# Patient Record
Sex: MALE | Race: WHITE | HISPANIC OR LATINO | Employment: OTHER | URBAN - METROPOLITAN AREA
[De-identification: names, ages, dates, MRNs, and addresses within clinical notes are randomized per-mention and may not be internally consistent; named-entity substitution may affect disease eponyms.]

---

## 2017-01-04 ENCOUNTER — OFFICE VISIT (OUTPATIENT)
Dept: INTERNAL MEDICINE | Facility: CLINIC | Age: 57
End: 2017-01-04
Payer: COMMERCIAL

## 2017-01-04 VITALS
OXYGEN SATURATION: 98 % | TEMPERATURE: 98.3 F | SYSTOLIC BLOOD PRESSURE: 124 MMHG | WEIGHT: 209.6 LBS | BODY MASS INDEX: 31.77 KG/M2 | DIASTOLIC BLOOD PRESSURE: 70 MMHG | HEIGHT: 68 IN | HEART RATE: 101 BPM

## 2017-01-04 DIAGNOSIS — E11.9 TYPE 2 DIABETES MELLITUS WITHOUT COMPLICATION, WITHOUT LONG-TERM CURRENT USE OF INSULIN (H): Primary | ICD-10-CM

## 2017-01-04 DIAGNOSIS — Z11.59 NEED FOR HEPATITIS C SCREENING TEST: ICD-10-CM

## 2017-01-04 DIAGNOSIS — T30.0 BURN OF SKIN: ICD-10-CM

## 2017-01-04 DIAGNOSIS — E78.5 HYPERLIPIDEMIA LDL GOAL <100: ICD-10-CM

## 2017-01-04 PROCEDURE — 99214 OFFICE O/P EST MOD 30 MIN: CPT | Performed by: INTERNAL MEDICINE

## 2017-01-04 RX ORDER — SILVER SULFADIAZINE 10 MG/G
CREAM TOPICAL 2 TIMES DAILY
Qty: 85 G | Refills: 1 | Status: SHIPPED | OUTPATIENT
Start: 2017-01-04 | End: 2017-01-11

## 2017-01-04 RX ORDER — LISINOPRIL 20 MG/1
20 TABLET ORAL 2 TIMES DAILY
Qty: 180 TABLET | Refills: 3 | Status: ON HOLD | OUTPATIENT
Start: 2017-01-04 | End: 2017-02-27

## 2017-01-04 RX ORDER — BLOOD-GLUCOSE METER
EACH MISCELLANEOUS
Qty: 1 KIT | Refills: 0 | Status: ON HOLD | OUTPATIENT
Start: 2017-01-04 | End: 2017-02-27

## 2017-01-04 RX ORDER — CEPHALEXIN 500 MG/1
500 CAPSULE ORAL 3 TIMES DAILY
Qty: 21 CAPSULE | Refills: 0 | Status: SHIPPED | OUTPATIENT
Start: 2017-01-04 | End: 2017-02-13

## 2017-01-04 RX ORDER — SIMVASTATIN 40 MG
40 TABLET ORAL AT BEDTIME
Qty: 90 TABLET | Refills: 3 | Status: ON HOLD | OUTPATIENT
Start: 2017-01-04 | End: 2017-02-27

## 2017-01-04 NOTE — NURSING NOTE
"Chief Complaint   Patient presents with     Diabetes       Initial /70 mmHg  Pulse 101  Temp(Src) 98.3  F (36.8  C) (Oral)  Ht 5' 8\" (1.727 m)  Wt 209 lb 9.6 oz (95.074 kg)  BMI 31.88 kg/m2  SpO2 98% Estimated body mass index is 31.88 kg/(m^2) as calculated from the following:    Height as of this encounter: 5' 8\" (1.727 m).    Weight as of this encounter: 209 lb 9.6 oz (95.074 kg).  BP completed using cuff size: stanley Roberson CMA      "
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
## 2017-01-12 ENCOUNTER — OFFICE VISIT (OUTPATIENT)
Dept: URGENT CARE | Facility: URGENT CARE | Age: 57
End: 2017-01-12
Payer: COMMERCIAL

## 2017-01-12 VITALS
TEMPERATURE: 98.6 F | WEIGHT: 209 LBS | BODY MASS INDEX: 31.79 KG/M2 | HEART RATE: 88 BPM | SYSTOLIC BLOOD PRESSURE: 130 MMHG | OXYGEN SATURATION: 100 % | DIASTOLIC BLOOD PRESSURE: 70 MMHG

## 2017-01-12 DIAGNOSIS — L03.116 CELLULITIS OF LEFT LOWER EXTREMITY: ICD-10-CM

## 2017-01-12 DIAGNOSIS — M79.672 LEFT FOOT PAIN: ICD-10-CM

## 2017-01-12 DIAGNOSIS — E11.40 TYPE 2 DIABETES MELLITUS WITH DIABETIC NEUROPATHY, WITH LONG-TERM CURRENT USE OF INSULIN (H): Primary | ICD-10-CM

## 2017-01-12 DIAGNOSIS — T30.0 BURNS OF MULTIPLE SPECIFIED SITES: ICD-10-CM

## 2017-01-12 DIAGNOSIS — Z79.4 TYPE 2 DIABETES MELLITUS WITH DIABETIC NEUROPATHY, WITH LONG-TERM CURRENT USE OF INSULIN (H): Primary | ICD-10-CM

## 2017-01-12 PROCEDURE — 96372 THER/PROPH/DIAG INJ SC/IM: CPT | Performed by: PHYSICIAN ASSISTANT

## 2017-01-12 PROCEDURE — 99214 OFFICE O/P EST MOD 30 MIN: CPT | Mod: 25 | Performed by: PHYSICIAN ASSISTANT

## 2017-01-12 RX ORDER — SILVER SULFADIAZINE 10 MG/G
CREAM TOPICAL 2 TIMES DAILY
Qty: 85 G | Refills: 1 | Status: SHIPPED | OUTPATIENT
Start: 2017-01-12 | End: 2017-01-19

## 2017-01-12 RX ORDER — CEFTRIAXONE 1 G/1
1000 INJECTION, POWDER, FOR SOLUTION INTRAMUSCULAR; INTRAVENOUS ONCE
Qty: 10 ML | Refills: 0 | OUTPATIENT
Start: 2017-01-12 | End: 2017-01-12

## 2017-01-12 RX ORDER — HYDROCODONE BITARTRATE AND ACETAMINOPHEN 5; 325 MG/1; MG/1
1-2 TABLET ORAL EVERY 8 HOURS PRN
Qty: 15 TABLET | Refills: 0 | Status: SHIPPED | OUTPATIENT
Start: 2017-01-12 | End: 2017-02-13
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
## 2017-02-06 DIAGNOSIS — E11.40 TYPE 2 DIABETES MELLITUS WITH DIABETIC NEUROPATHY, WITH LONG-TERM CURRENT USE OF INSULIN (H): Primary | ICD-10-CM

## 2017-02-06 DIAGNOSIS — Z79.4 TYPE 2 DIABETES MELLITUS WITH DIABETIC NEUROPATHY, WITH LONG-TERM CURRENT USE OF INSULIN (H): Primary | ICD-10-CM

## 2017-02-06 NOTE — TELEPHONE ENCOUNTER
glimepiride (AMARYL) 2 MG tablet      Last Written Prescription Date: 02/04/2016  Last Fill Quantity: 180,  # refills: 3   Last Office Visit with [facility], [facility] or  Health prescribing provider: 01/04/2017                                             metFORMIN (GLUCOPHAGE) 1000 MG tablet         Last Written Prescription Date: 02/04/2016  Last Fill Quantity: 180, # refills: 3  Last Office Visit with [facility], [facility] or [facility] prescribing provider:  01/04/2017        BP Readings from Last 3 Encounters:   01/12/17 130/70   01/04/17 124/70   02/04/16 138/80     MICROL       65   1/27/2016  No results found for this basename: microalbumin  CREATININE   Date Value Ref Range Status   01/27/2016 0.85 0.66 - 1.25 mg/dL Final   ]  GFR ESTIMATE   Date Value Ref Range Status   01/27/2016 >90  Non  GFR Calc   >60 mL/min/1.7m2 Final   01/19/2015 >90  Non  GFR Calc   >60 mL/min/1.7m2 Final   12/12/2013 >90 >60 mL/min/1.7m2 Final     GFR ESTIMATE IF BLACK   Date Value Ref Range Status   01/27/2016 >90   GFR Calc   >60 mL/min/1.7m2 Final   01/19/2015 >90   GFR Calc   >60 mL/min/1.7m2 Final   12/12/2013 >90 >60 mL/min/1.7m2 Final     CHOL      203   10/17/2016  HDL       44   10/17/2016  LDL      125   10/17/2016  TRIG      169   10/17/2016  CHOLHDLRATIO      3.3   1/19/2015  AST       30   1/27/2016  ALT       35   1/27/2016  A1C      9.6   10/17/2016  A1C      7.8   11/2/2015  A1C      7.1   1/19/2015  A1C     10.1   12/12/2013  A1C     10.1   2/26/2013  POTASSIUM   Date Value Ref Range Status   01/27/2016 4.2 3.4 - 5.3 mmol/L Final

## 2017-02-07 RX ORDER — GLIMEPIRIDE 2 MG/1
2 TABLET ORAL 2 TIMES DAILY
Qty: 180 TABLET | Refills: 0 | Status: ON HOLD | OUTPATIENT
Start: 2017-02-07 | End: 2017-03-20

## 2017-02-07 NOTE — TELEPHONE ENCOUNTER
Routing refill request to provider for review/approval because:  Labs not current:  Last A1C elevated

## 2017-02-10 DIAGNOSIS — E11.9 TYPE 2 DIABETES MELLITUS WITHOUT COMPLICATION, WITHOUT LONG-TERM CURRENT USE OF INSULIN (H): ICD-10-CM

## 2017-02-10 DIAGNOSIS — E78.5 HYPERLIPIDEMIA LDL GOAL <100: ICD-10-CM

## 2017-02-10 DIAGNOSIS — Z11.59 NEED FOR HEPATITIS C SCREENING TEST: ICD-10-CM

## 2017-02-10 LAB
ALBUMIN SERPL-MCNC: 3.7 G/DL (ref 3.4–5)
ALP SERPL-CCNC: 75 U/L (ref 40–150)
ALT SERPL W P-5'-P-CCNC: 24 U/L (ref 0–70)
ANION GAP SERPL CALCULATED.3IONS-SCNC: 7 MMOL/L (ref 3–14)
AST SERPL W P-5'-P-CCNC: 15 U/L (ref 0–45)
BILIRUB SERPL-MCNC: 0.4 MG/DL (ref 0.2–1.3)
BUN SERPL-MCNC: 7 MG/DL (ref 7–30)
CALCIUM SERPL-MCNC: 8.6 MG/DL (ref 8.5–10.1)
CHLORIDE SERPL-SCNC: 106 MMOL/L (ref 94–109)
CHOLEST SERPL-MCNC: 166 MG/DL
CO2 SERPL-SCNC: 26 MMOL/L (ref 20–32)
CREAT SERPL-MCNC: 0.81 MG/DL (ref 0.66–1.25)
GFR SERPL CREATININE-BSD FRML MDRD: NORMAL ML/MIN/1.7M2
GLUCOSE SERPL-MCNC: 84 MG/DL (ref 70–99)
HBA1C MFR BLD: 6.8 % (ref 4.3–6)
HDLC SERPL-MCNC: 42 MG/DL
LDLC SERPL CALC-MCNC: 109 MG/DL
NONHDLC SERPL-MCNC: 124 MG/DL
POTASSIUM SERPL-SCNC: 3.8 MMOL/L (ref 3.4–5.3)
PROT SERPL-MCNC: 7.6 G/DL (ref 6.8–8.8)
SODIUM SERPL-SCNC: 139 MMOL/L (ref 133–144)
TRIGL SERPL-MCNC: 76 MG/DL
TSH SERPL DL<=0.005 MIU/L-ACNC: 0.57 MU/L (ref 0.4–4)

## 2017-02-10 PROCEDURE — 36415 COLL VENOUS BLD VENIPUNCTURE: CPT | Performed by: INTERNAL MEDICINE

## 2017-02-10 PROCEDURE — 80053 COMPREHEN METABOLIC PANEL: CPT | Performed by: INTERNAL MEDICINE

## 2017-02-10 PROCEDURE — 83036 HEMOGLOBIN GLYCOSYLATED A1C: CPT | Performed by: INTERNAL MEDICINE

## 2017-02-10 PROCEDURE — 80061 LIPID PANEL: CPT | Performed by: INTERNAL MEDICINE

## 2017-02-10 PROCEDURE — 86803 HEPATITIS C AB TEST: CPT | Performed by: INTERNAL MEDICINE

## 2017-02-10 PROCEDURE — 84443 ASSAY THYROID STIM HORMONE: CPT | Performed by: INTERNAL MEDICINE

## 2017-02-13 ENCOUNTER — OFFICE VISIT (OUTPATIENT)
Dept: INTERNAL MEDICINE | Facility: CLINIC | Age: 57
End: 2017-02-13
Payer: COMMERCIAL

## 2017-02-13 VITALS
SYSTOLIC BLOOD PRESSURE: 128 MMHG | DIASTOLIC BLOOD PRESSURE: 72 MMHG | TEMPERATURE: 98.5 F | OXYGEN SATURATION: 98 % | HEART RATE: 72 BPM | HEIGHT: 68 IN | WEIGHT: 207.9 LBS | BODY MASS INDEX: 31.51 KG/M2

## 2017-02-13 DIAGNOSIS — Z79.4 TYPE 2 DIABETES MELLITUS WITH DIABETIC NEUROPATHY, WITH LONG-TERM CURRENT USE OF INSULIN (H): Primary | ICD-10-CM

## 2017-02-13 DIAGNOSIS — E11.40 TYPE 2 DIABETES MELLITUS WITH DIABETIC NEUROPATHY, WITH LONG-TERM CURRENT USE OF INSULIN (H): Primary | ICD-10-CM

## 2017-02-13 DIAGNOSIS — T25.232D: ICD-10-CM

## 2017-02-13 LAB — HCV AB SERPL QL IA: NORMAL

## 2017-02-13 PROCEDURE — 99214 OFFICE O/P EST MOD 30 MIN: CPT | Performed by: INTERNAL MEDICINE
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
## 2017-02-13 NOTE — NURSING NOTE
"Chief Complaint   Patient presents with     Diabetes     Foot Burn     Forms     FMLA       Initial /72 (BP Location: Left arm, Patient Position: Chair, Cuff Size: Adult Regular)  Pulse 72  Temp 98.5  F (36.9  C)  Ht 5' 8\" (1.727 m)  Wt 207 lb 14.4 oz (94.3 kg)  SpO2 98%  BMI 31.61 kg/m2 Estimated body mass index is 31.61 kg/(m^2) as calculated from the following:    Height as of this encounter: 5' 8\" (1.727 m).    Weight as of this encounter: 207 lb 14.4 oz (94.3 kg).  Medication Reconciliation: complete   Zeenat Roberson, RADHA      "

## 2017-02-17 ENCOUNTER — TELEPHONE (OUTPATIENT)
Dept: OTHER | Facility: CLINIC | Age: 57
End: 2017-02-17

## 2017-02-17 ENCOUNTER — HOSPITAL ENCOUNTER (OUTPATIENT)
Dept: WOUND CARE | Facility: CLINIC | Age: 57
Discharge: HOME | End: 2017-02-17
Attending: PODIATRIST | Admitting: PODIATRIST
Payer: COMMERCIAL

## 2017-02-17 DIAGNOSIS — I73.9 PAD (PERIPHERAL ARTERY DISEASE) (H): ICD-10-CM

## 2017-02-17 DIAGNOSIS — E11.42 DIABETIC POLYNEUROPATHY ASSOCIATED WITH TYPE 2 DIABETES MELLITUS (H): Primary | ICD-10-CM

## 2017-02-17 DIAGNOSIS — F17.200 TOBACCO USE DISORDER: ICD-10-CM

## 2017-02-17 DIAGNOSIS — T25.212A SECOND DEGREE BURN OF ANKLE, LEFT, INITIAL ENCOUNTER: ICD-10-CM

## 2017-02-17 DIAGNOSIS — I73.9 PAD (PERIPHERAL ARTERY DISEASE) (H): Primary | ICD-10-CM

## 2017-02-17 DIAGNOSIS — L97.522 ISCHEMIC ULCER OF LEFT FOOT WITH FAT LAYER EXPOSED (H): ICD-10-CM

## 2017-02-17 PROCEDURE — 11042 DBRDMT SUBQ TIS 1ST 20SQCM/<: CPT

## 2017-02-17 PROCEDURE — 99202 OFFICE O/P NEW SF 15 MIN: CPT | Mod: 25 | Performed by: PODIATRIST

## 2017-02-17 PROCEDURE — 11045 DBRDMT SUBQ TISS EACH ADDL: CPT | Performed by: PODIATRIST

## 2017-02-17 PROCEDURE — 99214 OFFICE O/P EST MOD 30 MIN: CPT | Mod: 25

## 2017-02-17 PROCEDURE — 93923 UPR/LXTR ART STDY 3+ LVLS: CPT

## 2017-02-17 PROCEDURE — 11042 DBRDMT SUBQ TIS 1ST 20SQCM/<: CPT | Performed by: PODIATRIST

## 2017-02-17 NOTE — TELEPHONE ENCOUNTER
I spoke with Gomez and he is scheduled for ANTOINETTE w/exercise and appointment with Dr. Aragon on 02/21/17. Milagro Rhodes,
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
## 2017-02-17 NOTE — TELEPHONE ENCOUNTER
Pt referred to [facility] by Dr. 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 Pt also has hx of DM II, Tobacco disorder.     Pt needs to be scheduled for ANTOINETTE with exercise and consult with vascular surgery (MD requesting Dr. Aragon).  Will route to scheduling to coordinate an appointment as soon as possible.    Sylvia Nolan RN BSN

## 2017-02-20 ENCOUNTER — TELEPHONE (OUTPATIENT)
Dept: WOUND CARE | Facility: CLINIC | Age: 57
End: 2017-02-20

## 2017-02-20 NOTE — TELEPHONE ENCOUNTER
Received call from [facility] at [phone] that they are not contracted with pt's insurance to fill Santyl ointment that Dr. Bell ordered on Friday. They have been trying to contact patient since Friday to let him know that they can not fill it and wondered if he has a local pharmacy to sent rx to. They will forward rx to [facility] in [facility] that pt had in his profile.

## 2017-02-21 ENCOUNTER — HOSPITAL ENCOUNTER (OUTPATIENT)
Dept: ULTRASOUND IMAGING | Facility: CLINIC | Age: 57
Discharge: HOME | End: 2017-02-21
Attending: SURGERY | Admitting: SURGERY
Payer: COMMERCIAL

## 2017-02-21 ENCOUNTER — OFFICE VISIT (OUTPATIENT)
Dept: OTHER | Facility: CLINIC | Age: 57
End: 2017-02-21
Attending: SURGERY
Payer: COMMERCIAL

## 2017-02-21 VITALS — HEART RATE: 80 BPM | SYSTOLIC BLOOD PRESSURE: 129 MMHG | DIASTOLIC BLOOD PRESSURE: 80 MMHG

## 2017-02-21 DIAGNOSIS — I73.9 PAD (PERIPHERAL ARTERY DISEASE) (H): ICD-10-CM

## 2017-02-21 DIAGNOSIS — E08.621 DIABETIC ULCER OF LEFT FOOT ASSOCIATED WITH DIABETES MELLITUS DUE TO UNDERLYING CONDITION (H): Primary | ICD-10-CM

## 2017-02-21 DIAGNOSIS — L97.529 DIABETIC ULCER OF LEFT FOOT ASSOCIATED WITH DIABETES MELLITUS DUE TO UNDERLYING CONDITION (H): Primary | ICD-10-CM

## 2017-02-21 PROCEDURE — 93924 LWR XTR VASC STDY BILAT: CPT

## 2017-02-21 PROCEDURE — 99214 OFFICE O/P EST MOD 30 MIN: CPT | Mod: ZP | Performed by: SURGERY

## 2017-02-21 PROCEDURE — 99211 OFF/OP EST MAY X REQ PHY/QHP: CPT | Mod: 25

## 2017-02-21 RX ORDER — CEPHALEXIN 500 MG/1
500 CAPSULE ORAL 3 TIMES DAILY
Qty: 30 CAPSULE | Refills: 0 | Status: SHIPPED | OUTPATIENT
Start: 2017-02-21 | End: 2017-02-24

## 2017-02-21 NOTE — MR AVS SNAPSHOT
After Visit Summary   2/21/2017    Gomez Turk    MRN: [id number]           Patient Information     Date Of Birth          1960        Visit Information        Provider Department      2/21/2017 12:00 PM Jesus Aragon MD [facility]      Today's Diagnoses     Diabetic ulcer of left foot associated with diabetes mellitus due to underlying condition (H)    -  1    PAD (peripheral artery disease) (H)           Follow-ups after your visit        Your next 10 appointments already scheduled     Mar 17, 2017  9:00 AM CDT   Return Visit with Nivia Bell DPM, Podiatry/Foot and Ankle Surgery   [facility] [place] [facility]    [address] [facility] [address] [place] [address] [phone]              Who to contact     If you have questions or need follow up information about today's clinic visit or your schedule please contact [facility] directly at [phone].  Normal or non-critical lab and imaging results will be communicated to you by [employer], letter or phone within 4 business days after the clinic has received the results. If you do not hear from us within 7 days, please contact the clinic through [employer] or phone. If you have a critical or abnormal lab result, we will notify you by phone as soon as possible.  Submit refill requests through [employer] or call your pharmacy and they will forward the refill request to us. Please allow 3 business days for your refill to be completed.          Additional Information About Your Visit        [employer] Information     [employer] gives you secure access to your electronic health record. If you see a primary care provider, you can also send messages to your care team and make appointments. If you have questions, please call your primary care clinic.  If you do not have a primary care provider, please call  [phone] and they will assist you.        Care EveryWhere ID     This is your Care EveryWhere ID. This could be used by other organizations to access your [place] medical records  [id number]        Your Vitals Were     Pulse                   80            Blood Pressure from Last 3 Encounters:   02/21/17 129/80   02/13/17 128/72   01/12/17 130/70    Weight from Last 3 Encounters:   02/13/17 207 lb 14.4 oz (94.3 kg)   01/12/17 209 lb (94.8 kg)   01/04/17 209 lb 9.6 oz (95.1 kg)              Today, you had the following     No orders found for display         Today's Medication Changes          These changes are accurate as of: 2/21/17 12:54 PM.  If you have any questions, ask your nurse or doctor.               Start taking these medicines.        Dose/Directions    cephALEXin 500 MG capsule   Commonly known as:  KEFLEX   Used for:  Diabetic ulcer of left foot associated with diabetes mellitus due to underlying condition (H)   Started by:  Jesus Aragon MD        Dose:  500 mg   Take 1 capsule (500 mg) by mouth 3 times daily   Quantity:  30 capsule   Refills:  0            Where to get your medicines      These medications [facility] to [id number] [address] [facility]  [address] [facility] [address]     Phone:  [phone]     cephALEXin 500 MG capsule                Primary Care Provider Office Phone # Fax #    Tylor Roberts [phone]       [facility] [address] [facility] [address]        Thank you!     Thank you for choosing [facility] [place]  for your care. Our goal is always to provide you with excellent care. Hearing back from our patients is one way we can continue to improve our services. Please take a few minutes to complete the written survey that you may receive in the mail after your visit with us. Thank you!             Your Updated Medication List - Protect others around you: Learn  "how to safely use, store and throw away your medicines at www.disposemymeds.org.          This list is accurate as of: 2/21/17 12:54 PM.  Always use your most recent med list.                   Brand Name Dispense Instructions for use    aspirin 81 MG tablet     100 tablet    Take 81 mg by mouth daily Reported on 2/21/2017       * blood glucose monitoring meter device kit     1 kit    Use to test blood sugars 4 times daily or as directed with appropriate accu strips for QID use as well as control solution, + lancets, # 1 month RF times 11.       * blood glucose monitoring meter device kit     1 kit    Use to test blood sugars 3 times daily or as directed with test strips and lancets per insurance coverage, 3 month supply RF prn       blood glucose monitoring test strip    ACCU-CHEK SUNITA    1 Box    4 strips by In Vitro route 4 times daily       cephALEXin 500 MG capsule    KEFLEX    30 capsule    Take 1 capsule (500 mg) by mouth 3 times daily       collagenase ointment     90 g    Apply topically daily       exenatide ER 2 MG recon vial kit susp for weekly inj    BYDUREON    3 kit    Inject 2 mg Subcutaneous every 7 days       glimepiride 2 MG tablet    AMARYL    180 tablet    Take 1 tablet (2 mg) by mouth 2 times daily       lidocaine 2 % topical gel    XYLOCAINE    30 mL    Apply topically as needed for moderate pain       lisinopril 20 MG tablet    PRINIVIL    180 tablet    Take 1 tablet (20 mg) by mouth 2 times daily       metFORMIN 1000 MG tablet    GLUCOPHAGE    180 tablet    Take 1 tablet (1,000 mg) by mouth 2 times daily (with meals)       order for DME     1 Device    Post of shoe       order for DME     30 days    Equipment being ordered: [employer] Fax [phone]  Primary Dressing 4x4 non-sterile gauze   Qty 2 loafs Secondary Dressing Kerlix wrap 4\" Qty 30 Length of Need: 1 month Frequency of dressing change: daily       sildenafil 100 MG cap/tab    VIAGRA    12 tablet    Take 1 tablet (100 " mg) by mouth daily as needed       simvastatin 40 MG tablet    ZOCOR    90 tablet    Take 1 tablet (40 mg) by mouth At Bedtime at bedtime.       thin lancets    NO BRAND SPECIFIED    1 month    as directed qid       * Notice:  This list has 2 medication(s) that are the same as other medications prescribed for you. Read the directions carefully, and ask your doctor or other care provider to review them with you.
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
## 2017-02-24 ENCOUNTER — TELEPHONE (OUTPATIENT)
Dept: OTHER | Facility: CLINIC | Age: 57
End: 2017-02-24

## 2017-02-24 DIAGNOSIS — E08.621 DIABETIC ULCER OF LEFT FOOT ASSOCIATED WITH DIABETES MELLITUS DUE TO UNDERLYING CONDITION (H): ICD-10-CM

## 2017-02-24 DIAGNOSIS — L97.529 DIABETIC ULCER OF LEFT FOOT ASSOCIATED WITH DIABETES MELLITUS DUE TO UNDERLYING CONDITION (H): ICD-10-CM

## 2017-02-24 RX ORDER — CEPHALEXIN 500 MG/1
500 CAPSULE ORAL 3 TIMES DAILY
Qty: 17 CAPSULE | Refills: 0 | Status: SHIPPED | OUTPATIENT
Start: 2017-02-24 | End: 2017-03-16

## 2017-02-24 NOTE — TELEPHONE ENCOUNTER
Pts wife called noting dog ate some of pts pills, only has 4 left of Cephalexin.     Order placed for for refill of remaining pills needed, pt and pts wife note understanding.      Sylvia Nolan, RN, BSN.

## 2017-02-27 ENCOUNTER — HOSPITAL ENCOUNTER (INPATIENT)
Facility: CLINIC | Age: 57
LOS: 8 days | Discharge: HOME HEALTH | DRG: 253 | End: 2017-03-07
Attending: RADIOLOGY | Admitting: SURGERY
Payer: COMMERCIAL

## 2017-02-27 ENCOUNTER — APPOINTMENT (OUTPATIENT)
Dept: ULTRASOUND IMAGING | Facility: CLINIC | Age: 57
DRG: 253 | End: 2017-02-27
Attending: SURGERY
Payer: COMMERCIAL

## 2017-02-27 ENCOUNTER — APPOINTMENT (OUTPATIENT)
Dept: INTERVENTIONAL RADIOLOGY/VASCULAR | Facility: CLINIC | Age: 57
DRG: 253 | End: 2017-02-27
Attending: SURGERY
Payer: COMMERCIAL

## 2017-02-27 DIAGNOSIS — E11.9 TYPE 2 DIABETES MELLITUS WITHOUT COMPLICATION, WITHOUT LONG-TERM CURRENT USE OF INSULIN (H): ICD-10-CM

## 2017-02-27 DIAGNOSIS — Z79.4 TYPE 2 DIABETES MELLITUS WITH DIABETIC NEUROPATHY, WITH LONG-TERM CURRENT USE OF INSULIN (H): Primary | ICD-10-CM

## 2017-02-27 DIAGNOSIS — I73.9 PAD (PERIPHERAL ARTERY DISEASE) (H): ICD-10-CM

## 2017-02-27 DIAGNOSIS — E11.40 TYPE 2 DIABETES MELLITUS WITH DIABETIC NEUROPATHY, WITH LONG-TERM CURRENT USE OF INSULIN (H): Primary | ICD-10-CM

## 2017-02-27 LAB
ANION GAP SERPL CALCULATED.3IONS-SCNC: 6 MMOL/L (ref 3–14)
APTT PPP: 31 SEC (ref 22–37)
APTT PPP: 40 SEC (ref 22–37)
BASOPHILS # BLD AUTO: 0 10E9/L (ref 0–0.2)
BASOPHILS NFR BLD AUTO: 0.3 %
BUN SERPL-MCNC: 10 MG/DL (ref 7–30)
CALCIUM SERPL-MCNC: 8.6 MG/DL (ref 8.5–10.1)
CHLORIDE SERPL-SCNC: 106 MMOL/L (ref 94–109)
CO2 SERPL-SCNC: 28 MMOL/L (ref 20–32)
CREAT SERPL-MCNC: 0.7 MG/DL (ref 0.66–1.25)
CREAT SERPL-MCNC: 0.77 MG/DL (ref 0.66–1.25)
DIFFERENTIAL METHOD BLD: ABNORMAL
EOSINOPHIL # BLD AUTO: 0.2 10E9/L (ref 0–0.7)
EOSINOPHIL NFR BLD AUTO: 1.3 %
ERYTHROCYTE [DISTWIDTH] IN BLOOD BY AUTOMATED COUNT: 13 % (ref 10–15)
ERYTHROCYTE [DISTWIDTH] IN BLOOD BY AUTOMATED COUNT: 13.1 % (ref 10–15)
ERYTHROCYTE [DISTWIDTH] IN BLOOD BY AUTOMATED COUNT: 13.1 % (ref 10–15)
GFR SERPL CREATININE-BSD FRML MDRD: NORMAL ML/MIN/1.7M2
GFR SERPL CREATININE-BSD FRML MDRD: NORMAL ML/MIN/1.7M2
GLUCOSE BLDC GLUCOMTR-MCNC: 74 MG/DL (ref 70–99)
GLUCOSE BLDC GLUCOMTR-MCNC: 85 MG/DL (ref 70–99)
GLUCOSE SERPL-MCNC: 87 MG/DL (ref 70–99)
HCT VFR BLD AUTO: 36.4 % (ref 40–53)
HCT VFR BLD AUTO: 37.3 % (ref 40–53)
HCT VFR BLD AUTO: 39 % (ref 40–53)
HGB BLD-MCNC: 12.5 G/DL (ref 13.3–17.7)
HGB BLD-MCNC: 12.7 G/DL (ref 13.3–17.7)
HGB BLD-MCNC: 13.2 G/DL (ref 13.3–17.7)
IMM GRANULOCYTES # BLD: 0 10E9/L (ref 0–0.4)
IMM GRANULOCYTES NFR BLD: 0.2 %
INR PPP: 0.9 (ref 0.86–1.14)
INR PPP: 0.93 (ref 0.86–1.14)
LMWH PPP CHRO-ACNC: NORMAL IU/ML
LYMPHOCYTES # BLD AUTO: 3.2 10E9/L (ref 0.8–5.3)
LYMPHOCYTES NFR BLD AUTO: 24.5 %
MCH RBC QN AUTO: 31.3 PG (ref 26.5–33)
MCH RBC QN AUTO: 31.3 PG (ref 26.5–33)
MCH RBC QN AUTO: 31.6 PG (ref 26.5–33)
MCHC RBC AUTO-ENTMCNC: 33.8 G/DL (ref 31.5–36.5)
MCHC RBC AUTO-ENTMCNC: 34 G/DL (ref 31.5–36.5)
MCHC RBC AUTO-ENTMCNC: 34.3 G/DL (ref 31.5–36.5)
MCV RBC AUTO: 92 FL (ref 78–100)
MONOCYTES # BLD AUTO: 1.1 10E9/L (ref 0–1.3)
MONOCYTES NFR BLD AUTO: 8.1 %
NEUTROPHILS # BLD AUTO: 8.6 10E9/L (ref 1.6–8.3)
NEUTROPHILS NFR BLD AUTO: 65.6 %
NRBC # BLD AUTO: 0 10*3/UL
NRBC BLD AUTO-RTO: 0 /100
PLATELET # BLD AUTO: 381 10E9/L (ref 150–450)
PLATELET # BLD AUTO: 382 10E9/L (ref 150–450)
PLATELET # BLD AUTO: 422 10E9/L (ref 150–450)
POTASSIUM SERPL-SCNC: 3.7 MMOL/L (ref 3.4–5.3)
RBC # BLD AUTO: 3.96 10E12/L (ref 4.4–5.9)
RBC # BLD AUTO: 4.06 10E12/L (ref 4.4–5.9)
RBC # BLD AUTO: 4.22 10E12/L (ref 4.4–5.9)
SODIUM SERPL-SCNC: 140 MMOL/L (ref 133–144)
WBC # BLD AUTO: 13.1 10E9/L (ref 4–11)
WBC # BLD AUTO: 14.5 10E9/L (ref 4–11)
WBC # BLD AUTO: 14.9 10E9/L (ref 4–11)

## 2017-02-27 PROCEDURE — 36415 COLL VENOUS BLD VENIPUNCTURE: CPT | Performed by: INTERNAL MEDICINE

## 2017-02-27 PROCEDURE — 85520 HEPARIN ASSAY: CPT | Performed by: SURGERY

## 2017-02-27 PROCEDURE — 85610 PROTHROMBIN TIME: CPT | Performed by: INTERNAL MEDICINE

## 2017-02-27 PROCEDURE — 85730 THROMBOPLASTIN TIME PARTIAL: CPT | Performed by: RADIOLOGY

## 2017-02-27 PROCEDURE — C1757 CATH, THROMBECTOMY/EMBOLECT: HCPCS

## 2017-02-27 PROCEDURE — 27210906 ZZH KIT CR8

## 2017-02-27 PROCEDURE — 25500064 ZZH RX 255 OP 636: Performed by: RADIOLOGY

## 2017-02-27 PROCEDURE — 27210804 ZZH SHEATH CR3

## 2017-02-27 PROCEDURE — C1769 GUIDE WIRE: HCPCS

## 2017-02-27 PROCEDURE — 25000132 ZZH RX MED GY IP 250 OP 250 PS 637: Performed by: INTERNAL MEDICINE

## 2017-02-27 PROCEDURE — 36415 COLL VENOUS BLD VENIPUNCTURE: CPT | Performed by: SURGERY

## 2017-02-27 PROCEDURE — 85025 COMPLETE CBC W/AUTO DIFF WBC: CPT | Performed by: INTERNAL MEDICINE

## 2017-02-27 PROCEDURE — 27210845 ZZH DEVICE INFLATION CR5

## 2017-02-27 PROCEDURE — 85347 COAGULATION TIME ACTIVATED: CPT

## 2017-02-27 PROCEDURE — 75710 ARTERY X-RAYS ARM/LEG: CPT | Mod: LT

## 2017-02-27 PROCEDURE — 82565 ASSAY OF CREATININE: CPT | Performed by: RADIOLOGY

## 2017-02-27 PROCEDURE — 25000125 ZZHC RX 250: Performed by: RADIOLOGY

## 2017-02-27 PROCEDURE — 27210742 ZZH CATH CR1

## 2017-02-27 PROCEDURE — 25000128 H RX IP 250 OP 636: Performed by: INTERNAL MEDICINE

## 2017-02-27 PROCEDURE — 36415 COLL VENOUS BLD VENIPUNCTURE: CPT | Performed by: RADIOLOGY

## 2017-02-27 PROCEDURE — 25000132 ZZH RX MED GY IP 250 OP 250 PS 637: Performed by: PHYSICIAN ASSISTANT

## 2017-02-27 PROCEDURE — 00000146 ZZHCL STATISTIC GLUCOSE BY METER IP

## 2017-02-27 PROCEDURE — 80048 BASIC METABOLIC PNL TOTAL CA: CPT | Performed by: INTERNAL MEDICINE

## 2017-02-27 PROCEDURE — 85610 PROTHROMBIN TIME: CPT | Performed by: RADIOLOGY

## 2017-02-27 PROCEDURE — 40000853 ZZH STATISTIC ANGIOGRAM, STENT, VERTEBRO PLASTY

## 2017-02-27 PROCEDURE — 27210808 ZZH SHEATH CR7

## 2017-02-27 PROCEDURE — 25000128 H RX IP 250 OP 636: Performed by: RADIOLOGY

## 2017-02-27 PROCEDURE — 25000128 H RX IP 250 OP 636

## 2017-02-27 PROCEDURE — 25000132 ZZH RX MED GY IP 250 OP 250 PS 637: Performed by: RADIOLOGY

## 2017-02-27 PROCEDURE — 27210886 ZZH ACCESSORY CR5

## 2017-02-27 PROCEDURE — 047U3ZZ DILATION OF LEFT PERONEAL ARTERY, PERCUTANEOUS APPROACH: ICD-10-PCS | Performed by: RADIOLOGY

## 2017-02-27 PROCEDURE — 85730 THROMBOPLASTIN TIME PARTIAL: CPT | Performed by: INTERNAL MEDICINE

## 2017-02-27 PROCEDURE — 25000125 ZZHC RX 250

## 2017-02-27 PROCEDURE — 99223 1ST HOSP IP/OBS HIGH 75: CPT | Performed by: INTERNAL MEDICINE

## 2017-02-27 PROCEDURE — 25000128 H RX IP 250 OP 636: Performed by: SURGERY

## 2017-02-27 PROCEDURE — 85027 COMPLETE CBC AUTOMATED: CPT | Performed by: RADIOLOGY

## 2017-02-27 PROCEDURE — 12000007 ZZH R&B INTERMEDIATE

## 2017-02-27 PROCEDURE — 27210892 ZZH CATH CR4

## 2017-02-27 PROCEDURE — 93971 EXTREMITY STUDY: CPT | Mod: LT

## 2017-02-27 RX ORDER — BISACODYL 5 MG
5-15 TABLET, DELAYED RELEASE (ENTERIC COATED) ORAL DAILY PRN
Status: DISCONTINUED | OUTPATIENT
Start: 2017-02-27 | End: 2017-03-07 | Stop reason: HOSPADM

## 2017-02-27 RX ORDER — NICOTINE POLACRILEX 4 MG
15-30 LOZENGE BUCCAL
Status: DISCONTINUED | OUTPATIENT
Start: 2017-02-27 | End: 2017-03-07 | Stop reason: HOSPADM

## 2017-02-27 RX ORDER — PIPERACILLIN SODIUM, TAZOBACTAM SODIUM 3; .375 G/15ML; G/15ML
3.38 INJECTION, POWDER, LYOPHILIZED, FOR SOLUTION INTRAVENOUS EVERY 6 HOURS
Status: DISCONTINUED | OUTPATIENT
Start: 2017-02-27 | End: 2017-03-04

## 2017-02-27 RX ORDER — FLUMAZENIL 0.1 MG/ML
0.2 INJECTION, SOLUTION INTRAVENOUS
Status: DISCONTINUED | OUTPATIENT
Start: 2017-02-27 | End: 2017-02-27 | Stop reason: HOSPADM

## 2017-02-27 RX ORDER — LIDOCAINE 40 MG/G
CREAM TOPICAL
Status: DISCONTINUED | OUTPATIENT
Start: 2017-02-27 | End: 2017-02-27 | Stop reason: HOSPADM

## 2017-02-27 RX ORDER — NALOXONE HYDROCHLORIDE 0.4 MG/ML
.1-.4 INJECTION, SOLUTION INTRAMUSCULAR; INTRAVENOUS; SUBCUTANEOUS
Status: DISCONTINUED | OUTPATIENT
Start: 2017-02-27 | End: 2017-03-07 | Stop reason: HOSPADM

## 2017-02-27 RX ORDER — FENTANYL CITRATE 50 UG/ML
INJECTION, SOLUTION INTRAMUSCULAR; INTRAVENOUS
Status: COMPLETED
Start: 2017-02-27 | End: 2017-02-27

## 2017-02-27 RX ORDER — FENTANYL CITRATE 50 UG/ML
25-50 INJECTION, SOLUTION INTRAMUSCULAR; INTRAVENOUS EVERY 5 MIN PRN
Status: DISCONTINUED | OUTPATIENT
Start: 2017-02-27 | End: 2017-02-27 | Stop reason: HOSPADM

## 2017-02-27 RX ORDER — ASPIRIN 81 MG/1
81 TABLET ORAL DAILY
Status: DISCONTINUED | OUTPATIENT
Start: 2017-02-27 | End: 2017-03-07 | Stop reason: HOSPADM

## 2017-02-27 RX ORDER — POTASSIUM CHLORIDE 29.8 MG/ML
20 INJECTION INTRAVENOUS
Status: DISCONTINUED | OUTPATIENT
Start: 2017-02-27 | End: 2017-03-07 | Stop reason: HOSPADM

## 2017-02-27 RX ORDER — ATORVASTATIN CALCIUM 40 MG/1
40 TABLET, FILM COATED ORAL
Status: DISCONTINUED | OUTPATIENT
Start: 2017-02-27 | End: 2017-03-07 | Stop reason: HOSPADM

## 2017-02-27 RX ORDER — PROCHLORPERAZINE 25 MG
25 SUPPOSITORY, RECTAL RECTAL EVERY 12 HOURS PRN
Status: DISCONTINUED | OUTPATIENT
Start: 2017-02-27 | End: 2017-03-04

## 2017-02-27 RX ORDER — EZETIMIBE 10 MG/1
10 TABLET ORAL AT BEDTIME
Status: CANCELLED | OUTPATIENT
Start: 2017-02-27

## 2017-02-27 RX ORDER — TRAZODONE HYDROCHLORIDE 50 MG/1
50 TABLET, FILM COATED ORAL
Status: DISCONTINUED | OUTPATIENT
Start: 2017-02-27 | End: 2017-03-02

## 2017-02-27 RX ORDER — OXYCODONE HYDROCHLORIDE 5 MG/1
5-10 TABLET ORAL
Status: DISCONTINUED | OUTPATIENT
Start: 2017-02-27 | End: 2017-03-01

## 2017-02-27 RX ORDER — POTASSIUM CHLORIDE 1.5 G/1.58G
20-40 POWDER, FOR SOLUTION ORAL
Status: DISCONTINUED | OUTPATIENT
Start: 2017-02-27 | End: 2017-03-07 | Stop reason: HOSPADM

## 2017-02-27 RX ORDER — POLYETHYLENE GLYCOL 3350 17 G/17G
17 POWDER, FOR SOLUTION ORAL DAILY PRN
Status: DISCONTINUED | OUTPATIENT
Start: 2017-02-27 | End: 2017-03-07 | Stop reason: HOSPADM

## 2017-02-27 RX ORDER — ONDANSETRON 4 MG/1
4 TABLET, ORALLY DISINTEGRATING ORAL EVERY 6 HOURS PRN
Status: DISCONTINUED | OUTPATIENT
Start: 2017-02-27 | End: 2017-03-07 | Stop reason: HOSPADM

## 2017-02-27 RX ORDER — NITROGLYCERIN 5 MG/ML
100-500 VIAL (ML) INTRAVENOUS EVERY 5 MIN PRN
Status: DISCONTINUED | OUTPATIENT
Start: 2017-02-27 | End: 2017-02-27 | Stop reason: HOSPADM

## 2017-02-27 RX ORDER — HYDROMORPHONE HYDROCHLORIDE 1 MG/ML
.3-.5 INJECTION, SOLUTION INTRAMUSCULAR; INTRAVENOUS; SUBCUTANEOUS
Status: DISCONTINUED | OUTPATIENT
Start: 2017-02-27 | End: 2017-03-01

## 2017-02-27 RX ORDER — TRAZODONE HYDROCHLORIDE 50 MG/1
50 TABLET, FILM COATED ORAL AT BEDTIME
Status: DISCONTINUED | OUTPATIENT
Start: 2017-02-27 | End: 2017-02-27

## 2017-02-27 RX ORDER — IODIXANOL 320 MG/ML
150 INJECTION, SOLUTION INTRAVASCULAR ONCE
Status: COMPLETED | OUTPATIENT
Start: 2017-02-27 | End: 2017-02-27

## 2017-02-27 RX ORDER — HEPARIN SODIUM 1000 [USP'U]/ML
INJECTION, SOLUTION INTRAVENOUS; SUBCUTANEOUS
Status: DISCONTINUED
Start: 2017-02-27 | End: 2017-02-27 | Stop reason: HOSPADM

## 2017-02-27 RX ORDER — LISINOPRIL 20 MG/1
20 TABLET ORAL 2 TIMES DAILY
Status: DISCONTINUED | OUTPATIENT
Start: 2017-02-27 | End: 2017-03-07 | Stop reason: HOSPADM

## 2017-02-27 RX ORDER — SIMVASTATIN 40 MG
40 TABLET ORAL AT BEDTIME
Status: CANCELLED | OUTPATIENT
Start: 2017-02-27

## 2017-02-27 RX ORDER — LIDOCAINE HYDROCHLORIDE 10 MG/ML
1-30 INJECTION, SOLUTION EPIDURAL; INFILTRATION; INTRACAUDAL; PERINEURAL
Status: COMPLETED | OUTPATIENT
Start: 2017-02-27 | End: 2017-02-27

## 2017-02-27 RX ORDER — CEPHALEXIN 500 MG/1
500 CAPSULE ORAL 3 TIMES DAILY
Status: DISCONTINUED | OUTPATIENT
Start: 2017-02-27 | End: 2017-02-27

## 2017-02-27 RX ORDER — SODIUM CHLORIDE 9 MG/ML
INJECTION, SOLUTION INTRAVENOUS CONTINUOUS
Status: DISCONTINUED | OUTPATIENT
Start: 2017-02-27 | End: 2017-02-27 | Stop reason: HOSPADM

## 2017-02-27 RX ORDER — NALOXONE HYDROCHLORIDE 0.4 MG/ML
.1-.4 INJECTION, SOLUTION INTRAMUSCULAR; INTRAVENOUS; SUBCUTANEOUS
Status: DISCONTINUED | OUTPATIENT
Start: 2017-02-27 | End: 2017-02-27 | Stop reason: HOSPADM

## 2017-02-27 RX ORDER — NITROGLYCERIN 5 MG/ML
VIAL (ML) INTRAVENOUS
Status: COMPLETED
Start: 2017-02-27 | End: 2017-02-27

## 2017-02-27 RX ORDER — GLIMEPIRIDE 2 MG/1
2 TABLET ORAL 2 TIMES DAILY WITH MEALS
Status: DISCONTINUED | OUTPATIENT
Start: 2017-02-27 | End: 2017-03-02

## 2017-02-27 RX ORDER — POTASSIUM CHLORIDE 7.45 MG/ML
10 INJECTION INTRAVENOUS
Status: DISCONTINUED | OUTPATIENT
Start: 2017-02-27 | End: 2017-03-07 | Stop reason: HOSPADM

## 2017-02-27 RX ORDER — DEXTROSE MONOHYDRATE 25 G/50ML
25-50 INJECTION, SOLUTION INTRAVENOUS
Status: DISCONTINUED | OUTPATIENT
Start: 2017-02-27 | End: 2017-03-07 | Stop reason: HOSPADM

## 2017-02-27 RX ORDER — PROCHLORPERAZINE MALEATE 5 MG
5-10 TABLET ORAL EVERY 6 HOURS PRN
Status: DISCONTINUED | OUTPATIENT
Start: 2017-02-27 | End: 2017-03-04

## 2017-02-27 RX ORDER — ACETAMINOPHEN 325 MG/1
650 TABLET ORAL EVERY 4 HOURS PRN
Status: DISCONTINUED | OUTPATIENT
Start: 2017-02-27 | End: 2017-03-07 | Stop reason: HOSPADM

## 2017-02-27 RX ORDER — HEPARIN SODIUM 1000 [USP'U]/ML
500-6000 INJECTION, SOLUTION INTRAVENOUS; SUBCUTANEOUS
Status: COMPLETED | OUTPATIENT
Start: 2017-02-27 | End: 2017-02-27

## 2017-02-27 RX ORDER — FENTANYL CITRATE 50 UG/ML
INJECTION, SOLUTION INTRAMUSCULAR; INTRAVENOUS
Status: DISCONTINUED
Start: 2017-02-27 | End: 2017-02-27 | Stop reason: HOSPADM

## 2017-02-27 RX ORDER — SODIUM CHLORIDE 450 MG/100ML
INJECTION, SOLUTION INTRAVENOUS CONTINUOUS
Status: DISCONTINUED | OUTPATIENT
Start: 2017-02-28 | End: 2017-03-03 | Stop reason: CLARIF

## 2017-02-27 RX ORDER — SODIUM CHLORIDE 9 MG/ML
INJECTION, SOLUTION INTRAVENOUS CONTINUOUS
Status: DISCONTINUED | OUTPATIENT
Start: 2017-02-27 | End: 2017-02-27

## 2017-02-27 RX ORDER — ONDANSETRON 2 MG/ML
4 INJECTION INTRAMUSCULAR; INTRAVENOUS EVERY 6 HOURS PRN
Status: DISCONTINUED | OUTPATIENT
Start: 2017-02-27 | End: 2017-03-07 | Stop reason: HOSPADM

## 2017-02-27 RX ORDER — BISACODYL 10 MG
10 SUPPOSITORY, RECTAL RECTAL DAILY PRN
Status: DISCONTINUED | OUTPATIENT
Start: 2017-02-27 | End: 2017-03-07 | Stop reason: HOSPADM

## 2017-02-27 RX ORDER — ACETAMINOPHEN 500 MG
500-1000 TABLET ORAL EVERY 6 HOURS PRN
Status: DISCONTINUED | OUTPATIENT
Start: 2017-02-27 | End: 2017-03-01

## 2017-02-27 RX ORDER — POTASSIUM CHLORIDE 1500 MG/1
20-40 TABLET, EXTENDED RELEASE ORAL
Status: DISCONTINUED | OUTPATIENT
Start: 2017-02-27 | End: 2017-03-07 | Stop reason: HOSPADM

## 2017-02-27 RX ADMIN — LISINOPRIL 20 MG: 20 TABLET ORAL at 20:12

## 2017-02-27 RX ADMIN — Medication 3500 UNITS: at 22:43

## 2017-02-27 RX ADMIN — LIDOCAINE HYDROCHLORIDE 100 MG: 10 INJECTION, SOLUTION EPIDURAL; INFILTRATION; INTRACAUDAL; PERINEURAL at 08:30

## 2017-02-27 RX ADMIN — SODIUM CHLORIDE: 9 INJECTION, SOLUTION INTRAVENOUS at 07:15

## 2017-02-27 RX ADMIN — GLIMEPIRIDE 2 MG: 2 TABLET ORAL at 18:14

## 2017-02-27 RX ADMIN — MIDAZOLAM HYDROCHLORIDE 1 MG: 1 INJECTION, SOLUTION INTRAMUSCULAR; INTRAVENOUS at 08:20

## 2017-02-27 RX ADMIN — FENTANYL CITRATE 50 MCG: 50 INJECTION, SOLUTION INTRAMUSCULAR; INTRAVENOUS at 09:10

## 2017-02-27 RX ADMIN — HEPARIN SODIUM 950 UNITS/HR: 10000 INJECTION, SOLUTION INTRAVENOUS at 15:06

## 2017-02-27 RX ADMIN — MIDAZOLAM HYDROCHLORIDE 1 MG: 1 INJECTION, SOLUTION INTRAMUSCULAR; INTRAVENOUS at 08:40

## 2017-02-27 RX ADMIN — FENTANYL CITRATE 50 MCG: 50 INJECTION, SOLUTION INTRAMUSCULAR; INTRAVENOUS at 08:20

## 2017-02-27 RX ADMIN — HEPARIN SODIUM 4000 UNITS: 1000 INJECTION, SOLUTION INTRAVENOUS; SUBCUTANEOUS at 09:00

## 2017-02-27 RX ADMIN — TRAZODONE HYDROCHLORIDE 50 MG: 50 TABLET ORAL at 22:05

## 2017-02-27 RX ADMIN — MIDAZOLAM HYDROCHLORIDE 1 MG: 1 INJECTION, SOLUTION INTRAMUSCULAR; INTRAVENOUS at 10:05

## 2017-02-27 RX ADMIN — NITROGLYCERIN 200 MCG: 10 INJECTION INTRAVENOUS at 09:24

## 2017-02-27 RX ADMIN — FENTANYL CITRATE 50 MCG: 50 INJECTION, SOLUTION INTRAMUSCULAR; INTRAVENOUS at 08:40

## 2017-02-27 RX ADMIN — NITROGLYCERIN 200 MCG: 10 INJECTION INTRAVENOUS at 09:52

## 2017-02-27 RX ADMIN — ATORVASTATIN CALCIUM 40 MG: 40 TABLET, FILM COATED ORAL at 20:12

## 2017-02-27 RX ADMIN — ASPIRIN 81 MG: 81 TABLET, COATED ORAL at 14:55

## 2017-02-27 RX ADMIN — NITROGLYCERIN 200 MCG: 10 INJECTION INTRAVENOUS at 09:01

## 2017-02-27 RX ADMIN — NICOTINE 1 PATCH: 7 PATCH, EXTENDED RELEASE TRANSDERMAL at 14:55

## 2017-02-27 RX ADMIN — PIPERACILLIN SODIUM,TAZOBACTAM SODIUM 3.38 G: 3; .375 INJECTION, POWDER, FOR SOLUTION INTRAVENOUS at 14:58

## 2017-02-27 RX ADMIN — FENTANYL CITRATE 50 MCG: 50 INJECTION, SOLUTION INTRAMUSCULAR; INTRAVENOUS at 09:45

## 2017-02-27 RX ADMIN — PIPERACILLIN SODIUM,TAZOBACTAM SODIUM 3.38 G: 3; .375 INJECTION, POWDER, FOR SOLUTION INTRAVENOUS at 21:11

## 2017-02-27 RX ADMIN — FENTANYL CITRATE 50 MCG: 50 INJECTION, SOLUTION INTRAMUSCULAR; INTRAVENOUS at 08:30

## 2017-02-27 RX ADMIN — IODIXANOL 200 ML: 320 INJECTION, SOLUTION INTRAVASCULAR at 10:12

## 2017-02-27 RX ADMIN — ACETAMINOPHEN 1000 MG: 500 TABLET, FILM COATED ORAL at 11:03

## 2017-02-27 RX ADMIN — FENTANYL CITRATE 50 MCG: 50 INJECTION, SOLUTION INTRAMUSCULAR; INTRAVENOUS at 10:05
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
## 2017-02-27 NOTE — PROGRESS NOTES
1106 Pt back to room at 1045. Right groin pain at 8, pt given po tylenol. Pt given water so far. VSS. Right groin soft, no bleed or hematoma. Call light in place.   1121 ultrasound here doing US vein mapping now. Wife in room.  1133 hob up 30 degrees. Meal given. Wife helping him.

## 2017-02-27 NOTE — IP AVS SNAPSHOT
[facility]    [address] [place] [address]    Phone:  [phone]                                       After Visit Summary   2/27/2017    Gomez Turk    MRN: [id number]           After Visit Summary Signature Page     I have received my discharge instructions, and my questions have been answered. I have discussed any challenges I see with this plan with the nurse or doctor.    ..........................................................................................................................................  Patient/Patient Representative Signature      ..........................................................................................................................................  Patient Representative Print Name and Relationship to Patient    ..................................................               ................................................  Date                                            Time    ..........................................................................................................................................  Reviewed by Signature/Title    ...................................................              ..............................................  Date                                                            Time

## 2017-02-27 NOTE — PROGRESS NOTES
Vascular Surgery Progress Note    S:Underwent left leg angio and peroneal PTA.    O:   Vitals:  BP  Min: 102/61  Max: 143/87  Temp  Av.5  F (36.4  C)  Min: 97.5  F (36.4  C)  Max: 97.5  F (36.4  C)  Pulse  Av  Min: 69  Max: 69       Physical Exam: Angio reviewed.  Good arteries to left trifurcation.  Occluded PT (entire) and mid AT with reconstituted DP.  Several proximal 1/4 peroneal stenosis that did well with Angioplasty.    +3 Biphasic right DP Doppler.  +1 monophasic left DP Doppler (better with peroneal angioplasty).  Dry eschar over medial ankle ulcer with no cellulitis.      Assessment/Plan: Admit for debridement tomorrow-  Start IV antibiotics (elevated WBC and ulcers).  Start IV Heparin due to Peroneal PTA (hold Plavix for now)                                    ?? Whether enough blood supply with peroneal for healing-- especially toe ulcers.  Consider BK Popliteal to DP NRTSV tomorrow with debridement-   Map left GSV.      Wm. Margo MD       ADDENDUM:  I reviewed the angiogram with the pt and his wife.  We discussed the pros (better blood supply will aid wound healing) and cons (increased risk of wound infection due to ulcers) of the bypass.  They agree to the more aggressive revascularization.    Jesus Aragon MD

## 2017-02-27 NOTE — PLAN OF CARE
Problem: Goal Outcome Summary  Goal: Goal Outcome Summary  Outcome: No Change  Patient arrived to floor from care suites, pulses per doppler, vitals stable.
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
## 2017-02-27 NOTE — LETTER
Transition Communication Hand-off for Care Transitions to Next Level of Care Provider    Name: Gomez Turk  MRN #: [id number]  Primary Care Provider: Tylor Roberts  Primary Care MD Name: Dr. Roberts  Primary Clinic: [facility] [address] [facility] [address]  Primary Care Clinic Name: [facility]  Reason for Hospitalization:  PERIPHERAL ARTERIAL DISEASE WITH LEFT FOOT ULCER AND WOUNDS  LEFT FOOT WOUND, PAD  PAD (peripheral artery disease) (H)  PAD (peripheral artery disease) (H)  Admit Date/Time: 2/27/2017  6:25 AM  Discharge Date: 3/7/2017  Payor Source: Payor: [employer] / Plan: [employer] OPEN ACCESS FULLY INSURED / Product Type: HMO /     Reason for Communication Hand-off Referral: Other informational    Discharge Plan: Home with wife and [facility] services       Concern for non-adherence with plan of care:   Y/N N  Discharge Needs Assessment:  Needs       Most Recent Value    Equipment Currently Used at Home none    Transportation Available family or friend will provide    # of Referrals Placed by [facility], Communication hand-offs to next level of Care Providers          Already enrolled in Tele-monitoring program and name of program:  n/a  Follow-up specialty is recommended: No    Follow-up plan:  Future Appointments  Date Time Provider Department Center   3/7/2017 2:30 PM [facility]   3/17/2017 9:00 AM Nivia Bell DPM, Podiatry/Foot and Ankle Surgery [facility]       Any outstanding tests or procedures:        Referrals     Future Labs/Procedures    Home care nursing referral     Comments:    RN skilled nursing visit. RN to assess vital signs and weight, pain level and activity tolerance, incision for signs/symptoms of infection and home safety.  RN to provide wound care. Start of service March 8th, Wednesday.     Your provider has ordered home care nursing services. If you have not been contacted within 2 days of your discharge please  call the inpatient department phone number at [phone] .    Home Care PT Referral for Hospital Discharge     Comments:    PT to eval and treat    Your provider has ordered home care - physical therapy. If you have not been contacted within 2 days of your discharge please call the department phone number listed on the top of this document.            Key Recommendations:  Home today with [facility] services to assist with L foot dressing changes and Home PT. Patient's wife works split shift and times at home are 930 am to 2 pm per patient.  Start of service requested for Wednesday, March 8th.     Chela Rojo    AVS/Discharge Summary is the source of truth; this is a helpful guide for improved communication of patient story

## 2017-02-27 NOTE — IR NOTE
Right femoral artery sheath pulled (lumen intact) and manual pressure held by MARY Marie RTDAVIS.

## 2017-02-27 NOTE — IR NOTE
Hemostasis achieved.  Right groin puncture site covered with a Tegaderm and Quick Clot gauze.  Dressing appears clean, dry and intact at this time.  Puncture site feels soft to palpation.  No complications observed.

## 2017-02-27 NOTE — CONSULTS
HOSPITALIST CONSULT CHART CHECK:    At this time the Vascular Medicine team has performed a consult and addressed all medical issues.  Hospitalist service was consulted for after hours medical cross-cover only.  Formal consult will be deferred, hospitalist will chart-check tomorrow.  Please see ASSESSMENT AND PLAN below.      Assessment & Plan   Gomez Turk is a 56 year old male who was admitted on 2/27/2017.  He underwent previously scheduled angiogram which identified single vessel run-off via peroneal artery with two areas of stenosis in this vessel.  He subsequently underwent stenting of these areas and was admitted to the vascular surgery service for continued management.    Peripheral Artery Disease with critical limb ischemia of non-healing left foot ulcerations with single vessel run-off via peroneal artery s/p angioplasty of two areas of stenosis on 02/27/17.  Continued management per vascular surgery.  Continues on heparin, plans to undergo wound debridement with possible below-knee popliteal to DP artery bypass in the OR 02/27.  Continues on scheduled IV Zosyn.    Hyperlipidemia.  FLP 02/10/17 with , HDL 42, TG 76 and total cholesterol 166 while on no lipid lowering medication.  Started on atorvastatin 40mg daily.    Diabetes Mellitus, Type 2.  Last Hgb A1c 6.8 on 02/10/17.  Maintained PTA on Metformin 1000mg BID, Glimepiride 2mg BID.    - Holding PTA metformin  - Continues on PTA glimepiride  - SSI insulin    Tobacco abuse, ongoing.  Cessation strongly encouraged.  Nicoderm replacement with 7mg patch.    DVT Prophylaxis: Enoxaprain (Lovenox) SQ, IV heparin  Code Status: Full Code    This was discussed with Dr. Gonzales who is in agreement with above.      John Love PA-C  Pager: [phone]

## 2017-02-28 ENCOUNTER — SURGERY (OUTPATIENT)
Age: 57
End: 2017-02-28

## 2017-02-28 ENCOUNTER — ANESTHESIA EVENT (OUTPATIENT)
Dept: SURGERY | Facility: CLINIC | Age: 57
DRG: 253 | End: 2017-02-28
Payer: COMMERCIAL

## 2017-02-28 ENCOUNTER — ANESTHESIA (OUTPATIENT)
Dept: SURGERY | Facility: CLINIC | Age: 57
DRG: 253 | End: 2017-02-28
Payer: COMMERCIAL

## 2017-02-28 ENCOUNTER — APPOINTMENT (OUTPATIENT)
Dept: SURGERY | Facility: PHYSICIAN GROUP | Age: 57
End: 2017-02-28
Payer: COMMERCIAL

## 2017-02-28 LAB
ANION GAP SERPL CALCULATED.3IONS-SCNC: 6 MMOL/L (ref 3–14)
BUN SERPL-MCNC: 14 MG/DL (ref 7–30)
CALCIUM SERPL-MCNC: 8.1 MG/DL (ref 8.5–10.1)
CHLORIDE SERPL-SCNC: 110 MMOL/L (ref 94–109)
CO2 SERPL-SCNC: 24 MMOL/L (ref 20–32)
CREAT SERPL-MCNC: 0.8 MG/DL (ref 0.66–1.25)
GFR SERPL CREATININE-BSD FRML MDRD: ABNORMAL ML/MIN/1.7M2
GLUCOSE BLDC GLUCOMTR-MCNC: 68 MG/DL (ref 70–99)
GLUCOSE BLDC GLUCOMTR-MCNC: 68 MG/DL (ref 70–99)
GLUCOSE BLDC GLUCOMTR-MCNC: 69 MG/DL (ref 70–99)
GLUCOSE BLDC GLUCOMTR-MCNC: 75 MG/DL (ref 70–99)
GLUCOSE BLDC GLUCOMTR-MCNC: 88 MG/DL (ref 70–99)
GLUCOSE BLDC GLUCOMTR-MCNC: 88 MG/DL (ref 70–99)
GLUCOSE SERPL-MCNC: 76 MG/DL (ref 70–99)
KCT BLD-ACNC: 175 SEC (ref 105–167)
LMWH PPP CHRO-ACNC: 0.37 IU/ML
LMWH PPP CHRO-ACNC: NORMAL IU/ML
POTASSIUM SERPL-SCNC: 4 MMOL/L (ref 3.4–5.3)
SODIUM SERPL-SCNC: 140 MMOL/L (ref 133–144)

## 2017-02-28 PROCEDURE — 11042 DBRDMT SUBQ TIS 1ST 20SQCM/<: CPT | Mod: 59 | Performed by: SURGERY

## 2017-02-28 PROCEDURE — 99233 SBSQ HOSP IP/OBS HIGH 50: CPT | Performed by: INTERNAL MEDICINE

## 2017-02-28 PROCEDURE — 88300 SURGICAL PATH GROSS: CPT | Performed by: SURGERY

## 2017-02-28 PROCEDURE — 37000008 ZZH ANESTHESIA TECHNICAL FEE, 1ST 30 MIN: Performed by: SURGERY

## 2017-02-28 PROCEDURE — 25000125 ZZHC RX 250: Performed by: ANESTHESIOLOGY

## 2017-02-28 PROCEDURE — 35571 ART BYP POP-TIBL-PRL-OTHER: CPT | Performed by: SURGERY

## 2017-02-28 PROCEDURE — 27210794 ZZH OR GENERAL SUPPLY STERILE: Performed by: SURGERY

## 2017-02-28 PROCEDURE — 25000132 ZZH RX MED GY IP 250 OP 250 PS 637: Performed by: INTERNAL MEDICINE

## 2017-02-28 PROCEDURE — 25000132 ZZH RX MED GY IP 250 OP 250 PS 637: Performed by: PHYSICIAN ASSISTANT

## 2017-02-28 PROCEDURE — 25000128 H RX IP 250 OP 636: Performed by: INTERNAL MEDICINE

## 2017-02-28 PROCEDURE — 25000125 ZZHC RX 250: Performed by: SURGERY

## 2017-02-28 PROCEDURE — 85520 HEPARIN ASSAY: CPT | Performed by: SURGERY

## 2017-02-28 PROCEDURE — 25000128 H RX IP 250 OP 636: Performed by: NURSE ANESTHETIST, CERTIFIED REGISTERED

## 2017-02-28 PROCEDURE — 37000009 ZZH ANESTHESIA TECHNICAL FEE, EACH ADDTL 15 MIN: Performed by: SURGERY

## 2017-02-28 PROCEDURE — 36000069 ZZH SURGERY LEVEL 5 EA 15 ADDTL MIN: Performed by: SURGERY

## 2017-02-28 PROCEDURE — 041N09Q BYPASS LEFT POPLITEAL ARTERY TO LOWER EXTREMITY ARTERY WITH AUTOLOGOUS VENOUS TISSUE, OPEN APPROACH: ICD-10-PCS | Performed by: SURGERY

## 2017-02-28 PROCEDURE — 36000067 ZZH SURGERY LEVEL 5 1ST 30 MIN: Performed by: SURGERY

## 2017-02-28 PROCEDURE — S0020 INJECTION, BUPIVICAINE HYDRO: HCPCS | Performed by: SURGERY

## 2017-02-28 PROCEDURE — 40000169 ZZH STATISTIC PRE-PROCEDURE ASSESSMENT I: Performed by: SURGERY

## 2017-02-28 PROCEDURE — 0Y6Q0Z0 DETACHMENT AT LEFT 1ST TOE, COMPLETE, OPEN APPROACH: ICD-10-PCS | Performed by: SURGERY

## 2017-02-28 PROCEDURE — 27210995 ZZH RX 272: Performed by: SURGERY

## 2017-02-28 PROCEDURE — 25800025 ZZH RX 258: Performed by: NURSE ANESTHETIST, CERTIFIED REGISTERED

## 2017-02-28 PROCEDURE — 27210995 ZZH RX 272: Performed by: INTERNAL MEDICINE

## 2017-02-28 PROCEDURE — 28810 AMPUTATION TOE & METATARSAL: CPT | Mod: TA | Performed by: SURGERY

## 2017-02-28 PROCEDURE — 80048 BASIC METABOLIC PNL TOTAL CA: CPT | Performed by: INTERNAL MEDICINE

## 2017-02-28 PROCEDURE — 25000566 ZZH SEVOFLURANE, EA 15 MIN: Performed by: SURGERY

## 2017-02-28 PROCEDURE — 25800025 ZZH RX 258: Performed by: ANESTHESIOLOGY

## 2017-02-28 PROCEDURE — 88300 SURGICAL PATH GROSS: CPT | Mod: 26 | Performed by: SURGERY

## 2017-02-28 PROCEDURE — 25000128 H RX IP 250 OP 636: Performed by: SURGERY

## 2017-02-28 PROCEDURE — 0JBR0ZZ EXCISION OF LEFT FOOT SUBCUTANEOUS TISSUE AND FASCIA, OPEN APPROACH: ICD-10-PCS | Performed by: SURGERY

## 2017-02-28 PROCEDURE — 71000012 ZZH RECOVERY PHASE 1 LEVEL 1 FIRST HR: Performed by: SURGERY

## 2017-02-28 PROCEDURE — 00000146 ZZHCL STATISTIC GLUCOSE BY METER IP

## 2017-02-28 PROCEDURE — 25000128 H RX IP 250 OP 636: Performed by: ANESTHESIOLOGY

## 2017-02-28 PROCEDURE — 25000132 ZZH RX MED GY IP 250 OP 250 PS 637: Performed by: FAMILY MEDICINE

## 2017-02-28 PROCEDURE — 36415 COLL VENOUS BLD VENIPUNCTURE: CPT | Performed by: SURGERY

## 2017-02-28 PROCEDURE — 25000125 ZZHC RX 250: Performed by: NURSE ANESTHETIST, CERTIFIED REGISTERED

## 2017-02-28 PROCEDURE — 06BQ0ZZ EXCISION OF LEFT SAPHENOUS VEIN, OPEN APPROACH: ICD-10-PCS | Performed by: SURGERY

## 2017-02-28 PROCEDURE — 12000007 ZZH R&B INTERMEDIATE

## 2017-02-28 RX ORDER — PROPOFOL 10 MG/ML
INJECTION, EMULSION INTRAVENOUS PRN
Status: DISCONTINUED | OUTPATIENT
Start: 2017-02-28 | End: 2017-02-28

## 2017-02-28 RX ORDER — ONDANSETRON 4 MG/1
4 TABLET, ORALLY DISINTEGRATING ORAL EVERY 30 MIN PRN
Status: DISCONTINUED | OUTPATIENT
Start: 2017-02-28 | End: 2017-02-28 | Stop reason: HOSPADM

## 2017-02-28 RX ORDER — PROPOFOL 10 MG/ML
INJECTION, EMULSION INTRAVENOUS CONTINUOUS PRN
Status: DISCONTINUED | OUTPATIENT
Start: 2017-02-28 | End: 2017-02-28

## 2017-02-28 RX ORDER — SODIUM CHLORIDE, SODIUM LACTATE, POTASSIUM CHLORIDE, CALCIUM CHLORIDE 600; 310; 30; 20 MG/100ML; MG/100ML; MG/100ML; MG/100ML
INJECTION, SOLUTION INTRAVENOUS CONTINUOUS
Status: DISCONTINUED | OUTPATIENT
Start: 2017-02-28 | End: 2017-02-28 | Stop reason: HOSPADM

## 2017-02-28 RX ORDER — ONDANSETRON 4 MG/1
4 TABLET, ORALLY DISINTEGRATING ORAL EVERY 6 HOURS PRN
Status: DISCONTINUED | OUTPATIENT
Start: 2017-02-28 | End: 2017-02-28

## 2017-02-28 RX ORDER — SODIUM CHLORIDE, SODIUM LACTATE, POTASSIUM CHLORIDE, CALCIUM CHLORIDE 600; 310; 30; 20 MG/100ML; MG/100ML; MG/100ML; MG/100ML
INJECTION, SOLUTION INTRAVENOUS CONTINUOUS PRN
Status: DISCONTINUED | OUTPATIENT
Start: 2017-02-28 | End: 2017-02-28

## 2017-02-28 RX ORDER — MAGNESIUM HYDROXIDE 1200 MG/15ML
LIQUID ORAL PRN
Status: DISCONTINUED | OUTPATIENT
Start: 2017-02-28 | End: 2017-02-28 | Stop reason: HOSPADM

## 2017-02-28 RX ORDER — HEPARIN SODIUM 1000 [USP'U]/ML
INJECTION, SOLUTION INTRAVENOUS; SUBCUTANEOUS PRN
Status: DISCONTINUED | OUTPATIENT
Start: 2017-02-28 | End: 2017-02-28

## 2017-02-28 RX ORDER — LIDOCAINE 40 MG/G
CREAM TOPICAL
Status: DISCONTINUED | OUTPATIENT
Start: 2017-02-28 | End: 2017-03-07 | Stop reason: HOSPADM

## 2017-02-28 RX ORDER — HYDROMORPHONE HYDROCHLORIDE 1 MG/ML
.3-.5 INJECTION, SOLUTION INTRAMUSCULAR; INTRAVENOUS; SUBCUTANEOUS EVERY 5 MIN PRN
Status: DISCONTINUED | OUTPATIENT
Start: 2017-02-28 | End: 2017-02-28 | Stop reason: HOSPADM

## 2017-02-28 RX ORDER — BUPIVACAINE HYDROCHLORIDE 5 MG/ML
INJECTION, SOLUTION PERINEURAL PRN
Status: DISCONTINUED | OUTPATIENT
Start: 2017-02-28 | End: 2017-02-28 | Stop reason: HOSPADM

## 2017-02-28 RX ORDER — CALCIUM CARBONATE 500 MG/1
500-1000 TABLET, CHEWABLE ORAL
Status: DISCONTINUED | OUTPATIENT
Start: 2017-02-28 | End: 2017-03-07 | Stop reason: HOSPADM

## 2017-02-28 RX ORDER — GLYCOPYRROLATE 0.2 MG/ML
INJECTION, SOLUTION INTRAMUSCULAR; INTRAVENOUS PRN
Status: DISCONTINUED | OUTPATIENT
Start: 2017-02-28 | End: 2017-02-28

## 2017-02-28 RX ORDER — FENTANYL CITRATE 50 UG/ML
INJECTION, SOLUTION INTRAMUSCULAR; INTRAVENOUS PRN
Status: DISCONTINUED | OUTPATIENT
Start: 2017-02-28 | End: 2017-02-28

## 2017-02-28 RX ORDER — LABETALOL HYDROCHLORIDE 5 MG/ML
10 INJECTION, SOLUTION INTRAVENOUS
Status: DISCONTINUED | OUTPATIENT
Start: 2017-02-28 | End: 2017-02-28 | Stop reason: HOSPADM

## 2017-02-28 RX ORDER — ONDANSETRON 2 MG/ML
INJECTION INTRAMUSCULAR; INTRAVENOUS PRN
Status: DISCONTINUED | OUTPATIENT
Start: 2017-02-28 | End: 2017-02-28

## 2017-02-28 RX ORDER — ASPIRIN 600 MG/1
600 SUPPOSITORY RECTAL ONCE
Status: COMPLETED | OUTPATIENT
Start: 2017-02-28 | End: 2017-02-28

## 2017-02-28 RX ORDER — CLOPIDOGREL BISULFATE 75 MG/1
75 TABLET ORAL DAILY
Status: DISCONTINUED | OUTPATIENT
Start: 2017-02-28 | End: 2017-03-07 | Stop reason: HOSPADM

## 2017-02-28 RX ORDER — HYDROMORPHONE HYDROCHLORIDE 1 MG/ML
.3-.5 INJECTION, SOLUTION INTRAMUSCULAR; INTRAVENOUS; SUBCUTANEOUS
Status: DISCONTINUED | OUTPATIENT
Start: 2017-02-28 | End: 2017-02-28

## 2017-02-28 RX ORDER — AMOXICILLIN 250 MG
1-2 CAPSULE ORAL 2 TIMES DAILY
Status: DISCONTINUED | OUTPATIENT
Start: 2017-02-28 | End: 2017-03-07 | Stop reason: HOSPADM

## 2017-02-28 RX ORDER — HEPARIN SODIUM 1000 [USP'U]/ML
INJECTION, SOLUTION INTRAVENOUS; SUBCUTANEOUS PRN
Status: DISCONTINUED | OUTPATIENT
Start: 2017-02-28 | End: 2017-02-28 | Stop reason: HOSPADM

## 2017-02-28 RX ORDER — EPHEDRINE SULFATE 50 MG/ML
INJECTION, SOLUTION INTRAMUSCULAR; INTRAVENOUS; SUBCUTANEOUS PRN
Status: DISCONTINUED | OUTPATIENT
Start: 2017-02-28 | End: 2017-02-28

## 2017-02-28 RX ORDER — FENTANYL CITRATE 50 UG/ML
25-50 INJECTION, SOLUTION INTRAMUSCULAR; INTRAVENOUS
Status: DISCONTINUED | OUTPATIENT
Start: 2017-02-28 | End: 2017-02-28 | Stop reason: HOSPADM

## 2017-02-28 RX ORDER — ONDANSETRON 2 MG/ML
4 INJECTION INTRAMUSCULAR; INTRAVENOUS EVERY 30 MIN PRN
Status: DISCONTINUED | OUTPATIENT
Start: 2017-02-28 | End: 2017-02-28 | Stop reason: HOSPADM

## 2017-02-28 RX ORDER — ONDANSETRON 2 MG/ML
4 INJECTION INTRAMUSCULAR; INTRAVENOUS EVERY 6 HOURS PRN
Status: DISCONTINUED | OUTPATIENT
Start: 2017-02-28 | End: 2017-02-28

## 2017-02-28 RX ORDER — NALOXONE HYDROCHLORIDE 0.4 MG/ML
.1-.4 INJECTION, SOLUTION INTRAMUSCULAR; INTRAVENOUS; SUBCUTANEOUS
Status: DISCONTINUED | OUTPATIENT
Start: 2017-02-28 | End: 2017-02-28

## 2017-02-28 RX ORDER — LIDOCAINE HYDROCHLORIDE 20 MG/ML
INJECTION, SOLUTION INFILTRATION; PERINEURAL PRN
Status: DISCONTINUED | OUTPATIENT
Start: 2017-02-28 | End: 2017-02-28

## 2017-02-28 RX ADMIN — PIPERACILLIN SODIUM,TAZOBACTAM SODIUM 3.38 G: 3; .375 INJECTION, POWDER, FOR SOLUTION INTRAVENOUS at 03:45

## 2017-02-28 RX ADMIN — HYDROMORPHONE HYDROCHLORIDE 0.5 MG: 1 INJECTION, SOLUTION INTRAMUSCULAR; INTRAVENOUS; SUBCUTANEOUS at 14:23

## 2017-02-28 RX ADMIN — SODIUM CHLORIDE: 4.5 INJECTION, SOLUTION INTRAVENOUS at 00:26

## 2017-02-28 RX ADMIN — HYDROMORPHONE HYDROCHLORIDE 0.2 MG: 1 INJECTION, SOLUTION INTRAMUSCULAR; INTRAVENOUS; SUBCUTANEOUS at 11:42

## 2017-02-28 RX ADMIN — PHENYLEPHRINE HYDROCHLORIDE 100 MCG: 10 INJECTION, SOLUTION INTRAMUSCULAR; INTRAVENOUS; SUBCUTANEOUS at 10:45

## 2017-02-28 RX ADMIN — OXYCODONE HYDROCHLORIDE 10 MG: 5 TABLET ORAL at 16:10

## 2017-02-28 RX ADMIN — HYDROMORPHONE HYDROCHLORIDE 0.5 MG: 1 INJECTION, SOLUTION INTRAMUSCULAR; INTRAVENOUS; SUBCUTANEOUS at 17:48

## 2017-02-28 RX ADMIN — LISINOPRIL 20 MG: 20 TABLET ORAL at 20:03

## 2017-02-28 RX ADMIN — FENTANYL CITRATE 25 MCG: 50 INJECTION, SOLUTION INTRAMUSCULAR; INTRAVENOUS at 09:50

## 2017-02-28 RX ADMIN — FENTANYL CITRATE 25 MCG: 50 INJECTION, SOLUTION INTRAMUSCULAR; INTRAVENOUS at 10:10

## 2017-02-28 RX ADMIN — PIPERACILLIN SODIUM,TAZOBACTAM SODIUM 3.38 G: 3; .375 INJECTION, POWDER, FOR SOLUTION INTRAVENOUS at 08:57

## 2017-02-28 RX ADMIN — ATORVASTATIN CALCIUM 40 MG: 40 TABLET, FILM COATED ORAL at 20:03

## 2017-02-28 RX ADMIN — SODIUM CHLORIDE, POTASSIUM CHLORIDE, SODIUM LACTATE AND CALCIUM CHLORIDE: 600; 310; 30; 20 INJECTION, SOLUTION INTRAVENOUS at 10:40

## 2017-02-28 RX ADMIN — PROPOFOL 30 MCG/KG/MIN: 10 INJECTION, EMULSION INTRAVENOUS at 10:21

## 2017-02-28 RX ADMIN — SODIUM CHLORIDE, POTASSIUM CHLORIDE, SODIUM LACTATE AND CALCIUM CHLORIDE: 600; 310; 30; 20 INJECTION, SOLUTION INTRAVENOUS at 09:41

## 2017-02-28 RX ADMIN — SODIUM CHLORIDE, POTASSIUM CHLORIDE, SODIUM LACTATE AND CALCIUM CHLORIDE: 600; 310; 30; 20 INJECTION, SOLUTION INTRAVENOUS at 09:32

## 2017-02-28 RX ADMIN — HYDROMORPHONE HYDROCHLORIDE 0.2 MG: 1 INJECTION, SOLUTION INTRAMUSCULAR; INTRAVENOUS; SUBCUTANEOUS at 11:23

## 2017-02-28 RX ADMIN — OXYCODONE HYDROCHLORIDE 10 MG: 5 TABLET ORAL at 22:32

## 2017-02-28 RX ADMIN — SENNOSIDES AND DOCUSATE SODIUM 1 TABLET: 8.6; 5 TABLET ORAL at 20:03

## 2017-02-28 RX ADMIN — POTASSIUM CHLORIDE 20 MEQ: 1500 TABLET, EXTENDED RELEASE ORAL at 20:03

## 2017-02-28 RX ADMIN — FENTANYL CITRATE 25 MCG: 50 INJECTION, SOLUTION INTRAMUSCULAR; INTRAVENOUS at 10:06

## 2017-02-28 RX ADMIN — Medication 5 MG: at 10:54

## 2017-02-28 RX ADMIN — FENTANYL CITRATE 25 MCG: 50 INJECTION, SOLUTION INTRAMUSCULAR; INTRAVENOUS at 14:29

## 2017-02-28 RX ADMIN — PHENYLEPHRINE HYDROCHLORIDE 100 MCG: 10 INJECTION, SOLUTION INTRAMUSCULAR; INTRAVENOUS; SUBCUTANEOUS at 10:54

## 2017-02-28 RX ADMIN — ONDANSETRON 4 MG: 2 INJECTION INTRAMUSCULAR; INTRAVENOUS at 13:07

## 2017-02-28 RX ADMIN — PHENYLEPHRINE HYDROCHLORIDE 100 MCG: 10 INJECTION, SOLUTION INTRAMUSCULAR; INTRAVENOUS; SUBCUTANEOUS at 11:04

## 2017-02-28 RX ADMIN — CLOPIDOGREL BISULFATE 75 MG: 75 TABLET, FILM COATED ORAL at 17:50

## 2017-02-28 RX ADMIN — FENTANYL CITRATE 25 MCG: 50 INJECTION, SOLUTION INTRAMUSCULAR; INTRAVENOUS at 10:00

## 2017-02-28 RX ADMIN — PHENYLEPHRINE HYDROCHLORIDE 150 MCG: 10 INJECTION, SOLUTION INTRAMUSCULAR; INTRAVENOUS; SUBCUTANEOUS at 10:03

## 2017-02-28 RX ADMIN — FENTANYL CITRATE 25 MCG: 50 INJECTION, SOLUTION INTRAMUSCULAR; INTRAVENOUS at 14:53

## 2017-02-28 RX ADMIN — SODIUM CHLORIDE 1000 ML: 0.9 IRRIGANT IRRIGATION at 10:22

## 2017-02-28 RX ADMIN — SODIUM CHLORIDE: 4.5 INJECTION, SOLUTION INTRAVENOUS at 16:35

## 2017-02-28 RX ADMIN — FENTANYL CITRATE 25 MCG: 50 INJECTION, SOLUTION INTRAMUSCULAR; INTRAVENOUS at 14:26

## 2017-02-28 RX ADMIN — Medication 5 MG: at 09:57

## 2017-02-28 RX ADMIN — NICOTINE 1 PATCH: 7 PATCH, EXTENDED RELEASE TRANSDERMAL at 17:48

## 2017-02-28 RX ADMIN — HEPARIN SODIUM 5000 UNITS: 1000 INJECTION, SOLUTION INTRAVENOUS; SUBCUTANEOUS at 11:36

## 2017-02-28 RX ADMIN — GLYCOPYRROLATE 0.2 MG: 0.2 INJECTION, SOLUTION INTRAMUSCULAR; INTRAVENOUS at 09:58

## 2017-02-28 RX ADMIN — ACETAMINOPHEN 650 MG: 325 TABLET, FILM COATED ORAL at 00:34

## 2017-02-28 RX ADMIN — DEXMEDETOMIDINE 8 MCG: 100 INJECTION, SOLUTION, CONCENTRATE INTRAVENOUS at 10:38

## 2017-02-28 RX ADMIN — PHENYLEPHRINE HYDROCHLORIDE 50 MCG: 10 INJECTION, SOLUTION INTRAMUSCULAR; INTRAVENOUS; SUBCUTANEOUS at 10:27

## 2017-02-28 RX ADMIN — HYDROMORPHONE HYDROCHLORIDE 0.5 MG: 1 INJECTION, SOLUTION INTRAMUSCULAR; INTRAVENOUS; SUBCUTANEOUS at 15:00

## 2017-02-28 RX ADMIN — LIDOCAINE HYDROCHLORIDE 40 MG: 20 INJECTION, SOLUTION INFILTRATION; PERINEURAL at 09:50

## 2017-02-28 RX ADMIN — HEPARIN SODIUM 252.5 ML: 1000 INJECTION, SOLUTION INTRAVENOUS; SUBCUTANEOUS at 10:22

## 2017-02-28 RX ADMIN — FENTANYL CITRATE 50 MCG: 50 INJECTION, SOLUTION INTRAMUSCULAR; INTRAVENOUS at 10:26

## 2017-02-28 RX ADMIN — HYDROMORPHONE HYDROCHLORIDE 0.2 MG: 1 INJECTION, SOLUTION INTRAMUSCULAR; INTRAVENOUS; SUBCUTANEOUS at 11:11

## 2017-02-28 RX ADMIN — HEPARIN SODIUM 1050 UNITS/HR: 10000 INJECTION, SOLUTION INTRAVENOUS at 16:33

## 2017-02-28 RX ADMIN — PHENYLEPHRINE HYDROCHLORIDE 100 MCG: 10 INJECTION, SOLUTION INTRAMUSCULAR; INTRAVENOUS; SUBCUTANEOUS at 10:20

## 2017-02-28 RX ADMIN — PIPERACILLIN SODIUM,TAZOBACTAM SODIUM 3.38 G: 3; .375 INJECTION, POWDER, FOR SOLUTION INTRAVENOUS at 19:21

## 2017-02-28 RX ADMIN — OXYCODONE HYDROCHLORIDE 10 MG: 5 TABLET ORAL at 05:37

## 2017-02-28 RX ADMIN — PHENYLEPHRINE HYDROCHLORIDE 100 MCG: 10 INJECTION, SOLUTION INTRAMUSCULAR; INTRAVENOUS; SUBCUTANEOUS at 10:09

## 2017-02-28 RX ADMIN — OXYCODONE HYDROCHLORIDE 10 MG: 5 TABLET ORAL at 00:34

## 2017-02-28 RX ADMIN — Medication 0.4 MCG/KG/MIN: at 11:17

## 2017-02-28 RX ADMIN — PHENYLEPHRINE HYDROCHLORIDE 100 MCG: 10 INJECTION, SOLUTION INTRAMUSCULAR; INTRAVENOUS; SUBCUTANEOUS at 10:42

## 2017-02-28 RX ADMIN — Medication 5 MG: at 10:01

## 2017-02-28 RX ADMIN — PHENYLEPHRINE HYDROCHLORIDE 100 MCG: 10 INJECTION, SOLUTION INTRAMUSCULAR; INTRAVENOUS; SUBCUTANEOUS at 10:12

## 2017-02-28 RX ADMIN — OXYCODONE HYDROCHLORIDE 10 MG: 5 TABLET ORAL at 19:25

## 2017-02-28 RX ADMIN — MIDAZOLAM HYDROCHLORIDE 2 MG: 1 INJECTION, SOLUTION INTRAMUSCULAR; INTRAVENOUS at 09:45

## 2017-02-28 RX ADMIN — BUPIVACAINE HYDROCHLORIDE 30 ML: 5 INJECTION, SOLUTION PERINEURAL at 13:06

## 2017-02-28 RX ADMIN — FENTANYL CITRATE 25 MCG: 50 INJECTION, SOLUTION INTRAMUSCULAR; INTRAVENOUS at 14:51

## 2017-02-28 RX ADMIN — SODIUM CHLORIDE, POTASSIUM CHLORIDE, SODIUM LACTATE AND CALCIUM CHLORIDE: 600; 310; 30; 20 INJECTION, SOLUTION INTRAVENOUS at 13:26

## 2017-02-28 RX ADMIN — FENTANYL CITRATE 50 MCG: 50 INJECTION, SOLUTION INTRAMUSCULAR; INTRAVENOUS at 10:37

## 2017-02-28 RX ADMIN — PROPOFOL 250 MG: 10 INJECTION, EMULSION INTRAVENOUS at 09:50

## 2017-02-28 RX ADMIN — ASPIRIN 600 MG: 600 SUPPOSITORY RECTAL at 14:51

## 2017-02-28 ASSESSMENT — ENCOUNTER SYMPTOMS: SEIZURES: 0

## 2017-02-28 ASSESSMENT — LIFESTYLE VARIABLES: TOBACCO_USE: 0

## 2017-02-28 NOTE — ANESTHESIA PREPROCEDURE EVALUATION
Anesthesia Evaluation     . Pt has had prior anesthetic.     No history of anesthetic complications     ROS/MED HX    ENT/Pulmonary:      (-) tobacco use and sleep apnea   Neurologic:     (+)neuropathy    (-) seizures and CVA   Cardiovascular:     (+) Dyslipidemia, hypertension-Peripheral Vascular Disease---. : . . . :. .       METS/Exercise Tolerance:     Hematologic:         Musculoskeletal:         GI/Hepatic:     (+) GERD Asymptomatic on medication,      (-) liver disease   Renal/Genitourinary:      (-) renal disease   Endo:     (+) type II DM Not using insulin Diabetic complications: nephropathy, Obesity, .   (-) thyroid disease and chronic steroid usage   Psychiatric:         Infectious Disease:        (-) Recent Fever   Malignancy:         Other:               Physical Exam  Normal systems: cardiovascular, pulmonary and dental    Airway   Mallampati: II  TM distance: >3 FB  Neck ROM: full    Dental     Cardiovascular       Pulmonary                     Anesthesia Plan      History & Physical Review  History and physical reviewed and following examination; no interval change.    ASA Status:  3 .    NPO Status:  > 8 hours    Plan for General and LMA with Propofol induction. Maintenance will be Balanced.    PONV prophylaxis:  Ondansetron (or other 5HT-3)       Postoperative Care  Postoperative pain management:  IV analgesics.      Consents  Anesthetic plan, risks, benefits and alternatives discussed with:  Patient..                          .

## 2017-02-28 NOTE — PROGRESS NOTES
HOSPITALIST CHART CHECK:     Hospitalist service was consulted for after hours medical cross-cover only.  Vascular medicine is currently following and managing chronic medical conditions.  For detailed Assessment and Plan, please refer to my prior note dated 02/27/2017.      John Love PA-C  2/28/2017, 2:50 PM  Pager: [phone]
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
## 2017-02-28 NOTE — ANESTHESIA POSTPROCEDURE EVALUATION
Patient: Gomez Turk    Procedure(s):  IRRIGATION AND DEBRIDEMENT LEFT ANKLE WOUND, LEFT LEG FEMORAL POPLITEAL BYPASS, LEFT GREATER TOE AMPUTATION - Wound Class: II-Clean Contaminated   - Wound Class: I-Clean  Greater Toe - Wound Class: III-Contaminated    Diagnosis:LEFT FOOT WOUND, PAD  Diagnosis Additional Information: No value filed.    Anesthesia Type:  General, LMA    Note:  Anesthesia Post Evaluation    Patient location during evaluation: PACU  Patient participation: Able to fully participate in evaluation  Level of consciousness: awake and alert  Pain management: satisfactory to patient  Airway patency: patent  Cardiovascular status: hemodynamically stable  Respiratory status: acceptable and unassisted  Hydration status: balanced  PONV: none     Anesthetic complications: None          Last vitals:  Vitals:    02/28/17 1430 02/28/17 1440 02/28/17 1450   BP: 110/67 104/61 108/66   Pulse:      Resp: 26 30 16   Temp:      SpO2: 96% 98% 97%         Electronically Signed By: Chris Walker MD  February 28, 2017  3:04 PM
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
## 2017-02-28 NOTE — PROGRESS NOTES
[facility]  Vascular Medicine Progress Note          Assessment and Plan:      1. Critical limb ischemia of non-healing ulcerations of left foot with single vessel run-off via peroneal artery s/p angioplasty 2 areas of stenosis 2/27/17     To OR now for BK Popliteal to DP NRTGSV with debridement    IV UFH on hold one hour prior to OR  Zosyn     2. Ongoing tobacco use     Still smokes 5 cigarettes per day.   7 mg nicotine patch     3. Hyperlipidemia     LDL of 109, on no lipid lowering therapy.   He had been prescribed simvastatin previously, but he never took this.   Started Lipitor 40 mg daily.     4. Type 2 diabetes mellitus     His most recent A1C was 6.8%, on metformin and glimepiride.   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   SS Novolog cvg.   Restart metformin 48 hours post angio                Interval History:   doing well; no cp, sob, n/v/d, or abd pain. and ulcer pain LLE last noc relieved with narcotics              Review of Systems:   The 10 point Review of Systems is negative other than noted in the HPI             Medications:       sodium chloride (PF)  3 mL Intracatheter Q8H     [Auto Hold] aspirin EC  81 mg Oral Daily     [Auto Hold] collagenase   Topical Daily     [Auto Hold] glimepiride  2 mg Oral BID w/meals     [Auto Hold] lisinopril  20 mg Oral BID     [Auto Hold] insulin aspart  1-7 Units Subcutaneous TID AC     [Auto Hold] insulin aspart  1-5 Units Subcutaneous At Bedtime     [Auto Hold] atorvastatin  40 mg Oral Daily at 8 pm     [Auto Hold] nicotine   Transdermal Q8H     [Auto Hold] nicotine   Transdermal Daily     [Auto Hold] nicotine  1 patch Transdermal Daily     [Auto Hold] piperacillin-tazobactam  3.375 g Intravenous Q6H                  Physical Exam:     Vitals were reviewed  Patient Vitals for the past 24 hrs:   BP Temp Temp src  Pulse Heart Rate Resp SpO2 Weight   02/28/17 0725 108/57 97.8  F (36.6  C) Oral 65 68 16 99 % -   02/28/17 0600 - - - - - - - 208 lb 14.4 oz (94.8 kg)   02/28/17 0330 - - - - - 16 - -   02/28/17 0130 - - - - - 16 - -   02/28/17 0034 - - - - - 20 - -   02/28/17 0000 126/68 99  F (37.2  C) Oral - 78 20 98 % -   02/27/17 2031 (!) 134/35 98.3  F (36.8  C) Oral 59 76 16 99 % -   02/27/17 1528 130/72 97.6  F (36.4  C) Oral 62 63 16 99 % -   02/27/17 1318 143/78 - - - 60 16 98 % -   02/27/17 1230 - - - - - 18 - -   02/27/17 1200 - - - - - 18 97 % -   02/27/17 1130 131/70 - - - - 16 98 % -   02/27/17 1115 126/75 - - - - 16 96 % -   02/27/17 1100 127/69 - - - - 16 96 % -   02/27/17 1045 118/75 - - - - 16 97 % -   02/27/17 1020 109/64 - - - - - - -   02/27/17 1010 - - - - - - 99 % -   02/27/17 1000 105/60 - - - - - - -   02/27/17 0950 - - - - 62 12 - -   02/27/17 0930 102/61 - - - - 14 - -   02/27/17 0910 106/61 - - - 65 14 100 % -     Wt Readings from Last 4 Encounters:   02/28/17 208 lb 14.4 oz (94.8 kg)   02/13/17 207 lb 14.4 oz (94.3 kg)   01/12/17 209 lb (94.8 kg)   01/04/17 209 lb 9.6 oz (95.1 kg)       Intake/Output Summary (Last 24 hours) at 02/28/17 0905  Last data filed at 02/28/17 0600   Gross per 24 hour   Intake              600 ml   Output                0 ml   Net              600 ml     Constitutional: normal  Eyes: normal  ENT: normal  Neck: Supple, symmetrical, trachea midline, no adenopathy, thyroid symmetric, not enlarged and no tenderness, skin normal.  Hematologic / Lymphatic: normal  Back: normal  Lungs: No increased work of breathing, good air exchange, clear to auscultation bilaterally, no crackles or wheezing.  Cardiovascular: Regular rate and rhythm, normal S1 and S2, no S3 or S4, and no murmur noted.  Abdomen: normal  Musculoskeletal: eschar left ankle unchanged from yesterday  Neurologic: normal  Neuropsychiatric: normal  Skin: normal  Right DP Triphasic Dopplerable and Left DP Not Palpable,  Monophasic Dopplerable           Data:     Results for orders placed or performed during the hospital encounter of 02/27/17 (from the past 24 hour(s))   IR Lower Extremity Angiogram Left    Narrative    INTERVENTIONAL RADIOLOGY LOWER EXTREMITY ANGIOGRAM LEFT   2/27/2017  10:21 AM    HISTORY: 56-year-old patient with peripheral arterial disease. Patient  has history of diabetes and nonhealing wounds in the left ankle and  toes. Patient had ANTOINETTE examination on February 21, 2017 demonstrating  significant arterial insufficiency in the left lower extremity.  Request made for evaluation with angiogram.    TECHNIQUE: Patient was brought to the interventional radiology  department and informed consent obtained. Patient had previously been  seen by Dr. Kenroy Aragon of vascular surgery in clinic.    Patient was placed in a supine position and skin overlying the right  groin was prepped and draped in standard sterile fashion. 1% lidocaine  was used for local anesthesia. Micropuncture kit was used to access  the right common femoral artery and over a series of maneuvers, 6  [place] vascular sheath was placed. Over a guidewire, pigtail catheter  was advanced to the abdominal aorta where angiogram performed. Pigtail  catheter was then pulled back to the distal abdominal aorta were  pelvic angiogram performed in AP projection. A renal double curve  catheter was used to cross the aortic bifurcation and placed the left  external iliac artery where angiogram performed throughout the left  lower extremity. A IOANA-1 catheter was then placed in the popliteal  artery where angiogram performed in the runoff arteries. 4000 units of  heparin was administered as an IV bolus. 200 mcg of nitroglycerin  administered into the runoff arteries. An 014 wire was advanced into  the peroneal artery across areas of severe stenosis. Balloon  angioplasty was then performed with 2 mm, 2.5 mm, and 3 mm SLEEK  balloons. The proximal stenosis appears widely  patent at completion.  The segmental stenosis several centimeters distal demonstrates mild  residual stenosis, though in part likely related to spasm. Completion  angiogram performed with catheter in the popliteal artery and  administration of nitroglycerin. After reviewing the images, ACT level  was found to be 175 and catheter removed. Plan was to attempt  Angio-Seal, though angiogram in the right groin demonstrates access at  the femoral bifurcation, therefore unable to utilize Angio-Seal for  arteriotomy closure. Sheath was removed and hemostasis achieved with  manual compression.    Sedation: 6 mg IV Versed, 200 mcg IV fentanyl.    Medications: 600 mcg intra-arterial nitroglycerin, given at three  separate instances of 200 mcg each, 4000 units of IV heparin as a  bolus.    Sedation time: 110 minutes    Please note the above medications were administered by the  interventional radiology staff under my direct supervision. Patient's  vital signs were monitored and remained stable throughout the  procedure.    Fluoroscopic time: 9.9 minutes    Total fluoroscopic dose: 180 mGy    Contrast: 120 mL of Visipaque administered intra-arterially without  complication    Local anesthetic: 10 mL of 1% lidocaine    FINDINGS: Total of 22 spot fluoroscopic images and angiogram sequences  were obtained throughout the procedure. Abdominal aorta is widely  patent. Both renal arteries are widely patent. Both common iliac,  internal iliac, external iliac arteries are widely patent. The left  common femoral, profunda femoral, superficial femoral, popliteal  arteries are widely patent. Chronic occlusion of the posterior tibial  artery. Segments of the anterior tibial artery are occluded, at the  origin and at the distal segment, as well as in the mid segment.  Moderate to severe focal stenoses noted in the proximal peroneal  artery. These were treated with 2 mm, 2.5 mm, and 3 mm angioplasty  balloons with good result at completion.  The more proximal stenosis  appears widely patent. The more distal stenosis has slight residual  stenosis, though considerably improved from prior exam. After using 3  mm balloon, would not attempt larger diameter balloon. Runoff appears  otherwise unchanged, single vessel via the peroneal artery to the  ankle where there is collateralized filling to posterior tibial  branches in the dorsalis pedis artery. Area of ulceration in the ankle  has slight hyperemia.      Impression    IMPRESSION: Angiogram and angioplasty performed throughout the left  lower extremity. Single vessel runoff via the peroneal artery. Two  proximal stenoses treated with 2 mm, 2.5 mm, and 3 mm balloons.  Improved result at completion. Hope is that patient will have adequate  blood flow for healing. The patient will remain in hospital overnight  and have debridement the following day with Dr. Aragon.     CBC with platelets   Result Value Ref Range    WBC 14.9 (H) 4.0 - 11.0 10e9/L    RBC Count 3.96 (L) 4.4 - 5.9 10e12/L    Hemoglobin 12.5 (L) 13.3 - 17.7 g/dL    Hematocrit 36.4 (L) 40.0 - 53.0 %    MCV 92 78 - 100 fl    MCH 31.6 26.5 - 33.0 pg    MCHC 34.3 31.5 - 36.5 g/dL    RDW 13.0 10.0 - 15.0 %    Platelet Count 381 150 - 450 10e9/L   US Lower Extremity Venous Mapping Left    Narrative    LEFT LOWER EXTREMITY VENOUS MAPPING ULTRASOUND  2/27/2017 11:39 AM     HISTORY:  Preoperative planning for leg bypass.    COMPARISON: Angiogram dated 2/27/2017.    FINDINGS: The left greater saphenous vein was mapped and marked. Its  diameters from the saphenofemoral junction to the knee range between  6.2 to 3.3 mm. Its diameters from below the knee to the ankle range  between 3.8 to 2.9 mm.    Please refer to the sonographer's diagram for diameters at specific  levels.   Hospitalist IP Consult: Patient to be seen: Routine - within 24 hours; after hours ceross coverage only please; Consultant may enter orders: Yes    Narrative    John Love PA-C      2/27/2017  1:48 PM  HOSPITALIST CONSULT CHART CHECK:    At this time the Vascular Medicine team has performed a consult   and addressed all medical issues.  Hospitalist service was   consulted for after hours medical cross-cover only.  Formal   consult will be deferred, hospitalist will chart-check tomorrow.    Please see ASSESSMENT AND PLAN below.      Assessment & Plan   Gomez Turk is a 56 year old male who was admitted on   2/27/2017.  He underwent previously scheduled angiogram which   identified single vessel run-off via peroneal artery with two   areas of stenosis in this vessel.  He subsequently underwent   stenting of these areas and was admitted to the vascular surgery   service for continued management.    Peripheral Artery Disease with critical limb ischemia of   non-healing left foot ulcerations with single vessel run-off via   peroneal artery s/p angioplasty of two areas of stenosis on   02/27/17.  Continued management per vascular surgery.  Continues   on heparin, plans to undergo wound debridement with possible   below-knee popliteal to DP artery bypass in the OR 02/27.    Continues on scheduled IV Zosyn.    Hyperlipidemia.  FLP 02/10/17 with , HDL 42, TG 76 and   total cholesterol 166 while on no lipid lowering medication.    Started on atorvastatin 40mg daily.    Diabetes Mellitus, Type 2.  Last Hgb A1c 6.8 on 02/10/17.    Maintained PTA on Metformin 1000mg BID, Glimepiride 2mg BID.    - Holding PTA metformin  - Continues on PTA glimepiride  - SSI insulin    Tobacco abuse, ongoing.  Cessation strongly encouraged.  Nicoderm   replacement with 7mg patch.    DVT Prophylaxis: Enoxaprain (Lovenox) SQ, IV heparin  Code Status: Full Code    This was discussed with Dr. Gonzales who is in agreement with   above.      John Love PA-C  Pager: [phone]   INR   Result Value Ref Range    INR 0.93 0.86 - 1.14   Partial thromboplastin time   Result Value Ref Range    PTT 40 (H) 22 - 37 sec   CBC  with platelets differential   Result Value Ref Range    WBC 13.1 (H) 4.0 - 11.0 10e9/L    RBC Count 4.06 (L) 4.4 - 5.9 10e12/L    Hemoglobin 12.7 (L) 13.3 - 17.7 g/dL    Hematocrit 37.3 (L) 40.0 - 53.0 %    MCV 92 78 - 100 fl    MCH 31.3 26.5 - 33.0 pg    MCHC 34.0 31.5 - 36.5 g/dL    RDW 13.1 10.0 - 15.0 %    Platelet Count 382 150 - 450 10e9/L    Diff Method Automated Method     % Neutrophils 65.6 %    % Lymphocytes 24.5 %    % Monocytes 8.1 %    % Eosinophils 1.3 %    % Basophils 0.3 %    % Immature Granulocytes 0.2 %    Nucleated RBCs 0 0 /100    Absolute Neutrophil 8.6 (H) 1.6 - 8.3 10e9/L    Absolute Lymphocytes 3.2 0.8 - 5.3 10e9/L    Absolute Monocytes 1.1 0.0 - 1.3 10e9/L    Absolute Eosinophils 0.2 0.0 - 0.7 10e9/L    Absolute Basophils 0.0 0.0 - 0.2 10e9/L    Abs Immature Granulocytes 0.0 0 - 0.4 10e9/L    Absolute Nucleated RBC 0.0    Basic metabolic panel   Result Value Ref Range    Sodium 140 133 - 144 mmol/L    Potassium 3.7 3.4 - 5.3 mmol/L    Chloride 106 94 - 109 mmol/L    Carbon Dioxide 28 20 - 32 mmol/L    Anion Gap 6 3 - 14 mmol/L    Glucose 87 70 - 99 mg/dL    Urea Nitrogen 10 7 - 30 mg/dL    Creatinine 0.77 0.66 - 1.25 mg/dL    GFR Estimate >90  Non  GFR Calc   >60 mL/min/1.7m2    GFR Estimate If Black >90   GFR Calc   >60 mL/min/1.7m2    Calcium 8.6 8.5 - 10.1 mg/dL   Glucose by meter   Result Value Ref Range    Glucose 85 70 - 99 mg/dL   Heparin 10a Level   Result Value Ref Range    Heparin 10A Level  IU/mL     <0.10  Therapeutic Range:     UFH:   0.15-0.35 IU/mL for low              intensity dosing            0.30-0.70 IU/mL for high              intensity dosing for              DVT and PE     Enoxaparin: If administered              once daily with dose              1.5mg/k.0-2.0 IU/mL                 If administered              twice daily with dose              1mg/k.60-1.0 IU/mL     Glucose by meter   Result Value Ref Range    Glucose 74 70 -  99 mg/dL   Glucose by meter   Result Value Ref Range    Glucose 88 70 - 99 mg/dL   Heparin 10a Level   Result Value Ref Range    Heparin 10A Level 0.37 IU/mL   Basic metabolic panel   Result Value Ref Range    Sodium 140 133 - 144 mmol/L    Potassium 4.0 3.4 - 5.3 mmol/L    Chloride 110 (H) 94 - 109 mmol/L    Carbon Dioxide 24 20 - 32 mmol/L    Anion Gap 6 3 - 14 mmol/L    Glucose 76 70 - 99 mg/dL    Urea Nitrogen 14 7 - 30 mg/dL    Creatinine 0.80 0.66 - 1.25 mg/dL    GFR Estimate >90  Non  GFR Calc   >60 mL/min/1.7m2    GFR Estimate If Black >90   GFR Calc   >60 mL/min/1.7m2    Calcium 8.1 (L) 8.5 - 10.1 mg/dL

## 2017-02-28 NOTE — PLAN OF CARE
Problem: Goal Outcome Summary  Goal: Goal Outcome Summary  Outcome: No Change  VSS.  Groin soft, non-tender.  +Signal by doppler bilaterally.  Moderate pain in L foot, oxy as needed.  Up SBA t o BR, rested most of night.

## 2017-02-28 NOTE — BRIEF OP NOTE
[facility] Brief Operative Note    Pre-operative diagnosis: LEFT FOOT WOUND, PAD   Post-operative diagnosis same   Procedure: Procedure(s):  IRRIGATION AND DEBRIDEMENT LEFT ANKLE WOUND, LEFT LEG FEMORAL POPLITEAL BYPASS, LEFT GREATER TOE AMPUTATION - Wound Class: II-Clean Contaminated   - Wound Class: I-Clean  Greater Toe - Wound Class: III-Contaminated   Surgeon(s): Surgeon(s) and Role:  Panel 1:     * Jesus Aragon MD - Primary    Panel 2:     * Jesus Aragon MD - Primary   Estimated blood loss: 50 mL    Specimens:   ID Type Source Tests Collected by Time Destination   A : LEFT GREATER TOE Tissue Toe SURGICAL PATHOLOGY EXAM Jesus Aragon MD 2/28/2017  1:24 PM       Findings: Below the knee popliteal to DP bypass graft, wound debridement including left greater toe amputation

## 2017-02-28 NOTE — PROGRESS NOTES
Dr Aragon rounding. Plan to restart heparin. When heparin is available.Dr Aragon updated that order is for pharmacy to dose .  Crow in pharmacy notified of heparin orders. Pt ok to dc to floor. Crow at bedside updated writer with plan of care. He plans to talk to floor pharmacy to get heparin started.

## 2017-02-28 NOTE — PLAN OF CARE
Problem: Goal Outcome Summary  Goal: Goal Outcome Summary  Outcome: No Change  AVSS. Angiogram site soft no hematoma. Plan for surgery at 1030. NPO at [facility]. D/C heparin 1 hr prior to procedure. Up SBA. Denies pain. Heparin adjusted/maintained per orders. Wounds on foot scabbing and no drainage- FUNMILAYO

## 2017-03-01 ENCOUNTER — APPOINTMENT (OUTPATIENT)
Dept: OCCUPATIONAL THERAPY | Facility: CLINIC | Age: 57
DRG: 253 | End: 2017-03-01
Attending: FAMILY MEDICINE
Payer: COMMERCIAL

## 2017-03-01 LAB
ANION GAP SERPL CALCULATED.3IONS-SCNC: 7 MMOL/L (ref 3–14)
BASOPHILS # BLD AUTO: 0 10E9/L (ref 0–0.2)
BASOPHILS NFR BLD AUTO: 0.2 %
BUN SERPL-MCNC: 7 MG/DL (ref 7–30)
CALCIUM SERPL-MCNC: 8 MG/DL (ref 8.5–10.1)
CHLORIDE SERPL-SCNC: 107 MMOL/L (ref 94–109)
CO2 SERPL-SCNC: 24 MMOL/L (ref 20–32)
COPATH REPORT: NORMAL
CREAT SERPL-MCNC: 0.85 MG/DL (ref 0.66–1.25)
DIFFERENTIAL METHOD BLD: ABNORMAL
EOSINOPHIL # BLD AUTO: 0.1 10E9/L (ref 0–0.7)
EOSINOPHIL NFR BLD AUTO: 0.6 %
ERYTHROCYTE [DISTWIDTH] IN BLOOD BY AUTOMATED COUNT: 13.1 % (ref 10–15)
GFR SERPL CREATININE-BSD FRML MDRD: ABNORMAL ML/MIN/1.7M2
GLUCOSE BLDC GLUCOMTR-MCNC: 105 MG/DL (ref 70–99)
GLUCOSE BLDC GLUCOMTR-MCNC: 64 MG/DL (ref 70–99)
GLUCOSE BLDC GLUCOMTR-MCNC: 87 MG/DL (ref 70–99)
GLUCOSE BLDC GLUCOMTR-MCNC: 96 MG/DL (ref 70–99)
GLUCOSE SERPL-MCNC: 114 MG/DL (ref 70–99)
HCT VFR BLD AUTO: 32.3 % (ref 40–53)
HGB BLD-MCNC: 10.8 G/DL (ref 13.3–17.7)
IMM GRANULOCYTES # BLD: 0 10E9/L (ref 0–0.4)
IMM GRANULOCYTES NFR BLD: 0.2 %
LMWH PPP CHRO-ACNC: 0.15 IU/ML
LYMPHOCYTES # BLD AUTO: 1.9 10E9/L (ref 0.8–5.3)
LYMPHOCYTES NFR BLD AUTO: 14.5 %
MCH RBC QN AUTO: 30.5 PG (ref 26.5–33)
MCHC RBC AUTO-ENTMCNC: 33.4 G/DL (ref 31.5–36.5)
MCV RBC AUTO: 91 FL (ref 78–100)
MONOCYTES # BLD AUTO: 1.4 10E9/L (ref 0–1.3)
MONOCYTES NFR BLD AUTO: 10.5 %
NEUTROPHILS # BLD AUTO: 9.8 10E9/L (ref 1.6–8.3)
NEUTROPHILS NFR BLD AUTO: 74 %
NRBC # BLD AUTO: 0 10*3/UL
NRBC BLD AUTO-RTO: 0 /100
PLATELET # BLD AUTO: 341 10E9/L (ref 150–450)
POTASSIUM SERPL-SCNC: 3.8 MMOL/L (ref 3.4–5.3)
RBC # BLD AUTO: 3.54 10E12/L (ref 4.4–5.9)
SODIUM SERPL-SCNC: 138 MMOL/L (ref 133–144)
WBC # BLD AUTO: 13.3 10E9/L (ref 4–11)

## 2017-03-01 PROCEDURE — 25000132 ZZH RX MED GY IP 250 OP 250 PS 637: Performed by: INTERNAL MEDICINE

## 2017-03-01 PROCEDURE — 25000128 H RX IP 250 OP 636: Performed by: SURGERY

## 2017-03-01 PROCEDURE — 99233 SBSQ HOSP IP/OBS HIGH 50: CPT | Performed by: INTERNAL MEDICINE

## 2017-03-01 PROCEDURE — 36415 COLL VENOUS BLD VENIPUNCTURE: CPT | Performed by: SURGERY

## 2017-03-01 PROCEDURE — 99407 BEHAV CHNG SMOKING > 10 MIN: CPT | Performed by: OCCUPATIONAL THERAPIST

## 2017-03-01 PROCEDURE — 82565 ASSAY OF CREATININE: CPT | Performed by: INTERNAL MEDICINE

## 2017-03-01 PROCEDURE — 27210995 ZZH RX 272: Performed by: INTERNAL MEDICINE

## 2017-03-01 PROCEDURE — 12000007 ZZH R&B INTERMEDIATE

## 2017-03-01 PROCEDURE — 80048 BASIC METABOLIC PNL TOTAL CA: CPT | Performed by: INTERNAL MEDICINE

## 2017-03-01 PROCEDURE — 85025 COMPLETE CBC W/AUTO DIFF WBC: CPT | Performed by: INTERNAL MEDICINE

## 2017-03-01 PROCEDURE — 25000132 ZZH RX MED GY IP 250 OP 250 PS 637: Performed by: FAMILY MEDICINE

## 2017-03-01 PROCEDURE — 85520 HEPARIN ASSAY: CPT | Performed by: SURGERY

## 2017-03-01 PROCEDURE — 00000146 ZZHCL STATISTIC GLUCOSE BY METER IP

## 2017-03-01 PROCEDURE — 25000132 ZZH RX MED GY IP 250 OP 250 PS 637: Performed by: PHYSICIAN ASSISTANT

## 2017-03-01 PROCEDURE — 40000133 ZZH STATISTIC OT WARD VISIT: Performed by: OCCUPATIONAL THERAPIST

## 2017-03-01 RX ORDER — FAMOTIDINE 20 MG/1
20 TABLET, FILM COATED ORAL 2 TIMES DAILY
Status: DISCONTINUED | OUTPATIENT
Start: 2017-03-01 | End: 2017-03-07 | Stop reason: HOSPADM

## 2017-03-01 RX ORDER — HYDROMORPHONE HYDROCHLORIDE 1 MG/ML
.3-.5 INJECTION, SOLUTION INTRAMUSCULAR; INTRAVENOUS; SUBCUTANEOUS
Status: DISCONTINUED | OUTPATIENT
Start: 2017-03-01 | End: 2017-03-07 | Stop reason: HOSPADM

## 2017-03-01 RX ORDER — GABAPENTIN 300 MG/1
300 CAPSULE ORAL 3 TIMES DAILY
Status: DISCONTINUED | OUTPATIENT
Start: 2017-03-01 | End: 2017-03-07 | Stop reason: HOSPADM

## 2017-03-01 RX ORDER — ZOLPIDEM TARTRATE 6.25 MG/1
6.25 TABLET, FILM COATED, EXTENDED RELEASE ORAL
Status: DISCONTINUED | OUTPATIENT
Start: 2017-03-01 | End: 2017-03-07 | Stop reason: HOSPADM

## 2017-03-01 RX ORDER — OXYCODONE HYDROCHLORIDE 5 MG/1
15 TABLET ORAL
Status: DISCONTINUED | OUTPATIENT
Start: 2017-03-01 | End: 2017-03-02

## 2017-03-01 RX ADMIN — FAMOTIDINE 20 MG: 20 TABLET, FILM COATED ORAL at 11:41

## 2017-03-01 RX ADMIN — SENNOSIDES AND DOCUSATE SODIUM 1 TABLET: 8.6; 5 TABLET ORAL at 21:28

## 2017-03-01 RX ADMIN — OXYCODONE HYDROCHLORIDE 10 MG: 5 TABLET ORAL at 01:39

## 2017-03-01 RX ADMIN — POTASSIUM CHLORIDE 20 MEQ: 1500 TABLET, EXTENDED RELEASE ORAL at 08:15

## 2017-03-01 RX ADMIN — GLIMEPIRIDE 2 MG: 2 TABLET ORAL at 08:12

## 2017-03-01 RX ADMIN — PIPERACILLIN SODIUM,TAZOBACTAM SODIUM 3.38 G: 3; .375 INJECTION, POWDER, FOR SOLUTION INTRAVENOUS at 20:06

## 2017-03-01 RX ADMIN — Medication 3400 UNITS: at 00:48

## 2017-03-01 RX ADMIN — GLIMEPIRIDE 2 MG: 2 TABLET ORAL at 18:16

## 2017-03-01 RX ADMIN — SENNOSIDES AND DOCUSATE SODIUM 1 TABLET: 8.6; 5 TABLET ORAL at 08:13

## 2017-03-01 RX ADMIN — GABAPENTIN 300 MG: 300 CAPSULE ORAL at 21:28

## 2017-03-01 RX ADMIN — ASPIRIN 81 MG: 81 TABLET, COATED ORAL at 08:12

## 2017-03-01 RX ADMIN — OXYCODONE HYDROCHLORIDE 10 MG: 5 TABLET ORAL at 05:04

## 2017-03-01 RX ADMIN — NICOTINE 1 PATCH: 7 PATCH, EXTENDED RELEASE TRANSDERMAL at 08:13

## 2017-03-01 RX ADMIN — OXYCODONE HYDROCHLORIDE 10 MG: 5 TABLET ORAL at 08:15

## 2017-03-01 RX ADMIN — HEPARIN SODIUM 1200 UNITS/HR: 10000 INJECTION, SOLUTION INTRAVENOUS at 11:53

## 2017-03-01 RX ADMIN — LISINOPRIL 20 MG: 20 TABLET ORAL at 08:12

## 2017-03-01 RX ADMIN — OXYCODONE HYDROCHLORIDE 5 MG: 5 TABLET ORAL at 13:02

## 2017-03-01 RX ADMIN — CLOPIDOGREL BISULFATE 75 MG: 75 TABLET, FILM COATED ORAL at 08:13

## 2017-03-01 RX ADMIN — ATORVASTATIN CALCIUM 40 MG: 40 TABLET, FILM COATED ORAL at 20:06

## 2017-03-01 RX ADMIN — OXYCODONE HYDROCHLORIDE 15 MG: 5 TABLET ORAL at 15:16

## 2017-03-01 RX ADMIN — PIPERACILLIN SODIUM,TAZOBACTAM SODIUM 3.38 G: 3; .375 INJECTION, POWDER, FOR SOLUTION INTRAVENOUS at 01:45

## 2017-03-01 RX ADMIN — OXYCODONE HYDROCHLORIDE 15 MG: 5 TABLET ORAL at 21:28

## 2017-03-01 RX ADMIN — HYDROMORPHONE HYDROCHLORIDE 0.5 MG: 1 INJECTION, SOLUTION INTRAMUSCULAR; INTRAVENOUS; SUBCUTANEOUS at 11:49

## 2017-03-01 RX ADMIN — SODIUM CHLORIDE: 4.5 INJECTION, SOLUTION INTRAVENOUS at 05:05

## 2017-03-01 RX ADMIN — SODIUM CHLORIDE: 4.5 INJECTION, SOLUTION INTRAVENOUS at 20:06

## 2017-03-01 RX ADMIN — GABAPENTIN 300 MG: 300 CAPSULE ORAL at 17:21

## 2017-03-01 RX ADMIN — PIPERACILLIN SODIUM,TAZOBACTAM SODIUM 3.38 G: 3; .375 INJECTION, POWDER, FOR SOLUTION INTRAVENOUS at 08:17

## 2017-03-01 RX ADMIN — GABAPENTIN 300 MG: 300 CAPSULE ORAL at 13:33

## 2017-03-01 RX ADMIN — FAMOTIDINE 20 MG: 20 TABLET, FILM COATED ORAL at 21:28

## 2017-03-01 RX ADMIN — PIPERACILLIN SODIUM,TAZOBACTAM SODIUM 3.38 G: 3; .375 INJECTION, POWDER, FOR SOLUTION INTRAVENOUS at 13:34

## 2017-03-01 RX ADMIN — OXYCODONE HYDROCHLORIDE 10 MG: 5 TABLET ORAL at 11:41

## 2017-03-01 RX ADMIN — OXYCODONE HYDROCHLORIDE 15 MG: 5 TABLET ORAL at 18:16

## 2017-03-01 RX ADMIN — LISINOPRIL 20 MG: 20 TABLET ORAL at 21:28

## 2017-03-01 NOTE — PROGRESS NOTES
[facility]    Vascular Medicine Progress Note    Date of Service (when I saw the patient): 03/01/2017          Physician Supervisory Attestation:   I have reviewed and discussed with the physician assistant their history, physical and plan and independently interviewed and examined Gomez Turk and agree with the plan as stated in the physician assistant note.    He is c/o rt foot/ankle pain but controlled with meds, on IV heparin per vasc surgery continue 24 hours, activity as tolerated.  Excellent graft pulses.  Discussed with [facility] surg team ?? Viability of great toe amputation plantar flap closure  He quit smoking few days ago. On patches, no withdrawal  Cont asa and plavix add H2 blocker for GI protection  Statin  Tight control of DM  Cont zosyn    Time spent 35 minutes.    Leda Phillip MD 3/1/2017      Assessment & Plan     1. Critical limb ischemia of non-healing ulcerations of left foot with single vessel run-off via peroneal artery s/p angioplasty 2 areas of stenosis 2/27/17 followed by below-knee popliteal to dorsalis pedis nonreversed translocated greater saphenous vein bypass 2/28/17      Assessment: Pain controlled. Good pulse in graft and DP.  Plan:   -Continue Heparin, Plavix  -Continue Zosyn  -Needs to stop smoking.       2. Ongoing tobacco use      Assessment: Still smokes 5 cigarettes per day. Wants to quit.   Plan: 7 mg nicotine patch      3. Hyperlipidemia      Assessment: LDL of 109, on no lipid lowering therapy. He had been prescribed simvastatin previously, but he never took this.   Plan: Started Lipitor 40 mg daily. Recheck lipid panel in 3 months through primary MD.      4. Type 2 diabetes mellitus      Assessment:   -His most recent A1C was 6.8%, on metformin and glimepiride.   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   Plan:    -SS Novolog cvg. and Glimepiride in hospital   -Restart metformin 48 hours post angio    Interval History    Pain controlled. Doing well.     Physical Exam   Temp: 98.1  F (36.7  C) Temp src: Axillary BP: 125/68   Heart Rate: 91 Resp: 18 SpO2: 91 % O2 Device: None (Room air) Oxygen Delivery: 2 LPM  Vitals:    02/27/17 0655 02/28/17 0600 03/01/17 0625   Weight: 92.7 kg (204 lb 6.4 oz) 94.8 kg (208 lb 14.4 oz) 95.3 kg (210 lb 1.6 oz)     Vital Signs with Ranges  Temp:  [97.9  F (36.6  C)-99.4  F (37.4  C)] 98.1  F (36.7  C)  Heart Rate:  [68-95] 91  Resp:  [10-30] 18  BP: ()/(57-79) 125/68  SpO2:  [91 %-98 %] 91 %  I/O last 3 completed shifts:  In: 4304 [P.O.:1000; I.V.:3304]  Out: 4475 [Urine:4425; Blood:50]    Constitutional: Awake, alert, cooperative, no apparent distress, and appears stated age.  Eyes: Lids and lashes normal, sclera clear, conjunctiva normal.  ENT: Normocephalic, without obvious abnormality, atraumatic, oral pharynx with moist mucus membranes  Respiratory: No increased work of breathing, good air exchange, clear to auscultation bilaterally, no crackles or wheezing.  Cardiovascular: Regular rate and rhythm, normal S1 and S2, no S3 or S4, and no murmur noted.  GI: Normal bowel sounds, soft, non-distended, non-tender  Skin: Dressing in place over ulcers.   Musculoskeletal: There is no redness, warmth, or swelling of the joints.    Neurologic: Awake, alert, oriented to name, place and time.  Cranial nerves II-XII are grossly intact.    Neuropsychiatric: Calm, normal eye contact, alert, normal affect, oriented to self, place, time and situation, memory for past and recent events intact and thought process normal.    Medications     HEParin 1,200 Units/hr (03/01/17 0827)     NaCl 75 mL/hr at 03/01/17 0827       HYDROmorphone  1 mg Intravenous Once     clopidogrel  75 mg Oral Daily     sodium chloride (PF)  3 mL Intracatheter Q8H     senna-docusate  1-2 tablet Oral BID     aspirin EC  81 mg Oral  Daily     glimepiride  2 mg Oral BID w/meals     lisinopril  20 mg Oral BID     insulin aspart  1-7 Units Subcutaneous TID AC     insulin aspart  1-5 Units Subcutaneous At Bedtime     atorvastatin  40 mg Oral Daily at 8 pm     nicotine   Transdermal Q8H     nicotine   Transdermal Daily     nicotine  1 patch Transdermal Daily     piperacillin-tazobactam  3.375 g Intravenous Q6H       Data     Recent Labs  Lab 03/01/17  0705 02/28/17  0530 02/27/17  1626 02/27/17  1055 02/27/17  0715   WBC 13.3*  --  13.1* 14.9* 14.5*   HGB 10.8*  --  12.7* 12.5* 13.2*   MCV 91  --  92 92 92     --  382 381 422   INR  --   --  0.93  --  0.90    140 140  --   --    POTASSIUM 3.8 4.0 3.7  --   --    CHLORIDE 107 110* 106  --   --    CO2 24 24 28  --   --    BUN 7 14 10  --   --    CR 0.85 0.80 0.77  --  0.70   ANIONGAP 7 6 6  --   --    KRYSTEN 8.0* 8.1* 8.6  --   --    * 76 87  --   --      No results found for this or any previous visit (from the past 24 hour(s)).

## 2017-03-01 NOTE — PLAN OF CARE
Problem: Goal Outcome Summary  Goal: Goal Outcome Summary  Outcome: Improving  A&Ox3. VSS. Regular DM diet, declined meals, breakfast & lunch.  Medicated for pain, with oral Oxycodone and IV Dilaudid for breakthrough. Oxycodone increased to 15mg PRN.  Potassium replaced. Voided.
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
## 2017-03-01 NOTE — PROGRESS NOTES
HOSPITALIST CHART CHECK:     Hospitalist service was consulted for after hours medical cross-cover only.  Vascular medicine is currently following and managing chronic medical conditions.  For detailed Assessment and Plan, please refer note by John Love from 2/27.    Sveta Chou PA-C  Internal Medicine Hospitalist

## 2017-03-01 NOTE — PLAN OF CARE
Problem: Individualization  Goal: Patient Preferences  Outcome: Improving  A/O. VSS except low grade fever. LS clear. BS active, no flatus, denies nausea. Canada removed, due to void. Diet advanced. Numbness in left calf/foot. Bilateral lower extremities pulses audible with doppler. Pain 7-10/10, managed with oxycodone, pt sleeping in between pain medication administration. Mid leg incision and left foot dressing with drainage that did not extend. Pt tossing and turning during the evening but much more relaxed as the night progressed.

## 2017-03-02 ENCOUNTER — APPOINTMENT (OUTPATIENT)
Dept: PHYSICAL THERAPY | Facility: CLINIC | Age: 57
DRG: 253 | End: 2017-03-02
Attending: SURGERY
Payer: COMMERCIAL

## 2017-03-02 LAB
CREAT SERPL-MCNC: 0.87 MG/DL (ref 0.66–1.25)
ERYTHROCYTE [DISTWIDTH] IN BLOOD BY AUTOMATED COUNT: 13.1 % (ref 10–15)
GFR SERPL CREATININE-BSD FRML MDRD: NORMAL ML/MIN/1.7M2
GLUCOSE BLDC GLUCOMTR-MCNC: 114 MG/DL (ref 70–99)
GLUCOSE BLDC GLUCOMTR-MCNC: 54 MG/DL (ref 70–99)
GLUCOSE BLDC GLUCOMTR-MCNC: 69 MG/DL (ref 70–99)
GLUCOSE BLDC GLUCOMTR-MCNC: 81 MG/DL (ref 70–99)
GLUCOSE BLDC GLUCOMTR-MCNC: 82 MG/DL (ref 70–99)
GLUCOSE BLDC GLUCOMTR-MCNC: 91 MG/DL (ref 70–99)
GLUCOSE BLDC GLUCOMTR-MCNC: 95 MG/DL (ref 70–99)
HCT VFR BLD AUTO: 30.2 % (ref 40–53)
HGB BLD-MCNC: 10.3 G/DL (ref 13.3–17.7)
LMWH PPP CHRO-ACNC: NORMAL IU/ML
MCH RBC QN AUTO: 31.2 PG (ref 26.5–33)
MCHC RBC AUTO-ENTMCNC: 34.1 G/DL (ref 31.5–36.5)
MCV RBC AUTO: 92 FL (ref 78–100)
PLATELET # BLD AUTO: 314 10E9/L (ref 150–450)
POTASSIUM SERPL-SCNC: 3.5 MMOL/L (ref 3.4–5.3)
RBC # BLD AUTO: 3.3 10E12/L (ref 4.4–5.9)
WBC # BLD AUTO: 15.7 10E9/L (ref 4–11)

## 2017-03-02 PROCEDURE — 11042 DBRDMT SUBQ TIS 1ST 20SQCM/<: CPT | Mod: 79 | Performed by: SURGERY

## 2017-03-02 PROCEDURE — 25000132 ZZH RX MED GY IP 250 OP 250 PS 637: Performed by: INTERNAL MEDICINE

## 2017-03-02 PROCEDURE — 25000128 H RX IP 250 OP 636: Performed by: SURGERY

## 2017-03-02 PROCEDURE — 84132 ASSAY OF SERUM POTASSIUM: CPT | Performed by: FAMILY MEDICINE

## 2017-03-02 PROCEDURE — 25000132 ZZH RX MED GY IP 250 OP 250 PS 637: Performed by: PHYSICIAN ASSISTANT

## 2017-03-02 PROCEDURE — 40000193 ZZH STATISTIC PT WARD VISIT: Performed by: PHYSICAL THERAPIST

## 2017-03-02 PROCEDURE — 12000007 ZZH R&B INTERMEDIATE

## 2017-03-02 PROCEDURE — 97161 PT EVAL LOW COMPLEX 20 MIN: CPT | Mod: GP | Performed by: PHYSICAL THERAPIST

## 2017-03-02 PROCEDURE — 82565 ASSAY OF CREATININE: CPT | Performed by: FAMILY MEDICINE

## 2017-03-02 PROCEDURE — 0JBR0ZZ EXCISION OF LEFT FOOT SUBCUTANEOUS TISSUE AND FASCIA, OPEN APPROACH: ICD-10-PCS | Performed by: SURGERY

## 2017-03-02 PROCEDURE — 00000146 ZZHCL STATISTIC GLUCOSE BY METER IP

## 2017-03-02 PROCEDURE — 85520 HEPARIN ASSAY: CPT | Performed by: FAMILY MEDICINE

## 2017-03-02 PROCEDURE — 99233 SBSQ HOSP IP/OBS HIGH 50: CPT | Performed by: INTERNAL MEDICINE

## 2017-03-02 PROCEDURE — 25000132 ZZH RX MED GY IP 250 OP 250 PS 637: Performed by: FAMILY MEDICINE

## 2017-03-02 PROCEDURE — 36415 COLL VENOUS BLD VENIPUNCTURE: CPT | Performed by: FAMILY MEDICINE

## 2017-03-02 PROCEDURE — 85027 COMPLETE CBC AUTOMATED: CPT | Performed by: FAMILY MEDICINE

## 2017-03-02 PROCEDURE — 97530 THERAPEUTIC ACTIVITIES: CPT | Mod: GP | Performed by: PHYSICAL THERAPIST

## 2017-03-02 RX ORDER — OXYCODONE HYDROCHLORIDE 5 MG/1
10-15 TABLET ORAL
Status: DISCONTINUED | OUTPATIENT
Start: 2017-03-02 | End: 2017-03-07 | Stop reason: HOSPADM

## 2017-03-02 RX ADMIN — ASPIRIN 81 MG: 81 TABLET, COATED ORAL at 08:56

## 2017-03-02 RX ADMIN — HYDROMORPHONE HYDROCHLORIDE 0.5 MG: 1 INJECTION, SOLUTION INTRAMUSCULAR; INTRAVENOUS; SUBCUTANEOUS at 09:55

## 2017-03-02 RX ADMIN — OXYCODONE HYDROCHLORIDE 15 MG: 5 TABLET ORAL at 14:05

## 2017-03-02 RX ADMIN — OXYCODONE HYDROCHLORIDE 15 MG: 5 TABLET ORAL at 20:06

## 2017-03-02 RX ADMIN — HYDROMORPHONE HYDROCHLORIDE 0.3 MG: 1 INJECTION, SOLUTION INTRAMUSCULAR; INTRAVENOUS; SUBCUTANEOUS at 21:21

## 2017-03-02 RX ADMIN — PIPERACILLIN SODIUM,TAZOBACTAM SODIUM 3.38 G: 3; .375 INJECTION, POWDER, FOR SOLUTION INTRAVENOUS at 20:06

## 2017-03-02 RX ADMIN — SENNOSIDES AND DOCUSATE SODIUM 2 TABLET: 8.6; 5 TABLET ORAL at 21:20

## 2017-03-02 RX ADMIN — PIPERACILLIN SODIUM,TAZOBACTAM SODIUM 3.38 G: 3; .375 INJECTION, POWDER, FOR SOLUTION INTRAVENOUS at 08:26

## 2017-03-02 RX ADMIN — GABAPENTIN 300 MG: 300 CAPSULE ORAL at 16:08

## 2017-03-02 RX ADMIN — OXYCODONE HYDROCHLORIDE 15 MG: 5 TABLET ORAL at 10:58

## 2017-03-02 RX ADMIN — PIPERACILLIN SODIUM,TAZOBACTAM SODIUM 3.38 G: 3; .375 INJECTION, POWDER, FOR SOLUTION INTRAVENOUS at 02:00

## 2017-03-02 RX ADMIN — LISINOPRIL 20 MG: 20 TABLET ORAL at 08:56

## 2017-03-02 RX ADMIN — OXYCODONE HYDROCHLORIDE 15 MG: 5 TABLET ORAL at 03:51

## 2017-03-02 RX ADMIN — OXYCODONE HYDROCHLORIDE 15 MG: 5 TABLET ORAL at 23:03

## 2017-03-02 RX ADMIN — CLOPIDOGREL BISULFATE 75 MG: 75 TABLET, FILM COATED ORAL at 08:56

## 2017-03-02 RX ADMIN — OXYCODONE HYDROCHLORIDE 15 MG: 5 TABLET ORAL at 17:01

## 2017-03-02 RX ADMIN — OXYCODONE HYDROCHLORIDE 15 MG: 5 TABLET ORAL at 00:37

## 2017-03-02 RX ADMIN — SENNOSIDES AND DOCUSATE SODIUM 1 TABLET: 8.6; 5 TABLET ORAL at 08:56

## 2017-03-02 RX ADMIN — OXYCODONE HYDROCHLORIDE 15 MG: 5 TABLET ORAL at 07:41

## 2017-03-02 RX ADMIN — FAMOTIDINE 20 MG: 20 TABLET, FILM COATED ORAL at 08:55

## 2017-03-02 RX ADMIN — GLIMEPIRIDE 2 MG: 2 TABLET ORAL at 08:56

## 2017-03-02 RX ADMIN — ATORVASTATIN CALCIUM 40 MG: 40 TABLET, FILM COATED ORAL at 20:06

## 2017-03-02 RX ADMIN — HYDROMORPHONE HYDROCHLORIDE 0.5 MG: 1 INJECTION, SOLUTION INTRAMUSCULAR; INTRAVENOUS; SUBCUTANEOUS at 14:30

## 2017-03-02 RX ADMIN — POTASSIUM CHLORIDE 20 MEQ: 1500 TABLET, EXTENDED RELEASE ORAL at 16:08

## 2017-03-02 RX ADMIN — GABAPENTIN 300 MG: 300 CAPSULE ORAL at 21:20

## 2017-03-02 RX ADMIN — PIPERACILLIN SODIUM,TAZOBACTAM SODIUM 3.38 G: 3; .375 INJECTION, POWDER, FOR SOLUTION INTRAVENOUS at 14:33

## 2017-03-02 RX ADMIN — GABAPENTIN 300 MG: 300 CAPSULE ORAL at 08:55

## 2017-03-02 RX ADMIN — NICOTINE 1 PATCH: 7 PATCH, EXTENDED RELEASE TRANSDERMAL at 08:55

## 2017-03-02 NOTE — PLAN OF CARE
Problem: Goal Outcome Summary  Goal: Goal Outcome Summary  Outcome: Improving  Vitally stable. A/O x4, pain control achieved with Oxycodone Q3H.   Resp: IS to 3000 independently.  CV: normal  GI: tolerating diet. Bowel sounds active.   : voiding adequately without difficulty.   Surg: incisions and dressings intact. Pulses present via doppler. Pt bumped left leg while dangling in bed to void. Very small hematoma marked on left shin - no change.  Blood sugar 51 at 0200. Pt asymptomatic and corrected with snack and juice.

## 2017-03-02 NOTE — PLAN OF CARE
Problem: Individualization  Goal: Patient Preferences  Outcome: No Change  Vss, a-febrile, c/o pain left foot, 9-10/10, oxycodone and iv dilaudid helping, up with SBA, keep left extremity elevated, 2+ pulses left foot per doppler, dressing change , some moist drainage, will continue to monitor.
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
## 2017-03-02 NOTE — CONSULTS
"CLINICAL NUTRITION SERVICES  -  ASSESSMENT NOTE      RECOMMENDATIONS FOR MD/PROVIDER TO ORDER:   Recommend referral for outpatient diabetes education.   Recommendations Ordered by Registered Dietitian (RD):   Ordered: Glucerna chocolate @ 10 am   Malnutrition: Patient does not meet two of the criteria necessary for diagnosing malnutrition        REASON FOR ASSESSMENT  Gomez Turk is a 56 year old male seen by dietetic intern for Admission Nutrition Risk Screen - unintentional loss of 10 lbs      NUTRITION HISTORY  - Information obtained from spouse.  Pt usually eats 1 meal per day, dinner (sometime breakfast on Sat or Sun.)    Pt does not snack during the day and usually eat for 1 hr at dinner time 'buffet style'  Pt has not taken supplements in the past.     CURRENT NUTRITION ORDERS  Diet Order:     Mod Consistent Carbohydrate, Low Cholesterol, sat. fat.    Current Intake/Tolerance:  Pt was able to eat 50% of fish sandwich and french fries from [employer]. Pt did not eat breakfast this morning.     PHYSICAL FINDINGS  Observed  No nutrition-related physical findings observed  Obtained from Chart/Interdisciplinary Team  Edema: +2 L -leg, ankle and foot  knee popliteal to DP bypass graft, wound debridement including left greater toe amputation    ANTHROPOMETRICS  Height: 5' 9\"  Weight: 210 lbs 1.57 oz  96 kg  Body mass index is 31.03 kg/(m^2).  Weight Status:  Obesity Grade I BMI 30-34.9  IBW: 73 kg  % IBW: 132 %  Weight History: Wt stable since admit. UBW: 210 lbs.  Wt Readings from Last 5 Encounters:   03/01/17 95.3 kg (210 lb 1.6 oz)   02/13/17 94.3 kg (207 lb 14.4 oz)   01/12/17 94.8 kg (209 lb)   01/04/17 95.1 kg (209 lb 9.6 oz)   02/04/16 99.6 kg (219 lb 9.6 oz)       LABS  Labs reviewed    MEDICATIONS  Medications reviewed    Dosing Weight 79 kg (adj wt based on adm. wt)    ASSESSED NUTRITION NEEDS:  Estimated Energy Needs: 5945-5154 kcals (25-30 Kcal/Kg)  Justification: maintenance  Estimated Protein " Needs:  grams protein (1.2-1.5 g pro/Kg)  Justification: preservation of lean body mass  Estimated Fluid Needs: 5932-4390  mL (1 mL/Kcal)  Justification: per provider pending fluid status    MALNUTRITION:  % Weight Loss:  Weight loss does not meet criteria for malnutrition   % Intake:  Decreased intake does not meet criteria for malnutrition   Subcutaneous Fat Loss:  None observed  Muscle Loss:  None observed  Fluid Retention:  Mild +2 L -leg, ankle and foot    Malnutrition Diagnosis: Patient does not meet two of the above criteria necessary for diagnosing malnutrition    NUTRITION DIAGNOSIS:  Inadequate oral intake related to decreased appetite post surgery as evidenced by pt's spouse reports pt eating 50% of meals.    NUTRITION INTERVENTIONS  Recommendations / Nutrition Prescription  Continue w/ Mod Consistent Carbohydrate, Low Cholesterol and sat. fat. diet  Ordered: Glucerna chocolate @ 10 am  Recommend referral for outpatient diabetes education.    Implementation  Nutrition education: Discussed consistent CHO to maintain blood glucose levels.   Medical Food Supplement - Ordered nutrition supplement    Nutrition Goals  Pt will meet 50-75% of nutrition needs with meals and supplements.    MONITORING AND EVALUATION:  Progress towards goals will be monitored and evaluated per protocol and Practice Guidelines    Irasema Lawrence  Dietetic Intern

## 2017-03-02 NOTE — PLAN OF CARE
Problem: Goal Outcome Summary  Goal: Goal Outcome Summary  Outcome: No Change  VSS. Pain controlled with 15mg oxy. Voiding. Pulses palpable on R, doppler strong on L. Dressing CDI on LLE. Incisions WDL. Heparin at 1200u/hr.

## 2017-03-02 NOTE — PROGRESS NOTES
[facility]    Vascular Medicine Progress Note    Date of Service (when I saw the patient): 03/02/2017          Physician Supervisory Attestation:   I have reviewed and discussed with the physician assistant their history, physical and plan and independently interviewed and examined Gomez Turk and agree with the plan as stated in the physician assistant note.    This morning after vascular surg evaluation he tried to stood up then bleed into the dressing and completely saturated newly changed dressing. No dizziness but c/o pain  Heparin stopped. On IV dilaudid prn  On nicotine patch , motivated to quit completely.  Heart: RRR, lungs: CTA  Abd: soft, positive BS  Foot dressing soaked with blood.    Plan:  D/c heparin  [facility] surgery team to reassess, discussed with RN ( notifying to [facility] surg team)  Assist in quitting smoking  BS control  Antiplatelet meds with H2 blocker  Statin    Time spent today 35 minutes.    Leda Phillip MD ,[facility]    3/2/2017          Assessment & Plan     1. Critical limb ischemia of non-healing ulcerations of left foot with single vessel run-off via peroneal artery s/p angioplasty 2 areas of stenosis 2/27/17 followed by below-knee popliteal to dorsalis pedis nonreversed translocated greater saphenous vein bypass 2/28/17      Assessment: Pain controlled. Good pulse in graft and DP.  Plan:   -Continue ASA, Plavix (and Pepcid for GI protection)  -Stop IV heparin  -Continue Zosyn for ulcers  -Needs to stop smoking.       2. Ongoing tobacco use      Assessment: Still smokes 5 cigarettes per day. Wants to quit.   Plan: 7 mg nicotine patch      3. Hyperlipidemia      Assessment: LDL of 109, on no lipid lowering therapy. He had been prescribed simvastatin previously, but he never took this.   Plan: Started Lipitor 40 mg daily. Recheck lipid panel in 3 months through primary MD.      4. Type 2 diabetes mellitus      Assessment:   -His most recent A1C was 6.8%, on  metformin and glimepiride.   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   Plan:   -SS Novolog cvg. in hospital   -Hold restarting metformin given low blood sugars. Will also hold Glimepiride.     Interval History    Pain controlled. Doing well. Just stood up to use urinal and now with completely saturated/bloody dressing over toe amp site.     Physical Exam   Temp: 98.7  F (37.1  C) Temp src: Oral BP: 104/60 Pulse: 87 Heart Rate: 91 Resp: 18 SpO2: 98 % O2 Device: None (Room air)    Vitals:    02/27/17 0655 02/28/17 0600 03/01/17 0625   Weight: 92.7 kg (204 lb 6.4 oz) 94.8 kg (208 lb 14.4 oz) 95.3 kg (210 lb 1.6 oz)     Vital Signs with Ranges  Temp:  [97.8  F (36.6  C)-99.1  F (37.3  C)] 98.7  F (37.1  C)  Pulse:  [87-93] 87  Heart Rate:  [75-96] 91  Resp:  [18] 18  BP: (104-154)/(60-71) 104/60  SpO2:  [96 %-98 %] 98 %  I/O last 3 completed shifts:  In: 3645.5 [P.O.:1400; I.V.:2245.5]  Out: 3315 [Urine:3315]    Constitutional: Awake, alert, cooperative, no apparent distress, and appears stated age.  Eyes: Lids and lashes normal, sclera clear, conjunctiva normal.  ENT: Normocephalic, without obvious abnormality, atraumatic, oral pharynx with moist mucus membranes  Respiratory: No increased work of breathing, good air exchange, clear to auscultation bilaterally, no crackles or wheezing.  Cardiovascular: Regular rate and rhythm, normal S1 and S2, no S3 or S4, and no murmur noted.  GI: Normal bowel sounds, soft, non-distended, non-tender  Skin: Dressing in place over ulcers. Left foot toe amp site dressing saturated in blood.   Musculoskeletal: There is no redness, warmth, or swelling of the joints.    Neurologic: Awake, alert, oriented to name, place and time.  Cranial nerves II-XII are grossly intact.    Neuropsychiatric: Calm, normal eye contact, alert, normal affect, oriented to self,  place, time and situation, memory for past and recent events intact and thought process normal.    Medications     NaCl 10 mL/hr at 03/02/17 0854       HYDROmorphone  1 mg Intravenous Once     famotidine  20 mg Oral BID     gabapentin  300 mg Oral TID     clopidogrel  75 mg Oral Daily     sodium chloride (PF)  3 mL Intracatheter Q8H     senna-docusate  1-2 tablet Oral BID     aspirin EC  81 mg Oral Daily     glimepiride  2 mg Oral BID w/meals     lisinopril  20 mg Oral BID     insulin aspart  1-7 Units Subcutaneous TID AC     insulin aspart  1-5 Units Subcutaneous At Bedtime     atorvastatin  40 mg Oral Daily at 8 pm     nicotine   Transdermal Q8H     nicotine   Transdermal Daily     nicotine  1 patch Transdermal Daily     piperacillin-tazobactam  3.375 g Intravenous Q6H       Data     Recent Labs  Lab 03/02/17  0725 03/01/17  0705 02/28/17  0530 02/27/17  1626  02/27/17  0715   WBC 15.7* 13.3*  --  13.1*  < > 14.5*   HGB 10.3* 10.8*  --  12.7*  < > 13.2*   MCV 92 91  --  92  < > 92    341  --  382  < > 422   INR  --   --   --  0.93  --  0.90   NA  --  138 140 140  --   --    POTASSIUM 3.5 3.8 4.0 3.7  < >  --    CHLORIDE  --  107 110* 106  --   --    CO2  --  24 24 28  --   --    BUN  --  7 14 10  --   --    CR 0.87 0.85 0.80 0.77  --  0.70   ANIONGAP  --  7 6 6  --   --    KRYSTEN  --  8.0* 8.1* 8.6  --   --    GLC  --  114* 76 87  --   --    < > = values in this interval not displayed.  No results found for this or any previous visit (from the past 24 hour(s)).

## 2017-03-02 NOTE — PLAN OF CARE
Problem: Goal Outcome Summary  Goal: Goal Outcome Summary     PT: Orders received and chart reviewed. Pt in bed with L foot elevated, bleeding through dressing that nursing is going to change.  No weight bearing restrictions noted in chart, will page MD to indicate weight bearing on L foot. Will plan to r/s eval for later this day.

## 2017-03-02 NOTE — PROGRESS NOTES
HOSPITALIST CHART CHECK:     Hospitalist service was consulted for after hours medical cross-cover only.  Vascular medicine is currently following and managing chronic medical conditions.  For detailed Assessment and Plan, please refer note by John Love from 2/27.    Discussed patients hypoglycemia with Vascular Medicine PA today. Plan to hold oral Amaryl until blood sugars improve.     Sveta Chou PA-C  Internal Medicine Hospitalist
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
## 2017-03-03 ENCOUNTER — APPOINTMENT (OUTPATIENT)
Dept: PHYSICAL THERAPY | Facility: CLINIC | Age: 57
DRG: 253 | End: 2017-03-03
Attending: RADIOLOGY
Payer: COMMERCIAL

## 2017-03-03 LAB
GLUCOSE BLDC GLUCOMTR-MCNC: 102 MG/DL (ref 70–99)
GLUCOSE BLDC GLUCOMTR-MCNC: 64 MG/DL (ref 70–99)
GLUCOSE BLDC GLUCOMTR-MCNC: 75 MG/DL (ref 70–99)
GLUCOSE BLDC GLUCOMTR-MCNC: 78 MG/DL (ref 70–99)
GLUCOSE BLDC GLUCOMTR-MCNC: 79 MG/DL (ref 70–99)
GLUCOSE BLDC GLUCOMTR-MCNC: 82 MG/DL (ref 70–99)
GLUCOSE SERPL-MCNC: 62 MG/DL (ref 70–99)
POTASSIUM SERPL-SCNC: 3.3 MMOL/L (ref 3.4–5.3)
POTASSIUM SERPL-SCNC: 3.6 MMOL/L (ref 3.4–5.3)

## 2017-03-03 PROCEDURE — 36415 COLL VENOUS BLD VENIPUNCTURE: CPT | Performed by: PHYSICIAN ASSISTANT

## 2017-03-03 PROCEDURE — 25000132 ZZH RX MED GY IP 250 OP 250 PS 637: Performed by: PHYSICIAN ASSISTANT

## 2017-03-03 PROCEDURE — 36415 COLL VENOUS BLD VENIPUNCTURE: CPT | Performed by: SURGERY

## 2017-03-03 PROCEDURE — 25000132 ZZH RX MED GY IP 250 OP 250 PS 637: Performed by: INTERNAL MEDICINE

## 2017-03-03 PROCEDURE — 84132 ASSAY OF SERUM POTASSIUM: CPT | Performed by: SURGERY

## 2017-03-03 PROCEDURE — 99233 SBSQ HOSP IP/OBS HIGH 50: CPT | Performed by: INTERNAL MEDICINE

## 2017-03-03 PROCEDURE — 25000128 H RX IP 250 OP 636: Performed by: SURGERY

## 2017-03-03 PROCEDURE — 25000132 ZZH RX MED GY IP 250 OP 250 PS 637: Performed by: FAMILY MEDICINE

## 2017-03-03 PROCEDURE — 84132 ASSAY OF SERUM POTASSIUM: CPT | Performed by: PHYSICIAN ASSISTANT

## 2017-03-03 PROCEDURE — 97110 THERAPEUTIC EXERCISES: CPT | Mod: GP | Performed by: PHYSICAL THERAPIST

## 2017-03-03 PROCEDURE — 82947 ASSAY GLUCOSE BLOOD QUANT: CPT | Performed by: PHYSICIAN ASSISTANT

## 2017-03-03 PROCEDURE — 00000146 ZZHCL STATISTIC GLUCOSE BY METER IP

## 2017-03-03 PROCEDURE — 40000193 ZZH STATISTIC PT WARD VISIT: Performed by: PHYSICAL THERAPIST

## 2017-03-03 PROCEDURE — 12000007 ZZH R&B INTERMEDIATE

## 2017-03-03 RX ORDER — POTASSIUM CHLORIDE 29.8 MG/ML
20 INJECTION INTRAVENOUS
Status: DISCONTINUED | OUTPATIENT
Start: 2017-03-03 | End: 2017-03-03

## 2017-03-03 RX ORDER — POTASSIUM CHLORIDE 1.5 G/1.58G
20-40 POWDER, FOR SOLUTION ORAL
Status: DISCONTINUED | OUTPATIENT
Start: 2017-03-03 | End: 2017-03-03

## 2017-03-03 RX ORDER — POTASSIUM CHLORIDE 1500 MG/1
20-40 TABLET, EXTENDED RELEASE ORAL
Status: DISCONTINUED | OUTPATIENT
Start: 2017-03-03 | End: 2017-03-03

## 2017-03-03 RX ORDER — HEPARIN SODIUM 5000 [USP'U]/.5ML
5000 INJECTION, SOLUTION INTRAVENOUS; SUBCUTANEOUS EVERY 8 HOURS
Status: DISCONTINUED | OUTPATIENT
Start: 2017-03-03 | End: 2017-03-07 | Stop reason: HOSPADM

## 2017-03-03 RX ORDER — POTASSIUM CHLORIDE 7.45 MG/ML
10 INJECTION INTRAVENOUS
Status: DISCONTINUED | OUTPATIENT
Start: 2017-03-03 | End: 2017-03-03

## 2017-03-03 RX ADMIN — SENNOSIDES AND DOCUSATE SODIUM 2 TABLET: 8.6; 5 TABLET ORAL at 09:02

## 2017-03-03 RX ADMIN — GABAPENTIN 300 MG: 300 CAPSULE ORAL at 21:53

## 2017-03-03 RX ADMIN — GABAPENTIN 300 MG: 300 CAPSULE ORAL at 09:04

## 2017-03-03 RX ADMIN — OXYCODONE HYDROCHLORIDE 15 MG: 5 TABLET ORAL at 16:01

## 2017-03-03 RX ADMIN — SENNOSIDES AND DOCUSATE SODIUM 2 TABLET: 8.6; 5 TABLET ORAL at 20:24

## 2017-03-03 RX ADMIN — PIPERACILLIN SODIUM,TAZOBACTAM SODIUM 3.38 G: 3; .375 INJECTION, POWDER, FOR SOLUTION INTRAVENOUS at 09:10

## 2017-03-03 RX ADMIN — POTASSIUM CHLORIDE 20 MEQ: 1500 TABLET, EXTENDED RELEASE ORAL at 11:18

## 2017-03-03 RX ADMIN — HYDROMORPHONE HYDROCHLORIDE 0.5 MG: 1 INJECTION, SOLUTION INTRAMUSCULAR; INTRAVENOUS; SUBCUTANEOUS at 18:03

## 2017-03-03 RX ADMIN — OXYCODONE HYDROCHLORIDE 15 MG: 5 TABLET ORAL at 12:23

## 2017-03-03 RX ADMIN — HYDROMORPHONE HYDROCHLORIDE 0.5 MG: 1 INJECTION, SOLUTION INTRAMUSCULAR; INTRAVENOUS; SUBCUTANEOUS at 00:15

## 2017-03-03 RX ADMIN — CLOPIDOGREL BISULFATE 75 MG: 75 TABLET, FILM COATED ORAL at 09:03

## 2017-03-03 RX ADMIN — FAMOTIDINE 20 MG: 20 TABLET, FILM COATED ORAL at 09:04

## 2017-03-03 RX ADMIN — OXYCODONE HYDROCHLORIDE 15 MG: 5 TABLET ORAL at 20:24

## 2017-03-03 RX ADMIN — LISINOPRIL 20 MG: 20 TABLET ORAL at 09:03

## 2017-03-03 RX ADMIN — NICOTINE 1 PATCH: 7 PATCH, EXTENDED RELEASE TRANSDERMAL at 09:06

## 2017-03-03 RX ADMIN — ASPIRIN 81 MG: 81 TABLET, COATED ORAL at 09:04

## 2017-03-03 RX ADMIN — POTASSIUM CHLORIDE 20 MEQ: 1500 TABLET, EXTENDED RELEASE ORAL at 20:24

## 2017-03-03 RX ADMIN — FAMOTIDINE 20 MG: 20 TABLET, FILM COATED ORAL at 20:24

## 2017-03-03 RX ADMIN — HYDROMORPHONE HYDROCHLORIDE 0.5 MG: 1 INJECTION, SOLUTION INTRAMUSCULAR; INTRAVENOUS; SUBCUTANEOUS at 21:53

## 2017-03-03 RX ADMIN — OXYCODONE HYDROCHLORIDE 15 MG: 5 TABLET ORAL at 09:04

## 2017-03-03 RX ADMIN — PIPERACILLIN SODIUM,TAZOBACTAM SODIUM 3.38 G: 3; .375 INJECTION, POWDER, FOR SOLUTION INTRAVENOUS at 14:44

## 2017-03-03 RX ADMIN — OXYCODONE HYDROCHLORIDE 15 MG: 5 TABLET ORAL at 06:18

## 2017-03-03 RX ADMIN — PIPERACILLIN SODIUM,TAZOBACTAM SODIUM 3.38 G: 3; .375 INJECTION, POWDER, FOR SOLUTION INTRAVENOUS at 02:10

## 2017-03-03 RX ADMIN — POTASSIUM CHLORIDE 40 MEQ: 1500 TABLET, EXTENDED RELEASE ORAL at 09:01

## 2017-03-03 RX ADMIN — HEPARIN SODIUM 5000 UNITS: 10000 INJECTION, SOLUTION INTRAVENOUS; SUBCUTANEOUS at 16:01

## 2017-03-03 RX ADMIN — HYDROMORPHONE HYDROCHLORIDE 0.5 MG: 1 INJECTION, SOLUTION INTRAMUSCULAR; INTRAVENOUS; SUBCUTANEOUS at 14:49

## 2017-03-03 RX ADMIN — HYDROMORPHONE HYDROCHLORIDE 0.5 MG: 1 INJECTION, SOLUTION INTRAMUSCULAR; INTRAVENOUS; SUBCUTANEOUS at 10:08

## 2017-03-03 RX ADMIN — LISINOPRIL 20 MG: 20 TABLET ORAL at 20:24

## 2017-03-03 RX ADMIN — OXYCODONE HYDROCHLORIDE 15 MG: 5 TABLET ORAL at 03:36

## 2017-03-03 RX ADMIN — HEPARIN SODIUM 5000 UNITS: 10000 INJECTION, SOLUTION INTRAVENOUS; SUBCUTANEOUS at 09:08

## 2017-03-03 RX ADMIN — PIPERACILLIN SODIUM,TAZOBACTAM SODIUM 3.38 G: 3; .375 INJECTION, POWDER, FOR SOLUTION INTRAVENOUS at 20:28

## 2017-03-03 RX ADMIN — GABAPENTIN 300 MG: 300 CAPSULE ORAL at 16:01

## 2017-03-03 RX ADMIN — ATORVASTATIN CALCIUM 40 MG: 40 TABLET, FILM COATED ORAL at 20:25

## 2017-03-03 ASSESSMENT — PAIN DESCRIPTION - DESCRIPTORS
DESCRIPTORS: ACHING;CONSTANT
DESCRIPTORS: ACHING

## 2017-03-03 NOTE — PLAN OF CARE
Problem: Goal Outcome Summary  Goal: Goal Outcome Summary  Outcome: Improving  Patient is A&OX4, VSS, CMS intact with +2 edema to LLE, erythema and edema to incisions but MD FUNMILAYO aware and states WNL. Groin dressing CDI. +2 doppler pulses to graft site and PT on L, unable to assess DP on left due to dressing for L great toe amputation. Dressing with moist drainage from left toe site which was debrided today. Right foot has some baseline numbness and tingling, +2 palpable DP and PT. Up independently because refusing the bed alarm, see progress notes. Voiding in urinal. Mod carb  Diet, no coverage.  Pain consistently rated 9-10/10, taking 15mg of oxycodone q3hr and scheduled gabapentin. One dose of breakthrough IV dilaudid. On continuous pulse ox.  Will continue to monitor.

## 2017-03-03 NOTE — PROGRESS NOTES
HOSPITALIST CHART CHECK:     Hospitalist service was consulted for after hours medical cross-cover only.  Vascular medicine is currently following and managing chronic medical conditions.  For detailed Assessment and Plan, please refer note by John Love from 2/27.    Hospitalist Service will round on patient over the weekend for medical co-management    Sveta Chou PA-C  Internal Medicine Hospitalist

## 2017-03-03 NOTE — PLAN OF CARE
Problem: Goal Outcome Summary  Goal: Goal Outcome Summary  PT: Patient seated at EOB at PT arrival and declines gait training due to increased pain in his L foot and ankle.  States he is aware he is supposed to maintain his L foot elevated, and returns to supine with his L foot up.  Patient instructed in supine L/E exercises that include: AP, QS, and GS, 10 reps, and shows independence in same.  Will continue to follow for exercise, bed mobility, transfers, and gait with appropriate AD as able prior to disch.

## 2017-03-03 NOTE — PROGRESS NOTES
[facility]    Vascular Medicine Progress Note    Date of Service (when I saw the patient): 03/03/2017     Assessment & Plan     1. Critical limb ischemia of non-healing ulcerations of left foot with single vessel run-off via peroneal artery s/p angioplasty 2 areas of stenosis 2/27/17 followed by below-knee popliteal to dorsalis pedis nonreversed translocated greater saphenous vein bypass 2/28/17      Assessment: Pain controlled. Good pulse in graft and DP.  Plan:   -Continue ASA, Plavix (and Pepcid for GI protection)  -Stopped IV heparin yesterday  -Continue Zosyn for ulcers per Dr. Rapp ? Coverage ( ID , antimicrobial management team recommending changig to PO )  Per Dr. rapp note  Add cipro or septra ?  Will clarify with vasc surg team  -Needs to stop smoking.   K 3.3 this am, replace.      2. Ongoing tobacco use      Assessment: Still smokes 5 cigarettes per day. Wants to quit.   Plan: 7 mg nicotine patch      3. Hyperlipidemia      Assessment: LDL of 109, on no lipid lowering therapy. He had been prescribed simvastatin previously, but he never took this.   Plan: Started Lipitor 40 mg daily. Recheck lipid panel in 3 months through primary MD.      4. Type 2 diabetes mellitus      Assessment:   -His most recent A1C was 6.8%, on metformin and glimepiride.   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   Plan:   -SS Novolog cvg. in hospital   -Hold restarting metformin given low blood sugars. Will also hold Glimepiride.     Time spent today 35 minutes.    Interval History    Pain controlled. Doing well.   Leg elevated on wedge foam. Low potasium 3.3  Physical Exam   Temp: 98.1  F (36.7  C) Temp src: Oral BP: 105/59 Pulse: 90 Heart Rate: 90 Resp: 18 SpO2: 95 % O2 Device: None (Room air)    Vitals:    02/28/17 0600 03/01/17 0625 03/03/17 0552   Weight: 94.8 kg (208 lb 14.4  oz) 95.3 kg (210 lb 1.6 oz) 98.1 kg (216 lb 4.3 oz)     Vital Signs with Ranges  Temp:  [98.1  F (36.7  C)-99  F (37.2  C)] 98.1  F (36.7  C)  Pulse:  [83-91] 90  Heart Rate:  [84-90] 90  Resp:  [18] 18  BP: ()/(47-63) 105/59  SpO2:  [93 %-98 %] 95 %  I/O last 3 completed shifts:  In: 2161 [P.O.:950; I.V.:1211]  Out: 2650 [Urine:2650]    Constitutional: Awake, alert, cooperative, no apparent distress, and appears stated age.  Eyes: Lids and lashes normal, sclera clear, conjunctiva normal.  ENT: Normocephalic, without obvious abnormality, atraumatic, oral pharynx with moist mucus membranes  Respiratory: No increased work of breathing, good air exchange, clear to auscultation bilaterally, no crackles or wheezing.  Cardiovascular: Regular rate and rhythm, normal S1 and S2, no S3 or S4, and no murmur noted.  GI: Normal bowel sounds, soft, non-distended, non-tender  Skin: Dressing in place over ulcers. Left foot toe amp site dressing saturated in blood.   Musculoskeletal: There is no redness, warmth, or swelling of the joints.    Neurologic: Awake, alert, oriented to name, place and time.  Cranial nerves II-XII are grossly intact.    Neuropsychiatric: Calm, normal eye contact, alert, normal affect, oriented to self, place, time and situation, memory for past and recent events intact and thought process normal.    Medications        heparin  5,000 Units Subcutaneous Q8H     HYDROmorphone  1 mg Intravenous Once     famotidine  20 mg Oral BID     gabapentin  300 mg Oral TID     clopidogrel  75 mg Oral Daily     sodium chloride (PF)  3 mL Intracatheter Q8H     senna-docusate  1-2 tablet Oral BID     aspirin EC  81 mg Oral Daily     lisinopril  20 mg Oral BID     insulin aspart  1-7 Units Subcutaneous TID AC     insulin aspart  1-5 Units Subcutaneous At Bedtime     atorvastatin  40 mg Oral Daily at 8 pm     nicotine   Transdermal Q8H     nicotine   Transdermal Daily     nicotine  1 patch Transdermal Daily      piperacillin-tazobactam  3.375 g Intravenous Q6H       Data     Recent Labs  Lab 03/03/17  0735 03/02/17  0725 03/01/17  0705 02/28/17  0530 02/27/17  1626  02/27/17  0715   WBC  --  15.7* 13.3*  --  13.1*  < > 14.5*   HGB  --  10.3* 10.8*  --  12.7*  < > 13.2*   MCV  --  92 91  --  92  < > 92   PLT  --  314 341  --  382  < > 422   INR  --   --   --   --  0.93  --  0.90   NA  --   --  138 140 140  --   --    POTASSIUM 3.3* 3.5 3.8 4.0 3.7  < >  --    CHLORIDE  --   --  107 110* 106  --   --    CO2  --   --  24 24 28  --   --    BUN  --   --  7 14 10  --   --    CR  --  0.87 0.85 0.80 0.77  --  0.70   ANIONGAP  --   --  7 6 6  --   --    KRYSTEN  --   --  8.0* 8.1* 8.6  --   --    GLC 62*  --  114* 76 87  < >  --    < > = values in this interval not displayed.  No results found for this or any previous visit (from the past 24 hour(s)).

## 2017-03-03 NOTE — OP NOTE
DATE OF SERVICE:  2017.      PROCEDURE:  Full-thickness/subcutaneous excisional debridement, left great toe dorsal skin flap.      SURGEON:  Emi Rodriguez MD      ANESTHESIA:  None.      INDICATIONS:  Ayaz Chaparro is a 56-year-old patient who has undergone a left below-knee popliteal to dorsalis pedis bypass graft.  He underwent amputation of the great toe.  We used the plantar aspect of the toe as a rollover skin flap.  The patient has excellent blood supply of his bypass graft; however, we are noticing the flap is very dusky with questionable viability and felt that debridement was indicated.      DESCRIPTION OF PROCEDURE:  Informed consent was obtained from the patient at bedside.  He has significant neuropathy; therefore, no anesthetic was required.  Using a 10 blade scalpel, I excised the sutures that we had used to approximate the flap.  We then excised approximately 50% of the flap which was nonviable.  The rest of the flap, particularly on the more proximal ventral artery, appeared to be still viable.  He had a hematoma at the base of the wound and this was evacuated.  The rest of the edges of the wound appeared to be viable.  A moistened Aquacel AG was placed in the base of the wound followed by wound gel, Adaptic and Kerlix rolls.      IMPRESSION:  Questionable viability of the flap.  We may end up losing the entire portion that we were using to cover our amputation site.  If so, this may need to heal by secondary intent or a more proximal ray amputation may be necessary.         EMI RODRIGUEZ MD             D: 2017 08:35   T: 2017 14:41   MT: TS      Name:     AYAZ CHAPARRO   MRN:      [id number]        Account:        [id number]   :      1960           Procedure Date: 2017      Document: [id number]       cc: Emi Rodriguez MD

## 2017-03-03 NOTE — PLAN OF CARE
Problem: Goal Outcome Summary  Goal: Goal Outcome Summary     PT: Orders received and chart reviewed. Eval completed upon return in the afternoon, limited assessment due to continue high level of L foot pain. Prior to surgery patient reports independence with mobility w/o AD and with daily activities. Pt presently requires SBA for bed mobility and CGA for transfers w/ WW, per MD pt able to weight bear as tolerated but pt holding L foot off floor. Unable to weight bear due to bleeding and pain. Pt will need assistive device at discharge - walker vs crutches. Anticipate pt to DC to home with assist of family.

## 2017-03-03 NOTE — PROGRESS NOTES
Patient is refusing the bed alarm. Educated on fall risk due to narcotics, surgery, multiple things attached to patient. Continues to refuse bed alarm. Will update oncoming shift and continue to monitor.
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
## 2017-03-03 NOTE — PLAN OF CARE
Problem: Goal Outcome Summary  Goal: Goal Outcome Summary  Outcome: Improving  POD3 I&D: VSS, soft BP 90/50, room air. Oxy/ dilaudid PRN. Incisions CDI, L foot CDI, graft pulse per doppler, R foot pulses per doppler. Mod CHO diet, up x1 assist.

## 2017-03-03 NOTE — PROGRESS NOTES
03/02/17 1521   Quick Adds   Type of Visit Initial PT Evaluation   Living Environment   Lives With spouse   Living Arrangements house   Home Accessibility bed and bath are not on the first floor;stairs to enter home;stairs within home   Number of Stairs to Enter Home 2   Number of Stairs Within Home 12   Transportation Available family or friend will provide   Self-Care   Usual Activity Tolerance good   Current Activity Tolerance fair   Regular Exercise no   Equipment Currently Used at Home none   Functional Level Prior   Ambulation 0-->independent   Transferring 0-->independent   Toileting 0-->independent   Bathing 0-->independent   Dressing 0-->independent   Fall history within last six months no   Which of the above functional risks had a recent onset or change? ambulation   Prior Functional Level Comment independent   General Information   Onset of Illness/Injury or Date of Surgery - Date 02/27/17   Referring Physician Jesus Aragon MD   Patient/Family Goals Statement return home   Pertinent History of Current Problem (include personal factors and/or comorbidities that impact the POC) s/p L great toe amputation, Peripheral Artery Disease with critical limb ischemia of non-healing left foot ulcerations with single vessel run-off via peroneal artery s/p angioplasty of two areas of stenosis on 02/27   Precautions/Limitations fall precautions   Weight-Bearing Status - LLE weight-bearing as tolerated   Cognitive Status Examination   Orientation orientation to person, place and time   Level of Consciousness alert   Follows Commands and Answers Questions 100% of the time   Personal Safety and Judgment intact   Memory intact   Range of Motion (ROM)   ROM Comment L ankle restricted by post op dressing, remaining ROM appears WFL   Strength   Manual Muscle Testing Quick Adds No deficits were identified   Bed Mobility   Bed Mobility Comments SBA   Transfer Skills   Transfer Comments CGA w/ WW, WBAT permitted pt keeping L  "foot elevated due to pain   Gait   Gait Comments unable due to foot pain   Balance   Balance Comments impaired in standing, unable to weight bear through L foot   General Therapy Interventions   Planned Therapy Interventions bed mobility training;gait training;transfer training   Clinical Impression   PT Diagnosis Difficulty walking   Influenced by the following impairments Impaired gait/balance   Functional limitations due to impairments impaired functional mobility   Clinical Presentation Stable/Uncomplicated   Clinical Presentation Rationale Clincial observation   Clinical Decision Making (Complexity) Low complexity   Therapy Frequency` daily   Predicted Duration of Therapy Intervention (days/wks) 4 days   Anticipated Equipment Needs at Discharge crutches;front wheeled walker   Anticipated Discharge Disposition Home with Assist   Risk & Benefits of therapy have been explained Yes   Patient, Family & other staff in agreement with plan of care Yes   [facility] [employer] TM \"6 Clicks\"   2016, Trustees of [facility], under license to [employer].  All rights reserved.   6 Clicks Short Forms Basic Mobility Inpatient Short Form   [facility] AM-PAC  \"6 Clicks\" V.2 Basic Mobility Inpatient Short Form   1. Turning from your back to your side while in a flat bed without using bedrails? 4 - None   2. Moving from lying on your back to sitting on the side of a flat bed without using bedrails? 4 - None   3. Moving to and from a bed to a chair (including a wheelchair)? 2 - A Lot   4. Standing up from a chair using your arms (e.g., wheelchair, or bedside chair)? 3 - A Little   5. To walk in hospital room? 2 - A Lot   6. Climbing 3-5 steps with a railing? 1 - Total   Basic Mobility Raw Score (Score out of 24.Lower scores equate to lower levels of function) 16   Total Evaluation Time   Total Evaluation Time (Minutes) 15     "

## 2017-03-04 LAB
CREAT SERPL-MCNC: 0.79 MG/DL (ref 0.66–1.25)
ERYTHROCYTE [DISTWIDTH] IN BLOOD BY AUTOMATED COUNT: 12.8 % (ref 10–15)
GFR SERPL CREATININE-BSD FRML MDRD: NORMAL ML/MIN/1.7M2
GLUCOSE BLDC GLUCOMTR-MCNC: 126 MG/DL (ref 70–99)
GLUCOSE BLDC GLUCOMTR-MCNC: 83 MG/DL (ref 70–99)
GLUCOSE BLDC GLUCOMTR-MCNC: 87 MG/DL (ref 70–99)
GLUCOSE BLDC GLUCOMTR-MCNC: 94 MG/DL (ref 70–99)
HCT VFR BLD AUTO: 28.2 % (ref 40–53)
HGB BLD-MCNC: 9.6 G/DL (ref 13.3–17.7)
MCH RBC QN AUTO: 31.1 PG (ref 26.5–33)
MCHC RBC AUTO-ENTMCNC: 34 G/DL (ref 31.5–36.5)
MCV RBC AUTO: 91 FL (ref 78–100)
PLATELET # BLD AUTO: 285 10E9/L (ref 150–450)
POTASSIUM SERPL-SCNC: 3.8 MMOL/L (ref 3.4–5.3)
RBC # BLD AUTO: 3.09 10E12/L (ref 4.4–5.9)
WBC # BLD AUTO: 11.3 10E9/L (ref 4–11)

## 2017-03-04 PROCEDURE — 25000132 ZZH RX MED GY IP 250 OP 250 PS 637: Performed by: INTERNAL MEDICINE

## 2017-03-04 PROCEDURE — 25000128 H RX IP 250 OP 636: Performed by: SURGERY

## 2017-03-04 PROCEDURE — 82565 ASSAY OF CREATININE: CPT | Performed by: FAMILY MEDICINE

## 2017-03-04 PROCEDURE — 25000132 ZZH RX MED GY IP 250 OP 250 PS 637: Performed by: PHYSICIAN ASSISTANT

## 2017-03-04 PROCEDURE — 36415 COLL VENOUS BLD VENIPUNCTURE: CPT | Performed by: SURGERY

## 2017-03-04 PROCEDURE — 99232 SBSQ HOSP IP/OBS MODERATE 35: CPT | Performed by: INTERNAL MEDICINE

## 2017-03-04 PROCEDURE — 00000146 ZZHCL STATISTIC GLUCOSE BY METER IP

## 2017-03-04 PROCEDURE — 85027 COMPLETE CBC AUTOMATED: CPT | Performed by: SURGERY

## 2017-03-04 PROCEDURE — 12000007 ZZH R&B INTERMEDIATE

## 2017-03-04 PROCEDURE — 25000132 ZZH RX MED GY IP 250 OP 250 PS 637: Performed by: SURGERY

## 2017-03-04 PROCEDURE — 25000132 ZZH RX MED GY IP 250 OP 250 PS 637: Performed by: FAMILY MEDICINE

## 2017-03-04 PROCEDURE — 84132 ASSAY OF SERUM POTASSIUM: CPT | Performed by: SURGERY

## 2017-03-04 PROCEDURE — 36415 COLL VENOUS BLD VENIPUNCTURE: CPT | Performed by: FAMILY MEDICINE

## 2017-03-04 RX ORDER — SULFAMETHOXAZOLE/TRIMETHOPRIM 800-160 MG
1 TABLET ORAL 2 TIMES DAILY
Status: DISCONTINUED | OUTPATIENT
Start: 2017-03-04 | End: 2017-03-07 | Stop reason: HOSPADM

## 2017-03-04 RX ADMIN — OXYCODONE HYDROCHLORIDE 15 MG: 5 TABLET ORAL at 22:27

## 2017-03-04 RX ADMIN — HEPARIN SODIUM 5000 UNITS: 10000 INJECTION, SOLUTION INTRAVENOUS; SUBCUTANEOUS at 01:15

## 2017-03-04 RX ADMIN — FAMOTIDINE 20 MG: 20 TABLET, FILM COATED ORAL at 08:25

## 2017-03-04 RX ADMIN — OXYCODONE HYDROCHLORIDE 15 MG: 5 TABLET ORAL at 13:01

## 2017-03-04 RX ADMIN — SENNOSIDES AND DOCUSATE SODIUM 2 TABLET: 8.6; 5 TABLET ORAL at 21:01

## 2017-03-04 RX ADMIN — SULFAMETHOXAZOLE AND TRIMETHOPRIM 1 TABLET: 800; 160 TABLET ORAL at 21:01

## 2017-03-04 RX ADMIN — HYDROMORPHONE HYDROCHLORIDE 0.5 MG: 1 INJECTION, SOLUTION INTRAMUSCULAR; INTRAVENOUS; SUBCUTANEOUS at 14:58

## 2017-03-04 RX ADMIN — SULFAMETHOXAZOLE AND TRIMETHOPRIM 1 TABLET: 800; 160 TABLET ORAL at 08:24

## 2017-03-04 RX ADMIN — ASPIRIN 81 MG: 81 TABLET, COATED ORAL at 08:27

## 2017-03-04 RX ADMIN — GABAPENTIN 300 MG: 300 CAPSULE ORAL at 08:25

## 2017-03-04 RX ADMIN — OXYCODONE HYDROCHLORIDE 15 MG: 5 TABLET ORAL at 04:33

## 2017-03-04 RX ADMIN — SENNOSIDES AND DOCUSATE SODIUM 1 TABLET: 8.6; 5 TABLET ORAL at 08:25

## 2017-03-04 RX ADMIN — CLOPIDOGREL BISULFATE 75 MG: 75 TABLET, FILM COATED ORAL at 08:25

## 2017-03-04 RX ADMIN — GABAPENTIN 300 MG: 300 CAPSULE ORAL at 14:58

## 2017-03-04 RX ADMIN — OXYCODONE HYDROCHLORIDE 15 MG: 5 TABLET ORAL at 01:15

## 2017-03-04 RX ADMIN — AMOXICILLIN AND CLAVULANATE POTASSIUM 1 TABLET: 875; 125 TABLET, FILM COATED ORAL at 21:02

## 2017-03-04 RX ADMIN — GABAPENTIN 300 MG: 300 CAPSULE ORAL at 21:02

## 2017-03-04 RX ADMIN — POTASSIUM CHLORIDE 20 MEQ: 1500 TABLET, EXTENDED RELEASE ORAL at 08:26

## 2017-03-04 RX ADMIN — OXYCODONE HYDROCHLORIDE 15 MG: 5 TABLET ORAL at 18:59

## 2017-03-04 RX ADMIN — HEPARIN SODIUM 5000 UNITS: 10000 INJECTION, SOLUTION INTRAVENOUS; SUBCUTANEOUS at 08:25

## 2017-03-04 RX ADMIN — ATORVASTATIN CALCIUM 40 MG: 40 TABLET, FILM COATED ORAL at 21:02

## 2017-03-04 RX ADMIN — PIPERACILLIN SODIUM,TAZOBACTAM SODIUM 3.38 G: 3; .375 INJECTION, POWDER, FOR SOLUTION INTRAVENOUS at 02:46

## 2017-03-04 RX ADMIN — HYDROMORPHONE HYDROCHLORIDE 0.5 MG: 1 INJECTION, SOLUTION INTRAMUSCULAR; INTRAVENOUS; SUBCUTANEOUS at 10:48

## 2017-03-04 RX ADMIN — OXYCODONE HYDROCHLORIDE 15 MG: 5 TABLET ORAL at 08:24

## 2017-03-04 RX ADMIN — FAMOTIDINE 20 MG: 20 TABLET, FILM COATED ORAL at 21:02

## 2017-03-04 RX ADMIN — NICOTINE 1 PATCH: 7 PATCH, EXTENDED RELEASE TRANSDERMAL at 08:25

## 2017-03-04 RX ADMIN — HYDROMORPHONE HYDROCHLORIDE 0.5 MG: 1 INJECTION, SOLUTION INTRAMUSCULAR; INTRAVENOUS; SUBCUTANEOUS at 20:59

## 2017-03-04 RX ADMIN — HEPARIN SODIUM 5000 UNITS: 10000 INJECTION, SOLUTION INTRAVENOUS; SUBCUTANEOUS at 14:58

## 2017-03-04 RX ADMIN — OXYCODONE HYDROCHLORIDE 15 MG: 5 TABLET ORAL at 15:57

## 2017-03-04 RX ADMIN — LISINOPRIL 20 MG: 20 TABLET ORAL at 21:02

## 2017-03-04 RX ADMIN — AMOXICILLIN AND CLAVULANATE POTASSIUM 1 TABLET: 875; 125 TABLET, FILM COATED ORAL at 08:25

## 2017-03-04 NOTE — PROGRESS NOTES
[facility]  Hospitalist Progress Note  Pradip Byers MD 03/04/2017           Assessment and Plan:        Patient has been followed by Vascular Medicine service for medical management; Hospitalist service will assume care over the weekend.        Gomez Turk is a 56 year old male who was admitted on 2/27/2017. He underwent previously scheduled angiogram which identified single vessel run-off via peroneal artery with two areas of stenosis in this vessel. He subsequently underwent stenting of these areas and was admitted to the vascular surgery service for continued management. Hospitalist service was requested to assume medical management 3/4 in the absence of Vascular Medicine coverage.      Peripheral Artery Disease with critical limb ischemia of non-healing left foot ulcerations with single vessel run-off via peroneal artery s/p angioplasty of two areas of stenosis on 02/27/17 followed by below-knee popliteal to dorsalis pedis nonreversed translocated greater saphenous vein bypass 2/28/17 with wound debridement and left great toe amputation. Wound debrided at bedside 3/2. Now with ongoing cellulitis.   -- primary management is per Vascular Surgery  -- pt on asa and plavix  -- on subq heparin   -- was on IV Zosyn, changed to Augmentin today  per Dr. Aragon.      Hyperlipidemia. FLP 02/10/17 with , HDL 42, TG 76 and total cholesterol 166 while on no lipid lowering medication. Started on atorvastatin 40mg daily.  --continue Lipitor with recheck in 3 months per PCP      Diabetes Mellitus, Type 2. Last Hgb A1c 6.8 on 02/10/17. Maintained PTA on Metformin 1000mg BID, Glimepiride 2mg BID. He has had intermittent episodes of hypoglycemia during admission.   - Holding PTA metformin and glimepiride  - SSI insulin      Tobacco abuse, ongoing. Cessation strongly encouraged. Nicoderm replacement with 7mg patch.      DVT Prophylaxis: SQ heparin  Code Status: Full Code        Interval History  "(Subjective):      Feels well. Has pain in the left foot with stepping, has had some bleeding. No fever, cough, SOB.                   Physical Exam:      Last Vital Signs:  BP 96/56 (BP Location: Right arm)  Pulse 90  Temp 98  F (36.7  C) (Oral)  Resp 16  Ht 1.753 m (5' 9\")  Wt 98.1 kg (216 lb 4.3 oz)  SpO2 98%  BMI 31.94 kg/m2      Intake/Output Summary (Last 24 hours) at 03/04/17 0852  Last data filed at 03/03/17 2156   Gross per 24 hour   Intake              360 ml   Output             1500 ml   Net            -1140 ml       Constitutional: Awake, alert, cooperative, no apparent distress   Respiratory: Clear to auscultation bilaterally, no crackles or wheezing   Cardiovascular: Regular rate and rhythm, normal S1 and S2, and no murmur noted   Abdomen: Normal bowel sounds, soft, non-distended, non-tender   Skin: No rashes, no cyanosis, dry to touch   Neuro: Alert and oriented x3, no weakness, numbness, memory loss   Extremities: Left lower leg edema,foot dorsum erythema, palpatory tenderness , healing post surgical incisions on the medial thigh    Other(s):        All other systems: Negative          Medications:      All current medications were reviewed with changes reflected in problem list.         Data:      All new lab and imaging data was reviewed.   Labs:    Recent Labs  Lab 03/04/17  0735 03/02/17  0725 03/01/17  0705   WBC 11.3* 15.7* 13.3*   HGB 9.6* 10.3* 10.8*   HCT 28.2* 30.2* 32.3*   MCV 91 92 91    314 341       Recent Labs  Lab 03/04/17  0735 03/03/17  1510 03/03/17  0735 03/02/17  0725 03/01/17  0705 02/28/17  0530 02/27/17  1626   NA  --   --   --   --  138 140 140   POTASSIUM 3.8 3.6 3.3* 3.5 3.8 4.0 3.7   CHLORIDE  --   --   --   --  107 110* 106   CO2  --   --   --   --  24 24 28   ANIONGAP  --   --   --   --  7 6 6   GLC  --   --  62*  --  114* 76 87   BUN  --   --   --   --  7 14 10   CR  --   --   --  0.87 0.85 0.80 0.77   GFRESTIMATED  --   --   --  >90Non  GFR Calc " >90Non  GFR Calc >90Non  GFR Calc >90Non  GFR Calc   GFRESTBLACK  --   --   --  >90African American GFR Calc >90African American GFR Calc >90African American GFR Calc >90African American GFR Calc   KRYSTEN  --   --   --   --  8.0* 8.1* 8.6      Imaging:   No results found for this or any previous visit (from the past 24 hour(s)).

## 2017-03-04 NOTE — PLAN OF CARE
Problem: Goal Outcome Summary  Goal: Goal Outcome Summary  Outcome: No Change  A&O. VSS. Taking Oxycodone and then Dilaudid for breakthrough pain. Pulses easily dopplered. Incisions and dressings CDI. Redness to left groin and bruising to right groin. Using ortho shoe with getting out of bed. Refuses bed alarm. Sits at side of bed to use urinal, otherwise calls appropriately to get out of bed.

## 2017-03-04 NOTE — PROGRESS NOTES
Patient has been followed by Vascular Medicine service for medical management; Hospitalist service will assume care over the weekend.     Assessment & Plan  Gomez Turk is a 56 year old male who was admitted on 2/27/2017. He underwent previously scheduled angiogram which identified single vessel run-off via peroneal artery with two areas of stenosis in this vessel. He subsequently underwent stenting of these areas and was admitted to the vascular surgery service for continued management. Hospitalist service was requested to assume medical management 3/4 in the absence of Vascular Medicine coverage.      Peripheral Artery Disease with critical limb ischemia of non-healing left foot ulcerations with single vessel run-off via peroneal artery s/p angioplasty of two areas of stenosis on 02/27/17 followed by below-knee popliteal to dorsalis pedis nonreversed translocated greater saphenous vein bypass 2/28/17 with wound debridement and left great toe amputation. Wound debrided at bedside 3/2. Now with ongoing cellulitis.   -- primary management is per Vascular Surgery  -- pt on asa and plavix  -- on subq heparin   -- continue IV Zosyn per Dr. Aragon, per his note considering adding po cipro or Septra? Will need to clarify this tomorrow.     Hyperlipidemia. FLP 02/10/17 with , HDL 42, TG 76 and total cholesterol 166 while on no lipid lowering medication. Started on atorvastatin 40mg daily.  --continue Lipitor with recheck in 3 months per PCP     Diabetes Mellitus, Type 2. Last Hgb A1c 6.8 on 02/10/17. Maintained PTA on Metformin 1000mg BID, Glimepiride 2mg BID. He has had intermittent episodes of hypoglycemia during admission.   - Holding PTA metformin and glimepiride  - SSI insulin     Tobacco abuse, ongoing. Cessation strongly encouraged. Nicoderm replacement with 7mg patch.     DVT Prophylaxis: SQ heparin  Code Status: Full Code    Angelique James PA-C

## 2017-03-04 NOTE — PLAN OF CARE
Problem: Goal Outcome Summary  Goal: Goal Outcome Summary  Outcome: No Change  3PM - 730PM: No change, pain mgt priority for pt, wounds and incisions assessed with no change noted. Pulses palpable via doppler.  Refused PT, see note, Taking dilaudid for break through pain as well.

## 2017-03-04 NOTE — PLAN OF CARE
Problem: Goal Outcome Summary  Goal: Goal Outcome Summary  Outcome: Improving  CMS intact to LLE, pulses per doppler. Dressing changed to Left foot this am by MD. Only needed Oxycodone q3h during the noc, no IVP Dilaudid. Up with SBA and walker to BR. Had BM

## 2017-03-04 NOTE — PROGRESS NOTES
Vascular Surgery Progress Note    S:Less sore in foot.  Still very painful to stand on foot.    O:   Vitals:  BP  Min: 96/56  Max: 136/77  Temp  Av.3  F (36.8  C)  Min: 98  F (36.7  C)  Max: 98.9  F (37.2  C)  I/O last 3 completed shifts:  In: 360 [P.O.:360]  Out: 1500 [Urine:1500]    Physical Exam: Alert   Comfortable.   Some decrease in edema and erythema of calf                              and foot.                               +3 graft pulse   Good Doppler in graft and DP (slightly less hyperemic)                           Ankle ulcer= very clean   Gel/Adaptic.                            Amputation= ?? Distal flap   Base= clean      Aquacel AG to site      Assessment/Plan:  Improving cellulitis.  On Zosyn.  Will change to Augmentin and                                                Septra.                                  Elevate  For edema.  WBAT  (will get heritage slipper to use from                                              [employer] shoes)                                   ASA/Plavic    SQ Heparin.                                   Good glucose control    < 110                                    May need revision of amputation site with flap coverage loss.                                         ( wait till cellulitis is better)      Wm. Margo MD

## 2017-03-05 ENCOUNTER — APPOINTMENT (OUTPATIENT)
Dept: PHYSICAL THERAPY | Facility: CLINIC | Age: 57
DRG: 253 | End: 2017-03-05
Attending: RADIOLOGY
Payer: COMMERCIAL

## 2017-03-05 LAB
GLUCOSE BLDC GLUCOMTR-MCNC: 109 MG/DL (ref 70–99)
GLUCOSE BLDC GLUCOMTR-MCNC: 114 MG/DL (ref 70–99)
GLUCOSE BLDC GLUCOMTR-MCNC: 121 MG/DL (ref 70–99)
GLUCOSE BLDC GLUCOMTR-MCNC: 125 MG/DL (ref 70–99)
POTASSIUM SERPL-SCNC: 4 MMOL/L (ref 3.4–5.3)

## 2017-03-05 PROCEDURE — 40000193 ZZH STATISTIC PT WARD VISIT: Performed by: PHYSICAL THERAPIST

## 2017-03-05 PROCEDURE — 25000132 ZZH RX MED GY IP 250 OP 250 PS 637: Performed by: INTERNAL MEDICINE

## 2017-03-05 PROCEDURE — 25000128 H RX IP 250 OP 636: Performed by: SURGERY

## 2017-03-05 PROCEDURE — 25000132 ZZH RX MED GY IP 250 OP 250 PS 637: Performed by: SURGERY

## 2017-03-05 PROCEDURE — 25000132 ZZH RX MED GY IP 250 OP 250 PS 637: Performed by: FAMILY MEDICINE

## 2017-03-05 PROCEDURE — 97110 THERAPEUTIC EXERCISES: CPT | Mod: GP | Performed by: PHYSICAL THERAPIST

## 2017-03-05 PROCEDURE — 36415 COLL VENOUS BLD VENIPUNCTURE: CPT | Performed by: SURGERY

## 2017-03-05 PROCEDURE — 97116 GAIT TRAINING THERAPY: CPT | Mod: GP | Performed by: PHYSICAL THERAPIST

## 2017-03-05 PROCEDURE — 25000132 ZZH RX MED GY IP 250 OP 250 PS 637: Performed by: PHYSICIAN ASSISTANT

## 2017-03-05 PROCEDURE — 99232 SBSQ HOSP IP/OBS MODERATE 35: CPT | Performed by: INTERNAL MEDICINE

## 2017-03-05 PROCEDURE — 12000007 ZZH R&B INTERMEDIATE

## 2017-03-05 PROCEDURE — 84132 ASSAY OF SERUM POTASSIUM: CPT | Performed by: SURGERY

## 2017-03-05 PROCEDURE — 00000146 ZZHCL STATISTIC GLUCOSE BY METER IP

## 2017-03-05 RX ORDER — METFORMIN HCL 500 MG
1000 TABLET, EXTENDED RELEASE 24 HR ORAL 2 TIMES DAILY WITH MEALS
Status: DISCONTINUED | OUTPATIENT
Start: 2017-03-05 | End: 2017-03-07 | Stop reason: HOSPADM

## 2017-03-05 RX ADMIN — HEPARIN SODIUM 5000 UNITS: 10000 INJECTION, SOLUTION INTRAVENOUS; SUBCUTANEOUS at 15:35

## 2017-03-05 RX ADMIN — AMOXICILLIN AND CLAVULANATE POTASSIUM 1 TABLET: 875; 125 TABLET, FILM COATED ORAL at 22:11

## 2017-03-05 RX ADMIN — HEPARIN SODIUM 5000 UNITS: 10000 INJECTION, SOLUTION INTRAVENOUS; SUBCUTANEOUS at 09:10

## 2017-03-05 RX ADMIN — GABAPENTIN 300 MG: 300 CAPSULE ORAL at 15:33

## 2017-03-05 RX ADMIN — OXYCODONE HYDROCHLORIDE 15 MG: 5 TABLET ORAL at 06:36

## 2017-03-05 RX ADMIN — CLOPIDOGREL BISULFATE 75 MG: 75 TABLET, FILM COATED ORAL at 09:01

## 2017-03-05 RX ADMIN — ATORVASTATIN CALCIUM 40 MG: 40 TABLET, FILM COATED ORAL at 22:11

## 2017-03-05 RX ADMIN — METFORMIN HYDROCHLORIDE 1000 MG: 500 TABLET, EXTENDED RELEASE ORAL at 12:30

## 2017-03-05 RX ADMIN — OXYCODONE HYDROCHLORIDE 15 MG: 5 TABLET ORAL at 17:02

## 2017-03-05 RX ADMIN — HEPARIN SODIUM 5000 UNITS: 10000 INJECTION, SOLUTION INTRAVENOUS; SUBCUTANEOUS at 00:10

## 2017-03-05 RX ADMIN — POTASSIUM CHLORIDE 20 MEQ: 1500 TABLET, EXTENDED RELEASE ORAL at 13:35

## 2017-03-05 RX ADMIN — OXYCODONE HYDROCHLORIDE 15 MG: 5 TABLET ORAL at 09:54

## 2017-03-05 RX ADMIN — OXYCODONE HYDROCHLORIDE 15 MG: 5 TABLET ORAL at 22:13

## 2017-03-05 RX ADMIN — HYDROMORPHONE HYDROCHLORIDE 0.5 MG: 1 INJECTION, SOLUTION INTRAMUSCULAR; INTRAVENOUS; SUBCUTANEOUS at 19:36

## 2017-03-05 RX ADMIN — HYDROMORPHONE HYDROCHLORIDE 0.5 MG: 1 INJECTION, SOLUTION INTRAMUSCULAR; INTRAVENOUS; SUBCUTANEOUS at 08:57

## 2017-03-05 RX ADMIN — OXYCODONE HYDROCHLORIDE 15 MG: 5 TABLET ORAL at 13:35

## 2017-03-05 RX ADMIN — FAMOTIDINE 20 MG: 20 TABLET, FILM COATED ORAL at 09:02

## 2017-03-05 RX ADMIN — GABAPENTIN 300 MG: 300 CAPSULE ORAL at 09:02

## 2017-03-05 RX ADMIN — SENNOSIDES AND DOCUSATE SODIUM 2 TABLET: 8.6; 5 TABLET ORAL at 22:12

## 2017-03-05 RX ADMIN — METFORMIN HYDROCHLORIDE 1000 MG: 500 TABLET, EXTENDED RELEASE ORAL at 18:41

## 2017-03-05 RX ADMIN — HYDROMORPHONE HYDROCHLORIDE 0.5 MG: 1 INJECTION, SOLUTION INTRAMUSCULAR; INTRAVENOUS; SUBCUTANEOUS at 16:00

## 2017-03-05 RX ADMIN — SULFAMETHOXAZOLE AND TRIMETHOPRIM 1 TABLET: 800; 160 TABLET ORAL at 22:11

## 2017-03-05 RX ADMIN — SULFAMETHOXAZOLE AND TRIMETHOPRIM 1 TABLET: 800; 160 TABLET ORAL at 09:02

## 2017-03-05 RX ADMIN — OXYCODONE HYDROCHLORIDE 15 MG: 5 TABLET ORAL at 03:09

## 2017-03-05 RX ADMIN — AMOXICILLIN AND CLAVULANATE POTASSIUM 1 TABLET: 875; 125 TABLET, FILM COATED ORAL at 09:01

## 2017-03-05 RX ADMIN — FAMOTIDINE 20 MG: 20 TABLET, FILM COATED ORAL at 22:13

## 2017-03-05 RX ADMIN — NICOTINE 1 PATCH: 7 PATCH, EXTENDED RELEASE TRANSDERMAL at 09:03

## 2017-03-05 RX ADMIN — LISINOPRIL 20 MG: 20 TABLET ORAL at 09:02

## 2017-03-05 RX ADMIN — HYDROMORPHONE HYDROCHLORIDE 0.5 MG: 1 INJECTION, SOLUTION INTRAMUSCULAR; INTRAVENOUS; SUBCUTANEOUS at 01:11

## 2017-03-05 RX ADMIN — SENNOSIDES AND DOCUSATE SODIUM 2 TABLET: 8.6; 5 TABLET ORAL at 09:02

## 2017-03-05 RX ADMIN — GABAPENTIN 300 MG: 300 CAPSULE ORAL at 22:13

## 2017-03-05 RX ADMIN — ASPIRIN 81 MG: 81 TABLET, COATED ORAL at 09:02

## 2017-03-05 RX ADMIN — LISINOPRIL 20 MG: 20 TABLET ORAL at 22:11

## 2017-03-05 RX ADMIN — HYDROMORPHONE HYDROCHLORIDE 0.5 MG: 1 INJECTION, SOLUTION INTRAMUSCULAR; INTRAVENOUS; SUBCUTANEOUS at 12:18

## 2017-03-05 ASSESSMENT — PAIN DESCRIPTION - DESCRIPTORS: DESCRIPTORS: ACHING;THROBBING

## 2017-03-05 NOTE — PLAN OF CARE
Alert and oriented x4. VSS. Blood glucose was 87, 83, 93. Up with SBA/ independent. Dressing changed by MD today. Pain almost constantly rated 9-10/10 though does not look like it, also helped with oxycodone and dilaudid. Plan to continue to monitor.

## 2017-03-05 NOTE — PLAN OF CARE
Problem: Goal Outcome Summary  Goal: Goal Outcome Summary  PT: Patient continues to decline gait and mobility training at this time due to pain in his L L/E, but reports he has been gently doing the three L/E exercises he was instructed in yesterday (AP, QS, GS), often, and feels they are helping him feel better.  Nursing also reports he has been getting to the bathroom with a walker and help of Nursing.  Will continue to follow daily to intervene as able and appropriate.

## 2017-03-05 NOTE — PROGRESS NOTES
Vasc Surg Progress Note  Pt seen at 1400    S: No acute events.    O:  Vitals: B/P: 122/67, T: 98.8, P: 90, R: 16  I/O:    Gross per 24 hour   Intake                0 ml   Output             1700 ml   Net            -1700 ml     Gen: middle age male, nad, pleasant  Resp: no labored breathing  Neuro: alert, richardson  Ext: palpable graft pulse, necrotic 1st toe    LABS/IMAGING: most recent reviewed.   WBC   Date Value Ref Range Status   03/04/2017 11.3 (H) 4.0 - 11.0 10e9/L Final   , Hemoglobin   Date Value Ref Range Status   03/04/2017 9.6 (L) 13.3 - 17.7 g/dL Final   , INR   Date Value Ref Range Status   02/27/2017 0.93 0.86 - 1.14 Final   , Creatinine   Date Value Ref Range Status   03/04/2017 0.79 0.66 - 1.25 mg/dL Final       A/P: 57yo male LLE pop to DP bypass is currently stable.  -pain control prn  -diet as tolerated  -cont w/ antibx  -cont local wound cares    Kane Comer MD   Vascular Surgery Fellow  Pg #[phone]

## 2017-03-05 NOTE — PROGRESS NOTES
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 He underwent previously scheduled angiogram which identified single vessel run-off via peroneal artery with two areas of stenosis in this vessel. He subsequently underwent stenting of these areas and was admitted to the vascular surgery service for continued management. Hospitalist service was requested to assume medical management 3/4 in the absence of Vascular Medicine coverage.       Peripheral Artery Disease with critical limb ischemia of non-healing left foot ulcerations with single vessel run-off via peroneal artery s/p angioplasty of two areas of stenosis on 02/27/17 followed by below-knee popliteal to dorsalis pedis nonreversed translocated greater saphenous vein bypass 2/28/17 with wound debridement and left great toe amputation. Wound debrided at bedside 3/2. Now with ongoing cellulitis.   -- primary management is per Vascular Surgery  -- pt on asa and plavix  -- on subq heparin   -- was on IV Zosyn, changed to Augmentin on 3/4 per Dr. Aragon.       Hyperlipidemia:   -he was on no lipid lowering medication.   -Started on atorvastatin 40mg daily during this admission  --continue Lipitor with recheck in 3 months per PCP    Anemia:  Post op blood loss    Recent Labs  Lab 03/04/17  0735 03/02/17  0725 03/01/17  0705 02/27/17  1626 02/27/17  1055   HGB 9.6* 10.3* 10.8* 12.7* 12.5*           Diabetes Mellitus, Type 2;  -Maintained PTA on Metformin 1000mg BID, Glimepiride 2mg BID.   -He has had intermittent episodes of hypoglycemia during admission.   - Holding PTA metformin and glimepiride  -we can resume metformin as that does not cause hypoglycemia  - SSI insulin      Lab Results   Component Value Date    A1C 6.8 02/10/2017  "   A1C 9.6 10/17/2016    A1C 7.8 11/02/2015    A1C 7.1 01/19/2015    A1C 10.1 12/12/2013   Educated him about use of insulin  This is only while inpatient  Resume metformin    Tobacco abuse, ongoing.:  -Cessation strongly encouraged. Nicoderm replacement with 7mg patch.      DVT Prophylaxis:   -SQ heparin    Code Status:   -Full Code    Disposition:  Per vascular surgery    Discussed care with patient and his RN                     Physical Exam:      Heart Rate: 84, Blood pressure 130/69, pulse 90, temperature 99.2  F (37.3  C), temperature source Oral, resp. rate 18, height 1.753 m (5' 9\"), weight 98.1 kg (216 lb 4.3 oz), SpO2 95 %.  Vitals:    02/28/17 0600 03/01/17 0625 03/03/17 0552   Weight: 94.8 kg (208 lb 14.4 oz) 95.3 kg (210 lb 1.6 oz) 98.1 kg (216 lb 4.3 oz)     Vital Signs with Ranges  Temp:  [98  F (36.7  C)-99.2  F (37.3  C)] 99.2  F (37.3  C)  Heart Rate:  [76-84] 84  Resp:  [14-18] 18  BP: (106-130)/(53-72) 130/69  SpO2:  [95 %-97 %] 95 %  I/O's Last 24 hours  I/O last 3 completed shifts:  In: 240 [P.O.:240]  Out: 500 [Urine:500]    Constitutional: Alert awake oriented     Lungs: Good air entry on both sides of lungs     Cardiovascular: S1 and S2 no S3      Abdomen:    Skin:   Cellulitis is improving  Left foot is wrapped     Other:           Medications:          amoxicillin-clavulanate  1 tablet Oral Q12H LEYLA     sulfamethoxazole-trimethoprim  1 tablet Oral BID     heparin  5,000 Units Subcutaneous Q8H     HYDROmorphone  1 mg Intravenous Once     famotidine  20 mg Oral BID     gabapentin  300 mg Oral TID     clopidogrel  75 mg Oral Daily     sodium chloride (PF)  3 mL Intracatheter Q8H     senna-docusate  1-2 tablet Oral BID     aspirin EC  81 mg Oral Daily     lisinopril  20 mg Oral BID     insulin aspart  1-7 Units Subcutaneous TID AC     insulin aspart  1-5 Units Subcutaneous At Bedtime     atorvastatin  40 mg Oral Daily at 8 pm     nicotine   Transdermal Q8H     nicotine   Transdermal Daily     " nicotine  1 patch Transdermal Daily     PRN Meds: oxyCODONE, zolpidem, HYDROmorphone, calcium carbonate, lidocaine, lidocaine 4%, sodium chloride (PF), lidocaine, glucose **OR** dextrose **OR** glucagon, naloxone, potassium chloride, potassium chloride, potassium chloride, potassium chloride with lidocaine, potassium chloride, acetaminophen, bisacodyl, polyethylene glycol, bisacodyl, ondansetron **OR** ondansetron, HOLD MEDICATION         Data:      All new lab and imaging data was reviewed.   Recent Labs   Lab Test  03/04/17   0735  03/02/17   0725  03/01/17   0705  02/27/17   1626   02/27/17   0715   WBC  11.3*  15.7*  13.3*  13.1*   < >  14.5*   HGB  9.6*  10.3*  10.8*  12.7*   < >  13.2*   MCV  91  92  91  92   < >  92   PLT  285  314  341  382   < >  422   INR   --    --    --   0.93   --   0.90    < > = values in this interval not displayed.      Recent Labs   Lab Test  03/04/17   2320  03/04/17   0735  03/03/17   1510  03/03/17   0735  03/02/17   0725  03/01/17   0705  02/28/17   0530  02/27/17   1626   NA   --    --    --    --    --   138  140  140   POTASSIUM   --   3.8  3.6  3.3*  3.5  3.8  4.0  3.7   CHLORIDE   --    --    --    --    --   107  110*  106   CO2   --    --    --    --    --   24  24  28   BUN   --    --    --    --    --   7  14  10   CR  0.79   --    --    --   0.87  0.85  0.80  0.77   ANIONGAP   --    --    --    --    --   7  6  6   KRYSTEN   --    --    --    --    --   8.0*  8.1*  8.6   GLC   --    --    --   62*   --   114*  76  87     No lab results found.    Invalid input(s): TROP, TROPONINIES

## 2017-03-06 LAB
ERYTHROCYTE [DISTWIDTH] IN BLOOD BY AUTOMATED COUNT: 12.7 % (ref 10–15)
GLUCOSE BLDC GLUCOMTR-MCNC: 100 MG/DL (ref 70–99)
GLUCOSE BLDC GLUCOMTR-MCNC: 108 MG/DL (ref 70–99)
GLUCOSE BLDC GLUCOMTR-MCNC: 118 MG/DL (ref 70–99)
GLUCOSE BLDC GLUCOMTR-MCNC: 87 MG/DL (ref 70–99)
HCT VFR BLD AUTO: 26.6 % (ref 40–53)
HGB BLD-MCNC: 9.1 G/DL (ref 13.3–17.7)
MCH RBC QN AUTO: 30.6 PG (ref 26.5–33)
MCHC RBC AUTO-ENTMCNC: 34.2 G/DL (ref 31.5–36.5)
MCV RBC AUTO: 90 FL (ref 78–100)
PLATELET # BLD AUTO: 321 10E9/L (ref 150–450)
POTASSIUM SERPL-SCNC: 4 MMOL/L (ref 3.4–5.3)
RBC # BLD AUTO: 2.97 10E12/L (ref 4.4–5.9)
WBC # BLD AUTO: 13.6 10E9/L (ref 4–11)

## 2017-03-06 PROCEDURE — 12000007 ZZH R&B INTERMEDIATE

## 2017-03-06 PROCEDURE — 99233 SBSQ HOSP IP/OBS HIGH 50: CPT | Performed by: INTERNAL MEDICINE

## 2017-03-06 PROCEDURE — 36415 COLL VENOUS BLD VENIPUNCTURE: CPT | Performed by: SPECIALIST

## 2017-03-06 PROCEDURE — 25000132 ZZH RX MED GY IP 250 OP 250 PS 637: Performed by: FAMILY MEDICINE

## 2017-03-06 PROCEDURE — 25000128 H RX IP 250 OP 636: Performed by: SURGERY

## 2017-03-06 PROCEDURE — 25000132 ZZH RX MED GY IP 250 OP 250 PS 637: Performed by: INTERNAL MEDICINE

## 2017-03-06 PROCEDURE — 25000132 ZZH RX MED GY IP 250 OP 250 PS 637: Performed by: PHYSICIAN ASSISTANT

## 2017-03-06 PROCEDURE — 00000146 ZZHCL STATISTIC GLUCOSE BY METER IP

## 2017-03-06 PROCEDURE — 25000132 ZZH RX MED GY IP 250 OP 250 PS 637: Performed by: SURGERY

## 2017-03-06 PROCEDURE — 99232 SBSQ HOSP IP/OBS MODERATE 35: CPT | Performed by: SPECIALIST

## 2017-03-06 PROCEDURE — 85027 COMPLETE CBC AUTOMATED: CPT | Performed by: SPECIALIST

## 2017-03-06 PROCEDURE — 84132 ASSAY OF SERUM POTASSIUM: CPT | Performed by: INTERNAL MEDICINE

## 2017-03-06 PROCEDURE — 36415 COLL VENOUS BLD VENIPUNCTURE: CPT | Performed by: INTERNAL MEDICINE

## 2017-03-06 RX ORDER — GABAPENTIN 300 MG/1
300 CAPSULE ORAL 3 TIMES DAILY
Qty: 90 CAPSULE | Refills: 3 | Status: SHIPPED | OUTPATIENT
Start: 2017-03-06 | End: 2017-11-16

## 2017-03-06 RX ORDER — LISINOPRIL 20 MG/1
20 TABLET ORAL 2 TIMES DAILY
Qty: 180 TABLET | Refills: 3 | Status: SHIPPED | OUTPATIENT
Start: 2017-03-06 | End: 2017-03-13

## 2017-03-06 RX ORDER — CLOPIDOGREL BISULFATE 75 MG/1
75 TABLET ORAL DAILY
Qty: 30 TABLET | Refills: 11 | Status: SHIPPED | OUTPATIENT
Start: 2017-03-06 | End: 2018-06-08

## 2017-03-06 RX ORDER — POLYETHYLENE GLYCOL 3350 17 G/17G
17 POWDER, FOR SOLUTION ORAL DAILY PRN
Qty: 7 PACKET | Refills: 0 | Status: SHIPPED | OUTPATIENT
Start: 2017-03-06 | End: 2017-03-17

## 2017-03-06 RX ORDER — AMOXICILLIN 250 MG
1-2 CAPSULE ORAL 2 TIMES DAILY
Qty: 100 TABLET | Refills: 0 | Status: SHIPPED | OUTPATIENT
Start: 2017-03-06 | End: 2017-03-17

## 2017-03-06 RX ORDER — OXYCODONE HYDROCHLORIDE 10 MG/1
15 TABLET ORAL
Qty: 50 TABLET | Refills: 0 | Status: SHIPPED | OUTPATIENT
Start: 2017-03-06 | End: 2017-03-17

## 2017-03-06 RX ORDER — ACETAMINOPHEN 325 MG/1
650 TABLET ORAL EVERY 4 HOURS PRN
Qty: 100 TABLET | Refills: 0 | Status: SHIPPED | OUTPATIENT
Start: 2017-03-06 | End: 2018-06-06

## 2017-03-06 RX ORDER — ATORVASTATIN CALCIUM 40 MG/1
40 TABLET, FILM COATED ORAL DAILY
Qty: 30 TABLET | Refills: 11 | Status: SHIPPED | OUTPATIENT
Start: 2017-03-06 | End: 2018-06-12

## 2017-03-06 RX ADMIN — OXYCODONE HYDROCHLORIDE 15 MG: 5 TABLET ORAL at 04:34

## 2017-03-06 RX ADMIN — CLOPIDOGREL BISULFATE 75 MG: 75 TABLET, FILM COATED ORAL at 08:04

## 2017-03-06 RX ADMIN — GABAPENTIN 300 MG: 300 CAPSULE ORAL at 08:03

## 2017-03-06 RX ADMIN — AMOXICILLIN AND CLAVULANATE POTASSIUM 1 TABLET: 875; 125 TABLET, FILM COATED ORAL at 21:01

## 2017-03-06 RX ADMIN — FAMOTIDINE 20 MG: 20 TABLET, FILM COATED ORAL at 08:05

## 2017-03-06 RX ADMIN — METFORMIN HYDROCHLORIDE 1000 MG: 500 TABLET, EXTENDED RELEASE ORAL at 18:25

## 2017-03-06 RX ADMIN — SENNOSIDES AND DOCUSATE SODIUM 2 TABLET: 8.6; 5 TABLET ORAL at 08:05

## 2017-03-06 RX ADMIN — METFORMIN HYDROCHLORIDE 1000 MG: 500 TABLET, EXTENDED RELEASE ORAL at 08:17

## 2017-03-06 RX ADMIN — NICOTINE 1 PATCH: 7 PATCH, EXTENDED RELEASE TRANSDERMAL at 08:11

## 2017-03-06 RX ADMIN — LISINOPRIL 20 MG: 20 TABLET ORAL at 08:04

## 2017-03-06 RX ADMIN — ASPIRIN 81 MG: 81 TABLET, COATED ORAL at 08:04

## 2017-03-06 RX ADMIN — AMOXICILLIN AND CLAVULANATE POTASSIUM 1 TABLET: 875; 125 TABLET, FILM COATED ORAL at 08:03

## 2017-03-06 RX ADMIN — OXYCODONE HYDROCHLORIDE 15 MG: 5 TABLET ORAL at 14:32

## 2017-03-06 RX ADMIN — SULFAMETHOXAZOLE AND TRIMETHOPRIM 1 TABLET: 800; 160 TABLET ORAL at 08:03

## 2017-03-06 RX ADMIN — FAMOTIDINE 20 MG: 20 TABLET, FILM COATED ORAL at 21:02

## 2017-03-06 RX ADMIN — OXYCODONE HYDROCHLORIDE 15 MG: 5 TABLET ORAL at 08:00

## 2017-03-06 RX ADMIN — OXYCODONE HYDROCHLORIDE 15 MG: 5 TABLET ORAL at 11:04

## 2017-03-06 RX ADMIN — SULFAMETHOXAZOLE AND TRIMETHOPRIM 1 TABLET: 800; 160 TABLET ORAL at 21:01

## 2017-03-06 RX ADMIN — HEPARIN SODIUM 5000 UNITS: 10000 INJECTION, SOLUTION INTRAVENOUS; SUBCUTANEOUS at 16:41

## 2017-03-06 RX ADMIN — HEPARIN SODIUM 5000 UNITS: 10000 INJECTION, SOLUTION INTRAVENOUS; SUBCUTANEOUS at 08:07

## 2017-03-06 RX ADMIN — LISINOPRIL 20 MG: 20 TABLET ORAL at 21:02

## 2017-03-06 RX ADMIN — OXYCODONE HYDROCHLORIDE 15 MG: 5 TABLET ORAL at 18:25

## 2017-03-06 RX ADMIN — SENNOSIDES AND DOCUSATE SODIUM 1 TABLET: 8.6; 5 TABLET ORAL at 21:02

## 2017-03-06 RX ADMIN — ATORVASTATIN CALCIUM 40 MG: 40 TABLET, FILM COATED ORAL at 21:02

## 2017-03-06 RX ADMIN — OXYCODONE HYDROCHLORIDE 15 MG: 5 TABLET ORAL at 01:15

## 2017-03-06 RX ADMIN — GABAPENTIN 300 MG: 300 CAPSULE ORAL at 16:38

## 2017-03-06 RX ADMIN — HYDROMORPHONE HYDROCHLORIDE 0.5 MG: 1 INJECTION, SOLUTION INTRAMUSCULAR; INTRAVENOUS; SUBCUTANEOUS at 21:03

## 2017-03-06 RX ADMIN — HYDROMORPHONE HYDROCHLORIDE 0.5 MG: 1 INJECTION, SOLUTION INTRAMUSCULAR; INTRAVENOUS; SUBCUTANEOUS at 06:34

## 2017-03-06 RX ADMIN — HEPARIN SODIUM 5000 UNITS: 10000 INJECTION, SOLUTION INTRAVENOUS; SUBCUTANEOUS at 01:15

## 2017-03-06 RX ADMIN — GABAPENTIN 300 MG: 300 CAPSULE ORAL at 21:02

## 2017-03-06 NOTE — PROGRESS NOTES
[facility]    Vascular Medicine Progress Note    Attestation: I have examined the patient independently of Yvonne Miles PA-C and agree with the examination and plan as delineated below.    Shade Canada MD      35 minutes total care today.    Date of Service (when I saw the patient): 03/06/2017     Assessment & Plan     1. Critical limb ischemia of non-healing ulcerations of left foot with single vessel run-off via peroneal artery s/p angioplasty 2 areas of stenosis 2/27/17 followed by below-knee popliteal to dorsalis pedis nonreversed translocated greater saphenous vein bypass 2/28/17      Assessment: Pain controlled. Good pulse in graft and DP. Bleeding of amp site earlier today - controlled now.   Plan:   -Continue ASA, Plavix (and Pepcid for GI protection)  -Continue Augmentin for ulcers per Dr. Aragon   -Needs to stop smoking.   -Home with home care in a few days when cleared by Dr. Aragon.       2. Ongoing tobacco use      Assessment: Still smokes 5 cigarettes per day. Wants to quit.   Plan: 7 mg nicotine patch      3. Hyperlipidemia      Assessment: LDL of 109, on no lipid lowering therapy. He had been prescribed simvastatin previously, but he never took this.   Plan: Started Lipitor 40 mg daily. Recheck lipid panel in 3 months through primary MD.      4. Type 2 diabetes mellitus      Assessment:   -His most recent A1C was 6.8%, on metformin and glimepiride.   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   Plan:   -SS Novolog cvg. in hospital   -Hold restarting metformin given low blood sugars. Will also hold Glimepiride.       Interval History    Pain controlled. Doing well.   Leg elevated on wedge foam.   Fair amount of bleeding from amp site this am when using urinal. Now stopped.   Appetite getting better. + BM.     Physical Exam   Temp: 98.6  F (37   C) Temp src: Oral BP: 123/66 Pulse: 87 Heart Rate: 77 Resp: 16 SpO2: 97 % O2 Device: None (Room air)    Vitals:    02/28/17 0600 03/01/17 0625 03/03/17 0552   Weight: 94.8 kg (208 lb 14.4 oz) 95.3 kg (210 lb 1.6 oz) 98.1 kg (216 lb 4.3 oz)     Vital Signs with Ranges  Temp:  [98.6  F (37  C)-99.4  F (37.4  C)] 98.6  F (37  C)  Pulse:  [80-87] 87  Heart Rate:  [77-90] 77  Resp:  [14-18] 16  BP: ()/(53-79) 123/66  SpO2:  [92 %-97 %] 97 %  I/O last 3 completed shifts:  In: 240 [P.O.:240]  Out: 1650 [Urine:1650]    Constitutional: Awake, alert, cooperative, no apparent distress, and appears stated age.  Eyes: Lids and lashes normal, sclera clear, conjunctiva normal.  ENT: Normocephalic, without obvious abnormality, atraumatic, oral pharynx with moist mucus membranes  Respiratory: No increased work of breathing, good air exchange, clear to auscultation bilaterally, no crackles or wheezing.  Cardiovascular: Regular rate and rhythm, normal S1 and S2, no S3 or S4, and no murmur noted.  GI: Normal bowel sounds, soft, non-distended, non-tender  Skin: Dressing in place over ulcers. Left foot toe amp site dressing intact and dry currently. Bypass surgical sites c/d/i.   Musculoskeletal: There is no redness, warmth, or swelling of the joints.    Neurologic: Awake, alert, oriented to name, place and time.  Cranial nerves II-XII are grossly intact.    Neuropsychiatric: Calm, normal eye contact, alert, normal affect, oriented to self, place, time and situation, memory for past and recent events intact and thought process normal.    Medications        metFORMIN  1,000 mg Oral BID w/meals     amoxicillin-clavulanate  1 tablet Oral Q12H LEYLA     sulfamethoxazole-trimethoprim  1 tablet Oral BID     heparin  5,000 Units Subcutaneous Q8H     HYDROmorphone  1 mg Intravenous Once     famotidine  20 mg Oral BID     gabapentin  300 mg Oral TID     clopidogrel  75 mg Oral Daily     sodium chloride (PF)  3 mL Intracatheter Q8H      senna-docusate  1-2 tablet Oral BID     aspirin EC  81 mg Oral Daily     lisinopril  20 mg Oral BID     insulin aspart  1-7 Units Subcutaneous TID AC     insulin aspart  1-5 Units Subcutaneous At Bedtime     atorvastatin  40 mg Oral Daily at 8 pm     nicotine   Transdermal Q8H     nicotine   Transdermal Daily     nicotine  1 patch Transdermal Daily       Data     Recent Labs  Lab 03/06/17  0752 03/05/17  1009 03/04/17  2320 03/04/17  0735  03/03/17  0735 03/02/17  0725 03/01/17  0705 02/28/17  0530 02/27/17  1626   WBC  --   --   --  11.3*  --   --  15.7* 13.3*  --  13.1*   HGB  --   --   --  9.6*  --   --  10.3* 10.8*  --  12.7*   MCV  --   --   --  91  --   --  92 91  --  92   PLT  --   --   --  285  --   --  314 341  --  382   INR  --   --   --   --   --   --   --   --   --  0.93   NA  --   --   --   --   --   --   --  138 140 140   POTASSIUM 4.0 4.0  --  3.8  < > 3.3* 3.5 3.8 4.0 3.7   CHLORIDE  --   --   --   --   --   --   --  107 110* 106   CO2  --   --   --   --   --   --   --  24 24 28   BUN  --   --   --   --   --   --   --  7 14 10   CR  --   --  0.79  --   --   --  0.87 0.85 0.80 0.77   ANIONGAP  --   --   --   --   --   --   --  7 6 6   KRYSTEN  --   --   --   --   --   --   --  8.0* 8.1* 8.6   GLC  --   --   --   --   --  62*  --  114* 76 87   < > = values in this interval not displayed.  No results found for this or any previous visit (from the past 24 hour(s)).

## 2017-03-06 NOTE — PLAN OF CARE
Problem: Goal Outcome Summary  Goal: Goal Outcome Summary  Outcome: No Change  A&O. VSS. Taking Oxycodone, Dilaudid for breakthrough pain. PT pulses with doppler. Dressing with dried bloody drainage, LLE erythremia, marked and unchanged today. Redness to left groin and bruising to right groin. Using ortho shoe with getting out of bed. Refuses bed alarm. Sits at side of bed to use urinal, up with A1, GB and RW. Restarted Metformin today. Possible flap revision tomorrow.

## 2017-03-06 NOTE — PROGRESS NOTES
[facility]  Hospitalist Progress Note        Vinod Putnam MD  03/06/2017        Interval History:      Patient denies any complaints but had greater toe stump with necrotic flap, trimmed this AM with significant bleeding but better now   Rates pain 8/10 with rest and 9/10 with activity          Assessment and Plan:      56 year old male who was admitted on 2/27/2017. He underwent previously scheduled angiogram which identified single vessel run-off via peroneal artery with two areas of stenosis in this vessel. He subsequently underwent stenting of these areas and was admitted to the vascular surgery service for continued management. Hospitalist service was requested to assume medical management 3/4 in the absence of Vascular Medicine coverage.       Peripheral Artery Disease with critical limb ischemia of non-healing left foot ulcerations with single vessel run-off via peroneal artery s/p angioplasty of two areas of stenosis on 02/27/17 followed by below-knee popliteal to dorsalis pedis nonreversed translocated greater saphenous vein bypass 2/28/17 with wound debridement and left great toe amputation. Wound debrided at bedside 3/2. Now with ongoing cellulitis.   -- primary management is per Vascular Surgery  -- pt on asa and plavix  -- on subq heparin   -- was on IV Zosyn, changed to Augmentin on 3/4 per Dr. Aragon.       Hyperlipidemia:   -he was on no lipid lowering medication.   -Started on atorvastatin 40mg daily during this admission  --continue Lipitor with recheck in 3 months per PCP    Anemia:  Post op blood loss  No need to transfuse and continue to monitor      Recent Labs  Lab 03/06/17  1051 03/04/17  0735 03/02/17  0725 03/01/17  0705 02/27/17  1626   HGB 9.1* 9.6* 10.3* 10.8* 12.7*       Diabetes Mellitus, Type 2;  -Maintained PTA on Metformin 1000mg BID, Glimepiride 2mg BID.   -He has had intermittent episodes of hypoglycemia during admission.   - Holding PTA metformin and  "glimepiride  -we can resume metformin as that does not cause hypoglycemia  - SSI insulin  Lab Results   Component Value Date    A1C 6.8 02/10/2017    A1C 9.6 10/17/2016    A1C 7.8 11/02/2015    A1C 7.1 01/19/2015    A1C 10.1 12/12/2013   Educated him about use of insulin  This is only while inpatient  Resumed metformin and well controlled A1C    Tobacco abuse, ongoing.:  -Cessation strongly encouraged. Nicoderm replacement with 7mg patch.      DVT Prophylaxis:   -SQ heparin    Code Status:   -Full Code    Disposition:  Per vascular surgery    Discussed care with patient and his RN                     Physical Exam:      Heart Rate: 81, Blood pressure 120/64, pulse 87, temperature 98.7  F (37.1  C), temperature source Oral, resp. rate 16, height 1.753 m (5' 9\"), weight 98.1 kg (216 lb 4.3 oz), SpO2 99 %.  Vitals:    02/28/17 0600 03/01/17 0625 03/03/17 0552   Weight: 94.8 kg (208 lb 14.4 oz) 95.3 kg (210 lb 1.6 oz) 98.1 kg (216 lb 4.3 oz)     Vital Signs with Ranges  Temp:  [98.6  F (37  C)-99.4  F (37.4  C)] 98.7  F (37.1  C)  Pulse:  [80-87] 87  Heart Rate:  [77-90] 81  Resp:  [14-18] 16  BP: ()/(53-79) 120/64  SpO2:  [92 %-99 %] 99 %  I/O's Last 24 hours  I/O last 3 completed shifts:  In: 240 [P.O.:240]  Out: 1650 [Urine:1650]    Constitutional: Alert awake oriented     Lungs: Good air entry on both sides of lungs     Cardiovascular: S1 and S2 no S3      Abdomen: No tenderness and good bowel sounds noted    Skin:   Cellulitis is improving  Left foot is wrapped     Cns  Cn intact and no focal deficit           Medications:          metFORMIN  1,000 mg Oral BID w/meals     amoxicillin-clavulanate  1 tablet Oral Q12H LEYLA     sulfamethoxazole-trimethoprim  1 tablet Oral BID     heparin  5,000 Units Subcutaneous Q8H     HYDROmorphone  1 mg Intravenous Once     famotidine  20 mg Oral BID     gabapentin  300 mg Oral TID     clopidogrel  75 mg Oral Daily     sodium chloride (PF)  3 mL Intracatheter Q8H     " senna-docusate  1-2 tablet Oral BID     aspirin EC  81 mg Oral Daily     lisinopril  20 mg Oral BID     insulin aspart  1-7 Units Subcutaneous TID AC     insulin aspart  1-5 Units Subcutaneous At Bedtime     atorvastatin  40 mg Oral Daily at 8 pm     nicotine   Transdermal Q8H     nicotine   Transdermal Daily     nicotine  1 patch Transdermal Daily     PRN Meds: oxyCODONE, zolpidem, HYDROmorphone, calcium carbonate, lidocaine, lidocaine 4%, sodium chloride (PF), lidocaine, glucose **OR** dextrose **OR** glucagon, naloxone, potassium chloride, potassium chloride, potassium chloride, potassium chloride with lidocaine, potassium chloride, acetaminophen, bisacodyl, polyethylene glycol, bisacodyl, ondansetron **OR** ondansetron, HOLD MEDICATION         Data:      All new lab and imaging data was reviewed.   Recent Labs   Lab Test  03/06/17   1051  03/04/17   0735  03/02/17   0725   02/27/17   1626   02/27/17   0715   WBC  13.6*  11.3*  15.7*   < >  13.1*   < >  14.5*   HGB  9.1*  9.6*  10.3*   < >  12.7*   < >  13.2*   MCV  90  91  92   < >  92   < >  92   PLT  321  285  314   < >  382   < >  422   INR   --    --    --    --   0.93   --   0.90    < > = values in this interval not displayed.      Recent Labs   Lab Test  03/06/17   0752  03/05/17   1009  03/04/17   2320  03/04/17   0735   03/03/17   0735  03/02/17   0725  03/01/17   0705  02/28/17   0530  02/27/17   1626   NA   --    --    --    --    --    --    --   138  140  140   POTASSIUM  4.0  4.0   --   3.8   < >  3.3*  3.5  3.8  4.0  3.7   CHLORIDE   --    --    --    --    --    --    --   107  110*  106   CO2   --    --    --    --    --    --    --   24  24  28   BUN   --    --    --    --    --    --    --   7  14  10   CR   --    --   0.79   --    --    --   0.87  0.85  0.80  0.77   ANIONGAP   --    --    --    --    --    --    --   7  6  6   KRYSTEN   --    --    --    --    --    --    --   8.0*  8.1*  8.6   GLC   --    --    --    --    --   62*   --   114*   76  87    < > = values in this interval not displayed.     No lab results found.    Invalid input(s): TROP, TROPONINIES       Vinod Putnam
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
## 2017-03-06 NOTE — PLAN OF CARE
Problem: Goal Outcome Summary  Goal: Goal Outcome Summary  PT: Needs SBA and vc for exercise, bed mobility, transfers, and gait with FWW and L postop shoe, 90'.  Disch per clinical course.  Patient planning for disch to home with help of his wife.

## 2017-03-06 NOTE — PLAN OF CARE
Problem: Goal Outcome Summary  Goal: Goal Outcome Summary  PT: Attempted patient for PT treatment - currently working with RN staff.  Will check back as schedule allows.

## 2017-03-07 ENCOUNTER — CARE COORDINATION (OUTPATIENT)
Dept: CARE COORDINATION | Facility: CLINIC | Age: 57
End: 2017-03-07

## 2017-03-07 VITALS
RESPIRATION RATE: 16 BRPM | DIASTOLIC BLOOD PRESSURE: 62 MMHG | TEMPERATURE: 98.9 F | HEIGHT: 69 IN | BODY MASS INDEX: 32.03 KG/M2 | SYSTOLIC BLOOD PRESSURE: 121 MMHG | WEIGHT: 216.27 LBS | OXYGEN SATURATION: 95 % | HEART RATE: 80 BPM

## 2017-03-07 LAB
GLUCOSE BLDC GLUCOMTR-MCNC: 94 MG/DL (ref 70–99)
PLATELET # BLD AUTO: 337 10E9/L (ref 150–450)

## 2017-03-07 PROCEDURE — 99207 ZZC CDG-CODE CATEGORY CHANGED: CPT | Performed by: SPECIALIST

## 2017-03-07 PROCEDURE — 25000132 ZZH RX MED GY IP 250 OP 250 PS 637: Performed by: INTERNAL MEDICINE

## 2017-03-07 PROCEDURE — 99233 SBSQ HOSP IP/OBS HIGH 50: CPT | Performed by: INTERNAL MEDICINE

## 2017-03-07 PROCEDURE — 25000132 ZZH RX MED GY IP 250 OP 250 PS 637: Performed by: FAMILY MEDICINE

## 2017-03-07 PROCEDURE — 25000128 H RX IP 250 OP 636: Performed by: SURGERY

## 2017-03-07 PROCEDURE — 36415 COLL VENOUS BLD VENIPUNCTURE: CPT | Performed by: SURGERY

## 2017-03-07 PROCEDURE — 85049 AUTOMATED PLATELET COUNT: CPT | Performed by: SURGERY

## 2017-03-07 PROCEDURE — 00000146 ZZHCL STATISTIC GLUCOSE BY METER IP

## 2017-03-07 PROCEDURE — 25000132 ZZH RX MED GY IP 250 OP 250 PS 637: Performed by: SURGERY

## 2017-03-07 PROCEDURE — 99232 SBSQ HOSP IP/OBS MODERATE 35: CPT | Performed by: SPECIALIST

## 2017-03-07 PROCEDURE — 25000132 ZZH RX MED GY IP 250 OP 250 PS 637: Performed by: PHYSICIAN ASSISTANT

## 2017-03-07 RX ADMIN — GABAPENTIN 300 MG: 300 CAPSULE ORAL at 10:50

## 2017-03-07 RX ADMIN — OXYCODONE HYDROCHLORIDE 15 MG: 5 TABLET ORAL at 00:33

## 2017-03-07 RX ADMIN — LISINOPRIL 20 MG: 20 TABLET ORAL at 10:51

## 2017-03-07 RX ADMIN — NICOTINE 1 PATCH: 7 PATCH, EXTENDED RELEASE TRANSDERMAL at 10:52

## 2017-03-07 RX ADMIN — OXYCODONE HYDROCHLORIDE 15 MG: 5 TABLET ORAL at 03:50

## 2017-03-07 RX ADMIN — OXYCODONE HYDROCHLORIDE 15 MG: 5 TABLET ORAL at 11:54

## 2017-03-07 RX ADMIN — METFORMIN HYDROCHLORIDE 1000 MG: 500 TABLET, EXTENDED RELEASE ORAL at 10:51

## 2017-03-07 RX ADMIN — HYDROMORPHONE HYDROCHLORIDE 0.5 MG: 1 INJECTION, SOLUTION INTRAMUSCULAR; INTRAVENOUS; SUBCUTANEOUS at 04:55

## 2017-03-07 RX ADMIN — AMOXICILLIN AND CLAVULANATE POTASSIUM 1 TABLET: 875; 125 TABLET, FILM COATED ORAL at 10:50

## 2017-03-07 RX ADMIN — SENNOSIDES AND DOCUSATE SODIUM 2 TABLET: 8.6; 5 TABLET ORAL at 10:58

## 2017-03-07 RX ADMIN — HEPARIN SODIUM 5000 UNITS: 10000 INJECTION, SOLUTION INTRAVENOUS; SUBCUTANEOUS at 10:48

## 2017-03-07 RX ADMIN — FAMOTIDINE 20 MG: 20 TABLET, FILM COATED ORAL at 10:51

## 2017-03-07 RX ADMIN — SULFAMETHOXAZOLE AND TRIMETHOPRIM 1 TABLET: 800; 160 TABLET ORAL at 10:49

## 2017-03-07 RX ADMIN — HEPARIN SODIUM 5000 UNITS: 10000 INJECTION, SOLUTION INTRAVENOUS; SUBCUTANEOUS at 00:37

## 2017-03-07 RX ADMIN — ASPIRIN 81 MG: 81 TABLET, COATED ORAL at 10:51

## 2017-03-07 RX ADMIN — CLOPIDOGREL BISULFATE 75 MG: 75 TABLET, FILM COATED ORAL at 10:50

## 2017-03-07 RX ADMIN — OXYCODONE HYDROCHLORIDE 15 MG: 5 TABLET ORAL at 07:41

## 2017-03-07 NOTE — PROGRESS NOTES
Vascular Surgery Progress Note    S:Less pain with walking.  Some bleeding from debrided great toe site yesterday-- OK since.    O:   Vitals:  BP  Min: 109/61  Max: 137/65  Temp  Av.6  F (37  C)  Min: 97.7  F (36.5  C)  Max: 99.6  F (37.6  C)  Pulse  Av  Min: 80  Max: 80  I/O last 3 completed shifts:  In: 440 [P.O.:440]  Out: 1400 [Urine:1400]    Physical Exam: Decreased edema  +3 graft pulse.  +3 Doppler graft and DP                             Erythema almost gone.  Surgical wds=A                              Ankle ulcer= clean  (Gel and Xeroform)                               Minor debridement great toe distal edge with #10 blade-- good bleeding                                   (Aquacel Ag to this)                                 2nd toe now with exposed joint.      Assessment/Plan: Will eventually need 2nd toe amputation and further bone debridement in OR-- would let infection be treated as outpitent with dressing changes and finish po antibiotics.                                       Home today.  [facility] for dressing changes.                                     RTO 2 weeks to decide on further treatment.      Wm. Margo MD

## 2017-03-07 NOTE — PROGRESS NOTES
[facility]  Hospitalist Progress Note        Vinod Putnam MD  03/07/2017        Interval History:      Patient denies any complaints but had  Supersaturated dressing and removed the dressing which showed multiple oozing spots which were treated with silver nitrate by the surgery resident. Pt ambulated after that and no more bleeding was noted  Planned to d/c later today          Assessment and Plan:      56 year old male who was admitted on 2/27/2017. He underwent previously scheduled angiogram which identified single vessel run-off via peroneal artery with two areas of stenosis in this vessel. He subsequently underwent stenting of these areas and was admitted to the vascular surgery service for continued management. Hospitalist service was requested to assume medical management 3/4 in the absence of Vascular Medicine coverage.       Peripheral Artery Disease with critical limb ischemia of non-healing left foot ulcerations with single vessel run-off via peroneal artery s/p angioplasty of two areas of stenosis on 02/27/17 followed by below-knee popliteal to dorsalis pedis nonreversed translocated greater saphenous vein bypass 2/28/17 with wound debridement and left great toe amputation. Wound debrided at bedside 3/2. Now with ongoing cellulitis.   -- primary management is per Vascular Surgery  -- pt on asa and plavix  -- on subq heparin   -- was on IV Zosyn, changed to Augmentin on 3/4 per Dr. Aragon.       Hyperlipidemia:   -he was on no lipid lowering medication.   -Started on atorvastatin 40mg daily during this admission  --continue Lipitor with recheck in 3 months per PCP    Anemia:  Post op blood loss  No need to transfuse and continue to monitor  Oozing spots were treated with silver nitrate and monitored and no more bleeding noted       Recent Labs  Lab 03/06/17  1051 03/04/17  0735 03/02/17  0725 03/01/17  0705   HGB 9.1* 9.6* 10.3* 10.8*       Diabetes Mellitus, Type 2;  -Maintained PTA  "on Metformin 1000mg BID, Glimepiride 2mg BID.   -He has had intermittent episodes of hypoglycemia during admission.   - Holding PTA metformin and glimepiride  -we can resume metformin as that does not cause hypoglycemia  - SSI insulin  Lab Results   Component Value Date    A1C 6.8 02/10/2017    A1C 9.6 10/17/2016    A1C 7.8 11/02/2015    A1C 7.1 01/19/2015    A1C 10.1 12/12/2013   Educated him about use of insulin  This is only while inpatient  Resumed metformin and well controlled A1C    Tobacco abuse, ongoing.:  -Cessation strongly encouraged. Nicoderm replacement with 7mg patch.      DVT Prophylaxis:   -SQ heparin    Code Status:   -Full Code    Disposition:  Per vascular surgery                       Physical Exam:      Heart Rate: 69, Blood pressure 121/62, pulse 80, temperature 98.9  F (37.2  C), temperature source Oral, resp. rate 16, height 1.753 m (5' 9\"), weight 98.1 kg (216 lb 4.3 oz), SpO2 95 %.  Vitals:    02/28/17 0600 03/01/17 0625 03/03/17 0552   Weight: 94.8 kg (208 lb 14.4 oz) 95.3 kg (210 lb 1.6 oz) 98.1 kg (216 lb 4.3 oz)     Vital Signs with Ranges  Temp:  [97.7  F (36.5  C)-99.6  F (37.6  C)] 98.9  F (37.2  C)  Pulse:  [80] 80  Heart Rate:  [69-79] 69  Resp:  [16] 16  BP: (109-137)/(61-65) 121/62  SpO2:  [91 %-97 %] 95 %  I/O's Last 24 hours  I/O last 3 completed shifts:  In: 440 [P.O.:440]  Out: 1400 [Urine:1400]    Constitutional: Alert awake oriented     Lungs: Good air entry on both sides of lungs     Cardiovascular: S1 and S2 no S3      Abdomen: No tenderness and good bowel sounds noted    Skin:   Cellulitis is improving  Left foot is unwrapped with missing great toe and great toe bed constantly oozing which improved with silver nitrate      Cns  Cn intact and no focal deficit           Medications:          metFORMIN  1,000 mg Oral BID w/meals     amoxicillin-clavulanate  1 tablet Oral Q12H LEYLA     sulfamethoxazole-trimethoprim  1 tablet Oral BID     heparin  5,000 Units Subcutaneous Q8H "     HYDROmorphone  1 mg Intravenous Once     famotidine  20 mg Oral BID     gabapentin  300 mg Oral TID     clopidogrel  75 mg Oral Daily     sodium chloride (PF)  3 mL Intracatheter Q8H     senna-docusate  1-2 tablet Oral BID     aspirin EC  81 mg Oral Daily     lisinopril  20 mg Oral BID     insulin aspart  1-7 Units Subcutaneous TID AC     insulin aspart  1-5 Units Subcutaneous At Bedtime     atorvastatin  40 mg Oral Daily at 8 pm     nicotine   Transdermal Q8H     nicotine   Transdermal Daily     nicotine  1 patch Transdermal Daily     PRN Meds: oxyCODONE, zolpidem, HYDROmorphone, calcium carbonate, lidocaine, lidocaine 4%, sodium chloride (PF), lidocaine, glucose **OR** dextrose **OR** glucagon, naloxone, potassium chloride, potassium chloride, potassium chloride, potassium chloride with lidocaine, potassium chloride, acetaminophen, bisacodyl, polyethylene glycol, bisacodyl, ondansetron **OR** ondansetron, HOLD MEDICATION         Data:      All new lab and imaging data was reviewed.   Recent Labs   Lab Test  03/07/17   0700  03/06/17   1051  03/04/17   0735  03/02/17   0725   02/27/17   1626   02/27/17   0715   WBC   --   13.6*  11.3*  15.7*   < >  13.1*   < >  14.5*   HGB   --   9.1*  9.6*  10.3*   < >  12.7*   < >  13.2*   MCV   --   90  91  92   < >  92   < >  92   PLT  337  321  285  314   < >  382   < >  422   INR   --    --    --    --    --   0.93   --   0.90    < > = values in this interval not displayed.      Recent Labs   Lab Test  03/06/17   0752  03/05/17   1009  03/04/17   2320  03/04/17   0735   03/03/17   0735  03/02/17   0725  03/01/17   0705  02/28/17   0530  02/27/17   1626   NA   --    --    --    --    --    --    --   138  140  140   POTASSIUM  4.0  4.0   --   3.8   < >  3.3*  3.5  3.8  4.0  3.7   CHLORIDE   --    --    --    --    --    --    --   107  110*  106   CO2   --    --    --    --    --    --    --   24  24  28   BUN   --    --    --    --    --    --    --   7  14  10   CR   --     --   0.79   --    --    --   0.87  0.85  0.80  0.77   ANIONGAP   --    --    --    --    --    --    --   7  6  6   KRYSTEN   --    --    --    --    --    --    --   8.0*  8.1*  8.6   GLC   --    --    --    --    --   62*   --   114*  76  87    < > = values in this interval not displayed.     No lab results found.    Invalid input(s): TROP, TROPONINIES       Vinod Putnam

## 2017-03-07 NOTE — PROGRESS NOTES
ANISA: Consult acknowledged. RN care coordinator managing home care referral, will continue to follow for any additional needs. Consult completed.    RONNA Rivas, [facility]  Daytime (8:00am-4:30pm): [phone]  After-Hours SW Pager (4:30pm-11:30pm): [phone]

## 2017-03-07 NOTE — PLAN OF CARE
Problem: Goal Outcome Summary  Goal: Goal Outcome Summary  Outcome: No Change  Pt VSS, patient received Oxycodone and dilaudid for pain this evening. Pt has good appetite, -no coverage. Left foot dressing with dried drainage- no new bleeding. Plan: continue to monitor.
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
## 2017-03-07 NOTE — DISCHARGE INSTRUCTIONS
You are being discharged with home care services through [facility]. [facility] will contact you to schedule initial visit. If you have any questions prior to this call, please contact the 24 hour patient line at [phone].    Discharge Instructions following Arterial Bypass Surgery  [facility]    The surgeon who performed your surgery is: _Dr. Jesus Aragon  [phone]___  Incision Care      After removal of dressings apply a piece of Aquacel AG to the great toe site and moisten with Saline solution. Apply a dab of Skintegrity Hydrogel to the second toe and a larger amount to the ankle wound. Cover all three areas with pieces of Xeroform Occlusive Guaze and wrap with a Kerlex Roll. The great toe area may be padded with some Kerlex guaze if needed before the wrap.      The length and number of incisions you have will vary depending on the exact type of arterial bypass you require.  You may have incisions on your  good  leg as well, if that happens to be the leg with the  best vein  for the bypass graft.      You may have staples holding your incisions closed, and possibly sutures in other incisions, especially if near the ankle or foot.  These will be removed at your 2 week post-operative appointment.  Once they are removed, you may have white strips of tape, called steri-strips, applied over the incision.  These will curl up on the ends after 5-7 days, at which time they can be removed.      Leg swelling is common after surgery and must be controlled by elevating your legs above your heart.  Propping your legs up on a stool or sitting in a recliner chair will not relieve the swelling, but it will prevent it from getting worse.  Avoid hanging your leg in a dependant position.  For the first few weeks after surgery, you will need to really work at controlling this swelling, and will likely need to spend a fair amount of  time with your leg elevated.  Eventually, you should be able to manage the swelling by elevating 3-4 times a day for 20-30 minutes.  If the swelling does not decrease after elevating your legs above your heart for at least 2 hours, you should call our office.      If you have a groin incision, you should keep gauze tucked in the skin fold to prevent the skin on skin contact.  This will prevent a moist environment which hinders healing.  Call our office to discuss this further if this area looks red or has an odor.      If you need to use a dressing over your incision, avoid using tape on the surrounding skin.  It is best to wrap with roll gauze, such as Kerlix.  Be careful not to wrap tightly, just enough to hold the gauze in place.       You may shower 2-3 days after your surgery - pat the incision dry to prevent irritation from moisture.  You do not need to cover with a bandage unless you have drainage.      You may develop a bruising and firmness near your incision.  This will soften and resolve over the next 1-3 weeks.  Call our office if it enlarges, becomes red, or painful.      On occasion a soft, fluid-filled bulge can develop near a surgical incision - this is called a seroma.  It will likely resolve on its own, but occasionally requires your doctor to drain it in clinic.  You should call our office if you have questions about this.      You may notice numbness around your incision.  This is due to nerve irritation from the surgery and will gradually improve over the next several weeks.      Activity    Walking is important after surgery.  You will begin walking before you leave the hospital, and then you should gradually increase your distance as tolerated.  You should be taking at least 3-4 short walks a day if leg swelling is not a problem.      You can climb stairs when you feel strong enough.      You should not drive for 1-2 weeks.  In addition, you can not be taking prescription strength pain  medication and you must feel strong enough to drive safely.      You may return to work once you feel ready - this can be decided at your 2 week post-operative visit.      You should avoid strenuous activity, including running, biking, or weight-lifting until discussed at your post-operative appointment.  Diet    You may resume a regular diet before you leave the hospital.  You may find that it is best to try smaller, more frequent meals until your appetite returns.    Medications    You may be started on a blood thinner after surgery.  If you are taking Coumadin, you need to have your blood monitored regularly.      You may be given a prescription for pain medication after surgery.  If you need to have this refilled, please call your pharmacy where you had it filled.  They will then call us for approval.  Please do not call after hours for a refill on pain medication.    Post-Operative Appointments    You will need to see your doctor in clinic 10-14 days after surgery.        You will then be monitored regularly with an Ultrasound test to assure that the bypass graft is functioning properly.  Typically for the first year, you will have this test and appointment with your doctor every 3-6 months.  Thereafter, the frequency varies, but is generally every 6-12 months.  We will notify you by mail when you are due for these appointments.    When to Call our Office    Temperature greater than 101.      When you are unable to feel a pulse in your foot or leg, when you have been monitoring regularly.      If you notice new onset of numbness, tingling, coolness or pain in your leg or foot.      Severe pain, especially if it is new and preventing sleep.      If you will be having an invasive procedure, such as a colonoscopy or dental work, within one year of your surgery, you may need to take an antibiotic prior to the procedure if you had an artificial graft placed with your surgery; please call us so we can assist you with  this.      Call our main number, [phone], for assistance with any concerns or questions.     Revised 5/2014

## 2017-03-07 NOTE — PROGRESS NOTES
Care Transition Initial Assessment - RN        Met with: Patient and Family.    DATA   Active Problems:    PAD (peripheral artery disease) (H)       Cognitive Status: awake, alert and oriented.        Contact information and PCP information verified: Yes    Lives With: spouse  Living Arrangements: house     Insurance concerns: No Insurance issues identified    ASSESSMENT  Patient currently receives the following services:  none        Identified issues/concerns regarding health management: interested in learning more from a dietitian  Patient wants [facility] services to assist with and help teach dressing changes to his wife to care for his left foot.     PLAN  Financial costs for the patient include ?  Patient given options and choices for discharge Yes, I called [facility] and they are only accepting patients with their supporting primary MD's. I called [facility] and they do not have availability until Friday for patient to be seen.  Patient then agreed to have [facility] with requested start of care for Wednesday. I informed the liaison that the best time is between 10 and 1:30 as this is the time patient's wife is home.   Patient anticipates discharging to home with [facility]        Patient anticipates needs for home equipment: No    Plan/Disposition: Home with [facility] RN for dressing changes.   Appointments:       Care  (CTS) will continue to follow as needed.

## 2017-03-07 NOTE — PLAN OF CARE
Problem: Perioperative Period (Adult)  Goal: Signs and Symptoms of Listed Potential Problems Will be Absent or Manageable (Perioperative Period)  Signs and symptoms of listed potential problems will be absent or manageable by discharge/transition of care (reference Perioperative Period (Adult) CPG).   Outcome: Adequate for Discharge Date Met:  03/07/17  Left foot wound redressed with cauterization x 2. Afterwards patient had no bleeding after trial walk to bathroom. Patient and wife taught how to perform dressing change with good feedback. Discharged to home with home health care.

## 2017-03-08 ENCOUNTER — TELEPHONE (OUTPATIENT)
Dept: OTHER | Facility: CLINIC | Age: 57
End: 2017-03-08

## 2017-03-08 NOTE — TELEPHONE ENCOUNTER
Pt was recently hospitalized for:   Critical limb ischemia of non-healing ulcerations of left foot with single vessel run-off via peroneal artery s/p angioplasty 2 areas of stenosis 2/27/17 followed by below-knee popliteal to dorsalis pedis nonreversed translocated greater saphenous vein bypass, debridement of ulcers and amputation of the left great toe 2/28/17    Pt was discharged from hospital yesterday, 3/7/17.  Pt calls today to report that he is having a vision problem in his right eye.  Pt reports that yesterday he had noted that his right eye was cloudy yesterday morning and later in the day he noted that when he closed his left eye he could not see at all out of the bottom half of his right eye.  Pt notes that today when he has both eyes open he is having a hard time focusing with his right eye.  Pt denies pain, HA, dizziness, or any other symptoms.  Pt had his wife look at his eyes and she does not note any difference in appearance of the pt's two eyes.    Discussed above with Yvonne Miles PA-C and she recommends that the pt be evaluated by eye doctor or PCP today.  I advised the pt of Yvonne's recommendation and he verbalized understanding.    Yin Valdes, VIDALN, RN

## 2017-03-08 NOTE — PROGRESS NOTES
If patient has visual complaints then needs to be seen per his eye physician for formal assessment of vision changes.  I do not see any reason alter BP medications unless told to do so or advised per Vascular to do so based on symptoms

## 2017-03-08 NOTE — PLAN OF CARE
Problem: Goal Outcome Summary  Goal: Goal Outcome Summary  Physical Therapy Discharge Summary     Reason for therapy discharge:    Discharged to home with home therapy.     Progress towards therapy goal(s). See goals on Care Plan in [facility] electronic health record for goal details.  Goals not met.  Barriers to achieving goals:   limited tolerance for therapy.     Therapy recommendation(s):    Continued therapy is recommended.  Rationale/Recommendations:  Pt will benfit from continued skilled PT services to increase indepndence and safety with functional mobility.

## 2017-03-08 NOTE — PROGRESS NOTES
Clinic Care Coordination Contact  OUTREACH    Referral Information:  Referral Source: CTS  Reason for Contact: Hospitalization--2/27-3/7/2017--  Discharge Diagnoses      1. Critical limb ischemia of non-healing ulcerations of left foot with single vessel run-off via peroneal artery s/p angioplasty 2 areas of stenosis 2/27/17 followed by below-knee popliteal to dorsalis pedis nonreversed translocated greater saphenous vein bypass, debridement of ulcers and amputation of the left great toe 2/28/17      2. Ongoing tobacco use      3. Hyperlipidemia      4. Type 2 diabetes mellitus        Universal Utilization:   ED Visits in last year: 0  Hospital visits in last year: 1  Last PCP appointment: 02/13/17  Missed Appointments: 6  Concerns: NO  Multiple Providers or Specialists:  (Yes)    Clinical Concerns:  Current Medical Concerns: Patient reports wounds are healing well redness decreasing.  Wife has been trained in by home care RN to change the dressings.  Patient has a future appointment with the wound clinic and Dr Aragon 3/20/2017  Patient reports partial blackened  Vision in right eye  since yesterday and was advised by Dr Aragon's office to see an Ophthalmologist.  CC encouraged a visit today /  Patient agrees with the plan.   Current Behavioral Concerns: Not discussed       Clinical Pathway Name: None  Clinical Pathway: None    Medication Management:  Reviewed by home care RN      Functional Status:  Mobility Status: Independent w/Device  Equipment Currently Used at Home: none, walker, standard        Psychosocial:  Current living arrangement:: I live in a private home with spouse       Resources and Interventions:  Current Resources:  ; Home Care  PAS Number:  (NA)  Senior Linkage Line Referral Placed:  (NA)  Advanced Care Plans/Directives on file:: No  Referrals Placed:  (NA)     Barriers: right eye partial black vision   Strengths:Patient will make an appointment with the Ophthalmologist today  ASAP  Patient/Caregiver understanding: Expresses good understanding of discharge instruction   Frequency of Care Coordination:  (CC will follow up when discharged from home care)        Plan:   CC spoke to April admission [facility] RN and she is questioning the Lisinopril dose of 20 mg twice a day  Blood pressure this morning 110/60  April will leave CC contact with Leena ARORA RN CM going forward     Rosalva Palafox, RN  Care Coordinator-[facility]  [email or web address]  [phone]

## 2017-03-08 NOTE — TELEPHONE ENCOUNTER
Home care nurse, WINSTON Mckeon asking if pt needs to be taking both augmentin and keflex.      Clarified above with Yvonne Miles PA-C and she states that the pt can discontinue the keflex and just continue with the augmentin.      Called April back to give above instruction.  April read back order.  April also reports that the pt only has a little bit of xeroform for his dressing changes and she states that they either need an order to get more xeroform or she wonders if they could use adaptic instead.  We will check with  tomorrow while he is in clinic and call April back ([phone]).    Yin Valdes, VIDALN, RN

## 2017-03-08 NOTE — PROGRESS NOTES
April [facility] RN  ([phone]).informed of Dr Roberts's advice.  April will continue to monitor the patients blood pressure     Rosalva Palafox RN  Care Coordinator-[facility]  [email or web address]  [phone]

## 2017-03-09 ENCOUNTER — HOSPITAL ENCOUNTER (EMERGENCY)
Facility: CLINIC | Age: 57
Discharge: HOME | End: 2017-03-09
Attending: EMERGENCY MEDICINE | Admitting: EMERGENCY MEDICINE
Payer: COMMERCIAL

## 2017-03-09 ENCOUNTER — APPOINTMENT (OUTPATIENT)
Dept: CT IMAGING | Facility: CLINIC | Age: 57
End: 2017-03-09
Attending: EMERGENCY MEDICINE
Payer: COMMERCIAL

## 2017-03-09 ENCOUNTER — APPOINTMENT (OUTPATIENT)
Dept: MRI IMAGING | Facility: CLINIC | Age: 57
End: 2017-03-09
Attending: EMERGENCY MEDICINE
Payer: COMMERCIAL

## 2017-03-09 VITALS
OXYGEN SATURATION: 99 % | WEIGHT: 204 LBS | SYSTOLIC BLOOD PRESSURE: 133 MMHG | RESPIRATION RATE: 20 BRPM | BODY MASS INDEX: 30.21 KG/M2 | HEART RATE: 70 BPM | TEMPERATURE: 98.5 F | HEIGHT: 69 IN | DIASTOLIC BLOOD PRESSURE: 79 MMHG

## 2017-03-09 DIAGNOSIS — H54.7 VISION LOSS: ICD-10-CM

## 2017-03-09 DIAGNOSIS — H53.10 SUBJECTIVE VISUAL DISTURBANCE: ICD-10-CM

## 2017-03-09 DIAGNOSIS — H46.9 OPTIC NEUROPATHY: ICD-10-CM

## 2017-03-09 LAB
ABO + RH BLD: NORMAL
ABO + RH BLD: NORMAL
ALBUMIN SERPL-MCNC: 2.9 G/DL (ref 3.4–5)
ALP SERPL-CCNC: 92 U/L (ref 40–150)
ALT SERPL W P-5'-P-CCNC: 30 U/L (ref 0–70)
ANION GAP SERPL CALCULATED.3IONS-SCNC: 7 MMOL/L (ref 3–14)
AST SERPL W P-5'-P-CCNC: 38 U/L (ref 0–45)
BASOPHILS # BLD AUTO: 0.1 10E9/L (ref 0–0.2)
BASOPHILS NFR BLD AUTO: 0.4 %
BILIRUB SERPL-MCNC: 0.6 MG/DL (ref 0.2–1.3)
BLD GP AB SCN SERPL QL: NORMAL
BLOOD BANK CMNT PATIENT-IMP: NORMAL
BUN SERPL-MCNC: 12 MG/DL (ref 7–30)
CALCIUM SERPL-MCNC: 8.8 MG/DL (ref 8.5–10.1)
CHLORIDE SERPL-SCNC: 105 MMOL/L (ref 94–109)
CO2 SERPL-SCNC: 25 MMOL/L (ref 20–32)
CREAT SERPL-MCNC: 1.06 MG/DL (ref 0.66–1.25)
CRP SERPL-MCNC: 80.7 MG/L (ref 0–8)
DIFFERENTIAL METHOD BLD: ABNORMAL
EOSINOPHIL # BLD AUTO: 0.3 10E9/L (ref 0–0.7)
EOSINOPHIL NFR BLD AUTO: 2.6 %
ERYTHROCYTE [DISTWIDTH] IN BLOOD BY AUTOMATED COUNT: 13 % (ref 10–15)
ERYTHROCYTE [SEDIMENTATION RATE] IN BLOOD BY WESTERGREN METHOD: 101 MM/H (ref 0–20)
GFR SERPL CREATININE-BSD FRML MDRD: 72 ML/MIN/1.7M2
GLUCOSE SERPL-MCNC: 60 MG/DL (ref 70–99)
HCT VFR BLD AUTO: 27.9 % (ref 40–53)
HGB BLD-MCNC: 9.5 G/DL (ref 13.3–17.7)
IMM GRANULOCYTES # BLD: 0.1 10E9/L (ref 0–0.4)
IMM GRANULOCYTES NFR BLD: 0.5 %
INR PPP: 1.01 (ref 0.86–1.14)
INTERPRETATION ECG - MUSE: NORMAL
LYMPHOCYTES # BLD AUTO: 2.4 10E9/L (ref 0.8–5.3)
LYMPHOCYTES NFR BLD AUTO: 21.6 %
MCH RBC QN AUTO: 30.7 PG (ref 26.5–33)
MCHC RBC AUTO-ENTMCNC: 34.1 G/DL (ref 31.5–36.5)
MCV RBC AUTO: 90 FL (ref 78–100)
MONOCYTES # BLD AUTO: 1.1 10E9/L (ref 0–1.3)
MONOCYTES NFR BLD AUTO: 10.1 %
NEUTROPHILS # BLD AUTO: 7.2 10E9/L (ref 1.6–8.3)
NEUTROPHILS NFR BLD AUTO: 64.8 %
NRBC # BLD AUTO: 0 10*3/UL
NRBC BLD AUTO-RTO: 0 /100
PLATELET # BLD AUTO: 450 10E9/L (ref 150–450)
POTASSIUM SERPL-SCNC: 4.8 MMOL/L (ref 3.4–5.3)
PROT SERPL-MCNC: 8.1 G/DL (ref 6.8–8.8)
RBC # BLD AUTO: 3.09 10E12/L (ref 4.4–5.9)
SODIUM SERPL-SCNC: 137 MMOL/L (ref 133–144)
SPECIMEN EXP DATE BLD: NORMAL
WBC # BLD AUTO: 11.2 10E9/L (ref 4–11)

## 2017-03-09 PROCEDURE — 85652 RBC SED RATE AUTOMATED: CPT | Performed by: EMERGENCY MEDICINE

## 2017-03-09 PROCEDURE — 86901 BLOOD TYPING SEROLOGIC RH(D): CPT | Performed by: EMERGENCY MEDICINE

## 2017-03-09 PROCEDURE — 25000128 H RX IP 250 OP 636: Performed by: EMERGENCY MEDICINE

## 2017-03-09 PROCEDURE — A9585 GADOBUTROL INJECTION: HCPCS | Performed by: EMERGENCY MEDICINE

## 2017-03-09 PROCEDURE — 70450 CT HEAD/BRAIN W/O DYE: CPT | Mod: XS

## 2017-03-09 PROCEDURE — 70498 CT ANGIOGRAPHY NECK: CPT

## 2017-03-09 PROCEDURE — 80053 COMPREHEN METABOLIC PANEL: CPT | Performed by: EMERGENCY MEDICINE

## 2017-03-09 PROCEDURE — 25500064 ZZH RX 255 OP 636: Performed by: EMERGENCY MEDICINE

## 2017-03-09 PROCEDURE — 86140 C-REACTIVE PROTEIN: CPT | Performed by: EMERGENCY MEDICINE

## 2017-03-09 PROCEDURE — 85025 COMPLETE CBC W/AUTO DIFF WBC: CPT | Performed by: EMERGENCY MEDICINE

## 2017-03-09 PROCEDURE — 70553 MRI BRAIN STEM W/O & W/DYE: CPT

## 2017-03-09 PROCEDURE — 93005 ELECTROCARDIOGRAM TRACING: CPT

## 2017-03-09 PROCEDURE — 96360 HYDRATION IV INFUSION INIT: CPT | Mod: 59

## 2017-03-09 PROCEDURE — 99285 EMERGENCY DEPT VISIT HI MDM: CPT | Mod: 25

## 2017-03-09 PROCEDURE — 25000125 ZZHC RX 250: Performed by: EMERGENCY MEDICINE

## 2017-03-09 PROCEDURE — 96361 HYDRATE IV INFUSION ADD-ON: CPT

## 2017-03-09 PROCEDURE — 85610 PROTHROMBIN TIME: CPT | Performed by: EMERGENCY MEDICINE

## 2017-03-09 PROCEDURE — 86900 BLOOD TYPING SEROLOGIC ABO: CPT | Performed by: EMERGENCY MEDICINE

## 2017-03-09 PROCEDURE — 86850 RBC ANTIBODY SCREEN: CPT | Performed by: EMERGENCY MEDICINE

## 2017-03-09 RX ORDER — IOPAMIDOL 755 MG/ML
70 INJECTION, SOLUTION INTRAVASCULAR ONCE
Status: COMPLETED | OUTPATIENT
Start: 2017-03-09 | End: 2017-03-09

## 2017-03-09 RX ORDER — SODIUM CHLORIDE 9 MG/ML
INJECTION, SOLUTION INTRAVENOUS CONTINUOUS
Status: DISCONTINUED | OUTPATIENT
Start: 2017-03-09 | End: 2017-03-09 | Stop reason: HOSPADM

## 2017-03-09 RX ORDER — GADOBUTROL 604.72 MG/ML
9 INJECTION INTRAVENOUS ONCE
Status: COMPLETED | OUTPATIENT
Start: 2017-03-09 | End: 2017-03-09

## 2017-03-09 RX ADMIN — GADOBUTROL 9 ML: 604.72 INJECTION INTRAVENOUS at 14:12

## 2017-03-09 RX ADMIN — SODIUM CHLORIDE 500 ML: 9 INJECTION, SOLUTION INTRAVENOUS at 12:49

## 2017-03-09 RX ADMIN — SODIUM CHLORIDE 100 ML: 9 INJECTION, SOLUTION INTRAVENOUS at 13:14

## 2017-03-09 RX ADMIN — IOPAMIDOL 70 ML: 755 INJECTION, SOLUTION INTRAVENOUS at 13:14

## 2017-03-09 ASSESSMENT — ENCOUNTER SYMPTOMS
NUMBNESS: 0
HEADACHES: 0

## 2017-03-09 NOTE — ED AVS SNAPSHOT
Emergency Department    [address] [place] [address]    Phone:  [phone]    Fax:  [phone]                                       Gomez Turk   MRN: [id number]    Department:   Emergency Department   Date of Visit:  3/9/2017           After Visit Summary Signature Page     I have received my discharge instructions, and my questions have been answered. I have discussed any challenges I see with this plan with the nurse or doctor.    ..........................................................................................................................................  Patient/Patient Representative Signature      ..........................................................................................................................................  Patient Representative Print Name and Relationship to Patient    ..................................................               ................................................  Date                                            Time    ..........................................................................................................................................  Reviewed by Signature/Title    ...................................................              ..............................................  Date                                                            Time

## 2017-03-09 NOTE — ED NOTES
Bed: ED06  Expected date:   Expected time:   Means of arrival:   Comments:  triage     Vishnu Kent MD  03/09/17 1145

## 2017-03-09 NOTE — TELEPHONE ENCOUNTER
I discussed with Dr. Aragon.     I then called April back and noted pt to have adaptic to wound. April notes understanding.     Sylvia Nolan RN, BSN.

## 2017-03-09 NOTE — PROGRESS NOTES
[facility]  Patient is currently open to home care services with [facility]. The patient was admitted to [facility] on 3/8/17 and is currently receiving RN services.  [facility]  and team have been notified that patient is in the ED.  [facility] liaison will continue to follow patient during stay.  If patient is admitted to inpatient status and is appropriate for skilled home care services at time of discharge, please provide orders to resume home care services.

## 2017-03-09 NOTE — DISCHARGE INSTRUCTIONS
Understanding Vision Problems    If your eyes are normal  Your eyes see objects as light. Your cornea focuses light rays onto a layer that lines the back of your eye (the retina). If your eyes are normal, this process produces a focused image on your retina. This makes objects look clear.    If you re nearsighted  Your cornea and your retina are too far apart if you re nearsighted. Sometimes your cornea or your lens has an abnormal shape. This makes light rays from distant objects focus in front of your retina. These objects then look blurry.    If you re farsighted  Your cornea and your retina are too close together if you re farsighted. Sometimes your cornea or your lens has an abnormal shape. This makes light rays from close objects focus behind your retina. These objects then look blurry.    If you have an astigmatism  Sometimes the curve of your cornea is uneven or the lens inside your eye is curved. Then light rays can t focus evenly onto your retina. This is called an astigmatism. It makes both close and distant objects look blurry.    [id number] [employer]. [address] [place] [address], [place] [address]. Todos los derechos reservados. Esta información no pretende sustituir la atención médica profesional. Sólo nye médico puede diagnosticar y tratar un problema de yumiko.

## 2017-03-09 NOTE — ED NOTES
MD at [facility] examining eyes that were previously dilated by eye doctor's office pt just came from.

## 2017-03-09 NOTE — PROGRESS NOTES
"Vascular Surgery Progress Note    Date of service: 3/9/2017    Subjective: C/o progressively worsening visual field defects in both eyes, denies any pain or discomfort otherwise.    Objective:  /79  Pulse 70  Temp 98.5  F (36.9  C) (Oral)  Resp 20  Ht 1.753 m (5' 9\")  Wt 92.5 kg (204 lb)  SpO2 99%  BMI 30.13 kg/m2  GEN: NAD  PULM: breathing comfortably  ABD: soft, NT, ND  EXTR: WWP, wound bases with minimal fibrinous debris, moist      Assessment and Plan:  Gomez Turk is a 56 yom with critical limb ischemia of non-healing ulcerations of left foot with single vessel run-off via peroneal artery s/p angioplasty 2 areas of stenosis 2/27/17 followed by below-knee popliteal to dorsalis pedis nonreversed translocated greater saphenous vein bypass, debridement of ulcers and amputation of the left great toe 2/28/17. He was recently discharged and was in good health until he re-presented with vision changes. He states that he felt a curtain coming down in both eyes. He was evaluated in the ED with brain MRI and CTA obtained and found no acute findings. Patient was referred to ophtalmology for further evaluation and treatment.  Dressing to left medial malleolus, greater tore and send toe wounds were changed while in the ED. Clean wound base. Continue daily dressing changes with aquacel to toe stump and hydrogel/xeroform to other open wounds. F/u with Dr. rapp as scheduled      Valentine Cardenas MD, PGY4  [phone]        "

## 2017-03-09 NOTE — ED AVS SNAPSHOT
Emergency Department    [address] [facility] [address]    Phone:  [phone]    Fax:  [phone]                                       Gomez Turk   MRN: [id number]    Department:   Emergency Department   Date of Visit:  3/9/2017           Patient Information     Date Of Birth          1960        Your diagnoses for this visit were:     Vision loss        You were seen by Vishnu Kent MD.      Follow-up Information     Follow up with Arya Mcgowan MD Today.    Specialty:  Ophthalmology    Contact information:    [facility]  [address]  [facility] [address] [phone]          Follow up with  Emergency Department.    Specialty:  EMERGENCY MEDICINE    Why:  If symptoms worsen    Contact information:    [address] [facility] [address] [phone]        Discharge Instructions         Understanding Vision Problems    If your eyes are normal  Your eyes see objects as light. Your cornea focuses light rays onto a layer that lines the back of your eye (the retina). If your eyes are normal, this process produces a focused image on your retina. This makes objects look clear.    If you re nearsighted  Your cornea and your retina are too far apart if you re nearsighted. Sometimes your cornea or your lens has an abnormal shape. This makes light rays from distant objects focus in front of your retina. These objects then look blurry.    If you re farsighted  Your cornea and your retina are too close together if you re farsighted. Sometimes your cornea or your lens has an abnormal shape. This makes light rays from close objects focus behind your retina. These objects then look blurry.    If you have an astigmatism  Sometimes the curve of your cornea is uneven or the lens inside your eye is curved. Then light rays can t focus evenly onto your retina. This is called an astigmatism. It makes both close and distant objects look  yaa.    [id number] [employer]. [address] [place] [address], [place] [address]. Todos los derechos reservados. Esta información no pretende sustituir la atención médica profesional. Sólo nye médico puede diagnosticar y tratar un problema de yumiko.          Future Appointments        Provider Department Dept Phone Center    3/17/2017 9:00 AM Nivia Bell DPM, Podiatry/Foot and Ankle Surgery [facility] [place] [phone] [facility]    3/20/2017 12:00 PM Jesus Aragon MD [facility] [phone] [facility]      24 Hour Appointment Hotline       To make an appointment at any [facility] clinic, call [phone] ([phone]). If you don't have a family doctor or clinic, we will help you find one. [facility] clinics are conveniently located to serve the needs of you and your family.             Review of your medicines      Our records show that you are taking the medicines listed below. If these are incorrect, please call your family doctor or clinic.        Dose / Directions Last dose taken    acetaminophen 325 MG tablet   Commonly known as:  TYLENOL   Dose:  650 mg   Quantity:  100 tablet        Take 2 tablets (650 mg) by mouth every 4 hours as needed for mild pain   Refills:  0        amoxicillin-clavulanate 875-125 MG per tablet   Commonly known as:  AUGMENTIN   Dose:  1 tablet   Indication:  Skin and Soft Tissue Infection   Quantity:  10 tablet        Take 1 tablet by mouth every 12 hours for 5 days   Refills:  0        aspirin 81 MG EC tablet   Dose:  81 mg   Quantity:  30 tablet        Take 1 tablet (81 mg) by mouth daily   Refills:  11        atorvastatin 40 MG tablet   Commonly known as:  LIPITOR   Dose:  40 mg   Quantity:  30 tablet        Take 1 tablet (40 mg) by mouth daily   Refills:  11        cephALEXin 500 MG capsule   Commonly known as:  KEFLEX   Dose:  500 mg   Quantity:  17 capsule        Take 1 capsule (500 mg) by mouth 3 times daily  "  Refills:  0        clopidogrel 75 MG tablet   Commonly known as:  PLAVIX   Dose:  75 mg   Quantity:  30 tablet        Take 1 tablet (75 mg) by mouth daily   Refills:  11        collagenase ointment   Quantity:  90 g        Apply topically daily   Refills:  1        gabapentin 300 MG capsule   Commonly known as:  NEURONTIN   Dose:  300 mg   Quantity:  90 capsule        Take 1 capsule (300 mg) by mouth 3 times daily   Refills:  3        glimepiride 2 MG tablet   Commonly known as:  AMARYL   Dose:  2 mg   Quantity:  180 tablet        Take 1 tablet (2 mg) by mouth 2 times daily   Refills:  0        lidocaine 2 % topical gel   Commonly known as:  XYLOCAINE   Quantity:  30 mL        Apply topically as needed for moderate pain   Refills:  1        lisinopril 20 MG tablet   Commonly known as:  PRINIVIL   Dose:  20 mg   Quantity:  180 tablet        Take 1 tablet (20 mg) by mouth 2 times daily   Refills:  3        metFORMIN 1000 MG tablet   Commonly known as:  GLUCOPHAGE   Dose:  1000 mg   Quantity:  180 tablet        Take 1 tablet (1,000 mg) by mouth 2 times daily (with meals)   Refills:  3        nicotine 7 MG/24HR 24 hr patch   Commonly known as:  NICODERM CQ   Dose:  1 patch   Quantity:  30 patch        Place 1 patch onto the skin daily   Refills:  3        order for DME   Quantity:  1 Device        Post of shoe   Refills:  0        order for DME   Quantity:  30 days        Equipment being ordered: [facility] [employer] [facility] Fax [phone]  Primary Dressing 4x4 non-sterile gauze   Qty 2 loafs Secondary Dressing Kerlix wrap 4\" Qty 30 Length of Need: 1 month Frequency of dressing change: daily   Refills:  0        oxyCODONE 10 MG IR tablet   Commonly known as:  ROXICODONE   Dose:  15 mg   Quantity:  50 tablet        Take 1.5 tablets (15 mg) by mouth every 3 hours as needed for moderate to severe pain   Refills:  0        polyethylene glycol Packet   Commonly known as:  MIRALAX/GLYCOLAX   Dose:  17 g   Quantity:  7 packet     "    Take 17 g by mouth daily as needed for constipation   Refills:  0        senna-docusate 8.6-50 MG per tablet   Commonly known as:  SENOKOT-S;PERICOLACE   Dose:  1-2 tablet   Quantity:  100 tablet        Take 1-2 tablets by mouth 2 times daily   Refills:  0        sildenafil 100 MG cap/tab   Commonly known as:  VIAGRA   Dose:  100 mg   Quantity:  12 tablet        Take 1 tablet (100 mg) by mouth daily as needed   Refills:  5                Procedures and tests performed during your visit     ABO/Rh type and screen    CBC with platelets differential    CT Head Neck Angio w/o & w Contrast    CT Head w/o Contrast    Comprehensive metabolic panel    EKG 12-lead, tracing only    INR    MR Brain w/o & w Contrast    Peripheral IV catheter      Orders Needing Specimen Collection     None      Pending Results     No orders found from 3/7/2017 to 3/10/2017.            Pending Culture Results     No orders found from 3/7/2017 to 3/10/2017.             Test Results from your hospital stay     3/9/2017 12:36 PM - Interface, Flexilab Results      Component Results     Component Value Ref Range & Units Status    WBC 11.2 (H) 4.0 - 11.0 10e9/L Final    RBC Count 3.09 (L) 4.4 - 5.9 10e12/L Final    Hemoglobin 9.5 (L) 13.3 - 17.7 g/dL Final    Hematocrit 27.9 (L) 40.0 - 53.0 % Final    MCV 90 78 - 100 fl Final    MCH 30.7 26.5 - 33.0 pg Final    MCHC 34.1 31.5 - 36.5 g/dL Final    RDW 13.0 10.0 - 15.0 % Final    Platelet Count 450 150 - 450 10e9/L Final    Diff Method Automated Method  Final    % Neutrophils 64.8 % Final    % Lymphocytes 21.6 % Final    % Monocytes 10.1 % Final    % Eosinophils 2.6 % Final    % Basophils 0.4 % Final    % Immature Granulocytes 0.5 % Final    Nucleated RBCs 0 0 /100 Final    Absolute Neutrophil 7.2 1.6 - 8.3 10e9/L Final    Absolute Lymphocytes 2.4 0.8 - 5.3 10e9/L Final    Absolute Monocytes 1.1 0.0 - 1.3 10e9/L Final    Absolute Eosinophils 0.3 0.0 - 0.7 10e9/L Final    Absolute Basophils 0.1 0.0 -  0.2 10e9/L Final    Abs Immature Granulocytes 0.1 0 - 0.4 10e9/L Final    Absolute Nucleated RBC 0.0  Final         3/9/2017 12:52 PM - Interface, Flexilab Results      Component Results     Component Value Ref Range & Units Status    Sodium 137 133 - 144 mmol/L Final    Potassium 4.8 3.4 - 5.3 mmol/L Final    Chloride 105 94 - 109 mmol/L Final    Carbon Dioxide 25 20 - 32 mmol/L Final    Anion Gap 7 3 - 14 mmol/L Final    Glucose 60 (L) 70 - 99 mg/dL Final    Urea Nitrogen 12 7 - 30 mg/dL Final    Creatinine 1.06 0.66 - 1.25 mg/dL Final    GFR Estimate 72 >60 mL/min/1.7m2 Final    Non  GFR Calc    GFR Estimate If Black 87 >60 mL/min/1.7m2 Final    African American GFR Calc    Calcium 8.8 8.5 - 10.1 mg/dL Final    Bilirubin Total 0.6 0.2 - 1.3 mg/dL Final    Albumin 2.9 (L) 3.4 - 5.0 g/dL Final    Protein Total 8.1 6.8 - 8.8 g/dL Final    Alkaline Phosphatase 92 40 - 150 U/L Final    ALT 30 0 - 70 U/L Final    AST 38 0 - 45 U/L Final         3/9/2017  1:11 PM - Interface, Flexilab Results      Component Results     Component    ABO    A    RH(D)     Pos    Antibody Screen    Neg    Test Valid Only At    [facility]    Specimen Expires    03/12/2017         3/9/2017 12:47 PM - Interface, Flexilab Results      Component Results     Component Value Ref Range & Units Status    INR 1.01 0.86 - 1.14 Final         3/9/2017  2:47 PM - Interface, Radiant Ib      Narrative     MRI OF THE BRAIN WITHOUT AND WITH CONTRAST March 9, 2017 2:02 PM     HISTORY: Loss of vision.     TECHNIQUE: Multisequence, multiplanar MRI images of the brain were  acquired before and after the administration of IV gadolinium (9 mL  Gadavist).    COMPARISON: Head CT same day.    FINDINGS: The ventricles and basal cisterns are normal in  configuration. There is no midline shift. There are no extra-axial  fluid collections. Gray-white differentiation is well maintained.  There is no evidence for stroke or acute  intracranial hemorrhage.  There is no abnormal contrast enhancement in the brain or its  coverings.    There is no sinusitis or mastoiditis.        Impression     IMPRESSION: Normal brain MRI.    MARTIN NUÑEZ MD         3/9/2017  2:47 PM - Interface, Radiant Ib      Narrative     CT ANGIOGRAM OF THE HEAD AND NECK WITHOUT AND WITH CONTRAST March 9, 2017 1:27 PM     HISTORY: Evaluate for dissection/thromboembolism. Vision loss.    TECHNIQUE:  Precontrast localizing scans were followed by CT  angiography with an injection of 70 mL Isovue-370 nonionic intravenous  contrast material with scans through the head and neck.  Images were  transferred to a separate 3-D workstation where multiplanar  reformations and 3-D images were created.  Estimates of carotid  stenoses are made relative to the distal internal carotid artery  diameters except as noted. Radiation dose for this scan was reduced  using automated exposure control, adjustment of the mA and/or kV  according to patient size, or iterative reconstruction technique.    COMPARISON: None.    FINDINGS:   Neck CTA: The common carotid arteries bilaterally are patent without  stenosis. There is calcified and noncalcified atherosclerotic plaque  at the origins of the internal carotid arteries on both sides that  does not result in stenosis. The cervical internal carotid arteries  bilaterally are patent without stenosis. The right vertebral artery is  patent without stenosis. The right vertebral artery below the C4 level  is not opacified with contrast and is likely occluded. Filling of the  distal right vertebral artery is likely via retrograde flow from the  nasal or artery. Proximal occlusion of the right vertebral artery may  be chronic or acute. Acute dissection is not excluded.    Head CTA: There is calcified atherosclerotic plaque of the  intracranial distal internal carotid arteries on both sides that  results in minimal narrowing of the cavernous segment on the  right but  no significant narrowing on the left. The basilar, bilateral anterior  cerebral, bilateral middle cerebral and bilateral posterior cerebral  arteries are patent and unremarkable. The anterior communicating and  bilateral posterior communicating arteries are patent and  unremarkable.        Impression     IMPRESSION:  1. Age-indeterminate complete occlusion of the proximal right  vertebral artery from the origin to approximately the C4 level with  retrograde filling of the distal segment.  2. Calcified atherosclerotic plaque of the proximal and distal  internal carotid arteries on both sides that does not result in any  stenosis.  3. Otherwise, normal neck and head CTA.      MARTIN NUÑEZ MD         3/9/2017  1:49 PM - Interface, Radiant Ib      Narrative     CT OF THE HEAD WITHOUT CONTRAST  3/9/2017 1:20 PM     COMPARISON: None.    HISTORY:  Vision loss.    TECHNIQUE: Axial CT images of the head from the skull base to the  vertex were acquired without IV contrast.    FINDINGS:  The ventricles and basal cisterns are within normal limits  in configuration. There is no midline shift. There are no extra-axial  fluid collections.  Gray-white differentiation is well maintained.     No intracranial hemorrhage, mass or recent infarct.    The visualized paranasal sinuses are well aerated. There is no  mastoiditis. There are no fractures of the visualized bones.         Impression     IMPRESSION:  Normal head CT.     Radiation dose for this scan was reduced using automated exposure  control, adjustment of the mA and/or kV according to patient size, or  iterative reconstruction technique.    MARTIN NUÑEZ MD                Clinical Quality Measure: Blood Pressure Screening     Your blood pressure was checked while you were in the emergency department today. The last reading we obtained was  BP: 133/79 . Please read the guidelines below about what these numbers mean and what you should do about them.  If your  systolic blood pressure (the top number) is less than 120 and your diastolic blood pressure (the bottom number) is less than 80, then your blood pressure is normal. There is nothing more that you need to do about it.  If your systolic blood pressure (the top number) is 120-139 or your diastolic blood pressure (the bottom number) is 80-89, your blood pressure may be higher than it should be. You should have your blood pressure rechecked within a year by a primary care provider.  If your systolic blood pressure (the top number) is 140 or greater or your diastolic blood pressure (the bottom number) is 90 or greater, you may have high blood pressure. High blood pressure is treatable, but if left untreated over time it can put you at risk for heart attack, stroke, or kidney failure. You should have your blood pressure rechecked by a primary care provider within the next 4 weeks.  If your provider in the emergency department today gave you specific instructions to follow-up with your doctor or provider even sooner than that, you should follow that instruction and not wait for up to 4 weeks for your follow-up visit.        Thank you for choosing [facility]       Thank you for choosing [facility] for your care. Our goal is always to provide you with excellent care. Hearing back from our patients is one way we can continue to improve our services. Please take a few minutes to complete the written survey that you may receive in the mail after you visit with us. Thank you!        [facility] Information     [facility] gives you secure access to your electronic health record. If you see a primary care provider, you can also send messages to your care team and make appointments. If you have questions, please call your primary care clinic.  If you do not have a primary care provider, please call [phone] and they will assist you.        Care EveryWhere ID     This is your Care EveryWhere ID. This could be used by other organizations to  access your [place] medical records  [id number]        After Visit Summary       This is your record. Keep this with you and show to your community pharmacist(s) and doctor(s) at your next visit.

## 2017-03-09 NOTE — ED PROVIDER NOTES
History     Chief Complaint:  Eye problem    HPI   Gomez Turk is a 56 year old male, with a history of PAD, type 2 Diabetes, and diverticulosis, who presents with an eye problem. The patient reports recent discharge from the hospital on Tuesday after having bypass surgery on his left lower leg and foot. The patient reports that since discharge he has had progressively worsening visual disturbances. Specifically, 2 days ago he began experiencing a loss of vision in his right eye that now encompasses 1/4 of his visual field from the top-down. Additionally, yesterday he began experiencing loss of vision in his left eye that now encompasses 2/3 of his visual field in that eye. Today he has also noted a change in his voice; it has become more high pitched. With his symptoms continuing to progressively worsen, he decided to go to an optometrist, who was concerned for ischemic optic neuropathy and referred him here for evaluation. He notes being currently on antibiotics secondary to his recent surgery. He denies a headache, numbness, tingling or weakness to any of his extremities. He further denies any other symptoms.    Allergies:  No known drug allergies      Medications:    Oxycodone (roxicodone) 10 mg ir tablet  Acetaminophen (tylenol) 325 mg tablet  Aspirin ec 81 mg ec tablet  Gabapentin (neurontin) 300 mg capsule  Lisinopril (prinivil) 20 mg tablet  Atorvastatin (lipitor) 40 mg tablet  Polyethylene glycol (miralax/glycolax) packet  Senna-docusate (senokot-s;pericolace) 8.6-50 mg per tablet  Clopidogrel (plavix) 75 mg tablet  Nicotine (nicoderm cq) 7 mg/24hr 24 hr patch  Amoxicillin-clavulanate (augmentin) 875-125 mg per tablet  Cephalexin (keflex) 500 mg capsule  Collagenase ointment  Lidocaine (xylocaine) 2 % topical gel  Order for dme  Metformin (glucophage) 1000 mg tablet  Glimepiride (amaryl) 2 mg tablet  Order for dme  Sildenafil (viagra) 100 mg tablet    Past Medical History:    Diverticulosis w/o  "bleed  Type 2 Diabetes  PAD    Past Surgical History:    ENT surgery - deviated septum repair  Irrigation and debridement foot, combined  Bypass graft femoropopliteal  Amputate toes    Family History:    Diabetes - mother  Lipids - mother  Cancer - paternal grandmother  Neurologic disorder - maternal uncle    Social History:  Smoking status: current every day - 0.25 packs/day  Alcohol use: no   Marital Status:       Review of Systems   Eyes: Positive for visual disturbance.   Neurological: Negative for numbness and headaches.   All other systems reviewed and are negative.      Physical Exam     Patient Vitals for the past 24 hrs:   BP Temp Temp src Pulse Resp SpO2 Height Weight   03/09/17 1437 133/79 - - 70 20 99 % - -   03/09/17 1146 107/75 98.5  F (36.9  C) Oral 75 16 100 % 1.753 m (5' 9\") 92.5 kg (204 lb)           Physical Exam  Nursing note and vitals reviewed.  Constitutional:  Oriented to person, place, and time. Cooperative.   HENT:   Nose:    Nose normal.   Mouth/Throat:   Mucous membranes are normal.   Eyes:    Conjunctivae normal and EOM are normal.      Pupils are equal, round, and reactive to light.      Funduscopic exam shows increased paleness to the central area of the fundi bilaterally.  Neck:    Trachea normal.   Cardiovascular:  Normal rate, regular rhythm, normal heart sounds and normal pulses. No murmur heard.  Pulmonary/Chest:  Effort normal and breath sounds normal.   Abdominal:   Soft. Normal appearance and bowel sounds are normal.      There is no tenderness.      There is no rebound and no CVA tenderness.   Musculoskeletal:  Extremities atraumatic x 4. Left lower leg edematous and slightly warm with a bandage over the left foot.  Lymphadenopathy:  No cervical adenopathy.   Neurological:   Alert and oriented to person, place, and time. Normal strength.      Diminished visual fields throughout.     No cranial nerve deficit or sensory deficit. GCS eye subscore is 4. GCS verbal subscore " is 5. GCS motor subscore is 6.   Skin:    Skin is intact. No rash noted.   Psychiatric:   Normal mood and affect.    Emergency Department Course   ECG (12:000:41):  Rate 67 bpm. WI interval 146. QRS duration 66. QT/QTc 408/431. P-R-T axes 49 28 39.   Normal sinus rhythm.  Normal ECG.   Interpreted at 1205 by Vishnu Kent MD*.    Imaging:  Radiographic findings were communicated with the patient who voiced understanding of the findings.    CT head neck angio w/o & w contrast  IMPRESSION:  1. Age-indeterminate complete occlusion of the proximal right  vertebral artery from the origin to approximately the C4 level with  retrograde filling of the distal segment.  2. Calcified atherosclerotic plaque of the proximal and distal  internal carotid arteries on both sides that does not result in any  stenosis.  3. Otherwise, normal neck and head CTA.  As read by Radiology.    CT head w/o contrast  IMPRESSION:  Normal head CT.     Radiation dose for this scan was reduced using automated exposure  control, adjustment of the mA and/or kV according to patient size, or  iterative reconstruction technique.  As read by Radiology.    MR Brain w/o & w contrast  IMPRESSION: Normal brain MRI  As read by Radiology.     Laboratory:  CBC: WBC 11.2 (H), HGB 9.5 (L), o/w WNL ()   CMP: Glucose 60 (L), Albumin 2.9 (L), o/w WNL (Creatinine 1.06)  INR: 1.01  ABO/Rh type and screen: ABO A, RH(D) pos, antibody neg  CRP inflammation: 80.7 (H)  Erythrocyte sedimentation rate auto: 101 (H)    Interventions:  1249 - NS 1L IV Bolus    Emergency Department Course:  Past medical records, nursing notes, and vitals reviewed.  1143: I performed an exam of the patient and obtained history, as documented above.   IV inserted and blood drawn.  The patient was sent for a MRI and CT scan while in the emergency department, findings above.  1211: I discussed the case with Dr. Canada regarding the patient.   1445: I discussed the case with   Juan M regarding the patient.   1456: I rechecked the patient. Findings and plan explained to the Patient. Patient discharged home with instructions regarding supportive care, medications, and reasons to return. The importance of close follow-up was reviewed.       Impression & Plan      Medical Decision Making:  Gomez Turk is a 56 year old male who came in with visual complains in both eyes. This is a patient who has significant vascular disease and in fact just had a fem-pop bypass a few days ago on the left. He is already on Plavix, but he has diabetes and just recently quit smoking. I was concerned that he could have a stroke or dissection as well as retinal artery or vein occlusion. My suspicion was lower that it would be a retinal vein or artery occlusion though, given the fact that this is both eyes. I first spoke with Dr. Canada, who knows this patient. He asked that I not only obtain an MRI of his brain, but also a CT angio of his head and neck. Those show the above findings. I do not believe those findings are the cause of his symptoms. I think at this point it is best that he follow up with an ophthalmologist, and I spoke with the on call ophthalmologist, Dr. Mcgowan. He indicated that he could see the patient in his clinic today. He also asked that I add on sed rate and CRP, which I did. He is to follow up with his own doctors as previously scheduled. He was subsequently to go straight to the eye clinic.     Diagnosis:    ICD-10-CM    1. Vision loss H54.7 CRP inflammation     CRP inflammation     Erythrocyte sedimentation rate auto     Erythrocyte sedimentation rate auto     CANCELED: CRP inflammation     CANCELED: Erythrocyte sedimentation rate auto       Disposition:  discharged to home    Discharge Medications:  New Prescriptions    No medications on file         Houston Winn  3/9/2017    EMERGENCY DEPARTMENT  PETER, Houston Winn, am serving as a scribe at 11:42 AM on 3/9/2017 to document  services personally performed by Vishnu Kent MD, based on my observations and the provider's statements to me.       Vishnu Kent MD  03/09/17 2055

## 2017-03-10 ENCOUNTER — CARE COORDINATION (OUTPATIENT)
Dept: CARE COORDINATION | Facility: CLINIC | Age: 57
End: 2017-03-10

## 2017-03-10 ENCOUNTER — TELEPHONE (OUTPATIENT)
Dept: OTHER | Facility: CLINIC | Age: 57
End: 2017-03-10

## 2017-03-10 ENCOUNTER — OFFICE VISIT (OUTPATIENT)
Dept: OPHTHALMOLOGY | Facility: CLINIC | Age: 57
End: 2017-03-10
Attending: OPHTHALMOLOGY
Payer: COMMERCIAL

## 2017-03-10 DIAGNOSIS — H53.10 SUBJECTIVE VISUAL DISTURBANCE: ICD-10-CM

## 2017-03-10 DIAGNOSIS — H46.9 OPTIC NEUROPATHY: ICD-10-CM

## 2017-03-10 PROCEDURE — 99215 OFFICE O/P EST HI 40 MIN: CPT | Mod: ZF

## 2017-03-10 PROCEDURE — 99214 OFFICE O/P EST MOD 30 MIN: CPT

## 2017-03-10 ASSESSMENT — SLIT LAMP EXAM - LIDS
COMMENTS: NORMAL
COMMENTS: NORMAL

## 2017-03-10 ASSESSMENT — CONF VISUAL FIELD
OS_INFERIOR_NASAL_RESTRICTION: 1
OD_SUPERIOR_TEMPORAL_RESTRICTION: 1

## 2017-03-10 ASSESSMENT — CUP TO DISC RATIO
OS_RATIO: 0.0
OD_RATIO: 0.0

## 2017-03-10 ASSESSMENT — TONOMETRY
OD_IOP_MMHG: 9
IOP_METHOD: TONOPEN
OS_IOP_MMHG: 8

## 2017-03-10 ASSESSMENT — VISUAL ACUITY
METHOD: SNELLEN - LINEAR
OS_SC: 20/150

## 2017-03-10 ASSESSMENT — EXTERNAL EXAM - RIGHT EYE: OD_EXAM: NORMAL

## 2017-03-10 ASSESSMENT — EXTERNAL EXAM - LEFT EYE: OS_EXAM: NORMAL

## 2017-03-10 NOTE — PROGRESS NOTES
Clinic Care Coordination Contact  Care Team Conversations    Patient reports he has had visits with 3 eye providers .    Patient reports his vision loss is permanent and he will not be able to go back to work or drive... Patient has a follow up appointment 3/29/2017  CC set up a consultation in the patients home through [facility] [phone]  Patient continues home care services     Rosalva Palafox RN  Care Coordinator-[facility]  [email or web address]  [phone]
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
## 2017-03-10 NOTE — PROGRESS NOTES
ATTENDING ASSESSMENT AND PLAN:       1. Bilateral perioperative ischemic optic neuropathy:    Gomez Turk is a pleasant 56 year old male who presents to my neuro-ophthalmology clinic today referred by Dr. Arya Mcgowan with subacute vision painless decreased vision in both eyes with associated optic nerve head swelling. The patient has a relevant past medical history of type 2 diabetes mellitus, peripheral vascular disease, and peripheral neuropathy. On 2/28 the patient underwent an uncomplicated femoral popliteal bypass surgery on his left lower leg and foot in addition to big toe amputation with a relatively uneventful postoperative course. During his stay at the hospital, he recalls that his vision was normal. No eye pain. Upon discharge from the hospital on 3/8 while driving back home he started to notice darkening in the lower part of his vision in the right that progressed over to cover most of his vision. Within hours it started to involve the left eye. The next day he noticed that most of the vision in the right eye is lost and the upper field in the left eye too.  The vision loss subjectively stopped progressing yesterday morning. The patient denies any symptoms of giant cell arteritis including headaches, jaw claudication, myalgias, temple tenderness, scalp tenderness.  His hgbA1c was checked in Feb and was 6.8. His last CBC yesterday showed a hgb / hematocrit of 9.5 / 27.9. He underwent a brain MRI that was read as unremarkable and a CT angiogram of the head / neck that was notable for age-indeterminate complete occlusion of the proximal right vertebral artery.  There were bilateral calcified plaques of the proximal and distal internal carotid arteries without significant stenosis. ESR and CRP were both elevated at 101 and 80.7 respectively.  The patient is on aspirin and plavix currently.  He was on heparin during his hospital course.  He suffered no major hemorrhagic events during the surgery or  after per his account.  The operative report suggested an unremarkable surgery without complication.  Discussions with the surgeon after our visit confirmed that the surgery was uncomplicated.  The patient did not require any blood transfusions at the time of surgery or in the post-operative course per his recollection.  The patient did recall that he was mildly hypotensive at some point during his hospital stay but he does not recall how low his blood pressure ran.  He did not think that his hypotension was severe.  The patient did use Viagra in the past but denies any use of erectile dysfunction medications since 2006.    Today the visual acuity is counting fingers in the right eye at 1 ft distance and 20/150 in the left eye. Both pupils showed sluggish reaction to light with a trace of afferent pupillary defect in the right eye. Extraocular motility was full. Slit lamp examination was remarkable for bilateral diffuse corneal punctate erosions indicative of dry eye and moderate cataracts in both eyes. Fundus exam showed bilateral moderate swelling of the optic nerves along with diffuse cotton wool spots in both eyes.       Fluorescein angiography was remarkable for delayed leakage from the optic nerves bilaterally.  Of note the choroidal vasculature filling in the right eye was unremarkable showing only mild delay in the choroidal filling time. OCT of the optic nerve head revealed severe superior segmental retinal nerve fiber layer thickening in the right eye and moderate retinal nerve fiber layer thickening inferiorly in the left eye indicative of segmental optic nerve head swelling in both eyes and correlating to visual field defects.    Automated visual field testing showed severe generalized depression in the right eye and a superior altitudinal defect in the left eye.     I have reviewed his brain MRI images from yesterday.  The optic nerves appeared normal. There was no optic nerve enhancement nor any  abnormal T2 signal.  I did not detect any definitive restricted diffusion of the optic nerves. There was no restricted diffusion elsewhere in the brain to indicate central nervous system ischemia.    In conclusion, Mr. Turk presents today with bilateral subacute painless vision loss from bilateral optic neuropathy with swollen optic nerve heads in the context of a recent vascular surgery and toe amputation 8 days prior.  Based upon the clinical picture, lack of MRI pathology of the optic nerves, segmental nature of the optic nerve head swelling, and his vasculopathic history, this represents perioperative ischemic optic neuropathy.  While his ESR/CRP are massively elevated, he has several reasons for acute phase reactant elevation including recent infection and recent surgery.  His age, lack of giant cell arteritis symptoms, and normal choroidal filling on fluorescein angiography exclude the possibility of giant cell arteritis.  If this were an inflammatory or malignant process unrelated to his recent surgery we would expect to see pathologic findings on the MRI which were not present.  The MRI is usually unremarkable in cases of ischemic optic neuropathy.    At this point there is no evidence based medicine treatment for perioperative ischemic optic neuropathy except to control any underlying risk factors and hemodynamics to try and avoid additional vision loss.  I advised the patient to check his blood pressure at least daily to ensure he does not become hypotensive.  He has relatively mild anemia- not severe enough to warrant transfusion.  In theory, swollen optic nerves status post recent ischemia are likely to be more susceptible to additional infarction with hemodynamic changes thus I have spoke with his vascular surgeon and we mutually agreed to delay any additional surgery (one was planned for next week to replace a skin flap on the foot, I believe) until the optic nerve head swelling had resolved and  he had healed from his ischemic optic neuropathy episode.  The optic nerve head swelling typically takes 6-12 weeks to resolve.  Finally I explained to the patient that this is a known, well reported, rare complication of surgery including vascular surgery, cardiac surgery, orthopedic surgery, plastic surgery, etc.  In many cases there is no clear complication of the surgery leading to the vision loss and pre-existing vascular risk factors probably play a role in a patient's susceptibility.     I will refer this patient on to low vision occupational therapy.  I will also see him back in 2 months for repeat evaluation.  He may always return sooner should he have a significant worsening of his vision.       Complete documentation of historical and exam elements from today's encounter can be found in the full encounter summary report (not reduplicated in this progress note).  I personally obtained the chief complaint(s) and history of present illness.  I confirmed and edited as necessary the review of systems, past medical/surgical history, family history, social history, and examination findings as documented by others; and I examined the patient myself.  I personally reviewed the relevant tests, images, and reports as documented above.  I formulated and edited as necessary the assessment and plan and discussed the findings and management plan with the patient and family   Siddhartha Mclaughlin MD  8:24 AM  3/10/2017

## 2017-03-10 NOTE — LETTER
March 10, 2017    RE: Gomez Turk  : 1960  MRN: [id number]    Dear Dr. Mcgowan,     Thank you for referring your patient, Gomez Turk, to my neuro-ophthalmology clinic recently.  After a thorough neuro-ophthalmic history and examination, I came to the following conclusions:     1. Bilateral perioperative ischemic optic neuropathy:    Gomez Turk is a pleasant 56 year old male who presents to my neuro-ophthalmology clinic today referred by Dr. Arya Mcgowan with subacute vision painless decreased vision in both eyes with associated optic nerve head swelling. The patient has a relevant past medical history of type 2 diabetes mellitus, peripheral vascular disease, and peripheral neuropathy. On  the patient underwent an uncomplicated femoral popliteal bypass surgery on his left lower leg and foot in addition to big toe amputation with a relatively uneventful postoperative course. During his stay at the hospital, he recalls that his vision was normal. No eye pain. Upon discharge from the hospital on 3/8 while driving back home he started to notice darkening in the lower part of his vision in the right that progressed over to cover most of his vision. Within hours it started to involve the left eye. The next day he noticed that most of the vision in the right eye is lost and the upper field in the left eye too.  The vision loss subjectively stopped progressing yesterday morning. The patient denies any symptoms of giant cell arteritis including headaches, jaw claudication, myalgias, temple tenderness, scalp tenderness.  His hgbA1c was checked in Feb and was 6.8. His last CBC yesterday showed a hgb / hematocrit of 9.5 / 27.9. He underwent a brain MRI that was read as unremarkable and a CT angiogram of the head / neck that was notable for age-indeterminate complete occlusion of the proximal right vertebral artery.  There were bilateral calcified plaques of the proximal and distal internal  carotid arteries without significant stenosis. ESR and CRP were both elevated at 101 and 80.7 respectively.  The patient is on aspirin and plavix currently.  He was on heparin during his hospital course.  He suffered no major hemorrhagic events during the surgery or after per his account.  The operative report suggested an unremarkable surgery without complication.  Discussions with the surgeon after our visit confirmed that the surgery was uncomplicated.  The patient did not require any blood transfusions at the time of surgery or in the post-operative course per his recollection.  The patient did recall that he was mildly hypotensive at some point during his hospital stay but he does not recall how low his blood pressure ran.  He did not think that his hypotension was severe.  The patient did use Viagra in the past but denies any use of erectile dysfunction medications since 2006.    Today the visual acuity is counting fingers in the right eye at 1 ft distance and 20/150 in the left eye. Both pupils showed sluggish reaction to light with a trace of afferent pupillary defect in the right eye. Extraocular motility was full. Slit lamp examination was remarkable for bilateral diffuse corneal punctate erosions indicative of dry eye and moderate cataracts in both eyes. Fundus exam showed bilateral moderate swelling of the optic nerves along with diffuse cotton wool spots in both eyes.       Fluorescein angiography was remarkable for delayed leakage from the optic nerves bilaterally.  Of note the choroidal vasculature filling in the right eye was unremarkable showing only mild delay in the choroidal filling time. OCT of the optic nerve head revealed severe superior segmental retinal nerve fiber layer thickening in the right eye and moderate retinal nerve fiber layer thickening inferiorly in the left eye indicative of segmental optic nerve head swelling in both eyes and correlating to visual field defects.    Automated  visual field testing showed severe generalized depression in the right eye and a superior altitudinal defect in the left eye.     I have reviewed his brain MRI images from yesterday.  The optic nerves appeared normal. There was no optic nerve enhancement nor any abnormal T2 signal.  I did not detect any definitive restricted diffusion of the optic nerves. There was no restricted diffusion elsewhere in the brain to indicate central nervous system ischemia.    In conclusion, Mr. Turk presents today with bilateral subacute painless vision loss from bilateral optic neuropathy with swollen optic nerve heads in the context of a recent vascular surgery and toe amputation 8 days prior.  Based upon the clinical picture, lack of MRI pathology of the optic nerves, segmental nature of the optic nerve head swelling, and his vasculopathic history, this represents perioperative ischemic optic neuropathy.  While his ESR/CRP are massively elevated, he has several reasons for acute phase reactant elevation including recent infection and recent surgery.  His age, lack of giant cell arteritis symptoms, and normal choroidal filling on fluorescein angiography exclude the possibility of giant cell arteritis.  If this were an inflammatory or malignant process unrelated to his recent surgery we would expect to see pathologic findings on the MRI which were not present.  The MRI is usually unremarkable in cases of ischemic optic neuropathy.    At this point there is no evidence based medicine treatment for perioperative ischemic optic neuropathy except to control any underlying risk factors and hemodynamics to try and avoid additional vision loss.  I advised the patient to check his blood pressure at least daily to ensure he does not become hypotensive.  He has relatively mild anemia- not severe enough to warrant transfusion.  In theory, swollen optic nerves status post recent ischemia are likely to be more susceptible to additional  infarction with hemodynamic changes thus I have spoke with his vascular surgeon and we mutually agreed to delay any additional surgery (one was planned for next week to replace a skin flap on the foot, I believe) until the optic nerve head swelling had resolved and he had healed from his ischemic optic neuropathy episode.  The optic nerve head swelling typically takes 6-12 weeks to resolve.  Finally I explained to the patient that this is a known, well reported, rare complication of surgery including vascular surgery, cardiac surgery, orthopedic surgery, plastic surgery, etc.  In many cases there is no clear complication of the surgery leading to the vision loss and pre-existing vascular risk factors probably play a role in a patient's susceptibility.     I will refer this patient on to low vision occupational therapy.  I will also see him back in 2 months for repeat evaluation.  He may always return sooner should he have a significant worsening of his vision.    Again, thank you for trusting me with the care of your patient.  For further exam details, please feel free to contact our office for additional records.  If you wish to contact me regarding this patient please email me at [email or web address] or give my clinic a call to arrange a phone conversation.    Sincerely,    Siddhartha Mclaughlin MD  , Neuro-Ophthalmology and Adult Strabismus  Department of Ophthalmology and Visual Neurosciences  [facility]    DX: perioperative ischemic optic neuropathy

## 2017-03-10 NOTE — MR AVS SNAPSHOT
After Visit Summary   3/10/2017    Gomez Turk    MRN: [id number]           Patient Information     Date Of Birth          1960        Visit Information        Provider Department      3/10/2017 7:30 AM Siddhartha Mclaughlin MD [facility]        Today's Diagnoses     Optic neuropathy        Subjective visual disturbance           Follow-ups after your visit        Your next 10 appointments already scheduled     Mar 17, 2017  9:00 AM CDT   Return Visit with Nivia Bell DPM, Podiatry/Foot and Ankle Surgery   [facility] [place] ([facility])    [address]  [address]  [facility] [address]   [phone]            Mar 20, 2017 12:00 PM CDT   Post Op with Jesus Aragon MD   [facility] ([facility])    [address]  [facility] [address]   [phone]            Mar 29, 2017  7:30 AM CDT   RETURN NEURO with Siddhartha Mclaughlin MD   Eye Clinic ([facility])    [address]  [address] [facility]  [address]  [facility] [address]   [phone]              Future tests that were ordered for you today     Open Future Orders        Priority Expected Expires Ordered    IOP Measurement Routine  5/8/2017 3/9/2017    Fluorescein Angiography OU (both eyes) Routine  9/9/2018 3/9/2017            Who to contact     Please call your clinic at [phone] to:    Ask questions about your health    Make or cancel appointments    Discuss your medicines    Learn about your test results    Speak to your doctor   If you have compliments or concerns about an experience at your clinic, or if you wish to file a complaint, please contact [facility] Patient Relations at [phone] or email us at [email or web address]         Additional Information About Your Visit        MyChart Information     MyChart gives  you secure access to your electronic health record. If you see a primary care provider, you can also send messages to your care team and make appointments. If you have questions, please call your primary care clinic.  If you do not have a primary care provider, please call [phone] and they will assist you.      Contour is an electronic gateway that provides easy, online access to your medical records. With Contour, you can request a clinic appointment, read your test results, renew a prescription or communicate with your care team.     To access your existing account, please contact your [facility] or call [phone] for assistance.        Care EveryWhere ID     This is your Care EveryWhere ID. This could be used by other organizations to access your [facility] medical records  [id number]         Blood Pressure from Last 3 Encounters:   03/09/17 133/79   03/07/17 121/62   02/21/17 129/80    Weight from Last 3 Encounters:   03/09/17 92.5 kg (204 lb)   03/03/17 98.1 kg (216 lb 4.3 oz)   02/13/17 94.3 kg (207 lb 14.4 oz)              We Performed the Following     Color Vision - Screening OU (both eyes)     Low Vision Central OU        Primary Care Provider Office Phone # Fax #    Tylor Roberts [phone]       [facility] [address] [place] [id number]        Thank you!     Thank you for choosing EYE CLINIC  for your care. Our goal is always to provide you with excellent care. Hearing back from our patients is one way we can continue to improve our services. Please take a few minutes to complete the written survey that you may receive in the mail after your visit with us. Thank you!             Your Updated Medication List - Protect others around you: Learn how to safely use, store and throw away your medicines at www.disposemymeds.org.          This list is accurate as of: 3/10/17 10:29 AM.  Always use your most recent med list.           "         Brand Name Dispense Instructions for use    acetaminophen 325 MG tablet    TYLENOL    100 tablet    Take 2 tablets (650 mg) by mouth every 4 hours as needed for mild pain       amoxicillin-clavulanate 875-125 MG per tablet    AUGMENTIN    10 tablet    Take 1 tablet by mouth every 12 hours for 5 days       aspirin 81 MG EC tablet     30 tablet    Take 1 tablet (81 mg) by mouth daily       atorvastatin 40 MG tablet    LIPITOR    30 tablet    Take 1 tablet (40 mg) by mouth daily       cephALEXin 500 MG capsule    KEFLEX    17 capsule    Take 1 capsule (500 mg) by mouth 3 times daily       clopidogrel 75 MG tablet    PLAVIX    30 tablet    Take 1 tablet (75 mg) by mouth daily       collagenase ointment     90 g    Apply topically daily       gabapentin 300 MG capsule    NEURONTIN    90 capsule    Take 1 capsule (300 mg) by mouth 3 times daily       glimepiride 2 MG tablet    AMARYL    180 tablet    Take 1 tablet (2 mg) by mouth 2 times daily       lidocaine 2 % topical gel    XYLOCAINE    30 mL    Apply topically as needed for moderate pain       lisinopril 20 MG tablet    PRINIVIL    180 tablet    Take 1 tablet (20 mg) by mouth 2 times daily       metFORMIN 1000 MG tablet    GLUCOPHAGE    180 tablet    Take 1 tablet (1,000 mg) by mouth 2 times daily (with meals)       nicotine 7 MG/24HR 24 hr patch    NICODERM CQ    30 patch    Place 1 patch onto the skin daily       order for DME     1 Device    Post of shoe       order for DME     30 days    Equipment being ordered: [facility] Medical Order Fax [phone]  Primary Dressing 4x4 non-sterile gauze   Qty 2 loafs Secondary Dressing Kerlix wrap 4\" Qty 30 Length of Need: 1 month Frequency of dressing change: daily       oxyCODONE 10 MG IR tablet    ROXICODONE    50 tablet    Take 1.5 tablets (15 mg) by mouth every 3 hours as needed for moderate to severe pain       polyethylene glycol Packet    MIRALAX/GLYCOLAX    7 packet    Take 17 g by mouth daily as needed for " constipation       senna-docusate 8.6-50 MG per tablet    SENOKOT-S;PERICOLACE    100 tablet    Take 1-2 tablets by mouth 2 times daily       sildenafil 100 MG cap/tab    VIAGRA    12 tablet    Take 1 tablet (100 mg) by mouth daily as needed

## 2017-03-10 NOTE — TELEPHONE ENCOUNTER
I called Gomez Turk about his vision changes. As he left the hospital he noted a progressive loss of vision from the bottom to the top.  By the time he got home he could only see the upper 1/5 visual field.   Shortly after he noted almost the exact same on the left eye.    CTA/MRI ruled out carotid disease and CVI. Elevated ESR and CRP  (could be related to foot / toe ulcers).      I spoke with Dr Siddhartha Mclaughlin who saw the patient.  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  Prognosis is unclear.  No specific treatment.  Avoid any general anesthesia.    This was discussed with the patient and his wife.       Jesus Aragon MD

## 2017-03-10 NOTE — PROGRESS NOTES
Clinic Care Coordination Contact  OUTREACH    Referral Information:  Referral Source: [facility]  Reason for Contact: ED visit 3/9/2017- vision loss  Care Conference:  (NA)     Universal Utilization:   ED Visits in last year: 0  Hospital visits in last year: 1  Last PCP appointment: 02/13/17  Missed Appointments: 6  Concerns: NO  Multiple Providers or Specialists:  (Yes)      Functional Status:  Mobility Status: Independent w/Device  Equipment Currently Used at Home: none, walker, standard       Psychosocial:  Current living arrangement:: I live in a private home with spouse     Resources and Interventions:  Current Resources:  (NA); Home Care  PAS Number:  (NA)  Senior Linkage Line Referral Placed:  (NA)  Advanced Care Plans/Directives on file:: No  Referrals Placed:  (NA)     Clinic Care Coordination Contact  UTC/Voicemail    Referral Source: CTS  Clinical Data: Care Coordinator Outreach  Outreach attempted x 1.  Left message on voicemail with call back information and requested return call.  Plan:  Care Coordinator will try to reach patient again in 1-2 business days.    Rosalva Palafox RN  Care Coordinator-[facility]  [email or web address]  [phone]

## 2017-03-13 ENCOUNTER — TELEPHONE (OUTPATIENT)
Dept: OTHER | Facility: CLINIC | Age: 57
End: 2017-03-13

## 2017-03-13 ENCOUNTER — OFFICE VISIT (OUTPATIENT)
Dept: OPHTHALMOLOGY | Facility: CLINIC | Age: 57
End: 2017-03-13
Attending: OPHTHALMOLOGY
Payer: COMMERCIAL

## 2017-03-13 ENCOUNTER — TELEPHONE (OUTPATIENT)
Dept: OPHTHALMOLOGY | Facility: CLINIC | Age: 57
End: 2017-03-13

## 2017-03-13 DIAGNOSIS — H47.013 ISCHEMIC OPTIC NEUROPATHY, BILATERAL: Primary | ICD-10-CM

## 2017-03-13 PROCEDURE — 99213 OFFICE O/P EST LOW 20 MIN: CPT | Mod: 25,ZF

## 2017-03-13 ASSESSMENT — CUP TO DISC RATIO
OD_RATIO: 0.0
OS_RATIO: 0.0

## 2017-03-13 ASSESSMENT — CONF VISUAL FIELD
OD_SUPERIOR_NASAL_RESTRICTION: 1
OS_SUPERIOR_NASAL_RESTRICTION: 1
OD_INFERIOR_NASAL_RESTRICTION: 1
OS_SUPERIOR_TEMPORAL_RESTRICTION: 1
OD_SUPERIOR_TEMPORAL_RESTRICTION: 1
OS_INFERIOR_NASAL_RESTRICTION: 1
OS_INFERIOR_TEMPORAL_RESTRICTION: 1
OD_INFERIOR_TEMPORAL_RESTRICTION: 1

## 2017-03-13 ASSESSMENT — SLIT LAMP EXAM - LIDS
COMMENTS: NORMAL
COMMENTS: NORMAL

## 2017-03-13 ASSESSMENT — TONOMETRY
OD_IOP_MMHG: 10
IOP_METHOD: TONOPEN
OS_IOP_MMHG: 12

## 2017-03-13 ASSESSMENT — EXTERNAL EXAM - LEFT EYE: OS_EXAM: NORMAL

## 2017-03-13 ASSESSMENT — VISUAL ACUITY
OD_SC: LP
METHOD: SNELLEN - LINEAR
OS_SC: HM

## 2017-03-13 ASSESSMENT — EXTERNAL EXAM - RIGHT EYE: OD_EXAM: NORMAL

## 2017-03-13 NOTE — PROGRESS NOTES
CC: Worsening Vision Loss    HPI: Gomez Turk is a 56 year old male who presents for acutely for worsening vision loss. He has a past medical history significant for hyperlipidemia, type 2 diabetes mellitus, peripheral artery disease, and past tobacco use. He underwent an uncomplicated femoral popliteal bypass surgery on 2/28 with normal hospital course. On 3/8, while driving back from the hospital, he started to notice loss of the lower vision field in his right eye which progressed to cover the entire eye. He was seen by Dr. Mclaughlin 3/10 and it is thought he has bilateral perioperative ischemic optic neuropathy.    Today, he says he woke up Sunday with significantly worse vision in his left eye (was previously 20/150 now HM). He has occasional episodes of sweats and nausea. They have measured his blood pressure at home and it has been normal (Sat SBP 140s, Sun SBP 120s)    Review of Testing  OCT 3-  Right Eye: worsening optic nerve edema avg 193 from 163  Left Eye: worsening optic nerve edema avg 180 from 141    Blood pressure in office today:  98/61  93/58    Assessment & Plan   Gomez Turk is a 56 year old male with the following diagnoses:     1. Perioperative Ischemic Optic Neuropathy, Both Eyes (Right > Left)    - other ddx could possibly include GCA, NAION, PION   - MRI (3/9) was unremarkable, no optic nerve enhancement    - CTA (3/9) age-indeterminate complete occlusion of proximal right vertebral artery; carotid plaques without stenosis    - FA (3/10) mild delay in choroidal filling, delayed leakage of optic nerves   - CBC (3/9) 11.2*/9.5*/27.9*/450*; ESR/CRP (3/9) 101*/80.7*; A1C (2/2017) 6.8%   - OCT (3/13) today worsening of optic nerve edema OU today   - repeat MRI   - repeat CBC   - stop lisinopril    Service will be in contact with patient, follow-up March 29th with Dr. Mclaughlin    Seen and discussed with Dr. Smith, discussed with Dr. Mclaughlin,    Wife Camelia's cell [phone]  [phone]    Romaine Fraga MD PGY-3  Ophthalmology    I have reviewed Dr. Fraga's note and agree with the documentation.  This patient appears to have suffered progression of his vision loss from perioperative ischemic optic neuropathy. Given the progression we will recheck an MRI orbits to ensure we have not missed any additional pathology.  Will also check complete blood count (CBC) to ensure no serious anemia.      Siddhartha Mclaughlin MD  , Neuro-Ophthalmology and Adult Strabismus  Department of Ophthalmology and Visual Neurosciences  [facility]

## 2017-03-13 NOTE — MR AVS SNAPSHOT
After Visit Summary   3/13/2017    Gomez Turk    MRN: [id number]           Patient Information     Date Of Birth          1960        Visit Information        Provider Department      3/13/2017 10:00 AM Romaine Fraga MD [facility]        Today's Diagnoses     Ischemic optic neuropathy, bilateral    -  1       Follow-ups after your visit        Your next 10 appointments already scheduled     Mar 13, 2017  2:30 PM CDT   (Arrive by 2:15 PM)   MR BRAIN W/O & W CONTRAST with [address] MRI ([facility])    [address] [place] [address] [phone]           Take your medicines as usual, unless your doctor tells you not to. Bring a list of your current medicines to your exam (including vitamins, minerals and over-the-counter drugs).  You will be given intravenous contrast for this exam. To prepare:   The day before your exam, drink extra fluids at least six 8-ounce glasses (unless your doctor tells you to restrict your fluids).   Have a blood test (creatinine test) within 30 days of your exam. Go to your clinic or Diagnostic Imaging Department for this test.  The MRI machine uses a strong magnet. Please wear clothes without metal (snaps, zippers). A sweatsuit works well, or we may give you a hospital gown.  Please remove any body piercings and hair extensions before you arrive. You will also remove watches, jewelry, hairpins, wallets, dentures, partial dental plates and hearing aids. You may wear contact lenses, and you may be able to wear your rings. We have a safe place to keep your personal items, but it is safer to leave them at home.   **IMPORTANT** THE INSTRUCTIONS BELOW ARE ONLY FOR THOSE PATIENTS WHO HAVE BEEN TOLD THEY WILL RECEIVE SEDATION OR GENERAL ANESTHESIA DURING THEIR MRI PROCEDURE:  IF YOU WILL RECEIVE SEDATION (take medicine to help you relax during your exam):   You must get the medicine from  your doctor before you arrive. Bring the medicine to the exam. Do not take it at home.   Arrive one hour early. Bring someone who can take you home after the test. Your medicine will make you sleepy. After the exam, you may not drive, take a bus or take a taxi by yourself.   No eating 8 hours before your exam. You may have clear liquids up until 4 hours before your exam. (Clear liquids include water, clear tea, black coffee and fruit juice without pulp.)  IF YOU WILL RECEIVE ANESTHESIA (be asleep for your exam):   Arrive 1 1/2 hours early. Bring someone who can take you home after the test. You may not drive, take a bus or take a taxi by yourself.   No eating 8 hours before your exam. You may have clear liquids up until 4 hours before your exam. (Clear liquids include water, clear tea, black coffee and fruit juice without pulp.)  Please call the Imaging Department at your exam site with any questions.            Mar 17, 2017  9:00 AM CDT   Return Visit with Nivia Bell DPM, Podiatry/Foot and Ankle Surgery   [facility] ([facility])    [address]  [address]  [facility] [address]   [phone]            Mar 20, 2017 12:00 PM CDT   Post Op with Jesus Aragon MD   [facility] ([facility])    [address]  [facility] [address]   [phone]            Mar 29, 2017  7:30 AM CDT   RETURN NEURO with Siddhartha Mclaughlin MD   [facility] ([facility])    [address]  [address] [facility]  [address]  [facility] [address]   [phone]              Future tests that were ordered for you today     Open Future Orders        Priority Expected Expires Ordered    MRI Brain w & w/o contrast Routine  3/13/2018 3/13/2017    CBC with platelets differential Routine  6/11/2017 3/13/2017            Who to contact     Please call your clinic at [phone]  to:    Ask questions about your health    Make or cancel appointments    Discuss your medicines    Learn about your test results    Speak to your doctor   If you have compliments or concerns about an experience at your clinic, or if you wish to file a complaint, please contact [facility] Patient Relations at [phone] or email us at [email or web address]         Additional Information About Your Visit        MyChart Information     [employer] gives you secure access to your electronic health record. If you see a primary care provider, you can also send messages to your care team and make appointments. If you have questions, please call your primary care clinic.  If you do not have a primary care provider, please call [phone] and they will assist you.      [employer] is an electronic gateway that provides easy, online access to your medical records. With [employer], you can request a clinic appointment, read your test results, renew a prescription or communicate with your care team.     To access your existing account, please contact your [facility] or call [phone] for assistance.        Care EveryWhere ID     This is your Care EveryWhere ID. This could be used by other organizations to access your [facility] medical records  [id number]         Blood Pressure from Last 3 Encounters:   03/09/17 133/79   03/07/17 121/62   02/21/17 129/80    Weight from Last 3 Encounters:   03/09/17 92.5 kg (204 lb)   03/03/17 98.1 kg (216 lb 4.3 oz)   02/13/17 94.3 kg (207 lb 14.4 oz)              We Performed the Following     OCT Optic Nerve RNFL Spectralis OU (both eyes)          Today's Medication Changes          These changes are accurate as of: 3/13/17 12:00 PM.  If you have any questions, ask your nurse or doctor.               Stop taking these medicines if you haven't already. Please contact your care team if you have questions.     lisinopril 20 MG  tablet   Commonly known as:  PRINIVIL   Stopped by:  Romaine Fraga MD                    Primary Care Provider Office Phone # Fax #    Tylor Roberts [phone]       [facility] [address] [facility] [address]        Thank you!     Thank you for choosing E[facility] CLINIC  for your care. Our goal is always to provide you with excellent care. Hearing back from our patients is one way we can continue to improve our services. Please take a few minutes to complete the written survey that you may receive in the mail after your visit with us. Thank you!             Your Updated Medication List - Protect others around you: Learn how to safely use, store and throw away your medicines at www.disposemymeds.org.          This list is accurate as of: 3/13/17 12:00 PM.  Always use your most recent med list.                   Brand Name Dispense Instructions for use    acetaminophen 325 MG tablet    TYLENOL    100 tablet    Take 2 tablets (650 mg) by mouth every 4 hours as needed for mild pain       aspirin 81 MG EC tablet     30 tablet    Take 1 tablet (81 mg) by mouth daily       atorvastatin 40 MG tablet    LIPITOR    30 tablet    Take 1 tablet (40 mg) by mouth daily       cephALEXin 500 MG capsule    KEFLEX    17 capsule    Take 1 capsule (500 mg) by mouth 3 times daily       clopidogrel 75 MG tablet    PLAVIX    30 tablet    Take 1 tablet (75 mg) by mouth daily       collagenase ointment     90 g    Apply topically daily       gabapentin 300 MG capsule    NEURONTIN    90 capsule    Take 1 capsule (300 mg) by mouth 3 times daily       glimepiride 2 MG tablet    AMARYL    180 tablet    Take 1 tablet (2 mg) by mouth 2 times daily       lidocaine 2 % topical gel    XYLOCAINE    30 mL    Apply topically as needed for moderate pain       metFORMIN 1000 MG tablet    GLUCOPHAGE    180 tablet    Take 1 tablet (1,000 mg) by mouth 2 times daily (with meals)       nicotine 7 MG/24HR 24 hr patch  "   NICODERM CQ    30 patch    Place 1 patch onto the skin daily       order for DME     1 Device    Post of shoe       order for DME     30 days    Equipment being ordered: [employer] Fax [phone]  Primary Dressing 4x4 non-sterile gauze   Qty 2 loafs Secondary Dressing Kerlix wrap 4\" Qty 30 Length of Need: 1 month Frequency of dressing change: daily       oxyCODONE 10 MG IR tablet    ROXICODONE    50 tablet    Take 1.5 tablets (15 mg) by mouth every 3 hours as needed for moderate to severe pain       polyethylene glycol Packet    MIRALAX/GLYCOLAX    7 packet    Take 17 g by mouth daily as needed for constipation       senna-docusate 8.6-50 MG per tablet    SENOKOT-S;PERICOLACE    100 tablet    Take 1-2 tablets by mouth 2 times daily       sildenafil 100 MG cap/tab    VIAGRA    12 tablet    Take 1 tablet (100 mg) by mouth daily as needed         "

## 2017-03-13 NOTE — NURSING NOTE
Chief Complaints and History of Present Illnesses   Patient presents with     Vision Changes Ou     HPI    Affected eye(s):  Both   Symptoms:     No floaters   No flashes   No redness   No Dryness         Do you have eye pain now?:  No      Comments:  Pt states vision in LE is much worse since last visit on Friday. Pt woke up on Sunday with decreased vision in LE. No eye pain.  DMII BS: 96 last night.  Lab Results       Component                Value               Date                       A1C                      6.8                 02/10/2017                 A1C                      9.6                 10/17/2016                 A1C                      7.8                 11/02/2015                 A1C                      7.1                 01/19/2015                 A1C                      10.1                12/12/2013                 [facility] March 13, 2017 10:04 AM

## 2017-03-13 NOTE — TELEPHONE ENCOUNTER
New vision loss left eye.  By Sunday morning lost all vision in left eye now.  No pain  Last visit with dr. Sheth 3-10-17  Annette-operative ischemic optic neuropathy  Scheduled eval with dr kirkpatrick at 10  Text/page Message to Dr. Smith  Note forwarded to dr. Sheth/Dr. Sarah Jacques RN 8:00 AM 03/13/17

## 2017-03-14 ENCOUNTER — TELEPHONE (OUTPATIENT)
Dept: INTERNAL MEDICINE | Facility: CLINIC | Age: 57
End: 2017-03-14

## 2017-03-14 ENCOUNTER — TELEPHONE (OUTPATIENT)
Dept: OPHTHALMOLOGY | Facility: CLINIC | Age: 57
End: 2017-03-14

## 2017-03-14 ENCOUNTER — CARE COORDINATION (OUTPATIENT)
Dept: CARE COORDINATION | Facility: CLINIC | Age: 57
End: 2017-03-14

## 2017-03-14 NOTE — LETTER
[facility]  [address]  [address]  Phone  [phone]  Fax   [phone]            Dear Tiffanie,    It was a pleasure to speak with you.  I would like to provide you with the enclosed emergency contact   information for your records.  As part of care coordination, we are developing care plans to assist in accomplishing your health care goals.  When we speak next, please feel free to let me know if you want to add or change any information on your care plans.    As always, feel free to contact me if you have any questions or concerns.  I look forward to working with you in the effort to achieve your health care and wellness goals .        Sincerely,        Rosalva Palafox RN, Care Coordinator  [facility]   [email or web address]   [phone]

## 2017-03-14 NOTE — TELEPHONE ENCOUNTER
I spoke with Mr. Turk wife and discussed with her the result of the MRI and that it did not show changes compared to prior study. No evidence of enhancement of the nerve and no signs of high pressure.   With that, his vision loss is most likely is secondary to bilateral posterior ischemic optic neuropathy. Unfortunately, there is no evidence for a good treatment for that.   He will see Dr. Mclaughlin after 2 weeks.     Aure Smith MD  Neuro-Ophthalmology Fellow

## 2017-03-14 NOTE — TELEPHONE ENCOUNTER
Spoke with patient wife she will be coming with him . He is having depression symptoms and inability to sleep with loss of vision . Patient did sign consent at  for wife to be able to discuss his health . Scheduled appt 3/16 /17 .Kasie Escobar RN

## 2017-03-14 NOTE — PROGRESS NOTES
Clinic Care Coordination Contact  Care Team Conversations    Incoming call from Camelia/wife and she is requesting the contact information for Vision Loss Resources. Camelia admits she is overwhelmed with her husbands vision loss and has a lot of questions .  CC referred Camelia to the Dr Palmer/ Ophthalmologists office.  Camelia agrees with the plan. CC contact information given for future questions or concerns .    Rosalva Palafox RN  Care Coordinator-[facility]  [email or web address]  [phone]

## 2017-03-14 NOTE — LETTER
[facility] Home  Complex Care Plan  About Me  Patient Name:  Gomez Turk    YOB: 1960  Age:   56 year old   [place] MRN: [id number] Telephone Information:     Home Phone [phone]   Mobile [phone]       Address:    [address] [place] [address] Email address:  [email or web address]      Emergency Contact(s)  Name Relationship Lgl Grd Work Phone Home Phone Mobile Phone   RADHA RINALDI Spouse   [phone]           Primary language:  English     needed? No   [place] [facility]:  [phone] op. 1  Other communication barriers: No  Preferred Method of Communication:  Letter, MyChart, Phone  Current living arrangement: I live in a private home with spouse  Mobility Status/ Medical Equipment: Independent w/Device  Other information to know about me:    Health Maintenance  Health Maintenance Reviewed:      My Access Plan  Medical Emergency 911   Primary Clinic Line [facility] [phone]   24 Hour Appointment Line [phone] or  [phone]) (toll-free)   24 Hour Nurse Line [phone] (toll-free)   Preferred Urgent Care [facility], [phone]   Preferred [facility]  [phone]   Preferred Pharmacy [id number] - [place], [place] - [address] [facility] Crisis Line [facility], [phone] or 911     My Care Team Members  Patient Care Team       Relationship Specialty Notifications Start End    Tylor Roberts MD PCP - General   2/15/02     Phone: [phone] Fax: [phone]         [facility] [address] [facility] [address]    Jesus Aragon MD Surgeon Surgery  3/10/17     Comment:  phone: [phone]     Emani CRUZ         My Care Plans  Self Management and Treatment Plan  Goals and (Comments)  Goal #1: I will call  [place] [facility]       0% of  goal reached      Action Plans on File:  (NA)  Advance Care Plans/Directives Type:   Type Advanced Care Plans/Directives:  (NA)    My Medical and Care Information  Problem List   Patient Active Problem List   Diagnosis     Diverticulosis of large intestine     Tobacco use disorder     HYPERLIPIDEMIA LDL GOAL <100     Type 2 diabetes mellitus with diabetic neuropathy, with long-term current use of insulin (H)     PAD (peripheral artery disease) (H)      Current Medications and Allergies:  See printed Medication Report.    Care Coordination Start Date: 03/08/17   Frequency of Care Coordination:  (CC will follow up when discharged from home care)   Form Last Updated: 03/14/2017

## 2017-03-14 NOTE — TELEPHONE ENCOUNTER
Reason for Call:  Other call back    Detailed comments: Can he get some depression pills and/or a sleep aid?  Does he need to come to see Dr. Roberts.    Phone Number Patient can be reached at: [phone]    Best Time: Any time    Can we leave a detailed message on this number? YES    Call taken on 3/14/2017 at 8:43 AM by Dona Antunez

## 2017-03-15 ENCOUNTER — CARE COORDINATION (OUTPATIENT)
Dept: CARE COORDINATION | Facility: CLINIC | Age: 57
End: 2017-03-15

## 2017-03-15 DIAGNOSIS — G89.18 ACUTE POST-OPERATIVE PAIN: Primary | ICD-10-CM

## 2017-03-15 DIAGNOSIS — I73.9 PAD (PERIPHERAL ARTERY DISEASE) (H): ICD-10-CM

## 2017-03-15 RX ORDER — OXYCODONE HYDROCHLORIDE 10 MG/1
5-10 TABLET ORAL 3 TIMES DAILY PRN
Qty: 20 TABLET | Refills: 0 | Status: CANCELLED | OUTPATIENT
Start: 2017-03-15

## 2017-03-15 RX ORDER — OXYCODONE HYDROCHLORIDE 5 MG/1
5-10 TABLET ORAL 3 TIMES DAILY
Qty: 30 TABLET | Refills: 0 | Status: ON HOLD | OUTPATIENT
Start: 2017-03-15 | End: 2017-03-20

## 2017-03-15 NOTE — TELEPHONE ENCOUNTER
Pt's wife called to request refill for prn oxycodone. She stated that pt was taking oxycodone 15 mg three times a day alternating with prn acetaminophen and then was off of oxycodone for couple of days. Pt is having intermittent throbbing pain on his left foot now which is keeping him awake at night.      Will route to provider to approve or disapprove.     Nadia Page RN, BSN.

## 2017-03-15 NOTE — PROGRESS NOTES
Clinic Care Coordination RN:    Incoming call from Camelia/ wife .  Requesting contact information for the [facility] to ask questions about [facility] assistance in the future   CC gave [facility] contact information [phone].  Patient and wife will need marriage counseling to adjust to his vision loss in the future and Camelia will call CC when needed       Rosalva Palafox RN  Care Coordinator-[facility]  [email or web address]  [phone]

## 2017-03-16 ENCOUNTER — TELEPHONE (OUTPATIENT)
Dept: OPHTHALMOLOGY | Facility: CLINIC | Age: 57
End: 2017-03-16

## 2017-03-16 ENCOUNTER — TELEPHONE (OUTPATIENT)
Dept: INTERNAL MEDICINE | Facility: CLINIC | Age: 57
End: 2017-03-16

## 2017-03-16 ENCOUNTER — OFFICE VISIT (OUTPATIENT)
Dept: INTERNAL MEDICINE | Facility: CLINIC | Age: 57
End: 2017-03-16
Payer: COMMERCIAL

## 2017-03-16 VITALS
HEIGHT: 69 IN | HEART RATE: 89 BPM | WEIGHT: 191.6 LBS | TEMPERATURE: 99.1 F | DIASTOLIC BLOOD PRESSURE: 56 MMHG | OXYGEN SATURATION: 96 % | SYSTOLIC BLOOD PRESSURE: 110 MMHG | BODY MASS INDEX: 28.38 KG/M2

## 2017-03-16 DIAGNOSIS — H47.013 ISCHEMIC OPTIC NEUROPATHY, BILATERAL: ICD-10-CM

## 2017-03-16 DIAGNOSIS — I73.9 PAD (PERIPHERAL ARTERY DISEASE) (H): ICD-10-CM

## 2017-03-16 DIAGNOSIS — E11.39 TYPE 2 DIABETES MELLITUS WITH OTHER OPHTHALMIC COMPLICATION, WITHOUT LONG-TERM CURRENT USE OF INSULIN (H): ICD-10-CM

## 2017-03-16 DIAGNOSIS — H53.9 VISION CHANGES: Primary | ICD-10-CM

## 2017-03-16 DIAGNOSIS — F17.200 TOBACCO USE DISORDER: ICD-10-CM

## 2017-03-16 DIAGNOSIS — R07.89 CHEST WALL PAIN: ICD-10-CM

## 2017-03-16 LAB
BASOPHILS # BLD AUTO: 0 10E9/L (ref 0–0.2)
BASOPHILS NFR BLD AUTO: 0.1 %
DIFFERENTIAL METHOD BLD: ABNORMAL
EOSINOPHIL # BLD AUTO: 0 10E9/L (ref 0–0.7)
EOSINOPHIL NFR BLD AUTO: 0.1 %
ERYTHROCYTE [DISTWIDTH] IN BLOOD BY AUTOMATED COUNT: 12.9 % (ref 10–15)
HCT VFR BLD AUTO: 31.4 % (ref 40–53)
HGB BLD-MCNC: 10.4 G/DL (ref 13.3–17.7)
LYMPHOCYTES # BLD AUTO: 1.6 10E9/L (ref 0.8–5.3)
LYMPHOCYTES NFR BLD AUTO: 4.2 %
MCH RBC QN AUTO: 30.3 PG (ref 26.5–33)
MCHC RBC AUTO-ENTMCNC: 33.1 G/DL (ref 31.5–36.5)
MCV RBC AUTO: 92 FL (ref 78–100)
MONOCYTES # BLD AUTO: 2.5 10E9/L (ref 0–1.3)
MONOCYTES NFR BLD AUTO: 6.6 %
NEUTROPHILS # BLD AUTO: 34.3 10E9/L (ref 1.6–8.3)
NEUTROPHILS NFR BLD AUTO: 89 %
PLATELET # BLD AUTO: 768 10E9/L (ref 150–450)
RBC # BLD AUTO: 3.43 10E12/L (ref 4.4–5.9)
WBC # BLD AUTO: 38.5 10E9/L (ref 4–11)

## 2017-03-16 PROCEDURE — 99214 OFFICE O/P EST MOD 30 MIN: CPT | Performed by: INTERNAL MEDICINE

## 2017-03-16 PROCEDURE — 36415 COLL VENOUS BLD VENIPUNCTURE: CPT | Performed by: OPHTHALMOLOGY

## 2017-03-16 PROCEDURE — 85025 COMPLETE CBC W/AUTO DIFF WBC: CPT | Performed by: OPHTHALMOLOGY

## 2017-03-16 RX ORDER — HYDROCODONE BITARTRATE AND ACETAMINOPHEN 5; 325 MG/1; MG/1
1-2 TABLET ORAL EVERY 4 HOURS PRN
Qty: 20 TABLET | Refills: 0 | Status: SHIPPED | OUTPATIENT
Start: 2017-03-16 | End: 2017-03-17

## 2017-03-16 NOTE — MR AVS SNAPSHOT
After Visit Summary   3/16/2017    Gomez Turk    MRN: [id number]           Patient Information     Date Of Birth          1960        Visit Information        Provider Department      3/16/2017 7:40 AM Tylor Roberts MD [facility]        Today's Diagnoses     Vision changes    -  1    Type 2 diabetes mellitus with other ophthalmic complication, without long-term current use of insulin (H)        PAD (peripheral artery disease) (H)        Tobacco use disorder        Chest wall pain           Follow-ups after your visit        Follow-up notes from your care team     Return if symptoms worsen or fail to improve.      Your next 10 appointments already scheduled     Mar 16, 2017  9:45 AM CDT   [facility]   [facility]    [address] [address] [phone]           Patient must bring picture ID.  Patient should be prepared to give a urine specimen  Please do not eat 10-12 hours before your appointment if you are coming in fasting for labs on lipids, cholesterol, or glucose (sugar).  Pregnant women should follow their Care Team instructions. Water with medications is okay. Do not drink coffee or other fluids.   If you have concerns about taking  your medications, please ask at office or if scheduling via My [employer], send a message by clicking on Secure Messaging, Message Your Care Team.            Mar 20, 2017 12:00 PM CDT   Post Op with Jesus Aragon MD   [facility] ([facility])    [address]  [facility] [address] [phone]            Mar 29, 2017  7:30 AM CDT   RETURN NEURO with Siddhartha Mclaughlin MD   [facility] ([facility])    [address]  [address]  [address] [address]  [facility] [id number] [phone]              Who to contact      "If you have questions or need follow up information about today's clinic visit or your schedule please contact [facility] directly at [phone].  Normal or non-critical lab and imaging results will be communicated to you by [employer], letter or phone within 4 business days after the clinic has received the results. If you do not hear from us within 7 days, please contact the clinic through [employer] or phone. If you have a critical or abnormal lab result, we will notify you by phone as soon as possible.  Submit refill requests through [employer] or call your pharmacy and they will forward the refill request to us. Please allow 3 business days for your refill to be completed.          Additional Information About Your Visit        [employer] Information     [employer] gives you secure access to your electronic health record. If you see a primary care provider, you can also send messages to your care team and make appointments. If you have questions, please call your primary care clinic.  If you do not have a primary care provider, please call [phone] and they will assist you.        Care EveryWhere ID     This is your Care EveryWhere ID. This could be used by other organizations to access your [place] medical records  [id number]        Your Vitals Were     Pulse Temperature Height Pulse Oximetry BMI (Body Mass Index)       89 99.1  F (37.3  C) (Oral) 5' 9\" (1.753 m) 96% 28.29 kg/m2        Blood Pressure from Last 3 Encounters:   03/16/17 110/56   03/09/17 133/79   03/07/17 121/62    Weight from Last 3 Encounters:   03/16/17 191 lb 9.6 oz (86.9 kg)   03/09/17 204 lb (92.5 kg)   03/03/17 216 lb 4.3 oz (98.1 kg)              Today, you had the following     No orders found for display         Today's Medication Changes          These changes are accurate as of: 3/16/17  8:40 AM.  If you have any questions, ask your nurse or doctor.               Start taking these medicines.        Dose/Directions "    HYDROcodone-acetaminophen 5-325 MG per tablet   Commonly known as:  NORCO   Used for:  Chest wall pain   Started by:  Tylor Roberts MD        Dose:  1-2 tablet   Take 1-2 tablets by mouth every 4 hours as needed for moderate to severe pain maximum 4 tablet(s) per day   Quantity:  20 tablet   Refills:  0            Where to get your medicines      Some of these will need a paper prescription and others can be bought over the counter.  Ask your nurse if you have questions.     Bring a paper prescription for each of these medications     HYDROcodone-acetaminophen 5-325 MG per tablet                Primary Care Provider Office Phone # Fax #    Tylor Roberts [phone]       [facility] [address] [facility] [address]        Thank you!     Thank you for choosing [facility]  for your care. Our goal is always to provide you with excellent care. Hearing back from our patients is one way we can continue to improve our services. Please take a few minutes to complete the written survey that you may receive in the mail after your visit with us. Thank you!             Your Updated Medication List - Protect others around you: Learn how to safely use, store and throw away your medicines at www.disposemymeds.org.          This list is accurate as of: 3/16/17  8:40 AM.  Always use your most recent med list.                   Brand Name Dispense Instructions for use    acetaminophen 325 MG tablet    TYLENOL    100 tablet    Take 2 tablets (650 mg) by mouth every 4 hours as needed for mild pain       aspirin 81 MG EC tablet     30 tablet    Take 1 tablet (81 mg) by mouth daily       atorvastatin 40 MG tablet    LIPITOR    30 tablet    Take 1 tablet (40 mg) by mouth daily       clopidogrel 75 MG tablet    PLAVIX    30 tablet    Take 1 tablet (75 mg) by mouth daily       collagenase ointment     90 g    Apply topically daily       gabapentin 300 MG capsule     "NEURONTIN    90 capsule    Take 1 capsule (300 mg) by mouth 3 times daily       glimepiride 2 MG tablet    AMARYL    180 tablet    Take 1 tablet (2 mg) by mouth 2 times daily       HYDROcodone-acetaminophen 5-325 MG per tablet    NORCO    20 tablet    Take 1-2 tablets by mouth every 4 hours as needed for moderate to severe pain maximum 4 tablet(s) per day       lidocaine 2 % topical gel    XYLOCAINE    30 mL    Apply topically as needed for moderate pain       metFORMIN 1000 MG tablet    GLUCOPHAGE    180 tablet    Take 1 tablet (1,000 mg) by mouth 2 times daily (with meals)       nicotine 7 MG/24HR 24 hr patch    NICODERM CQ    30 patch    Place 1 patch onto the skin daily       order for DME     1 Device    Post of shoe       order for DME     30 days    Equipment being ordered: [facility] Fax [phone]  Primary Dressing 4x4 non-sterile gauze   Qty 2 loafs Secondary Dressing Kerlix wrap 4\" Qty 30 Length of Need: 1 month Frequency of dressing change: daily       * oxyCODONE 10 MG IR tablet    ROXICODONE    50 tablet    Take 1.5 tablets (15 mg) by mouth every 3 hours as needed for moderate to severe pain       * oxyCODONE 5 MG IR tablet    ROXICODONE    30 tablet    Take 1-2 tablets (5-10 mg) by mouth 3 times daily       polyethylene glycol Packet    MIRALAX/GLYCOLAX    7 packet    Take 17 g by mouth daily as needed for constipation       senna-docusate 8.6-50 MG per tablet    SENOKOT-S;PERICOLACE    100 tablet    Take 1-2 tablets by mouth 2 times daily       sildenafil 100 MG cap/tab    VIAGRA    12 tablet    Take 1 tablet (100 mg) by mouth daily as needed       * Notice:  This list has 2 medication(s) that are the same as other medications prescribed for you. Read the directions carefully, and ask your doctor or other care provider to review them with you.      "

## 2017-03-16 NOTE — TELEPHONE ENCOUNTER
Reason for Call:  Other call back    Detailed comments: Gomez saw Dr. Prabhakar today and blood work showed abnormalities.  He would like Dr. Roberts to order medication. Please call Camelia at home ASAP and let her know what to do.     Phone Number Patient can be reached at: Home number on file [phone] (home)    Best Time: ASAP    Can we leave a detailed message on this number? YES    Call taken on 3/16/2017 at 1:00 PM by Dona Antunez

## 2017-03-16 NOTE — PROGRESS NOTES
SUBJECTIVE:                                                    Gomez Turk is a 56 year old male who presents to clinic today for the following health issues:      ED/UC Followup:     Facility: [facility]  Date of visit: 3/9/17  Reason for visit: Vision loss  Current Status: Complete loss of vision. Patient has been experiencing nausea, vomiting, lack of appetite, and trouble sleeping. Right rib pain with breathing.     Did not finished Keflex because wife states it was causing patient to become ill        Medical Decision Making:  Gomez Turk is a 56 year old male who came in with visual complains in both eyes. This is a patient who has significant vascular disease and in fact just had a fem-pop bypass a few days ago on the left. He is already on Plavix, but he has diabetes and just recently quit smoking. I was concerned that he could have a stroke or dissection as well as retinal artery or vein occlusion. My suspicion was lower that it would be a retinal vein or artery occlusion though, given the fact that this is both eyes. I first spoke with Dr. Canada, who knows this patient. He asked that I not only obtain an MRI of his brain, but also a CT angio of his head and neck. Those show the above findings. I do not believe those findings are the cause of his symptoms. I think at this point it is best that he follow up with an ophthalmologist, and I spoke with the on call ophthalmologist, Dr. Mcgowan. He indicated that he could see the patient in his clinic today. He also asked that I add on sed rate and CRP, which I did. He is to follow up with his own doctors as previously scheduled. He was subsequently to go straight to the eye clinic.      Diagnosis:      ICD-10-CM     1. Vision loss H54.7 CRP inflammation       CRP inflammation       Erythrocyte          Problem list and histories reviewed & adjusted, as indicated.  Additional history: as documented    Patient Active Problem List   Diagnosis      Diverticulosis of large intestine     Tobacco use disorder     HYPERLIPIDEMIA LDL GOAL <100     Type 2 diabetes mellitus with diabetic neuropathy, with long-term current use of insulin (H)     PAD (peripheral artery disease) (H)     Past Surgical History   Procedure Laterality Date     Ent surgery  1990     deviated septum repair     Irrigation and debridement foot, combined Left 2/28/2017     Procedure: COMBINED IRRIGATION AND DEBRIDEMENT FOOT;  Surgeon: Jesus Aragon MD;  Location: [facility] OR     Bypass graft femoropopliteal Left 2/28/2017     Procedure: BYPASS GRAFT FEMOROPOPLITEAL;  Surgeon: Jesus Aragon MD;  Location: [facility] OR     Amputate toe(s) Left 2/28/2017     Procedure: AMPUTATE TOE(S);  Surgeon: Jesus Aragon MD;  Location: [facility] OR       Social History   Substance Use Topics     Smoking status: Former Smoker     Packs/day: 0.00     Types: Cigarettes     Smokeless tobacco: Never Used      Comment: quit 2 weeks ago     Alcohol use No     Family History   Problem Relation Age of Onset     DIABETES Mother      Lipids Mother      CANCER Paternal Grandmother      Neurologic Disorder Maternal Uncle      Glaucoma No family hx of      Macular Degeneration No family hx of      Retinal detachment No family hx of      Thyroid Disease No family hx of          Current Outpatient Prescriptions   Medication Sig Dispense Refill     oxyCODONE (ROXICODONE) 5 MG IR tablet Take 1-2 tablets (5-10 mg) by mouth 3 times daily 30 tablet 0     oxyCODONE (ROXICODONE) 10 MG IR tablet Take 1.5 tablets (15 mg) by mouth every 3 hours as needed for moderate to severe pain 50 tablet 0     acetaminophen (TYLENOL) 325 MG tablet Take 2 tablets (650 mg) by mouth every 4 hours as needed for mild pain 100 tablet 0     aspirin EC 81 MG EC tablet Take 1 tablet (81 mg) by mouth daily 30 tablet 11     gabapentin (NEURONTIN) 300 MG capsule Take 1 capsule (300 mg) by mouth 3 times daily 90 capsule 3     atorvastatin (LIPITOR) 40  "MG tablet Take 1 tablet (40 mg) by mouth daily 30 tablet 11     polyethylene glycol (MIRALAX/GLYCOLAX) Packet Take 17 g by mouth daily as needed for constipation 7 packet 0     senna-docusate (SENOKOT-S;PERICOLACE) 8.6-50 MG per tablet Take 1-2 tablets by mouth 2 times daily 100 tablet 0     clopidogrel (PLAVIX) 75 MG tablet Take 1 tablet (75 mg) by mouth daily 30 tablet 11     nicotine (NICODERM CQ) 7 MG/24HR 24 hr patch Place 1 patch onto the skin daily 30 patch 3     collagenase ointment Apply topically daily 90 g 1     lidocaine (XYLOCAINE) 2 % topical gel Apply topically as needed for moderate pain 30 mL 1     order for DME Equipment being ordered: [facility] Fax [phone]    Primary Dressing 4x4 non-sterile gauze   Qty 2 loafs  Secondary Dressing Kerlix wrap 4\" Qty 30  Length of Need: 1 month  Frequency of dressing change: daily 30 days 0     metFORMIN (GLUCOPHAGE) 1000 MG tablet Take 1 tablet (1,000 mg) by mouth 2 times daily (with meals) 180 tablet 3     glimepiride (AMARYL) 2 MG tablet Take 1 tablet (2 mg) by mouth 2 times daily 180 tablet 0     order for DME Post of shoe 1 Device 0     sildenafil (VIAGRA) 100 MG tablet Take 1 tablet (100 mg) by mouth daily as needed 12 tablet 5     Allergies   Allergen Reactions     No Known Drug Allergies      BP Readings from Last 3 Encounters:   03/16/17 92/56   03/09/17 133/79   03/07/17 121/62    Wt Readings from Last 3 Encounters:   03/16/17 191 lb 9.6 oz (86.9 kg)   03/09/17 204 lb (92.5 kg)   03/03/17 216 lb 4.3 oz (98.1 kg)              Labs reviewed in EPIC    Reviewed and updated as needed this visit by clinical staff  Tobacco  Allergies  Med Hx  Surg Hx  Fam Hx  Soc Hx      Reviewed and updated as needed this visit by Provider       ROS:  C: NEGATIVE for fever, chills, change in weight  E/M: NEGATIVE for ear, mouth and throat problems  R: NEGATIVE for significant cough or SOB  CV: NEGATIVE for chest pain, palpitations or peripheral edema  : " "NEGATIVE for frequency, dysuria, or hematuria  M: NEGATIVE for significant arthralgias or myalgia  H: NEGATIVE for bleeding problems  P: NEGATIVE for changes in mood or affect    OBJECTIVE:                                                    /56  Pulse 89  Temp 99.1  F (37.3  C) (Oral)  Ht 5' 9\" (1.753 m)  Wt 191 lb 9.6 oz (86.9 kg)  SpO2 96%  BMI 28.29 kg/m2  Body mass index is 28.29 kg/(m^2).  GENERAL: alert and no distress  EYES: decreased VA OU, HM only  HENT: ear canals- normal; TMs- normal; Nose- normal; Mouth- no ulcers, no lesions  NECK: no tenderness, no adenopathy, no asymmetry, no masses, no stiffness; thyroid- normal to palpation  RESP: lungs clear to auscultation - no rales, no rhonchi, no wheezes, right lateral low right chest wall pain  CV: regular rates and rhythm, normal S1 S2, no S3 or S4 and no click or rub   MS: extremities- no gross deformities noted, no edema  PSYCH: Alert and oriented times 3; speech- coherent , normal rate and volume; able to articulate logical thoughts, able to abstract reason, no tangential thoughts, no hallucinations or delusions, affect- normal     ASSESSMENT/PLAN:                                                      (H53.9) Vision changes  (primary encounter diagnosis)  Comment: appears stat blind at present, need 3 month window  Plan: advised may need VF testing with [facility] 30-2 if cc VA > 20/200 OU    (I73.9) PAD (peripheral artery disease) (H)  Comment: as noted per surgery  Plan:     (E11.40,  Z79.4) Type 2 diabetes mellitus with diabetic neuropathy, without long-term current use of insulin (H)  Comment:   Lab Results   Component Value Date    A1C 6.8 02/10/2017    A1C 9.6 10/17/2016    A1C 7.8 11/02/2015    A1C 7.1 01/19/2015    A1C 10.1 12/12/2013     Plan:     (F17.200) Tobacco use disorder  Comment:   Plan: Smoking cessation was advised and the risks of continued smoking in regards to this patients health history was reiterated. Options of smoking " cessation were also discussed. This time extended beyond the routine exam.    (R07.89) Chest wall pain  Comment: appears as muscular  Plan: HYDROcodone-acetaminophen (NORCO) 5-325 MG per         tablet          See Patient Instructions    Tylor Roberts MD  [facility]  25 minutes spent with this patient, face to face, discussing treatment options for listed problems above as well as side effects of appropriate medications.  Counseling time extended beyond 50% of the clinic visit.  Medication dosing, treatment plan and follow-up were discussed. Also reviewed need for primary care testing for patient.

## 2017-03-16 NOTE — TELEPHONE ENCOUNTER
I called and spoke with patient on the phone regarding the results of his recent complete blood count (CBC) from today after the lab called me with a critical result.  His wbc was 38,000 with 89% PMNs which is up from 11,000 wbc 1 week ago.  patient denies any wound infection and his wound dressings are changed regularly.  No UTI symptoms.  Platelets are at 768,000.  Patient reports that he has been vomitting for the past 3 days.  Advised patient and his wife that they must call his primary care physician today to have him review these labs as he will likely require a work-up to look for infection.

## 2017-03-17 ENCOUNTER — TELEPHONE (OUTPATIENT)
Dept: INTERNAL MEDICINE | Facility: CLINIC | Age: 57
End: 2017-03-17

## 2017-03-17 ENCOUNTER — HOSPITAL ENCOUNTER (INPATIENT)
Facility: CLINIC | Age: 57
LOS: 3 days | Discharge: HOME | DRG: 418 | End: 2017-03-20
Attending: EMERGENCY MEDICINE | Admitting: INTERNAL MEDICINE
Payer: COMMERCIAL

## 2017-03-17 ENCOUNTER — APPOINTMENT (OUTPATIENT)
Dept: ULTRASOUND IMAGING | Facility: CLINIC | Age: 57
DRG: 418 | End: 2017-03-17
Attending: EMERGENCY MEDICINE
Payer: COMMERCIAL

## 2017-03-17 ENCOUNTER — APPOINTMENT (OUTPATIENT)
Dept: CT IMAGING | Facility: CLINIC | Age: 57
DRG: 418 | End: 2017-03-17
Attending: EMERGENCY MEDICINE
Payer: COMMERCIAL

## 2017-03-17 DIAGNOSIS — I73.9 PAD (PERIPHERAL ARTERY DISEASE) (H): ICD-10-CM

## 2017-03-17 DIAGNOSIS — Z79.4 TYPE 2 DIABETES MELLITUS WITH DIABETIC NEUROPATHY, WITH LONG-TERM CURRENT USE OF INSULIN (H): ICD-10-CM

## 2017-03-17 DIAGNOSIS — G89.18 ACUTE POST-OPERATIVE PAIN: ICD-10-CM

## 2017-03-17 DIAGNOSIS — E11.40 TYPE 2 DIABETES MELLITUS WITH DIABETIC NEUROPATHY, WITH LONG-TERM CURRENT USE OF INSULIN (H): ICD-10-CM

## 2017-03-17 DIAGNOSIS — K81.0 ACUTE CHOLECYSTITIS: ICD-10-CM

## 2017-03-17 DIAGNOSIS — D62 ANEMIA DUE TO BLOOD LOSS, ACUTE: Primary | ICD-10-CM

## 2017-03-17 LAB
ALBUMIN SERPL-MCNC: 2.4 G/DL (ref 3.4–5)
ALP SERPL-CCNC: 303 U/L (ref 40–150)
ALT SERPL W P-5'-P-CCNC: 111 U/L (ref 0–70)
ANION GAP SERPL CALCULATED.3IONS-SCNC: 12 MMOL/L (ref 3–14)
AST SERPL W P-5'-P-CCNC: 51 U/L (ref 0–45)
BASOPHILS # BLD AUTO: 0 10E9/L (ref 0–0.2)
BASOPHILS NFR BLD AUTO: 0.1 %
BILIRUB SERPL-MCNC: 0.7 MG/DL (ref 0.2–1.3)
BUN SERPL-MCNC: 16 MG/DL (ref 7–30)
CALCIUM SERPL-MCNC: 8.4 MG/DL (ref 8.5–10.1)
CHLORIDE SERPL-SCNC: 96 MMOL/L (ref 94–109)
CO2 BLDCOV-SCNC: 22 MMOL/L (ref 21–28)
CO2 SERPL-SCNC: 23 MMOL/L (ref 20–32)
CREAT SERPL-MCNC: 0.99 MG/DL (ref 0.66–1.25)
DIFFERENTIAL METHOD BLD: ABNORMAL
EOSINOPHIL # BLD AUTO: 0.1 10E9/L (ref 0–0.7)
EOSINOPHIL NFR BLD AUTO: 0.2 %
ERYTHROCYTE [DISTWIDTH] IN BLOOD BY AUTOMATED COUNT: 13.1 % (ref 10–15)
GFR SERPL CREATININE-BSD FRML MDRD: 78 ML/MIN/1.7M2
GLUCOSE BLDC GLUCOMTR-MCNC: 54 MG/DL (ref 70–99)
GLUCOSE BLDC GLUCOMTR-MCNC: 95 MG/DL (ref 70–99)
GLUCOSE SERPL-MCNC: 68 MG/DL (ref 70–99)
HCT VFR BLD AUTO: 27.8 % (ref 40–53)
HGB BLD-MCNC: 9.6 G/DL (ref 13.3–17.7)
IMM GRANULOCYTES # BLD: 0.2 10E9/L (ref 0–0.4)
IMM GRANULOCYTES NFR BLD: 0.7 %
LACTATE BLD-SCNC: 1 MMOL/L (ref 0.7–2.1)
LIPASE SERPL-CCNC: 73 U/L (ref 73–393)
LYMPHOCYTES # BLD AUTO: 2.1 10E9/L (ref 0.8–5.3)
LYMPHOCYTES NFR BLD AUTO: 5.9 %
MCH RBC QN AUTO: 30.3 PG (ref 26.5–33)
MCHC RBC AUTO-ENTMCNC: 34.5 G/DL (ref 31.5–36.5)
MCV RBC AUTO: 88 FL (ref 78–100)
MONOCYTES # BLD AUTO: 2.1 10E9/L (ref 0–1.3)
MONOCYTES NFR BLD AUTO: 5.9 %
NEUTROPHILS # BLD AUTO: 30.5 10E9/L (ref 1.6–8.3)
NEUTROPHILS NFR BLD AUTO: 87.2 %
NRBC # BLD AUTO: 0 10*3/UL
NRBC BLD AUTO-RTO: 0 /100
PCO2 BLDV: 38 MM HG (ref 40–50)
PH BLDV: 7.38 PH (ref 7.32–7.43)
PLATELET # BLD AUTO: 666 10E9/L (ref 150–450)
PO2 BLDV: 26 MM HG (ref 25–47)
POTASSIUM SERPL-SCNC: 3.7 MMOL/L (ref 3.4–5.3)
PROT SERPL-MCNC: 8.1 G/DL (ref 6.8–8.8)
RBC # BLD AUTO: 3.17 10E12/L (ref 4.4–5.9)
SAO2 % BLDV FROM PO2: 48 %
SODIUM SERPL-SCNC: 131 MMOL/L (ref 133–144)
WBC # BLD AUTO: 35 10E9/L (ref 4–11)

## 2017-03-17 PROCEDURE — 25000125 ZZHC RX 250: Performed by: EMERGENCY MEDICINE

## 2017-03-17 PROCEDURE — 25500064 ZZH RX 255 OP 636: Performed by: EMERGENCY MEDICINE

## 2017-03-17 PROCEDURE — 00000146 ZZHCL STATISTIC GLUCOSE BY METER IP

## 2017-03-17 PROCEDURE — 74177 CT ABD & PELVIS W/CONTRAST: CPT

## 2017-03-17 PROCEDURE — 80053 COMPREHEN METABOLIC PANEL: CPT | Performed by: EMERGENCY MEDICINE

## 2017-03-17 PROCEDURE — 25000132 ZZH RX MED GY IP 250 OP 250 PS 637: Performed by: INTERNAL MEDICINE

## 2017-03-17 PROCEDURE — 93971 EXTREMITY STUDY: CPT | Mod: LT

## 2017-03-17 PROCEDURE — 25000128 H RX IP 250 OP 636: Performed by: EMERGENCY MEDICINE

## 2017-03-17 PROCEDURE — 87040 BLOOD CULTURE FOR BACTERIA: CPT | Performed by: EMERGENCY MEDICINE

## 2017-03-17 PROCEDURE — 99207 ZZC CDG-EXAM COMPONENT: MEETS DETAILED - DOWN CODED: CPT | Performed by: INTERNAL MEDICINE

## 2017-03-17 PROCEDURE — 99285 EMERGENCY DEPT VISIT HI MDM: CPT | Mod: 25

## 2017-03-17 PROCEDURE — 99221 1ST HOSP IP/OBS SF/LOW 40: CPT | Mod: AI | Performed by: INTERNAL MEDICINE

## 2017-03-17 PROCEDURE — 96361 HYDRATE IV INFUSION ADD-ON: CPT

## 2017-03-17 PROCEDURE — 83605 ASSAY OF LACTIC ACID: CPT

## 2017-03-17 PROCEDURE — 25000125 ZZHC RX 250: Performed by: HOSPITALIST

## 2017-03-17 PROCEDURE — 83690 ASSAY OF LIPASE: CPT | Performed by: EMERGENCY MEDICINE

## 2017-03-17 PROCEDURE — 96365 THER/PROPH/DIAG IV INF INIT: CPT

## 2017-03-17 PROCEDURE — 25800025 ZZH RX 258: Performed by: INTERNAL MEDICINE

## 2017-03-17 PROCEDURE — 36415 COLL VENOUS BLD VENIPUNCTURE: CPT

## 2017-03-17 PROCEDURE — 85025 COMPLETE CBC W/AUTO DIFF WBC: CPT | Performed by: EMERGENCY MEDICINE

## 2017-03-17 PROCEDURE — 12000007 ZZH R&B INTERMEDIATE

## 2017-03-17 PROCEDURE — 82803 BLOOD GASES ANY COMBINATION: CPT

## 2017-03-17 PROCEDURE — 25000128 H RX IP 250 OP 636: Performed by: INTERNAL MEDICINE

## 2017-03-17 PROCEDURE — 99222 1ST HOSP IP/OBS MODERATE 55: CPT | Mod: 57 | Performed by: SURGERY

## 2017-03-17 PROCEDURE — S0074 INJECTION, CEFOTETAN DISODIU: HCPCS | Performed by: EMERGENCY MEDICINE

## 2017-03-17 RX ORDER — DEXTROSE MONOHYDRATE 25 G/50ML
25-50 INJECTION, SOLUTION INTRAVENOUS
Status: DISCONTINUED | OUTPATIENT
Start: 2017-03-17 | End: 2017-03-20 | Stop reason: HOSPADM

## 2017-03-17 RX ORDER — ONDANSETRON 2 MG/ML
4 INJECTION INTRAMUSCULAR; INTRAVENOUS EVERY 6 HOURS PRN
Status: DISCONTINUED | OUTPATIENT
Start: 2017-03-17 | End: 2017-03-20 | Stop reason: HOSPADM

## 2017-03-17 RX ORDER — PIPERACILLIN SODIUM, TAZOBACTAM SODIUM 3; .375 G/15ML; G/15ML
3.38 INJECTION, POWDER, LYOPHILIZED, FOR SOLUTION INTRAVENOUS EVERY 6 HOURS
Status: DISCONTINUED | OUTPATIENT
Start: 2017-03-17 | End: 2017-03-20

## 2017-03-17 RX ORDER — METOCLOPRAMIDE 5 MG/1
10 TABLET ORAL EVERY 6 HOURS PRN
Status: DISCONTINUED | OUTPATIENT
Start: 2017-03-17 | End: 2017-03-20 | Stop reason: HOSPADM

## 2017-03-17 RX ORDER — HYDROMORPHONE HYDROCHLORIDE 1 MG/ML
.3-.5 INJECTION, SOLUTION INTRAMUSCULAR; INTRAVENOUS; SUBCUTANEOUS
Status: DISCONTINUED | OUTPATIENT
Start: 2017-03-17 | End: 2017-03-18

## 2017-03-17 RX ORDER — PROCHLORPERAZINE 25 MG
25 SUPPOSITORY, RECTAL RECTAL EVERY 12 HOURS PRN
Status: DISCONTINUED | OUTPATIENT
Start: 2017-03-17 | End: 2017-03-20 | Stop reason: HOSPADM

## 2017-03-17 RX ORDER — SODIUM CHLORIDE 9 MG/ML
INJECTION, SOLUTION INTRAVENOUS CONTINUOUS
Status: DISCONTINUED | OUTPATIENT
Start: 2017-03-17 | End: 2017-03-17

## 2017-03-17 RX ORDER — POLYETHYLENE GLYCOL 3350 17 G
2-4 POWDER IN PACKET (EA) ORAL
Status: DISCONTINUED | OUTPATIENT
Start: 2017-03-17 | End: 2017-03-20 | Stop reason: HOSPADM

## 2017-03-17 RX ORDER — ACETAMINOPHEN 325 MG/1
650 TABLET ORAL EVERY 4 HOURS PRN
Status: DISCONTINUED | OUTPATIENT
Start: 2017-03-17 | End: 2017-03-20 | Stop reason: HOSPADM

## 2017-03-17 RX ORDER — NALOXONE HYDROCHLORIDE 0.4 MG/ML
.1-.4 INJECTION, SOLUTION INTRAMUSCULAR; INTRAVENOUS; SUBCUTANEOUS
Status: DISCONTINUED | OUTPATIENT
Start: 2017-03-17 | End: 2017-03-18

## 2017-03-17 RX ORDER — ONDANSETRON 4 MG/1
4 TABLET, ORALLY DISINTEGRATING ORAL EVERY 6 HOURS PRN
Status: DISCONTINUED | OUTPATIENT
Start: 2017-03-17 | End: 2017-03-20 | Stop reason: HOSPADM

## 2017-03-17 RX ORDER — AMOXICILLIN 250 MG
1-2 CAPSULE ORAL 2 TIMES DAILY PRN
Status: DISCONTINUED | OUTPATIENT
Start: 2017-03-17 | End: 2017-03-20 | Stop reason: HOSPADM

## 2017-03-17 RX ORDER — IOPAMIDOL 755 MG/ML
96 INJECTION, SOLUTION INTRAVASCULAR ONCE
Status: COMPLETED | OUTPATIENT
Start: 2017-03-17 | End: 2017-03-17

## 2017-03-17 RX ORDER — PROCHLORPERAZINE MALEATE 5 MG
5-10 TABLET ORAL EVERY 6 HOURS PRN
Status: DISCONTINUED | OUTPATIENT
Start: 2017-03-17 | End: 2017-03-20 | Stop reason: HOSPADM

## 2017-03-17 RX ORDER — NICOTINE POLACRILEX 4 MG
15-30 LOZENGE BUCCAL
Status: DISCONTINUED | OUTPATIENT
Start: 2017-03-17 | End: 2017-03-20 | Stop reason: HOSPADM

## 2017-03-17 RX ORDER — METOCLOPRAMIDE HYDROCHLORIDE 5 MG/ML
10 INJECTION INTRAMUSCULAR; INTRAVENOUS EVERY 6 HOURS PRN
Status: DISCONTINUED | OUTPATIENT
Start: 2017-03-17 | End: 2017-03-20 | Stop reason: HOSPADM

## 2017-03-17 RX ORDER — GABAPENTIN 300 MG/1
300 CAPSULE ORAL 3 TIMES DAILY
Status: DISCONTINUED | OUTPATIENT
Start: 2017-03-17 | End: 2017-03-20 | Stop reason: HOSPADM

## 2017-03-17 RX ORDER — POLYETHYLENE GLYCOL 3350 17 G/17G
17 POWDER, FOR SOLUTION ORAL DAILY PRN
Status: DISCONTINUED | OUTPATIENT
Start: 2017-03-17 | End: 2017-03-20 | Stop reason: HOSPADM

## 2017-03-17 RX ORDER — CEFOTETAN DISODIUM 1 G/10ML
1 INJECTION, POWDER, FOR SOLUTION INTRAMUSCULAR; INTRAVENOUS EVERY 12 HOURS
Status: DISCONTINUED | OUTPATIENT
Start: 2017-03-17 | End: 2017-03-17

## 2017-03-17 RX ORDER — BISACODYL 10 MG
10 SUPPOSITORY, RECTAL RECTAL DAILY PRN
Status: DISCONTINUED | OUTPATIENT
Start: 2017-03-17 | End: 2017-03-20 | Stop reason: HOSPADM

## 2017-03-17 RX ADMIN — PIPERACILLIN SODIUM,TAZOBACTAM SODIUM 3.38 G: 3; .375 INJECTION, POWDER, FOR SOLUTION INTRAVENOUS at 20:48

## 2017-03-17 RX ADMIN — HYDROMORPHONE HYDROCHLORIDE 0.5 MG: 1 INJECTION, SOLUTION INTRAMUSCULAR; INTRAVENOUS; SUBCUTANEOUS at 20:52

## 2017-03-17 RX ADMIN — CEFOTETAN DISODIUM 1 G: 1 INJECTION, POWDER, FOR SOLUTION INTRAMUSCULAR; INTRAVENOUS at 19:32

## 2017-03-17 RX ADMIN — NICOTINE 1 PATCH: 7 PATCH, EXTENDED RELEASE TRANSDERMAL at 22:13

## 2017-03-17 RX ADMIN — IOPAMIDOL 96 ML: 755 INJECTION, SOLUTION INTRAVENOUS at 18:00

## 2017-03-17 RX ADMIN — SODIUM CHLORIDE 69 ML: 9 INJECTION, SOLUTION INTRAVENOUS at 18:01

## 2017-03-17 RX ADMIN — SODIUM CHLORIDE: 9 INJECTION, SOLUTION INTRAVENOUS at 20:48

## 2017-03-17 RX ADMIN — SODIUM CHLORIDE 1000 ML: 9 INJECTION, SOLUTION INTRAVENOUS at 17:04

## 2017-03-17 RX ADMIN — DEXTROSE MONOHYDRATE 25 ML: 25 INJECTION, SOLUTION INTRAVENOUS at 22:54

## 2017-03-17 RX ADMIN — DEXTROSE AND SODIUM CHLORIDE: 5; .9 INJECTION, SOLUTION INTRAVENOUS at 23:53

## 2017-03-17 ASSESSMENT — ENCOUNTER SYMPTOMS
APPETITE CHANGE: 1
ABDOMINAL PAIN: 1
COUGH: 0
WOUND: 1
NAUSEA: 1
DIARRHEA: 1
SHORTNESS OF BREATH: 0
BLOOD IN STOOL: 0
VOMITING: 1

## 2017-03-17 ASSESSMENT — ACTIVITIES OF DAILY LIVING (ADL): WHICH_OF_THE_ABOVE_FUNCTIONAL_RISKS_HAD_A_RECENT_ONSET_OR_CHANGE?: AMBULATION;TRANSFERRING
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
## 2017-03-17 NOTE — TELEPHONE ENCOUNTER
Homecare nurse is calling on behalf of pt.   Pt was seen by Ophth/Eye  yesterday. He Lost his sight over this past week.  Abnormal labs @ WBC 38,000--wanted PCP to address this.  Pt was on 5 day course of aug, and keflex, but had to stop antibiotics (stopped Sun PM) due to side effects:  Nausea, vomiting, urinary output has decreased, he started culturelle while on keflex. Since stopped ABX's, still not eating much, but has been drinking small sips of water.   1. Requesting RX for compasine (to help with symptoms).  2. Pt also cannot sleep a night. And is requesting RX for trazadone at bedtime.      Please advise. Pharmacy is pending. Also advise regarding pt's high WBC count.   Please call homecare nurse Susanne @ [phone], and pt.

## 2017-03-17 NOTE — IP AVS SNAPSHOT
[address] [facility]    [address] [place] [address]    Phone:  [phone]                                       After Visit Summary   3/17/2017    Gomez Turk    MRN: [id number]           After Visit Summary Signature Page     I have received my discharge instructions, and my questions have been answered. I have discussed any challenges I see with this plan with the nurse or doctor.    ..........................................................................................................................................  Patient/Patient Representative Signature      ..........................................................................................................................................  Patient Representative Print Name and Relationship to Patient    ..................................................               ................................................  Date                                            Time    ..........................................................................................................................................  Reviewed by Signature/Title    ...................................................              ..............................................  Date                                                            Time

## 2017-03-17 NOTE — TELEPHONE ENCOUNTER
Difficult to assess this over phone as am not in clinic, if that ill with nausea and vomiting concern for dehydration and abnormal renal function, ?also source for increased WBC, may benefit from ER assessment for IV fluids

## 2017-03-17 NOTE — ED PROVIDER NOTES
History     Chief Complaint:  Elevated White Blood Cell Count     HPI   Gomez Turk is a 56 year old male with a complex recent medical history and type II diabetes mellitus who presents accompanied by his wife for evaluation of an elevated white blood cell count. The patient is status post left femoropopliteal bypass graft, left foot irrigation and debridement, and left great toe amputation on 2/28/2017. The patient was prescribed Augmentin and Keflex following these procedures. On 3/9, the patient was seen in the ED by Dr. Kent for bilateral partial vision loss. He had an MRI of the brain and CTA of the head and neck that showed age indeterminate right artrial body occlusion. Vascular medicine, who knows the patient, was consulted at that time and did not think that this was the cause of his acute symptoms. He followed up with Dr. Mclaughlin of ophthalmology the next day and was diagnosed with bilateral perioperative ischemic optic neuropathy.     On 3/12, the patient started to develop nausea and vomiting. The following day, he additionally started to have diarrhea, described as loose stools that are not bloody or particularly watery. He has been vomiting approximately three times daily, and he has had burning abdominal pain and lower chest pain that he attributes to his frequent vomiting. He was evaluated in his primary clinic by Dr. Roberts yesterday regarding this and had a white count that returned elevated at 38,500, so he was referred to seek further evaluation and management in the ED regarding this abnormal lab. His lower chest pain was attributed to rib pain due to vomiting pain by Dr. Roberts. Currently, he reports that his vomiting is actually somewhat improved and he was able to eat yesterday. With regard to the patient's surgical wounds, both he and his wife feel that these have been healing very well. He has completed his courses of Amoxicillin and Keflex. With regard to his visual  disturbance, this is ongoing and has not changed significantly since his previous evaluation. He has not had any cough or shortness of breath recently.     Allergies:  NKDA     Medications:    HYDROcodone-acetaminophen (NORCO) 5-325 MG per tablet  oxyCODONE (ROXICODONE) 5 MG IR tablet  oxyCODONE (ROXICODONE) 10 MG IR tablet  acetaminophen (TYLENOL) 325 MG tablet  aspirin EC 81 MG EC tablet  gabapentin (NEURONTIN) 300 MG capsule  atorvastatin (LIPITOR) 40 MG tablet  polyethylene glycol (MIRALAX/GLYCOLAX) Packet  senna-docusate (SENOKOT-S;PERICOLACE) 8.6-50 MG per tablet  clopidogrel (PLAVIX) 75 MG tablet  nicotine (NICODERM CQ) 7 MG/24HR 24 hr patch  collagenase ointment  lidocaine (XYLOCAINE) 2 % topical gel  metFORMIN (GLUCOPHAGE) 1000 MG tablet  glimepiride (AMARYL) 2 MG tablet  sildenafil (VIAGRA) 100 MG tablet    Past Medical History:    Diabetes mellitus, type II  Diverticulosis   Peripheral artery disease  Hyperlipidemia     Past Surgical History:    Deviated septum repair  Irrigation and debridement foot, left  Bypass graft femoropopliteal, left   Amputate toe, left     Family History:    Diabetes - Mother  Lipids - Mother     Social History:  Tobacco use:    Former smoker, quit 2 weeks ago  Alcohol use:    Negative  Marital status:       Accompanied to ED by:  Wife      Review of Systems   Constitutional: Positive for appetite change (reduced).   Eyes: Positive for visual disturbance.   Respiratory: Negative for cough and shortness of breath.    Cardiovascular: Positive for chest pain.   Gastrointestinal: Positive for abdominal pain, diarrhea, nausea and vomiting. Negative for blood in stool.   Skin: Positive for wound (surgical wounds, left foot, healing ).   All other systems reviewed and are negative.    Physical Exam   First Vitals:  BP: 92/50  Pulse: 92  Temp: 99.9  F (37.7  C)  Resp: 16  SpO2: 100 %   Patient Vitals for the past 24 hrs:   BP Temp Temp src Pulse Heart Rate Resp SpO2   03/17/17  1904 102/68 - - - - 16 -   03/17/17 1745 111/60 - - - - - -   03/17/17 1730 110/62 - - - - - -   03/17/17 1728 120/60 - - - 86 14 -   03/17/17 1617 92/50 - - - - - -   03/17/17 1615 - 99.9  F (37.7  C) Oral 92 - 16 100 %          Physical Exam    Physical Exam   Constitutional:  Patient is oriented to person, place, and time. They appear well-developed and well-nourished. Mild distress secondary to abdominal pain.    HENT:   Mouth/Throat:   Oropharynx is clear and moist.   Eyes:    Conjunctivae normal and EOM are normal. Pupils are equal, round, and reactive to light.   Neck:    Normal range of motion.   Cardiovascular: Normal rate, regular rhythm and normal heart sounds.  Exam reveals no gallop and no friction rub.  No murmur heard.  Pulmonary/Chest:  Effort normal and breath sounds normal. Patient has no wheezes. Patient has no rales.   Abdominal:   The patient indicates that he had pain over the right lower rib. RUQ pain to palpation. Soft. Bowel sounds are normal. Patient exhibits no mass. There is no rebound and no guarding.   Musculoskeletal:  Normal range of motion. Patient exhibits no edema.   Neurological:   Patient is alert and oriented to person, place, and time. Patient has normal strength. No cranial nerve deficit or sensory deficit. GCS 15  Skin:   The patient has a left great toe amputation with slight surrounding erythema. No purulent drainage. Incision sites in the left foot as well as the left medial leg look to be well-healed, dry, and intact, with no dehiscence, no purulent drainage, and no bleeding. No rash noted.   Psychiatric:   Patient has a normal mood and affect. Patient's behavior is normal. Judgment and thought content normal.     Emergency Department Course     Imaging:  Radiographic findings were communicated with the patient and family who voiced understanding of the findings.    US Lower Extremity Venous Duplex Left:  IMPRESSION: Negative, no DVT identified in the left lower  extremity.  Preliminary report per radiology.    CT Abdomen Pelvis w Contrast:  IMPRESSION: Distended gallbladder with areas of gallbladder wall thickening and surrounding inflammation concerning for acute cholecystitis. No calcified stones are seen. Noncalcified stones may  be present.  Preliminary report per radiology.     Laboratory:  CBC: WBC 35.0 high, HGB 9.6 low,  high   CMP:  low, Glucose 68 low, Calcium 8.4 low, Albumin 2.4 low, Alkphos 303 high,  high, AST 51 high, o/w WNL (Creatinine 0.99)  ISTAT gases lactate esha POCT: pH 7.38, pCO2 38 low, pO2 26, Bicarbonate 22, O2 sat 48, Lactic acid 1.0   Lipase: 73  Blood culture: Pending x2     Interventions:  1704 NS 1,000 mL IV  1932 Cefotan 1 g IV     Emergency Department Course:  Nursing notes and vitals reviewed.  1647: I performed an exam of the patient as documented above.     1827: I updated and reassessed the patient.     1844: I spoke with Dr. Hollingsworth of the general surgery service regarding patient's presentation, findings, and plan of care.    1855: I updated and reassessed the patient.     1901: I spoke with Dr. Anand of the hospitalist service regarding patient's presentation, findings, and plan of care.    Findings and plan explained to the Patient and spouse who consents to admission. Discussed the patient with Dr. Anand, who will admit the patient to a surgical bed for further monitoring, evaluation, and treatment.     Impression & Plan      Medical Decision Making:  Gomez Turk is a 56 year old male sent over here from his primary care clinic after his white blood cell count came back elevated yesterday. He had complaint of vomiting over the last few days and right upper abdominal pain, although this has decreased as he has had decreased input. He did not have a surgical abdomen on exam, but focally reproducible. Here, he does in fact have a leukocytosis. His lactic acid is normal. His liver function is elevated slightly; it  was not this way eight days ago. I did CT scan his abdomen him thinking inflammatory bowel disease such as colitis would be common being that he was on antibiotics. CT scan actually shows cholecystitis, otherwise normal.  His incision sites and amputation site actually look clean, dry, and intact. This does not appear to be the source of his infection. I then spoke to Dr. Hollingsworth of general surgery. He is recommending admission for medical clearance, IV antibiotics, and gallbladder out tomorrow. At this time, I will admit to Dr. Anand.     Diagnosis:    ICD-10-CM   1. Acute cholecystitis K81.0       Disposition:  Admitted to Dr. Anand.    I, Sergey Lara, am serving as a scribe at 4:47 PM on 3/17/2017 to document services personally performed by Dr. Carey, based on my observations and the provider's statements to me.      EMERGENCY DEPARTMENT       Lilli Carey MD  03/17/17 2007

## 2017-03-18 ENCOUNTER — SURGERY (OUTPATIENT)
Age: 57
End: 2017-03-18

## 2017-03-18 ENCOUNTER — ANESTHESIA EVENT (OUTPATIENT)
Dept: SURGERY | Facility: CLINIC | Age: 57
DRG: 418 | End: 2017-03-18
Payer: COMMERCIAL

## 2017-03-18 ENCOUNTER — ANESTHESIA (OUTPATIENT)
Dept: SURGERY | Facility: CLINIC | Age: 57
DRG: 418 | End: 2017-03-18
Payer: COMMERCIAL

## 2017-03-18 LAB
ABO + RH BLD: NORMAL
ABO + RH BLD: NORMAL
ANION GAP SERPL CALCULATED.3IONS-SCNC: 11 MMOL/L (ref 3–14)
BLD GP AB SCN SERPL QL: NORMAL
BLD PROD TYP BPU: NORMAL
BLD UNIT ID BPU: 0
BLOOD BANK CMNT PATIENT-IMP: NORMAL
BLOOD PRODUCT CODE: NORMAL
BPU ID: NORMAL
BUN SERPL-MCNC: 13 MG/DL (ref 7–30)
CALCIUM SERPL-MCNC: 7.6 MG/DL (ref 8.5–10.1)
CHLORIDE SERPL-SCNC: 101 MMOL/L (ref 94–109)
CO2 SERPL-SCNC: 21 MMOL/L (ref 20–32)
CREAT SERPL-MCNC: 0.75 MG/DL (ref 0.66–1.25)
ERYTHROCYTE [DISTWIDTH] IN BLOOD BY AUTOMATED COUNT: 13.1 % (ref 10–15)
GFR SERPL CREATININE-BSD FRML MDRD: ABNORMAL ML/MIN/1.7M2
GLUCOSE BLDC GLUCOMTR-MCNC: 112 MG/DL (ref 70–99)
GLUCOSE BLDC GLUCOMTR-MCNC: 121 MG/DL (ref 70–99)
GLUCOSE BLDC GLUCOMTR-MCNC: 138 MG/DL (ref 70–99)
GLUCOSE BLDC GLUCOMTR-MCNC: 151 MG/DL (ref 70–99)
GLUCOSE BLDC GLUCOMTR-MCNC: 163 MG/DL (ref 70–99)
GLUCOSE SERPL-MCNC: 109 MG/DL (ref 70–99)
HCT VFR BLD AUTO: 25.1 % (ref 40–53)
HGB BLD-MCNC: 8.6 G/DL (ref 13.3–17.7)
INR PPP: 1.38 (ref 0.86–1.14)
MAGNESIUM SERPL-MCNC: 2.3 MG/DL (ref 1.6–2.3)
MCH RBC QN AUTO: 30 PG (ref 26.5–33)
MCHC RBC AUTO-ENTMCNC: 34.3 G/DL (ref 31.5–36.5)
MCV RBC AUTO: 88 FL (ref 78–100)
NUM BPU REQUESTED: 3
PLATELET # BLD AUTO: 568 10E9/L (ref 150–450)
POTASSIUM SERPL-SCNC: 3.1 MMOL/L (ref 3.4–5.3)
POTASSIUM SERPL-SCNC: 3.7 MMOL/L (ref 3.4–5.3)
RBC # BLD AUTO: 2.87 10E12/L (ref 4.4–5.9)
SODIUM SERPL-SCNC: 133 MMOL/L (ref 133–144)
SPECIMEN EXP DATE BLD: NORMAL
TRANSFUSION STATUS PATIENT QL: NORMAL
WBC # BLD AUTO: 28.6 10E9/L (ref 4–11)

## 2017-03-18 PROCEDURE — 27210794 ZZH OR GENERAL SUPPLY STERILE: Performed by: SURGERY

## 2017-03-18 PROCEDURE — 88304 TISSUE EXAM BY PATHOLOGIST: CPT | Performed by: SURGERY

## 2017-03-18 PROCEDURE — 25800025 ZZH RX 258: Performed by: NURSE ANESTHETIST, CERTIFIED REGISTERED

## 2017-03-18 PROCEDURE — 25000125 ZZHC RX 250: Performed by: NURSE ANESTHETIST, CERTIFIED REGISTERED

## 2017-03-18 PROCEDURE — 25800025 ZZH RX 258: Performed by: SURGERY

## 2017-03-18 PROCEDURE — 25000566 ZZH SEVOFLURANE, EA 15 MIN: Performed by: SURGERY

## 2017-03-18 PROCEDURE — 27210995 ZZH RX 272: Performed by: SURGERY

## 2017-03-18 PROCEDURE — 86900 BLOOD TYPING SEROLOGIC ABO: CPT | Performed by: ANESTHESIOLOGY

## 2017-03-18 PROCEDURE — 25000128 H RX IP 250 OP 636: Performed by: INTERNAL MEDICINE

## 2017-03-18 PROCEDURE — 25000125 ZZHC RX 250: Performed by: SURGERY

## 2017-03-18 PROCEDURE — 36415 COLL VENOUS BLD VENIPUNCTURE: CPT | Performed by: ANESTHESIOLOGY

## 2017-03-18 PROCEDURE — 85014 HEMATOCRIT: CPT

## 2017-03-18 PROCEDURE — 37000009 ZZH ANESTHESIA TECHNICAL FEE, EACH ADDTL 15 MIN: Performed by: SURGERY

## 2017-03-18 PROCEDURE — 86923 COMPATIBILITY TEST ELECTRIC: CPT | Performed by: ANESTHESIOLOGY

## 2017-03-18 PROCEDURE — 84132 ASSAY OF SERUM POTASSIUM: CPT

## 2017-03-18 PROCEDURE — 36415 COLL VENOUS BLD VENIPUNCTURE: CPT | Performed by: INTERNAL MEDICINE

## 2017-03-18 PROCEDURE — 83735 ASSAY OF MAGNESIUM: CPT | Performed by: INTERNAL MEDICINE

## 2017-03-18 PROCEDURE — 85027 COMPLETE CBC AUTOMATED: CPT | Performed by: INTERNAL MEDICINE

## 2017-03-18 PROCEDURE — 84132 ASSAY OF SERUM POTASSIUM: CPT | Performed by: INTERNAL MEDICINE

## 2017-03-18 PROCEDURE — 0FT44ZZ RESECTION OF GALLBLADDER, PERCUTANEOUS ENDOSCOPIC APPROACH: ICD-10-PCS | Performed by: SURGERY

## 2017-03-18 PROCEDURE — 25000128 H RX IP 250 OP 636: Performed by: NURSE ANESTHETIST, CERTIFIED REGISTERED

## 2017-03-18 PROCEDURE — 12000007 ZZH R&B INTERMEDIATE

## 2017-03-18 PROCEDURE — 86901 BLOOD TYPING SEROLOGIC RH(D): CPT | Performed by: ANESTHESIOLOGY

## 2017-03-18 PROCEDURE — 47562 LAPAROSCOPIC CHOLECYSTECTOMY: CPT | Performed by: SURGERY

## 2017-03-18 PROCEDURE — 85610 PROTHROMBIN TIME: CPT | Performed by: INTERNAL MEDICINE

## 2017-03-18 PROCEDURE — 25000128 H RX IP 250 OP 636: Performed by: SURGERY

## 2017-03-18 PROCEDURE — 36000060 ZZH SURGERY LEVEL 3 W FLUORO 1ST 30 MIN: Performed by: SURGERY

## 2017-03-18 PROCEDURE — 25000132 ZZH RX MED GY IP 250 OP 250 PS 637: Performed by: INTERNAL MEDICINE

## 2017-03-18 PROCEDURE — 00000146 ZZHCL STATISTIC GLUCOSE BY METER IP

## 2017-03-18 PROCEDURE — 99233 SBSQ HOSP IP/OBS HIGH 50: CPT | Performed by: INTERNAL MEDICINE

## 2017-03-18 PROCEDURE — 25000125 ZZHC RX 250: Performed by: ANESTHESIOLOGY

## 2017-03-18 PROCEDURE — P9016 RBC LEUKOCYTES REDUCED: HCPCS | Performed by: ANESTHESIOLOGY

## 2017-03-18 PROCEDURE — 88304 TISSUE EXAM BY PATHOLOGIST: CPT | Mod: 26 | Performed by: SURGERY

## 2017-03-18 PROCEDURE — 47562 LAPAROSCOPIC CHOLECYSTECTOMY: CPT | Mod: AS | Performed by: PHYSICIAN ASSISTANT

## 2017-03-18 PROCEDURE — 71000015 ZZH RECOVERY PHASE 1 LEVEL 2 EA ADDTL HR: Performed by: SURGERY

## 2017-03-18 PROCEDURE — 82803 BLOOD GASES ANY COMBINATION: CPT

## 2017-03-18 PROCEDURE — 37000008 ZZH ANESTHESIA TECHNICAL FEE, 1ST 30 MIN: Performed by: SURGERY

## 2017-03-18 PROCEDURE — 25000125 ZZHC RX 250: Performed by: PHYSICIAN ASSISTANT

## 2017-03-18 PROCEDURE — 25800025 ZZH RX 258: Performed by: ANESTHESIOLOGY

## 2017-03-18 PROCEDURE — 71000014 ZZH RECOVERY PHASE 1 LEVEL 2 FIRST HR: Performed by: SURGERY

## 2017-03-18 PROCEDURE — 25000128 H RX IP 250 OP 636: Performed by: ANESTHESIOLOGY

## 2017-03-18 PROCEDURE — 80048 BASIC METABOLIC PNL TOTAL CA: CPT | Performed by: INTERNAL MEDICINE

## 2017-03-18 PROCEDURE — 84295 ASSAY OF SERUM SODIUM: CPT

## 2017-03-18 PROCEDURE — 86850 RBC ANTIBODY SCREEN: CPT | Performed by: ANESTHESIOLOGY

## 2017-03-18 PROCEDURE — 36000058 ZZH SURGERY LEVEL 3 EA 15 ADDTL MIN: Performed by: SURGERY

## 2017-03-18 PROCEDURE — 40000169 ZZH STATISTIC PRE-PROCEDURE ASSESSMENT I: Performed by: SURGERY

## 2017-03-18 RX ORDER — CEFAZOLIN SODIUM 2 G/100ML
2 INJECTION, SOLUTION INTRAVENOUS
Status: DISCONTINUED | OUTPATIENT
Start: 2017-03-18 | End: 2017-03-18 | Stop reason: HOSPADM

## 2017-03-18 RX ORDER — OXYCODONE HYDROCHLORIDE 5 MG/1
5-10 TABLET ORAL
Status: DISCONTINUED | OUTPATIENT
Start: 2017-03-18 | End: 2017-03-20 | Stop reason: HOSPADM

## 2017-03-18 RX ORDER — SODIUM CHLORIDE, SODIUM LACTATE, POTASSIUM CHLORIDE, CALCIUM CHLORIDE 600; 310; 30; 20 MG/100ML; MG/100ML; MG/100ML; MG/100ML
INJECTION, SOLUTION INTRAVENOUS CONTINUOUS
Status: DISCONTINUED | OUTPATIENT
Start: 2017-03-18 | End: 2017-03-18 | Stop reason: HOSPADM

## 2017-03-18 RX ORDER — ONDANSETRON 4 MG/1
4 TABLET, ORALLY DISINTEGRATING ORAL EVERY 30 MIN PRN
Status: DISCONTINUED | OUTPATIENT
Start: 2017-03-18 | End: 2017-03-18 | Stop reason: HOSPADM

## 2017-03-18 RX ORDER — POTASSIUM CHLORIDE 29.8 MG/ML
20 INJECTION INTRAVENOUS
Status: DISCONTINUED | OUTPATIENT
Start: 2017-03-18 | End: 2017-03-20 | Stop reason: HOSPADM

## 2017-03-18 RX ORDER — SODIUM CHLORIDE AND POTASSIUM CHLORIDE 150; 900 MG/100ML; MG/100ML
INJECTION, SOLUTION INTRAVENOUS CONTINUOUS
Status: DISCONTINUED | OUTPATIENT
Start: 2017-03-18 | End: 2017-03-18

## 2017-03-18 RX ORDER — NEOSTIGMINE METHYLSULFATE 1 MG/ML
VIAL (ML) INJECTION PRN
Status: DISCONTINUED | OUTPATIENT
Start: 2017-03-18 | End: 2017-03-18

## 2017-03-18 RX ORDER — SODIUM CHLORIDE, SODIUM LACTATE, POTASSIUM CHLORIDE, CALCIUM CHLORIDE 600; 310; 30; 20 MG/100ML; MG/100ML; MG/100ML; MG/100ML
INJECTION, SOLUTION INTRAVENOUS CONTINUOUS PRN
Status: DISCONTINUED | OUTPATIENT
Start: 2017-03-18 | End: 2017-03-18

## 2017-03-18 RX ORDER — FENTANYL CITRATE 50 UG/ML
25-50 INJECTION, SOLUTION INTRAMUSCULAR; INTRAVENOUS
Status: DISCONTINUED | OUTPATIENT
Start: 2017-03-18 | End: 2017-03-18 | Stop reason: HOSPADM

## 2017-03-18 RX ORDER — SODIUM CHLORIDE 9 MG/ML
INJECTION, SOLUTION INTRAVENOUS CONTINUOUS PRN
Status: DISCONTINUED | OUTPATIENT
Start: 2017-03-18 | End: 2017-03-18

## 2017-03-18 RX ORDER — GLYCOPYRROLATE 0.2 MG/ML
INJECTION, SOLUTION INTRAMUSCULAR; INTRAVENOUS PRN
Status: DISCONTINUED | OUTPATIENT
Start: 2017-03-18 | End: 2017-03-18

## 2017-03-18 RX ORDER — ACETAMINOPHEN 10 MG/ML
1000 INJECTION, SOLUTION INTRAVENOUS ONCE
Status: COMPLETED | OUTPATIENT
Start: 2017-03-18 | End: 2017-03-18

## 2017-03-18 RX ORDER — FENTANYL CITRATE 50 UG/ML
INJECTION, SOLUTION INTRAMUSCULAR; INTRAVENOUS PRN
Status: DISCONTINUED | OUTPATIENT
Start: 2017-03-18 | End: 2017-03-18

## 2017-03-18 RX ORDER — POTASSIUM CHLORIDE 1.5 G/1.58G
20-40 POWDER, FOR SOLUTION ORAL
Status: DISCONTINUED | OUTPATIENT
Start: 2017-03-18 | End: 2017-03-20 | Stop reason: HOSPADM

## 2017-03-18 RX ORDER — POTASSIUM CHLORIDE 7.45 MG/ML
10 INJECTION INTRAVENOUS
Status: DISCONTINUED | OUTPATIENT
Start: 2017-03-18 | End: 2017-03-20 | Stop reason: HOSPADM

## 2017-03-18 RX ORDER — EPHEDRINE SULFATE 50 MG/ML
INJECTION, SOLUTION INTRAMUSCULAR; INTRAVENOUS; SUBCUTANEOUS PRN
Status: DISCONTINUED | OUTPATIENT
Start: 2017-03-18 | End: 2017-03-18

## 2017-03-18 RX ORDER — BUPIVACAINE HYDROCHLORIDE AND EPINEPHRINE 2.5; 5 MG/ML; UG/ML
INJECTION, SOLUTION INFILTRATION; PERINEURAL PRN
Status: DISCONTINUED | OUTPATIENT
Start: 2017-03-18 | End: 2017-03-18 | Stop reason: HOSPADM

## 2017-03-18 RX ORDER — ONDANSETRON 2 MG/ML
INJECTION INTRAMUSCULAR; INTRAVENOUS PRN
Status: DISCONTINUED | OUTPATIENT
Start: 2017-03-18 | End: 2017-03-18

## 2017-03-18 RX ORDER — ONDANSETRON 2 MG/ML
4 INJECTION INTRAMUSCULAR; INTRAVENOUS EVERY 30 MIN PRN
Status: DISCONTINUED | OUTPATIENT
Start: 2017-03-18 | End: 2017-03-18 | Stop reason: HOSPADM

## 2017-03-18 RX ORDER — FENTANYL CITRATE 50 UG/ML
100 INJECTION, SOLUTION INTRAMUSCULAR; INTRAVENOUS
Status: COMPLETED | OUTPATIENT
Start: 2017-03-18 | End: 2017-03-18

## 2017-03-18 RX ORDER — PROPOFOL 10 MG/ML
INJECTION, EMULSION INTRAVENOUS PRN
Status: DISCONTINUED | OUTPATIENT
Start: 2017-03-18 | End: 2017-03-18

## 2017-03-18 RX ORDER — CEFAZOLIN SODIUM 1 G/3ML
1 INJECTION, POWDER, FOR SOLUTION INTRAMUSCULAR; INTRAVENOUS SEE ADMIN INSTRUCTIONS
Status: DISCONTINUED | OUTPATIENT
Start: 2017-03-18 | End: 2017-03-18 | Stop reason: HOSPADM

## 2017-03-18 RX ORDER — LIDOCAINE HYDROCHLORIDE 20 MG/ML
INJECTION, SOLUTION INFILTRATION; PERINEURAL PRN
Status: DISCONTINUED | OUTPATIENT
Start: 2017-03-18 | End: 2017-03-18

## 2017-03-18 RX ORDER — HYDROMORPHONE HYDROCHLORIDE 1 MG/ML
.3-.5 INJECTION, SOLUTION INTRAMUSCULAR; INTRAVENOUS; SUBCUTANEOUS EVERY 5 MIN PRN
Status: DISCONTINUED | OUTPATIENT
Start: 2017-03-18 | End: 2017-03-18 | Stop reason: HOSPADM

## 2017-03-18 RX ORDER — MAGNESIUM SULFATE HEPTAHYDRATE 40 MG/ML
4 INJECTION, SOLUTION INTRAVENOUS EVERY 4 HOURS PRN
Status: DISCONTINUED | OUTPATIENT
Start: 2017-03-18 | End: 2017-03-20 | Stop reason: HOSPADM

## 2017-03-18 RX ORDER — MAGNESIUM HYDROXIDE 1200 MG/15ML
LIQUID ORAL PRN
Status: DISCONTINUED | OUTPATIENT
Start: 2017-03-18 | End: 2017-03-18 | Stop reason: HOSPADM

## 2017-03-18 RX ORDER — POTASSIUM CHLORIDE 1500 MG/1
20-40 TABLET, EXTENDED RELEASE ORAL
Status: DISCONTINUED | OUTPATIENT
Start: 2017-03-18 | End: 2017-03-20 | Stop reason: HOSPADM

## 2017-03-18 RX ORDER — VECURONIUM BROMIDE 1 MG/ML
INJECTION, POWDER, LYOPHILIZED, FOR SOLUTION INTRAVENOUS PRN
Status: DISCONTINUED | OUTPATIENT
Start: 2017-03-18 | End: 2017-03-18

## 2017-03-18 RX ORDER — SODIUM CHLORIDE AND POTASSIUM CHLORIDE 150; 900 MG/100ML; MG/100ML
INJECTION, SOLUTION INTRAVENOUS CONTINUOUS
Status: DISCONTINUED | OUTPATIENT
Start: 2017-03-18 | End: 2017-03-20 | Stop reason: HOSPADM

## 2017-03-18 RX ORDER — NALOXONE HYDROCHLORIDE 0.4 MG/ML
.1-.4 INJECTION, SOLUTION INTRAMUSCULAR; INTRAVENOUS; SUBCUTANEOUS
Status: DISCONTINUED | OUTPATIENT
Start: 2017-03-18 | End: 2017-03-20 | Stop reason: HOSPADM

## 2017-03-18 RX ADMIN — PIPERACILLIN SODIUM,TAZOBACTAM SODIUM 3.38 G: 3; .375 INJECTION, POWDER, FOR SOLUTION INTRAVENOUS at 09:00

## 2017-03-18 RX ADMIN — FIBRINOGEN HUMAN AND THROMBIN HUMAN 5 ML: KIT at 13:11

## 2017-03-18 RX ADMIN — FENTANYL CITRATE 50 MCG: 50 INJECTION, SOLUTION INTRAMUSCULAR; INTRAVENOUS at 14:03

## 2017-03-18 RX ADMIN — POTASSIUM CHLORIDE 10 MEQ: 7.46 INJECTION, SOLUTION INTRAVENOUS at 10:33

## 2017-03-18 RX ADMIN — GABAPENTIN 300 MG: 300 CAPSULE ORAL at 09:27

## 2017-03-18 RX ADMIN — FENTANYL CITRATE 50 MCG: 50 INJECTION, SOLUTION INTRAMUSCULAR; INTRAVENOUS at 14:24

## 2017-03-18 RX ADMIN — HYDROMORPHONE HYDROCHLORIDE 0.5 MG: 1 INJECTION, SOLUTION INTRAMUSCULAR; INTRAVENOUS; SUBCUTANEOUS at 14:08

## 2017-03-18 RX ADMIN — SODIUM CHLORIDE: 9 INJECTION, SOLUTION INTRAVENOUS at 12:35

## 2017-03-18 RX ADMIN — FENTANYL CITRATE 50 MCG: 50 INJECTION, SOLUTION INTRAMUSCULAR; INTRAVENOUS at 13:30

## 2017-03-18 RX ADMIN — PHENYLEPHRINE HYDROCHLORIDE 200 MCG: 10 INJECTION, SOLUTION INTRAMUSCULAR; INTRAVENOUS; SUBCUTANEOUS at 11:45

## 2017-03-18 RX ADMIN — HYDROMORPHONE HYDROCHLORIDE 0.5 MG: 1 INJECTION, SOLUTION INTRAMUSCULAR; INTRAVENOUS; SUBCUTANEOUS at 16:48

## 2017-03-18 RX ADMIN — PHENYLEPHRINE HYDROCHLORIDE 0.4 MCG/KG/MIN: 10 INJECTION, SOLUTION INTRAMUSCULAR; INTRAVENOUS; SUBCUTANEOUS at 11:51

## 2017-03-18 RX ADMIN — FENTANYL CITRATE 75 MCG: 50 INJECTION, SOLUTION INTRAMUSCULAR; INTRAVENOUS at 11:30

## 2017-03-18 RX ADMIN — VECURONIUM BROMIDE 4 MG: 1 INJECTION, POWDER, LYOPHILIZED, FOR SOLUTION INTRAVENOUS at 12:00

## 2017-03-18 RX ADMIN — SODIUM CHLORIDE 1000 ML: 0.9 IRRIGANT IRRIGATION at 12:16

## 2017-03-18 RX ADMIN — PIPERACILLIN SODIUM,TAZOBACTAM SODIUM 3.38 G: 3; .375 INJECTION, POWDER, FOR SOLUTION INTRAVENOUS at 02:22

## 2017-03-18 RX ADMIN — SODIUM CHLORIDE, POTASSIUM CHLORIDE, SODIUM LACTATE AND CALCIUM CHLORIDE: 600; 310; 30; 20 INJECTION, SOLUTION INTRAVENOUS at 11:32

## 2017-03-18 RX ADMIN — BUPIVACAINE HYDROCHLORIDE AND EPINEPHRINE BITARTRATE 30 ML: 2.5; .005 INJECTION, SOLUTION INFILTRATION; PERINEURAL at 13:30

## 2017-03-18 RX ADMIN — PROPOFOL 200 MG: 10 INJECTION, EMULSION INTRAVENOUS at 11:45

## 2017-03-18 RX ADMIN — HYDROMORPHONE HYDROCHLORIDE 0.5 MG: 1 INJECTION, SOLUTION INTRAMUSCULAR; INTRAVENOUS; SUBCUTANEOUS at 05:27

## 2017-03-18 RX ADMIN — POTASSIUM CHLORIDE AND SODIUM CHLORIDE 125 ML/HR: 900; 150 INJECTION, SOLUTION INTRAVENOUS at 18:35

## 2017-03-18 RX ADMIN — HYDROMORPHONE HYDROCHLORIDE: 10 INJECTION, SOLUTION INTRAMUSCULAR; INTRAVENOUS; SUBCUTANEOUS at 20:04

## 2017-03-18 RX ADMIN — SODIUM CHLORIDE, POTASSIUM CHLORIDE, SODIUM LACTATE AND CALCIUM CHLORIDE: 600; 310; 30; 20 INJECTION, SOLUTION INTRAVENOUS at 14:16

## 2017-03-18 RX ADMIN — SODIUM CHLORIDE 1000 ML: 900 IRRIGANT IRRIGATION at 12:16

## 2017-03-18 RX ADMIN — FENTANYL CITRATE 50 MCG: 50 INJECTION, SOLUTION INTRAMUSCULAR; INTRAVENOUS at 13:55

## 2017-03-18 RX ADMIN — ACETAMINOPHEN 1000 MG: 10 INJECTION, SOLUTION INTRAVENOUS at 15:45

## 2017-03-18 RX ADMIN — GLYCOPYRROLATE 0.8 MG: 0.2 INJECTION, SOLUTION INTRAMUSCULAR; INTRAVENOUS at 13:30

## 2017-03-18 RX ADMIN — SODIUM CHLORIDE, POTASSIUM CHLORIDE, SODIUM LACTATE AND CALCIUM CHLORIDE: 600; 310; 30; 20 INJECTION, SOLUTION INTRAVENOUS at 12:35

## 2017-03-18 RX ADMIN — HYDROMORPHONE HYDROCHLORIDE 0.5 MG: 1 INJECTION, SOLUTION INTRAMUSCULAR; INTRAVENOUS; SUBCUTANEOUS at 14:25

## 2017-03-18 RX ADMIN — NEOSTIGMINE METHYLSULFATE 4 MG: 1 INJECTION INTRAMUSCULAR; INTRAVENOUS; SUBCUTANEOUS at 13:30

## 2017-03-18 RX ADMIN — FENTANYL CITRATE 50 MCG: 50 INJECTION, SOLUTION INTRAMUSCULAR; INTRAVENOUS at 10:49

## 2017-03-18 RX ADMIN — HYDROMORPHONE HYDROCHLORIDE 0.5 MG: 1 INJECTION, SOLUTION INTRAMUSCULAR; INTRAVENOUS; SUBCUTANEOUS at 02:24

## 2017-03-18 RX ADMIN — HYDROMORPHONE HYDROCHLORIDE 0.5 MG: 1 INJECTION, SOLUTION INTRAMUSCULAR; INTRAVENOUS; SUBCUTANEOUS at 15:08

## 2017-03-18 RX ADMIN — Medication 5 MG: at 11:45

## 2017-03-18 RX ADMIN — GABAPENTIN 300 MG: 300 CAPSULE ORAL at 16:48

## 2017-03-18 RX ADMIN — HYDROMORPHONE HYDROCHLORIDE 0.5 MG: 1 INJECTION, SOLUTION INTRAMUSCULAR; INTRAVENOUS; SUBCUTANEOUS at 14:15

## 2017-03-18 RX ADMIN — LIDOCAINE HYDROCHLORIDE 80 MG: 20 INJECTION, SOLUTION INFILTRATION; PERINEURAL at 11:30

## 2017-03-18 RX ADMIN — FENTANYL CITRATE 50 MCG: 50 INJECTION, SOLUTION INTRAMUSCULAR; INTRAVENOUS at 14:39

## 2017-03-18 RX ADMIN — HYDROMORPHONE HYDROCHLORIDE 0.5 MG: 1 INJECTION, SOLUTION INTRAMUSCULAR; INTRAVENOUS; SUBCUTANEOUS at 08:58

## 2017-03-18 RX ADMIN — SODIUM CHLORIDE, POTASSIUM CHLORIDE, SODIUM LACTATE AND CALCIUM CHLORIDE: 600; 310; 30; 20 INJECTION, SOLUTION INTRAVENOUS at 10:55

## 2017-03-18 RX ADMIN — MIDAZOLAM HYDROCHLORIDE 1 MG: 1 INJECTION, SOLUTION INTRAMUSCULAR; INTRAVENOUS at 11:30

## 2017-03-18 RX ADMIN — FENTANYL CITRATE 75 MCG: 50 INJECTION, SOLUTION INTRAMUSCULAR; INTRAVENOUS at 12:15

## 2017-03-18 RX ADMIN — ONDANSETRON 4 MG: 2 INJECTION INTRAMUSCULAR; INTRAVENOUS at 13:30

## 2017-03-18 RX ADMIN — MIDAZOLAM HYDROCHLORIDE 2 MG: 1 INJECTION, SOLUTION INTRAMUSCULAR; INTRAVENOUS at 10:50

## 2017-03-18 RX ADMIN — GABAPENTIN 300 MG: 300 CAPSULE ORAL at 21:45

## 2017-03-18 RX ADMIN — PIPERACILLIN SODIUM,TAZOBACTAM SODIUM 3.38 G: 3; .375 INJECTION, POWDER, FOR SOLUTION INTRAVENOUS at 21:45

## 2017-03-18 RX ADMIN — FENTANYL CITRATE 50 MCG: 50 INJECTION, SOLUTION INTRAMUSCULAR; INTRAVENOUS at 13:45

## 2017-03-18 RX ADMIN — HYDROMORPHONE HYDROCHLORIDE 0.5 MG: 1 INJECTION, SOLUTION INTRAMUSCULAR; INTRAVENOUS; SUBCUTANEOUS at 14:44

## 2017-03-18 NOTE — ANESTHESIA PREPROCEDURE EVALUATION
Anesthesia Evaluation     . Pt has had prior anesthetic.     No history of anesthetic complications     ROS/MED HX    ENT/Pulmonary:      (-) sleep apnea   Neurologic:       Cardiovascular: Comment: Peripheral vascular disezase        METS/Exercise Tolerance:     Hematologic:         Musculoskeletal:         GI/Hepatic:        (-) GERD   Renal/Genitourinary:         Endo:     (+) type II DM .      Psychiatric:         Infectious Disease:         Malignancy:         Other: Comment: Recent ischeemic optic neuropathy and vision loss 10 days after fem pop by omdimplee              Physical Exam  Normal systems: dental    Airway   Mallampati: I  TM distance: >3 FB  Neck ROM: full    Dental     Cardiovascular   Rhythm and rate: regular      Pulmonary    breath sounds clear to auscultation                    Anesthesia Plan      History & Physical Review  History and physical reviewed and following examination; no interval change.    ASA Status:  3 emergent.        Plan for General and ETT with Intravenous induction. Maintenance will be Balanced.    PONV prophylaxis:  Ondansetron (or other 5HT-3)  Additional equipment: Videolaryngoscope and Arterial Line Discussed patient with dr boyce.  Per dr boyce, hospitalist, opthalmologist and family the patient would like to have this porocedure done even though not ideal in the setting of recent eye issues and perioperative ischemic optic neuropathy.  The patient has minimal vision (sees faint shadows in one eye; and has lost vision in the other.  Per the opthalmologist this is permanent    Plan is to maintain tight blood pressure control with an warren throughout and neosynephrine infusion as needed.  The patient and family understand that even with these precautions patients can have blood pressure issues during surgery.  Risks were discussed about possible worsening vision.  Patient agrees and consents to surgery.      Postoperative Care      Consents  Anesthetic plan, risks,  benefits and alternatives discussed with:  Patient..                          .

## 2017-03-18 NOTE — PROGRESS NOTES
[facility]    Hospitalist Progress Note    Date of Service (when I saw the patient): 03/18/2017    Assessment & Plan   Gomez Turk is a 56 year old male with complicated history of type 2 diabetes, hyperlipidemia, non-healing LLE ulcers, PAD s/p fem-pop bypass and L great toe amputation on 2/28/17, tobacco dependency, who developed progressive bilateral vision loss the past 2 weeks due to perioperative ischemic optic neuropathy who now presents with nausea and vomiting, diagnosed with acute cholecystitis.     Acute cholecystitis  WBC 35.0, CT abd/pelvis showing distended gallbladder with areas of gallbladder wall thickening and surrounding inflammation concerning for acute cholecystitis.  - Dr. Hollingsworth of general surgery requested perioperative evaluation.   - From a cardiac perspective, the patient is not considered high risk for surgery. Unfortunately not much can be done to preserve his vision given the current complications and neither option is ideal nor preferred over the other from an ophthalmologic perspective.  - Discussed with ophthalmologist on call, Dr. Singh, who attempted to reach Dr. Arnoldo barber but was unable to. Dr. Singh does recommend maintaining higher blood pressures would be ideal, but the patient's vision prognosis and chance for any significant recovery in vision is very poor. Primary goal would be to retain the minimal vision he has left  - Option 1 would be to proceed with cholecystectomy understanding the patient will be prone to some fluctuations in blood pressure with general anesthesia, with hopefully quicker post-operative improvement in blood pressure with definitive management.    - Option 2 would be to pursue medical management with antibiotics and cholecystotomy tube with risk for ongoing hypotension from sepsis and of active inflammation and infection  - Discussed with Dr. Hollingsworth, tentative plan is for surgery today.   - On Zosyn  - No further  optimization is needed prior to possible surgery. Will keep NPO.  - Holding PTA ASA & Plavix (see below)      Perioperative ischemic optic neuropathy - Felt this occurred secondary to low perfusions state madyson-opoperatively.   - Diagnosed and progressively worsening over the past 2 weeks, now with no vision in the right eye and only able to see shadows on the left. Patient has been following closely with ophthalmologist Dr. Mclaughlin.  - Per opthalmologist on call, Dr. Singh, the small benefit of steroids would not be worthwhile with the risk of active infection        PAD, admitted with critical leg ischemia, s/p angioplasty followed by bypass to LLE and L great toe amputation on 2/28/17  - Routine wound cares  - Consider vascular surgery consult if questions arise  - NOTE: Currently holding Plavix and ASA. Given recent  LE ischemia, he's at higher risk for recurrent events while this is off.   - Discussed with Dr. Hollingsworth, resume plavix & aspirin tonigh 3/18 post either cholecystostomy cholecystostomy tube unless significant bleeding complication with surgery.     Hypokalemia  - placed on potassium replacement  - check magnesium and replace per protocol PRN.      Type 2 diabetes  - Hold PTA metformin and Amaryl  - Sliding scale insulin  - On PTA neurontin for diabetic peripheral neuropathy.     Lab Results   Component Value Date    A1C 6.8 02/10/2017    A1C 9.6 10/17/2016       Recent Labs  Lab 03/18/17  0615 03/18/17  0229 03/17/17  2320 03/17/17  2245 03/17/17  1700   *  --   --   --  68*   BGM  --  112* 95 54*  --      - Holding PTA metformin and glimeperide.      Prophylaxis: Pneumatic Compression Devices    Code Status: Full    Disposition: Pending surgical plan and clinical improvement.    Communication: Discussed with opthalmologist and general surgeon  on 03/18/17       Sierra Sandhu  [phone] (p)  Text Page (7AM - 6PM)     Interval History   Still having RUQ pain, controlled with dilaudid.  Trouble sleeping. Otherwise, denies SOB,CP, vomiting, diarrhea. Doing overall well,  Waiting to hear from surgery about ultimate plan. Went though everything and he demonstrates a good understanding of events.     -Data reviewed today: I reviewed all new labs and imaging results over the last 24 hours. I personally reviewed no images or EKG's today.    Physical Exam   Temp: 99.5  F (37.5  C) Temp src: Oral BP: 124/67 Pulse: 72 Heart Rate: 76 Resp: 16 SpO2: 98 % O2 Device: None (Room air)    There were no vitals filed for this visit.  Vital Signs with Ranges  Temp:  [98.9  F (37.2  C)-99.9  F (37.7  C)] 99.5  F (37.5  C)  Pulse:  [72-92] 72  Heart Rate:  [72-86] 76  Resp:  [14-16] 16  BP: ()/(46-68) 124/67  SpO2:  [96 %-100 %] 98 %  I/O last 3 completed shifts:  In: 802.08 [I.V.:802.08]  Out: -     Constitutional:  NAD,   Neuropsyche:  Alert and oriented, answers questions appropriately.   Respiratory:  Breathing comfortably, good air exchange, no wheezes, no crackles.   Cardiovascular:  Regular rate and rhythm, no edema.  GI:  soft, NT/ND, BS normal  Skin/Integumen:  No acute rash or sign of bleeding.     Medications   All medications reviewed on 3/18    IV infusion builder WITH additives 125 mL/hr at 03/17/17 0573       ceFAZolin  2 g Intravenous Pre-Op/Pre-procedure x 1 dose     ceFAZolin  1 g Intravenous See Admin Instructions     gabapentin  300 mg Oral TID     nicotine  1 patch Transdermal Daily     piperacillin-tazobactam  3.375 g Intravenous Q6H     insulin aspart  1-7 Units Subcutaneous TID AC     insulin aspart  1-5 Units Subcutaneous At Bedtime     nicotine   Transdermal Daily     nicotine   Transdermal Q8H       Data     Recent Labs  Lab 03/18/17  0615 03/17/17  1700 03/16/17  0833   WBC 28.6* 35.0* 38.5*   HGB 8.6* 9.6* 10.4*   MCV 88 88 92   * 666* 768*   INR 1.38*  --   --     131*  --    POTASSIUM 3.1* 3.7  --    CHLORIDE 101 96  --    CO2 21 23  --    BUN 13 16  --    CR 0.75 0.99   --    ANIONGAP 11 12  --    KRYSTEN 7.6* 8.4*  --    * 68*  --    ALBUMIN  --  2.4*  --    PROTTOTAL  --  8.1  --    BILITOTAL  --  0.7  --    ALKPHOS  --  303*  --    ALT  --  111*  --    AST  --  51*  --    LIPASE  --  73  --        Recent Results (from the past 24 hour(s))   CT Abdomen Pelvis w Contrast    Narrative    CT ABDOMEN AND PELVIS WITH CONTRAST 3/17/2017 6:10 PM     HISTORY: Diarrhea, recent antibiotics.    COMPARISON: None.    TECHNIQUE: Volumetric helical acquisition of CT images from the lung  bases through the symphysis pubis after the administration of 96mL  Isovue-370  intravenous contrast. Radiation dose for this scan was  reduced using automated exposure control, adjustment of the mA and/or  kV according to patient size, or iterative reconstruction technique.    FINDINGS: The gallbladder is distended, the wall is thickened, and  there is surrounding inflammatory change concerning for acute  cholecystitis. No calcified stones are seen. Noncalcified stones may  be present. The liver, spleen, adrenal glands, kidneys, and pancreas  demonstrate no worrisome focal lesion. There are no dilated loops of  small intestine or large bowel to suggest ileus or obstruction. Small  amount of free fluid. Appendix unremarkable. No hydronephrosis. There  are moderate atherosclerotic changes of the visualized aorta and its  branches. There is no evidence of aortic dissection or aneurysm. No  free air. The visualized lung bases are unremarkable. Survey of the  visualized bony structures demonstrates no destructive bony lesions.      Impression    IMPRESSION: Distended gallbladder with areas of gallbladder wall  thickening and surrounding inflammation concerning for acute  cholecystitis. No calcified stones are seen. Noncalcified stones may  be present.    AYAZ BURTON MD   US Lower Extremity Venous Duplex Left    Narrative    US LOWER EXTREMITY VENOUS DUPLEX LEFT3/17/2017 6:41 PM    HISTORY:  s/p fem pop  bilpass, eval dvt    TECHNIQUE:Venous Doppler US. Color flow and spectral Doppler with  waveform analysis performed.    COMPARISON: None.    FINDINGS: The lower extremity venous ultrasound is negative for DVT.   The veins do augment and compress normally.  No thrombus is seen.      Impression    IMPRESSION: Negative, no DVT identified in the left lower extremity.    TROY THRASHER MD

## 2017-03-18 NOTE — H&P
[facility]  History and Physical   Hospitalist Service  Marcelino Anand MD    Gomez Turk MRN# [id number]   YOB: 1960 Age: 56 year old      Date of Admission:  3/17/2017    Assessment & Plan   Gomez Turk is a 56 year old male with complicated history of type 2 diabetes, hyperlipidemia, non-healing LLE ulcers, PAD s/p fem-pop bypass and L great toe amputation on 2/28/17, tobacco dependency, who developed progressive bilateral vision loss the past 2 weeks due to perioperative ischemic optic neuropathy who now presents with nausea and vomiting, diagnosed with acute cholecystitis.    Acute cholecystitis  WBC 35.0, CT abd/pelvis showing distended gallbladder with areas of gallbladder wall thickening and surrounding inflammation concerning for acute cholecystitis.  - Dr. Hollingsworth of general surgery consulted and requesting pre-operative evaluation  - Discussed with ophthalmologist on call, Dr. Singh, who attempted to reach Dr. Arnoldo barber but was unable to.  Dr. Singh does recommend maintaining higher blood pressures would be ideal, but the patient's vision prognosis and chance for any significant recovery in vision is very poor.  Primary goal would be to retain the minimal vision he has left  - From a cardiac perspective, the patient is not considered high risk for surgery.  Unfortunately not much can be done to preserve his vision given the current complications and neither option is ideal nor preferred over the other from an ophthalmologic perspective.   - Option 1 would be to proceed with cholecystectomy understanding the patient will be prone to some fluctuations in blood pressure with general anesthesia, with hopefully quicker post-operative improvement in blood pressure with definitive management   - Option 2 would be to pursue medical management with antibiotics and cholecystotomy tube with risk for ongoing hypotension from sepsis and of active inflammation and  infection  - Zosyn  - Hold Plavix and ASA  - No further optimization is needed prior to possible surgery.  Will keep NPO.    Perioperative ischemic optic neuropathy  Diagnosed and progressively worsening over the past 2 weeks, now with no vision in the right eye and only able to see shadows on the left.  Patient has been following closely with ophthalmologist Dr. Mclaughlin.  - Per opthalmologist on call, Dr. Singh, the small benefit of steroids would not be worthwhile with the risk of active infection    PAD s/p fem-pop bypass and L great toe amputation on 2/28/17  - Routine wound cares  - Consider vascular surgery consult if questions arise    Type 2 diabetes  - Hold PTA metformin and Amaryl  - Sliding scale insulin    Prophylaxis: Pneumatic Compression Devices  Code Status: Full  Disposition: Anticipate > 2 days.  Inpatient.    Primary Care Physician   Tylor Roberts    Chief Complaint   Nausea and vomiting  History is obtained from the patient.    History of Present Illness   Gomez Turk is a 56 year old male with history of type 2 diabetes, hyperlipidemia, PAD, tobacco dependency, who presents with nausea and vomiting.  The patient was recently admitted from 2/27 - 3/7/17 for critical limb ischemia of non-healing ulcerations of the left foot s/p angioplasty on 2/27 and below-knee popliteal to dorsalis pedis nonreversed translocated greater saphenous vein bypass, debridement of ulcers and amputation of the left great toe on 2/28.  The patient did well during his hospitalization and was discharged to home.  Soon after discharge he had gradual bilateral vision loss.  He was seen urgently in eye clinic on 3/10 by Dr. Mclaughlin and diagnosed with perioperative ischemic optic neuropathy.  Workup between ED, PCP, and optho clinic have included MRI brain on 3/9 which was unremarkable without optic nerve enhancement, CTA head/neck showing age indeterminate complete occlusion of the proximal right vertebral  artery, and MRI orbits (results not in EPIC).  He followed up in optho clinic on 3/13/17, and again with his PCP yesterday.  His vision has worsened to a point were he has no vision on the right and sees only shadows on the left.    He has also had fever and chills over the past week with poor appetite.  For 3 days he has had RUQ abdominal pain and nausea and vomiting.  When he was seen in clinic yesterday, his WBC was elevated to 38.5.  He was advised to come into the emergency department where CT abd/pelvis shows acute cholecystitis.  Dr. Hollingsworth of general surgery has been consulted and recommends cholecystectomy pending pre-operative clearance.  Blood pressure has been 101/53, temp 98.9F, lactic negative.    Past Medical History    I have reviewed this patient's medical history and updated it with pertinent information if needed.   Past Medical History   Diagnosis Date     DIVERTICULOSIS OF COLON W/O BLEED 6/25/2003     Hyperlipidemia LDL goal <100 10/31/2010     Tobacco use disorder 6/25/2003     Type 2 diabetes, HbA1c goal < 7% (H) 10/31/2010     Past Surgical History   I have reviewed this patient's surgical history and updated it with pertinent information if needed.  Past Surgical History   Procedure Laterality Date     Ent surgery  1990     deviated septum repair     Irrigation and debridement foot, combined Left 2/28/2017     Procedure: COMBINED IRRIGATION AND DEBRIDEMENT FOOT;  Surgeon: Jesus Aragon MD;  Location:  OR     Bypass graft femoropopliteal Left 2/28/2017     Procedure: BYPASS GRAFT FEMOROPOPLITEAL;  Surgeon: Jesus Aragon MD;  Location:  OR     Amputate toe(s) Left 2/28/2017     Procedure: AMPUTATE TOE(S);  Surgeon: Jesus Aragon MD;  Location:  OR     Prior to Admission Medications   Prior to Admission Medications   Prescriptions Last Dose Informant Patient Reported? Taking?   Lactobacillus Rhamnosus, GG, (CULTURELLE PO)  Spouse/Significant Other Yes Yes  "  Sig: Take 1 capsule by mouth 2 times daily   acetaminophen (TYLENOL) 325 MG tablet  at PRN Spouse/Significant Other No Yes   Sig: Take 2 tablets (650 mg) by mouth every 4 hours as needed for mild pain   aspirin EC 81 MG EC tablet 3/16/2017 at AM Spouse/Significant Other No Yes   Sig: Take 1 tablet (81 mg) by mouth daily   atorvastatin (LIPITOR) 40 MG tablet 3/16/2017 at PM Spouse/Significant Other No Yes   Sig: Take 1 tablet (40 mg) by mouth daily   clopidogrel (PLAVIX) 75 MG tablet 3/16/2017 at AM Spouse/Significant Other No Yes   Sig: Take 1 tablet (75 mg) by mouth daily   gabapentin (NEURONTIN) 300 MG capsule Past Week at Unknown time Spouse/Significant Other No Yes   Sig: Take 1 capsule (300 mg) by mouth 3 times daily   glimepiride (AMARYL) 2 MG tablet 3/16/2017 at Unknown time Spouse/Significant Other No Yes   Sig: Take 1 tablet (2 mg) by mouth 2 times daily   metFORMIN (GLUCOPHAGE) 1000 MG tablet Past Week at Unknown time Spouse/Significant Other No Yes   Sig: Take 1 tablet (1,000 mg) by mouth 2 times daily (with meals)   nicotine (NICODERM CQ) 7 MG/24HR 24 hr patch 3/17/2017 at Unknown time Spouse/Significant Other No Yes   Sig: Place 1 patch onto the skin daily   order for DME  Spouse/Significant Other No No   Sig: Post of shoe   order for DME  Spouse/Significant Other No No   Sig: Equipment being ordered: [facility] Fax [phone]    Primary Dressing 4x4 non-sterile gauze   Qty 2 loafs  Secondary Dressing Kerlix wrap 4\" Qty 30  Length of Need: 1 month  Frequency of dressing change: daily   oxyCODONE (ROXICODONE) 5 MG IR tablet 3/17/2017 at Unknown time Spouse/Significant Other No Yes   Sig: Take 1-2 tablets (5-10 mg) by mouth 3 times daily   sildenafil (VIAGRA) 100 MG tablet  at PRN Spouse/Significant Other No Yes   Sig: Take 1 tablet (100 mg) by mouth daily as needed      Facility-Administered Medications: None     Allergies   Allergies   Allergen Reactions     No Known Drug Allergies  "     Social History   I have reviewed this patient's social history and updated it with pertinent information if needed.   Gomez  reports that he has quit smoking. His smoking use included Cigarettes. He smoked 0.00 packs per day. He has never used smokeless tobacco. He reports that he does not drink alcohol or use illicit drugs.    Family History   I have reviewed this patient's family history and updated it with pertinent information if needed.    Problem (# of Occurrences) Relation (Name,Age of Onset)    CANCER (1) Paternal Grandmother    DIABETES (1) Mother    Lipids (1) Mother    Neurologic Disorder (1) Maternal Uncle       Negative family history of: Glaucoma, Macular Degeneration, Retinal detachment, Thyroid Disease        Review of Systems   The 10 point Review of Systems is negative other than noted in the HPI or here.    Physical Exam   /60  Pulse 92  Temp 99.9  F (37.7  C) (Oral)  Resp 14  SpO2 100%  Constitutional: Awake, NAD  HEENT: NC, AT, bilateral vision loss  Cardiovascular: S1, S2, regular rhythm  Respiratory: CTAB, no crackles  Gastrointestinal: Soft, RUQ tenderness, ND, no rebound  Neurologic: No focal deficits, oriented  Psychiatric: Appropriate affect  Lymphatic: No edema  Skin: LLE in dressing    Data   All pertinent laboratory and imaging results from this encounter were personally reviewed.    Recent Labs  Lab 03/17/17  1715 03/17/17  1700 03/16/17  0833   WBC  --  35.0* 38.5*   HGB  --  9.6* 10.4*   MCV  --  88 92   PLT  --  666* 768*   NA  --  131*  --    POTASSIUM  --  3.7  --    CHLORIDE  --  96  --    CO2  --  23  --    BUN  --  16  --    CR  --  0.99  --    ANIONGAP  --  12  --    KRYSTEN  --  8.4*  --    GLC  --  68*  --    LACT 1.0  --   --    ALBUMIN  --  2.4*  --    PROTTOTAL  --  8.1  --    BILITOTAL  --  0.7  --    ALKPHOS  --  303*  --    ALT  --  111*  --    AST  --  51*  --    LIPASE  --  73  --        Recent Results (from the past 24 hour(s))   CT Abdomen Pelvis w Contrast     Narrative    CT ABDOMEN AND PELVIS WITH CONTRAST 3/17/2017 6:10 PM     HISTORY: Diarrhea, recent antibiotics.    COMPARISON: None.    TECHNIQUE: Volumetric helical acquisition of CT images from the lung  bases through the symphysis pubis after the administration of 96mL  Isovue-370  intravenous contrast. Radiation dose for this scan was  reduced using automated exposure control, adjustment of the mA and/or  kV according to patient size, or iterative reconstruction technique.    FINDINGS: The gallbladder is distended, the wall is thickened, and  there is surrounding inflammatory change concerning for acute  cholecystitis. No calcified stones are seen. Noncalcified stones may  be present. The liver, spleen, adrenal glands, kidneys, and pancreas  demonstrate no worrisome focal lesion. There are no dilated loops of  small intestine or large bowel to suggest ileus or obstruction. Small  amount of free fluid. Appendix unremarkable. No hydronephrosis. There  are moderate atherosclerotic changes of the visualized aorta and its  branches. There is no evidence of aortic dissection or aneurysm. No  free air. The visualized lung bases are unremarkable. Survey of the  visualized bony structures demonstrates no destructive bony lesions.      Impression    IMPRESSION: Distended gallbladder with areas of gallbladder wall  thickening and surrounding inflammation concerning for acute  cholecystitis. No calcified stones are seen. Noncalcified stones may  be present.   US Lower Extremity Venous Duplex Left    Narrative    US LOWER EXTREMITY VENOUS DUPLEX LEFT3/17/2017 6:41 PM    HISTORY:  s/p fem pop bilpass, eval dvt    TECHNIQUE:Venous Doppler US. Color flow and spectral Doppler with  waveform analysis performed.    COMPARISON: None.    FINDINGS: The lower extremity venous ultrasound is negative for DVT.   The veins do augment and compress normally.  No thrombus is seen.      Impression    IMPRESSION: Negative, no DVT identified in  the left lower extremity.

## 2017-03-18 NOTE — ANESTHESIA PROCEDURE NOTES
ARTERIAL LINE PROCEDURE NOTE:   Pre-Procedure  Performed by BEATRIZ BALLARD  Location: pre-op      Pre-Anesthestic Checklist: patient identified, IV checked, risks and benefits discussed and informed consent    Timeout  Correct Patient: Yes   Correct Procedure: Yes   Correct Site: Yes   Correct Laterality: N/A   Correct Position: Yes   Site Marked: N/A   .   Procedure Documentation  Procedure: arterial line    Supine  Insertion Site:right, radial.Skin infiltrated with mL of 1% lidocaine. Injection technique: Seldinger Technique  .  .  Patient Prep;all elements of maximal sterile barrier technique followed, mask, hat, sterile gown, sterile gloves, draped, hand hygiene, chlorhexidine gluconate and isopropyl alcohol, patient draped    Assessment/Narrative    Catheter: 20 gauge, 12 cm     Secured by suture  Tegaderm dressing used.    Arterial waveform: Yes     Comments:  Arterial Line  No complications

## 2017-03-18 NOTE — ANESTHESIA CARE TRANSFER NOTE
Patient: Gomez Turk    Procedure(s):  LAPAROSCOPIC CHOLECYSTECTOMY - Wound Class: II-Clean Contaminated    Diagnosis: UNKNOWN  Diagnosis Additional Information: No value filed.    Anesthesia Type:   No value filed.     Note:  Airway :Nasal Cannula  Patient transferred to:PACU  Comments: Transferred to PACU  Awake  Breathing adequactely by self  Report to [facility]  RN      Vitals: (Last set prior to Anesthesia Care Transfer)    CRNA VITALS  3/18/2017 1315 - 3/18/2017 1357      3/18/2017             Pulse: 90    ART BP: 137/54    ART Mean: 82    SpO2: 98 %    Resp Rate (set): 10                Electronically Signed By: COOKIE Sutherland CRNA  March 18, 2017  1:57 PM

## 2017-03-18 NOTE — PROVIDER NOTIFICATION
Call on call MD regarding pt Low blood sugar,54 and PRN meds given.Blood sugar went up 94.MD order new IVF fluids.Pls see orders.

## 2017-03-18 NOTE — ED NOTES
[facility]  ED Nurse Handoff Report    ED Chief complaint: Abnormal Labs (high WBC)      ED Diagnosis:   Final diagnoses:   Acute cholecystitis       Code Status: Full Code    Allergies:   Allergies   Allergen Reactions     No Known Drug Allergies        Activity level:  Independent     Needed?: No    Isolation: No  Infection: Not Applicable    Bariatric?: No      Vital Signs:   Vitals:    03/17/17 1617 03/17/17 1728 03/17/17 1730 03/17/17 1745   BP: 92/50 120/60 110/62 111/60   Pulse:       Resp:  14     Temp:       TempSrc:       SpO2:           Cardiac Rhythm: ,        Pain level:      Is this patient confused?: No    Patient Report: Initial Complaint: For detailed report please read MD note. Patient sent from clinic for work-up of WBC- 38. Patient c/o abdominal pain since surgery for his foot, vascular bypass  Focused Assessment: No complaints except mild abdominal pain  Tests Performed: labs, CT, US  Abnormal Results: CT- angelika  Treatments provided: 1L NS, IV abx    Family Comments: wife at bedside    OBS brochure/video discussed/provided to patient: N/A    ED Medications:   Medications   cefoTEtan (CEFOTAN) 1 g vial to attach to  ml bag (not administered)   0.9% sodium chloride BOLUS (0 mLs Intravenous Stopped 3/17/17 1818)   iopamidol (ISOVUE-370) solution 96 mL (96 mLs Intravenous Given 3/17/17 1800)   sodium chloride 0.9 % for CT scan flush dose 69 mL (69 mLs Intravenous Given 3/17/17 1801)       Drips infusing?:  No      ED NURSE PHONE NUMBER: [phone]

## 2017-03-18 NOTE — OP NOTE
General Surgery Operative Note    PREOPERATIVE DIAGNOSIS:  Acute calculus cholecystitis    POSTOPERATIVE DIAGNOSIS:  same    PROCEDURE:  LAPAROSCOPIC CHOLECYSTECTOMY    Difficulty: Extremely difficult with a 22 modifier owing to acute cholecystitis and dense intra-abdominal adhesions. Patient is also on Plavix.    ANESTHESIA:  General.    PREOPERATIVE MEDICATIONS:  Ancef IV.    SURGEON:  Edin Hollingsworth MD    ASSISTANT:  Natalia Guan PA-C  A first assistant was necessary owing to challenging laparoscopic visualization and exposure.  Retraction was also necessary.    INDICATIONS:  CT scan and white blood cell count suggestive of acute cholecystitis.    PROCEDURE:  The patient was taken to the operating suite and uneventfully endotracheally intubated.  The abdomen was prepped and draped in a sterile fashion.  Surgeon initiated timeout was acknowledged.  We entered the abdomen in the left upper quadrant using Visiport technique.  Three other trocars were placed under laparoscopic visualization.  We elevated the liver and were able to identify a densely inflamed gallbladder.  Significant omental adhesions were taken down from the outside of the gallbladder using accommodation of sharp and blunt dissection.  The gallbladder was grasped and used to elevate the liver further.  We began dissecting out some fatty adhesions down near the neck of the gallbladder until a cystic duct was encountered.  We continued our dissection using combination of sharp and blunt dissection until the cystic duct was largely dissected out.  We continued our dissection up along the sides of the gallbladder, both medially and laterally, until we had created a space between the gallbladder and the liver.  At this point, we encountered the cystic artery, just posterior and lateral to the cystic duct.  This again was dissected out.  Once we had created a window where only the cystic artery and duct were noted to be entering the gallbladder, we felt  that this represented our critical view.  The cystic artery and duct were then doubly clipped and divided.  We continued our dissection up along the body of the gallbladder, freeing all attachments and adhesions of the gallbladder to the liver.  Gallbladder was removed from the liver in an atraumatic fashion.  A remnant of the posterior wall of the gallbladder remained adherent to the liver bed and this was taken out separately and removed.  The gallbladder was then brought up through the umbilical port site and removed from the abdomen.  The gallbladder fossa was reinspected, and all areas of bleeding were managed with electrocautery.  We irrigated the area with normal saline and aspirated it out.  A 15 round VIOLETTA drain was placed in the right upper quadrant of the gallbladder fossa.  We then removed the umbilical port trocar and closed the fascia with a figure-of-eight 0 Vicryl suture.  This was done using the Best-More device.  We then reinspected the abdomen, and everything appeared to be in pristine condition.  We removed the trocars under laparoscopic visualization and desufflated the abdomen with the [place] suction .  The skin edges were reapproximated with 4-0 Vicryl and Steri-Strips.  The patient was uneventfully extubated, awakened and taken to the PACU in stable condition.  At the conclusion of the case, all lap and needle counts were correct.      ESTIMATED BLOOD LOSS:  250 mL    INTRAOPERATIVE FINDINGS:  Acute gangrenous cholecystitis    Edin Hollingsworth MD, MD

## 2017-03-18 NOTE — ADDENDUM NOTE
Addendum  created 03/18/17 1456 by Trudy Ron    Anesthesia Intra Blocks edited, Child order released for a procedure order, Sign clinical note

## 2017-03-18 NOTE — ANESTHESIA POSTPROCEDURE EVALUATION
Patient: Gomez Turk    Procedure(s):  LAPAROSCOPIC CHOLECYSTECTOMY - Wound Class: II-Clean Contaminated    Diagnosis:UNKNOWN  Diagnosis Additional Information: No value filed.    Anesthesia Type:  No value filed.    Note:  Anesthesia Post Evaluation    Patient location during evaluation: PACU  Patient participation: Able to fully participate in evaluation  Level of consciousness: awake  Pain management: adequate  Airway patency: patent  Cardiovascular status: acceptable  Respiratory status: acceptable  Hydration status: acceptable  PONV: none     Anesthetic complications: None    Comments: Doing well, no post operative visison changes per patient        Last vitals:  Vitals:    03/18/17 1350 03/18/17 1410 03/18/17 1420   BP: 131/68 123/59 115/69   Pulse:      Resp: 18 24 16   Temp: 36.8  C (98.3  F)     SpO2: 97% 97% 95%         Electronically Signed By: Trudy Ron  March 18, 2017  2:52 PM

## 2017-03-18 NOTE — BRIEF OP NOTE
General Surgery Brief Operative Note    Pre-operative diagnosis: Acute cholecystitis   Post-operative diagnosis Acute gangrenous cholecystitis   Procedure: Procedure(s):  LAPAROSCOPIC CHOLECYSTECTOMY - Wound Class: II-Clean Contaminated    Surgeon(s), Assistant(s): Surgeon(s) and Role:     * Edin Hollingsworth MD - Primary     * Natalia Guan PA-C - Assisting   Estimated blood loss: 250 mL   Drains: VIOLETTA   Specimens:   ID Type Source Tests Collected by Time Destination   A : GALLBLADDER AND CONTENTS Tissue Gallbladder and Contents SURGICAL PATHOLOGY EXAM Edin Hollingsworth MD 3/18/2017  1:01 PM       Findings: See postop diagnosis   Condition: Stable   Comments:      Natalia Guan PA-C See dictated operative report for full details

## 2017-03-18 NOTE — PHARMACY-ADMISSION MEDICATION HISTORY
Admission Medication History    Admission medication history interview status for the 3/17/2017 admission is complete. See EPIC admission navigator for prior to admission medications     Medication history source reliability:Good    Actions taken by pharmacist (provider contacted, etc):None     Additional medication history information not noted on PTA med list :None    Medication reconciliation/reorder completed by provider prior to medication history? No    Time spent in this activity: 15 minutes    Prior to Admission medications    Medication Sig Last Dose Taking? Auth Provider   Lactobacillus Rhamnosus, GG, (CULTURELLE PO) Take 1 capsule by mouth 2 times daily  Yes Unknown, Entered By History   oxyCODONE (ROXICODONE) 5 MG IR tablet Take 1-2 tablets (5-10 mg) by mouth 3 times daily 3/17/2017 at Unknown time Yes Cristóbal Hill PA-C   acetaminophen (TYLENOL) 325 MG tablet Take 2 tablets (650 mg) by mouth every 4 hours as needed for mild pain  at PRN Yes Valentine Cardenas MD   aspirin EC 81 MG EC tablet Take 1 tablet (81 mg) by mouth daily 3/16/2017 at AM Yes Valentine Cardenas MD   gabapentin (NEURONTIN) 300 MG capsule Take 1 capsule (300 mg) by mouth 3 times daily Past Week at Unknown time Yes Valentine Cardenas MD   atorvastatin (LIPITOR) 40 MG tablet Take 1 tablet (40 mg) by mouth daily 3/16/2017 at PM Yes Valentine Cardenas MD   clopidogrel (PLAVIX) 75 MG tablet Take 1 tablet (75 mg) by mouth daily 3/16/2017 at AM Yes Valentine Cardenas MD   nicotine (NICODERM CQ) 7 MG/24HR 24 hr patch Place 1 patch onto the skin daily 3/17/2017 at Unknown time Yes Valentine Cardenas MD   metFORMIN (GLUCOPHAGE) 1000 MG tablet Take 1 tablet (1,000 mg) by mouth 2 times daily (with meals) Past Week at Unknown time Yes Tylor Roberts MD   glimepiride (AMARYL) 2 MG tablet Take 1 tablet (2 mg) by mouth 2 times daily 3/16/2017 at Unknown time Yes Tylor Roberts MD   sildenafil (VIAGRA)  "100 MG tablet Take 1 tablet (100 mg) by mouth daily as needed  at PRN Yes Tylor Roberts MD   order for DME Equipment being ordered: [facility] Fax [phone]    Primary Dressing 4x4 non-sterile gauze   Qty 2 loafs  Secondary Dressing Kerlix wrap 4\" Qty 30  Length of Need: 1 month  Frequency of dressing change: daily   Nivia Bell DPM, Podiatry/Foot and Ankle Surgery   order for DME Post of [facility] Bowman, GEOVANI Rush, PharmD      "

## 2017-03-18 NOTE — PLAN OF CARE
Problem: Goal Outcome Summary  Goal: Goal Outcome Summary  Outcome: No Change  A/OX4,cms intact.Has a vision impairment after his last surgery.Pain control with IV diluadid.IVF infusing.NPO from midnight.Up with SBA,use walker at home.Voiding per urinal.Incision from pervious femoral popliteal bypass surgery CDI.Diabetic,plan to have surgery today if everything okay.Will continue to monitor.

## 2017-03-18 NOTE — CONSULTS
Surgery Consultation, [facility]         Edin Hollingsworth MD    Gomez Turk MRN# [id number]   YOB: 1960 Age: 56 year old     PCP:  Tylor Roberts [phone]    Chief Complaint:  Right upper quadrant pain, nausea    History of Present Illness:  Gomez Turk is a 56 year old male who presented with several episodes of upper abdominal pain and intermittent nausea, usually after eating.  Commonly associated with eating greasy foods.  Patient's recent medical history is very complicated, this included a femoral to popliteal bypass as well as toe debridements. Patient has extensive vascular disease was also seen for acute vision loss following surgery. This was a nonsurgical problem but resulted in complete loss of vision in the right eye and significant loss in the left. Began having significant upper abdominal pain earlier in the week and now presents to the emergency department. Was seen by his primary care doctor yesterday and lab work revealed a white blood cell count of 38. Today his blood cell count is 35 and CT scan shows distended gallbladder suggestive of cholecystitis.    PMH:  Gomez Turk  has a past medical history of DIVERTICULOSIS OF COLON W/O BLEED (6/25/2003); Hyperlipidemia LDL goal <100 (10/31/2010); Tobacco use disorder (6/25/2003); and Type 2 diabetes, HbA1c goal < 7% (H) (10/31/2010).  PSH:  Gomez Turk  has a past surgical history that includes ENT surgery (1990); Irrigation and debridement foot, combined (Left, 2/28/2017); Bypass graft femoropopliteal (Left, 2/28/2017); and Amputate toe(s) (Left, 2/28/2017).    Home medications and allergies reviewed.    Social History:  Gomez Turk  reports that he has quit smoking. His smoking use included Cigarettes. He smoked 0.00 packs per day. He has never used smokeless tobacco. He reports that he does not drink alcohol or use illicit drugs.  Family History:  Gomez Turk family history  includes CANCER in his paternal grandmother; DIABETES in his mother; Lipids in his mother; Neurologic Disorder in his maternal uncle. There is no history of Glaucoma, Macular Degeneration, Retinal detachment, or Thyroid Disease.    ROS:  The 10 point Review of Systems is negative other than noted in the HPI.  Admits to nausea and abdominal pain. Some lightheadedness and dizziness..    Physical Exam:  Blood pressure 101/53, pulse 92, temperature 98.9  F (37.2  C), temperature source Oral, resp. rate 16, SpO2 97 %.  0 lbs 0 oz  Large gentleman in no distress.  Patient has a pleasant affect, speaks without difficulty.   Vision very poor. No site of the right eye, minimal site from left.  No cervical lymphadenopathy or thyromegaly.   Lung fields clear, breathing comfortably.   Heart normal sinus rhythm.  No murmurs rubs or gallops.  Abdomen soft, significantly tender in the right upper quadrant. Mild distention. No peritoneal signs or rebound. Suggestion of palpable gallbladder below the right ribs.  Skin warm, dry, without rashes or lesions.    All new lab and imaging data was reviewed.  Abdominal CT shows gallbladder with distention, wall thickening, and stones.     Assessment/plan:  Gomez Turk is a 56 year old male with signs and symptoms suggesting acute cholecystitis. This is consistent with his symptoms but the timing is unfortunate, given his recent surgery. In any event, I think he would do well with admission to the hospital, IV fluids and antibiotics, we will get him on the schedule for laparoscopic cholecystectomy in the morning.  However, given the concerns about his vision in the fear of potential worsening or complete loss of what vision he has left, I would want to make sure his vision was stabilized and general anesthesia did not present a significant risk for worsening.  If we were concerned about this, I would advocate IV antibiotics and perhaps a cholecystostomy tube for now.    Chris Hollingsworth,  M.D.  [facility]  [phone]    Please route or send letter to:  Primary Care Provider (PCP) and Referring Provider

## 2017-03-19 LAB
ALBUMIN SERPL-MCNC: 2.2 G/DL (ref 3.4–5)
ALP SERPL-CCNC: 285 U/L (ref 40–150)
ALT SERPL W P-5'-P-CCNC: 99 U/L (ref 0–70)
ANION GAP SERPL CALCULATED.3IONS-SCNC: 9 MMOL/L (ref 3–14)
AST SERPL W P-5'-P-CCNC: 109 U/L (ref 0–45)
BILIRUB DIRECT SERPL-MCNC: 0.4 MG/DL (ref 0–0.2)
BILIRUB SERPL-MCNC: 0.7 MG/DL (ref 0.2–1.3)
BUN SERPL-MCNC: 9 MG/DL (ref 7–30)
CALCIUM SERPL-MCNC: 7.9 MG/DL (ref 8.5–10.1)
CHLORIDE SERPL-SCNC: 103 MMOL/L (ref 94–109)
CO2 SERPL-SCNC: 21 MMOL/L (ref 20–32)
CREAT SERPL-MCNC: 0.74 MG/DL (ref 0.66–1.25)
ERYTHROCYTE [DISTWIDTH] IN BLOOD BY AUTOMATED COUNT: 13.7 % (ref 10–15)
GFR SERPL CREATININE-BSD FRML MDRD: ABNORMAL ML/MIN/1.7M2
GLUCOSE BLDC GLUCOMTR-MCNC: 105 MG/DL (ref 70–99)
GLUCOSE BLDC GLUCOMTR-MCNC: 79 MG/DL (ref 70–99)
GLUCOSE BLDC GLUCOMTR-MCNC: 85 MG/DL (ref 70–99)
GLUCOSE BLDC GLUCOMTR-MCNC: 98 MG/DL (ref 70–99)
GLUCOSE SERPL-MCNC: 91 MG/DL (ref 70–99)
HCT VFR BLD AUTO: 26.3 % (ref 40–53)
HGB BLD-MCNC: 9 G/DL (ref 13.3–17.7)
MCH RBC QN AUTO: 29.8 PG (ref 26.5–33)
MCHC RBC AUTO-ENTMCNC: 34.2 G/DL (ref 31.5–36.5)
MCV RBC AUTO: 87 FL (ref 78–100)
PLATELET # BLD AUTO: 589 10E9/L (ref 150–450)
POTASSIUM SERPL-SCNC: 3.8 MMOL/L (ref 3.4–5.3)
PROT SERPL-MCNC: 7.2 G/DL (ref 6.8–8.8)
RBC # BLD AUTO: 3.02 10E12/L (ref 4.4–5.9)
SODIUM SERPL-SCNC: 133 MMOL/L (ref 133–144)
WBC # BLD AUTO: 17.5 10E9/L (ref 4–11)

## 2017-03-19 PROCEDURE — 25000132 ZZH RX MED GY IP 250 OP 250 PS 637: Performed by: INTERNAL MEDICINE

## 2017-03-19 PROCEDURE — 85027 COMPLETE CBC AUTOMATED: CPT | Performed by: PHYSICIAN ASSISTANT

## 2017-03-19 PROCEDURE — 99233 SBSQ HOSP IP/OBS HIGH 50: CPT | Performed by: INTERNAL MEDICINE

## 2017-03-19 PROCEDURE — 12000007 ZZH R&B INTERMEDIATE

## 2017-03-19 PROCEDURE — 80076 HEPATIC FUNCTION PANEL: CPT | Performed by: PHYSICIAN ASSISTANT

## 2017-03-19 PROCEDURE — 25000128 H RX IP 250 OP 636: Performed by: INTERNAL MEDICINE

## 2017-03-19 PROCEDURE — 25000125 ZZHC RX 250: Performed by: PHYSICIAN ASSISTANT

## 2017-03-19 PROCEDURE — 25000132 ZZH RX MED GY IP 250 OP 250 PS 637: Performed by: SURGERY

## 2017-03-19 PROCEDURE — 00000146 ZZHCL STATISTIC GLUCOSE BY METER IP

## 2017-03-19 PROCEDURE — 80048 BASIC METABOLIC PNL TOTAL CA: CPT | Performed by: PHYSICIAN ASSISTANT

## 2017-03-19 PROCEDURE — 36415 COLL VENOUS BLD VENIPUNCTURE: CPT | Performed by: PHYSICIAN ASSISTANT

## 2017-03-19 RX ORDER — ATORVASTATIN CALCIUM 40 MG/1
40 TABLET, FILM COATED ORAL EVERY EVENING
Status: DISCONTINUED | OUTPATIENT
Start: 2017-03-19 | End: 2017-03-20 | Stop reason: HOSPADM

## 2017-03-19 RX ORDER — ASPIRIN 81 MG/1
81 TABLET ORAL DAILY
Status: DISCONTINUED | OUTPATIENT
Start: 2017-03-19 | End: 2017-03-20 | Stop reason: HOSPADM

## 2017-03-19 RX ORDER — CELECOXIB 100 MG/1
100 CAPSULE ORAL 2 TIMES DAILY PRN
Status: DISCONTINUED | OUTPATIENT
Start: 2017-03-19 | End: 2017-03-19

## 2017-03-19 RX ORDER — CELECOXIB 200 MG/1
200 CAPSULE ORAL 2 TIMES DAILY PRN
Status: DISCONTINUED | OUTPATIENT
Start: 2017-03-19 | End: 2017-03-19

## 2017-03-19 RX ORDER — CLOPIDOGREL BISULFATE 75 MG/1
75 TABLET ORAL DAILY
Status: DISCONTINUED | OUTPATIENT
Start: 2017-03-19 | End: 2017-03-20 | Stop reason: HOSPADM

## 2017-03-19 RX ORDER — DOCUSATE SODIUM 100 MG/1
100 CAPSULE, LIQUID FILLED ORAL 2 TIMES DAILY
Status: DISCONTINUED | OUTPATIENT
Start: 2017-03-19 | End: 2017-03-20 | Stop reason: HOSPADM

## 2017-03-19 RX ORDER — CELECOXIB 200 MG/1
200 CAPSULE ORAL 2 TIMES DAILY PRN
Status: DISCONTINUED | OUTPATIENT
Start: 2017-03-19 | End: 2017-03-20 | Stop reason: HOSPADM

## 2017-03-19 RX ADMIN — NICOTINE 1 PATCH: 7 PATCH, EXTENDED RELEASE TRANSDERMAL at 00:52

## 2017-03-19 RX ADMIN — HYDROMORPHONE HYDROCHLORIDE: 10 INJECTION, SOLUTION INTRAMUSCULAR; INTRAVENOUS; SUBCUTANEOUS at 10:53

## 2017-03-19 RX ADMIN — ASPIRIN 81 MG: 81 TABLET, COATED ORAL at 10:30

## 2017-03-19 RX ADMIN — HYDROMORPHONE HYDROCHLORIDE: 10 INJECTION, SOLUTION INTRAMUSCULAR; INTRAVENOUS; SUBCUTANEOUS at 14:12

## 2017-03-19 RX ADMIN — HYDROMORPHONE HYDROCHLORIDE: 10 INJECTION, SOLUTION INTRAMUSCULAR; INTRAVENOUS; SUBCUTANEOUS at 19:05

## 2017-03-19 RX ADMIN — ATORVASTATIN CALCIUM 40 MG: 40 TABLET, FILM COATED ORAL at 22:41

## 2017-03-19 RX ADMIN — CLOPIDOGREL BISULFATE 75 MG: 75 TABLET, FILM COATED ORAL at 10:31

## 2017-03-19 RX ADMIN — GABAPENTIN 300 MG: 300 CAPSULE ORAL at 10:30

## 2017-03-19 RX ADMIN — HYDROMORPHONE HYDROCHLORIDE: 10 INJECTION, SOLUTION INTRAMUSCULAR; INTRAVENOUS; SUBCUTANEOUS at 06:53

## 2017-03-19 RX ADMIN — HYDROMORPHONE HYDROCHLORIDE: 10 INJECTION, SOLUTION INTRAMUSCULAR; INTRAVENOUS; SUBCUTANEOUS at 00:57

## 2017-03-19 RX ADMIN — POTASSIUM CHLORIDE AND SODIUM CHLORIDE: 900; 150 INJECTION, SOLUTION INTRAVENOUS at 12:34

## 2017-03-19 RX ADMIN — PIPERACILLIN SODIUM,TAZOBACTAM SODIUM 3.38 G: 3; .375 INJECTION, POWDER, FOR SOLUTION INTRAVENOUS at 10:27

## 2017-03-19 RX ADMIN — POTASSIUM CHLORIDE AND SODIUM CHLORIDE: 900; 150 INJECTION, SOLUTION INTRAVENOUS at 20:03

## 2017-03-19 RX ADMIN — DOCUSATE SODIUM 100 MG: 100 CAPSULE, LIQUID FILLED ORAL at 12:35

## 2017-03-19 RX ADMIN — Medication 1 MG: at 22:41

## 2017-03-19 RX ADMIN — POTASSIUM CHLORIDE AND SODIUM CHLORIDE: 900; 150 INJECTION, SOLUTION INTRAVENOUS at 03:37

## 2017-03-19 RX ADMIN — GABAPENTIN 300 MG: 300 CAPSULE ORAL at 19:02

## 2017-03-19 RX ADMIN — GABAPENTIN 300 MG: 300 CAPSULE ORAL at 22:41

## 2017-03-19 RX ADMIN — ACETAMINOPHEN 650 MG: 325 TABLET, FILM COATED ORAL at 00:52

## 2017-03-19 RX ADMIN — DOCUSATE SODIUM 100 MG: 100 CAPSULE, LIQUID FILLED ORAL at 22:41

## 2017-03-19 RX ADMIN — PIPERACILLIN SODIUM,TAZOBACTAM SODIUM 3.38 G: 3; .375 INJECTION, POWDER, FOR SOLUTION INTRAVENOUS at 03:37

## 2017-03-19 NOTE — PROGRESS NOTES
[facility]    Hospitalist Progress Note    Date of Service (when I saw the patient): 03/19/2017    Assessment & Plan   Gomez Turk is a 56 year old male with complicated history of type 2 diabetes, hyperlipidemia, non-healing LLE ulcers, PAD s/p fem-pop bypass and L great toe amputation on 2/28/17, tobacco dependency, who developed progressive bilateral vision loss the past 2 weeks due to perioperative ischemic optic neuropathy who now presents with nausea and vomiting, diagnosed with acute cholecystitis.     Acute gangrenous cholecystitis  WBC 35.0, CT abd/pelvis showing distended gallbladder with areas of gallbladder wall thickening and surrounding inflammation concerning for acute cholecystitis.  - S/p laparoscopy cholecystectomy by Dr. Hollingsworth on 3/18. Difficult case, drain left in place.   - Continues on zosyn given gangrenous cholecystitis.     Pain control   - Continues with a lot of pain, transitioned to PCA evening of 3/18  - Added celebrex @ 200 mg BID PRN. Chose this over other NSAIDS, because he is on ASA and plavix for PAD. Use tylenol as well.     FEN  - Continue on NS + 20 KCl @ 125 cc/h. bp soft and minimal appetite on 3/19   - Fluid bolus ordered PRN for SBP < 100.   - ADAT     Perioperative ischemic optic neuropathy - Felt this occurred secondary to low perfusions state madyson-opoperatively.   - Diagnosed and progressively worsening over the past 2 weeks, now with no vision in the right eye and only able to see shadows on the left. Patient has been following closely with ophthalmologist Dr. Mclaughlin.  - Per opthalmologist on call, Dr. Singh, the small benefit of steroids would not be worthwhile with the risk of active infection     PAD, admitted with critical leg ischemia, s/p angioplasty followed by bypass to LLE and L great toe amputation on 2/28/17  - Routine wound cares  - Consider vascular surgery consult if questions arise  - Resumed Plavix and ASA on 3/18.   - Dressing  changes daily ordered.     Hypokalemia, resolved  - placed on potassium replacement  - check magnesium and replace per protocol PRN.      Type 2 diabetes  - Hold PTA metformin and Amaryl  - Sliding scale insulin  - On PTA neurontin for diabetic peripheral neuropathy.     Lab Results   Component Value Date    A1C 6.8 02/10/2017    A1C 9.6 10/17/2016       Recent Labs  Lab 03/19/17  1222 03/19/17  0615 03/19/17  0102 03/18/17  2108 03/18/17  1739 03/18/17  1408 03/18/17  1004 03/18/17  0615  03/17/17  1700   GLC  --  91  --   --   --   --   --  109*  --  68*   BGM 79  --  105* 121* 163* 151* 138*  --   < >  --    < > = values in this interval not displayed.  - Holding PTA metformin and glimeperide.   - Advancing diet slowly     Prophylaxis: Pneumatic Compression Devices, ASA, plavix    Insominia: Ordered melatonin    Code Status: Full    Disposition: Hopefully can transition to PO medications tomorrow and discharge home.        Sierra Sandhu  [phone] (p)  Text Page (7AM - 6PM)     Interval History   As above. Continued pain is his biggest complaint. Passing gas and had small BM yesterday. Advancing diet slowly. Continues to have trouble sleeping. Forgot to order melatonin, so ordered as scheduled.     -Data reviewed today: I reviewed all new labs and imaging results over the last 24 hours. I personally reviewed no images or EKG's today.    Physical Exam   Temp: 98.4  F (36.9  C) Temp src: Oral BP: 122/62 Pulse: 78 Heart Rate: 78 Resp: 18 SpO2: 95 % O2 Device: None (Room air) Oxygen Delivery: 3 LPM  There were no vitals filed for this visit.  Vital Signs with Ranges  Temp:  [97.7  F (36.5  C)-98.4  F (36.9  C)] 98.4  F (36.9  C)  Pulse:  [60-78] 78  Heart Rate:  [62-78] 78  Resp:  [16-18] 18  BP: ()/(51-70) 122/62  SpO2:  [95 %-100 %] 95 %  I/O last 3 completed shifts:  In: 2920 [P.O.:320; I.V.:2600]  Out: 1170 [Urine:1100; Drains:70]    Constitutional:  NAD,   Neuropsyche:  Alert and oriented, answers  questions appropriately. Blind.   Respiratory:  Breathing comfortably, good air exchange, no wheezes, no crackles.   Cardiovascular:  Regular rate and rhythm, no edema.  GI:  Soft, incisions clean and dry, BS normal, TTP localized to RUQ without guarding or rebound.   Skin/Integumen:  No acute rash or sign of bleeding.     Medications   All medications reviewed on 3/18    0.9% sodium chloride + KCl 20 mEq/L 125 mL/hr at 03/19/17 1234       aspirin  81 mg Oral Daily     clopidogrel  75 mg Oral Daily     atorvastatin  40 mg Oral QPM     docusate sodium  100 mg Oral BID     melatonin  1 mg Oral At Bedtime     HYDROmorphone   Intravenous PCA     gabapentin  300 mg Oral TID     nicotine  1 patch Transdermal Daily     piperacillin-tazobactam  3.375 g Intravenous Q6H     insulin aspart  1-7 Units Subcutaneous TID AC     insulin aspart  1-5 Units Subcutaneous At Bedtime     nicotine   Transdermal Daily     nicotine   Transdermal Q8H       Data     Recent Labs  Lab 03/19/17  0615 03/18/17  1808 03/18/17  0615 03/17/17  1700   WBC 17.5*  --  28.6* 35.0*   HGB 9.0*  --  8.6* 9.6*   MCV 87  --  88 88   *  --  568* 666*   INR  --   --  1.38*  --      --  133 131*   POTASSIUM 3.8 3.7 3.1* 3.7   CHLORIDE 103  --  101 96   CO2 21  --  21 23   BUN 9  --  13 16   CR 0.74  --  0.75 0.99   ANIONGAP 9  --  11 12   KRYSTEN 7.9*  --  7.6* 8.4*   GLC 91  --  109* 68*   ALBUMIN 2.2*  --   --  2.4*   PROTTOTAL 7.2  --   --  8.1   BILITOTAL 0.7  --   --  0.7   ALKPHOS 285*  --   --  303*   ALT 99*  --   --  111*   *  --   --  51*   LIPASE  --   --   --  73       No results found for this or any previous visit (from the past 24 hour(s)).

## 2017-03-19 NOTE — PLAN OF CARE
Problem: Goal Outcome Summary  Goal: Goal Outcome Summary  Outcome: Improving  Pt A/O x4. Impaired vision.Up Ax1 GB and walker. Voiding per urinal.  VSS. BP soft. CMS Baseline loss of sensation to LLE. Drsg reinforced. PCA controlling pain. Lap site x3 C/D/I. VIOLETTA in place and patent. abd distended. Nicotene patch in place RUE. BGM. Bypass incision approximated. C/D/I FUNMILAYO. Tolerating clear liq diet. Denies N/V. Will cont to monitor.

## 2017-03-19 NOTE — PROGRESS NOTES
Surgery Postop Note    Pt is s/p lap angelika.  Difficult case, drain left in place.  Pretty lethargic but pain improved with PCA.  Probably needs one more day; advance diet, try to transition to PO pain meds.    Chris Hollingsworth M.D.  [facility]  [phone]

## 2017-03-19 NOTE — CONSULTS
"CLINICAL NUTRITION SERVICES  -  ASSESSMENT NOTE      RECOMMENDATIONS FOR MD/PROVIDER TO ORDER:   Rec change to High Consistent CHO (6559-7889 kcals per day) as appropriate.   Recommendations Ordered by Registered Dietitian (RD):   Vanilla Glucerna BID btw meals   Malnutrition:  Severe malnutrition  In Context of:  Acute illness or injury        REASON FOR ASSESSMENT  Gomez Turk is a 56 year old male seen by Registered Dietitian for Admission Nutrition Risk Screen - Unintentional weight loss of 10# or more in past 2 months      NUTRITION HISTORY  - Information obtained from patient and EPIC records.  - Patient is on a regular diet at home.  - Typical food/fluid intake is good up until recently.  - Diet history:  Wife had reported to intern in early March that he typically has 1 meal per day (dinner).  He does eat breakfast on the w/e.  Pt tells me he is a \"foodie\" and appreciates the  on TV and cooking techniques.  Pt very talkative and emotional during my visit, upset about his recent vision loss.    No food allergies/intolerances.  Pt started having N/V on 3/12 and has had limited nutritional intake since then (x 8 days).  He states the weight loss is from \"all of my fat stores\".  Pt remembers getting Vanilla Glucerna during recent admission and tolerated it well.      CURRENT NUTRITION ORDERS  Diet Order:     Regular     Current Intake/Tolerance:  For lunch today he was only able to eat a few bites of a burger.  Pt is resistant to having me change his diet to Consistent CHO level.      PHYSICAL FINDINGS  Observed  Subcutaneous fat loss - orbital   Obtained from Chart/Interdisciplinary Team  None noted    ANTHROPOMETRICS  Height:  5 ft 9 in  Weight:  No record this admission  Wt on 3/16:  86.9 kg  BMI:  28.2  Weight Status:  Overweight BMI 25-29.9  IBW:  72.7 kg  % IBW:  120%  Weight History:  Pt has lost 16 lbs over the past month (8%).  He states he weighed 220 lbs last summer.    Wt Readings from Last " 20 Encounters:   03/16/17 86.9 kg (191 lb 9.6 oz)   03/09/17 92.5 kg (204 lb)   03/03/17 98.1 kg (216 lb 4.3 oz)   02/13/17 94.3 kg (207 lb 14.4 oz)   01/12/17 94.8 kg (209 lb)   01/04/17 95.1 kg (209 lb 9.6 oz)   02/04/16 99.6 kg (219 lb 9.6 oz)   11/09/15 99.5 kg (219 lb 6.4 oz)   02/04/15 100 kg (220 lb 8 oz)   01/19/15 98.9 kg (218 lb)   02/06/14 98.9 kg (218 lb)   06/25/13 97.5 kg (215 lb)   02/26/13 98.7 kg (217 lb 8 oz)   04/25/12 96.6 kg (213 lb)   03/12/12 93.4 kg (206 lb)   03/05/12 94.3 kg (208 lb)   03/02/12 97.5 kg (215 lb)   01/18/12 96.6 kg (213 lb)   04/06/11 97.1 kg (214 lb)   10/26/10 96.2 kg (212 lb)       LABS  Labs reviewed  Accuchecks:  121, 105, 91, 79    MEDICATIONS  Medications reviewed  IVF NaCl + 20 KCl at 125 mL/hr - for fluid delivery  Colace - for bowel program    Dosing Weight:  76.2 kg (adjusted for overwt)    ASSESSED NUTRITION NEEDS:  Estimated Energy Needs:  9672-9017 kcals (25-30 Kcal/Kg)  Justification: overweight  Estimated Protein Needs:   grams protein (1.2-1.5 g pro/Kg)  Justification: post-op  Estimated Fluid Needs:  8435-5782 mL (1 mL/Kcal)  Justification: maintenance    MALNUTRITION:  % Weight Loss:  > 5% in 1 month (severe malnutrition)  % Intake:  </= 50% for >/= 5 days (severe malnutrition)  Subcutaneous Fat Loss:  Orbital region mild depletion  Muscle Loss:  None observed  Fluid Retention:  None noted    Malnutrition Diagnosis: Severe malnutrition  In Context of:  Acute illness or injury    NUTRITION DIAGNOSIS:  Inadequate oral intake related to altered GI function, decreased appetite and early satiety as evidenced by limited nutritional intake x 8 days.      NUTRITION INTERVENTIONS  Recommendations / Nutrition Prescription  Vanilla Glucerna BID btw meals    - Rec change to High Consistent CHO (5146-4813 kcals per day) as appropriate.    Implementation  Nutrition education: Per Provider order if indicated    Provided education on the use of supplements to  optimize intake.  Medical Food Supplement - Ordered Glucerna in EPIC.  Ordered one to be sent now.    Nutrition Goals  Pt will consume > 75% meals and > 50% supplements in 3-5 days.    MONITORING AND EVALUATION:  Progress towards goals will be monitored and evaluated per protocol and Practice Guidelines    Alejandrina Buenrostro, RD, LD, [facility]

## 2017-03-20 VITALS
DIASTOLIC BLOOD PRESSURE: 63 MMHG | HEART RATE: 73 BPM | RESPIRATION RATE: 16 BRPM | OXYGEN SATURATION: 98 % | SYSTOLIC BLOOD PRESSURE: 116 MMHG | TEMPERATURE: 97.8 F

## 2017-03-20 DIAGNOSIS — K81.0 ACUTE CHOLECYSTITIS: ICD-10-CM

## 2017-03-20 LAB
ALBUMIN SERPL-MCNC: 1.9 G/DL (ref 3.4–5)
ALP SERPL-CCNC: 261 U/L (ref 40–150)
ALT SERPL W P-5'-P-CCNC: 71 U/L (ref 0–70)
ANION GAP SERPL CALCULATED.3IONS-SCNC: 9 MMOL/L (ref 3–14)
AST SERPL W P-5'-P-CCNC: 58 U/L (ref 0–45)
BILIRUB SERPL-MCNC: 0.5 MG/DL (ref 0.2–1.3)
BUN SERPL-MCNC: 5 MG/DL (ref 7–30)
CALCIUM SERPL-MCNC: 7.7 MG/DL (ref 8.5–10.1)
CHLORIDE SERPL-SCNC: 106 MMOL/L (ref 94–109)
CO2 BLD-SCNC: 21 MMOL/L (ref 21–28)
CO2 SERPL-SCNC: 21 MMOL/L (ref 20–32)
CREAT SERPL-MCNC: 0.7 MG/DL (ref 0.66–1.25)
ERYTHROCYTE [DISTWIDTH] IN BLOOD BY AUTOMATED COUNT: 13.3 % (ref 10–15)
GFR SERPL CREATININE-BSD FRML MDRD: ABNORMAL ML/MIN/1.7M2
GLUCOSE BLDC GLUCOMTR-MCNC: 74 MG/DL (ref 70–99)
GLUCOSE BLDC GLUCOMTR-MCNC: 85 MG/DL (ref 70–99)
GLUCOSE BLDC GLUCOMTR-MCNC: 95 MG/DL (ref 70–99)
GLUCOSE SERPL-MCNC: 71 MG/DL (ref 70–99)
HCT VFR BLD AUTO: 21.2 % (ref 40–53)
HCT VFR BLD CALC: 26 %PCV (ref 40–53)
HGB BLD CALC-MCNC: 8.8 G/DL (ref 13.3–17.7)
HGB BLD-MCNC: 7.2 G/DL (ref 13.3–17.7)
MCH RBC QN AUTO: 29.9 PG (ref 26.5–33)
MCHC RBC AUTO-ENTMCNC: 34 G/DL (ref 31.5–36.5)
MCV RBC AUTO: 88 FL (ref 78–100)
PCO2 BLD: 40 MM HG (ref 35–45)
PH BLD: 7.32 PH (ref 7.35–7.45)
PLATELET # BLD AUTO: 466 10E9/L (ref 150–450)
PO2 BLD: 205 MM HG (ref 80–105)
POTASSIUM BLD-SCNC: 3.7 MMOL/L (ref 3.4–5.3)
POTASSIUM SERPL-SCNC: 3.8 MMOL/L (ref 3.4–5.3)
PROT SERPL-MCNC: 6.1 G/DL (ref 6.8–8.8)
RBC # BLD AUTO: 2.41 10E12/L (ref 4.4–5.9)
SAO2 % BLDA FROM PO2: 100 % (ref 92–100)
SODIUM BLD-SCNC: 132 MMOL/L (ref 133–144)
SODIUM SERPL-SCNC: 136 MMOL/L (ref 133–144)
WBC # BLD AUTO: 12.1 10E9/L (ref 4–11)

## 2017-03-20 PROCEDURE — 25000132 ZZH RX MED GY IP 250 OP 250 PS 637: Performed by: INTERNAL MEDICINE

## 2017-03-20 PROCEDURE — 97597 DBRDMT OPN WND 1ST 20 CM/<: CPT | Mod: 79 | Performed by: SURGERY

## 2017-03-20 PROCEDURE — 85027 COMPLETE CBC AUTOMATED: CPT | Performed by: INTERNAL MEDICINE

## 2017-03-20 PROCEDURE — 25000132 ZZH RX MED GY IP 250 OP 250 PS 637: Performed by: PHYSICIAN ASSISTANT

## 2017-03-20 PROCEDURE — 25000132 ZZH RX MED GY IP 250 OP 250 PS 637: Performed by: SURGERY

## 2017-03-20 PROCEDURE — 99239 HOSP IP/OBS DSCHRG MGMT >30: CPT | Performed by: INTERNAL MEDICINE

## 2017-03-20 PROCEDURE — 80053 COMPREHEN METABOLIC PANEL: CPT | Performed by: INTERNAL MEDICINE

## 2017-03-20 PROCEDURE — 25000128 H RX IP 250 OP 636: Performed by: INTERNAL MEDICINE

## 2017-03-20 PROCEDURE — 36415 COLL VENOUS BLD VENIPUNCTURE: CPT | Performed by: INTERNAL MEDICINE

## 2017-03-20 PROCEDURE — 00000146 ZZHCL STATISTIC GLUCOSE BY METER IP

## 2017-03-20 PROCEDURE — 99221 1ST HOSP IP/OBS SF/LOW 40: CPT | Mod: 24 | Performed by: SURGERY

## 2017-03-20 RX ORDER — GLIMEPIRIDE 2 MG/1
TABLET ORAL
Refills: 0 | COMMUNITY
Start: 2017-03-20 | End: 2017-05-30

## 2017-03-20 RX ORDER — FERROUS SULFATE 325(65) MG
325 TABLET ORAL EVERY OTHER DAY
Qty: 60 TABLET | Refills: 0 | Status: SHIPPED | OUTPATIENT
Start: 2017-03-20 | End: 2018-06-06

## 2017-03-20 RX ORDER — CELECOXIB 200 MG/1
200 CAPSULE ORAL 2 TIMES DAILY PRN
Qty: 30 CAPSULE | Refills: 0 | Status: SHIPPED | OUTPATIENT
Start: 2017-03-20 | End: 2017-05-30

## 2017-03-20 RX ORDER — PIPERACILLIN SODIUM, TAZOBACTAM SODIUM 3; .375 G/15ML; G/15ML
3.38 INJECTION, POWDER, LYOPHILIZED, FOR SOLUTION INTRAVENOUS EVERY 6 HOURS
Status: DISCONTINUED | OUTPATIENT
Start: 2017-03-20 | End: 2017-03-20 | Stop reason: HOSPADM

## 2017-03-20 RX ORDER — OXYCODONE HYDROCHLORIDE 5 MG/1
5-10 TABLET ORAL EVERY 4 HOURS PRN
Qty: 30 TABLET | Refills: 0 | Status: SHIPPED | OUTPATIENT
Start: 2017-03-20 | End: 2017-03-23

## 2017-03-20 RX ORDER — AMOXICILLIN 250 MG
1-2 CAPSULE ORAL 2 TIMES DAILY PRN
COMMUNITY
Start: 2017-03-20 | End: 2018-06-06

## 2017-03-20 RX ADMIN — ACETAMINOPHEN 650 MG: 325 TABLET, FILM COATED ORAL at 13:08

## 2017-03-20 RX ADMIN — NICOTINE 1 PATCH: 7 PATCH, EXTENDED RELEASE TRANSDERMAL at 01:09

## 2017-03-20 RX ADMIN — PIPERACILLIN SODIUM,TAZOBACTAM SODIUM 3.38 G: 3; .375 INJECTION, POWDER, FOR SOLUTION INTRAVENOUS at 11:12

## 2017-03-20 RX ADMIN — DOCUSATE SODIUM 100 MG: 100 CAPSULE, LIQUID FILLED ORAL at 08:36

## 2017-03-20 RX ADMIN — PIPERACILLIN SODIUM,TAZOBACTAM SODIUM 3.38 G: 3; .375 INJECTION, POWDER, FOR SOLUTION INTRAVENOUS at 06:05

## 2017-03-20 RX ADMIN — PIPERACILLIN SODIUM,TAZOBACTAM SODIUM 3.38 G: 3; .375 INJECTION, POWDER, FOR SOLUTION INTRAVENOUS at 01:08

## 2017-03-20 RX ADMIN — OXYCODONE HYDROCHLORIDE 10 MG: 5 TABLET ORAL at 13:08

## 2017-03-20 RX ADMIN — ACETAMINOPHEN 650 MG: 325 TABLET, FILM COATED ORAL at 08:35

## 2017-03-20 RX ADMIN — GABAPENTIN 300 MG: 300 CAPSULE ORAL at 08:36

## 2017-03-20 RX ADMIN — CELECOXIB 200 MG: 200 CAPSULE ORAL at 03:31

## 2017-03-20 RX ADMIN — ASPIRIN 81 MG: 81 TABLET, COATED ORAL at 08:35

## 2017-03-20 RX ADMIN — CLOPIDOGREL BISULFATE 75 MG: 75 TABLET, FILM COATED ORAL at 08:36

## 2017-03-20 RX ADMIN — OXYCODONE HYDROCHLORIDE 10 MG: 5 TABLET ORAL at 10:07

## 2017-03-20 RX ADMIN — HYDROMORPHONE HYDROCHLORIDE: 10 INJECTION, SOLUTION INTRAMUSCULAR; INTRAVENOUS; SUBCUTANEOUS at 07:09

## 2017-03-20 RX ADMIN — ACETAMINOPHEN 650 MG: 325 TABLET, FILM COATED ORAL at 03:31

## 2017-03-20 RX ADMIN — HYDROMORPHONE HYDROCHLORIDE: 10 INJECTION, SOLUTION INTRAMUSCULAR; INTRAVENOUS; SUBCUTANEOUS at 03:29

## 2017-03-20 NOTE — PROGRESS NOTES
[facility]  Patient is currently open to home care services with [facility].  The patient is currently receiving RN/PT/OT services.  [facility]  and team have been notified of patient admission.  [facility] liaison will continue to follow patient during stay.  If appropriate provide orders to resume home care at time of discharge.    Abigail Chappell RN, BSN  [facility] Liaison  [phone]

## 2017-03-20 NOTE — PLAN OF CARE
Problem: Goal Outcome Summary  Goal: Goal Outcome Summary  Outcome: Adequate for Discharge Date Met:  03/19/17  Pain managed with PCA Dilaudid, tolerating regular diet, voiding in urinal, VIOLETTA drain and sutures  removed, drsg change to left ft with aquacel ag extra, adaptic, 4x4's and kerlix, no change in vision.

## 2017-03-20 NOTE — CONSULTS
VASCULAR SURGERY CONSULTATION      HISTORY OF PRESENT ILLNESS:  Gomez Turk was scheduled to see me in the office this afternoon for postoperative followup.  He had undergone a left below-knee popliteal to dorsalis pedis nonreversed translocated greater saphenous vein bypass on 02/28/2017.  We did a partial amputation of his great toe and debrided a left medial ankle ulcer and a left dorsal second toe ulcer.  He had excellent blood supply following the procedure and was placed on aspirin and Plavix.  We did several debridements of the great toe site, which will need to heal by secondary intent.  We felt that a second toe with exposed bone eventually may require an amputation.        The patient was discharged to home on 03/04/2017 doing well.  He went home on aspirin and Plavix along with Augmentin and Septra for his toe cellulitis.  Aquacel AG was used for dressing change by home health care.      He noticed as he was leaving problems with vision, initially in the right eye, followed by the left eye.  He underwent evaluation and was found to have no cerebrovascular disease.  However, it was suggested he likely had ischemic optic neuritis and was seen by Neuro-Ophthalmology at [facility] (see their notes).  He can see very little out his right side, but does have stable vision of light and forms in the left eye, though he is unable to read.  Prognosis of this is unclear.      The patient developed acute right upper quadrant pain.  He required admission and starting intravenous antibiotics.  This did not improve.  Though we wanted to avoid any general anesthetic due to the ischemic optic neuritis as recommended by her Neuro-Ophthalmology, his pain was such that this could not be dealt with.  He therefore underwent a laparoscopic cholecystectomy for necrotic gallbladder by my associate, Dr. Hollingsworth, on 03/18/2017.  A drain was left in place, and he was treated with antibiotics.  Drain was removed last  evening.  He is feeling much better and ready for discharge.      Since he was still in the hospital, I felt I should evaluate him for his left foot.      PHYSICAL EXAMINATION:  He is alert and appropriate.  Vital signs are stable.  T-max is now 99.9 degrees.  Less edema is noted in his leg.  The thigh incisions are healing well.  He has an excellent palpable pulse within the bypass graft on the dorsalis pedis artery.  Several sutures in the dorsum of the foot were removed.  The medial ankle ulcer measures 3x2 cm with a layer of slough and fibrin, but no surrounding cellulitis.  The partially amputated great toe is granulating in with a mild amount of fibrin.  The bone is no longer exposed.  There is a slight amount of bone exposed on the second toe, but no obvious infection.      PROCEDURE:  Timeout was called, and the sites were identified.  Using a sharp debriding agent, I debrided the slough and fibrin off the ankle and great toe ulcers (the latter measures approximately 3x3 cm) in a selective fashion.  I could not distinguish the proximal amputated bone of the great toe, which is granulating in well over this.Minimal distal toe necrosis at skin edge was also partially debrided. Moistened Aquacel AG was applied over both sites, followed by Desiree and Fernanda.      IMPRESSION:     1.  Patent left leg bypass graft.  The ulcerations are improving.  He will continue with the moistened Aquacel AG and change this daily to every other day with home health care.  I will plan to see him in the office next week.   2.  Ischemic optic neuritis.  Prognosis is guarded.  He has a followup appointment with his neuro-ophthalmologist on 2017.     3.  Recovering from emergent Lap Mansi for gangrenous cholecystitis.        EMI RODRIGUEZ MD             D: 2017 13:33   T: 2017 14:08   MT: [id number]      Name:     AYAZ CHAPARRO   MRN:      [id number]        Account:       [id number]   :      1960            Consult Date:  03/20/2017      Document: [id number]       cc: Jesus Aragon MD

## 2017-03-20 NOTE — DISCHARGE INSTRUCTIONS
A hospital follow-up appointment has been scheduled for you.    It is important to go to this appointment--bring this paperwork with you.  At this visit, you should discuss your hospital stay--tell your primary care doctor about how your symptoms are doing, and if there were any medication changes made.  Your doctor will make sure you are still doing OK and update the records at their clinic.        Discharge Instructions following  Cholecystectomy  [facility]   Diet:    Regular diet as tolerated.  Drink plenty of fluids, especially water.  Activities:    No heavy lifting (greater than 10-15 lbs) or strenuous activity until approved by surgeon.  Bathing/Incision Care    Ok to shower.  Do not submerge incision (no tub baths or swimming) until it s fully healed.  Pat incisions dry.  If present, tape dressing (Steri-Strips) will fall off on their own in 7-10 days.  No lotion, powders, or perfumes near or on the incision. Change dressing daily to previous drain site, when no longer having drainage okay to leave open to air.   What to expect:    For laparoscopic surgery, you may have shoulder discomfort due to the gas used in surgery.  This should resolve in 24-48 hours.  Short, frequent walks may help with this.  Call your surgeon if you have these signs or symptoms:    Fever greater than 101 oF or chills.    Severe nausea, vomiting, or constipation.    Signs of infection at incision site: redness, swelling, warmth, foul-smelling drainage.    Increased pain that does not improve with pain medicine.    With any questions or concerns.  Follow up with doctor as ordered.
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
## 2017-03-20 NOTE — TELEPHONE ENCOUNTER
[facility] pharmacy computer system down, sending Abx. For Augmentin to [facility] instead of [facility] pharm. In [place].    Brittni Jensen RN BSN

## 2017-03-20 NOTE — PLAN OF CARE
Problem: Goal Outcome Summary  Goal: Goal Outcome Summary  Outcome: No Change  A/O x4. Blind. VSS. BP soft. Up Ax1 walker and ortho shoe. C/o uncontrolled pain with PCA. Celebrex and tylenol given. Appeared to rest comfortably for 1 hr; however, restless over night. LLE Drsg C/D/I. 4x lap sites C/D/I. Voiding adequately per urinal. Nicotine patch in place. Progressing per POC. Will cont to monitor.

## 2017-03-20 NOTE — PLAN OF CARE
Problem: Goal Outcome Summary  Goal: Goal Outcome Summary  Outcome: Adequate for Discharge Date Met:  03/20/17  VSS. A&Ox4. R abd pain managed with tylenol and oxycodone, once PCA dilaudid stopped. Pain better controlled on orals. Poor appetite BG 74. LLE scabbing incision, L foot dressing changed. Lap sites WDL. R sided old drain site dressing changed. CMS neuropathy. BS still hypoactive, little flatus. Tele: NSR. Up Ax1. Seen by surgery, Dr. rapp and hospitalist. D/c today home with family.

## 2017-03-20 NOTE — PROGRESS NOTES
"Care Coordination:    Routinely following. Noted d/c order.  Scheduled f/u apt with PCP Dr. Roberts for 3/23 at 4:00pm, and requested (per homecare request) hospitalist write \"resume RN/PT/OT services\" order on d/c papers.     Will meet with pt re: f/u apts for specialty providers, vascular and surgery.    Pt's spouse in agreement with f/u apt, and will be able to manage scheduling the specialty appointments.    Gianna Sesay RN, BSN  [facility] Care Coordinator   Mobile Phone: [phone]    "

## 2017-03-20 NOTE — PLAN OF CARE
Problem: Goal Outcome Summary  Goal: Goal Outcome Summary  Outcome: Improving  A&O, vs stable, drsg's to abdomen CDI.  Elevating LLE, has scabs on leg from previous surgery, drsg to left foot CDI.  Pain controlled with dilaudid PCA, IVF infusing, voiding using the urinal.  Has soft touch call light for vision impairment.  Bowel sounds hypoactive, Bm today per pt, on regular diet, will continue to monitor.

## 2017-03-21 ENCOUNTER — TELEPHONE (OUTPATIENT)
Dept: INTERNAL MEDICINE | Facility: CLINIC | Age: 57
End: 2017-03-21

## 2017-03-21 LAB — COPATH REPORT: NORMAL

## 2017-03-22 NOTE — TELEPHONE ENCOUNTER
"Hospital/TCU/ED for chronic condition Discharge Protocol    \"Hi, my name is Michelle Heck, a registered nurse, and I am calling from [facility].  I am calling to follow up and see how things are going for you after your recent emergency visit/hospital/TCU stay.\"    Tell me how you are doing now that you are home?\" okay      Discharge Instructions    \"Let's review your discharge instructions.  What is/are the follow-up recommendations?  Pt. Response: see surgeon and PCP     \"Has an appointment with your primary care provider been scheduled?\"   Yes. (confirm)    \"When you see the provider, I would recommend that you bring your medications with you.\"    Medications    \"Tell me what changed about your medicines when you discharged?\"    Changes to chronic meds?    0-1    \"What questions do you have about your medications?\"    None     New diagnoses of heart failure, COPD, diabetes, or MI?    No              Medication reconciliation completed? Yes  Was MTM referral placed (*Make sure to put transitions as reason for referral)?   No    Call Summary    \"What questions or concerns do you have about your recent visit and your follow-up care?\"     none    \"If you have questions or things don't continue to improve, we encourage you contact us through the main clinic number (give number).  Even if the clinic is not open, triage nurses are available 24/7 to help you.     We would like you to know that our clinic has extended hours (provide information).  We also have urgent care (provide details on closest location and hours/contact info)\"      \"Thank you for your time and take care!\"             "

## 2017-03-23 ENCOUNTER — OFFICE VISIT (OUTPATIENT)
Dept: INTERNAL MEDICINE | Facility: CLINIC | Age: 57
End: 2017-03-23
Payer: COMMERCIAL

## 2017-03-23 ENCOUNTER — CARE COORDINATION (OUTPATIENT)
Dept: CARE COORDINATION | Facility: CLINIC | Age: 57
End: 2017-03-23

## 2017-03-23 VITALS
WEIGHT: 196 LBS | BODY MASS INDEX: 29.03 KG/M2 | DIASTOLIC BLOOD PRESSURE: 72 MMHG | HEART RATE: 75 BPM | SYSTOLIC BLOOD PRESSURE: 130 MMHG | HEIGHT: 69 IN | TEMPERATURE: 98.2 F | OXYGEN SATURATION: 100 %

## 2017-03-23 DIAGNOSIS — K81.0 ACUTE CHOLECYSTITIS: ICD-10-CM

## 2017-03-23 DIAGNOSIS — H54.3 VISION LOSS, BILATERAL: ICD-10-CM

## 2017-03-23 DIAGNOSIS — G89.18 ACUTE POST-OPERATIVE PAIN: ICD-10-CM

## 2017-03-23 DIAGNOSIS — Z09 HOSPITAL DISCHARGE FOLLOW-UP: Primary | ICD-10-CM

## 2017-03-23 DIAGNOSIS — I73.9 PAD (PERIPHERAL ARTERY DISEASE) (H): ICD-10-CM

## 2017-03-23 LAB
ALBUMIN SERPL-MCNC: 2.6 G/DL (ref 3.4–5)
ALP SERPL-CCNC: 247 U/L (ref 40–150)
ALT SERPL W P-5'-P-CCNC: 46 U/L (ref 0–70)
AST SERPL W P-5'-P-CCNC: 23 U/L (ref 0–45)
BACTERIA SPEC CULT: NO GROWTH
BACTERIA SPEC CULT: NO GROWTH
BILIRUB DIRECT SERPL-MCNC: 0.2 MG/DL (ref 0–0.2)
BILIRUB SERPL-MCNC: 0.3 MG/DL (ref 0.2–1.3)
ERYTHROCYTE [DISTWIDTH] IN BLOOD BY AUTOMATED COUNT: 13.3 % (ref 10–15)
HCT VFR BLD AUTO: 27.2 % (ref 40–53)
HGB BLD-MCNC: 8.9 G/DL (ref 13.3–17.7)
Lab: NORMAL
Lab: NORMAL
MCH RBC QN AUTO: 30.2 PG (ref 26.5–33)
MCHC RBC AUTO-ENTMCNC: 32.7 G/DL (ref 31.5–36.5)
MCV RBC AUTO: 92 FL (ref 78–100)
MICRO REPORT STATUS: NORMAL
MICRO REPORT STATUS: NORMAL
PLATELET # BLD AUTO: 508 10E9/L (ref 150–450)
PROT SERPL-MCNC: 8.1 G/DL (ref 6.8–8.8)
RBC # BLD AUTO: 2.95 10E12/L (ref 4.4–5.9)
SPECIMEN SOURCE: NORMAL
SPECIMEN SOURCE: NORMAL
WBC # BLD AUTO: 15.1 10E9/L (ref 4–11)

## 2017-03-23 PROCEDURE — 85027 COMPLETE CBC AUTOMATED: CPT | Performed by: INTERNAL MEDICINE

## 2017-03-23 PROCEDURE — 99495 TRANSJ CARE MGMT MOD F2F 14D: CPT | Performed by: INTERNAL MEDICINE

## 2017-03-23 PROCEDURE — 80076 HEPATIC FUNCTION PANEL: CPT | Performed by: INTERNAL MEDICINE

## 2017-03-23 PROCEDURE — 36415 COLL VENOUS BLD VENIPUNCTURE: CPT | Performed by: INTERNAL MEDICINE

## 2017-03-23 RX ORDER — OXYCODONE HYDROCHLORIDE 5 MG/1
5-10 TABLET ORAL EVERY 4 HOURS PRN
Qty: 30 TABLET | Refills: 0 | Status: SHIPPED | OUTPATIENT
Start: 2017-03-23 | End: 2017-05-01

## 2017-03-23 NOTE — LETTER
[address]  [address]      March 24, 2017      Gomez Turk  [id number] [facility] [id number]          Dear Gomez,      I have enclosed a copy of your most recent labs done here at the clinic and if available some of your prior labs for comparison.     Your liver, hemoglobin and white blood cell function tests are still slightly abnormal but overall stable and should be rechecked here in the clinic in 2 weeks with a follow-up visit with me.  I will look forward to seeing you at that time and please call to make an appointment.      Please call me if you have further questions.        Tylor Roberts MD

## 2017-03-23 NOTE — PROGRESS NOTES
SUBJECTIVE:                                                    Gomez Turk is a 56 year old male who presents to clinic today for the following health issues:    Hospital Follow-up Visit:    Hospital/Nursing Home/IP Rehab Facility: [facility]  Date of Admission: 3/17/2017  Date of Discharge: 3/20/2017  Reason(s) for Admission: Cholecystitis             Problems taking medications regularly:  None       Medication changes since discharge: None       Problems adhering to non-medication therapy:  None    Summary of hospitalization:  [facility] discharge summary reviewed  Diagnostic Tests/Treatments reviewed.  Follow up needed: surgery  Other Healthcare Providers Involved in Patient s Care:         None  Update since discharge: stable.     Post Discharge Medication Reconciliation: discharge medications reconciled, continue medications without change.  Plan of care communicated with patient     Coding guidelines for this visit:  Type of Medical   Decision Making Face-to-Face Visit       within 7 Days of discharge Face-to-Face Visit        within 14 days of discharge   Moderate Complexity [id number]   High Complexity [id number]            Acute gangrenous cholecystitis  WBC 35.0, CT abd/pelvis showing distended gallbladder with areas of gallbladder wall thickening and surrounding inflammation concerning for acute cholecystitis.  - S/p laparoscopy cholecystectomy by Dr. Hollingsworth on 3/18. Difficult case, drain left in place.   - Received zosyn perioperatively. Given severity of infection, leukocytosis, will continue x 5 more days at discharge.      Pain control   - Continues with a lot of pain, transitioned to PCA evening of 3/18 > off PCA and using 10 mg oxycodone PRN with good relief + celebrex at time of discharge.   - Discussed the importance of a short course.       H/o perioperative ischemic optic neuropathy - Felt this occurred secondary to low perfusions state madyson-opoperatively.   -  Diagnosed and progressively worsening over the past 2 weeks, now with no vision in the right eye and only able to see shadows on the left. Patient has been following closely with ophthalmologist Dr. Mclaughlin. Ophthalmologists were consulted perioperatively, and recommended close monitoring of blood pressure to avoid lows peroperatively.      Acute Blood Loss Anemia   - Hgb down to 7.2, post-op. No sign of bleeding  - Ordered iron at discharge, with follow up hgb.       PAD, admitted with critical leg ischemia, s/p angioplasty followed by bypass to LLE and L great toe amputation on 2/28/17  - Resumed Plavix and ASA on 3/18.   - Dressing changes daily ordered.   - Follow up appointment scheduled. Continue activity restrictions and wound care as PTA.      Hypokalemia, resolved  - placed on potassium replacement  - check magnesium and replace per protocol PRN.       Type 2 diabetes  - Hold PTA metformin and Amaryl  - Sliding scale insulin  - On PTA neurontin for diabetic peripheral neuropathy.     Problem list and histories reviewed & adjusted, as indicated.  Additional history: as documented    Patient Active Problem List   Diagnosis     Diverticulosis of large intestine     Tobacco use disorder     HYPERLIPIDEMIA LDL GOAL <100     PAD (peripheral artery disease) (H)     Type 2 diabetes mellitus with other ophthalmic complication, without long-term current use of insulin (H)     Acute cholecystitis     Past Surgical History:   Procedure Laterality Date     AMPUTATE TOE(S) Left 2/28/2017    Procedure: AMPUTATE TOE(S);  Surgeon: Jesus Aragon MD;  Location:  OR     BYPASS GRAFT FEMOROPOPLITEAL Left 2/28/2017    Procedure: BYPASS GRAFT FEMOROPOPLITEAL;  Surgeon: Jesus Aragon MD;  Location:  OR     ENT SURGERY  1990    deviated septum repair     IRRIGATION AND DEBRIDEMENT FOOT, COMBINED Left 2/28/2017    Procedure: COMBINED IRRIGATION AND DEBRIDEMENT FOOT;  Surgeon: Jesus Aragon MD;   Location:  OR     LAPAROSCOPIC CHOLECYSTECTOMY N/A 3/18/2017    Procedure: LAPAROSCOPIC CHOLECYSTECTOMY;  Surgeon: Edin Hollingsworth MD;  Location:  OR       Social History   Substance Use Topics     Smoking status: Former Smoker     Packs/day: 0.00     Types: Cigarettes     Smokeless tobacco: Never Used      Comment: quit 2 weeks ago     Alcohol use No     Family History   Problem Relation Age of Onset     DIABETES Mother      Lipids Mother      CANCER Paternal Grandmother      Neurologic Disorder Maternal Uncle      Glaucoma No family hx of      Macular Degeneration No family hx of      Retinal detachment No family hx of      Thyroid Disease No family hx of          Current Outpatient Prescriptions   Medication Sig Dispense Refill     oxyCODONE (ROXICODONE) 5 MG IR tablet Take 1-2 tablets (5-10 mg) by mouth every 4 hours as needed for moderate to severe pain or pain 30 tablet 0     celecoxib (CELEBREX) 200 MG capsule Take 1 capsule (200 mg) by mouth 2 times daily as needed for moderate pain 30 capsule 0     glimepiride (AMARYL) 2 MG tablet HOLD UNTIL YOU HAVE FULLY ADVANCED DIET, AND BLOOD SUGARS START TO RUN OVER 150.  0     senna-docusate (SENOKOT-S;PERICOLACE) 8.6-50 MG per tablet Take 1-2 tablets by mouth 2 times daily as needed (constipation) While on oxycodone to prevent constipation       ferrous sulfate (IRON) 325 (65 FE) MG tablet Take 1 tablet (325 mg) by mouth every other day 60 tablet 0     amoxicillin-clavulanate (AUGMENTIN) 875-125 MG per tablet Take 1 tablet by mouth 2 times daily 10 tablet 0     Lactobacillus Rhamnosus, GG, (CULTURELLE PO) Take 1 capsule by mouth 2 times daily       acetaminophen (TYLENOL) 325 MG tablet Take 2 tablets (650 mg) by mouth every 4 hours as needed for mild pain 100 tablet 0     aspirin EC 81 MG EC tablet Take 1 tablet (81 mg) by mouth daily 30 tablet 11     gabapentin (NEURONTIN) 300 MG capsule Take 1 capsule (300 mg) by mouth 3 times daily 90 capsule 3      "atorvastatin (LIPITOR) 40 MG tablet Take 1 tablet (40 mg) by mouth daily 30 tablet 11     clopidogrel (PLAVIX) 75 MG tablet Take 1 tablet (75 mg) by mouth daily 30 tablet 11     nicotine (NICODERM CQ) 7 MG/24HR 24 hr patch Place 1 patch onto the skin daily 30 patch 3     order for DME Equipment being ordered: [employer] Fax [phone]    Primary Dressing 4x4 non-sterile gauze   Qty 2 loafs  Secondary Dressing Kerlix wrap 4\" Qty 30  Length of Need: 1 month  Frequency of dressing change: daily 30 days 0     metFORMIN (GLUCOPHAGE) 1000 MG tablet Take 1 tablet (1,000 mg) by mouth 2 times daily (with meals) 180 tablet 3     order for DME Post of shoe 1 Device 0     sildenafil (VIAGRA) 100 MG tablet Take 1 tablet (100 mg) by mouth daily as needed 12 tablet 5     Allergies   Allergen Reactions     No Known Drug Allergies      Recent Labs   Lab Test  03/20/17   0730  03/19/17   0615   03/17/17   1700   02/10/17   1520  10/17/16   1542  01/27/16   1523  11/02/15   1529   A1C   --    --    --    --    --   6.8*  9.6*   --   7.8*   LDL   --    --    --    --    --   109*  125*  132*   --    HDL   --    --    --    --    --   42  44  40   --    TRIG   --    --    --    --    --   76  169*  170*   --    ALT  71*  99*   --   111*   < >  24   --   35   --    CR  0.70  0.74   < >  0.99   < >  0.81   --   0.85   --    GFRESTIMATED  >90  Non  GFR Calc    >90  Non  GFR Calc     < >  78   < >  >90  Non  GFR Calc     --   >90  Non  GFR Calc     --    GFRESTBLACK  >90   GFR Calc    >90   GFR Calc     < >  >90   GFR Calc     < >  >90   GFR Calc     --   >90   GFR Calc     --    POTASSIUM  3.8  3.8   < >  3.7   < >  3.8   --   4.2   --    TSH   --    --    --    --    --   0.57   --   0.91   --     < > = values in this interval not displayed.      BP Readings from Last 3 Encounters: " "  03/20/17 116/63   03/16/17 110/56   03/09/17 133/79    Wt Readings from Last 3 Encounters:   03/16/17 191 lb 9.6 oz (86.9 kg)   03/09/17 204 lb (92.5 kg)   03/03/17 216 lb 4.3 oz (98.1 kg)           Labs reviewed in EPIC    Reviewed and updated as needed this visit by clinical staff       Reviewed and updated as needed this visit by Provider       ROS:  C: NEGATIVE for fever, chills, change in weight  E/M: NEGATIVE for ear, mouth and throat problems  R: NEGATIVE for significant cough or SOB  CV: NEGATIVE for chest pain, palpitations or peripheral edema  : NEGATIVE for frequency, dysuria, or hematuria  M: NEGATIVE for significant arthralgias or myalgia  H: NEGATIVE for bleeding problems  P: NEGATIVE for changes in mood or affect    OBJECTIVE:                                                    /72  Pulse 75  Temp 98.2  F (36.8  C) (Oral)  Ht 5' 9\" (1.753 m)  Wt 196 lb (88.9 kg)  SpO2 100%  BMI 28.94 kg/m2  Body mass index is 28.94 kg/(m^2).  GENERAL: alert and no distress  EYES: decreased VA OU to HM  HENT: ear canals- normal; TMs- normal; Nose- normal; Mouth- no ulcers, no lesions  RESP: lungs clear to auscultation - no rales, no rhonchi, no wheezes  CV: regular rates and rhythm, normal S1 S2, no S3 or S4 and no click or rub   ABDOMEN: soft, no tenderness, no  hepatosplenomegaly, no masses, normal bowel sounds  MS: extremities- no gross deformities noted  PSYCH: Alert and oriented times 3; speech- coherent , normal rate and volume; able to articulate logical thoughts, able to abstract reason, no tangential thoughts, no hallucinations or delusions, affect- normal     ASSESSMENT/PLAN:                                                      (Z09) Hospital discharge follow-up  (primary encounter diagnosis)  Comment: STABLE   Plan: Hepatic panel, CBC with platelets            (K81.0) Acute cholecystitis  Comment: RESOLVED AS NOTED  Plan:     (H54.3) Vision loss, bilateral  Comment: slightly better although " decreased  Plan:     (I73.9) PAD (peripheral artery disease) (H)  Comment: stable as noted  Plan:        (G89.18) Acute post-operative pain  Comment: refilled until sees surgery  Plan: oxyCODONE (ROXICODONE) 5 MG IR tablet          See Patient Instructions    Tylor Roberts MD  [facility]    THE MEDICATION LIST HAS BEEN FULLY RECONCILED BY THE M.D. AND THE NURSING STAFF.

## 2017-03-23 NOTE — PROGRESS NOTES
Clinic Care Coordination Contact  OUTREACH    Referral Information:  Referral Source: CTS  Reason for Contact: Follow up on vision loss   Care Conference: No     Universal Utilization:   ED Visits in last year: 0  Hospital visits in last year: 1  Last PCP appointment: 02/13/17  Missed Appointments: 6  Concerns: NO  Multiple Providers or Specialists:  (Yes)    Clinical Concerns:  Current Medical Concerns: Patient continues home care services.  Patient reports he had a company come into the home and they made some changes to help him navigate at home to accommodate is vision loss.  Patient states he can see shadows only.  Patient just recently had his gallbladder out and has some soreness in the surgical area.  Patient presents with a boot on left foot and states he has home care change the dressing twice a week  Current Behavioral Concerns: not discussed      Clinical Pathway Name: None  Clinical Pathway: None    Medication Management:  Reviewed at PCP visit      Functional Status:  Mobility Status: Independent w/Device  Equipment Currently Used at Home: none, walker, standard  Transportation: family provides transportation      Psychosocial:  Current living arrangement:: I live in a private home with spouse     Resources and Interventions:  Current Resources:  (NA); Home Care  PAS Number:  (NA)  Senior Linkage Line Referral Placed:  (NA)  Advanced Care Plans/Directives on file:: No  Referrals Placed:  (NA)     Goals:   Goal 1 Statement: I will get vision loss resources   Goal 1 Progression Percent: 100%  Goal 1 Progression Date: 03/23/17        Barriers: Vision loss   Strengths:Patient has a positive attitude about vision loss.  Patient plans to get a second opinion at the [facility]     Frequency of Care Coordination:  (CC will follow up when discharged from home care)       Plan: CC will follow up when discharged from home care     Rosalva Palafox RN  Care Coordinator-[facility]  [facility]  [email or web address]  [phone]

## 2017-03-23 NOTE — MR AVS SNAPSHOT
After Visit Summary   3/23/2017    Gomez Turk    MRN: [id number]           Patient Information     Date Of Birth          1960        Visit Information        Provider Department      3/23/2017 4:00 PM Tylor Roberts MD [facility]        Today's Diagnoses     Hospital discharge follow-up    -  1    Acute cholecystitis        Vision loss, bilateral        PAD (peripheral artery disease) (H)        Acute post-operative pain           Follow-ups after your visit        Your next 10 appointments already scheduled     Mar 29, 2017  7:30 AM CDT   RETURN NEURO with Siddhartha Mclaughlin MD   [facility] ([facility]    [address]  [address]  [address]  [place] [facility] [address] [phone]            Apr 03, 2017  1:00 PM CDT   Post Op with Celestine Guzman PA-C   [facility]    [address], [address]  [place] [facility] [address] [phone]              Who to contact     If you have questions or need follow up information about today's clinic visit or your schedule please contact [facility] directly at [phone].  Normal or non-critical lab and imaging results will be communicated to you by MyChart, letter or phone within 4 business days after the clinic has received the results. If you do not hear from us within 7 days, please contact the clinic through [employer] or phone. If you have a critical or abnormal lab result, we will notify you by phone as soon as possible.  Submit refill requests through [employer] or call your pharmacy and they will forward the refill request to us. Please allow 3 business days for your refill to be completed.          Additional Information About Your Visit        MyChart Information     [employer] gives you secure access to your electronic health record. If you see a primary care provider, you can also send messages to your care  "team and make appointments. If you have questions, please call your primary care clinic.  If you do not have a primary care provider, please call [phone] and they will assist you.        Care EveryWhere ID     This is your Care EveryWhere ID. This could be used by other organizations to access your [facility] medical records  [id number]        Your Vitals Were     Pulse Temperature Height Pulse Oximetry BMI (Body Mass Index)       75 98.2  F (36.8  C) (Oral) 5' 9\" (1.753 m) 100% 28.94 kg/m2        Blood Pressure from Last 3 Encounters:   03/23/17 130/72   03/20/17 116/63   03/16/17 110/56    Weight from Last 3 Encounters:   03/23/17 196 lb (88.9 kg)   03/16/17 191 lb 9.6 oz (86.9 kg)   03/09/17 204 lb (92.5 kg)              We Performed the Following     CBC with platelets     Hepatic panel          Where to get your medicines      Some of these will need a paper prescription and others can be bought over the counter.  Ask your nurse if you have questions.     Bring a paper prescription for each of these medications     oxyCODONE 5 MG IR tablet          Primary Care Provider Office Phone # Fax #    Tylor Roberts [phone]       [facility] [address] [facility] [address]        Thank you!     Thank you for choosing [facility]  for your care. Our goal is always to provide you with excellent care. Hearing back from our patients is one way we can continue to improve our services. Please take a few minutes to complete the written survey that you may receive in the mail after your visit with us. Thank you!             Your Updated Medication List - Protect others around you: Learn how to safely use, store and throw away your medicines at www.disposemymeds.org.          This list is accurate as of: 3/23/17  4:24 PM.  Always use your most recent med list.                   Brand Name Dispense Instructions for use    acetaminophen 325 MG tablet    " "TYLENOL    100 tablet    Take 2 tablets (650 mg) by mouth every 4 hours as needed for mild pain       AMARYL 2 MG tablet   Generic drug:  glimepiride      HOLD UNTIL YOU HAVE FULLY ADVANCED DIET, AND BLOOD SUGARS START TO RUN OVER 150.       amoxicillin-clavulanate 875-125 MG per tablet    AUGMENTIN    10 tablet    Take 1 tablet by mouth 2 times daily       aspirin 81 MG EC tablet     30 tablet    Take 1 tablet (81 mg) by mouth daily       atorvastatin 40 MG tablet    LIPITOR    30 tablet    Take 1 tablet (40 mg) by mouth daily       celecoxib 200 MG capsule    celeBREX    30 capsule    Take 1 capsule (200 mg) by mouth 2 times daily as needed for moderate pain       clopidogrel 75 MG tablet    PLAVIX    30 tablet    Take 1 tablet (75 mg) by mouth daily       CULTURELLE PO      Take 1 capsule by mouth 2 times daily       ferrous sulfate 325 (65 FE) MG tablet    IRON    60 tablet    Take 1 tablet (325 mg) by mouth every other day       gabapentin 300 MG capsule    NEURONTIN    90 capsule    Take 1 capsule (300 mg) by mouth 3 times daily       metFORMIN 1000 MG tablet    GLUCOPHAGE    180 tablet    Take 1 tablet (1,000 mg) by mouth 2 times daily (with meals)       nicotine 7 MG/24HR 24 hr patch    NICODERM CQ    30 patch    Place 1 patch onto the skin daily       order for DME     1 Device    Post of shoe       order for DME     30 days    Equipment being ordered: [employer] Fax [phone]  Primary Dressing 4x4 non-sterile gauze   Qty 2 loafs Secondary Dressing Kerlix wrap 4\" Qty 30 Length of Need: 1 month Frequency of dressing change: daily       oxyCODONE 5 MG IR tablet    ROXICODONE    30 tablet    Take 1-2 tablets (5-10 mg) by mouth every 4 hours as needed for moderate to severe pain or pain       senna-docusate 8.6-50 MG per tablet    SENOKOT-S;PERICOLACE     Take 1-2 tablets by mouth 2 times daily as needed (constipation) While on oxycodone to prevent constipation       sildenafil 100 MG cap/tab    " VIAGRA    12 tablet    Take 1 tablet (100 mg) by mouth daily as needed

## 2017-03-23 NOTE — NURSING NOTE
"Chief Complaint   Patient presents with     Hospital F/U       Initial /72  Pulse 75  Temp 98.2  F (36.8  C) (Oral)  Ht 5' 9\" (1.753 m)  Wt 196 lb (88.9 kg)  SpO2 100%  BMI 28.94 kg/m2 Estimated body mass index is 28.94 kg/(m^2) as calculated from the following:    Height as of this encounter: 5' 9\" (1.753 m).    Weight as of this encounter: 196 lb (88.9 kg).  Medication Reconciliation: complete   Zeenat Roberson, RADHA      "

## 2017-03-28 DIAGNOSIS — H46.9 OPTIC NEUROPATHY: Primary | ICD-10-CM

## 2017-03-29 ENCOUNTER — OFFICE VISIT (OUTPATIENT)
Dept: OPHTHALMOLOGY | Facility: CLINIC | Age: 57
End: 2017-03-29
Attending: OPHTHALMOLOGY
Payer: COMMERCIAL

## 2017-03-29 DIAGNOSIS — H47.013 ISCHEMIC OPTIC NEUROPATHY, BILATERAL: Primary | ICD-10-CM

## 2017-03-29 DIAGNOSIS — H46.9 OPTIC NEUROPATHY: ICD-10-CM

## 2017-03-29 PROCEDURE — 92083 EXTENDED VISUAL FIELD XM: CPT | Mod: ZF | Performed by: OPHTHALMOLOGY

## 2017-03-29 PROCEDURE — 99213 OFFICE O/P EST LOW 20 MIN: CPT | Mod: ZF

## 2017-03-29 PROCEDURE — 92133 CPTRZD OPH DX IMG PST SGM ON: CPT | Mod: ZF | Performed by: OPHTHALMOLOGY

## 2017-03-29 ASSESSMENT — VISUAL ACUITY
OD_SC: CF@ 1 FT
OS_SC: CF @ 1 FT
METHOD: SNELLEN - LINEAR
OD_SC+: TEMPORAL

## 2017-03-29 ASSESSMENT — CUP TO DISC RATIO
OD_RATIO: 0.0
OS_RATIO: 0.0

## 2017-03-29 ASSESSMENT — EXTERNAL EXAM - RIGHT EYE: OD_EXAM: NORMAL

## 2017-03-29 ASSESSMENT — SLIT LAMP EXAM - LIDS
COMMENTS: NORMAL
COMMENTS: NORMAL

## 2017-03-29 ASSESSMENT — TONOMETRY
IOP_METHOD: TONOPEN
OS_IOP_MMHG: 14
OD_IOP_MMHG: 12

## 2017-03-29 ASSESSMENT — CONF VISUAL FIELD
OS_NORMAL: 1
OD_INFERIOR_NASAL_RESTRICTION: 1
OD_SUPERIOR_NASAL_RESTRICTION: 1

## 2017-03-29 ASSESSMENT — EXTERNAL EXAM - LEFT EYE: OS_EXAM: NORMAL

## 2017-03-29 NOTE — MR AVS SNAPSHOT
After Visit Summary   3/29/2017    Gomez Turk    MRN: [id number]           Patient Information     Date Of Birth          1960        Visit Information        Provider Department      3/29/2017 7:30 AM Siddhartha Mclaughlin MD [facility]        Today's Diagnoses     Optic neuropathy           Follow-ups after your visit        Additional Services     OCCUPATIONAL THERAPY REFERRAL       *This therapy referral will be filtered to a centralized scheduling office at [facility] and the patient will receive a call to schedule an appointment at a [place] location most convenient for them. *     [facility] provides Occupational Therapy evaluation and treatment and many specialty services across the [place] system.  If requesting a specialty program, please choose from the list below.    If you have not heard from the scheduling office within 2 business days, please call [phone] for all locations, with the exception of [place], please call [phone].     Treatment: Evaluation & Treatment  Special Instructions/Modalities: Low vision evaluation and therapy (Alejandrina Salinas)  Special Programs: Low vision evaluation and therapy (Alejandrina Salinas)    Please be aware that coverage of these services is subject to the terms and limitations of your health insurance plan.  Call member services at your health plan with any benefit or coverage questions.      **Note to Provider:  If you are referring outside of [place] for the therapy appointment, please list the name of the location in the  special instructions  above, print the referral and give to the patient to schedule the appointment.                  Your next 10 appointments already scheduled     Mar 30, 2017 12:00 PM CDT   Post Op with Jesus Aragon MD   [facility] ([facility]    [address]  [facility] [address]    [phone]            Apr 03, 2017  1:00 PM CDT   Post Op with Celestine Guzman PA-C   [facility] ([facility]    [address], [address]  [facility] [id number] [phone]            Apr 06, 2017 10:00 AM CDT   Office Visit with Tylor Roberts MD   [facility] ([facility])    [address] [address] [phone]           Bring a current list of meds and any records pertaining to this visit.  For Physicals, please bring immunization records and any forms needing to be filled out.  Please arrive 10 minutes early to complete paperwork.            Apr 27, 2017  7:30 AM CDT   RETURN NEURO with Siddhartha Mclaughlin MD   Eye Clinic ([facility])    [address]  [address]  [facility] [id number] [phone]              Who to contact     Please call your clinic at [phone] to:    Ask questions about your health    Make or cancel appointments    Discuss your medicines    Learn about your test results    Speak to your doctor   If you have compliments or concerns about an experience at your clinic, or if you wish to file a complaint, please contact [facility] Patient Relations at [phone] or email us at [email or web address]         Additional Information About Your Visit        [email or web address] Information     [email or web address] gives you secure access to your electronic health record. If you see a primary care provider, you can also send messages to your care team and make appointments. If you have questions, please call your primary care clinic.  If you do not have a primary care provider, please call [phone] and they will assist you.      [email or web address] is an electronic gateway that provides easy, online access to your medical records. With [email or web address], you can request a clinic appointment, read your test results, renew a  prescription or communicate with your care team.     To access your existing account, please contact your [facility] or call [phone] for assistance.        Care EveryWhere ID     This is your Care EveryWhere ID. This could be used by other organizations to access your [place] medical records  [id number]         Blood Pressure from Last 3 Encounters:   03/23/17 130/72   03/20/17 116/63   03/16/17 110/56    Weight from Last 3 Encounters:   03/23/17 88.9 kg (196 lb)   03/16/17 86.9 kg (191 lb 9.6 oz)   03/09/17 92.5 kg (204 lb)              We Performed the Following     DILATED FUNDUS EXAM     IOP Measurement     Low Vision Central OU     OCCUPATIONAL THERAPY REFERRAL     OCT Optic Nerve RNFL Spectralis OU (both eyes)        Primary Care Provider Office Phone # Fax #    Tylor Roberts [phone]       [facility] [address] [place] [address]        Thank you!     Thank you for choosing EYE CLINIC  for your care. Our goal is always to provide you with excellent care. Hearing back from our patients is one way we can continue to improve our services. Please take a few minutes to complete the written survey that you may receive in the mail after your visit with us. Thank you!             Your Updated Medication List - Protect others around you: Learn how to safely use, store and throw away your medicines at www.disposemymeds.org.          This list is accurate as of: 3/29/17  9:42 AM.  Always use your most recent med list.                   Brand Name Dispense Instructions for use    acetaminophen 325 MG tablet    TYLENOL    100 tablet    Take 2 tablets (650 mg) by mouth every 4 hours as needed for mild pain       AMARYL 2 MG tablet   Generic drug:  glimepiride      HOLD UNTIL YOU HAVE FULLY ADVANCED DIET, AND BLOOD SUGARS START TO RUN OVER 150.       amoxicillin-clavulanate 875-125 MG per tablet    AUGMENTIN    10 tablet    Take 1 tablet by  "mouth 2 times daily       aspirin 81 MG EC tablet     30 tablet    Take 1 tablet (81 mg) by mouth daily       atorvastatin 40 MG tablet    LIPITOR    30 tablet    Take 1 tablet (40 mg) by mouth daily       celecoxib 200 MG capsule    celeBREX    30 capsule    Take 1 capsule (200 mg) by mouth 2 times daily as needed for moderate pain       clopidogrel 75 MG tablet    PLAVIX    30 tablet    Take 1 tablet (75 mg) by mouth daily       CULTURELLE PO      Take 1 capsule by mouth 2 times daily       ferrous sulfate 325 (65 FE) MG tablet    IRON    60 tablet    Take 1 tablet (325 mg) by mouth every other day       gabapentin 300 MG capsule    NEURONTIN    90 capsule    Take 1 capsule (300 mg) by mouth 3 times daily       metFORMIN 1000 MG tablet    GLUCOPHAGE    180 tablet    Take 1 tablet (1,000 mg) by mouth 2 times daily (with meals)       nicotine 7 MG/24HR 24 hr patch    NICODERM CQ    30 patch    Place 1 patch onto the skin daily       order for DME     1 Device    Post of shoe       order for DME     30 days    Equipment being ordered: [employer] Fax [phone]  Primary Dressing 4x4 non-sterile gauze   Qty 2 loafs Secondary Dressing Kerlix wrap 4\" Qty 30 Length of Need: 1 month Frequency of dressing change: daily       oxyCODONE 5 MG IR tablet    ROXICODONE    30 tablet    Take 1-2 tablets (5-10 mg) by mouth every 4 hours as needed for moderate to severe pain or pain       senna-docusate 8.6-50 MG per tablet    SENOKOT-S;PERICOLACE     Take 1-2 tablets by mouth 2 times daily as needed (constipation) While on oxycodone to prevent constipation       sildenafil 100 MG cap/tab    VIAGRA    12 tablet    Take 1 tablet (100 mg) by mouth daily as needed         "

## 2017-03-29 NOTE — LETTER
2017    RE: Gomez Turk  : 1960  MRN: [id number]    Dear Providers,    I saw our mutual patient, Gomez Turk, in follow-up in my clinic recently.  After a thorough neuro-ophthalmic history and examination, I came to the following conclusions:       1. Bilateral perioperative ischemic optic neuropathy    Gomez Turk is a pleasant 56 year old  or  male who presents to my neuro-ophthalmology clinic today for follow of his bilateral perioperative ischemic optic neuropathy.     Please refer to my initial note dated 3/10/17 for details.     - he underwent uncomplicated femoral popliteal bypass surgery, big toe amputation on 17   - noticed vision loss initially on 3/8/17 with the right eye.  Had bilateral severe optic neuropathy last visit (worse in the right eye than in the left eye)  - noticed progressive vision loss in the left eye acutely on 3/13/17 leading to urgent visit with Dr. Romaine Fraga.  - Repeat MRI orbits obtained - no significant visual pathway pathology - no changes from first MRI  - repeat CBC looking for anemia that may have precipitated worsening vision showed elevated white count (35,000 with left shift) and thrombocytosis  - patient was called immediately upon receipt of CBC results and told to call his primary care physician for check to look for infection   - patient ultimately found to have acute gangrenous cholecystitis and is now status post laparoscopic cholecystectomy 3/18/17.   - blood pressure was running little low- unclear whether he may have had early sepsis  - No further decrease in vision since last visit (did not have subjective progression in vision loss since before cholecystectomy surgery)    - In summary, he initially presented with bilateral perioperative ischemic optic neuropathy (right greater than left), and again noticed worsening of his vision of the left eye 2 days later.    - Progression in the left eye may have been  secondary to early sepsis due to his gangrenous cholecystitis.   - he denies being on any blood pressure medications since his initial hospitalization for the popliteal bypass.    - His testing today revealed mild improvement in his visual acuity in both eyes.  - OCT RNFL shows decreased retinal nerve fiber layer swelling in both eyes likely indicative of early atrophy.  - Visual field testing shows global depression in both eyes (compared to 3/10/17 visit in the right eye is slightly improved and in the left eye is worse)  - His nerves now look pale on examination indicative of permanent vision loss and optic atrophy setting in.  - We again discussed the lack of treatment for ischemic optic neuropathy- aside from treating potential vasculopathic risk factors (better diabetes mellitus control, avoiding hypotension or hypertension, avoiding anemia, etc) and that likely his vision will remain at this level.   - we will send him to low vision occupational therapy     - return to clinic in 1 month.   - I would still recommend avoiding any elective surgeries that require general anesthesia given that his optic nerve heads are still swollen and in theory he may be more susceptible to additional perioperative ischemic optic neuropathy during this window.  Once optic nerve head swelling resolved then will notify vascular surgeon.    For further exam details, please feel free to contact our office for additional records.  If you wish to contact me regarding this patient please email me at [email or web address] or give my clinic a call to arrange a phone conversation.    Sincerely,    Siddhartha Mclaughlin MD  , Neuro-Ophthalmology and Adult Strabismus  Department of Ophthalmology and Visual Neurosciences  [facility]

## 2017-03-30 ENCOUNTER — TELEPHONE (OUTPATIENT)
Dept: OPHTHALMOLOGY | Facility: CLINIC | Age: 57
End: 2017-03-30

## 2017-03-30 ENCOUNTER — OFFICE VISIT (OUTPATIENT)
Dept: OTHER | Facility: CLINIC | Age: 57
End: 2017-03-30
Attending: SURGERY
Payer: COMMERCIAL

## 2017-03-30 VITALS — SYSTOLIC BLOOD PRESSURE: 116 MMHG | DIASTOLIC BLOOD PRESSURE: 68 MMHG | HEART RATE: 85 BPM

## 2017-03-30 DIAGNOSIS — I73.9 PAD (PERIPHERAL ARTERY DISEASE) (H): Primary | ICD-10-CM

## 2017-03-30 DIAGNOSIS — I73.9 PAD (PERIPHERAL ARTERY DISEASE) (H): ICD-10-CM

## 2017-03-30 DIAGNOSIS — E08.621 DIABETIC ULCER OF RIGHT FOOT ASSOCIATED WITH DIABETES MELLITUS DUE TO UNDERLYING CONDITION (H): Primary | ICD-10-CM

## 2017-03-30 DIAGNOSIS — L97.519 DIABETIC ULCER OF RIGHT FOOT ASSOCIATED WITH DIABETES MELLITUS DUE TO UNDERLYING CONDITION (H): Primary | ICD-10-CM

## 2017-03-30 PROCEDURE — 99211 OFF/OP EST MAY X REQ PHY/QHP: CPT

## 2017-03-30 PROCEDURE — 11042 DBRDMT SUBQ TIS 1ST 20SQCM/<: CPT | Performed by: SURGERY

## 2017-03-30 RX ORDER — OXYCODONE HYDROCHLORIDE 5 MG/1
5 TABLET ORAL EVERY 4 HOURS PRN
Qty: 40 TABLET | Refills: 0 | Status: SHIPPED | OUTPATIENT
Start: 2017-03-30 | End: 2017-04-13

## 2017-03-30 NOTE — MR AVS SNAPSHOT
After Visit Summary   3/30/2017    Gomez Turk    MRN: [id number]           Patient Information     Date Of Birth          1960        Visit Information        Provider Department      3/30/2017 12:00 PM Jesus Aragon MD [facility]      Today's Diagnoses     Diabetic ulcer of right foot associated with diabetes mellitus due to underlying condition (H)    -  1    PAD (peripheral artery disease) (H)           Follow-ups after your visit        Your next 10 appointments already scheduled     Apr 03, 2017  1:00 PM CDT   Post Op with Celestine Guzman PA-C   [facility] ([facility])    [address]  [facility] [address] [phone]            Apr 06, 2017 10:00 AM CDT   Office Visit with Tylor Roberts MD   [facility] ([facility])    [address] [id number] [phone]           Bring a current list of meds and any records pertaining to this visit.  For Physicals, please bring immunization records and any forms needing to be filled out.  Please arrive 10 minutes early to complete paperwork.            Apr 20, 2017  9:30 AM CDT   Evaluation 90 with Alejandrina Salinas OT   [facility], Occupational Therapy, Vision - Outpatie ([facility])    [address] [facility]  9th Floor, Clinic 9a  [facility] [address]   [phone]            Apr 27, 2017  7:30 AM CDT   RETURN NEURO with Siddhartha Mclaughlin MD   Eye Clinic ([facility])    [address]  [address] [facility]  [address] Clin [address]  [facility] [address]   [phone]              Who to contact     If you have questions or need follow up information about today's clinic visit or your schedule please contact [facility] directly at  [phone].  Normal or non-critical lab and imaging results will be communicated to you by MyChart, letter or phone within 4 business days after the clinic has received the results. If you do not hear from us within 7 days, please contact the clinic through Mixwith[employer] or phone. If you have a critical or abnormal lab result, we will notify you by phone as soon as possible.  Submit refill requests through [employer] or call your pharmacy and they will forward the refill request to us. Please allow 3 business days for your refill to be completed.          Additional Information About Your Visit        [employer] Information     [employer] gives you secure access to your electronic health record. If you see a primary care provider, you can also send messages to your care team and make appointments. If you have questions, please call your primary care clinic.  If you do not have a primary care provider, please call [phone] and they will assist you.        Care EveryWhere ID     This is your Care EveryWhere ID. This could be used by other organizations to access your [place] medical records  [id number]        Your Vitals Were     Pulse                   85            Blood Pressure from Last 3 Encounters:   03/30/17 116/68   03/23/17 130/72   03/20/17 116/63    Weight from Last 3 Encounters:   03/23/17 196 lb (88.9 kg)   03/16/17 191 lb 9.6 oz (86.9 kg)   03/09/17 204 lb (92.5 kg)              We Performed the Following     DEBRIDE SKIN/SUBQ TISSUE        Primary Care Provider Office Phone # Fax #    Tylor Roberts [phone]       [facility] [address] [facility] [address]        Thank you!     Thank you for choosing [facility]  for your care. Our goal is always to provide you with excellent care. Hearing back from our patients is one way we can continue to improve our services. Please take a few minutes to complete the written survey that you may receive in  "the mail after your visit with us. Thank you!             Your Updated Medication List - Protect others around you: Learn how to safely use, store and throw away your medicines at www.disposemymeds.org.          This list is accurate as of: 3/30/17 12:58 PM.  Always use your most recent med list.                   Brand Name Dispense Instructions for use    acetaminophen 325 MG tablet    TYLENOL    100 tablet    Take 2 tablets (650 mg) by mouth every 4 hours as needed for mild pain       AMARYL 2 MG tablet   Generic drug:  glimepiride      HOLD UNTIL YOU HAVE FULLY ADVANCED DIET, AND BLOOD SUGARS START TO RUN OVER 150.       amoxicillin-clavulanate 875-125 MG per tablet    AUGMENTIN    10 tablet    Take 1 tablet by mouth 2 times daily       aspirin 81 MG EC tablet     30 tablet    Take 1 tablet (81 mg) by mouth daily       atorvastatin 40 MG tablet    LIPITOR    30 tablet    Take 1 tablet (40 mg) by mouth daily       celecoxib 200 MG capsule    celeBREX    30 capsule    Take 1 capsule (200 mg) by mouth 2 times daily as needed for moderate pain       clopidogrel 75 MG tablet    PLAVIX    30 tablet    Take 1 tablet (75 mg) by mouth daily       CULTURELLE PO      Take 1 capsule by mouth 2 times daily       ferrous sulfate 325 (65 FE) MG tablet    IRON    60 tablet    Take 1 tablet (325 mg) by mouth every other day       gabapentin 300 MG capsule    NEURONTIN    90 capsule    Take 1 capsule (300 mg) by mouth 3 times daily       metFORMIN 1000 MG tablet    GLUCOPHAGE    180 tablet    Take 1 tablet (1,000 mg) by mouth 2 times daily (with meals)       nicotine 7 MG/24HR 24 hr patch    NICODERM CQ    30 patch    Place 1 patch onto the skin daily       order for DME     1 Device    Post of shoe       order for DME     30 days    Equipment being ordered: [employer] Fax [phone]  Primary Dressing 4x4 non-sterile gauze   Qty 2 loafs Secondary Dressing Kerlix wrap 4\" Qty 30 Length of Need: 1 month Frequency of " dressing change: daily       oxyCODONE 5 MG IR tablet    ROXICODONE    30 tablet    Take 1-2 tablets (5-10 mg) by mouth every 4 hours as needed for moderate to severe pain or pain       senna-docusate 8.6-50 MG per tablet    SENOKOT-S;PERICOLACE     Take 1-2 tablets by mouth 2 times daily as needed (constipation) While on oxycodone to prevent constipation       sildenafil 100 MG cap/tab    VIAGRA    12 tablet    Take 1 tablet (100 mg) by mouth daily as needed

## 2017-03-30 NOTE — NURSING NOTE
"Chief Complaint   Patient presents with     RECHECK     Hx of left below-knee popliteal to dorsalis pedis nonreversed translocated greater saphenous vein bypass on 02/28/2017. F/U per Dr. Aragon       Initial /68 (BP Location: Left arm, Patient Position: Chair, Cuff Size: Adult Regular)  Pulse 85 Estimated body mass index is 28.94 kg/(m^2) as calculated from the following:    Height as of 3/23/17: 5' 9\" (1.753 m).    Weight as of 3/23/17: 196 lb (88.9 kg).  Medication Reconciliation: complete     Face to face nursing time: 8 minutes    Elena Simon MA     "

## 2017-03-30 NOTE — PROGRESS NOTES
Gomez Turk His wife return to see in the office today.  He is on a left below-knee popliteal to dorsalis pedis NRTSV bypass along with debridement of the medial ankle ulcer, partial amputation of the great toe ulcer, and debridement of the  Second toe dorsal ulcer.  The second toe does have exposed bone and may eventually require amputation to the joint space involvement.  There using AquacelAg on the great toe partial amputation and a wound gel on the medial ankle.    He developed bilateral significant visual changes seven days postoperatively.  This is evaluated at [facility].  Prognosis is guarded.  He does still have some vision in the left eye which has remained stable but obviously this will be quite disabling for him.      Exam: Alert and appropriate.  Blood pressure 116/68.  Pulse 85.             +3 palpable pulse within left leg bypass graft with +3 biphasic Doppler the graft and dorsalis pedis artery                .  Medial ankle ulcer measures 2 x 1.3 cm and very superficial with good granulation and no undermining.  Great toe ulcer measures 3.4 x 3.5 somewhat depth of 0.3 cm.  Thick fibrin covers most of the wound.  Distally there is some nonviable fatty tissue.  No obvious infection.  Granulation tissue is noted over the second toe with no obvious visible joint space measuring 1 x 1 x 0.1 cm        Procedure: Timeout was called.  A full-thickness/subcutaneous excisional debridement was performed of the three ulcers using a 15 blade scalpel.  All fibrin and slough was removed and the skin edges were debrided.  On the great toe we excised the bleeding tissue.  Good bleeding was noted that stopped with simple pressure and time.  AquacelAG applied to the toe ulcers and wound gel to the ankle.      Impression:  #1  Patent left bypass graft with good distal flow.  Duplex in six weeks.                         #2  Toe ulcerations and ankle ulcer.  Continue with daily dressing changes.  Return for debridement  next week.  Eventually may require second toe amputation    .  Jesus Aragon MD     Please route or send letter to:  *None*

## 2017-04-03 ENCOUNTER — TELEPHONE (OUTPATIENT)
Dept: NURSING | Facility: CLINIC | Age: 57
End: 2017-04-03

## 2017-04-03 NOTE — TELEPHONE ENCOUNTER
Alejandrina Grossman, [facility] calling with request to renew physical therapy order to evaluate and treat patient.  Verbal consent given per nursing standing orders.

## 2017-04-04 ENCOUNTER — TELEPHONE (OUTPATIENT)
Dept: OTHER | Facility: CLINIC | Age: 57
End: 2017-04-04

## 2017-04-04 NOTE — TELEPHONE ENCOUNTER
S/p left below-knee popliteal to dorsalis pedis NRTSV bypass along with debridement of the medial ankle ulcer, partial amputation of the great toe ulcer, and debridement of the Second toe dorsal ulcer 2/28/17    Home health nurse, Leena, called to report that the area on the top of the pt's foot where  removed sutures last week has dehisced and is 100% covered with yellow slough.  Nurse reports that the wound edges are slightly reddened.  Nurse is inquiring about wound care to the new open wound and also asking if they should be applying santyl or hydrogel to the pt's ankle wound.    Will discuss with  and then call the home health nurse back.    Yin Valdes, VIDALN, RN

## 2017-04-04 NOTE — TELEPHONE ENCOUNTER
Discussed with .   recommends that home health nurse apply bacitracin to the new open wound and hydrogel to the ankle wound.  Additionally,  recommends that the pt come in for a clinical evaluation this week.    Called home health nurse back and informed her of above.  Pt is scheduled to see  on 4/6/17.    VIDAL WallaceN, RN

## 2017-04-05 ENCOUNTER — TELEPHONE (OUTPATIENT)
Dept: INTERNAL MEDICINE | Facility: CLINIC | Age: 57
End: 2017-04-05

## 2017-04-05 NOTE — TELEPHONE ENCOUNTER
Home Physical Therapy 1 x a week for 4 weeks for home exercise and Gait . Approved.Kasie Escobar RN

## 2017-04-06 ENCOUNTER — OFFICE VISIT (OUTPATIENT)
Dept: OTHER | Facility: CLINIC | Age: 57
End: 2017-04-06
Attending: SURGERY
Payer: COMMERCIAL

## 2017-04-06 ENCOUNTER — OFFICE VISIT (OUTPATIENT)
Dept: INTERNAL MEDICINE | Facility: CLINIC | Age: 57
End: 2017-04-06
Payer: COMMERCIAL

## 2017-04-06 VITALS — DIASTOLIC BLOOD PRESSURE: 73 MMHG | SYSTOLIC BLOOD PRESSURE: 114 MMHG | HEART RATE: 68 BPM

## 2017-04-06 VITALS
SYSTOLIC BLOOD PRESSURE: 130 MMHG | OXYGEN SATURATION: 100 % | TEMPERATURE: 97.8 F | BODY MASS INDEX: 27.64 KG/M2 | DIASTOLIC BLOOD PRESSURE: 80 MMHG | HEART RATE: 77 BPM | WEIGHT: 187.2 LBS

## 2017-04-06 DIAGNOSIS — K81.0 ACUTE CHOLECYSTITIS: ICD-10-CM

## 2017-04-06 DIAGNOSIS — H54.3 VISION LOSS, BILATERAL: Primary | ICD-10-CM

## 2017-04-06 DIAGNOSIS — I73.9 PAD (PERIPHERAL ARTERY DISEASE) (H): ICD-10-CM

## 2017-04-06 DIAGNOSIS — E08.621 DIABETIC ULCER OF LEFT FOOT ASSOCIATED WITH DIABETES MELLITUS DUE TO UNDERLYING CONDITION (H): Primary | ICD-10-CM

## 2017-04-06 DIAGNOSIS — L97.529 DIABETIC ULCER OF LEFT FOOT ASSOCIATED WITH DIABETES MELLITUS DUE TO UNDERLYING CONDITION (H): Primary | ICD-10-CM

## 2017-04-06 DIAGNOSIS — G47.9 SLEEP DISORDER: ICD-10-CM

## 2017-04-06 LAB
ERYTHROCYTE [DISTWIDTH] IN BLOOD BY AUTOMATED COUNT: 14 % (ref 10–15)
HCT VFR BLD AUTO: 30.7 % (ref 40–53)
HGB BLD-MCNC: 9.8 G/DL (ref 13.3–17.7)
MCH RBC QN AUTO: 29.2 PG (ref 26.5–33)
MCHC RBC AUTO-ENTMCNC: 31.9 G/DL (ref 31.5–36.5)
MCV RBC AUTO: 91 FL (ref 78–100)
PLATELET # BLD AUTO: 526 10E9/L (ref 150–450)
RBC # BLD AUTO: 3.36 10E12/L (ref 4.4–5.9)
WBC # BLD AUTO: 10.6 10E9/L (ref 4–11)

## 2017-04-06 PROCEDURE — 85027 COMPLETE CBC AUTOMATED: CPT | Performed by: INTERNAL MEDICINE

## 2017-04-06 PROCEDURE — 36415 COLL VENOUS BLD VENIPUNCTURE: CPT | Performed by: INTERNAL MEDICINE

## 2017-04-06 PROCEDURE — 99214 OFFICE O/P EST MOD 30 MIN: CPT | Performed by: INTERNAL MEDICINE

## 2017-04-06 PROCEDURE — 11042 DBRDMT SUBQ TIS 1ST 20SQCM/<: CPT | Performed by: SURGERY

## 2017-04-06 RX ORDER — TRAZODONE HYDROCHLORIDE 100 MG/1
100 TABLET ORAL
Qty: 30 TABLET | Refills: 1 | Status: SHIPPED | OUTPATIENT
Start: 2017-04-06 | End: 2017-05-30 | Stop reason: ALTCHOICE

## 2017-04-06 NOTE — MR AVS SNAPSHOT
After Visit Summary   4/6/2017    Gomez Turk    MRN: [id number]           Patient Information     Date Of Birth          1960        Visit Information        Provider Department      4/6/2017 10:00 AM Tylor Roberts MD [facility]        Today's Diagnoses     Vision loss, bilateral    -  1    Acute cholecystitis        Sleep disorder           Follow-ups after your visit        Your next 10 appointments already scheduled     Apr 06, 2017  1:00 PM CDT   Return Visit with Jesus Aragon MD   [facility] ([facility])    [address]  [place] [address]   [phone]            Apr 20, 2017  9:30 AM CDT   Evaluation 90 with Alejandrina Salinas OT   [facility] [place], Occupational Therapy, Vision - Outpatie ([facility], [facility])    [address] [facility] [address] [facility]  9th Floor, Clinic [address]a  [place] [address]   [phone]            Apr 27, 2017  7:30 AM CDT   RETURN NEURO with Siddhartha Mclaughlin MD   [facility]    [address]  [address] Clin [address]  [place] [address]   [phone]            May 11, 2017 10:30 AM CDT   US LOWER EXTREMITY ARTERIAL DUPLEX LEFT with SHVUS4   [facility] ([facility])    [address]  [facility] [address]   [phone]           Please bring a list of your medicines (including vitamins, minerals and over-the-counter drugs). Also, tell your doctor about any allergies you may have. Wear comfortable clothes and leave your valuables at home.  You do not need to do anything special to prepare for your exam.  Please call the Imaging Department at your exam site with any questions.            May 11, 2017 11:15 AM CDT   Return Visit with Jesus Aragon MD   [facility] ([facility])    [address]  [place] [id number] [phone]              Future tests that were ordered for you today     Open Future Orders        Priority Expected Expires Ordered    CBC with platelets Routine 5/1/2017 5/31/2017 4/6/2017            Who to contact     If you have questions or need follow up information about today's clinic visit or your schedule please contact [facility] directly at [phone].  Normal or non-critical lab and imaging results will be communicated to you by [employer], letter or phone within 4 business days after the clinic has received the results. If you do not hear from us within 7 days, please contact the clinic through Color[employer] or phone. If you have a critical or abnormal lab result, we will notify you by phone as soon as possible.  Submit refill requests through [employer] or call your pharmacy and they will forward the refill request to us. Please allow 3 business days for your refill to be completed.          Additional Information About Your Visit        [employer] Information     [employer] gives you secure access to your electronic health record. If you see a primary care provider, you can also send messages to your care team and make appointments. If you have questions, please call your primary care clinic.  If you do not have a primary care provider, please call [phone] and they will assist you.        Care EveryWhere ID     This is your Care EveryWhere ID. This could be used by other organizations to access your [place] medical records  [id number]        Your Vitals Were     Pulse Temperature Pulse Oximetry BMI (Body Mass Index)          77 97.8  F (36.6  C) (Oral) 100% 27.64 kg/m2         Blood Pressure from Last 3 Encounters:   04/06/17 130/80   03/30/17 116/68   03/23/17 130/72    Weight from Last 3 Encounters:   04/06/17 187 lb 3.2 oz (84.9 kg)   03/23/17 196 lb (88.9 kg)    03/16/17 191 lb 9.6 oz (86.9 kg)              We Performed the Following     CBC with platelets          Today's Medication Changes          These changes are accurate as of: 4/6/17 10:29 AM.  If you have any questions, ask your nurse or doctor.               Start taking these medicines.        Dose/Directions    traZODone 100 MG tablet   Commonly known as:  DESYREL   Used for:  Sleep disorder   Started by:  Tylor Roberts MD        Dose:  100 mg   Take 1 tablet (100 mg) by mouth nightly as needed for sleep   Quantity:  30 tablet   Refills:  1            Where to get your medicines      These medications were sent to [id number] [place] [facility] [address] [facility]  [address] [facility] [address]     Phone:  [phone]     traZODone 100 MG tablet                Primary Care Provider Office Phone # Fax #    Tylor Roberts [phone]       [facility] [address]  [facility] [address]        Thank you!     Thank you for choosing [facility]  for your care. Our goal is always to provide you with excellent care. Hearing back from our patients is one way we can continue to improve our services. Please take a few minutes to complete the written survey that you may receive in the mail after your visit with us. Thank you!             Your Updated Medication List - Protect others around you: Learn how to safely use, store and throw away your medicines at www.disposemymeds.org.          This list is accurate as of: 4/6/17 10:29 AM.  Always use your most recent med list.                   Brand Name Dispense Instructions for use    acetaminophen 325 MG tablet    TYLENOL    100 tablet    Take 2 tablets (650 mg) by mouth every 4 hours as needed for mild pain       AMARYL 2 MG tablet   Generic drug:  glimepiride      HOLD UNTIL YOU HAVE FULLY ADVANCED DIET, AND BLOOD SUGARS START TO RUN OVER 150.       aspirin 81 MG EC tablet     30  "tablet    Take 1 tablet (81 mg) by mouth daily       atorvastatin 40 MG tablet    LIPITOR    30 tablet    Take 1 tablet (40 mg) by mouth daily       celecoxib 200 MG capsule    celeBREX    30 capsule    Take 1 capsule (200 mg) by mouth 2 times daily as needed for moderate pain       clopidogrel 75 MG tablet    PLAVIX    30 tablet    Take 1 tablet (75 mg) by mouth daily       CULTURELLE PO      Take 1 capsule by mouth 2 times daily       ferrous sulfate 325 (65 FE) MG tablet    IRON    60 tablet    Take 1 tablet (325 mg) by mouth every other day       gabapentin 300 MG capsule    NEURONTIN    90 capsule    Take 1 capsule (300 mg) by mouth 3 times daily       metFORMIN 1000 MG tablet    GLUCOPHAGE    180 tablet    Take 1 tablet (1,000 mg) by mouth 2 times daily (with meals)       nicotine 7 MG/24HR 24 hr patch    NICODERM CQ    30 patch    Place 1 patch onto the skin daily       order for DME     1 Device    Post of shoe       order for DME     30 days    Equipment being ordered: [employer] [facility] Fax [phone]  Primary Dressing 4x4 non-sterile gauze   Qty 2 loafs Secondary Dressing Kerlix wrap 4\" Qty 30 Length of Need: 1 month Frequency of dressing change: daily       * oxyCODONE 5 MG IR tablet    ROXICODONE    30 tablet    Take 1-2 tablets (5-10 mg) by mouth every 4 hours as needed for moderate to severe pain or pain       * oxyCODONE 5 MG IR tablet    ROXICODONE    40 tablet    Take 1 tablet (5 mg) by mouth every 4 hours as needed for pain maximum 6 tablet(s) per day       senna-docusate 8.6-50 MG per tablet    SENOKOT-S;PERICOLACE     Take 1-2 tablets by mouth 2 times daily as needed (constipation) While on oxycodone to prevent constipation       sildenafil 100 MG cap/tab    VIAGRA    12 tablet    Take 1 tablet (100 mg) by mouth daily as needed       traZODone 100 MG tablet    DESYREL    30 tablet    Take 1 tablet (100 mg) by mouth nightly as needed for sleep       * Notice:  This list has 2 medication(s) " that are the same as other medications prescribed for you. Read the directions carefully, and ask your doctor or other care provider to review them with you.

## 2017-04-06 NOTE — NURSING NOTE
"Chief Complaint   Patient presents with     RECHECK     wound check       Initial /73 (BP Location: Right arm, Patient Position: Chair, Cuff Size: Adult Large)  Pulse 68 Estimated body mass index is 27.64 kg/(m^2) as calculated from the following:    Height as of 3/23/17: 5' 9\" (1.753 m).    Weight as of an earlier encounter on 4/6/17: 187 lb 3.2 oz (84.9 kg).  Medication Reconciliation: complete     Face to face nursing time: 8 minutes    Elena Simon MA     "

## 2017-04-06 NOTE — PROGRESS NOTES
SUBJECTIVE:                                                    Gomez Turk is a 56 year old male who presents to clinic today for the following health issues:    Recheck liver, hgb and wbc from 3/23/17 labs     Other concerns:  1. Requesting refills on Oxycodone 5 mg tab as issues of sleep disorder also due to visual changes.    Problem list and histories reviewed & adjusted, as indicated.  Additional history: as documented    Patient Active Problem List   Diagnosis     Diverticulosis of large intestine     Tobacco use disorder     HYPERLIPIDEMIA LDL GOAL <100     PAD (peripheral artery disease) (H)     Type 2 diabetes mellitus with other ophthalmic complication, without long-term current use of insulin (H)     Acute cholecystitis     Vision loss, bilateral     Past Surgical History:   Procedure Laterality Date     AMPUTATE TOE(S) Left 2/28/2017    Procedure: AMPUTATE TOE(S);  Surgeon: Jesus Aragon MD;  Location:  OR     BYPASS GRAFT FEMOROPOPLITEAL Left 2/28/2017    Procedure: BYPASS GRAFT FEMOROPOPLITEAL;  Surgeon: Jesus Aragon MD;  Location:  OR     ENT SURGERY  1990    deviated septum repair     IRRIGATION AND DEBRIDEMENT FOOT, COMBINED Left 2/28/2017    Procedure: COMBINED IRRIGATION AND DEBRIDEMENT FOOT;  Surgeon: Jesus Aragon MD;  Location:  OR     LAPAROSCOPIC CHOLECYSTECTOMY N/A 3/18/2017    Procedure: LAPAROSCOPIC CHOLECYSTECTOMY;  Surgeon: Edin Hollingsworth MD;  Location:  OR       Social History   Substance Use Topics     Smoking status: Former Smoker     Packs/day: 0.00     Types: Cigarettes     Smokeless tobacco: Never Used      Comment: quit 2 weeks ago     Alcohol use No     Family History   Problem Relation Age of Onset     DIABETES Mother      Lipids Mother      CANCER Paternal Grandmother      Neurologic Disorder Maternal Uncle      Glaucoma No family hx of      Macular Degeneration No family hx of      Retinal detachment No family hx of       Thyroid Disease No family hx of          Current Outpatient Prescriptions   Medication Sig Dispense Refill     oxyCODONE (ROXICODONE) 5 MG IR tablet Take 1 tablet (5 mg) by mouth every 4 hours as needed for pain maximum 6 tablet(s) per day 40 tablet 0     oxyCODONE (ROXICODONE) 5 MG IR tablet Take 1-2 tablets (5-10 mg) by mouth every 4 hours as needed for moderate to severe pain or pain 30 tablet 0     celecoxib (CELEBREX) 200 MG capsule Take 1 capsule (200 mg) by mouth 2 times daily as needed for moderate pain 30 capsule 0     glimepiride (AMARYL) 2 MG tablet HOLD UNTIL YOU HAVE FULLY ADVANCED DIET, AND BLOOD SUGARS START TO RUN OVER 150.  0     senna-docusate (SENOKOT-S;PERICOLACE) 8.6-50 MG per tablet Take 1-2 tablets by mouth 2 times daily as needed (constipation) While on oxycodone to prevent constipation       ferrous sulfate (IRON) 325 (65 FE) MG tablet Take 1 tablet (325 mg) by mouth every other day 60 tablet 0     aspirin EC 81 MG EC tablet Take 1 tablet (81 mg) by mouth daily 30 tablet 11     gabapentin (NEURONTIN) 300 MG capsule Take 1 capsule (300 mg) by mouth 3 times daily 90 capsule 3     atorvastatin (LIPITOR) 40 MG tablet Take 1 tablet (40 mg) by mouth daily 30 tablet 11     clopidogrel (PLAVIX) 75 MG tablet Take 1 tablet (75 mg) by mouth daily 30 tablet 11     nicotine (NICODERM CQ) 7 MG/24HR 24 hr patch Place 1 patch onto the skin daily 30 patch 3     metFORMIN (GLUCOPHAGE) 1000 MG tablet Take 1 tablet (1,000 mg) by mouth 2 times daily (with meals) 180 tablet 3     sildenafil (VIAGRA) 100 MG tablet Take 1 tablet (100 mg) by mouth daily as needed 12 tablet 5     Lactobacillus Rhamnosus, GG, (CULTURELLE PO) Take 1 capsule by mouth 2 times daily       acetaminophen (TYLENOL) 325 MG tablet Take 2 tablets (650 mg) by mouth every 4 hours as needed for mild pain 100 tablet 0     order for DME Equipment being ordered: [employer] [facility] Fax [phone]    Primary Dressing 4x4 non-sterile gauze   Qty 2  "loafs  Secondary Dressing Kerlix wrap 4\" Qty 30  Length of Need: 1 month  Frequency of dressing change: daily 30 days 0     order for DME Post of shoe 1 Device 0     Allergies   Allergen Reactions     No Known Drug Allergies      BP Readings from Last 3 Encounters:   04/06/17 130/80   03/30/17 116/68   03/23/17 130/72    Wt Readings from Last 3 Encounters:   04/06/17 187 lb 3.2 oz (84.9 kg)   03/23/17 196 lb (88.9 kg)   03/16/17 191 lb 9.6 oz (86.9 kg)                  Labs reviewed in EPIC    Reviewed and updated as needed this visit by clinical staff  Tobacco  Allergies  Med Hx  Surg Hx  Fam Hx  Soc Hx      Reviewed and updated as needed this visit by Provider         ROS:  C: NEGATIVE for fever, chills, change in weight  E/M: NEGATIVE for ear, mouth and throat problems  R: NEGATIVE for significant cough or SOB  CV: NEGATIVE for chest pain, palpitations or peripheral edema  GI: NEGATIVE for nausea, abdominal pain, heartburn, or change in bowel habits  : NEGATIVE for frequency, dysuria, or hematuria  M: NEGATIVE for significant arthralgias or myalgia  H: NEGATIVE for bleeding problems  P: NEGATIVE for changes in mood or affect    OBJECTIVE:                                                    /80  Pulse 77  Temp 97.8  F (36.6  C) (Oral)  Wt 187 lb 3.2 oz (84.9 kg)  SpO2 100%  BMI 27.64 kg/m2  Body mass index is 27.64 kg/(m^2).  GENERAL:  alert and no distress  EYES: decreased VA OU HM, using blind cane.  Walks with personal aide.  HENT: ear canals- normal; TMs- normal; Nose- normal; Mouth- no ulcers, no lesions  NECK: no tenderness, no adenopathy, no asymmetry, no masses, no stiffness; thyroid- normal to palpation  RESP: lungs clear to auscultation - no rales, no rhonchi, no wheezes  CV: regular rates and rhythm, normal S1 S2, no S3 or S4 and no click or rub   MS: extremities- no gross deformities noted  PSYCH: Alert and oriented times 3; speech- coherent , normal rate and volume; able to articulate " logical thoughts, able to abstract reason, no tangential thoughts, no hallucinations or delusions, affect- normal     ASSESSMENT/PLAN:                                                      (H54.3) Vision loss, bilateral  (primary encounter diagnosis)  Comment: suspect anoxia due to surgery  Plan: CBC with platelets, CBC with platelets        May suggest SS disability for 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    (K81.0) Acute cholecystitis  Comment: resolved  Plan: CBC with platelets, CBC with platelets        Follow CBC    (G47.9) Sleep disorder  Comment: suggest stopping pain meds, trial Trazodone.  Plan: traZODone (DESYREL) 100 MG tablet          See Patient Instructions    Tylor Roberts MD  [facility]    25 minutes spent with this patient, face to face, discussing treatment options for listed problems above as well as side effects of appropriate medications.  Counseling time extended beyond 50% of the clinic visit.  Medication dosing, treatment plan and follow-up were discussed. Also reviewed need for primary care testing for patient.

## 2017-04-06 NOTE — MR AVS SNAPSHOT
After Visit Summary   4/6/2017    Gomez Turk    MRN: [id number]           Patient Information     Date Of Birth          1960        Visit Information        Provider Department      4/6/2017 1:00 PM Jesus Aragon MD [facility]  [facility] [place]       Follow-ups after your visit        Your next 10 appointments already scheduled     Apr 13, 2017 11:15 AM CDT   Return Visit with Jesus Aragon MD   [facility] ([facility])    [address]  [place] [address]   [phone]            Apr 18, 2017 10:00 AM CDT   Return Visit with Jesus Aragon MD   [facility] [place] ([facility])    [address]  [place] [address]   [phone]            Apr 20, 2017  9:30 AM CDT   Evaluation 90 with Alejandrina Salinas OT   [facility] [place], Occupational Therapy, Vision - Outpatie ([facility], [facility])    [address] [facility] [address]  [place] [address]   [phone]            Apr 27, 2017  7:30 AM CDT   RETURN NEURO with Siddhartha Mclaughlin MD   Eye Clinic ([facility])    [address]  [address] [facility]  [address] [address]  [place] [address]   [phone]            Apr 27, 2017 10:30 AM CDT   Return Visit with Jesus Aragon MD   [facility] Center ([facility])    [address]  [place] [address]   [phone]            May 11, 2017 10:30 AM CDT   US LOWER EXTREMITY ARTERIAL DUPLEX LEFT with VUS4   [facility] MVI Ultrasound ([facility])    [address]  [place] [address]   [phone]           Please bring a list of your  medicines (including vitamins, minerals and over-the-counter drugs). Also, tell your doctor about any allergies you may have. Wear comfortable clothes and leave your valuables at home.  You do not need to do anything special to prepare for your exam.  Please call the Imaging Department at your exam site with any questions.            May 11, 2017 11:15 AM CDT   Return Visit with Jesus Aragon MD   [facility] [place] ([facility])    [address]  [facility] [id number] [phone]              Future tests that were ordered for you today     Open Future Orders        Priority Expected Expires Ordered    CBC with platelets Routine 5/1/2017 5/31/2017 4/6/2017            Who to contact     If you have questions or need follow up information about today's clinic visit or your schedule please contact [facility] directly at [phone].  Normal or non-critical lab and imaging results will be communicated to you by [employer], letter or phone within 4 business days after the clinic has received the results. If you do not hear from us within 7 days, please contact the clinic through [employer] or phone. If you have a critical or abnormal lab result, we will notify you by phone as soon as possible.  Submit refill requests through [employer] or call your pharmacy and they will forward the refill request to us. Please allow 3 business days for your refill to be completed.          Additional Information About Your Visit        [employer] Information     [employer] gives you secure access to your electronic health record. If you see a primary care provider, you can also send messages to your care team and make appointments. If you have questions, please call your primary care clinic.  If you do not have a primary care provider, please call [phone] and they will assist you.        Care EveryWhere ID     This is your Care  EveryWhere ID. This could be used by other organizations to access your [place] medical records  [id number]        Your Vitals Were     Pulse                   68            Blood Pressure from Last 3 Encounters:   04/06/17 114/73   04/06/17 130/80   03/30/17 116/68    Weight from Last 3 Encounters:   04/06/17 187 lb 3.2 oz (84.9 kg)   03/23/17 196 lb (88.9 kg)   03/16/17 191 lb 9.6 oz (86.9 kg)              Today, you had the following     No orders found for display         Today's Medication Changes          These changes are accurate as of: 4/6/17  1:32 PM.  If you have any questions, ask your nurse or doctor.               Start taking these medicines.        Dose/Directions    traZODone 100 MG tablet   Commonly known as:  DESYREL   Used for:  Sleep disorder   Started by:  Tylor Roberts MD        Dose:  100 mg   Take 1 tablet (100 mg) by mouth nightly as needed for sleep   Quantity:  30 tablet   Refills:  1            Where to get your medicines      These medications were sent to [id number] - [facility] [address]  [address] [facility] [address]     Phone:  [phone]     traZODone 100 MG tablet                Primary Care Provider Office Phone # Fax #    Tylor Roberts [phone]       [facility] [address] [facility] [address]        Thank you!     Thank you for choosing [facility] [place]  for your care. Our goal is always to provide you with excellent care. Hearing back from our patients is one way we can continue to improve our services. Please take a few minutes to complete the written survey that you may receive in the mail after your visit with us. Thank you!             Your Updated Medication List - Protect others around you: Learn how to safely use, store and throw away your medicines at www.disposemymeds.org.          This list is accurate as of: 4/6/17  1:32 PM.  Always use your most recent  "med list.                   Brand Name Dispense Instructions for use    acetaminophen 325 MG tablet    TYLENOL    100 tablet    Take 2 tablets (650 mg) by mouth every 4 hours as needed for mild pain       AMARYL 2 MG tablet   Generic drug:  glimepiride      HOLD UNTIL YOU HAVE FULLY ADVANCED DIET, AND BLOOD SUGARS START TO RUN OVER 150.       aspirin 81 MG EC tablet     30 tablet    Take 1 tablet (81 mg) by mouth daily       atorvastatin 40 MG tablet    LIPITOR    30 tablet    Take 1 tablet (40 mg) by mouth daily       celecoxib 200 MG capsule    celeBREX    30 capsule    Take 1 capsule (200 mg) by mouth 2 times daily as needed for moderate pain       clopidogrel 75 MG tablet    PLAVIX    30 tablet    Take 1 tablet (75 mg) by mouth daily       CULTURELLE PO      Take 1 capsule by mouth 2 times daily       ferrous sulfate 325 (65 FE) MG tablet    IRON    60 tablet    Take 1 tablet (325 mg) by mouth every other day       gabapentin 300 MG capsule    NEURONTIN    90 capsule    Take 1 capsule (300 mg) by mouth 3 times daily       metFORMIN 1000 MG tablet    GLUCOPHAGE    180 tablet    Take 1 tablet (1,000 mg) by mouth 2 times daily (with meals)       nicotine 7 MG/24HR 24 hr patch    NICODERM CQ    30 patch    Place 1 patch onto the skin daily       order for DME     1 Device    Post of shoe       order for DME     30 days    Equipment being ordered: [facility] [facility] Fax [phone]  Primary Dressing 4x4 non-sterile gauze   Qty 2 loafs Secondary Dressing Kerlix wrap 4\" Qty 30 Length of Need: 1 month Frequency of dressing change: daily       * oxyCODONE 5 MG IR tablet    ROXICODONE    30 tablet    Take 1-2 tablets (5-10 mg) by mouth every 4 hours as needed for moderate to severe pain or pain       * oxyCODONE 5 MG IR tablet    ROXICODONE    40 tablet    Take 1 tablet (5 mg) by mouth every 4 hours as needed for pain maximum 6 tablet(s) per day       senna-docusate 8.6-50 MG per tablet    SENOKOT-S;PERICOLACE     Take " 1-2 tablets by mouth 2 times daily as needed (constipation) While on oxycodone to prevent constipation       sildenafil 100 MG cap/tab    VIAGRA    12 tablet    Take 1 tablet (100 mg) by mouth daily as needed       traZODone 100 MG tablet    DESYREL    30 tablet    Take 1 tablet (100 mg) by mouth nightly as needed for sleep       * Notice:  This list has 2 medication(s) that are the same as other medications prescribed for you. Read the directions carefully, and ask your doctor or other care provider to review them with you.

## 2017-04-06 NOTE — PROGRESS NOTES
Gomez Turk and his wife returns in the office today.  He had undergone a left popliteal to dorsalis pedis NRTSV bypass.  He had a partial amputation of his great toe with debridement of the medial ankle and dorsal second also.   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    Last week and debrided his foot ulcers.  They've been using Aquacel Ag on the great toe amputation site and a wound gel on the other sites.  We removed his sutures since it up in three weeks in surgery on the dorsum of the foot and these were partially pulling through.  Two evenings ago his wife noticed that  The wound dehisced over the dorsalis pedis site with no bleeding.  We felt that evaluation was indicated today.      Exam: Alert and appropriate.  Blood pressure 114/73.  Pulse 68.             +3 palpable pulse bypass graft and distal dorsalis pedis artery.             Dehiscence of the dorsal foot wound with no obvious infection.  This measures 3.5 cm in length and 1.2 cm with a depth of approximately 0.3 cm.  There is fibrin within the wound.  Graft is not exposed that there may be a silk suture from a side branch visible.             Dorsal partial toe amputation site measures 3 x 2 cm the depth is 0.3 cm and filled with fibrin.  Very minimal distal necrotic skin is noted.  Ulcer of the ankle decreased to 1.1 x 1.2 cm with a depth of 0.1 cm healthy granulation tissue.  Dorsal second toe measures approximately 1 x 1 cm.  Previous exposed joint is no longer visible.  No swelling or signs of infection.    Procedure: Timeout was called.  4% topical applied.  He is a 50 with scalpel full-thickness/subcutaneous excisional debridement was performed on the three ulcer sites ( not the second toe).  Good granulation tissue is noted in the ankle and toe.  I cautiously debrided over the graft prevent graft from being exposed but got down to viable bleeding tissue.  A moistened Aquacel Ag was  placed over the dorsal foot ulcer and great toe with wound gel to the other sites.  Good bleeding was noted.  This stopped with simple pressure and time.    Impression:  #1  Dorsal wound dehiscence.  Mild swelling is noted but not significant.  No obvious infection.  This will need to heal by secondary intent.  Daily application of Aquacel Ag with moistening.                          #2  Other ulcers are improving.  Eventually the second toe may need to be removed since the joint space was exposed in one time.  Continue with same dressing changes.    Due to concerns over the dorsal foot dehiscence.  I will see him again in one week.       Jesus Aragon MD       Please route or send letter to:  *None*

## 2017-04-06 NOTE — NURSING NOTE
"Chief Complaint   Patient presents with     RECHECK       Initial /80  Pulse 77  Temp 97.8  F (36.6  C) (Oral)  Wt 187 lb 3.2 oz (84.9 kg)  SpO2 100%  BMI 27.64 kg/m2 Estimated body mass index is 27.64 kg/(m^2) as calculated from the following:    Height as of 3/23/17: 5' 9\" (1.753 m).    Weight as of this encounter: 187 lb 3.2 oz (84.9 kg).     Medication Reconciliation: complete   Zeenat Roberson CMA      "

## 2017-04-13 ENCOUNTER — OFFICE VISIT (OUTPATIENT)
Dept: OTHER | Facility: CLINIC | Age: 57
End: 2017-04-13
Attending: SURGERY
Payer: COMMERCIAL

## 2017-04-13 ENCOUNTER — CARE COORDINATION (OUTPATIENT)
Dept: CARE COORDINATION | Facility: CLINIC | Age: 57
End: 2017-04-13

## 2017-04-13 VITALS — DIASTOLIC BLOOD PRESSURE: 83 MMHG | SYSTOLIC BLOOD PRESSURE: 121 MMHG | HEART RATE: 80 BPM

## 2017-04-13 DIAGNOSIS — L97.529 DIABETIC ULCER OF LEFT FOOT ASSOCIATED WITH DIABETES MELLITUS DUE TO UNDERLYING CONDITION (H): Primary | ICD-10-CM

## 2017-04-13 DIAGNOSIS — E08.621 DIABETIC ULCER OF LEFT FOOT ASSOCIATED WITH DIABETES MELLITUS DUE TO UNDERLYING CONDITION (H): Primary | ICD-10-CM

## 2017-04-13 DIAGNOSIS — I73.9 PAD (PERIPHERAL ARTERY DISEASE) (H): ICD-10-CM

## 2017-04-13 PROCEDURE — 11042 DBRDMT SUBQ TIS 1ST 20SQCM/<: CPT | Performed by: SURGERY

## 2017-04-13 PROCEDURE — 99211 OFF/OP EST MAY X REQ PHY/QHP: CPT

## 2017-04-13 RX ORDER — OXYCODONE HYDROCHLORIDE 5 MG/1
5 TABLET ORAL EVERY 4 HOURS PRN
Qty: 30 TABLET | Refills: 0 | Status: SHIPPED | OUTPATIENT
Start: 2017-04-13 | End: 2017-05-17

## 2017-04-13 NOTE — LETTER
[facility] Home  Complex Care Plan  About Me  Patient Name:  Gomez Chaparro    YOB: 1960  Age:   56 year old   Andre MRN: [id number] Telephone Information:     Home Phone [phone]   Mobile Not on file.       Address:    [address] [place] [address] Email address:  [email or web address]      Emergency Contact(s)  Name Relationship Lgl Grd Work Phone Home Phone Mobile Phone   1. RADHA CHAPARRO Spouse   [phone]   2. SHANKAR, CHR* Son    [phone]           Primary language:  English     needed? No   [employer]:  [phone] op. 1  Other communication barriers: No  Preferred Method of Communication:  Letter, MyChart, Phone  Current living arrangement: I live in a private home with spouse  Mobility Status/ Medical Equipment: Independent w/Device  Other information to know about me:    Health Maintenance  Health Maintenance Reviewed: Not assessed    My Access Plan  Medical Emergency 911   Primary Clinic Line [facility]- [phone]   24 Hour Appointment Line [phone] or  [phone] ([phone]) (toll-free)   24 Hour Nurse Line [phone] (toll-free)   Preferred Urgent Care [facility], [phone]   Preferred Hospital [facility]  [phone]   Preferred Pharmacy [id number] - [place], [place] - [address] [facility] Crisis Line [facility], [phone] or 911     My Care Team Members  Patient Care Team       Relationship Specialty Notifications Start End    Tylor Roberts MD PCP - General   2/15/02     Phone: [phone] Fax: [phone]         [facility] [address] [facility] [address]    Jesus Aragon MD Surgeon Surgery  3/10/17     Comment:  phone: [phone]     Rosalva Alvarez MD, RN Clinic Care Coordinator  Admissions 3/23/17     Phone:  [phone] Fax: [phone]             My Care Plans  Self Management and Treatment Plan  Goals and (Comments)  Goal #1: I will get vision loss resources       100% of goal reached    Goal #2:  (I will increase my strength )   I will keep my home care PT appointments    50% of goal reached      Action Plans on File:  (NA)  Advance Care Plans/Directives Type:   Type Advanced Care Plans/Directives:  (NA)    My Medical and Care Information  Problem List   Patient Active Problem List   Diagnosis     Diverticulosis of large intestine     Tobacco use disorder     HYPERLIPIDEMIA LDL GOAL <100     PAD (peripheral artery disease) (H)     Type 2 diabetes mellitus with other ophthalmic complication, without long-term current use of insulin (H)     Acute cholecystitis     Vision loss, bilateral      Current Medications and Allergies:  See printed Medication Report.    Care Coordination Start Date: 03/08/17   Frequency of Care Coordination:  (CC will follow up when discharged from home care)   Form Last Updated: 04/13/2017

## 2017-04-13 NOTE — PROGRESS NOTES
Gomez Turk And his wife return to see me in the office today.He underwent a left popliteal to dorsalis pedis bypass graft with a partial open great toe amputation. He also has a ulcer over the left second dorsal toe with the joint exposed but appears to be resolving ( no amputation planned at this time no this is oftentimes necessary since the joint was exposed at one time).  He also was a left medial ankle ulcer is decreasing in size.      His distal anastomosis site broke down and were allowing this to heal by secondary intent.    Revision problem postoperatively that has not improved with some vision in his left eye but none in the right.    He is decreasing his oxycodone to approximately one tablet every night and needs a refill today.    Exam: Alert and appropriate.  Blood pressure 121/83.  Pulse 80              +3 palpable pulse within bypass graft and distal dorsalis pedis artery.                 Dehisced area measures 4.5 x 1.57 with a depth of 0.3.  Graft is not visible at the base and the wound is filled with fibrin.  Edges are well defined with no infection.  Great toe ulcer measured 3.2 x 2.57 depth of 0.3 cm.  Granulation tissue is noted the base covering at least 60% of the wound.  There is no nonviable tissue.  Fibrin is noted over the wounds.                  Second toe ulcer measures 1 x 1 cm with a depth of less than 0.1 cm.  Joint is no longer visible.                     Medial ankle ulcer measures 1.2 x 1 cm with a depth of 0.1 cm excellent granulation.      Procedure:  Timeout was called.  4% topical lidocaine was applied to the four ulcers with good results.  A full-thickness/subcutaneous excisional debridement was performed removing all slough and fibrin debriding the edges.  Good bleeding was noted.  Graft is not exposed at the base of the dorsal ankle incision breakdown.  Aquacel Ag was applied.      Impression:  Improving ulcerations.  Less concerned about the graft site now that  were seen better granulation tissue.  This will heal by secondary intent.  Plan daily Aquacel Ag by his wife with home health care seeing him weekly.  Return to clinic in one week.    I refilled his oxycodone #30 today.      Jesus Aragon MD Please route or send letter to:  *None*

## 2017-04-13 NOTE — MR AVS SNAPSHOT
After Visit Summary   4/13/2017    Gomez Turk    MRN: [id number]           Patient Information     Date Of Birth          1960        Visit Information        Provider Department      4/13/2017 11:15 AM Jesus Aragon MD [facility] [place]      Today's Diagnoses     PAD (peripheral artery disease) (H)           Follow-ups after your visit        Follow-up notes from your care team     Return in about 1 week (around 4/20/2017).      Your next 10 appointments already scheduled     Apr 18, 2017 10:00 AM CDT   Return Visit with Jesus Aragon MD   [facility] ([facility])    [address]  [place] [address]   [phone]            Apr 20, 2017  9:30 AM CDT   Evaluation 90 with Alejandrina Salinas OT   [facility], [place], Occupational Therapy, Vision - Outpatie ([facility], [facility])    [address] [place] [address]  [facility], Clinic 9a  [facility] [address]   [phone]            Apr 27, 2017  7:30 AM CDT   RETURN NEURO with Siddhartha Mclaughlin MD   Eye Clinic ([facility])    [address]  [address]  [facility] [address]   [phone]            Apr 27, 2017 10:30 AM CDT   Return Visit with Jesus Aragon MD   [facility] ([facility])    [address]  [place] [address]   [phone]            May 11, 2017 10:30 AM CDT   US LOWER EXTREMITY ARTERIAL DUPLEX LEFT with SHVUS4   [facility] ([facility])    [address]  [place] [address]   [phone]           Please bring a list of your medicines (including vitamins, minerals and over-the-counter drugs). Also, tell your doctor  about any allergies you may have. Wear comfortable clothes and leave your valuables at home.  You do not need to do anything special to prepare for your exam.  Please call the Imaging Department at your exam site with any questions.            May 11, 2017 11:15 AM CDT   Return Visit with Jesus Aragon MD   [facility] ([facility])    [address]  [place] [address] [phone]              Who to contact     If you have questions or need follow up information about today's clinic visit or your schedule please contact [facility] directly at [phone].  Normal or non-critical lab and imaging results will be communicated to you by Aptanahart, letter or phone within 4 business days after the clinic has received the results. If you do not hear from us within 7 days, please contact the clinic through Aptanahart or phone. If you have a critical or abnormal lab result, we will notify you by phone as soon as possible.  Submit refill requests through LemonStand. or call your pharmacy and they will forward the refill request to us. Please allow 3 business days for your refill to be completed.          Additional Information About Your Visit        AptanaharCoScale Information     LemonStand. gives you secure access to your electronic health record. If you see a primary care provider, you can also send messages to your care team and make appointments. If you have questions, please call your primary care clinic.  If you do not have a primary care provider, please call [phone] and they will assist you.        Care EveryWhere ID     This is your Care EveryWhere ID. This could be used by other organizations to access your [place] medical records  [id number]        Your Vitals Were     Pulse                   80            Blood Pressure from Last 3 Encounters:   04/13/17 121/83   04/06/17 114/73   04/06/17 130/80     Weight from Last 3 Encounters:   04/06/17 187 lb 3.2 oz (84.9 kg)   03/23/17 196 lb (88.9 kg)   03/16/17 191 lb 9.6 oz (86.9 kg)              Today, you had the following     No orders found for display         Where to get your medicines      Some of these will need a paper prescription and others can be bought over the counter.  Ask your nurse if you have questions.     Bring a paper prescription for each of these medications     oxyCODONE 5 MG IR tablet          Primary Care Provider Office Phone # Fax #    Tylor Roberts [phone]       [facility] [address] [facility] [address]        Thank you!     Thank you for choosing [facility] [place]  for your care. Our goal is always to provide you with excellent care. Hearing back from our patients is one way we can continue to improve our services. Please take a few minutes to complete the written survey that you may receive in the mail after your visit with us. Thank you!             Your Updated Medication List - Protect others around you: Learn how to safely use, store and throw away your medicines at www.disposemymeds.org.          This list is accurate as of: 4/13/17 12:25 PM.  Always use your most recent med list.                   Brand Name Dispense Instructions for use    acetaminophen 325 MG tablet    TYLENOL    100 tablet    Take 2 tablets (650 mg) by mouth every 4 hours as needed for mild pain       AMARYL 2 MG tablet   Generic drug:  glimepiride      HOLD UNTIL YOU HAVE FULLY ADVANCED DIET, AND BLOOD SUGARS START TO RUN OVER 150.       aspirin 81 MG EC tablet     30 tablet    Take 1 tablet (81 mg) by mouth daily       atorvastatin 40 MG tablet    LIPITOR    30 tablet    Take 1 tablet (40 mg) by mouth daily       celecoxib 200 MG capsule    celeBREX    30 capsule    Take 1 capsule (200 mg) by mouth 2 times daily as needed for moderate pain       clopidogrel 75 MG tablet    PLAVIX    30 tablet    Take  "1 tablet (75 mg) by mouth daily       CULTURELLE PO      Take 1 capsule by mouth 2 times daily       ferrous sulfate 325 (65 FE) MG tablet    IRON    60 tablet    Take 1 tablet (325 mg) by mouth every other day       gabapentin 300 MG capsule    NEURONTIN    90 capsule    Take 1 capsule (300 mg) by mouth 3 times daily       metFORMIN 1000 MG tablet    GLUCOPHAGE    180 tablet    Take 1 tablet (1,000 mg) by mouth 2 times daily (with meals)       nicotine 7 MG/24HR 24 hr patch    NICODERM CQ    30 patch    Place 1 patch onto the skin daily       order for DME     1 Device    Post of shoe       order for DME     30 days    Equipment being ordered: [employer] Fax [phone]  Primary Dressing 4x4 non-sterile gauze   Qty 2 loafs Secondary Dressing Kerlix wrap 4\" Qty 30 Length of Need: 1 month Frequency of dressing change: daily       * oxyCODONE 5 MG IR tablet    ROXICODONE    30 tablet    Take 1-2 tablets (5-10 mg) by mouth every 4 hours as needed for moderate to severe pain or pain       * oxyCODONE 5 MG IR tablet    ROXICODONE    30 tablet    Take 1 tablet (5 mg) by mouth every 4 hours as needed for pain maximum 6 tablet(s) per day       senna-docusate 8.6-50 MG per tablet    SENOKOT-S;PERICOLACE     Take 1-2 tablets by mouth 2 times daily as needed (constipation) While on oxycodone to prevent constipation       sildenafil 100 MG cap/tab    VIAGRA    12 tablet    Take 1 tablet (100 mg) by mouth daily as needed       traZODone 100 MG tablet    DESYREL    30 tablet    Take 1 tablet (100 mg) by mouth nightly as needed for sleep       * Notice:  This list has 2 medication(s) that are the same as other medications prescribed for you. Read the directions carefully, and ask your doctor or other care provider to review them with you.      "

## 2017-04-13 NOTE — PROGRESS NOTES
Clinic Care Coordination Contact  OUTREACH    Referral Information:  Referral Source: CTS  Reason for Contact: Follow up call   Care Conference: No     Universal Utilization:   ED Visits in last year: 0  Hospital visits in last year: 1  Last PCP appointment: 02/13/17  Missed Appointments: 6  Concerns: NO  Multiple Providers or Specialists:  (Yes)    Clinical Concerns:  Current Medical Concerns: Patient reports he saw Dr Aragon/Vascular surgeon and he is pleased with the healing process of his foot .   Patient states he is moving around much more and will have home care PT for approximately 1 week .  Patient states his vision problems are in the early morning and at night.  Can't see up close and shadows far away .  Patient was told his vision could improve as time goes on.  Blood sugars are averaging around 98.  Patient states his taste buds are coming back and appetite is a little better   Current Behavioral Concerns: Not discussed       Clinical Pathway Name: None  Clinical Pathway: None    Medication Management:  No     Functional Status:  Mobility Status: Independent w/Device  Equipment Currently Used at Home: none, walker, standard  Transportation: Family provides            Psychosocial:  Current living arrangement:: I live in a private home with spouse    Resources and Interventions:  Current Resources:  (NA); Home Care  PAS Number:  (NA)  Senior Linkage Line Referral Placed:  (NA)  Advanced Care Plans/Directives on file:: No  Referrals Placed:  (NA)     Goals:   Goal 1 Statement: I will get vision loss resources   Goal 1 Progression Percent: 100%  Goal 1 Progression Date: 04/13/17  Goal 2 Statement:  I will increase my strength--I will keep home care PT appointments   Goal 2 Progression Percent : 50 %  Goal 2 Progression date : 4/13/2017        Barriers: Poor vision  Strengths: Vision loss devices in place     Frequency of Care Coordination:  (CC will follow up when discharged from home care)  Upcoming  appointment: 03/23/17     Plan: CC will follow up in 2 weeks     Rosalva Palafox, RN  Care Coordinator-[facility]  [email or web address]  [phone]

## 2017-04-13 NOTE — NURSING NOTE
"Chief Complaint   Patient presents with     RECHECK     wound check       Initial /83 (BP Location: Left arm, Patient Position: Chair, Cuff Size: Adult Regular)  Pulse 80 Estimated body mass index is 27.64 kg/(m^2) as calculated from the following:    Height as of 3/23/17: 5' 9\" (1.753 m).    Weight as of 4/6/17: 187 lb 3.2 oz (84.9 kg).  Medication Reconciliation: complete     Face to face nursing time: 8 minutes    Elena Simon MA     "

## 2017-04-18 ENCOUNTER — OFFICE VISIT (OUTPATIENT)
Dept: OTHER | Facility: CLINIC | Age: 57
End: 2017-04-18
Attending: SURGERY
Payer: COMMERCIAL

## 2017-04-18 VITALS — DIASTOLIC BLOOD PRESSURE: 78 MMHG | SYSTOLIC BLOOD PRESSURE: 153 MMHG | HEART RATE: 92 BPM

## 2017-04-18 DIAGNOSIS — L97.529 DIABETIC ULCER OF LEFT FOOT ASSOCIATED WITH DIABETES MELLITUS DUE TO UNDERLYING CONDITION (H): Primary | ICD-10-CM

## 2017-04-18 DIAGNOSIS — E08.621 DIABETIC ULCER OF LEFT FOOT ASSOCIATED WITH DIABETES MELLITUS DUE TO UNDERLYING CONDITION (H): Primary | ICD-10-CM

## 2017-04-18 PROCEDURE — 11042 DBRDMT SUBQ TIS 1ST 20SQCM/<: CPT | Performed by: SURGERY

## 2017-04-18 NOTE — PROGRESS NOTES
Gomez Turk And his wife return to see me today for debridement of his left foot and ankle ulcers.  He had partial dehiscence of the dorsalis pedis bypass wound.  Aquacel Ag is used on a daily basis on the wounds except for the medial ankle wound were wound gel was used.  He's been doing well with stable vision in his left eye.  He was able to walk a quarter mile yesterday with no difficulty.    Exam: Alert and appropriate. VSS              Chest= clear               +3 palpable pulse in left leg bypass graft and native distal dorsalis pedis                   Artery.              All wounds are improving with better granulation tissue.               Dorsalis pedis wound measures 4.5 x 1.6 on with a depth of 0.3 cm                 Mild amount of slough is noted.  No undermining.  Graft is not exposed.                 50-60% healthy granulation.                 Medial ankle ulcer measures 1 x 1 cm the depth of 0.1 cm and minimal slough.                 Great toe dorsal ulcer measures 3.2 x 2.6 cm the depth of 0.2 cm mild amount of slough is noted..                 Dorsal second toe measures 1 x 1 cm with no exposed joint.                 No obvious cellulitis on any site.    Procedure: Timeout was called and sites were identified on his foot.  4% topical lidocaine was applied with good results.  A full-thickness/subcutaneous excisional debridement was performed and the four ulcers to remove all slough and fibrin and debride the base down to good bleeding granulation tissue and the skin edges slightly.  He tolerated this very well.  Aquacel Ag was reapplied along with wound gel to the ankle and second toe sites.      Impression: Continued improvement wounds.  The need to heal by secondary intent.  Daily dressing changes as described.  Return to office in one week  .    Jesus Aragon MD

## 2017-04-18 NOTE — MR AVS SNAPSHOT
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      Your next 10 appointments already scheduled     Apr 20, 2017  9:30 AM CDT   Evaluation 90 with Alejandrina Salinas OT   [facility], [place], Occupational Therapy, Vision - Outpatie ([facility], [facility])    [address] [facility]  [facility], Clinic 9a  [facility] [address]   [phone]            Apr 27, 2017  7:30 AM CDT   RETURN NEURO with Siddhartha Mclaughlin MD   Eye Clinic ([facility])    [address]  [address] Clin [address]  [facility] [address]   [phone]            Apr 27, 2017 10:30 AM CDT   Return Visit with Jesus Aragon MD   [facility] ([facility])    [address]  [facility] [address]   [phone]            May 11, 2017 10:30 AM CDT   US LOWER EXTREMITY ARTERIAL DUPLEX LEFT with VUS4   [facility]ound ([facility])    [address]  [facility] [address]   [phone]           Please bring a list of your medicines (including vitamins, minerals and over-the-counter drugs). Also, tell your doctor about any allergies you may have. Wear comfortable clothes and leave your valuables at home.  You do not need to do anything special to prepare for your exam.  Please call the Imaging  Department at your exam site with any questions.            May 11, 2017 11:15 AM CDT   Return Visit with Jesus Aragon MD   [facility] ([facility])    [address]  [place] [id number] [phone]              Who to contact     If you have questions or need follow up information about today's clinic visit or your schedule please contact [facility] [place] directly at [phone].  Normal or non-critical lab and imaging results will be communicated to you by OneUp Sportshart, letter or phone within 4 business days after the clinic has received the results. If you do not hear from us within 7 days, please contact the clinic through Plasmont or phone. If you have a critical or abnormal lab result, we will notify you by phone as soon as possible.  Submit refill requests through [employer] or call your pharmacy and they will forward the refill request to us. Please allow 3 business days for your refill to be completed.          Additional Information About Your Visit        OneUp SportsharYaData Information     [employer] gives you secure access to your electronic health record. If you see a primary care provider, you can also send messages to your care team and make appointments. If you have questions, please call your primary care clinic.  If you do not have a primary care provider, please call [phone] and they will assist you.        Care EveryWhere ID     This is your Care EveryWhere ID. This could be used by other organizations to access your [place] medical records  [id number]        Your Vitals Were     Pulse                   92            Blood Pressure from Last 3 Encounters:   04/18/17 153/78   04/13/17 121/83   04/06/17 114/73    Weight from Last 3 Encounters:   04/06/17 187 lb 3.2 oz (84.9 kg)   03/23/17 196 lb (88.9 kg)   03/16/17 191 lb 9.6 oz (86.9 kg)              We Performed the Following     DEBRIDE  SKIN/SUBQ TISSUE        Primary Care Provider Office Phone # Fax #    Tylor Roberts [phone]       [facility] [address] [facility] [address]        Thank you!     Thank you for choosing [facility] [place]  for your care. Our goal is always to provide you with excellent care. Hearing back from our patients is one way we can continue to improve our services. Please take a few minutes to complete the written survey that you may receive in the mail after your visit with us. Thank you!             Your Updated Medication List - Protect others around you: Learn how to safely use, store and throw away your medicines at www.disposemymeds.org.          This list is accurate as of: 4/18/17 10:21 AM.  Always use your most recent med list.                   Brand Name Dispense Instructions for use    acetaminophen 325 MG tablet    TYLENOL    100 tablet    Take 2 tablets (650 mg) by mouth every 4 hours as needed for mild pain       AMARYL 2 MG tablet   Generic drug:  glimepiride      HOLD UNTIL YOU HAVE FULLY ADVANCED DIET, AND BLOOD SUGARS START TO RUN OVER 150.       aspirin 81 MG EC tablet     30 tablet    Take 1 tablet (81 mg) by mouth daily       atorvastatin 40 MG tablet    LIPITOR    30 tablet    Take 1 tablet (40 mg) by mouth daily       celecoxib 200 MG capsule    celeBREX    30 capsule    Take 1 capsule (200 mg) by mouth 2 times daily as needed for moderate pain       clopidogrel 75 MG tablet    PLAVIX    30 tablet    Take 1 tablet (75 mg) by mouth daily       CULTURELLE PO      Take 1 capsule by mouth 2 times daily       ferrous sulfate 325 (65 FE) MG tablet    IRON    60 tablet    Take 1 tablet (325 mg) by mouth every other day       gabapentin 300 MG capsule    NEURONTIN    90 capsule    Take 1 capsule (300 mg) by mouth 3 times daily       metFORMIN 1000 MG tablet    GLUCOPHAGE    180 tablet    Take 1 tablet (1,000 mg) by mouth 2 times daily (with meals)     "   nicotine 7 MG/24HR 24 hr patch    NICODERM CQ    30 patch    Place 1 patch onto the skin daily       order for DME     1 Device    Post of shoe       order for DME     30 days    Equipment being ordered: [employer] Fax [phone]  Primary Dressing 4x4 non-sterile gauze   Qty 2 loafs Secondary Dressing Kerlix wrap 4\" Qty 30 Length of Need: 1 month Frequency of dressing change: daily       * oxyCODONE 5 MG IR tablet    ROXICODONE    30 tablet    Take 1-2 tablets (5-10 mg) by mouth every 4 hours as needed for moderate to severe pain or pain       * oxyCODONE 5 MG IR tablet    ROXICODONE    30 tablet    Take 1 tablet (5 mg) by mouth every 4 hours as needed for pain maximum 6 tablet(s) per day       senna-docusate 8.6-50 MG per tablet    SENOKOT-S;PERICOLACE     Take 1-2 tablets by mouth 2 times daily as needed (constipation) While on oxycodone to prevent constipation       sildenafil 100 MG cap/tab    VIAGRA    12 tablet    Take 1 tablet (100 mg) by mouth daily as needed       traZODone 100 MG tablet    DESYREL    30 tablet    Take 1 tablet (100 mg) by mouth nightly as needed for sleep       * Notice:  This list has 2 medication(s) that are the same as other medications prescribed for you. Read the directions carefully, and ask your doctor or other care provider to review them with you.      "

## 2017-04-18 NOTE — NURSING NOTE
"Chief Complaint   Patient presents with     RECHECK     wound care       Initial /78 (BP Location: Right arm, Patient Position: Chair, Cuff Size: Adult Large)  Pulse 92 Estimated body mass index is 27.64 kg/(m^2) as calculated from the following:    Height as of 3/23/17: 5' 9\" (1.753 m).    Weight as of 4/6/17: 187 lb 3.2 oz (84.9 kg).  Medication Reconciliation: complete     Face to face nursing time: 8 minutes    Elena Simon MA     "

## 2017-04-20 ENCOUNTER — HOSPITAL ENCOUNTER (OUTPATIENT)
Dept: OCCUPATIONAL THERAPY | Facility: CLINIC | Age: 57
Discharge: HOME | End: 2017-04-20
Attending: OCCUPATIONAL THERAPIST | Admitting: OCCUPATIONAL THERAPIST
Payer: COMMERCIAL

## 2017-04-20 PROCEDURE — 97535 SELF CARE MNGMENT TRAINING: CPT | Mod: GO | Performed by: OCCUPATIONAL THERAPIST

## 2017-04-20 PROCEDURE — 40000249 ZZH STATISTIC VISIT LOW VISION CLINIC: Performed by: OCCUPATIONAL THERAPIST

## 2017-04-20 PROCEDURE — 97166 OT EVAL MOD COMPLEX 45 MIN: CPT | Mod: GO | Performed by: OCCUPATIONAL THERAPIST

## 2017-04-20 ASSESSMENT — VISUAL ACUITY
OD: CF
OS: CF

## 2017-04-20 ASSESSMENT — ACTIVITIES OF DAILY LIVING (ADL): PRIOR_ADL/IADL_STATUS: INDEPENDENT PRIOR TO ONSET

## 2017-04-20 NOTE — PROGRESS NOTES
04/20/17 0900   Visit Type   Type of Visit Initial       Present No   General Information   Start Of Care Date 04/20/17   Referring Physician MD Arnoldo   Orders Evaluate And Treat As Indicated   Date of Order 03/29/17   Medical Diagnosis ischemic optic neuropathy   Onset Of Illness/injury Or Date Of Surgery 03/08/2017   Surgical/Medical history reviewed Yes  (toe amputation 2/28, ION 03/38, Gangrenous cholecystitis)   Precautions/Limitations diabetic neuropathy, feet,   Additional Occupational Profile Info/Pertinent History of Current Problem pt with difficult course of medical sequela since toe amputation of 2/28. Has completed home therapies. 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 No   Have you fallen and had an injury in the past year? No   Is the patient a fall risk? No   Cognitive/Behavioral   Communication Intact   Cognitive Status Intact for evaluation process   Behavior Appropriate   Patient/family aware of diagnosis Yes   How well do you understand your eye condition? Well   Vision related restrictions on visiting with family/friends None   Reported emotional impact of visual impairment Severe   Adjustment to disability Good  (for recent onset. Has also met with SSB)   Physical Status/Equipment   Physical Status (big toe amputation - wearing protective boot)   Mobility equipment used White cane   Visual Report    Functional Complaints Reading;Writing;Homemaking;Safety in mobility  (cooking, shopping, computer and phone communications)   Visual Complaints (near total visual impairment both eyes)   Darell Bonnet Symptoms? No   Magnifier (strength and type) 7X magnifier for seeing large print cards   Technology Computer  (cell phone (android - trying Dragon))   Lighting and Glare   Is your lighting adequate? Yes/ at home   Is glare a problem? Yes/ indoors;Yes/ outdoors   Are you satisfied with your sunglasses? No   Sunglass filter color Gray   Visual Acuity   Acuity right eye CF   Acuity left eye CF   Contrast Sensitivity   Contrast sensitivity (score/25) unable   [place] Read   Smallest print size read unable   Functional Reading Screen   Reading screen comments Self Report Assessment of Functional Visual Performance Profle: 54% visual impairment.   Dynavision: Evaluation of visual skills/search of extra personal space via 5X4 foot computerized light board with 64 stimuli.  The user reacts as quickly as possible by striking the lights as they turn on in random succession.   Dynavision Cascade Dynavision Mode A (single stimulus attention, 1 minute   Dynavision Mode A (single stimulus attention, 1 minute)   Patient Score (Mode A, 1 min) 26   Education   Learner Patient;Significant Other   Readiness Eager;Acceptance   Method Explanation;Demonstration   Response Verbalizes understanding;Demonstrates understanding;Needs reinforcement   Clinical Impression, OT Eval   Criteria for Skilled Therapeutic Interventions Met yes;treatment indicated   Therapy  Diagnosis: Impaired ADL/IADL with deficits in Reading based ADL;Written communication;Home management  (cooking, computer and phone communications, light managment)   Assessment of Occupational Performance 5 or more Performance Deficits   Identified Performance Deficits as above   Clinical Decision Making (Complexity) Moderate complexity   OT Visual Rehabilitation Evaluation Plan    Therapy Plan Occupational therapy intervention   Planned Interventions Visual field loss awareness;Low vision compensatory training for reading;Low vision compensatory training for written communication;Low vision compensatory training for object identification;Instruction in environmental adaptations for glare;Instruction in environmental adaptations for contrast;Instruction in environmental adaptations for lighting;Optical device/ADL device instruction and training;Instruction in community resources   Frequency / Duration 4 tx visits over 12 weeks -1X every 3 weeks for 12 weeks   Risks and Benefits of Treatment have been explained. Yes   Patient, Family in agreement with plan of care Yes   GOALS   Goals Near Vision;Environmental Modification;Resource Education   Goals Addressed this Session Near vision   Goal 1 - Near Vision   Goal Description: Near Vision Patient will verbalize awareness of visual field Loss and demonstrate improved use of visual skills/adaptive equipment for increased independence in reading-based activities of daily living, written communication and detail ADL tasks.   Target Date 07/13/17   Goal 3 - Environmental modification   Goal Description: Environment modification Patient will demonstrate understanding of the impact of lighting, contrast and glare on ADL activities, in conjunction with environmentally-based ADL modifications   Target Date 07/13/17   Goal 4 - Resource education   Goal Description: Resource education Patient will verbalize awareness of community resources for the following:;Audio access to print materials;Access to low vision devices;Transportation alternatives   Target Date 07/13/17   Total Evaluation Time   Total Evaluation Time 45   Therapy Certification   Certification date from 04/20/17   Certification date to 07/13/17   Medical Diagnosis ischemic optic neuropathy both eyes, near total visual impairment

## 2017-04-26 DIAGNOSIS — H46.9 OPTIC NEUROPATHY: Primary | ICD-10-CM

## 2017-04-27 ENCOUNTER — OFFICE VISIT (OUTPATIENT)
Dept: OPHTHALMOLOGY | Facility: CLINIC | Age: 57
End: 2017-04-27
Attending: OPHTHALMOLOGY
Payer: COMMERCIAL

## 2017-04-27 ENCOUNTER — TELEPHONE (OUTPATIENT)
Dept: INTERNAL MEDICINE | Facility: CLINIC | Age: 57
End: 2017-04-27

## 2017-04-27 DIAGNOSIS — H46.9 OPTIC NEUROPATHY: ICD-10-CM

## 2017-04-27 PROCEDURE — 99213 OFFICE O/P EST LOW 20 MIN: CPT | Mod: ZF

## 2017-04-27 PROCEDURE — 92083 EXTENDED VISUAL FIELD XM: CPT | Mod: ZF | Performed by: OPHTHALMOLOGY

## 2017-04-27 ASSESSMENT — EXTERNAL EXAM - RIGHT EYE: OD_EXAM: NORMAL

## 2017-04-27 ASSESSMENT — CONF VISUAL FIELD
OS_INFERIOR_NASAL_RESTRICTION: 1
OD_SUPERIOR_NASAL_RESTRICTION: 1
OD_INFERIOR_NASAL_RESTRICTION: 1
OD_INFERIOR_TEMPORAL_RESTRICTION: 1
OS_SUPERIOR_NASAL_RESTRICTION: 1

## 2017-04-27 ASSESSMENT — SLIT LAMP EXAM - LIDS
COMMENTS: NORMAL
COMMENTS: NORMAL

## 2017-04-27 ASSESSMENT — VISUAL ACUITY: METHOD: SNELLEN - LINEAR

## 2017-04-27 ASSESSMENT — CUP TO DISC RATIO
OD_RATIO: 0.0
OS_RATIO: 0.0

## 2017-04-27 ASSESSMENT — TONOMETRY
IOP_METHOD: TONOPEN
OS_IOP_MMHG: 19
OD_IOP_MMHG: 17

## 2017-04-27 ASSESSMENT — EXTERNAL EXAM - LEFT EYE: OS_EXAM: NORMAL

## 2017-04-27 NOTE — TELEPHONE ENCOUNTER
Completed forms faxed and copied for abstraction. Left detailed VM for patient informing that copies were at IM reception for pick-up and blank forms for eye doctor were included.  Zeenat Roberson CMA

## 2017-04-27 NOTE — PROGRESS NOTES
ATTENDING ASSESSMENT AND PLAN:       1. Bilateral perioperative ischemic optic neuropathy-    Gomez uTrk is a pleasant 56 year old male who presents to my neuro-ophthalmology clinic today for follow of his bilateral perioperative ischemic optic neuropathy. Please refer to my initial note dated 3/10/17 for details of presentation.    Optic disc edema today has completely resolved in both eyes with transition to moderate optic nerve head pallor diffusely in both eyes.    Visual acuity slightly improved today- he has lost central vision and must use eccentric fixation for optimal vision.  Advised patient that he is unlikely to regain much additional vision at this point.  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    Advised patient that he may now proceed with any additional semi-elective vascular surgery he might require for his ischemic leg (will send note to his vascular surgeon).  While he could always suffer additional vision loss with any surgery affecting hemodynamic status, his chances are theoretically less now that this acute event of ischemia and optic nerve head swelling has resolved.  Patient should notify the anesthesia team and surgeon in the future for any surgery that may affect hemodynamic status as it could in theory result in additional vision loss.  Blood pressure, anemia, and blood loss should be followed carefully in that situation.    Follow-up in 1 year or sooner as needed  Continue to follow-up with low vision occupational therapy          Complete documentation of historical and exam elements from today's encounter can be found in the full encounter summary report (not reduplicated in this progress note).  I personally obtained the chief complaint(s) and history of present illness.  I confirmed and edited as necessary the review of systems, past medical/surgical history, family history, social history, and examination findings as  documented by others; and I examined the patient myself.  I personally reviewed the relevant tests, images, and reports as documented above.  I formulated and edited as necessary the assessment and plan and discussed the findings and management plan with the patient and family   Siddhartha Mclaughlin MD  9:29 AM  4/27/2017

## 2017-04-27 NOTE — MR AVS SNAPSHOT
After Visit Summary   4/27/2017    Gomez Turk    MRN: [id number]           Patient Information     Date Of Birth          1960        Visit Information        Provider Department      4/27/2017 7:30 AM Siddhartha Mclaughlin MD [facility]        Today's Diagnoses     Optic neuropathy - Both Eyes           Follow-ups after your visit        Your next 10 appointments already scheduled     May 03, 2017  9:30 AM CDT   Treatment 60 with Alejandrina Salinas OT   [facility] [place], Occupational Therapy, Vision - Outpatie ([facility])    [address] [place] [address]  9th Floor, Clinic 9a  [facility] [address]   [phone]            May 04, 2017 11:45 AM CDT   Return Visit with Jesus Aragon MD   [facility] ([facility])    [address]  [facility] [address] [phone]            May 11, 2017 10:30 AM CDT   US LOWER EXTREMITY ARTERIAL DUPLEX LEFT with SHVUS4   [facility] ([facility])    [address]  [facility] [address]   [phone]           Please bring a list of your medicines (including vitamins, minerals and over-the-counter drugs). Also, tell your doctor about any allergies you may have. Wear comfortable clothes and leave your valuables at home.  You do not need to do anything special to prepare for your exam.  Please call the Imaging Department at your exam site with any questions.            May 11, 2017 11:15 AM CDT   Return Visit with Jesus Aragon MD   [facility] ([facility])    [address]  [facility] [address] [phone]            May 18, 2017 10:00 AM CDT   Return Visit with Jesus Aragon MD   [facility] ([facility])    [address]  [place] [id number] [phone]              Who to contact     Please call your clinic at [phone] to:    Ask questions about your health    Make or cancel appointments    Discuss your medicines    Learn about your test results    Speak to your doctor   If you have compliments or concerns about an experience at your clinic, or if you wish to file a complaint, please contact [facility] Patient Relations at [phone] or email us at [email or web address]         Additional Information About Your Visit        [employer] Information     "[employer] gives you secure access to your electronic health record. If you see a primary care provider, you can also send messages to your care team and make appointments. If you have questions, please call your primary care clinic.  If you do not have a primary care provider, please call [phone] and they will assist you.      "[employer] is an electronic gateway that provides easy, online access to your medical records. With "[employer] you can request a clinic appointment, read your test results, renew a prescription or communicate with your care team.     To access your existing account, please contact your [facility] or call [phone] for assistance.        Care EveryWhere ID     This is your Care EveryWhere ID. This could be used by other organizations to access your [facility] medical records  [id number]         Blood Pressure from Last 3 Encounters:   04/18/17 153/78   04/13/17 121/83   04/06/17 114/73    Weight from Last 3 Encounters:   04/06/17 84.9 kg (187 lb 3.2 oz)   03/23/17 88.9 kg (196 lb)   03/16/17 86.9 kg (191 lb 9.6 oz)              We Performed the Following     IOP Measurement     Low Vision Central OU        Primary Care Provider Office Phone # Fax #    Tylor Roberts [phone]       [facility] [address] [facility] [address]        Thank you!     Thank you for choosing [facility]  for your care. Our goal is always to provide you with excellent care. Hearing back from our patients is one way we can continue to improve our services. Please take a few minutes to complete the written survey that you may receive in the mail after your visit with us. Thank you!             Your Updated Medication List - Protect others around you: Learn how to safely use, store and throw away your medicines at www.disposemymeds.org.          This list is accurate as of: 4/27/17 11:10 AM.  Always use your most recent med list.                   Brand Name Dispense Instructions for use    acetaminophen 325 MG tablet    TYLENOL    100 tablet    Take 2 tablets (650 mg) by mouth every 4 hours as needed for mild pain       AMARYL 2 MG tablet   Generic drug:  glimepiride      HOLD UNTIL YOU HAVE FULLY ADVANCED DIET, AND BLOOD SUGARS START TO RUN OVER 150.       aspirin 81 MG EC tablet     30 tablet    Take 1 tablet (81 mg) by mouth daily       atorvastatin 40 MG tablet    LIPITOR    30 tablet    Take 1 tablet (40 mg) by mouth daily       celecoxib 200 MG capsule    celeBREX    30 capsule    Take 1 capsule (200 mg) by mouth 2 times daily as needed for moderate pain       clopidogrel 75 MG tablet    PLAVIX    30 tablet    Take 1 tablet (75 mg) by mouth daily       CULTURELLE PO      Take 1 capsule by mouth 2 times daily       ferrous sulfate 325 (65 FE) MG tablet    IRON    60 tablet    Take 1 tablet (325 mg) by mouth every other day       gabapentin 300 MG capsule    NEURONTIN    90 capsule    Take 1 capsule (300 mg) by mouth 3 times daily       metFORMIN 1000 MG tablet    GLUCOPHAGE    180 tablet    Take 1 tablet (1,000 mg) by mouth 2 times daily (with meals)       nicotine 7 MG/24HR 24 hr patch    NICODERM CQ    30 patch    Place 1 patch onto the skin daily       order for DME     1 Device    Post of shoe       order for DME      "30 days    Equipment being ordered: [employer] Fax [phone]  Primary Dressing 4x4 non-sterile gauze   Qty 2 loafs Secondary Dressing Kerlix wrap 4\" Qty 30 Length of Need: 1 month Frequency of dressing change: daily       * oxyCODONE 5 MG IR tablet    ROXICODONE    30 tablet    Take 1-2 tablets (5-10 mg) by mouth every 4 hours as needed for moderate to severe pain or pain       * oxyCODONE 5 MG IR tablet    ROXICODONE    30 tablet    Take 1 tablet (5 mg) by mouth every 4 hours as needed for pain maximum 6 tablet(s) per day       senna-docusate 8.6-50 MG per tablet    SENOKOT-S;PERICOLACE     Take 1-2 tablets by mouth 2 times daily as needed (constipation) While on oxycodone to prevent constipation       sildenafil 100 MG cap/tab    VIAGRA    12 tablet    Take 1 tablet (100 mg) by mouth daily as needed       traZODone 100 MG tablet    DESYREL    30 tablet    Take 1 tablet (100 mg) by mouth nightly as needed for sleep       * Notice:  This list has 2 medication(s) that are the same as other medications prescribed for you. Read the directions carefully, and ask your doctor or other care provider to review them with you.      "

## 2017-04-27 NOTE — NURSING NOTE
Chief Complaints and History of Present Illnesses   Patient presents with     Follow Up For     Bilateral perioperative ischemic optic neuropathy     HPI    Affected eye(s):  Both   Symptoms:     Blurred vision   No decreased vision         Do you have eye pain now?:  No      Comments:  Arianna MCCLELLAN 7:47 AM April 27, 2017

## 2017-04-27 NOTE — LETTER
2017    RE: Gomez Turk  : 1960  MRN: [id number]    Dear Providers,    I saw our mutual patient, Gomez Turk, in follow-up in my clinic recently.  After a thorough neuro-ophthalmic history and examination, I came to the following conclusions:     1. Bilateral perioperative ischemic optic neuropathy-    Gomez Turk is a pleasant 56 year old male who presents to my neuro-ophthalmology clinic today for follow of his bilateral perioperative ischemic optic neuropathy. Please refer to my initial note dated 3/10/17 for details of presentation.    Optic disc edema today has completely resolved in both eyes with transition to moderate optic nerve head pallor diffusely in both eyes.    Visual acuity slightly improved today- he has lost central vision and must use eccentric fixation for optimal vision.  Advised patient that he is unlikely to regain much additional vision at this point.  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    Advised patient that he may now proceed with any additional semi-elective vascular surgery he might require for his ischemic leg (will send note to his vascular surgeon).  While he could always suffer additional vision loss with any surgery affecting hemodynamic status, his chances are theoretically less now that this acute event of ischemia and optic nerve head swelling has resolved.  Patient should notify the anesthesia team and surgeon in the future for any surgery that may affect hemodynamic status as it could in theory result in additional vision loss.  Blood pressure, anemia, and blood loss should be followed carefully in that situation.    Follow-up in 1 year or sooner as needed  Continue to follow-up with low vision occupational therapy         For further exam details, please feel free to contact our office for additional records.  If you wish to contact me regarding this patient please email me at  [email or web address] or give my clinic a call to arrange a phone conversation.    Sincerely,    Siddhartha Mclaughlin MD  , Neuro-Ophthalmology and Adult Strabismus  Department of Ophthalmology and Visual Neurosciences  [facility]

## 2017-05-01 DIAGNOSIS — M79.672 LEFT FOOT PAIN: Primary | ICD-10-CM

## 2017-05-01 DIAGNOSIS — I73.9 PAD (PERIPHERAL ARTERY DISEASE) (H): ICD-10-CM

## 2017-05-01 DIAGNOSIS — K81.0 ACUTE CHOLECYSTITIS: ICD-10-CM

## 2017-05-01 DIAGNOSIS — Z87.2 HISTORY OF FOOT ULCER: ICD-10-CM

## 2017-05-01 DIAGNOSIS — G89.18 ACUTE POST-OPERATIVE PAIN: ICD-10-CM

## 2017-05-01 RX ORDER — OXYCODONE HYDROCHLORIDE 5 MG/1
5-10 TABLET ORAL EVERY 4 HOURS PRN
Qty: 30 TABLET | Refills: 0 | Status: SHIPPED | OUTPATIENT
Start: 2017-05-01 | End: 2017-05-17

## 2017-05-03 ENCOUNTER — CARE COORDINATION (OUTPATIENT)
Dept: CARE COORDINATION | Facility: CLINIC | Age: 57
End: 2017-05-03

## 2017-05-03 ENCOUNTER — TELEPHONE (OUTPATIENT)
Dept: OTHER | Facility: CLINIC | Age: 57
End: 2017-05-03

## 2017-05-03 NOTE — LETTER
[facility]  Complex Care Plan  About Me  Patient Name:  Gomez Chaparro    YOB: 1960  Age:   56 year old   Andre MRN: [id number] Telephone Information:     Home Phone [phone]   Mobile Not on file.       Address:    [address] [place] [address] Email address:  [email or web address]      Emergency Contact(s)  Name Relationship Lgl Grd Work Phone Home Phone Mobile Phone   1. RADHA CHAPARRO Spouse   [phone]   2. SHANKAR, CHR* Son    [phone]           Primary language:  English     needed? No   [employer]:  [phone] op. 1  Other communication barriers: No  Preferred Method of Communication:  Letter, MyChart, Phone  Current living arrangement: I live in a private home with spouse  Mobility Status/ Medical Equipment: Independent w/Device  Other information to know about me:    Health Maintenance  Health Maintenance Reviewed: Due/Overdue Cholesterol Foot exam Microalbumin urine Health care directive  My Access Plan  Medical Emergency 911   Primary Clinic Line [facility]- [phone]   24 Hour Appointment Line [phone] or  [phone] ([phone]) (toll-free)   24 Hour Nurse Line [phone] (toll-free)   Preferred Urgent Care [facility], [phone]   Preferred Hospital [facility]  [phone]   Preferred Pharmacy [id number] - [place], [place] - [address] [facility] Crisis Line [facility], [phone] or [phone]     My Care Team Members  Patient Care Team       Relationship Specialty Notifications Start End    Tylor Roberts MD PCP - General   2/15/02     Phone: [phone] Fax: [phone]         [facility] [address] [facility] [place] [place] [address]    Jesus Aragon MD Surgeon Surgery  3/10/17     Comment:  phone: [phone]     Rosalva Alvarez MD, SAYRA  Clinic Care Coordinator  Admissions 3/23/17     Phone: [phone] Fax: [phone]             My Care Plans  Self Management and Treatment Plan  Goals and (Comments)  Goal #1: I will get vison loss resources       100% of goal reached    Goal #2:  (I will increase my strength )   80% of goal reached  I will do home PT exercises daily   80% of goal reached    Action Plans on File:  (NA)  Advance Care Plans/Directives Type:   Type Advanced Care Plans/Directives:  (NA)    My Medical and Care Information  Problem List   Patient Active Problem List   Diagnosis     Diverticulosis of large intestine     Tobacco use disorder     HYPERLIPIDEMIA LDL GOAL <100     PAD (peripheral artery disease) (H)     Type 2 diabetes mellitus with other ophthalmic complication, without long-term current use of insulin (H)     Acute cholecystitis     Vision loss, bilateral      Current Medications and Allergies:  See printed Medication Report.    Care Coordination Start Date: 03/08/17   Frequency of Care Coordination:  2-4 weeks   Form Last Updated: 05/03/2017

## 2017-05-03 NOTE — TELEPHONE ENCOUNTER
Pt's wife Camelia called and stated that pt's left leg is swollen and the swelling did not go down even after the leg was elevated. She stated that the left leg is slightly red but there are no other changes to the left leg from when pt saw Dr. Aragon. Denied any discoloration or pain. Pt's wife was told that pt can have Dr. Aragon check his leg on 5/4/17 since he has an appt with him at 11:45am. She was told to take pt to urgent care or ER if the symptoms get worse. She stated understanding.     Nadia Page, RN, BSN.

## 2017-05-03 NOTE — PROGRESS NOTES
Clinic Care Coordination Contact  OUTREACH    Referral Information:  Referral Source: CTS  Reason for Contact: ED visit 3/9/2017- vision loss  Care Conference: No     Universal Utilization:   ED Visits in last year: 0  Hospital visits in last year: 1  Last PCP appointment: 02/13/17  Missed Appointments: 6  Concerns: NO  Multiple Providers or Specialists:  (Yes)    Clinical Concerns:  Current Medical Concerns:   Patient reports he just saw the eye provider and the edema has decreased a bit in the eyes.  Patient states he is having a hard time adjusting to the recent sunlight .  Patient states overall his vision is better.   Patient continues to have his foot debrided every 2 weeks and the foot is healing well.    Appetite is much better.    Strength is improving and he continues daily home PT exercises  .  Sleeping poorly at night   Current Behavioral Concerns: not discussed      Clinical Pathway Name: None  Clinical Pathway: None    Medication Management:  Not discussed today      Functional Status:  Mobility Status: Independent w/Device  Equipment Currently Used at Home: none, walker, standard       Psychosocial:  Current living arrangement:: I live in a private home with spouse     Resources and Interventions:  Current Resources:  (NA); Home Care  PAS Number:  (NA)  Senior Linkage Line Referral Placed:  (NA)  Advanced Care Plans/Directives on file:: No  Referrals Placed:  (NA)     Goals:   Goal 1 Statement: I will get vision loss resources   Goal 1 Progression Percent: 100%  Goal 1 Progression Date: 05/03/17  Goal 2 Statement:  (I will increase my strength )  Goal 2 Progression Percent: 80%  Goal 2 Progression Date: 05/03/17  Goal 3 Statement:  (I will improve my strength)  Goal 3 Progression Percent: 50%  Goal 3 Progression Date: 05/03/17      Barriers: No barriers identified   Strengths: Improved eye sight,foot healing well,( gallbladder better) good appetite     Frequency of Care Coordination:  (CC will follow  up when discharged from home care)  Upcoming appointment: 03/23/17     Plan: CC will follow up in 2-4 weeks     Rosalva Palafox RN  Care Coordinator-[facility]  [email or web address]  [phone]

## 2017-05-04 ENCOUNTER — OFFICE VISIT (OUTPATIENT)
Dept: OTHER | Facility: CLINIC | Age: 57
End: 2017-05-04
Attending: SURGERY
Payer: COMMERCIAL

## 2017-05-04 VITALS — SYSTOLIC BLOOD PRESSURE: 157 MMHG | DIASTOLIC BLOOD PRESSURE: 85 MMHG | HEART RATE: 79 BPM

## 2017-05-04 DIAGNOSIS — L97.222 ULCER OF CALF, LEFT, WITH FAT LAYER EXPOSED (H): Primary | ICD-10-CM

## 2017-05-04 PROCEDURE — 11042 DBRDMT SUBQ TIS 1ST 20SQCM/<: CPT | Performed by: SURGERY

## 2017-05-04 PROCEDURE — 99211 OFF/OP EST MAY X REQ PHY/QHP: CPT

## 2017-05-04 NOTE — PROGRESS NOTES
Gomez Turk and his wife return to see me in the office today.  He undergone a left  Below-knee popliteal to dorsalis pedis NRTSV bypass for great toe and medial ankle ulcer along with a dorsal second toe ulcer.  Partial toe amputation was performed along with steal by secondary intent.    He had the vision problems as documented previously.  He has some vision in his left eye that fortunately has stabilized.    They're applying Aquacel Ag to the breakdown on the dorsal incision and great toe with a wound gel to the other sites.    Exam: Alert and appropriate. Blood pressure 157/85.  Pulse 79             +3 palpable pulse in graft and dorsalis pedis artery.              Dorsal wound as a small amount of exposed tendon but no exposed graft  .              This now measures 3 x 1.5 cm with granulation tissue filling most of the wound and fibrin.                 Great toe ulcer has decreased in size now measuring 2 x 2 0.3 cm with a moderate amount of fibrin.                  Medial ankle ulcer measures 1 x 1 cm with minimal depth.  Second toe measures approximately 0.4 x 0.4 with no exposed joint noted today.                   Mild calf and foot swelling.  Some dry irritated skin ( will apply topical hydrocortisone daily for the next week).      Procedure: Timeout was called and sites were identified on the left foot.  On the three ulcer sites a full-thickness/subcutaneous excisional debridement was performed removing all slough and fibrin.   Partial removal of the small amount of exposed tendon.  Good bleeding was noted.  Aquacel Ag was sent with saline was applied to the two larger ulcers with wound gel to the rest.        Impression:  Continued improvement of ulcerations.  These will need to heal by secondary intent.  Daily dressing changes by his wife.  Size E Tubigrip p.r.n. Swelling.  Visit next week for graft duplex and re-debridement.        Jesus Aragon MD     Please route or send letter  to:  *None*

## 2017-05-04 NOTE — MR AVS SNAPSHOT
After Visit Summary   5/4/2017    Gomez Turk    MRN: [id number]           Patient Information     Date Of Birth          1960        Visit Information        Provider Department      5/4/2017 11:45 AM Jesus Aragon MD [facility] [place]      Today's Diagnoses     Ulcer of calf, left, with fat layer exposed (H)    -  1       Follow-ups after your visit        Follow-up notes from your care team     Return in about 1 week (around 5/11/2017).      Your next 10 appointments already scheduled     May 11, 2017 10:30 AM CDT   US LOWER EXTREMITY ARTERIAL DUPLEX LEFT with VUS4   [facility] ([facility])    [address]  [place] [address]   [phone]           Please bring a list of your medicines (including vitamins, minerals and over-the-counter drugs). Also, tell your doctor about any allergies you may have. Wear comfortable clothes and leave your valuables at home.  You do not need to do anything special to prepare for your exam.  Please call the Imaging Department at your exam site with any questions.            May 11, 2017 11:15 AM CDT   Return Visit with Jesus Aragon MD   [facility] [place] ([facility])    [address]  [place] [address] [phone]            May 18, 2017 10:00 AM CDT   Return Visit with Jesus Aragon MD   [facility] [place] ([facility])    [address]  [place] [id number] [phone]              Who to contact     If you have questions or need follow up information about today's clinic visit or your schedule please contact [facility] directly at [phone].  Normal or non-critical lab and imaging results will be  communicated to you by LucidErahart, letter or phone within 4 business days after the clinic has received the results. If you do not hear from us within 7 days, please contact the clinic through LoopMe or phone. If you have a critical or abnormal lab result, we will notify you by phone as soon as possible.  Submit refill requests through LoopMe or call your pharmacy and they will forward the refill request to us. Please allow 3 business days for your refill to be completed.          Additional Information About Your Visit        LucidEraharKiva Information     LoopMe gives you secure access to your electronic health record. If you see a primary care provider, you can also send messages to your care team and make appointments. If you have questions, please call your primary care clinic.  If you do not have a primary care provider, please call [phone] and they will assist you.        Care EveryWhere ID     This is your Care EveryWhere ID. This could be used by other organizations to access your [place] medical records  [id number]        Your Vitals Were     Pulse                   79            Blood Pressure from Last 3 Encounters:   05/04/17 157/85   04/18/17 153/78   04/13/17 121/83    Weight from Last 3 Encounters:   04/06/17 187 lb 3.2 oz (84.9 kg)   03/23/17 196 lb (88.9 kg)   03/16/17 191 lb 9.6 oz (86.9 kg)              We Performed the Following     DEBRIDE SKIN/SUBQ TISSUE        Primary Care Provider Office Phone # Fax #    Tylor Roberts [phone]       [facility] [address] [place] [id number]        Thank you!     Thank you for choosing [facility]  for your care. Our goal is always to provide you with excellent care. Hearing back from our patients is one way we can continue to improve our services. Please take a few minutes to complete the written survey that you may receive in the mail after your visit with us. Thank you!             Your  "Updated Medication List - Protect others around you: Learn how to safely use, store and throw away your medicines at www.disposemymeds.org.          This list is accurate as of: 5/4/17 12:52 PM.  Always use your most recent med list.                   Brand Name Dispense Instructions for use    acetaminophen 325 MG tablet    TYLENOL    100 tablet    Take 2 tablets (650 mg) by mouth every 4 hours as needed for mild pain       AMARYL 2 MG tablet   Generic drug:  glimepiride      HOLD UNTIL YOU HAVE FULLY ADVANCED DIET, AND BLOOD SUGARS START TO RUN OVER 150.       aspirin 81 MG EC tablet     30 tablet    Take 1 tablet (81 mg) by mouth daily       atorvastatin 40 MG tablet    LIPITOR    30 tablet    Take 1 tablet (40 mg) by mouth daily       celecoxib 200 MG capsule    celeBREX    30 capsule    Take 1 capsule (200 mg) by mouth 2 times daily as needed for moderate pain       clopidogrel 75 MG tablet    PLAVIX    30 tablet    Take 1 tablet (75 mg) by mouth daily       CULTURELLE PO      Take 1 capsule by mouth 2 times daily       ferrous sulfate 325 (65 FE) MG tablet    IRON    60 tablet    Take 1 tablet (325 mg) by mouth every other day       gabapentin 300 MG capsule    NEURONTIN    90 capsule    Take 1 capsule (300 mg) by mouth 3 times daily       metFORMIN 1000 MG tablet    GLUCOPHAGE    180 tablet    Take 1 tablet (1,000 mg) by mouth 2 times daily (with meals)       nicotine 7 MG/24HR 24 hr patch    NICODERM CQ    30 patch    Place 1 patch onto the skin daily       order for DME     1 Device    Post of shoe       order for DME     30 days    Equipment being ordered: [employer] Fax [phone]  Primary Dressing 4x4 non-sterile gauze   Qty 2 loafs Secondary Dressing Kerlix wrap 4\" Qty 30 Length of Need: 1 month Frequency of dressing change: daily       * oxyCODONE 5 MG IR tablet    ROXICODONE    30 tablet    Take 1 tablet (5 mg) by mouth every 4 hours as needed for pain maximum 6 tablet(s) per day       * " oxyCODONE 5 MG IR tablet    ROXICODONE    30 tablet    Take 1-2 tablets (5-10 mg) by mouth every 4 hours as needed for moderate to severe pain or pain       senna-docusate 8.6-50 MG per tablet    SENOKOT-S;PERICOLACE     Take 1-2 tablets by mouth 2 times daily as needed (constipation) While on oxycodone to prevent constipation       sildenafil 100 MG cap/tab    VIAGRA    12 tablet    Take 1 tablet (100 mg) by mouth daily as needed       traZODone 100 MG tablet    DESYREL    30 tablet    Take 1 tablet (100 mg) by mouth nightly as needed for sleep       * Notice:  This list has 2 medication(s) that are the same as other medications prescribed for you. Read the directions carefully, and ask your doctor or other care provider to review them with you.

## 2017-05-04 NOTE — NURSING NOTE
"Chief Complaint   Patient presents with     RECHECK     wound check per Dr. Aragon.        Initial /85 (BP Location: Left arm, Patient Position: Chair, Cuff Size: Adult Regular)  Pulse 79 Estimated body mass index is 27.64 kg/(m^2) as calculated from the following:    Height as of 3/23/17: 5' 9\" (1.753 m).    Weight as of 4/6/17: 187 lb 3.2 oz (84.9 kg).  Medication Reconciliation: complete    Face to face time: 5 minutes    Gianna Morales CMA    "

## 2017-05-11 ENCOUNTER — HOSPITAL ENCOUNTER (OUTPATIENT)
Dept: ULTRASOUND IMAGING | Facility: CLINIC | Age: 57
Discharge: HOME | End: 2017-05-11
Attending: SURGERY | Admitting: SURGERY
Payer: COMMERCIAL

## 2017-05-11 ENCOUNTER — OFFICE VISIT (OUTPATIENT)
Dept: OTHER | Facility: CLINIC | Age: 57
End: 2017-05-11
Attending: SURGERY
Payer: COMMERCIAL

## 2017-05-11 VITALS — HEART RATE: 80 BPM | SYSTOLIC BLOOD PRESSURE: 126 MMHG | DIASTOLIC BLOOD PRESSURE: 70 MMHG

## 2017-05-11 DIAGNOSIS — I73.9 PAD (PERIPHERAL ARTERY DISEASE) (H): ICD-10-CM

## 2017-05-11 DIAGNOSIS — L97.222 ULCER OF CALF, LEFT, WITH FAT LAYER EXPOSED (H): Primary | ICD-10-CM

## 2017-05-11 PROCEDURE — 93926 LOWER EXTREMITY STUDY: CPT | Mod: LT

## 2017-05-11 PROCEDURE — 99213 OFFICE O/P EST LOW 20 MIN: CPT | Mod: ZP | Performed by: SURGERY

## 2017-05-11 PROCEDURE — 11042 DBRDMT SUBQ TIS 1ST 20SQCM/<: CPT | Performed by: SURGERY

## 2017-05-11 NOTE — PROGRESS NOTES
Gomez Turk His wife return to see me in the office today.  A gangrenous changes to his toes and a nonhealing medial ankle ulcer.  He underwent a left below-knee popliteal to dorsalis pedis NRTSV bypass on 2/28/2017.  An open amputation of the great toe was performed.  He also had exposure of the left second toe joint space dorsally.  He did have some breakdown over the dorsalis pedis incision for allowing this to heal by secondary intent.  He is using a combination of Aquacel Ag and  Wound gel with daily dressing changes.    He had the vision changes postoperatively.  [facility].      Exam: Alert and appropriate.  Blood pressure 126/70.  Pulse 80.              Vision changes as described essentially blind in the right eye with some vision in the left  .            Chest= clear   Cardiovascular=RR               +3 palpable graft and dorsalis pedis pulse.           Ulcer over the dorsal foot measures 3 x 1 cm with a depth of the gel 0.3 and a small amount of tendon at the base.  Good granulation tissue otherwise noted with no undermining.  Great toe ulcer measures 2 x 1.8 cm with a depth of 0.2 cm and good granulation tissue with mild fibrin.  Medial ankle measures 0.8 x 0.8 x 0.1 cm with decreasing size.  Second dorsal toe ulcer measures 0.8 x 0.6 cm with minimal depth and no exposed joint though obvious loss of the tendon.      Procedure: Timeout was called and all sites were identified with 4% topical lidocaine being applied.  Using a 15 blade scalpel full-thickness/subcutaneous excisional debridement was performed..  Good bleeding granulation tissue was noted following removal of the fibrinous debris in the edges.  A small cut occurred to the distal great toe amputation site from the scalpel that stopped with time and pressure.  Aquacel Ag and wound gel applied.      Duplex today reveals the bypass graft is widely patent with good flow with no  stenoses.        Impression:  #1  Patent left leg bypass graft.  Continue with  Aspirin and Plavix.                              Repeat duplex three months.                            #2   Slowly improving ulcerations to the foot-ankle-toes.  Continue with same dressing changes.  This will need to heal by secondary intent.  May need eventual second toe amputation since the joint was involved and now there is deformity from tendon loss. Repeat debridement in two weeks.       Jesus Aragon MD       Please route or send letter to:  *None*

## 2017-05-11 NOTE — MR AVS SNAPSHOT
After Visit Summary   5/11/2017    Gomez Turk    MRN: [id number]           Patient Information     Date Of Birth          1960        Visit Information        Provider Department      5/11/2017 11:15 AM Jesus Aragon MD [facility]       Follow-ups after your visit        Your next 10 appointments already scheduled     May 25, 2017 11:15 AM CDT   Return Visit with Jesus Aragon MD   [facility] [place] ([facility])    [address]  [place] [address]   [phone]            Jun 08, 2017 11:15 AM CDT   Return Visit with Jesus Aragon MD   [facility] ([facility])    [address]  [place] [address]   [phone]            Aug 10, 2017 10:00 AM CDT   US LOWER EXTREMITY ARTERIAL DUPLEX [facility]   [facility] ([facility])    [address]  [place] [address]   [phone]           Please bring a list of your medicines (including vitamins, minerals and over-the-counter drugs). Also, tell your doctor about any allergies you may have. Wear comfortable clothes and leave your valuables at home.  You do not need to do anything special to prepare for your exam.  Please call the Imaging Department at your exam site with any questions.            Aug 10, 2017 10:45 AM CDT   Return Visit with Jesus Aragon MD   [facility] ([facility])    [address]  [facility] [address]   [phone]              Who to contact     If you have questions or need follow up information about today's clinic visit or your schedule please contact [facility] directly at  [phone].  Normal or non-critical lab and imaging results will be communicated to you by MyChart, letter or phone within 4 business days after the clinic has received the results. If you do not hear from us within 7 days, please contact the clinic through Pinkdingohart or phone. If you have a critical or abnormal lab result, we will notify you by phone as soon as possible.  Submit refill requests through Opta Sportsdata or call your pharmacy and they will forward the refill request to us. Please allow 3 business days for your refill to be completed.          Additional Information About Your Visit        Pinkdingohart Information     [employer] gives you secure access to your electronic health record. If you see a primary care provider, you can also send messages to your care team and make appointments. If you have questions, please call your primary care clinic.  If you do not have a primary care provider, please call [phone] and they will assist you.        Care EveryWhere ID     This is your Care EveryWhere ID. This could be used by other organizations to access your [place] medical records  [id number]        Your Vitals Were     Pulse                   80            Blood Pressure from Last 3 Encounters:   05/11/17 126/70   05/04/17 157/85   04/18/17 153/78    Weight from Last 3 Encounters:   04/06/17 187 lb 3.2 oz (84.9 kg)   03/23/17 196 lb (88.9 kg)   03/16/17 191 lb 9.6 oz (86.9 kg)              Today, you had the following     No orders found for display       Primary Care Provider Office Phone # Fax #    Tylor Roberts [phone]       [facility] [address] [facility] [address]        Thank you!     Thank you for choosing [facility]  for your care. Our goal is always to provide you with excellent care. Hearing back from our patients is one way we can continue to improve our services. Please take a few minutes to complete the written survey that you may  "receive in the mail after your visit with us. Thank you!             Your Updated Medication List - Protect others around you: Learn how to safely use, store and throw away your medicines at www.disposemymeds.org.          This list is accurate as of: 5/11/17 12:06 PM.  Always use your most recent med list.                   Brand Name Dispense Instructions for use    acetaminophen 325 MG tablet    TYLENOL    100 tablet    Take 2 tablets (650 mg) by mouth every 4 hours as needed for mild pain       AMARYL 2 MG tablet   Generic drug:  glimepiride      HOLD UNTIL YOU HAVE FULLY ADVANCED DIET, AND BLOOD SUGARS START TO RUN OVER 150.       aspirin 81 MG EC tablet     30 tablet    Take 1 tablet (81 mg) by mouth daily       atorvastatin 40 MG tablet    LIPITOR    30 tablet    Take 1 tablet (40 mg) by mouth daily       celecoxib 200 MG capsule    celeBREX    30 capsule    Take 1 capsule (200 mg) by mouth 2 times daily as needed for moderate pain       clopidogrel 75 MG tablet    PLAVIX    30 tablet    Take 1 tablet (75 mg) by mouth daily       CULTURELLE PO      Take 1 capsule by mouth 2 times daily       ferrous sulfate 325 (65 FE) MG tablet    IRON    60 tablet    Take 1 tablet (325 mg) by mouth every other day       gabapentin 300 MG capsule    NEURONTIN    90 capsule    Take 1 capsule (300 mg) by mouth 3 times daily       metFORMIN 1000 MG tablet    GLUCOPHAGE    180 tablet    Take 1 tablet (1,000 mg) by mouth 2 times daily (with meals)       nicotine 7 MG/24HR 24 hr patch    NICODERM CQ    30 patch    Place 1 patch onto the skin daily       order for DME     1 Device    Post of shoe       order for DME     30 days    Equipment being ordered: [employer] Fax [phone]  Primary Dressing 4x4 non-sterile gauze   Qty 2 loafs Secondary Dressing Kerlix wrap 4\" Qty 30 Length of Need: 1 month Frequency of dressing change: daily       * oxyCODONE 5 MG IR tablet    ROXICODONE    30 tablet    Take 1 tablet (5 mg) by " mouth every 4 hours as needed for pain maximum 6 tablet(s) per day       * oxyCODONE 5 MG IR tablet    ROXICODONE    30 tablet    Take 1-2 tablets (5-10 mg) by mouth every 4 hours as needed for moderate to severe pain or pain       senna-docusate 8.6-50 MG per tablet    SENOKOT-S;PERICOLACE     Take 1-2 tablets by mouth 2 times daily as needed (constipation) While on oxycodone to prevent constipation       sildenafil 100 MG cap/tab    VIAGRA    12 tablet    Take 1 tablet (100 mg) by mouth daily as needed       traZODone 100 MG tablet    DESYREL    30 tablet    Take 1 tablet (100 mg) by mouth nightly as needed for sleep       * Notice:  This list has 2 medication(s) that are the same as other medications prescribed for you. Read the directions carefully, and ask your doctor or other care provider to review them with you.

## 2017-05-11 NOTE — NURSING NOTE
"Chief Complaint   Patient presents with     RECHECK     L pop-DP (10:30 [facility]; 11:15 [facility]) History of left below-knee popliteal to dorsalis pedis Nonreversed translocated saphenous vein bypass; 6 week follow up to 3-30-17 appointment with Dr. Aragon        Initial /70 (BP Location: Left arm, Patient Position: Chair, Cuff Size: Adult Large)  Pulse 80 Estimated body mass index is 27.64 kg/(m^2) as calculated from the following:    Height as of 3/23/17: 5' 9\" (1.753 m).    Weight as of 4/6/17: 187 lb 3.2 oz (84.9 kg).  Medication Reconciliation: complete     Face to face nursing time: 8 minutes    Elena Simon MA     "

## 2017-05-12 DIAGNOSIS — I73.9 PAD (PERIPHERAL ARTERY DISEASE) (H): Primary | ICD-10-CM

## 2017-05-17 ENCOUNTER — RADIANT APPOINTMENT (OUTPATIENT)
Dept: GENERAL RADIOLOGY | Facility: CLINIC | Age: 57
End: 2017-05-17
Attending: FAMILY MEDICINE
Payer: COMMERCIAL

## 2017-05-17 ENCOUNTER — OFFICE VISIT (OUTPATIENT)
Dept: URGENT CARE | Facility: URGENT CARE | Age: 57
End: 2017-05-17
Payer: COMMERCIAL

## 2017-05-17 VITALS
BODY MASS INDEX: 29.53 KG/M2 | OXYGEN SATURATION: 100 % | HEART RATE: 80 BPM | WEIGHT: 200 LBS | DIASTOLIC BLOOD PRESSURE: 72 MMHG | TEMPERATURE: 98 F | SYSTOLIC BLOOD PRESSURE: 134 MMHG

## 2017-05-17 DIAGNOSIS — F17.200 TOBACCO USE DISORDER: ICD-10-CM

## 2017-05-17 DIAGNOSIS — R06.02 SOB (SHORTNESS OF BREATH): ICD-10-CM

## 2017-05-17 DIAGNOSIS — E11.39 TYPE 2 DIABETES MELLITUS WITH OTHER OPHTHALMIC COMPLICATION, WITHOUT LONG-TERM CURRENT USE OF INSULIN (H): ICD-10-CM

## 2017-05-17 DIAGNOSIS — Z87.2 HISTORY OF FOOT ULCER: ICD-10-CM

## 2017-05-17 DIAGNOSIS — Z09 FOLLOW-UP EXAMINATION FOLLOWING SURGERY: Primary | ICD-10-CM

## 2017-05-17 DIAGNOSIS — R06.02 SOB (SHORTNESS OF BREATH): Primary | ICD-10-CM

## 2017-05-17 DIAGNOSIS — I73.9 PAD (PERIPHERAL ARTERY DISEASE) (H): ICD-10-CM

## 2017-05-17 DIAGNOSIS — M79.672 LEFT FOOT PAIN: ICD-10-CM

## 2017-05-17 LAB
CREAT SERPL-MCNC: 0.92 MG/DL (ref 0.66–1.25)
D DIMER PPP FEU-MCNC: 0.3 UG/ML FEU (ref 0–0.5)
GFR SERPL CREATININE-BSD FRML MDRD: 85 ML/MIN/1.7M2
TROPONIN I SERPL-MCNC: NORMAL UG/L (ref 0–0.04)
WBC # BLD AUTO: 12.2 10E9/L (ref 4–11)

## 2017-05-17 PROCEDURE — 85379 FIBRIN DEGRADATION QUANT: CPT | Performed by: FAMILY MEDICINE

## 2017-05-17 PROCEDURE — 82565 ASSAY OF CREATININE: CPT | Performed by: FAMILY MEDICINE

## 2017-05-17 PROCEDURE — 93000 ELECTROCARDIOGRAM COMPLETE: CPT | Performed by: FAMILY MEDICINE

## 2017-05-17 PROCEDURE — 99215 OFFICE O/P EST HI 40 MIN: CPT | Performed by: FAMILY MEDICINE

## 2017-05-17 PROCEDURE — 71020 XR CHEST 2 VW: CPT

## 2017-05-17 PROCEDURE — 84484 ASSAY OF TROPONIN QUANT: CPT | Performed by: FAMILY MEDICINE

## 2017-05-17 PROCEDURE — 36415 COLL VENOUS BLD VENIPUNCTURE: CPT | Performed by: FAMILY MEDICINE

## 2017-05-17 PROCEDURE — 85048 AUTOMATED LEUKOCYTE COUNT: CPT | Performed by: FAMILY MEDICINE

## 2017-05-17 RX ORDER — OXYCODONE HYDROCHLORIDE 5 MG/1
5 TABLET ORAL EVERY 4 HOURS PRN
Qty: 30 TABLET | Refills: 0 | Status: SHIPPED | OUTPATIENT
Start: 2017-05-17 | End: 2017-05-30

## 2017-05-17 RX ORDER — OXYCODONE HYDROCHLORIDE 5 MG/1
5-10 TABLET ORAL EVERY 4 HOURS PRN
Qty: 30 TABLET | Refills: 0 | Status: SHIPPED | OUTPATIENT
Start: 2017-05-17 | End: 2017-05-17

## 2017-05-17 NOTE — PROGRESS NOTES
[facility]    Chart reviewed  Patient has multiple open wounds.  Request for Oxycodone #30 approved.     Cristóbal Hill PA-C

## 2017-05-17 NOTE — NURSING NOTE
"Chief Complaint   Patient presents with     Shortness of Breath     difficulty time breathing for 4 days.       Initial /72 (BP Location: Left arm, Patient Position: Chair, Cuff Size: Adult Regular)  Pulse 80  Temp 98  F (36.7  C) (Oral)  Wt 200 lb (90.7 kg)  SpO2 100%  BMI 29.53 kg/m2 Estimated body mass index is 29.53 kg/(m^2) as calculated from the following:    Height as of 3/23/17: 5' 9\" (1.753 m).    Weight as of this encounter: 200 lb (90.7 kg).  Medication Reconciliation: complete    "

## 2017-05-17 NOTE — MR AVS SNAPSHOT
After Visit Summary   5/17/2017    Gomez Turk    MRN: [id number]           Patient Information     Date Of Birth          1960        Visit Information        Provider Department      5/17/2017 3:40 PM Tylor Hill DO [facility]        Today's Diagnoses     SOB (shortness of breath)    -  1    Type 2 diabetes mellitus with other ophthalmic complication, without long-term current use of insulin (H)        Tobacco use disorder           Follow-ups after your visit        Your next 10 appointments already scheduled     May 25, 2017 11:15 AM CDT   Return Visit with Jesus Aragon MD   [facility] ([facility])    [address]  [place] [address] [phone]            Jun 08, 2017 11:15 AM CDT   Return Visit with Jesus Aragon MD   [facility] ([facility])    [address]  [place] [address] [phone]            Aug 10, 2017 10:00 AM CDT   US LOWER EXTREMITY ARTERIAL DUPLEX LEFT with SHVUS1   [facility] ([facility])    [address]  [place] [address]   [phone]           Please bring a list of your medicines (including vitamins, minerals and over-the-counter drugs). Also, tell your doctor about any allergies you may have. Wear comfortable clothes and leave your valuables at home.  You do not need to do anything special to prepare for your exam.  Please call the Imaging Department at your exam site with any questions.            Aug 10, 2017 10:45 AM CDT   Return Visit with Jesus Aragon MD   [facility] ([facility])    [address]  [place] [address] [phone]              Who to contact      If you have questions or need follow up information about today's clinic visit or your schedule please contact [facility] directly at [phone].  Normal or non-critical lab and imaging results will be communicated to you by [employer], letter or phone within 4 business days after the clinic has received the results. If you do not hear from us within 7 days, please contact the clinic through [employer] or phone. If you have a critical or abnormal lab result, we will notify you by phone as soon as possible.  Submit refill requests through [employer] or call your pharmacy and they will forward the refill request to us. Please allow 3 business days for your refill to be completed.          Additional Information About Your Visit        [employer] Information     [employer] gives you secure access to your electronic health record. If you see a primary care provider, you can also send messages to your care team and make appointments. If you have questions, please call your primary care clinic.  If you do not have a primary care provider, please call [phone] and they will assist you.        Care EveryWhere ID     This is your Care EveryWhere ID. This could be used by other organizations to access your [facility] medical records  [id number]        Your Vitals Were     Pulse Temperature Pulse Oximetry BMI (Body Mass Index)          80 98  F (36.7  C) (Oral) 100% 29.53 kg/m2         Blood Pressure from Last 3 Encounters:   05/17/17 134/72   05/11/17 126/70   05/04/17 157/85    Weight from Last 3 Encounters:   05/17/17 200 lb (90.7 kg)   04/06/17 187 lb 3.2 oz (84.9 kg)   03/23/17 196 lb (88.9 kg)              We Performed the Following     Creatinine     D dimer quantitative     EKG 12-lead complete w/read - Clinics     Troponin I     WBC count          Today's Medication Changes          These changes are accurate as of: 5/17/17 11:59 PM.  If you have any questions, ask your nurse or doctor.                These medicines have changed or have updated prescriptions.        Dose/Directions    oxyCODONE 5 MG IR tablet   Commonly known as:  ROXICODONE   This may have changed:  Another medication with the same name was removed. Continue taking this medication, and follow the directions you see here.   Used for:  PAD (peripheral artery disease) (H)   Changed by:  Jesus Aragon MD        Dose:  5 mg   Take 1 tablet (5 mg) by mouth every 4 hours as needed for pain maximum 6 tablet(s) per day   Quantity:  30 tablet   Refills:  0            Where to get your medicines      Some of these will need a paper prescription and others can be bought over the counter.  Ask your nurse if you have questions.     Bring a paper prescription for each of these medications     oxyCODONE 5 MG IR tablet                Primary Care Provider Office Phone # Fax #    Tylor Roberts [phone]       [facility] [address] [facility] [address]        Thank you!     Thank you for choosing [facility]  for your care. Our goal is always to provide you with excellent care. Hearing back from our patients is one way we can continue to improve our services. Please take a few minutes to complete the written survey that you may receive in the mail after your visit with us. Thank you!             Your Updated Medication List - Protect others around you: Learn how to safely use, store and throw away your medicines at www.disposemymeds.org.          This list is accurate as of: 5/17/17 11:59 PM.  Always use your most recent med list.                   Brand Name Dispense Instructions for use    acetaminophen 325 MG tablet    TYLENOL    100 tablet    Take 2 tablets (650 mg) by mouth every 4 hours as needed for mild pain       AMARYL 2 MG tablet   Generic drug:  glimepiride      HOLD UNTIL YOU HAVE FULLY ADVANCED DIET, AND BLOOD SUGARS START TO RUN OVER 150.       aspirin 81 MG EC  "tablet     30 tablet    Take 1 tablet (81 mg) by mouth daily       atorvastatin 40 MG tablet    LIPITOR    30 tablet    Take 1 tablet (40 mg) by mouth daily       celecoxib 200 MG capsule    celeBREX    30 capsule    Take 1 capsule (200 mg) by mouth 2 times daily as needed for moderate pain       clopidogrel 75 MG tablet    PLAVIX    30 tablet    Take 1 tablet (75 mg) by mouth daily       CULTURELLE PO      Take 1 capsule by mouth 2 times daily Reported on 5/17/2017       ferrous sulfate 325 (65 FE) MG tablet    IRON    60 tablet    Take 1 tablet (325 mg) by mouth every other day       gabapentin 300 MG capsule    NEURONTIN    90 capsule    Take 1 capsule (300 mg) by mouth 3 times daily       metFORMIN 1000 MG tablet    GLUCOPHAGE    180 tablet    Take 1 tablet (1,000 mg) by mouth 2 times daily (with meals)       nicotine 7 MG/24HR 24 hr patch    NICODERM CQ    30 patch    Place 1 patch onto the skin daily       order for DME     1 Device    Post of shoe       order for DME     30 days    Equipment being ordered: [employer] Fax [phone]  Primary Dressing 4x4 non-sterile gauze   Qty 2 loafs Secondary Dressing Kerlix wrap 4\" Qty 30 Length of Need: 1 month Frequency of dressing change: daily       oxyCODONE 5 MG IR tablet    ROXICODONE    30 tablet    Take 1 tablet (5 mg) by mouth every 4 hours as needed for pain maximum 6 tablet(s) per day       senna-docusate 8.6-50 MG per tablet    SENOKOT-S;PERICOLACE     Take 1-2 tablets by mouth 2 times daily as needed (constipation) While on oxycodone to prevent constipation       sildenafil 100 MG cap/tab    VIAGRA    12 tablet    Take 1 tablet (100 mg) by mouth daily as needed       traZODone 100 MG tablet    DESYREL    30 tablet    Take 1 tablet (100 mg) by mouth nightly as needed for sleep         "

## 2017-05-17 NOTE — TELEPHONE ENCOUNTER
Pt requesting for refill for Oxycodone.   Med & pharm loaded.   Routing to Cristóbal Hill MS, PAMALLORIE.     Nadia Page, RN, BSN.

## 2017-05-21 NOTE — PROGRESS NOTES
SUBJECTIVE: Gomez Turk is a 56 year old male presenting with a chief complaint of SOB which is not worse with activity, actually feels better.  Feels on occasion he needs to take a deep breath but technically does not feel SOB.  Onset of symptoms was week(s) ago.  Course of illness is same.    Severity moderate  Current and Associated symptoms: none  Treatment measures tried include OTC meds.  Predisposing factors include stress, diabetes, and smoker.    Past Medical History:   Diagnosis Date     DIVERTICULOSIS OF COLON W/O BLEED 6/25/2003     Hyperlipidemia LDL goal <100 10/31/2010     Tobacco use disorder 6/25/2003     Type 2 diabetes, HbA1c goal < 7% (H) 10/31/2010     Allergies   Allergen Reactions     No Known Drug Allergies      Social History   Substance Use Topics     Smoking status: Current Every Day Smoker     Packs/day: 0.00     Types: Cigarettes     Smokeless tobacco: Never Used      Comment: quit 2 weeks ago     Alcohol use No       ROS:  Review Of Systems  Skin: negative  Eyes: negative  Ears/Nose/Throat: negative  Respiratory: SOB  Cardiovascular: negative for, palpitations, tachycardia, irregular heart beat and chest pain  Gastrointestinal: negative  Genitourinary: negative  Musculoskeletal: negative  Neurologic: negative  Psychiatric: negative  Hematologic/Lymphatic/Immunologic: negative  Endocrine: negative      OBJECTIVE:  /72 (BP Location: Left arm, Patient Position: Chair, Cuff Size: Adult Regular)  Pulse 80  Temp 98  F (36.7  C) (Oral)  Wt 200 lb (90.7 kg)  SpO2 100%  BMI 29.53 kg/m2   GENERAL APPEARANCE: healthy, alert and no distress  EYES: EOMI,  PERRL, conjunctiva clear  HENT: ear canals and TM's normal.  Nose and mouth without ulcers, erythema or lesions  NECK: supple, nontender, no lymphadenopathy  RESP: lungs clear to auscultation - no rales, rhonchi or wheezes  CV: regular rates and rhythm, normal S1 S2, no murmur noted  ABDOMEN:  soft, nontender, no HSM or masses and  bowel sounds normal  NEURO: Normal strength and tone, sensory exam grossly normal,  normal speech and mentation  SKIN: no suspicious lesions or rashes    Xray without acute findings, read by Tylor Hlil D.O.         EKG Interpretation:      Interpreted by Tylor Hill    Symptoms at time of EKG: None   Rhythm: Normal sinus   Rate: Normal  Axis: Normal  Ectopy: None  Conduction: Normal  ST Segments/ T Waves: No ST-T wave changes and No acute ischemic changes  Q Waves: None  Comparison to prior: No old EKG available    Clinical Impression: normal EKG        ICD-10-CM    1. SOB (shortness of breath) R06.02 Troponin I     D dimer quantitative     WBC count     EKG 12-lead complete w/read - Clinics     Creatinine     XR Chest 2 Views   2. Type 2 diabetes mellitus with other ophthalmic complication, without long-term current use of insulin (H) E11.39 Troponin I     D dimer quantitative     WBC count     EKG 12-lead complete w/read - Clinics     XR Chest 2 Views   3. Tobacco use disorder F17.200 Troponin I     D dimer quantitative     WBC count     EKG 12-lead complete w/read - Clinics     XR Chest 2 Views     Pt believes it is more from stress as it is not worse with activity and desires to F/U  Fluids/Rest, f/u ED if worse/not any better

## 2017-05-22 DIAGNOSIS — Z79.4 TYPE 2 DIABETES MELLITUS WITH DIABETIC NEUROPATHY, WITH LONG-TERM CURRENT USE OF INSULIN (H): ICD-10-CM

## 2017-05-22 DIAGNOSIS — E11.40 TYPE 2 DIABETES MELLITUS WITH DIABETIC NEUROPATHY, WITH LONG-TERM CURRENT USE OF INSULIN (H): ICD-10-CM

## 2017-05-22 RX ORDER — GLIMEPIRIDE 2 MG/1
TABLET ORAL
Qty: 180 TABLET | Refills: 0 | Status: SHIPPED | OUTPATIENT
Start: 2017-05-22 | End: 2018-01-17

## 2017-05-22 NOTE — TELEPHONE ENCOUNTER
"Currently listed as historical with sig :\"HOLD UNTIL YOU HAVE FULLY ADVANCED DIET, AND BLOOD SUGARS START TO RUN OVER 150.\"  Was filled by Dr. Armando brooke.     Routing refill request to provider for review/approval because:  Medication is reported/historical  Labs out of range:  LDL, microalbumin  Labs not current:  microalbumin      BP Readings from Last 3 Encounters:   05/17/17 134/72   05/11/17 126/70   05/04/17 157/85     Lab Results   Component Value Date    MICROL 65 01/27/2016     Lab Results   Component Value Date    UMALCR 31.98 01/27/2016     Creatinine   Date Value Ref Range Status   05/17/2017 0.92 0.66 - 1.25 mg/dL Final   ]  GFR Estimate   Date Value Ref Range Status   05/17/2017 85 >60 mL/min/1.7m2 Final     Comment:     Non  GFR Calc   03/20/2017 >90  Non  GFR Calc   >60 mL/min/1.7m2 Final   03/19/2017 >90  Non  GFR Calc   >60 mL/min/1.7m2 Final     GFR Estimate If Black   Date Value Ref Range Status   05/17/2017 >90   GFR Calc   >60 mL/min/1.7m2 Final   03/20/2017 >90   GFR Calc   >60 mL/min/1.7m2 Final   03/19/2017 >90   GFR Calc   >60 mL/min/1.7m2 Final     Lab Results   Component Value Date    CHOL 166 02/10/2017     Lab Results   Component Value Date    HDL 42 02/10/2017     Lab Results   Component Value Date     02/10/2017     Lab Results   Component Value Date    TRIG 76 02/10/2017     Lab Results   Component Value Date    CHOLHDLRATIO 3.3 01/19/2015     Lab Results   Component Value Date    AST 23 03/23/2017     Lab Results   Component Value Date    ALT 46 03/23/2017     Lab Results   Component Value Date    A1C 6.8 02/10/2017    A1C 9.6 10/17/2016    A1C 7.8 11/02/2015    A1C 7.1 01/19/2015    A1C 10.1 12/12/2013     Potassium   Date Value Ref Range Status   03/20/2017 3.8 3.4 - 5.3 mmol/L Final       "

## 2017-05-22 NOTE — TELEPHONE ENCOUNTER
glimepiride      Last Written Prescription Date:    Last Fill Quantity: ,   # refills: 0  Last Office Visit with [facility] or [facility] prescribing provider: 03/20/17  Future Office visit:    Next 5 appointments (look out 90 days)     May 25, 2017 11:15 AM CDT   Return Visit with Jesus Aragon MD   [facility] ([facility])    [address]  [place] [address]   [phone]            Jun 08, 2017 11:15 AM CDT   Return Visit with Jesus Aragon MD   [facility] ([facility])    [address]  [place] [address]   [phone]            Aug 10, 2017 10:45 AM CDT   Return Visit with Jesus Aragon MD   [facility] [place] ([facility])    [address]  [place] [address]   [phone]                   Routing refill request to provider for review/approval because:  Medication is reported/historical

## 2017-05-24 ENCOUNTER — CARE COORDINATION (OUTPATIENT)
Dept: CARE COORDINATION | Facility: CLINIC | Age: 57
End: 2017-05-24

## 2017-05-25 ENCOUNTER — OFFICE VISIT (OUTPATIENT)
Dept: OTHER | Facility: CLINIC | Age: 57
End: 2017-05-25
Attending: SURGERY
Payer: COMMERCIAL

## 2017-05-25 VITALS — DIASTOLIC BLOOD PRESSURE: 77 MMHG | SYSTOLIC BLOOD PRESSURE: 159 MMHG | HEART RATE: 70 BPM

## 2017-05-25 DIAGNOSIS — I73.9 PAD (PERIPHERAL ARTERY DISEASE) (H): ICD-10-CM

## 2017-05-25 DIAGNOSIS — L97.222 ULCER OF CALF, LEFT, WITH FAT LAYER EXPOSED (H): Primary | ICD-10-CM

## 2017-05-25 PROCEDURE — 11042 DBRDMT SUBQ TIS 1ST 20SQCM/<: CPT | Performed by: SURGERY

## 2017-05-25 NOTE — MR AVS SNAPSHOT
After Visit Summary   5/25/2017    Gomez Turk    MRN: [id number]           Patient Information     Date Of Birth          1960        Visit Information        Provider Department      5/25/2017 11:15 AM Jesus Aragon MD [facility]      Today's Diagnoses     Ulcer of calf, left, with fat layer exposed (H)    -  1    PAD (peripheral artery disease) (H)           Follow-ups after your visit        Your next 10 appointments already scheduled     May 30, 2017  9:40 AM CDT   Office Visit with Tylor Roberts MD   [facility]    [address] [id number] [phone]           Bring a current list of meds and any records pertaining to this visit.  For Physicals, please bring immunization records and any forms needing to be filled out.  Please arrive 10 minutes early to complete paperwork.            Jun 08, 2017 11:15 AM CDT   Return Visit with Jesus Aragon MD   [facility] ([facility])    [address]  [facility] [address]   [phone]            Aug 10, 2017 10:00 AM CDT   US LOWER EXTREMITY ARTERIAL D[facility]   [facility] ([facility])    [address]  [facility] [address]   [phone]           Please bring a list of your medicines (including vitamins, minerals and over-the-counter drugs). Also, tell your doctor about any allergies you may have. Wear comfortable clothes and leave your valuables at home.  You do not need to do anything special to prepare for your exam.  Please call the Imaging Department at your exam site with any questions.            Aug 10, 2017 10:45 AM CDT   Return Visit with Jesus Aragon MD   [facility] ([facility])    [address]  [place] [address] [phone]              Who to contact     If you have questions or need follow up information about today's clinic visit or your schedule please contact [facility] [place] directly at [phone].  Normal or non-critical lab and imaging results will be communicated to you by MyChart, letter or phone within 4 business days after the clinic has received the results. If you do not hear from us within 7 days, please contact the clinic through [employer] or phone. If you have a critical or abnormal lab result, we will notify you by phone as soon as possible.  Submit refill requests through [employer] or call your pharmacy and they will forward the refill request to us. Please allow 3 business days for your refill to be completed.          Additional Information About Your Visit        [employer] Information     [employer] gives you secure access to your electronic health record. If you see a primary care provider, you can also send messages to your care team and make appointments. If you have questions, please call your primary care clinic.  If you do not have a primary care provider, please call [phone] and they will assist you.        Care EveryWhere ID     This is your Care EveryWhere ID. This could be used by other organizations to access your [place] medical records  [id number]        Your Vitals Were     Pulse                   70            Blood Pressure from Last 3 Encounters:   05/25/17 159/77   05/17/17 134/72   05/11/17 126/70    Weight from Last 3 Encounters:   05/17/17 200 lb (90.7 kg)   04/06/17 187 lb 3.2 oz (84.9 kg)   03/23/17 196 lb (88.9 kg)              We Performed the Following     DEBRIDE SKIN/SUBQ TISSUE        Primary Care Provider Office Phone # Fax #    Tylor Roberts [phone]       [facility] [address] [facility]  [address]        Thank you!     Thank you for choosing [facility]  for your care. Our goal is always to provide you with excellent care. Hearing back from our patients is one way we can continue to improve our services. Please take a few minutes to complete the written survey that you may receive in the mail after your visit with us. Thank you!             Your Updated Medication List - Protect others around you: Learn how to safely use, store and throw away your medicines at www.disposemymeds.org.          This list is accurate as of: 5/25/17 12:32 PM.  Always use your most recent med list.                   Brand Name Dispense Instructions for use    acetaminophen 325 MG tablet    TYLENOL    100 tablet    Take 2 tablets (650 mg) by mouth every 4 hours as needed for mild pain       * AMARYL 2 MG tablet   Generic drug:  glimepiride      HOLD UNTIL YOU HAVE FULLY ADVANCED DIET, AND BLOOD SUGARS START TO RUN OVER 150.       * glimepiride 2 MG tablet    AMARYL    180 tablet    TAKE ONE TABLET BY MOUTH TWICE DAILY       aspirin 81 MG EC tablet     30 tablet    Take 1 tablet (81 mg) by mouth daily       atorvastatin 40 MG tablet    LIPITOR    30 tablet    Take 1 tablet (40 mg) by mouth daily       celecoxib 200 MG capsule    celeBREX    30 capsule    Take 1 capsule (200 mg) by mouth 2 times daily as needed for moderate pain       clopidogrel 75 MG tablet    PLAVIX    30 tablet    Take 1 tablet (75 mg) by mouth daily       CULTURELLE PO      Take 1 capsule by mouth 2 times daily Reported on 5/17/2017       ferrous sulfate 325 (65 FE) MG tablet    IRON    60 tablet    Take 1 tablet (325 mg) by mouth every other day       gabapentin 300 MG capsule    NEURONTIN    90 capsule    Take 1 capsule (300 mg) by mouth 3 times daily       metFORMIN 1000 MG tablet    GLUCOPHAGE    180 tablet    Take 1 tablet (1,000 mg) by mouth 2 times daily (with meals)       nicotine 7 MG/24HR 24 hr patch    NICODERM CQ     "30 patch    Place 1 patch onto the skin daily       order for DME     1 Device    Post of shoe       order for DME     30 days    Equipment being ordered: [employer] Fax [phone]  Primary Dressing 4x4 non-sterile gauze   Qty 2 loafs Secondary Dressing Kerlix wrap 4\" Qty 30 Length of Need: 1 month Frequency of dressing change: daily       oxyCODONE 5 MG IR tablet    ROXICODONE    30 tablet    Take 1 tablet (5 mg) by mouth every 4 hours as needed for pain maximum 6 tablet(s) per day       senna-docusate 8.6-50 MG per tablet    SENOKOT-S;PERICOLACE     Take 1-2 tablets by mouth 2 times daily as needed (constipation) While on oxycodone to prevent constipation       sildenafil 100 MG cap/tab    VIAGRA    12 tablet    Take 1 tablet (100 mg) by mouth daily as needed       traZODone 100 MG tablet    DESYREL    30 tablet    Take 1 tablet (100 mg) by mouth nightly as needed for sleep       * Notice:  This list has 2 medication(s) that are the same as other medications prescribed for you. Read the directions carefully, and ask your doctor or other care provider to review them with you.      "

## 2017-05-25 NOTE — PROGRESS NOTES
Gomez Turk And his wife return to see me in the office today.  He does undergone a left popliteal to dorsalis pedis bypass graft for toe and ankle ulcerations.  These been gradually improving with the use of Aquacel Ag and wound gel.  Bypass graft is widely patent.  He does have known involvement of the left second toe joint but this has healed over.  He does have hammertoe deformity now with Hemorrhage under the nailbed but no open ulcer.      His vision is unchanged but fortunately remains stable.    Exam: Alert and appropriate.  Blood pressure 159/77 pulse 70.             Excellent palpable pulse within left leg bypass graft and dorsalis pedis artery.              Improvement of ulcers.  Medial ankle ulcer now measures 0.6 x 0.6 cm.  Dorsal ulcer over the graft measures 1.2 x 0.7 cm with excellent granulation tissue and a very minimal exposed tendon.  Great toe ulcer measures 1 x 0.7 cm with good granulation.  All wounds are less than 0.1 cm in depth.  No open ulcer over the second toe that has the hammertoe deformity and some mild swelling and erythema.      Procedure: Timeout was called and the sites are identified in the left foot and ankle.  4% topical lidocaine was applied.  Using a 15 blade scalpel the overlying scabs removed and we performed a full-thickness/subcutaneous excisional debridement of all ulcers including 0.1 cm the skin edges.  Minimal debridement of the small portion of exposed tendon was done over the dorsal wound.  Graft is well covered by soft tissue and granulation tissue.  Good bleeding was noted but not excessive even with his Plavix.  Wound gel was applied.  Adaptic was applied over this along with a Fernanda.      Impression:  Slowly improving ulcerations.  Change to wound gel due to minimal depth.  Return to office in 2-3 weeks for re-debridement.  He eventually may require the second toe to be amputated and this was discussed.      Jesus Aragon MD     Please route or  send letter to:  *None*

## 2017-05-25 NOTE — NURSING NOTE
"Chief Complaint   Patient presents with     RECHECK     wound check       Initial /77 (BP Location: Right arm, Patient Position: Chair, Cuff Size: Adult Large)  Pulse 70 Estimated body mass index is 29.53 kg/(m^2) as calculated from the following:    Height as of 3/23/17: 5' 9\" (1.753 m).    Weight as of 5/17/17: 200 lb (90.7 kg).  Medication Reconciliation: complete     Face to face nursing time: 8 minutes    Elena Simon MA     "

## 2017-05-26 ENCOUNTER — TELEPHONE (OUTPATIENT)
Dept: OPHTHALMOLOGY | Facility: CLINIC | Age: 57
End: 2017-05-26

## 2017-05-26 NOTE — TELEPHONE ENCOUNTER
"----- Message from Tylor Jacques RN sent at 5/26/2017 10:08 AM CDT -----  Regarding: FW: Call Back  Contact: [phone]   Left message direct number at [phone]  Tylor Jacques RN 10:08 AM 05/26/17    ----- Message -----     From: Flores Arzate     Sent: 5/26/2017   9:26 AM       To: Eye [facility]  Subject: Call Back                                        Camelia, pt's spouse is requesting a call back. She stated that the pt has had \"a lot of vision changes\" lately and wonder if that's something that he should come in for. She stated that he is legally blind and wonders if this new change should be a concern.    Thank you!    KI    Please DO NOT send this message and/or reply back to sender.  Call Center Representatives DO NOT respond to messages.        "

## 2017-05-26 NOTE — TELEPHONE ENCOUNTER
Pt with h/o bilateral ischemic optic neuropathy  Less than 5ft/200 vision each eye  Pt/wife reporting worsening vision in past 1-2 weeks and more so in past 3 days  Cloudier vision and dots in vision  No objects/people-- does not sound s/s geoffrey drake per phone call.  Recommended evaluation and pt unable today  [place] dr. Broadbent [place] [facility]  Pt aware of date/time/location  Tylor Jacques RN 11:48 AM 05/26/17

## 2017-05-27 ENCOUNTER — OFFICE VISIT (OUTPATIENT)
Dept: OPHTHALMOLOGY | Facility: CLINIC | Age: 57
End: 2017-05-27

## 2017-05-27 DIAGNOSIS — H53.10 SUBJECTIVE VISUAL DISTURBANCE: ICD-10-CM

## 2017-05-27 DIAGNOSIS — H47.013 ISCHEMIC OPTIC NEUROPATHY, BILATERAL: Primary | ICD-10-CM

## 2017-05-27 ASSESSMENT — CUP TO DISC RATIO
OD_RATIO: 0.0
OS_RATIO: 0.0

## 2017-05-27 ASSESSMENT — TONOMETRY
IOP_METHOD: ICARE
OD_IOP_MMHG: 13
OS_IOP_MMHG: 14

## 2017-05-27 ASSESSMENT — SLIT LAMP EXAM - LIDS
COMMENTS: NORMAL
COMMENTS: NORMAL

## 2017-05-27 ASSESSMENT — EXTERNAL EXAM - RIGHT EYE: OD_EXAM: NORMAL

## 2017-05-27 ASSESSMENT — VISUAL ACUITY
METHOD: SNELLEN - LINEAR
OS_SC: 20/250 ECC
OD_SC: 20/200 ECC
OD_SC+: +

## 2017-05-27 ASSESSMENT — EXTERNAL EXAM - LEFT EYE: OS_EXAM: NORMAL

## 2017-05-27 NOTE — NURSING NOTE
Chief Complaints and History of Present Illnesses   Patient presents with     Follow Up For     Vision cloudy 4 days ago both eyes     HPI    Affected eye(s):  Both   Symptoms:     No flashes      Duration:  4 days   Frequency:  Constant          Comments:  Patient states that about 4 days ago both eyes became cloudy. It started with big white and black spots both eyes, lasted a few days and then became smaller and replaced with smoky and hazy cloudiness both eyes. Central vision seems to have improved. Denies eye pain or flashes of light. No eye drops. He notes that he was sitting in the sun and wonders if that has caused the symptoms.    Jina Parmar COT 9:16 AM May 27, 2017

## 2017-05-27 NOTE — MR AVS SNAPSHOT
After Visit Summary   5/27/2017    Gomez Turk    MRN: [id number]           Patient Information     Date Of Birth          1960        Visit Information        Provider Department      5/27/2017 9:00 AM Broadbent, Talmage Jay, MD [facility] Ophthalmology        Today's Diagnoses     Ischemic optic neuropathy, bilateral - Both Eyes    -  1    Subjective visual disturbance           Follow-ups after your visit        Your next 10 appointments already scheduled     May 30, 2017  9:40 AM CDT   Office Visit with Tylor Roberts MD   [facility] ([facility])    [address] [id number] [phone]           Bring a current list of meds and any records pertaining to this visit.  For Physicals, please bring immunization records and any forms needing to be filled out.  Please arrive 10 minutes early to complete paperwork.            Jun 08, 2017 11:15 AM CDT   Return Visit with Jesus Aragon MD   [facility] ([facility])    [address]  [facility] [address]   [phone]            Aug 10, 2017 10:00 AM CDT   US LOWER EXTREMITY ARTERIAL DUPLEX LEFT with SHVUS1   [facility] ([facility])    [address]  [facility] [address]   [phone]           Please bring a list of your medicines (including vitamins, minerals and over-the-counter drugs). Also, tell your doctor about any allergies you may have. Wear comfortable clothes and leave your valuables at home.  You do not need to do anything special to prepare for your exam.  Please call the Imaging Department at your exam site with any questions.            Aug 10, 2017 10:45 AM CDT   Return Visit with Jesus Aragon MD   [facility] ([facility]    [address]  [place] [address] [phone]              Who to contact     Please call your clinic at [phone] to:    Ask questions about your health    Make or cancel appointments    Discuss your medicines    Learn about your test results    Speak to your doctor   If you have compliments or concerns about an experience at your clinic, or if you wish to file a complaint, please contact [facility] Patient Relations at [phone] or email us at [email or web address]         Additional Information About Your Visit        MySQUARharTransporeon Information     Triggit gives you secure access to your electronic health record. If you see a primary care provider, you can also send messages to your care team and make appointments. If you have questions, please call your primary care clinic.  If you do not have a primary care provider, please call [phone] and they will assist you.      Triggit is an electronic gateway that provides easy, online access to your medical records. With Triggit, you can request a clinic appointment, read your test results, renew a prescription or communicate with your care team.     To access your existing account, please contact your [facility] or call [phone] for assistance.        Care EveryWhere ID     This is your Care EveryWhere ID. This could be used by other organizations to access your [facility] medical records  [id number]         Blood Pressure from Last 3 Encounters:   05/25/17 159/77   05/17/17 134/72   05/11/17 126/70    Weight from Last 3 Encounters:   05/17/17 90.7 kg (200 lb)   04/06/17 84.9 kg (187 lb 3.2 oz)   03/23/17 88.9 kg (196 lb)              Today, you had the following     No orders found for display       Primary Care Provider Office Phone # Fax #    Tylor Roberts [phone]       [facility] [address] [facility] [address]         Thank you!     Thank you for choosing [facility] OPHTH[facility]MOLOG[facility]  for your care. Our goal is always to provide you with excellent care. Hearing back from our patients is one way we can continue to improve our services. Please take a few minutes to complete the written survey that you may receive in the mail after your visit with us. Thank you!             Your Updated Medication List - Protect others around you: Learn how to safely use, store and throw away your medicines at www.disposemymeds.org.          This list is accurate as of: 5/27/17 10:12 AM.  Always use your most recent med list.                   Brand Name Dispense Instructions for use    acetaminophen 325 MG tablet    TYLENOL    100 tablet    Take 2 tablets (650 mg) by mouth every 4 hours as needed for mild pain       * AMARYL 2 MG tablet   Generic drug:  glimepiride      HOLD UNTIL YOU HAVE FULLY ADVANCED DIET, AND BLOOD SUGARS START TO RUN OVER 150.       * glimepiride 2 MG tablet    AMARYL    180 tablet    TAKE ONE TABLET BY MOUTH TWICE DAILY       aspirin 81 MG EC tablet     30 tablet    Take 1 tablet (81 mg) by mouth daily       atorvastatin 40 MG tablet    LIPITOR    30 tablet    Take 1 tablet (40 mg) by mouth daily       celecoxib 200 MG capsule    celeBREX    30 capsule    Take 1 capsule (200 mg) by mouth 2 times daily as needed for moderate pain       clopidogrel 75 MG tablet    PLAVIX    30 tablet    Take 1 tablet (75 mg) by mouth daily       CULTURELLE PO      Take 1 capsule by mouth 2 times daily Reported on 5/17/2017       ferrous sulfate 325 (65 FE) MG tablet    IRON    60 tablet    Take 1 tablet (325 mg) by mouth every other day       gabapentin 300 MG capsule    NEURONTIN    90 capsule    Take 1 capsule (300 mg) by mouth 3 times daily       metFORMIN 1000 MG tablet    GLUCOPHAGE    180 tablet    Take 1 tablet (1,000 mg) by mouth 2 times daily (with meals)       nicotine 7 MG/24HR 24 hr patch    NICODERM CQ    30 patch    Place 1 patch  "onto the skin daily       order for DME     1 Device    Post of shoe       order for DME     30 days    Equipment being ordered: [employer] [facility] Fax [phone]  Primary Dressing 4x4 non-sterile gauze   Qty 2 loafs Secondary Dressing Kerlix wrap 4\" Qty 30 Length of Need: 1 month Frequency of dressing change: daily       oxyCODONE 5 MG IR tablet    ROXICODONE    30 tablet    Take 1 tablet (5 mg) by mouth every 4 hours as needed for pain maximum 6 tablet(s) per day       senna-docusate 8.6-50 MG per tablet    SENOKOT-S;PERICOLACE     Take 1-2 tablets by mouth 2 times daily as needed (constipation) While on oxycodone to prevent constipation       sildenafil 100 MG cap/tab    VIAGRA    12 tablet    Take 1 tablet (100 mg) by mouth daily as needed       traZODone 100 MG tablet    DESYREL    30 tablet    Take 1 tablet (100 mg) by mouth nightly as needed for sleep       * Notice:  This list has 2 medication(s) that are the same as other medications prescribed for you. Read the directions carefully, and ask your doctor or other care provider to review them with you.      "

## 2017-05-27 NOTE — PROGRESS NOTES
Subjective visual disturbance          Mr. Turk presents today because of changes that he has noticed in his vision over the past several days.  He has a history of profound vision loss due to ischemic optic neuropathy.  Following a vascular surgery he developed vision loss and optic disc edema.  His course was further complicated by gangrenous cholecystitis for which he had urgent surgery.  Since that time his blood pressure has been more stable.  He has been followed by the neuro ophthalmology service and at his last visit his optic disc edema had resolved.  He has noticed some changes in his vision over the last several weeks.  He has noticed some large black and white dots in his vision, these have since resolved.  He feels like the vision centrally was maybe a little bit better but that has gotten worse.  He currently feels like his vision is foggy centrally and peripherally.  He feels like he is unable to see the TV as well anymore, though this morning he noted he was able to see the weather man on the TV.      Assessment & Plan     Gomez Turk is a 56 year old male with the following diagnoses:   1. Ischemic optic neuropathy, bilateral - Both Eyes    2. Subjective visual disturbance       His exam today is essentially unchanged from his previous visit.  His vision actually measures slightly better today than before though I think this is likely variation rather than true significant improvement.  We spent some time discussing his diagnosis and prognosis.  He was concerned that he could hurt his vision by being out in the sun.  I reassured him that sunlight would not damage his vision and that he could use dark glasses for light sensitivity in the sun.      They will contact us with further questions or concerns, otherwise plan to follow up as scheduled in about 12 months with Dr. Prabhakar.         Attending Physician Attestation:  I have seen and examined this patient.  I have confirmed and edited  as necessary the chief complaint(s), history of present illness, review of systems, relevant history, and examination findings as documented by others.  I have personally reviewed the relevant tests, images, and reports as documented above.  I have confirmed and edited as necessary the assessment and plan and agree with this note.    - Talmage Broadbent MD, PhD 9:04 AM 5/27/2017

## 2017-05-30 ENCOUNTER — OFFICE VISIT (OUTPATIENT)
Dept: INTERNAL MEDICINE | Facility: CLINIC | Age: 57
End: 2017-05-30
Payer: COMMERCIAL

## 2017-05-30 VITALS
TEMPERATURE: 97.8 F | SYSTOLIC BLOOD PRESSURE: 142 MMHG | OXYGEN SATURATION: 99 % | BODY MASS INDEX: 29.46 KG/M2 | DIASTOLIC BLOOD PRESSURE: 74 MMHG | HEART RATE: 77 BPM | WEIGHT: 199.5 LBS

## 2017-05-30 DIAGNOSIS — H54.3 VISION LOSS, BILATERAL: Primary | ICD-10-CM

## 2017-05-30 DIAGNOSIS — G47.9 SLEEP DISORDER: ICD-10-CM

## 2017-05-30 DIAGNOSIS — F41.9 ANXIETY: ICD-10-CM

## 2017-05-30 DIAGNOSIS — Z71.89 COUNSELING REGARDING ADVANCED DIRECTIVES: ICD-10-CM

## 2017-05-30 PROCEDURE — 99214 OFFICE O/P EST MOD 30 MIN: CPT | Performed by: INTERNAL MEDICINE

## 2017-05-30 RX ORDER — TRAZODONE HYDROCHLORIDE 100 MG/1
100 TABLET ORAL
Qty: 30 TABLET | Refills: 1 | Status: CANCELLED | OUTPATIENT
Start: 2017-05-30

## 2017-05-30 RX ORDER — CITALOPRAM HYDROBROMIDE 20 MG/1
20 TABLET ORAL DAILY
Qty: 90 TABLET | Refills: 1 | Status: SHIPPED | OUTPATIENT
Start: 2017-05-30 | End: 2017-07-19 | Stop reason: DRUGHIGH

## 2017-05-30 RX ORDER — ZOLPIDEM TARTRATE 5 MG/1
5 TABLET ORAL
Qty: 14 TABLET | Refills: 0 | Status: SHIPPED | OUTPATIENT
Start: 2017-05-30 | End: 2017-06-13

## 2017-05-30 ASSESSMENT — ANXIETY QUESTIONNAIRES
3. WORRYING TOO MUCH ABOUT DIFFERENT THINGS: SEVERAL DAYS
1. FEELING NERVOUS, ANXIOUS, OR ON EDGE: NEARLY EVERY DAY
5. BEING SO RESTLESS THAT IT IS HARD TO SIT STILL: NEARLY EVERY DAY
7. FEELING AFRAID AS IF SOMETHING AWFUL MIGHT HAPPEN: NEARLY EVERY DAY
GAD7 TOTAL SCORE: 17
6. BECOMING EASILY ANNOYED OR IRRITABLE: NEARLY EVERY DAY
2. NOT BEING ABLE TO STOP OR CONTROL WORRYING: SEVERAL DAYS
IF YOU CHECKED OFF ANY PROBLEMS ON THIS QUESTIONNAIRE, HOW DIFFICULT HAVE THESE PROBLEMS MADE IT FOR YOU TO DO YOUR WORK, TAKE CARE OF THINGS AT HOME, OR GET ALONG WITH OTHER PEOPLE: EXTREMELY DIFFICULT

## 2017-05-30 ASSESSMENT — PATIENT HEALTH QUESTIONNAIRE - PHQ9: 5. POOR APPETITE OR OVEREATING: NEARLY EVERY DAY

## 2017-05-30 NOTE — PROGRESS NOTES
SUBJECTIVE:                                                    Gomez Turk is a 56 year old male who presents to clinic today for the following health issues:      Abnormal Mood Symptoms      Duration: 3+ weeks.     Description:  Depression: no  Anxiety: YES  Panic attacks: YES    Accompanying signs and symptoms: see PHQ-9 and SABINE scores- worse at night time, pt having a hard time relaxing- feels claustrophobic and has a hard time breathing     History (similar episodes/previous evaluation): Shortness of breath, seen at  5/17/17    Precipitating or alleviating factors: vision loss     Therapies tried and outcome: none       Problem list and histories reviewed & adjusted, as indicated.  Additional history: as documented    Patient Active Problem List   Diagnosis     Diverticulosis of large intestine     Tobacco use disorder     HYPERLIPIDEMIA LDL GOAL <100     PAD (peripheral artery disease) (H)     Type 2 diabetes mellitus with other ophthalmic complication, without long-term current use of insulin (H)     Acute cholecystitis     Vision loss, bilateral     Counseling regarding advanced directives     Past Surgical History:   Procedure Laterality Date     AMPUTATE TOE(S) Left 2/28/2017    Procedure: AMPUTATE TOE(S);  Surgeon: Jesus Aragon MD;  Location:  OR     BYPASS GRAFT FEMOROPOPLITEAL Left 2/28/2017    Procedure: BYPASS GRAFT FEMOROPOPLITEAL;  Surgeon: Jesus Aragon MD;  Location:  OR     ENT SURGERY  1990    deviated septum repair     IRRIGATION AND DEBRIDEMENT FOOT, COMBINED Left 2/28/2017    Procedure: COMBINED IRRIGATION AND DEBRIDEMENT FOOT;  Surgeon: Jesus Aragon MD;  Location:  OR     LAPAROSCOPIC CHOLECYSTECTOMY N/A 3/18/2017    Procedure: LAPAROSCOPIC CHOLECYSTECTOMY;  Surgeon: Edin Hollingsworth MD;  Location:  OR       Social History   Substance Use Topics     Smoking status: Current Every Day Smoker     Packs/day: 0.00     Types: Cigarettes      Smokeless tobacco: Never Used      Comment: quit 2 weeks ago     Alcohol use No     Family History   Problem Relation Age of Onset     DIABETES Mother      Lipids Mother      CANCER Paternal Grandmother      Neurologic Disorder Maternal Uncle      Glaucoma No family hx of      Macular Degeneration No family hx of      Retinal detachment No family hx of      Thyroid Disease No family hx of          Current Outpatient Prescriptions   Medication Sig Dispense Refill     glimepiride (AMARYL) 2 MG tablet TAKE ONE TABLET BY MOUTH TWICE DAILY  180 tablet 0     oxyCODONE (ROXICODONE) 5 MG IR tablet Take 1 tablet (5 mg) by mouth every 4 hours as needed for pain maximum 6 tablet(s) per day 30 tablet 0     traZODone (DESYREL) 100 MG tablet Take 1 tablet (100 mg) by mouth nightly as needed for sleep 30 tablet 1     celecoxib (CELEBREX) 200 MG capsule Take 1 capsule (200 mg) by mouth 2 times daily as needed for moderate pain 30 capsule 0     glimepiride (AMARYL) 2 MG tablet HOLD UNTIL YOU HAVE FULLY ADVANCED DIET, AND BLOOD SUGARS START TO RUN OVER 150.  0     senna-docusate (SENOKOT-S;PERICOLACE) 8.6-50 MG per tablet Take 1-2 tablets by mouth 2 times daily as needed (constipation) While on oxycodone to prevent constipation       ferrous sulfate (IRON) 325 (65 FE) MG tablet Take 1 tablet (325 mg) by mouth every other day 60 tablet 0     Lactobacillus Rhamnosus, GG, (CULTURELLE PO) Take 1 capsule by mouth 2 times daily Reported on 5/17/2017       acetaminophen (TYLENOL) 325 MG tablet Take 2 tablets (650 mg) by mouth every 4 hours as needed for mild pain 100 tablet 0     aspirin EC 81 MG EC tablet Take 1 tablet (81 mg) by mouth daily 30 tablet 11     gabapentin (NEURONTIN) 300 MG capsule Take 1 capsule (300 mg) by mouth 3 times daily 90 capsule 3     atorvastatin (LIPITOR) 40 MG tablet Take 1 tablet (40 mg) by mouth daily 30 tablet 11     clopidogrel (PLAVIX) 75 MG tablet Take 1 tablet (75 mg) by mouth daily 30 tablet 11      "nicotine (NICODERM CQ) 7 MG/24HR 24 hr patch Place 1 patch onto the skin daily 30 patch 3     order for DME Equipment being ordered: [employer] [facility] Fax [phone]    Primary Dressing 4x4 non-sterile gauze   Qty 2 loafs  Secondary Dressing Kerlix wrap 4\" Qty 30  Length of Need: 1 month  Frequency of dressing change: daily 30 days 0     metFORMIN (GLUCOPHAGE) 1000 MG tablet Take 1 tablet (1,000 mg) by mouth 2 times daily (with meals) 180 tablet 3     order for DME Post of shoe 1 Device 0     sildenafil (VIAGRA) 100 MG tablet Take 1 tablet (100 mg) by mouth daily as needed 12 tablet 5     Allergies   Allergen Reactions     No Known Drug Allergies      BP Readings from Last 3 Encounters:   05/25/17 159/77   05/17/17 134/72   05/11/17 126/70    Wt Readings from Last 3 Encounters:   05/17/17 200 lb (90.7 kg)   04/06/17 187 lb 3.2 oz (84.9 kg)   03/23/17 196 lb (88.9 kg)                    Reviewed and updated as needed this visit by clinical staff  Tobacco  Allergies  Med Hx  Surg Hx  Fam Hx  Soc Hx      Reviewed and updated as needed this visit by Provider         ROS:  C: NEGATIVE for fever, chills, change in weight  E/M: NEGATIVE for ear, mouth and throat problems  R: NEGATIVE for significant cough or SOB  CV: NEGATIVE for chest pain, palpitations or peripheral edema  GI: NEGATIVE for nausea, abdominal pain, heartburn, or change in bowel habits  : NEGATIVE for frequency, dysuria, or hematuria  H: NEGATIVE for bleeding problems    OBJECTIVE:                                                    /74  Pulse 77  Temp 97.8  F (36.6  C) (Oral)  Wt 199 lb 8 oz (90.5 kg)  SpO2 99%  BMI 29.46 kg/m2  Body mass index is 29.46 kg/(m^2).  GENERAL: alert and using blind cane  EYES: decreased VA noted OU, 20/HM  HENT: ear canals- normal; TMs- normal; Nose- normal; Mouth- no ulcers, no lesions  NECK: no tenderness, no adenopathy, no asymmetry, no masses, no stiffness; thyroid- normal to palpation  RESP: lungs " clear to auscultation - no rales, no rhonchi, no wheezes  CV: regular rates and rhythm, normal S1 S2, no S3 or S4 and no click or rub   ABDOMEN: soft, no tenderness, no  hepatosplenomegaly, no masses, normal bowel sounds  PSYCH: anxious         ASSESSMENT/PLAN:                                                      (H54.3) Vision loss, bilateral  (primary encounter diagnosis)  Comment: progressive and slightly worse  Plan: citalopram (CELEXA) 20 MG tablet            (F41.9) Anxiety  Comment: suspect related to above  Plan: citalopram (CELEXA) 20 MG tablet, zolpidem         (AMBIEN) 5 MG tablet, f/u 4 weeks for reassessment        I've explained to him that drugs of the SSRI class can have side effects such as weight gain, sexual dysfunction, insomnia, headache, nausea. These medications are generally effective at alleviating symptoms of anxiety and/or depression. Let me know if significant side effects do occur.      (Z71.89) Counseling regarding advanced directives  Comment: discussed options of care  Plan:     (G47.9) Sleep disorder  Comment: stop Trazodone and start Ambien trial.  Plan:     See Patient Instructions    Tylor Roberts MD  [facility]    25 minutes spent with this patient, face to face, discussing treatment options for listed problems above as well as side effects of appropriate medications.  Counseling time extended beyond 50% of the clinic visit.  Medication dosing, treatment plan and follow-up were discussed. Also reviewed need for primary care testing for patient.

## 2017-05-30 NOTE — NURSING NOTE
"Chief Complaint   Patient presents with     Anxiety       Initial /74  Pulse 77  Temp 97.8  F (36.6  C) (Oral)  Wt 199 lb 8 oz (90.5 kg)  SpO2 99%  BMI 29.46 kg/m2 Estimated body mass index is 29.46 kg/(m^2) as calculated from the following:    Height as of 3/23/17: 5' 9\" (1.753 m).    Weight as of this encounter: 199 lb 8 oz (90.5 kg).  Medication Reconciliation: complete   Zeenat Roberson, RADHA      "

## 2017-05-30 NOTE — MR AVS SNAPSHOT
After Visit Summary   5/30/2017    Gomez Turk    MRN: [id number]           Patient Information     Date Of Birth          1960        Visit Information        Provider Department      5/30/2017 9:40 AM Tylor Roberts MD [facility]        Today's Diagnoses     Vision loss, bilateral    -  1    Anxiety        Counseling regarding advanced directives        Sleep disorder           Follow-ups after your visit        Follow-up notes from your care team     Return if symptoms worsen or fail to improve.      Your next 10 appointments already scheduled     Jun 08, 2017 11:15 AM CDT   Return Visit with Jesus Aragon MD   [facility] ([facility])    [address]  [place] [address] [phone]            Aug 10, 2017 10:00 AM CDT   US LOWER EXTREMITY ARTERIAL DUPLEX LEFT with SHVUS1   [facility] ([facility])    [address]  [place] [address]   [phone]           Please bring a list of your medicines (including vitamins, minerals and over-the-counter drugs). Also, tell your doctor about any allergies you may have. Wear comfortable clothes and leave your valuables at home.  You do not need to do anything special to prepare for your exam.  Please call the Imaging Department at your exam site with any questions.            Aug 10, 2017 10:45 AM CDT   Return Visit with Jesus Aragon MD   [facility] ([facility])    [address]  [place] [address] [phone]              Who to contact     If you have questions or need follow up information about today's clinic visit or your schedule please contact [facility] directly at [phone].  Normal or non-critical lab and imaging  results will be communicated to you by Conscious Boxhart, letter or phone within 4 business days after the clinic has received the results. If you do not hear from us within 7 days, please contact the clinic through Wear My Tags or phone. If you have a critical or abnormal lab result, we will notify you by phone as soon as possible.  Submit refill requests through Wear My Tags or call your pharmacy and they will forward the refill request to us. Please allow 3 business days for your refill to be completed.          Additional Information About Your Visit        Wear My Tags Information     Wear My Tags gives you secure access to your electronic health record. If you see a primary care provider, you can also send messages to your care team and make appointments. If you have questions, please call your primary care clinic.  If you do not have a primary care provider, please call [phone] and they will assist you.        Care EveryWhere ID     This is your Care EveryWhere ID. This could be used by other organizations to access your [facility] medical records  [id number]        Your Vitals Were     Pulse Temperature Pulse Oximetry BMI (Body Mass Index)          77 97.8  F (36.6  C) (Oral) 99% 29.46 kg/m2         Blood Pressure from Last 3 Encounters:   05/30/17 142/74   05/25/17 159/77   05/17/17 134/72    Weight from Last 3 Encounters:   05/30/17 199 lb 8 oz (90.5 kg)   05/17/17 200 lb (90.7 kg)   04/06/17 187 lb 3.2 oz (84.9 kg)              Today, you had the following     No orders found for display         Today's Medication Changes          These changes are accurate as of: 5/30/17 10:29 AM.  If you have any questions, ask your nurse or doctor.               Start taking these medicines.        Dose/Directions    citalopram 20 MG tablet   Commonly known as:  celeXA   Used for:  Vision loss, bilateral, Anxiety   Started by:  Tylor Roberts MD        Dose:  20 mg   Take 1 tablet (20 mg) by mouth daily   Quantity:  90 tablet   Refills:  1        zolpidem 5 MG tablet   Commonly known as:  AMBIEN   Used for:  Anxiety   Started by:  Tylor Roberts MD        Dose:  5 mg   Take 1 tablet (5 mg) by mouth nightly as needed for sleep   Quantity:  14 tablet   Refills:  0         Stop taking these medicines if you haven't already. Please contact your care team if you have questions.     oxyCODONE 5 MG IR tablet   Commonly known as:  ROXICODONE   Stopped by:  Tylor Roberts MD           traZODone 100 MG tablet   Commonly known as:  DESYREL   Stopped by:  Tylor Roberts MD                Where to get your medicines      These medications were sent to [id number] - [place], [place] - [address] [facility]  [address] [facility] [address]     Phone:  [phone]     citalopram 20 MG tablet         Some of these will need a paper prescription and others can be bought over the counter.  Ask your nurse if you have questions.     Bring a paper prescription for each of these medications     zolpidem 5 MG tablet                Primary Care Provider Office Phone # Fax #    Tylor Roberts -[phone]       [facility] [address]  [facility] [address]        Thank you!     Thank you for choosing [facility]  for your care. Our goal is always to provide you with excellent care. Hearing back from our patients is one way we can continue to improve our services. Please take a few minutes to complete the written survey that you may receive in the mail after your visit with us. Thank you!             Your Updated Medication List - Protect others around you: Learn how to safely use, store and throw away your medicines at www.disposemymeds.org.          This list is accurate as of: 5/30/17 10:29 AM.  Always use your most recent med list.                   Brand Name Dispense Instructions for use    acetaminophen 325 MG tablet    TYLENOL    100 tablet    Take 2 tablets (650 mg) by mouth every 4  "hours as needed for mild pain       aspirin 81 MG EC tablet     30 tablet    Take 1 tablet (81 mg) by mouth daily       atorvastatin 40 MG tablet    LIPITOR    30 tablet    Take 1 tablet (40 mg) by mouth daily       citalopram 20 MG tablet    celeXA    90 tablet    Take 1 tablet (20 mg) by mouth daily       clopidogrel 75 MG tablet    PLAVIX    30 tablet    Take 1 tablet (75 mg) by mouth daily       ferrous sulfate 325 (65 FE) MG tablet    IRON    60 tablet    Take 1 tablet (325 mg) by mouth every other day       gabapentin 300 MG capsule    NEURONTIN    90 capsule    Take 1 capsule (300 mg) by mouth 3 times daily       glimepiride 2 MG tablet    AMARYL    180 tablet    TAKE ONE TABLET BY MOUTH TWICE DAILY       metFORMIN 1000 MG tablet    GLUCOPHAGE    180 tablet    Take 1 tablet (1,000 mg) by mouth 2 times daily (with meals)       nicotine 7 MG/24HR 24 hr patch    NICODERM CQ    30 patch    Place 1 patch onto the skin daily       order for DME     1 Device    Post of shoe       order for DME     30 days    Equipment being ordered: [facility] Fax [phone]  Primary Dressing 4x4 non-sterile gauze   Qty 2 loafs Secondary Dressing Kerlix wrap 4\" Qty 30 Length of Need: 1 month Frequency of dressing change: daily       senna-docusate 8.6-50 MG per tablet    SENOKOT-S;PERICOLACE     Take 1-2 tablets by mouth 2 times daily as needed (constipation) While on oxycodone to prevent constipation       sildenafil 100 MG cap/tab    VIAGRA    12 tablet    Take 1 tablet (100 mg) by mouth daily as needed       zolpidem 5 MG tablet    AMBIEN    14 tablet    Take 1 tablet (5 mg) by mouth nightly as needed for sleep         "

## 2017-05-31 ENCOUNTER — TELEPHONE (OUTPATIENT)
Dept: INTERNAL MEDICINE | Facility: CLINIC | Age: 57
End: 2017-05-31

## 2017-05-31 ASSESSMENT — ANXIETY QUESTIONNAIRES: GAD7 TOTAL SCORE: 17

## 2017-05-31 NOTE — TELEPHONE ENCOUNTER
A  by the name of Amber called requesting a verbal confirmation indicating wether or not the patient is still totally and permanently disabled.  She would like to be called at [phone]. This is a secured line so a detailed message can be left on th VM.

## 2017-06-06 ENCOUNTER — CARE COORDINATION (OUTPATIENT)
Dept: CARE COORDINATION | Facility: CLINIC | Age: 57
End: 2017-06-06

## 2017-06-06 NOTE — PROGRESS NOTES
Clinic Care Coordination Contact  [facility]/Voicemail    Referral Source: [facility]   Clinical Data: Care Coordinator Outreach---  Follow up call after [facility] appointment 5/31/2017  Outreach attempted x 1.  Left message on voicemail with call back information and requested return call.  Plan:  Care Coordinator will await a return call  .    Rosalva Palafox RN / Clinical Care Coordinator     [address] [address]  [email or web address]  Office :  [phone] / Fax :  [phone]

## 2017-06-13 ENCOUNTER — TELEPHONE (OUTPATIENT)
Dept: INTERNAL MEDICINE | Facility: CLINIC | Age: 57
End: 2017-06-13

## 2017-06-13 ENCOUNTER — MYC REFILL (OUTPATIENT)
Dept: INTERNAL MEDICINE | Facility: CLINIC | Age: 57
End: 2017-06-13

## 2017-06-13 DIAGNOSIS — F41.9 ANXIETY: ICD-10-CM

## 2017-06-13 DIAGNOSIS — I73.9 PAD (PERIPHERAL ARTERY DISEASE) (H): Primary | ICD-10-CM

## 2017-06-13 RX ORDER — ZOLPIDEM TARTRATE 5 MG/1
5 TABLET ORAL
Qty: 14 TABLET | Refills: 0 | Status: SHIPPED | OUTPATIENT
Start: 2017-06-13 | End: 2017-06-13 | Stop reason: DRUGHIGH

## 2017-06-13 RX ORDER — OXYCODONE HYDROCHLORIDE 5 MG/1
5 TABLET ORAL EVERY 8 HOURS PRN
Qty: 30 TABLET | Refills: 0 | Status: SHIPPED | OUTPATIENT
Start: 2017-06-13 | End: 2018-01-17

## 2017-06-13 RX ORDER — ZOLPIDEM TARTRATE 10 MG/1
10 TABLET ORAL
Qty: 30 TABLET | Refills: 0 | Status: SHIPPED | OUTPATIENT
Start: 2017-06-13 | End: 2017-07-12

## 2017-06-13 NOTE — TELEPHONE ENCOUNTER
Per pt's mychart message below  Comment:  Would like this refilled he is taking 2 tablets a night that is working for him. Thank you his sleeping is all night now.  Thank you from Gomez Turk    You just filled exactly what he was doing before and it would only be a 7 day supply.  Is this what you want?  Please respond to pt on mychart if you do not want him taking 2 per day...

## 2017-06-13 NOTE — TELEPHONE ENCOUNTER
Message from MyChart:  Original authorizing provider: MD Gomez Vidal would like a refill of the following medications:  zolpidem (AMBIEN) 5 MG tablet [Tylor Roberts MD]    Preferred pharmacy: [id number] [address] [facility]    Comment:  Would like this refilled he is taking 2 tablets a night that is working for him. Thank you his sleeping is all night now. Thank you from Gomez Turk

## 2017-06-13 NOTE — PROGRESS NOTES
Clinic Care Coordination Contact      CC spoke to the patient wife and she reports the patient had an assessment through [facility] 5/31/2017.   Patient will make a  future appointment for classes at the [facility] on Thursdays when he is done with his appointments with Dr Aragon   .Patient wears son glasses when he is outdoors to protect his eyes .   Amputated toe is healing well and continues to be followed by Dr Aragon   Blood sugars have been in the low 100's and this morning his reading was 77    Plan:    CC will follow up in 2-4 weeks     Rosalva Palafox RN / Clinical Care Coordinator     [address] [address]  [email or web address]  Office :  [phone] / Fax :  [phone]

## 2017-06-13 NOTE — TELEPHONE ENCOUNTER
There is no refill placed, I take it he is now taking 10 mg at [facility] and wants a 7 day supply.  If so, please send as refill request

## 2017-06-13 NOTE — TELEPHONE ENCOUNTER
Pt's wife calling to request refill of oxycodone for pt.  Pt is taking oxycodone prior to wound care.  Pt's wife states that they will need the Rx by the end of the week.  I told her that I could discuss with  when I see him next and I will call her back once Rx is signed.  Pt's wife will plan to pick rx up after she is called.    Unable to fill per RN protocol.    Med and pharm loaded, will route to provider for approval/denial.    Yin Valdes, VIDALN, RN

## 2017-06-13 NOTE — TELEPHONE ENCOUNTER
I entered the refill and you just filled what was given last time and didn't change anything.   New script for 10mg loaded, is that what you want, or take 2 5mg?

## 2017-06-15 ENCOUNTER — MEDICAL CORRESPONDENCE (OUTPATIENT)
Dept: HEALTH INFORMATION MANAGEMENT | Facility: CLINIC | Age: 57
End: 2017-06-15

## 2017-06-22 ENCOUNTER — OFFICE VISIT (OUTPATIENT)
Dept: OTHER | Facility: CLINIC | Age: 57
End: 2017-06-22
Attending: SURGERY
Payer: COMMERCIAL

## 2017-06-22 VITALS — HEART RATE: 70 BPM | DIASTOLIC BLOOD PRESSURE: 76 MMHG | SYSTOLIC BLOOD PRESSURE: 123 MMHG

## 2017-06-22 DIAGNOSIS — L97.222 ULCER OF CALF, LEFT, WITH FAT LAYER EXPOSED (H): ICD-10-CM

## 2017-06-22 DIAGNOSIS — I73.9 PAD (PERIPHERAL ARTERY DISEASE) (H): Primary | ICD-10-CM

## 2017-06-22 PROCEDURE — 97597 DBRDMT OPN WND 1ST 20 CM/<: CPT | Performed by: SURGERY
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
## 2017-06-22 NOTE — NURSING NOTE
"Chief Complaint   Patient presents with     RECHECK     wound check       Initial /76 (BP Location: Left arm, Patient Position: Chair, Cuff Size: Adult Regular)  Pulse 70 Estimated body mass index is 29.46 kg/(m^2) as calculated from the following:    Height as of 3/23/17: 5' 9\" (1.753 m).    Weight as of 5/30/17: 199 lb 8 oz (90.5 kg).  Medication Reconciliation: complete     Face to face nursing time: 8 minutes    Elena Simon MA     "

## 2017-06-22 NOTE — PROGRESS NOTES
Gomez Turk And his wife return to see me in the office today.   He had undergone a left popliteal to dorsalis pedis bypass graft along with amputation of his left great toe and debridement of multiple ulcers.  He developed a breakdown over the dorsalis pedis artery and we've been debriding this wound allowing to heal by secondary intent.  He has known likely osteomyelitis of the dorsal second toe.  Due to his patient problems following surgery  We decided to let this heal over and not perform an amputation unless chronic problems should develop.    Overall he continues to do well.  His vision is very poor but has stabilized.  He is disabled from work because of this.  He uses a cane now to help with walking.  He still wearing his cast boot on the left and with the decreased swelling this is getting harder to keep on.    Medications: Aspirin, Plavix, Lipitor, Glucophage, Neurontin.  Still occasionally will have a cigarette but trying to quit ( encouraged again)    Exam: Alert and appropriate.  Blood pressure 123/74.  Pulse 70 regular              +3 pulse within the left graft and dorsalis pedis artery              Decreased ankle and foot swelling.              Scab is noted over the dorsalis pedis incision and great toe ulcerated site              Healed second toe ulcer is noted with hammertoe deformity but no               Clinical infection.  Skin is somewhat dry on the foot and distal calf.               Ankle ulcer measures 1.2 x 0.8 x 0.1 cm with healthy granulation.      Procedure: Timeout was called and the sites were identified.  4% topical lidocaine was applied.  Using a 15 blade scalpel a selective debridement was performed to remove all scabs.  Granulation tissue following debridement on the medial ankle looks excellent and a wound gel was applied.  On the great toe with removal of the scab a small ulcer still noted measuring 0.3 x 0.3 x 0.1 cm with good bleeding.   Dorsalis pedis incision is  completely epithelialized over with removal of the scab.      Impression:  Continued improvement.  Centrally only open ulcers over the medial ankle.  Continue with the wound gel daily.  Sween 24 lotion over the dry skin.  He may start wearing a regular shoe as long as it has a good insert.  We will see him again in August for his follow-up duplex ultrasound and reevaluation of the ulcerated areas.  His wife is very L experience taking care of his foot and will call me if there is any concerns prior to this.      Jesus Aragon MD     Please route or send letter to:  *None*

## 2017-07-12 DIAGNOSIS — F41.9 ANXIETY: ICD-10-CM

## 2017-07-12 RX ORDER — ZOLPIDEM TARTRATE 10 MG/1
10 TABLET ORAL
Qty: 30 TABLET | Refills: 0 | Status: SHIPPED | OUTPATIENT
Start: 2017-07-12 | End: 2017-08-09

## 2017-07-12 NOTE — TELEPHONE ENCOUNTER
zolpidem (AMBIEN) 10 MG tablet      Last Written Prescription Date:  6/13/2017  Last Fill Quantity: 30,   # refills: 0  Last Office Visit with [facility] prescribing provider: 5/30/2017  Future Office visit:    Next 5 appointments (look out 90 days)     Aug 10, 2017 10:45 AM CDT   Return Visit with Jesus Aragon MD   [facility] ([facility])    [address]  [facility] [address] [phone]                   Routing refill request to provider for review/approval because:  Drug not on the [facility] refill protocol or controlled substance

## 2017-07-13 ENCOUNTER — CARE COORDINATION (OUTPATIENT)
Dept: CARE COORDINATION | Facility: CLINIC | Age: 57
End: 2017-07-13

## 2017-07-13 NOTE — PROGRESS NOTES
Clinic Care Coordination Contact  OUTREACH    Referral Information:  Referral Source: CTS  Reason for Contact: ED visit 3/9/2017- vision loss  Care Conference: No )     Universal Utilization:   ED Visits in last year: 0  Hospital visits in last year: 1  Last PCP appointment: 02/13/17  Missed Appointments: 6  Concerns: NO  Multiple Providers or Specialists:  (Yes)    Clinical Concerns:  Current Medical Concerns: CC spoke to Camelia/wife and she reports the patients blood sugar is ranging .  Patient is not ready to start Vision Loss Resource classes at this time.  Patient stays close to home due to the diminished  vision.  Wife states the patient is going for a new eye exam to get new glasses and then he might do more outside the home.  Continues daily walks in the home to increase his strength  Clinical Pathway Name: None  Clinical Pathway: None    Medication Management:  Not discussed today      Functional Status:  Mobility Status: Independent w/Device  Equipment Currently Used at Home: none, walker, standard  Psychosocial:  Current living arrangement:: I live in a private home with spouse     Resources and Interventions:  Current Resources:  (NA); Home Care  PAS Number:  (NA)  Senior Linkage Line Referral Placed:  (NA)  Advanced Care Plans/Directives on file:: No  Referrals Placed:  (NA)     Goals:   Goal 1 Statement: I will increase my strength -I will walk daily in my home         Barriers: Diminished vision   Strengths: getting new glasses        Plan:   No unmet needs, no further care coordination needed at this time   Wife has CC contact information to call with future questions or concerns     Rosalva Palafox RN / Clinical Care Coordinator     [address] [address]  [email or web address]  Office :  [phone] / Fax :  [phone]

## 2017-07-13 NOTE — LETTER
[facility]  Complex Care Plan  About Me  Patient Name:  Gomez Chaparro    YOB: 1960  Age:   56 year old   Andre MRN: [id number] Telephone Information:     Home Phone [phone]   Mobile Not on file.       Address:    [address] [place] [address] Email address:  [email or web address]      Emergency Contact(s)  Name Relationship Lgl Grd Work Phone Home Phone Mobile Phone   1. RADHA CHAPARRO Spouse   [phone]   2. HARRISON NELSON Relative   [phone]            Primary language:  English     needed? No   [facility]:  [phone] op. 1  Other communication barriers: No  Preferred Method of Communication:  Letter, MyChart, Phone  Current living arrangement: I live in a private home with spouse  Mobility Status/ Medical Equipment: Independent w/Device  Other information to know about me:    Health Maintenance  Health Maintenance Reviewed: Due/Overdue Foot exam Micro albumin urine and LDL ( Bad cholesterol)    My Access Plan  Medical Emergency 911   Primary Clinic Line [facility]- [phone]   24 Hour Appointment Line [phone] or  [phone]) (toll-free)   24 Hour Nurse Line [phone] (toll-free)   Preferred Urgent Care [facility], [phone]   Preferred Hospital [facility]  [phone]   Preferred Pharmacy [id number] - [place], [place] - [address] [facility] Crisis Line The National Suicide Prevention Lifeline at 1-957.804.2031 or 911     My Care Team Members  Patient Care Team       Relationship Specialty Notifications Start End    Tylor Roberts MD PCP - General   2/15/02     Phone: [phone] Fax: [phone]         [facility] [address] [facility] [address]    Jesus Aragon MD Surgeon Surgery  3/10/17     Comment:  phone: [phone]     Emani  MD         My Care Plans  Self Management and Treatment Plan  Goals and (Comments)  Goal #1: I will increase my strength  I will do my daily walks in the home      100% of goal reached      Action Plans on File:  (NA)  Advance Care Plans/Directives Type:   Type Advanced Care Plans/Directives:  (NA)    My Medical and Care Information  Problem List   Patient Active Problem List   Diagnosis     Diverticulosis of large intestine     Tobacco use disorder     HYPERLIPIDEMIA LDL GOAL <100     PAD (peripheral artery disease) (H)     Type 2 diabetes mellitus with other ophthalmic complication, without long-term current use of insulin (H)     Acute cholecystitis     Vision loss, bilateral     Counseling regarding advanced directives      Current Medications and Allergies:  See printed Medication Report.    Care Coordination Start Date: 05/03/17   Frequency of Care Coordination:  (CC closed 7/13/201   Form Last Updated: 07/13/2017

## 2017-07-18 ENCOUNTER — MYC MEDICAL ADVICE (OUTPATIENT)
Dept: INTERNAL MEDICINE | Facility: CLINIC | Age: 57
End: 2017-07-18

## 2017-07-18 DIAGNOSIS — H54.3 VISION LOSS, BILATERAL: Primary | ICD-10-CM

## 2017-07-19 RX ORDER — CITALOPRAM HYDROBROMIDE 40 MG/1
40 TABLET ORAL DAILY
Qty: 90 TABLET | Refills: 1 | Status: SHIPPED | OUTPATIENT
Start: 2017-07-19 | End: 2018-01-17

## 2017-08-09 ENCOUNTER — MYC REFILL (OUTPATIENT)
Dept: INTERNAL MEDICINE | Facility: CLINIC | Age: 57
End: 2017-08-09

## 2017-08-09 DIAGNOSIS — F41.9 ANXIETY: ICD-10-CM

## 2017-08-09 RX ORDER — ZOLPIDEM TARTRATE 10 MG/1
10 TABLET ORAL
Qty: 30 TABLET | Refills: 0 | Status: SHIPPED | OUTPATIENT
Start: 2017-08-09 | End: 2017-09-08

## 2017-08-09 NOTE — TELEPHONE ENCOUNTER
Message from [employer]:  Original authorizing provider: Tylor Roberts MD    Gomez MOSELEYPasquale Turk would like a refill of the following medications:  zolpidem (AMBIEN) 10 MG tablet [Tylor Roberts MD]    Preferred pharmacy: [id number] - [facility] [address] [facility]    Comment:

## 2017-08-09 NOTE — TELEPHONE ENCOUNTER
ambien      Last Written Prescription Date:  7/12/17  Last Fill Quantity: 30,   # refills: 0  Last Office Visit with [facility], [facility] or [facility] prescribing provider: 5/30/17  Future Office visit:    Next 5 appointments (look out 90 days)     Aug 10, 2017 10:45 AM CDT   Return Visit with Jesus Aragon MD   [facility] ([facility])    [address] [place]  [facility] [address] [phone]                   Routing refill request to provider for review/approval because:  Drug not on the [facility], [facility] or [facility] refill protocol or controlled substance

## 2017-08-10 ENCOUNTER — OFFICE VISIT (OUTPATIENT)
Dept: OTHER | Facility: CLINIC | Age: 57
End: 2017-08-10
Attending: SURGERY
Payer: COMMERCIAL

## 2017-08-10 ENCOUNTER — HOSPITAL ENCOUNTER (OUTPATIENT)
Dept: ULTRASOUND IMAGING | Facility: CLINIC | Age: 57
Discharge: HOME | End: 2017-08-10
Attending: SURGERY | Admitting: SURGERY
Payer: COMMERCIAL

## 2017-08-10 VITALS — SYSTOLIC BLOOD PRESSURE: 154 MMHG | DIASTOLIC BLOOD PRESSURE: 76 MMHG | HEART RATE: 56 BPM

## 2017-08-10 DIAGNOSIS — I73.9 PAD (PERIPHERAL ARTERY DISEASE) (H): Primary | ICD-10-CM

## 2017-08-10 DIAGNOSIS — I73.9 PAD (PERIPHERAL ARTERY DISEASE) (H): ICD-10-CM

## 2017-08-10 PROCEDURE — 99211 OFF/OP EST MAY X REQ PHY/QHP: CPT | Mod: 25

## 2017-08-10 PROCEDURE — 99213 OFFICE O/P EST LOW 20 MIN: CPT | Mod: ZP | Performed by: SURGERY

## 2017-08-10 PROCEDURE — 93926 LOWER EXTREMITY STUDY: CPT | Mod: LT

## 2017-08-10 RX ORDER — OXYCODONE HYDROCHLORIDE 5 MG/1
5 TABLET ORAL EVERY 8 HOURS PRN
Qty: 40 TABLET | Refills: 0 | Status: SHIPPED | OUTPATIENT
Start: 2017-08-10 | End: 2018-01-17
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
## 2017-08-10 NOTE — PROGRESS NOTES
[facility]  Gomez Turk Return to see me in the office today.   He underwent a left below-knee popliteal to dorsalis pedis bypass graft on 2/28/2017.  He had multiple ulcerations including the medial ankle.  He also had a great toe amputation.  All these ulcers have healed over.    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  He does have some light perception one of his eyes.  He still able to live independently with his wife but is now disabled as far as a working.    At the time of his last visit On 6/22/2017 He was noticing a slight rash on the dorsal foot lateral to the incision and also near the ankle.  He is tried multiple topical agents and this is only increased in size.  This is very pruritic and annoying.    He still has rest pain within his foot.  He is running out of his oxycodone.  He does request that he have this refilled taking one nightly or every other night to help him sleep and I do feel this is appropriate.  He is on Neurontin and this is not helping.  He has wife are very reliable and OMR concerns about narcotic addiction       Medications include Lipitor, aspirin, Plavix, Neurontin, Amaryl, Glucophage,                                                  Celexa               He has quit smoking in the past but now smoking  Again-up to one pack per day.  This started again after his blindness episode and stress associated with this including loss of his job due to this disability.    Exam: Alert and appropriate.Blood pressure 154/76 right arm.  Pulse 56.             Chest= clear   Cardiovascular=RR              +3 pulse within the graft and native dorsalis pedis artery with triphasic                   Doppler signals.             Healed great toe amputation site and medial ankle ulcer.  Previously                        exposed bone over the second hammer toe deformity now healed over              Rash noted in the ankle region medially and also lateral dorsal foot.        Duplex today reveals a widely patent graft with good flow.  Native artery has a slightly elevated velocity compared to last visit but more due to calcification with a diameter of 1.8 mm and good flow.        Impression:  #1  Patent left leg bypass graft with adequate distal flow.  Continue with aspirin and Plavix.  Repeat duplex in three months.                          #2  Non  improving rash to left ankle and foot.  He is tried conservative treatment.  Recommended to see a dermatologist.                          #3  I again stressed the absolute necessity of complete smoking cessation with his vascular problems in particular the limited runoff in his left foot.                          #4   I will give him 40 tablets of 5 mg oxycodone to use very sparingly to help him sleep at night and last him until next visit.      Jesus Aragon MD     Please route or send letter to:  Primary Care Provider (PCP)

## 2017-08-10 NOTE — NURSING NOTE
"Chief Complaint   Patient presents with     RECHECK     3 Month F/U, L BK pop, Hx of left below-knee popliteal to dorsalis pedis       Initial /76 (BP Location: Right arm, Patient Position: Chair, Cuff Size: Adult Regular)  Pulse 56 Estimated body mass index is 29.46 kg/(m^2) as calculated from the following:    Height as of 3/23/17: 5' 9\" (1.753 m).    Weight as of 5/30/17: 199 lb 8 oz (90.5 kg).  Medication Reconciliation: complete    Face to Face time: 5 minutes    Zeenat Reyna MA    "

## 2017-08-11 DIAGNOSIS — I73.9 PAD (PERIPHERAL ARTERY DISEASE) (H): Primary | ICD-10-CM

## 2017-08-17 ENCOUNTER — OFFICE VISIT (OUTPATIENT)
Dept: DERMATOLOGY | Facility: CLINIC | Age: 57
End: 2017-08-17
Payer: COMMERCIAL

## 2017-08-17 VITALS — HEART RATE: 57 BPM | OXYGEN SATURATION: 98 % | SYSTOLIC BLOOD PRESSURE: 139 MMHG | DIASTOLIC BLOOD PRESSURE: 78 MMHG

## 2017-08-17 DIAGNOSIS — L28.0 LICHEN SIMPLEX CHRONICUS: Primary | ICD-10-CM

## 2017-08-17 PROCEDURE — 99203 OFFICE O/P NEW LOW 30 MIN: CPT | Performed by: DERMATOLOGY

## 2017-08-17 RX ORDER — BETAMETHASONE DIPROPIONATE 0.5 MG/G
CREAM TOPICAL
Qty: 100 G | Refills: 3 | Status: SHIPPED | OUTPATIENT
Start: 2017-08-17 | End: 2020-04-26

## 2017-08-17 NOTE — PATIENT INSTRUCTIONS
Proper skin care from Dr. Perry- [facility]     Eliminate harsh soaps, i.e. Dial, Zest, Dorita Spring;   Use mild soaps such as Cetaphil or Dove Sensitive Skin   Avoid hot or cold showers   After showering, lightly dry off.    Aggressive use of a moisturizer (including Vanicream, Cetaphil, Aquafor or Cerave)   We recommend using a tub that needs to be scooped out, not a pump. This has more of an oil base. It will hold moisture in your skin much better than a water base moisturizer. The ones recommended are non- pore clogging.       If you have any questions call [phone] and follow the prompts to Dr. Perry's office.
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
## 2017-08-17 NOTE — NURSING NOTE
"Initial /78  Pulse 57  SpO2 98% Estimated body mass index is 29.46 kg/(m^2) as calculated from the following:    Height as of 3/23/17: 1.753 m (5' 9\").    Weight as of 5/30/17: 90.5 kg (199 lb 8 oz). .      "

## 2017-08-17 NOTE — PROGRESS NOTES
Gomez Turk is a 56 year old year old male patient here today for itching spot on left ankle.   .  Patient states this has been present for years.  Patient reports the following itching:  none .  Patient reports the following previous treatments dial soap.  Patient reports the following modifying factors none.  Associated symptoms: none.  Patient has no other skin complaints today.  Remainder of the HPI, Meds, PMH, Allergies, FH, and SH was reviewed in chart.      Past Medical History:   Diagnosis Date     DIVERTICULOSIS OF COLON W/O BLEED 6/25/2003     Hyperlipidemia LDL goal <100 10/31/2010     Tobacco use disorder 6/25/2003     Type 2 diabetes, HbA1c goal < 7% (H) 10/31/2010       Past Surgical History:   Procedure Laterality Date     AMPUTATE TOE(S) Left 2/28/2017    Procedure: AMPUTATE TOE(S);  Surgeon: Jesus Aragon MD;  Location:  OR     BYPASS GRAFT FEMOROPOPLITEAL Left 2/28/2017    Procedure: BYPASS GRAFT FEMOROPOPLITEAL;  Surgeon: Jesus Aragon MD;  Location:  OR     ENT SURGERY  1990    deviated septum repair     IRRIGATION AND DEBRIDEMENT FOOT, COMBINED Left 2/28/2017    Procedure: COMBINED IRRIGATION AND DEBRIDEMENT FOOT;  Surgeon: Jesus Aragon MD;  Location:  OR     LAPAROSCOPIC CHOLECYSTECTOMY N/A 3/18/2017    Procedure: LAPAROSCOPIC CHOLECYSTECTOMY;  Surgeon: Edin Hollingsworth MD;  Location:  OR        Family History   Problem Relation Age of Onset     DIABETES Mother      Lipids Mother      Melanoma Mother      CANCER Paternal Grandmother      Neurologic Disorder Maternal Uncle      Glaucoma No family hx of      Macular Degeneration No family hx of      Retinal detachment No family hx of      Thyroid Disease No family hx of        Social History     Social History     Marital status:      Spouse name: N/A     Number of children: N/A     Years of education: N/A     Occupational History     Not on file.     Social History Main Topics     Smoking  status: Current Every Day Smoker     Packs/day: 1.00     Types: Cigarettes     Smokeless tobacco: Never Used     Alcohol use No     Drug use: No     Sexual activity: Yes     Partners: Female     Other Topics Concern     Not on file     Social History Narrative       Outpatient Encounter Prescriptions as of 8/17/2017   Medication Sig Dispense Refill     augmented betamethasone dipropionate (DIPROLENE-AF) 0.05 % cream Apply sparingly to affected area twice daily as needed.  Do not apply to face. 100 g 3     oxyCODONE (ROXICODONE) 5 MG IR tablet Take 1 tablet (5 mg) by mouth every 8 hours as needed for pain maximum 2 tablet(s) per day 40 tablet 0     zolpidem (AMBIEN) 10 MG tablet Take 1 tablet (10 mg) by mouth nightly as needed for sleep 30 tablet 0     citalopram (CELEXA) 40 MG tablet Take 1 tablet (40 mg) by mouth daily 90 tablet 1     oxyCODONE (ROXICODONE) 5 MG IR tablet Take 1 tablet (5 mg) by mouth every 8 hours as needed for pain maximum 6 tablet(s) per day 30 tablet 0     glimepiride (AMARYL) 2 MG tablet TAKE ONE TABLET BY MOUTH TWICE DAILY  180 tablet 0     senna-docusate (SENOKOT-S;PERICOLACE) 8.6-50 MG per tablet Take 1-2 tablets by mouth 2 times daily as needed (constipation) While on oxycodone to prevent constipation       ferrous sulfate (IRON) 325 (65 FE) MG tablet Take 1 tablet (325 mg) by mouth every other day 60 tablet 0     acetaminophen (TYLENOL) 325 MG tablet Take 2 tablets (650 mg) by mouth every 4 hours as needed for mild pain 100 tablet 0     aspirin EC 81 MG EC tablet Take 1 tablet (81 mg) by mouth daily 30 tablet 11     gabapentin (NEURONTIN) 300 MG capsule Take 1 capsule (300 mg) by mouth 3 times daily 90 capsule 3     atorvastatin (LIPITOR) 40 MG tablet Take 1 tablet (40 mg) by mouth daily 30 tablet 11     clopidogrel (PLAVIX) 75 MG tablet Take 1 tablet (75 mg) by mouth daily 30 tablet 11     nicotine (NICODERM CQ) 7 MG/24HR 24 hr patch Place 1 patch onto the skin daily 30 patch 3     order  "for DME Equipment being ordered: [facility] Fax [phone]    Primary Dressing 4x4 non-sterile gauze   Qty 2 loafs  Secondary Dressing Kerlix wrap 4\" Qty 30  Length of Need: 1 month  Frequency of dressing change: daily 30 days 0     metFORMIN (GLUCOPHAGE) 1000 MG tablet Take 1 tablet (1,000 mg) by mouth 2 times daily (with meals) 180 tablet 3     order for DME Post of shoe 1 Device 0     sildenafil (VIAGRA) 100 MG tablet Take 1 tablet (100 mg) by mouth daily as needed 12 tablet 5     No facility-administered encounter medications on file as of 8/17/2017.              Review Of Systems  Skin: As above  Eyes: negative  Ears/Nose/Throat: negative  Respiratory: No shortness of breath, dyspnea on exertion, cough, or hemoptysis  Cardiovascular: negative  Gastrointestinal: negative  Genitourinary: negative  Musculoskeletal: negative  Neurologic: negative  Psychiatric: negative  Hematologic/Lymphatic/Immunologic: negative  Endocrine: negative      O:   NAD, WDWN, Alert & Oriented, Mood & Affect wnl, Vitals stable   Here today alone   /78  Pulse 57  SpO2 98%   General appearance normal   Vitals stable   Alert, oriented and in no acute distress     Lichenified excoriated palque on left ankle       The remainder of expanded problem focused exam was unremarkable; the following areas were examined:  scalp/hair, conjunctiva/lids, face, neck, lips, chest, digits/nails, RUE, LUE.      Eyes: Conjunctivae/lids:Normal     ENT: Lips, buccal mucosa, tongue: normal    MSK:Normal    Cardiovascular: peripheral edema none    Pulm: Breathing Normal    Neuro/Psych: Orientation:Normal; Mood/Affect:Normal      A/P:  1. [facility]  Zyrtec daily   Betamethasone daily  Skin care discussed with patient   Stop dial   Skin care regimen reviewed with patient: Eliminate harsh soaps, i.e. Dial, zest, irsih spring; Mild soaps such as Cetaphil or Dove sensitive skin, avoid hot or cold showers, aggressive use of emollients including vanicream, " cetaphil or cerave discussed with patient.    Return to clinic 2 weeks

## 2017-09-08 ENCOUNTER — MYC REFILL (OUTPATIENT)
Dept: INTERNAL MEDICINE | Facility: CLINIC | Age: 57
End: 2017-09-08

## 2017-09-08 DIAGNOSIS — F41.9 ANXIETY: ICD-10-CM

## 2017-09-08 RX ORDER — ZOLPIDEM TARTRATE 10 MG/1
10 TABLET ORAL
Qty: 30 TABLET | Refills: 0 | Status: SHIPPED | OUTPATIENT
Start: 2017-09-08 | End: 2017-10-09

## 2017-09-08 NOTE — TELEPHONE ENCOUNTER
zolpidem (AMBIEN) 10 MG tablet      Last Written Prescription Date:  8/9/17  Last Fill Quantity: 30,   # refills: 0  Last Office Visit with [facility] or [facility] prescribing provider: 5/30/17  Future Office visit:    Next 5 appointments (look out 90 days)     Nov 09, 2017 10:30 AM CST   Return Visit with Jesus Aragon MD   [facility] ([facility])    [address]  [facility] [address] [phone]                   Routing refill request to provider for review/approval because:  Drug not on the [facility] or [facility] refill protocol or controlled substance

## 2017-09-08 NOTE — TELEPHONE ENCOUNTER
Message from MyChart:  Original authorizing provider: Tylor Roberts MD    Gomez Artisquin would like a refill of the following medications:  zolpidem (AMBIEN) 10 MG tablet [Tylor Roberts MD]    Preferred pharmacy: [id number] - [facility] [address]    Comment:  This is working well for him to sleep.. Thank yo

## 2017-10-09 ENCOUNTER — MYC REFILL (OUTPATIENT)
Dept: INTERNAL MEDICINE | Facility: CLINIC | Age: 57
End: 2017-10-09

## 2017-10-09 DIAGNOSIS — F41.9 ANXIETY: ICD-10-CM

## 2017-10-10 RX ORDER — ZOLPIDEM TARTRATE 10 MG/1
10 TABLET ORAL
Qty: 30 TABLET | Refills: 0 | Status: SHIPPED | OUTPATIENT
Start: 2017-10-10 | End: 2017-11-09

## 2017-10-10 NOTE — TELEPHONE ENCOUNTER
Message from MyChart:  Original authorizing provider: Tylor Roberts MD    Gomez MOSELEYPasquale Turk would like a refill of the following medications:  zolpidem (AMBIEN) 10 MG tablet [Tylor Roberts MD]    Preferred pharmacy: [id number] - [facility] [address]    Comment:  Thank You

## 2017-10-10 NOTE — TELEPHONE ENCOUNTER
ambien      Last Written Prescription Date:  9/8/17  Last Fill Quantity: 30,   # refills: 0  Last Office Visit with [facility], [facility] or [facility] prescribing provider: 5/30/17  Future Office visit:    Next 5 appointments (look out 90 days)     Nov 09, 2017 10:30 AM CST   Return Visit with Jesus Aragon MD   [facility] ([facility])    [address]  [facility] [address] [phone]                   Routing refill request to provider for review/approval because:  Drug not on the [facility], [facility] or [facility] refill protocol or controlled substance

## 2017-11-06 ENCOUNTER — DOCUMENTATION ONLY (OUTPATIENT)
Dept: LAB | Facility: CLINIC | Age: 57
End: 2017-11-06

## 2017-11-06 DIAGNOSIS — E11.39 TYPE 2 DIABETES MELLITUS WITH OTHER OPHTHALMIC COMPLICATION, WITHOUT LONG-TERM CURRENT USE OF INSULIN (H): Primary | ICD-10-CM

## 2017-11-07 DIAGNOSIS — E11.39 TYPE 2 DIABETES MELLITUS WITH OTHER OPHTHALMIC COMPLICATION, WITHOUT LONG-TERM CURRENT USE OF INSULIN (H): ICD-10-CM

## 2017-11-07 LAB
ALBUMIN SERPL-MCNC: 4 G/DL (ref 3.4–5)
ALP SERPL-CCNC: 96 U/L (ref 40–150)
ALT SERPL W P-5'-P-CCNC: 49 U/L (ref 0–70)
AST SERPL W P-5'-P-CCNC: 31 U/L (ref 0–45)
BILIRUB DIRECT SERPL-MCNC: 0.1 MG/DL (ref 0–0.2)
BILIRUB SERPL-MCNC: 0.2 MG/DL (ref 0.2–1.3)
ERYTHROCYTE [DISTWIDTH] IN BLOOD BY AUTOMATED COUNT: 12.7 % (ref 10–15)
HCT VFR BLD AUTO: 37.6 % (ref 40–53)
HGB BLD-MCNC: 12.5 G/DL (ref 13.3–17.7)
MCH RBC QN AUTO: 32 PG (ref 26.5–33)
MCHC RBC AUTO-ENTMCNC: 33.2 G/DL (ref 31.5–36.5)
MCV RBC AUTO: 96 FL (ref 78–100)
PLATELET # BLD AUTO: 296 10E9/L (ref 150–450)
PROT SERPL-MCNC: 7.9 G/DL (ref 6.8–8.8)
RBC # BLD AUTO: 3.91 10E12/L (ref 4.4–5.9)
WBC # BLD AUTO: 12.5 10E9/L (ref 4–11)

## 2017-11-07 PROCEDURE — 36415 COLL VENOUS BLD VENIPUNCTURE: CPT | Performed by: INTERNAL MEDICINE

## 2017-11-07 PROCEDURE — 85027 COMPLETE CBC AUTOMATED: CPT | Performed by: INTERNAL MEDICINE

## 2017-11-07 PROCEDURE — 80076 HEPATIC FUNCTION PANEL: CPT | Performed by: INTERNAL MEDICINE

## 2017-11-09 ENCOUNTER — OFFICE VISIT (OUTPATIENT)
Dept: OTHER | Facility: CLINIC | Age: 57
End: 2017-11-09
Attending: SURGERY
Payer: COMMERCIAL

## 2017-11-09 ENCOUNTER — MYC REFILL (OUTPATIENT)
Dept: INTERNAL MEDICINE | Facility: CLINIC | Age: 57
End: 2017-11-09

## 2017-11-09 ENCOUNTER — HOSPITAL ENCOUNTER (OUTPATIENT)
Dept: ULTRASOUND IMAGING | Facility: CLINIC | Age: 57
Discharge: HOME | End: 2017-11-09
Attending: SURGERY | Admitting: SURGERY
Payer: COMMERCIAL

## 2017-11-09 VITALS — SYSTOLIC BLOOD PRESSURE: 150 MMHG | HEART RATE: 61 BPM | DIASTOLIC BLOOD PRESSURE: 75 MMHG

## 2017-11-09 DIAGNOSIS — I73.9 PAD (PERIPHERAL ARTERY DISEASE) (H): Primary | ICD-10-CM

## 2017-11-09 DIAGNOSIS — I73.9 PAD (PERIPHERAL ARTERY DISEASE) (H): ICD-10-CM

## 2017-11-09 DIAGNOSIS — F41.9 ANXIETY: ICD-10-CM

## 2017-11-09 PROCEDURE — 99211 OFF/OP EST MAY X REQ PHY/QHP: CPT

## 2017-11-09 PROCEDURE — 99214 OFFICE O/P EST MOD 30 MIN: CPT | Mod: ZP | Performed by: SURGERY

## 2017-11-09 PROCEDURE — 93926 LOWER EXTREMITY STUDY: CPT | Mod: LT

## 2017-11-09 RX ORDER — OXYCODONE HYDROCHLORIDE 5 MG/1
5 TABLET ORAL EVERY 4 HOURS PRN
Qty: 40 TABLET | Refills: 0 | Status: SHIPPED | OUTPATIENT
Start: 2017-11-09 | End: 2018-01-29

## 2017-11-09 RX ORDER — ZOLPIDEM TARTRATE 10 MG/1
10 TABLET ORAL
Qty: 30 TABLET | Refills: 0 | Status: SHIPPED | OUTPATIENT
Start: 2017-11-09 | End: 2017-12-11

## 2017-11-09 NOTE — NURSING NOTE
"Chief Complaint   Patient presents with     RECHECK     L pop-DP (9:45 V[facility]; 10:30 WRO) History of left below-knee popliteal to dorsalis pedis bypass graft on 2/28/2017; 3 months follow up to 8/10/17 appointment with Dr. Aragon        Initial /75 (BP Location: Left arm, Patient Position: Chair, Cuff Size: Adult Regular)  Pulse 61 Estimated body mass index is 29.46 kg/(m^2) as calculated from the following:    Height as of 3/23/17: 5' 9\" (1.753 m).    Weight as of 5/30/17: 199 lb 8 oz (90.5 kg).  Medication Reconciliation: complete    Face to Face time 5 minutes  Irasema Chan CMA      "

## 2017-11-09 NOTE — PROGRESS NOTES
[facility]    Gomez Turk Returns to see me for a three month follow-up.  He underwent a left below-knee popliteal to dorsalis pedis NRTSV bypass along with a great toe amputation and ulcer debridement on 2/28/2017.  The amputation site is healed over.  He still has a very small  Proximally 0.5 x 0.3 cm scab on the  left medial malleolus that were allowing to heal with no specific treatment. He is ambulatory.  He has some mild swelling.  He does complain of pain in the foot particularly at the end of the day for which he takes oxycodone 5 mg tablets usually one or two daily ( we gave him 40 tablets three months ago when he just finish this prescription).  He feels he still needs this to remain active and mobile.    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 Etiology was unclear but has been associated with operative procedures ( usually with significant hypotension which was not his situation). He is now disabled.  He uses a cane due to his very limited vision.    PMH: Medications: Aspirin, Plavix, Celexa, Neurontin, Amaryl, Glucophage,                                  Lipitor           Reports good control of his Type 2 diabetes.            Last LDL on 2/10/2017 was 109 With an A1c= 6.8              Still smoking several cigarettes daily but knows he needs to quit.    Exam: Alert and appropriate.  Decreased vision.  Blood pressure 150/75.              Pulse= 61 and regular.   Chest= clear   Cardiovascular=RR               +3 left graft and dorsalis pedis pulse              Minimal swelling.  Normal sensation.  Good shoes.               Very small superficial scab over medial ankle              Excellent triphasic Doppler in the graft and dorsalis pedis artery          Duplex revealed a bypass graft is widely patent with good flow. Native artery diameter is decreased to 2.4 mm versus 4.1 mm previously.  Outflow dorsalis pedis  is increased from 1.8 to 2.2 mm        Impression:  Patent left leg bypass graft with adequate distal flow.  Continue three month follow-up especially with decreased diameter of the popliteal artery inflow.  Continue with aspirin and Plavix.                       Patient needs to quit smoking completely.                       I refilled his oxycodone 5 mg tablets #40to last him until his next follow-up      Jesus Aragon MD     Please route or send letter to:  Primary Care Provider (PCP)

## 2017-11-09 NOTE — TELEPHONE ENCOUNTER
Message from MyChart:  Original authorizing provider: Tylor Roberts MD    Gomez MOSELEYPasquale Turk would like a refill of the following medications:  zolpidem (AMBIEN) 10 MG tablet [Tylor Roberts MD]    Preferred pharmacy: [facility][id number] - [facility] [address]    Comment:  Thank you
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
## 2017-11-09 NOTE — LETTER
[facility] [address]  [address]  [place] [address]  Phone: [phone]  Fax: [phone]    2017    Re: Gomez Turk : 1960    Gomez Turk Returns to see me for a three month follow-up.  He underwent a left below-knee popliteal to dorsalis pedis NRTSV bypass along with a great toe amputation and ulcer debridement on 2017.  The amputation site is healed over.  He still has a very small Proximally 0.5 x 0.3 cm scab on the  left medial malleolus that were allowing to heal with no specific treatment. He is ambulatory.  He has some mild swelling.  He does complain of pain in the foot particularly at the end of the day for which he takes oxycodone 5 mg tablets usually one or two daily ( we gave him 40 tablets three months ago when he just finish this prescription).  He feels he still needs this to remain active and mobile.     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 Etiology was unclear but has been associated with operative procedures ( usually with significant hypotension which was not his situation). He is now disabled.  He uses a cane due to his very limited vision.     PMH: Medications: Aspirin, Plavix, Celexa, Neurontin, Amaryl, Glucophage, Lipitor           Reports good control of his Type 2 diabetes.           Last LDL on 2/10/2017 was 109 With an A1c= 6.8           Still smoking several cigarettes daily but knows he needs to quit.     Exam: Alert and appropriate.  Decreased vision.  Blood pressure 150/75.              Pulse= 61 and regular.   Chest= clear   Cardiovascular=RR               +3 left graft and dorsalis pedis pulse              Minimal swelling.  Normal sensation.  Good shoes.               Very small superficial scab over medial ankle              Excellent triphasic Doppler in the graft and dorsalis pedis artery     Duplex revealed a bypass graft is widely  patent with good flow. Native artery diameter is decreased to 2.4 mm versus 4.1 mm previously.  Outflow dorsalis pedis is increased from 1.8 to 2.2 mm     Impression:  Patent left leg bypass graft with adequate distal flow.  Continue three month follow-up especially with decreased diameter of the popliteal artery inflow.  Continue with aspirin and Plavix.  Patient needs to quit smoking completely.  I refilled his oxycodone 5 mg tablets #40 to last him until his next follow-up     Jesus Aragon MD

## 2017-11-09 NOTE — TELEPHONE ENCOUNTER
ambien      Last Written Prescription Date:  10/10/17  Last Fill Quantity: 30,   # refills: 0  Future Office visit:    Next 5 appointments (look out 90 days)     Nov 15, 2017  9:40 AM CST   Office Visit with Tylor Roberts MD   [facility]    [address] [address]   [phone]                   Routing refill request to provider for review/approval because:  Drug not on the [facility], [facility] or [facility] refill protocol or controlled substance

## 2017-11-16 ENCOUNTER — MYC MEDICAL ADVICE (OUTPATIENT)
Dept: INTERNAL MEDICINE | Facility: CLINIC | Age: 57
End: 2017-11-16

## 2017-11-16 DIAGNOSIS — E11.9 TYPE 2 DIABETES MELLITUS WITHOUT COMPLICATION, WITHOUT LONG-TERM CURRENT USE OF INSULIN (H): ICD-10-CM

## 2017-11-16 DIAGNOSIS — I73.9 PAD (PERIPHERAL ARTERY DISEASE) (H): ICD-10-CM

## 2017-11-16 DIAGNOSIS — E11.40 TYPE 2 DIABETES MELLITUS WITH DIABETIC NEUROPATHY, WITH LONG-TERM CURRENT USE OF INSULIN (H): ICD-10-CM

## 2017-11-16 DIAGNOSIS — Z79.4 TYPE 2 DIABETES MELLITUS WITH DIABETIC NEUROPATHY, WITH LONG-TERM CURRENT USE OF INSULIN (H): ICD-10-CM

## 2017-11-16 RX ORDER — GABAPENTIN 300 MG/1
300 CAPSULE ORAL 3 TIMES DAILY
Qty: 90 CAPSULE | Refills: 3 | Status: SHIPPED | OUTPATIENT
Start: 2017-11-16 | End: 2018-05-30

## 2017-11-16 NOTE — TELEPHONE ENCOUNTER
gabapentin      Last Written Prescription Date:  3/6/17  Last Fill Quantity: 90,   # refills: 3  Future Office visit:       Routing refill request to provider for review/approval because:  Drug not on the [facility], [facility] or [facility] refill protocol or controlled substance

## 2017-12-11 ENCOUNTER — MYC REFILL (OUTPATIENT)
Dept: INTERNAL MEDICINE | Facility: CLINIC | Age: 57
End: 2017-12-11

## 2017-12-11 DIAGNOSIS — F41.9 ANXIETY: ICD-10-CM

## 2017-12-11 RX ORDER — ZOLPIDEM TARTRATE 10 MG/1
10 TABLET ORAL
Qty: 30 TABLET | Refills: 0 | Status: SHIPPED | OUTPATIENT
Start: 2017-12-11 | End: 2018-01-08

## 2017-12-11 NOTE — TELEPHONE ENCOUNTER
Message from MyChart:  Original authorizing provider: Tylor Roberts MD    Gomez MOSELEYPasquale Turk would like a refill of the following medications:  zolpidem (AMBIEN) 10 MG tablet [Tylor Roberts MD]    Preferred pharmacy: [id number] - [facility] [address]    Comment:  Thank you

## 2017-12-11 NOTE — TELEPHONE ENCOUNTER
Ambien       Last Written Prescription Date:  11/9/17  Last Fill Quantity: 30,   # refills: 0  Last Office Visit: 11/15/17  Future Office visit:    Next 5 appointments (look out 90 days)     Feb 15, 2018 11:30 AM CST   Return Visit with Jesus Aragon MD   [facility] ([facility])    [address]  [facility] [address]   [phone]                   Routing refill request to provider for review/approval because:  Drug not on the [facility], [facility] or [facility] refill protocol or controlled substance

## 2018-01-08 ENCOUNTER — MYC REFILL (OUTPATIENT)
Dept: INTERNAL MEDICINE | Facility: CLINIC | Age: 58
End: 2018-01-08

## 2018-01-08 DIAGNOSIS — F41.9 ANXIETY: ICD-10-CM

## 2018-01-08 RX ORDER — ZOLPIDEM TARTRATE 10 MG/1
10 TABLET ORAL
Qty: 30 TABLET | Refills: 0 | Status: SHIPPED | OUTPATIENT
Start: 2018-01-08 | End: 2018-02-10

## 2018-01-08 NOTE — TELEPHONE ENCOUNTER
Ambien       Last Written Prescription Date:  12/11/17  Last Fill Quantity: 30,   # refills: 0  Last Office Visit: 5/30/17  Future Office visit:    Next 5 appointments (look out 90 days)     Feb 15, 2018 11:30 AM CST   Return Visit with Jesus Aragon MD   [facility] ([facility])    [address]  [facility] [address]   [phone]                   Routing refill request to provider for review/approval because:  Drug not on the [facility], [facility] or [facility] refill protocol or controlled substance

## 2018-01-08 NOTE — TELEPHONE ENCOUNTER
Message from MyChart:  Original authorizing provider: Tylor Roberts MD    Gomez MOSELEYPasquale Turk would like a refill of the following medications:  zolpidem (AMBIEN) 10 MG tablet [Tylor Roberts MD]    Preferred pharmacy: [id number] - [facility] [address]    Comment:  Thank you

## 2018-01-17 ENCOUNTER — OFFICE VISIT (OUTPATIENT)
Dept: INTERNAL MEDICINE | Facility: CLINIC | Age: 58
End: 2018-01-17
Payer: COMMERCIAL

## 2018-01-17 VITALS
BODY MASS INDEX: 30.81 KG/M2 | WEIGHT: 208 LBS | HEART RATE: 71 BPM | HEIGHT: 69 IN | TEMPERATURE: 98.4 F | DIASTOLIC BLOOD PRESSURE: 64 MMHG | OXYGEN SATURATION: 99 % | SYSTOLIC BLOOD PRESSURE: 130 MMHG

## 2018-01-17 DIAGNOSIS — R10.84 ABDOMINAL PAIN, GENERALIZED: Primary | ICD-10-CM

## 2018-01-17 DIAGNOSIS — E11.40 TYPE 2 DIABETES MELLITUS WITH DIABETIC NEUROPATHY, WITH LONG-TERM CURRENT USE OF INSULIN (H): ICD-10-CM

## 2018-01-17 DIAGNOSIS — F17.200 TOBACCO USE DISORDER: ICD-10-CM

## 2018-01-17 DIAGNOSIS — Z79.4 TYPE 2 DIABETES MELLITUS WITH DIABETIC NEUROPATHY, WITH LONG-TERM CURRENT USE OF INSULIN (H): ICD-10-CM

## 2018-01-17 DIAGNOSIS — H54.3 VISION LOSS, BILATERAL: ICD-10-CM

## 2018-01-17 PROCEDURE — 99214 OFFICE O/P EST MOD 30 MIN: CPT | Performed by: INTERNAL MEDICINE

## 2018-01-17 RX ORDER — CITALOPRAM HYDROBROMIDE 40 MG/1
40 TABLET ORAL DAILY
Qty: 90 TABLET | Refills: 1 | Status: SHIPPED | OUTPATIENT
Start: 2018-01-17 | End: 2018-05-30

## 2018-01-17 RX ORDER — GLIMEPIRIDE 2 MG/1
2 TABLET ORAL 2 TIMES DAILY
Qty: 180 TABLET | Refills: 3 | Status: SHIPPED | OUTPATIENT
Start: 2018-01-17 | End: 2020-04-26

## 2018-01-17 RX ORDER — NICOTINE 21 MG/24HR
1 PATCH, TRANSDERMAL 24 HOURS TRANSDERMAL EVERY 24 HOURS
Qty: 30 PATCH | Refills: 0 | Status: SHIPPED | OUTPATIENT
Start: 2018-01-17 | End: 2018-06-06

## 2018-01-17 NOTE — NURSING NOTE
"Chief Complaint   Patient presents with     Abdominal Pain       Initial /64  Pulse 71  Temp 98.4  F (36.9  C) (Oral)  Ht 5' 9\" (1.753 m)  Wt 208 lb (94.3 kg)  SpO2 99%  BMI 30.72 kg/m2 Estimated body mass index is 30.72 kg/(m^2) as calculated from the following:    Height as of this encounter: 5' 9\" (1.753 m).    Weight as of this encounter: 208 lb (94.3 kg).  Medication Reconciliation: complete   Zeenat Roberson CMA      "

## 2018-01-17 NOTE — PROGRESS NOTES
"  SUBJECTIVE:   Gomez Turk is a 57 year old male who presents to clinic today for the following health issues:    Abdominal Pain      Duration: Began Friday and lasted only a couple of days     Description (location/character/radiation): LLQ/ sore ?/ intermittent, resolved today        Associated flank pain: None    Intensity:  moderate    Accompanying signs and symptoms: abd feels numb- \"feels like there is nothing there\". Decreased appetite        Fever/Chills: no       Gas/Bloating: Bloating        Nausea/vomitting: no        Diarrhea: Passing loose stools every 2-3 days         Dysuria or Hematuria: no     History (previous similar pain/trauma/previous testing): Hx of diverticulosis     Precipitating or alleviating factors:       Pain worse with eating/BM/urination: None        Pain relieved by BM: no     Therapies tried and outcome: None    LMP:  not applicable    Other concerns:  1. Discuss smoking cessation- smoking 1 PPD   2. Discuss glucose meters for visually impaired     Problem list and histories reviewed & adjusted, as indicated.  Additional history: as documented    Patient Active Problem List   Diagnosis     Diverticulosis of large intestine     Tobacco use disorder     HYPERLIPIDEMIA LDL GOAL <100     PAD (peripheral artery disease) (H)     Type 2 diabetes mellitus with other ophthalmic complication, without long-term current use of insulin (H)     Acute cholecystitis     Vision loss, bilateral     Counseling regarding advanced directives     Past Surgical History:   Procedure Laterality Date     AMPUTATE TOE(S) Left 2/28/2017    Procedure: AMPUTATE TOE(S);  Surgeon: Jesus Aragon MD;  Location:  OR     BYPASS GRAFT FEMOROPOPLITEAL Left 2/28/2017    Procedure: BYPASS GRAFT FEMOROPOPLITEAL;  Surgeon: Jesus Aragon MD;  Location:  OR     ENT SURGERY  1990    deviated septum repair     IRRIGATION AND DEBRIDEMENT FOOT, COMBINED Left 2/28/2017    Procedure: COMBINED " IRRIGATION AND DEBRIDEMENT FOOT;  Surgeon: Jesus Aragon MD;  Location:  OR     LAPAROSCOPIC CHOLECYSTECTOMY N/A 3/18/2017    Procedure: LAPAROSCOPIC CHOLECYSTECTOMY;  Surgeon: Edin Hollingsworth MD;  Location:  OR       Social History   Substance Use Topics     Smoking status: Current Every Day Smoker     Packs/day: 1.00     Types: Cigarettes     Smokeless tobacco: Never Used     Alcohol use No     Family History   Problem Relation Age of Onset     DIABETES Mother      Lipids Mother      Melanoma Mother      CANCER Paternal Grandmother      Neurologic Disorder Maternal Uncle      Glaucoma No family hx of      Macular Degeneration No family hx of      Retinal detachment No family hx of      Thyroid Disease No family hx of          Current Outpatient Prescriptions   Medication Sig Dispense Refill     citalopram (CELEXA) 40 MG tablet Take 1 tablet (40 mg) by mouth daily 90 tablet 1     glimepiride (AMARYL) 2 MG tablet Take 1 tablet (2 mg) by mouth 2 times daily 180 tablet 3     metFORMIN (GLUCOPHAGE) 1000 MG tablet Take 1 tablet (1,000 mg) by mouth 2 times daily (with meals) 180 tablet 3     nicotine (NICODERM CQ) 21 MG/24HR 24 hr patch Place 1 patch onto the skin every 24 hours 30 patch 0     zolpidem (AMBIEN) 10 MG tablet Take 1 tablet (10 mg) by mouth nightly as needed for sleep 30 tablet 0     gabapentin (NEURONTIN) 300 MG capsule Take 1 capsule (300 mg) by mouth 3 times daily (Patient taking differently: Take 300 mg by mouth 2 times daily ) 90 capsule 3     oxyCODONE IR (ROXICODONE) 5 MG tablet Take 1 tablet (5 mg) by mouth every 4 hours as needed for pain maximum 3 tablet(s) per day 40 tablet 0     aspirin EC 81 MG EC tablet Take 1 tablet (81 mg) by mouth daily 30 tablet 11     atorvastatin (LIPITOR) 40 MG tablet Take 1 tablet (40 mg) by mouth daily 30 tablet 11     clopidogrel (PLAVIX) 75 MG tablet Take 1 tablet (75 mg) by mouth daily 30 tablet 11     augmented betamethasone dipropionate  (DIPROLENE-AF) 0.05 % cream Apply sparingly to affected area twice daily as needed.  Do not apply to face. 100 g 3     [DISCONTINUED] citalopram (CELEXA) 40 MG tablet Take 1 tablet (40 mg) by mouth daily 90 tablet 1     [DISCONTINUED] glimepiride (AMARYL) 2 MG tablet TAKE ONE TABLET BY MOUTH TWICE DAILY  180 tablet 0     senna-docusate (SENOKOT-S;PERICOLACE) 8.6-50 MG per tablet Take 1-2 tablets by mouth 2 times daily as needed (constipation) While on oxycodone to prevent constipation (Patient not taking: Reported on 1/17/2018)       ferrous sulfate (IRON) 325 (65 FE) MG tablet Take 1 tablet (325 mg) by mouth every other day (Patient not taking: Reported on 1/17/2018) 60 tablet 0     acetaminophen (TYLENOL) 325 MG tablet Take 2 tablets (650 mg) by mouth every 4 hours as needed for mild pain (Patient not taking: Reported on 1/17/2018) 100 tablet 0     nicotine (NICODERM CQ) 7 MG/24HR 24 hr patch Place 1 patch onto the skin daily 30 patch 3     [DISCONTINUED] metFORMIN (GLUCOPHAGE) 1000 MG tablet Take 1 tablet (1,000 mg) by mouth 2 times daily (with meals) 180 tablet 3     sildenafil (VIAGRA) 100 MG tablet Take 1 tablet (100 mg) by mouth daily as needed (Patient not taking: Reported on 1/17/2018) 12 tablet 5     Allergies   Allergen Reactions     No Known Drug Allergies      BP Readings from Last 3 Encounters:   11/09/17 150/75   08/17/17 139/78   08/10/17 154/76    Wt Readings from Last 3 Encounters:   05/30/17 199 lb 8 oz (90.5 kg)   05/17/17 200 lb (90.7 kg)   04/06/17 187 lb 3.2 oz (84.9 kg)            Reviewed and updated as needed this visit by clinical staffTobacco  Allergies  Meds  Med Hx  Surg Hx  Fam Hx  Soc Hx      Reviewed and updated as needed this visit by Provider         ROS:  C: NEGATIVE for fever, chills, change in weight  E/M: NEGATIVE for ear, mouth and throat problems  R: NEGATIVE for significant cough or SOB  CV: NEGATIVE for chest pain, palpitations or peripheral edema  : NEGATIVE for  "frequency, dysuria, or hematuria  M: NEGATIVE for significant arthralgias or myalgia  H: NEGATIVE for bleeding problems  P: NEGATIVE for changes in mood or affect    OBJECTIVE:                                                    /64  Pulse 71  Temp 98.4  F (36.9  C) (Oral)  Ht 5' 9\" (1.753 m)  Wt 208 lb (94.3 kg)  SpO2 99%  BMI 30.72 kg/m2  Body mass index is 30.72 kg/(m^2).  GENERAL: alert and no distress  EYES: Eyes grossly normal to inspection less visual acuity, extraocular movements - intact  HENT: ear canals- normal; TMs- normal; Nose- normal; Mouth- no ulcers, no lesions  NECK: no tenderness, no adenopathy, no asymmetry, no masses, no stiffness; thyroid- normal to palpation  RESP: lungs clear to auscultation - no rales, no rhonchi, no wheezes  CV: regular rates and rhythm, normal S1 S2, no S3 or S4 and no click or rub   MS: extremities- no gross deformities noted, no edema  NEURO: Decreased visual acuity is noted  PSYCH: Alert and oriented times 3; speech- coherent , normal rate and volume; able to articulate logical thoughts, able to abstract reason, no tangential thoughts, no hallucinations or delusions, affect- normal       ASSESSMENT/PLAN:                                                      (R10.84) Abdominal pain, generalized  (primary encounter diagnosis)  Comment: Appears to have resolved thus will not proceed further.  On questioning I wonder if this may be a component in relation to his metformin use.  I have asked him to restart his metformin as he stopped his medications and see if his symptoms return  Plan: Comprehensive metabolic panel            (H54.3) Vision loss, bilateral  Comment: Stable and currently on Social Security and disability for bilateral visual loss.  Plan: citalopram (CELEXA) 40 MG tablet            (E11.40,  Z79.4) Type 2 diabetes mellitus with diabetic neuropathy, with long-term current use of insulin (H)  Comment: Labs recommended as fasting prescriptions were " refilled.  Plan: glimepiride (AMARYL) 2 MG tablet, metFORMIN         (GLUCOPHAGE) 1000 MG tablet, Comprehensive         metabolic panel, Hemoglobin A1c, Albumin Random        Urine Quantitative with Creat Ratio, TSH with         free T4 reflex, Lipid Profile, DIABETES         EDUCATOR REFERRAL            (F17.200) Tobacco use disorder  Comment: Poking cessation was recommended and discussed in a prescription for NicoDerm patch was sent to the pharmacy as the patient's wife tells me that they have insurance coverage for it.  Plan: nicotine (NICODERM CQ) 21 MG/24HR 24 hr patch          See Patient Instructions and a flu shot was also recommended.    Tylor Robetrs MD  [facility]    THE MEDICATION LIST HAS BEEN FULLY RECONCILED BY THE M.D. AND THE NURSING STAFF.    25 minutes spent with this patient, face to face, discussing treatment options for listed problems above as well as side effects of appropriate medications.  Counseling time extended beyond 50% of the clinic visit.  Medication dosing, treatment plan and follow-up were discussed. Also reviewed need for primary care testing for patient.

## 2018-01-17 NOTE — MR AVS SNAPSHOT
After Visit Summary   1/17/2018    Gomez Turk    MRN: [id number]           Patient Information     Date Of Birth          1960        Visit Information        Provider Department      1/17/2018 10:00 AM Tylor Roberts MD [facility]        Today's Diagnoses     Abdominal pain, generalized    -  1    Vision loss, bilateral        Type 2 diabetes mellitus with diabetic neuropathy, with long-term current use of insulin (H)        Tobacco use disorder           Follow-ups after your visit        Additional Services     DIABETES EDUCATOR REFERRAL       DIABETES SELF MANAGEMENT TRAINING (DSMT)      Your provider has referred you to Diabetes Education: [facility]: Diabetes Education - All [facility] [phone]   [email or web address]     If an urgent visit is needed or A1C is above 12, Care Team to call the Diabetes  Education Team at [phone] or send an In Basket message to the Diabetes Education Pool (P DIAB ED-PATIENT CARE).    A  will call you to make your appointment. If it has been more than 3 business days since your referral was placed, please call the above phone number to schedule.    Type of training and number of hours: Previous Diagnosis: Follow-up DSMT - 2 hours.    Medicare covers: 10 hours of initial DSMT in 12 month period from the time of first visit, plus 2 hours of follow-up DSMT annually, and additional hours as requested for insulin training.    Diabetes Type: Type 2 - On Oral Medication             Diabetes Co-Morbidities: blind and needs options for BS check               A1C Goal:  <7.0       A1C is: Lab Results       Component                Value               Date                       A1C                      6.8                 02/10/2017              Diabetes Education Topics: Comprehensive Knowledge Assessment and Instruction    Special Educational Needs Requiring Individual  DSMT: None       MEDICAL NUTRITION THERAPY (MNT) for Diabetes    Medical Nutrition Therapy with a Registered Dietitian can be provided in coordination with Diabetes Self-Management Training to assist in achieving optimal diabetes management.    MNT Type and Hours: Previous diagnosis: Annual follow-up MNT - 2 hours                       Medicare will cover: 3 hours initial MNT in 12 month period after first visit, plus 2 hours of follow-up MNT annually    Please be aware that coverage of these services is subject to the terms and limitations of your health insurance plan.  Call member services at your health plan to determine Diabetes Self-Management Training (Codes  &amp; ) and Medical Nutrition Therapy (Codes 48492 & 98443) benefits and ask which blood glucose monitor brands are covered by your plan.  Please bring the following with you to your appointment:    (1)  List of current medications   (2)  List of Blood Glucose Monitor brands that are covered by your insurance plan  (3)  Blood Glucose Monitor and log book  (4)   Food records for the 3 days prior to your visit    The Certified Diabetes Educator may make diabetes medication adjustments per the CDE Protocol and Collaborative Practice Agreement.                  Your next 10 appointments already scheduled     Feb 15, 2018 10:45 AM CST   US LOWER EXTREMITY ARTERIAL DUPLEX LEFT with SHVUS1   [facility] ([facility])    [address]  [place] [address]   [phone]           Please bring a list of your medicines (including vitamins, minerals and over-the-counter drugs). Also, tell your doctor about any allergies you may have. Wear comfortable clothes and leave your valuables at home.  You do not need to do anything special to prepare for your exam.  Please call the Imaging Department at your exam site with any questions.            Feb 15, 2018 11:30 AM CST   Return Visit with  Jesus Aragon MD   [facility] ([facility])    [address]  [place] [id number] [phone]              Future tests that were ordered for you today     Open Future Orders        Priority Expected Expires Ordered    Comprehensive metabolic panel Routine 1/17/2018 3/31/2018 1/17/2018    Hemoglobin A1c Routine 1/17/2018 3/31/2018 1/17/2018    Albumin Random Urine Quantitative with Creat Ratio Routine 1/17/2018 3/31/2018 1/17/2018    TSH with free T4 reflex Routine 1/17/2018 3/31/2018 1/17/2018    Lipid Profile Routine 1/17/2018 3/31/2018 1/17/2018            Who to contact     If you have questions or need follow up information about today's clinic visit or your schedule please contact [facility] directly at [phone].  Normal or non-critical lab and imaging results will be communicated to you by MedAwarehart, letter or phone within 4 business days after the clinic has received the results. If you do not hear from us within 7 days, please contact the clinic through Netliftt or phone. If you have a critical or abnormal lab result, we will notify you by phone as soon as possible.  Submit refill requests through MarkTheGlobe or call your pharmacy and they will forward the refill request to us. Please allow 3 business days for your refill to be completed.          Additional Information About Your Visit        MedAwarehart Information     MarkTheGlobe gives you secure access to your electronic health record. If you see a primary care provider, you can also send messages to your care team and make appointments. If you have questions, please call your primary care clinic.  If you do not have a primary care provider, please call [phone] and they will assist you.        Care EveryWhere ID     This is your Care EveryWhere ID. This could be used by other organizations to access your [facility]  "records  [id number]        Your Vitals Were     Pulse Temperature Height Pulse Oximetry BMI (Body Mass Index)       71 98.4  F (36.9  C) (Oral) 5' 9\" (1.753 m) 99% 30.72 kg/m2        Blood Pressure from Last 3 Encounters:   01/17/18 130/64   11/09/17 150/75   08/17/17 139/78    Weight from Last 3 Encounters:   01/17/18 208 lb (94.3 kg)   05/30/17 199 lb 8 oz (90.5 kg)   05/17/17 200 lb (90.7 kg)              We Performed the Following     DIABETES EDUCATOR REFERRAL          Today's Medication Changes          These changes are accurate as of: 1/17/18 10:28 AM.  If you have any questions, ask your nurse or doctor.               These medicines have changed or have updated prescriptions.        Dose/Directions    gabapentin 300 MG capsule   Commonly known as:  NEURONTIN   This may have changed:  when to take this   Used for:  PAD (peripheral artery disease) (H), Type 2 diabetes mellitus without complication, without long-term current use of insulin (H), Type 2 diabetes mellitus with diabetic neuropathy, with long-term current use of insulin (H)        Dose:  300 mg   Take 1 capsule (300 mg) by mouth 3 times daily   Quantity:  90 capsule   Refills:  3       glimepiride 2 MG tablet   Commonly known as:  AMARYL   This may have changed:  See the new instructions.   Used for:  Type 2 diabetes mellitus with diabetic neuropathy, with long-term current use of insulin (H)   Changed by:  Tylor Roberts MD        Dose:  2 mg   Take 1 tablet (2 mg) by mouth 2 times daily   Quantity:  180 tablet   Refills:  3         Stop taking these medicines if you haven't already. Please contact your care team if you have questions.     order for DME   Stopped by:  Tylor Roberts MD           order for DME   Stopped by:  Tylor Roberts MD                Where to get your medicines      These medications were sent to [id number] [place], [place] - [address]  [address] [facility] [address]     Phone:  " [phone]     citalopram 40 MG tablet    glimepiride 2 MG tablet    metFORMIN 1000 MG tablet                Primary Care Provider Office Phone # Fax #    Tylor Roberts [phone] [address] [facility] [address]        Equal Access to Services     KADIESTACY LAMONT : Hadii aad ku hadasho Soomaali, waaxda luqadaha, qaybta kaalmada adeegyada, waxay idiin hayaan adeeg nasra laOleksandrjosen . So [facility] [phone].    ATENCIÓN: Si habla español, tiene a nye disposición servicios gratuitos de asistencia lingüística. Llame al [phone].    We comply with applicable federal civil rights laws and [place] laws. We do not discriminate on the basis of race, color, national origin, age, disability, sex, sexual orientation, or gender identity.            Thank you!     Thank you for choosing [facility]  for your care. Our goal is always to provide you with excellent care. Hearing back from our patients is one way we can continue to improve our services. Please take a few minutes to complete the written survey that you may receive in the mail after your visit with us. Thank you!             Your Updated Medication List - Protect others around you: Learn how to safely use, store and throw away your medicines at www.disposemymeds.org.          This list is accurate as of: 1/17/18 10:28 AM.  Always use your most recent med list.                   Brand Name Dispense Instructions for use Diagnosis    acetaminophen 325 MG tablet    TYLENOL    100 tablet    Take 2 tablets (650 mg) by mouth every 4 hours as needed for mild pain    PAD (peripheral artery disease) (H)       aspirin 81 MG EC tablet     30 tablet    Take 1 tablet (81 mg) by mouth daily    PAD (peripheral artery disease) (H)       atorvastatin 40 MG tablet    LIPITOR    30 tablet    Take 1 tablet (40 mg) by mouth daily    PAD (peripheral artery disease) (H)       augmented betamethasone dipropionate 0.05 % cream     DIPROLENE-AF    100 g    Apply sparingly to affected area twice daily as needed.  Do not apply to face.    Lichen simplex chronicus       citalopram 40 MG tablet    celeXA    90 tablet    Take 1 tablet (40 mg) by mouth daily    Vision loss, bilateral       clopidogrel 75 MG tablet    PLAVIX    30 tablet    Take 1 tablet (75 mg) by mouth daily    PAD (peripheral artery disease) (H)       ferrous sulfate 325 (65 FE) MG tablet    IRON    60 tablet    Take 1 tablet (325 mg) by mouth every other day    Anemia due to blood loss, acute       gabapentin 300 MG capsule    NEURONTIN    90 capsule    Take 1 capsule (300 mg) by mouth 3 times daily    PAD (peripheral artery disease) (H), Type 2 diabetes mellitus without complication, without long-term current use of insulin (H), Type 2 diabetes mellitus with diabetic neuropathy, with long-term current use of insulin (H)       glimepiride 2 MG tablet    AMARYL    180 tablet    Take 1 tablet (2 mg) by mouth 2 times daily    Type 2 diabetes mellitus with diabetic neuropathy, with long-term current use of insulin (H)       metFORMIN 1000 MG tablet    GLUCOPHAGE    180 tablet    Take 1 tablet (1,000 mg) by mouth 2 times daily (with meals)    Type 2 diabetes mellitus with diabetic neuropathy, with long-term current use of insulin (H)       nicotine 7 MG/24HR 24 hr patch    NICODERM CQ    30 patch    Place 1 patch onto the skin daily    PAD (peripheral artery disease) (H)       oxyCODONE IR 5 MG tablet    ROXICODONE    40 tablet    Take 1 tablet (5 mg) by mouth every 4 hours as needed for pain maximum 3 tablet(s) per day    PAD (peripheral artery disease) (H)       senna-docusate 8.6-50 MG per tablet    SENOKOT-S;PERICOLACE     Take 1-2 tablets by mouth 2 times daily as needed (constipation) While on oxycodone to prevent constipation    Acute cholecystitis       sildenafil 100 MG tablet    VIAGRA    12 tablet    Take 1 tablet (100 mg) by mouth daily as needed    Impotence        zolpidem 10 MG tablet    AMBIEN    30 tablet    Take 1 tablet (10 mg) by mouth nightly as needed for sleep    Anxiety

## 2018-01-29 ENCOUNTER — TELEPHONE (OUTPATIENT)
Dept: OTHER | Facility: CLINIC | Age: 58
End: 2018-01-29

## 2018-01-29 DIAGNOSIS — I73.9 PAD (PERIPHERAL ARTERY DISEASE) (H): ICD-10-CM

## 2018-01-29 NOTE — TELEPHONE ENCOUNTER
Pt's wife, Camelia, called back.  She reports that the pt occasionally takes an oxycodone when he is going to be moving around a lot.    's last OV note from 11/9/17 states:   I refilled his oxycodone 5 mg tablets #40to last him until his next follow-up    Will route a refill request to  for approval/refusal.    Yin Valdes, VIDALN, RN

## 2018-01-29 NOTE — TELEPHONE ENCOUNTER
Oxycodone refill request  Last fill was 11/9/17, 40 tabs.  Pt has f/u scheduled for 2/15/18    Pt's wife, Camelia, called back.  She reports that the pt occasionally takes an oxycodone when he is going to be moving around a lot.    's last OV note from 11/9/17 states:   I refilled his oxycodone 5 mg tablets #40to last him until his next follow-up    Will route to  for approval/refusal.    If approved, call Camelia's mobile phone and she will  rx.    Yin Valdes, VIDALN, RN

## 2018-01-29 NOTE — TELEPHONE ENCOUNTER
Pt's wife, Camelia, WINSTON on triage line asking for refill of pt's oxycodone.  Last fill was 11/9/17, 40 tabs.  Pt has f/u scheduled for 2/15/18.      LM asking for callback to further assess.    Yin Valdes, BSN, RN

## 2018-01-30 RX ORDER — OXYCODONE HYDROCHLORIDE 5 MG/1
5 TABLET ORAL 2 TIMES DAILY PRN
Qty: 30 TABLET | Refills: 0 | Status: SHIPPED | OUTPATIENT
Start: 2018-01-30 | End: 2018-03-14

## 2018-02-10 ENCOUNTER — MYC REFILL (OUTPATIENT)
Dept: INTERNAL MEDICINE | Facility: CLINIC | Age: 58
End: 2018-02-10

## 2018-02-10 DIAGNOSIS — F41.9 ANXIETY: ICD-10-CM

## 2018-02-12 RX ORDER — ZOLPIDEM TARTRATE 10 MG/1
10 TABLET ORAL
Qty: 30 TABLET | Refills: 0 | Status: SHIPPED | OUTPATIENT
Start: 2018-02-12 | End: 2018-03-08

## 2018-02-12 NOTE — TELEPHONE ENCOUNTER
Message from MyChart:  Original authorizing provider: Tylor Roberts MD    Gomez MOSELEYPasquale Turk would like a refill of the following medications:  zolpidem (AMBIEN) 10 MG tablet [Tylor Roberts MD]    Preferred pharmacy: [id number] - [facility] [id number]    Comment:  Thank you

## 2018-02-12 NOTE — TELEPHONE ENCOUNTER
Last Written Prescription Date:  1/8/18  Last Fill Quantity: 30,  # refills: 0   Last office visit: 1/17/2018 with prescribing provider:  1/17/18   Future Office Visit:   Next 5 appointments (look out 90 days)     Feb 15, 2018 11:30 AM CST   Return Visit with Jesus Aragon MD   [facility] ([facility])    [address]  [facility] [address]   [phone]                 Routing refill request to provider for review/approval because:  Drug not on the [facility] refill protocol

## 2018-02-15 ENCOUNTER — HOSPITAL ENCOUNTER (OUTPATIENT)
Dept: ULTRASOUND IMAGING | Facility: CLINIC | Age: 58
Discharge: HOME | End: 2018-02-15
Attending: SURGERY | Admitting: SURGERY
Payer: COMMERCIAL

## 2018-02-15 ENCOUNTER — OFFICE VISIT (OUTPATIENT)
Dept: OTHER | Facility: CLINIC | Age: 58
End: 2018-02-15
Attending: SURGERY
Payer: COMMERCIAL

## 2018-02-15 ENCOUNTER — TRANSFERRED RECORDS (OUTPATIENT)
Dept: HEALTH INFORMATION MANAGEMENT | Facility: CLINIC | Age: 58
End: 2018-02-15

## 2018-02-15 VITALS — DIASTOLIC BLOOD PRESSURE: 73 MMHG | SYSTOLIC BLOOD PRESSURE: 133 MMHG | HEART RATE: 76 BPM

## 2018-02-15 DIAGNOSIS — I73.9 PAD (PERIPHERAL ARTERY DISEASE) (H): Primary | ICD-10-CM

## 2018-02-15 DIAGNOSIS — I73.9 PAD (PERIPHERAL ARTERY DISEASE) (H): ICD-10-CM

## 2018-02-15 PROCEDURE — 99214 OFFICE O/P EST MOD 30 MIN: CPT | Mod: ZP | Performed by: SURGERY

## 2018-02-15 PROCEDURE — G0463 HOSPITAL OUTPT CLINIC VISIT: HCPCS

## 2018-02-15 PROCEDURE — 93926 LOWER EXTREMITY STUDY: CPT | Mod: LT

## 2018-02-15 NOTE — NURSING NOTE
"Chief Complaint   Patient presents with     RECHECK     3 month follow up, hx of left BK popliteal to DP bypass graft 2/28/17       Initial /73 (BP Location: Left arm, Patient Position: Chair, Cuff Size: Adult Large)  Pulse 76 Estimated body mass index is 30.72 kg/(m^2) as calculated from the following:    Height as of 1/17/18: 5' 9\" (1.753 m).    Weight as of 1/17/18: 208 lb (94.3 kg).  Medication Reconciliation: complete    Face to face time: 5 minutes    Gianna Morales CMA    "

## 2018-02-15 NOTE — PROGRESS NOTES
[place] [facility] [place]    Gomez Turk his wife return seen in the office today.  He developed nonhealing ulcerations in his left foot and toes.  He underwent a left below-knee popliteal to dorsalis pedis bypass graft on 2/28/2017 followed by several toe amputations and eventual healing of his ankle ulcer.    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  He uses a cane for ambulation with some light perception and gross figure perception.    He does have a second hammertoe and will develop a callus on the tip of the toe but no ulcer.    Recently noticed a small ulcer on the right plantar heel that is slowly improving but  when he steps on this.    Has some chronic swelling in the left ankle and foot as expected.      PMH: Medications: Lipitor, aspirin, Plavix, Celexa, Glucophage, Amaryl           Still smoking but trying to quit.  Less than one pack daily.      Exam: Alert and appropriate.  Negotiates well with his cane.             Blood pressure 133/73.  Pulse 76.              Chest= clear    Cardiovascular=RR              +3 left graft and dorsalis pedis pulse with triphasic dorsalis pedis signal              Callus with no ulcer over the tip of the deformed left second toe               Well-healed right great toe amputation site.               Medial excoriations on left distal calf from scratching.              Mild left calf and ankle edema                   0.3 x 0.3 x 0.2 cm ulcer right heel with no swelling or erythema          Evaluated shoes which have very poor instep inserts.      Slightly debrided the callus on his second toe with a 15 blade scalpel with no ulcer.  Duplex reveals a widely patent left leg bypass graft    .  Slight increase in the diameter of the dorsalis pedis artery now to 2.4 millimeters being 2.2 mm in the past.          Impression: Patent left leg bypass graft with adequate distal perfusion.  Continue on aspirin and Plavix.  Follow-up  duplex in 6 months.                        Lesion on his right heel partially related offloading.  With his diabetes and PAD he needs appropriate custom-made inserts for both of his shoes.    These will be ordered the our orthotic department.  He will let me know if this does not improve.  No angiogram is been performed on the right.  2- ANTOINETTE= 1.30 decreasing to 0.69 with exercise and multiphasic waveforms on right.                        He knows that he should quit smoking completely.       Jesus Aragon MD     Please route or send letter to:  Primary Care Provider (PCP)

## 2018-02-15 NOTE — MR AVS SNAPSHOT
After Visit Summary   2/15/2018    Gomez Turk    MRN: [id number]           Patient Information     Date Of Birth          1960        Visit Information        Provider Department      2/15/2018 11:30 AM Jesus Aragon MD [facility]      Today's Diagnoses     PAD (peripheral artery disease) (H)    -  1       Follow-ups after your visit        Follow-up notes from your care team     Return in about 6 months (around 8/15/2018).      Your next 10 appointments already scheduled     Feb 26, 2018 10:30 AM CST   Diabetic Education with  DIABETIC ED RESOURCE   [facility] ([facility])    [address] [address] [phone]              Who to contact     If you have questions or need follow up information about today's clinic visit or your schedule please contact [facility] [place] directly at [phone].  Normal or non-critical lab and imaging results will be communicated to you by Cyotahart, letter or phone within 4 business days after the clinic has received the results. If you do not hear from us within 7 days, please contact the clinic through [employer] or phone. If you have a critical or abnormal lab result, we will notify you by phone as soon as possible.  Submit refill requests through [email or web address] or call your pharmacy and they will forward the refill request to us. Please allow 3 business days for your refill to be completed.          Additional Information About Your Visit        MyChart Information     [email or web address] gives you secure access to your electronic health record. If you see a primary care provider, you can also send messages to your care team and make appointments. If you have questions, please call your primary care clinic.  If you do not have a primary care provider, please call [phone] and they will assist  you.        Care EveryWhere ID     This is your Care EveryWhere ID. This could be used by other organizations to access your [facility] medical records  [id number]        Your Vitals Were     Pulse                   76            Blood Pressure from Last 3 Encounters:   02/15/18 133/73   01/17/18 130/64   11/09/17 150/75    Weight from Last 3 Encounters:   01/17/18 208 lb (94.3 kg)   05/30/17 199 lb 8 oz (90.5 kg)   05/17/17 200 lb (90.7 kg)              Today, you had the following     No orders found for display       Primary Care Provider Office Phone # Fax #    Tylor Roberts [phone]       [address] [place] [address]        Equal Access to Services     YUVAL MIGUEL : Hadii anita jain hadasho Soomaali, waaxda luqadaha, qaybta kaalmada adeegyada, amnuel sandersin haysanjay calles . So [facility] [phone].    ATENCIÓN: Si habla español, tiene a nye disposición servicios gratuitos de asistencia lingüística. Llame al [phone].    We comply with applicable federal civil rights laws and [place] laws. We do not discriminate on the basis of race, color, national origin, age, disability, sex, sexual orientation, or gender identity.            Thank you!     Thank you for choosing [facility] [place]  for your care. Our goal is always to provide you with excellent care. Hearing back from our patients is one way we can continue to improve our services. Please take a few minutes to complete the written survey that you may receive in the mail after your visit with us. Thank you!             Your Updated Medication List - Protect others around you: Learn how to safely use, store and throw away your medicines at www.disposemymeds.org.          This list is accurate as of 2/15/18 12:42 PM.  Always use your most recent med list.                   Brand Name Dispense Instructions for use Diagnosis    acetaminophen 325 MG tablet    TYLENOL    100 tablet    Take 2 tablets (650  mg) by mouth every 4 hours as needed for mild pain    PAD (peripheral artery disease) (H)       aspirin 81 MG EC tablet     30 tablet    Take 1 tablet (81 mg) by mouth daily    PAD (peripheral artery disease) (H)       atorvastatin 40 MG tablet    LIPITOR    30 tablet    Take 1 tablet (40 mg) by mouth daily    PAD (peripheral artery disease) (H)       augmented betamethasone dipropionate 0.05 % cream    DIPROLENE-AF    100 g    Apply sparingly to affected area twice daily as needed.  Do not apply to face.    Lichen simplex chronicus       citalopram 40 MG tablet    celeXA    90 tablet    Take 1 tablet (40 mg) by mouth daily    Vision loss, bilateral       clopidogrel 75 MG tablet    PLAVIX    30 tablet    Take 1 tablet (75 mg) by mouth daily    PAD (peripheral artery disease) (H)       ferrous sulfate 325 (65 FE) MG tablet    IRON    60 tablet    Take 1 tablet (325 mg) by mouth every other day    Anemia due to blood loss, acute       gabapentin 300 MG capsule    NEURONTIN    90 capsule    Take 1 capsule (300 mg) by mouth 3 times daily    PAD (peripheral artery disease) (H), Type 2 diabetes mellitus without complication, without long-term current use of insulin (H), Type 2 diabetes mellitus with diabetic neuropathy, with long-term current use of insulin (H)       glimepiride 2 MG tablet    AMARYL    180 tablet    Take 1 tablet (2 mg) by mouth 2 times daily    Type 2 diabetes mellitus with diabetic neuropathy, with long-term current use of insulin (H)       metFORMIN 1000 MG tablet    GLUCOPHAGE    180 tablet    Take 1 tablet (1,000 mg) by mouth 2 times daily (with meals)    Type 2 diabetes mellitus with diabetic neuropathy, with long-term current use of insulin (H)       * nicotine 7 MG/24HR 24 hr patch    NICODERM CQ    30 patch    Place 1 patch onto the skin daily    PAD (peripheral artery disease) (H)       * nicotine 21 MG/24HR 24 hr patch    NICODERM CQ    30 patch    Place 1 patch onto the skin every 24 hours     Tobacco use disorder       oxyCODONE IR 5 MG tablet    ROXICODONE    30 tablet    Take 1 tablet (5 mg) by mouth 2 times daily as needed for pain maximum 3 tablet(s) per day    PAD (peripheral artery disease) (H)       senna-docusate 8.6-50 MG per tablet    SENOKOT-S;PERICOLACE     Take 1-2 tablets by mouth 2 times daily as needed (constipation) While on oxycodone to prevent constipation    Acute cholecystitis       sildenafil 100 MG tablet    VIAGRA    12 tablet    Take 1 tablet (100 mg) by mouth daily as needed    Impotence       zolpidem 10 MG tablet    AMBIEN    30 tablet    Take 1 tablet (10 mg) by mouth nightly as needed for sleep    Anxiety       * Notice:  This list has 2 medication(s) that are the same as other medications prescribed for you. Read the directions carefully, and ask your doctor or other care provider to review them with you.

## 2018-02-15 NOTE — LETTER
[facility]  [address]  [place] [address]  Phone: [phone]  Fax: [phone]    February 15, 2018    Re: Gomez Turk, : 1960    [place] [facility]     Gomez Turk his wife return seen in the office today.  He developed nonhealing ulcerations in his left foot and toes.  He underwent a left below-knee popliteal to dorsalis pedis bypass graft on 2017 followed by several toe amputations and eventual healing of his ankle ulcer.     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  He uses a cane for ambulation with some light perception and gross figure perception.     He does have a second hammertoe and will develop a callus on the tip of the toe but no ulcer.     Recently noticed a small ulcer on the right plantar heel that is slowly improving but  when he steps on this.     Has some chronic swelling in the left ankle and foot as expected.     PMH: Medications: Lipitor, aspirin, Plavix, Celexa, Glucophage, Amaryl           Still smoking but trying to quit.  Less than one pack daily.     Exam: Alert and appropriate.  Negotiates well with his cane.             Blood pressure 133/73.  Pulse 76.              Chest= clear    Cardiovascular=RR              +3 left graft and dorsalis pedis pulse with triphasic dorsalis pedis signal              Callus with no ulcer over the tip of the deformed left second toe               Well-healed right great toe amputation site.               Medial excoriations on left distal calf from scratching.              Mild left calf and ankle edema                    0.3 x 0.3 x 0.2 cm ulcer right heel with no swelling or erythema     Evaluated shoes which have very poor instep inserts.     Slightly debrided the callus on his second toe with a 15 blade scalpel with no ulcer.  Duplex reveals a widely patent left leg bypass graft.  Slight increase in the diameter of the dorsalis  pedis artery now to 2.4 millimeters being 2.2 mm in the past.     Impression: Patent left leg bypass graft with adequate distal perfusion.  Continue on aspirin and Plavix.  Follow-up duplex in 6 months.     Lesion on his right heel partially related offloading.  With his diabetes and PAD he needs appropriate custom-made inserts for both of his shoes.   These will be ordered the our orthotic department.  He will let me know if this does not improve. No angiogram is been performed on the right.  2- ANTOINETTE= 1.30 decreasing to 0.69 with exercise and multiphasic waveforms on right.     He knows that he should quit smoking completely.     Jesus Aragon MD

## 2018-02-16 DIAGNOSIS — I73.9 PAD (PERIPHERAL ARTERY DISEASE) (H): Primary | ICD-10-CM

## 2018-03-06 DIAGNOSIS — E11.40 TYPE 2 DIABETES MELLITUS WITH DIABETIC NEUROPATHY, WITH LONG-TERM CURRENT USE OF INSULIN (H): ICD-10-CM

## 2018-03-06 DIAGNOSIS — Z79.4 TYPE 2 DIABETES MELLITUS WITH DIABETIC NEUROPATHY, WITH LONG-TERM CURRENT USE OF INSULIN (H): ICD-10-CM

## 2018-03-06 DIAGNOSIS — R10.84 ABDOMINAL PAIN, GENERALIZED: ICD-10-CM

## 2018-03-06 LAB
ALBUMIN SERPL-MCNC: 3.7 G/DL (ref 3.4–5)
ALP SERPL-CCNC: 79 U/L (ref 40–150)
ALT SERPL W P-5'-P-CCNC: 24 U/L (ref 0–70)
ANION GAP SERPL CALCULATED.3IONS-SCNC: 7 MMOL/L (ref 3–14)
AST SERPL W P-5'-P-CCNC: 29 U/L (ref 0–45)
BILIRUB SERPL-MCNC: 0.4 MG/DL (ref 0.2–1.3)
BUN SERPL-MCNC: 14 MG/DL (ref 7–30)
CALCIUM SERPL-MCNC: 9.3 MG/DL (ref 8.5–10.1)
CHLORIDE SERPL-SCNC: 106 MMOL/L (ref 94–109)
CHOLEST SERPL-MCNC: 99 MG/DL
CO2 SERPL-SCNC: 26 MMOL/L (ref 20–32)
CREAT SERPL-MCNC: 0.96 MG/DL (ref 0.66–1.25)
CREAT UR-MCNC: 89 MG/DL
GFR SERPL CREATININE-BSD FRML MDRD: 81 ML/MIN/1.7M2
GLUCOSE SERPL-MCNC: 113 MG/DL (ref 70–99)
HBA1C MFR BLD: 6 % (ref 4.3–6)
HDLC SERPL-MCNC: 57 MG/DL
LDLC SERPL CALC-MCNC: 22 MG/DL
MICROALBUMIN UR-MCNC: 35 MG/L
MICROALBUMIN/CREAT UR: 39.37 MG/G CR (ref 0–17)
NONHDLC SERPL-MCNC: 42 MG/DL
POTASSIUM SERPL-SCNC: 4.7 MMOL/L (ref 3.4–5.3)
PROT SERPL-MCNC: 7.6 G/DL (ref 6.8–8.8)
SODIUM SERPL-SCNC: 139 MMOL/L (ref 133–144)
TRIGL SERPL-MCNC: 102 MG/DL
TSH SERPL DL<=0.005 MIU/L-ACNC: 0.8 MU/L (ref 0.4–4)

## 2018-03-06 PROCEDURE — 80061 LIPID PANEL: CPT | Performed by: INTERNAL MEDICINE

## 2018-03-06 PROCEDURE — 36415 COLL VENOUS BLD VENIPUNCTURE: CPT | Performed by: INTERNAL MEDICINE

## 2018-03-06 PROCEDURE — 84443 ASSAY THYROID STIM HORMONE: CPT | Performed by: INTERNAL MEDICINE

## 2018-03-06 PROCEDURE — 80053 COMPREHEN METABOLIC PANEL: CPT | Performed by: INTERNAL MEDICINE

## 2018-03-06 PROCEDURE — 83036 HEMOGLOBIN GLYCOSYLATED A1C: CPT | Performed by: INTERNAL MEDICINE

## 2018-03-06 PROCEDURE — 82043 UR ALBUMIN QUANTITATIVE: CPT | Performed by: INTERNAL MEDICINE

## 2018-03-08 ENCOUNTER — TELEPHONE (OUTPATIENT)
Dept: OPHTHALMOLOGY | Facility: CLINIC | Age: 58
End: 2018-03-08

## 2018-03-08 ENCOUNTER — MYC REFILL (OUTPATIENT)
Dept: INTERNAL MEDICINE | Facility: CLINIC | Age: 58
End: 2018-03-08

## 2018-03-08 DIAGNOSIS — F41.9 ANXIETY: ICD-10-CM

## 2018-03-08 RX ORDER — ZOLPIDEM TARTRATE 10 MG/1
10 TABLET ORAL
Qty: 30 TABLET | Refills: 0 | Status: SHIPPED | OUTPATIENT
Start: 2018-03-08 | End: 2018-04-06

## 2018-03-08 NOTE — TELEPHONE ENCOUNTER
----- Message from Regina Olivas sent at 3/8/2018 11:38 AM CST -----  Regarding: Dr. Mclaughlin, Request call to discuss next steps  Hi Team,    Patient wife, Camelia, called to see if her  needed to come in for an annual visit? She also notes that husbands eye sight has gotten worse. They're wondering what they should do. Please follow-up.    Kind Regards    Regina     Please DO NOT send this message and/or reply back to sender. Call Center Representatives DO NOT respond to messages.

## 2018-03-08 NOTE — TELEPHONE ENCOUNTER
Left eye vision fading more-- going more into the dark in last month  Right eye was better for a period of time then became worse again-- past month  No eye pain    Feels losing more vision     H/o ischemic optic neuropathy    Scheduled with dr. ledezma for evaluation tomorrow. Will discuss f/u with dr. rollins at that appt    Note to dr. Velasco for review  Tylor Jacques RN 12:40 PM 03/08/18

## 2018-03-08 NOTE — TELEPHONE ENCOUNTER
ambien      Last Written Prescription Date:  2/12/18  Last Fill Quantity: 30,   # refills: 0  Last Office Visit: 1/17/18  Future Office visit:    Next 5 appointments (look out 90 days)     Mar 12, 2018  8:40 AM CDT   PHYSICAL with Tylor Roberts MD   [facility]    [address] [place] [id number] [phone]                   Routing refill request to provider for review/approval because:  Drug not on the [facility], [facility] or [facility] refill protocol or controlled substance

## 2018-03-08 NOTE — TELEPHONE ENCOUNTER
Message from FetchBackhart:  Original authorizing provider: Tylor Roberts MD    Gomez MOSELEYPasquale Turk would like a refill of the following medications:  zolpidem (AMBIEN) 10 MG tablet [Tylor Roberts MD]    Preferred pharmacy: [id number] - [facility] [address] [facility]    Comment:

## 2018-03-09 ENCOUNTER — OFFICE VISIT (OUTPATIENT)
Dept: OPHTHALMOLOGY | Facility: CLINIC | Age: 58
End: 2018-03-09
Attending: OPHTHALMOLOGY
Payer: COMMERCIAL

## 2018-03-09 ENCOUNTER — VIRTUAL VISIT (OUTPATIENT)
Dept: OPHTHALMOLOGY | Facility: CLINIC | Age: 58
End: 2018-03-09

## 2018-03-09 DIAGNOSIS — Z09 FOLLOW UP: Primary | ICD-10-CM

## 2018-03-09 DIAGNOSIS — H47.013 ISCHEMIC OPTIC NEUROPATHY OF BOTH EYES: Primary | ICD-10-CM

## 2018-03-09 DIAGNOSIS — H47.013 ISCHEMIC OPTIC NEUROPATHY OF BOTH EYES: ICD-10-CM

## 2018-03-09 LAB
CRP SERPL-MCNC: <2.9 MG/L (ref 0–8)
ERYTHROCYTE [SEDIMENTATION RATE] IN BLOOD BY WESTERGREN METHOD: 30 MM/H (ref 0–20)

## 2018-03-09 PROCEDURE — 92133 CPTRZD OPH DX IMG PST SGM ON: CPT | Mod: ZF | Performed by: OPHTHALMOLOGY

## 2018-03-09 PROCEDURE — G0463 HOSPITAL OUTPT CLINIC VISIT: HCPCS | Mod: ZF

## 2018-03-09 ASSESSMENT — CUP TO DISC RATIO
OS_RATIO: 0.0
OD_RATIO: 0.0

## 2018-03-09 ASSESSMENT — CONF VISUAL FIELD
OS_SUPERIOR_NASAL_RESTRICTION: 1
OD_SUPERIOR_NASAL_RESTRICTION: 1
OS_INFERIOR_NASAL_RESTRICTION: 1
OD_INFERIOR_NASAL_RESTRICTION: 1

## 2018-03-09 ASSESSMENT — VISUAL ACUITY: METHOD: SNELLEN - LINEAR

## 2018-03-09 ASSESSMENT — TONOMETRY
OS_IOP_MMHG: 20
OD_IOP_MMHG: 20
IOP_METHOD: TONOPEN

## 2018-03-09 ASSESSMENT — SLIT LAMP EXAM - LIDS
COMMENTS: NORMAL
COMMENTS: NORMAL

## 2018-03-09 ASSESSMENT — EXTERNAL EXAM - RIGHT EYE: OD_EXAM: NORMAL

## 2018-03-09 ASSESSMENT — EXTERNAL EXAM - LEFT EYE: OS_EXAM: NORMAL

## 2018-03-09 NOTE — PROGRESS NOTES
Called patient to discususs ESR and CRP results from today. ESR was at high normal adjusted for age, and CRP was negative. No indication to start steroids at this time. Plan for routine follow up with Dr. Mclaughlin. The patient demonstrated understanding and all questions were addressed.

## 2018-03-09 NOTE — PROGRESS NOTES
Chief Complaints and History of Present Illnesses   Patient presents with     Follow Up For     vision getting dark and cloudy       HPI:   Patient reports increase in darkness and blurry vision over the last 2 weeks. Patient reports there was no sudden visual loss or eye pain, however he has just noticed over the last 2 weeks vision seems to be darkening. He reports less islands of vision compared to a few months ago. Patient reports mild occipital headache a few days ago but did not last long and was mild, he relays this to his sinuses being congested during this time. Denies jaw claudicaiton/fatigue, weight loss, no proximal muscle weakness. Initial ischemic optic neuropathy was thought related to possible hypotensive episode perioperatively. Patient reports no recent surgeries during this time. He denies feelings of dizziness and light headedness. Has only taken 3 antihypertensive medications in the last 3 weeks. He reports no recent changes in his health and has tried to be more active, even going to [place] a month or so ago to be in the warm weather and outside. Patient was seen by Alejandrina Salinas in low vision last year, however has not seen in sometime as symptoms were stable until recently. Dimming vision is constant, and slowly progressing in both eyes.     PAST MEDICAL HISTORY:  type 2 diabetes mellitus  peripheral vascular disease   peripheral neuropathy.      Assessment & Plan     Gomez Turk is a 57 year old male with the following diagnoses:   1. Ischemic optic neuropathy of both eyes             -Patient carries diagnosis of bilateral perioperative ischemic optic neuropathy see initial note 3/10/17 regarding initial presentation.   -VA appears to be worsened today compared with last visit from 20/200 and 20/250 to E at 3 ft both eyes.    -visual field not done today post dilation however last LVC with significant global depression, small eccentric islands of vision.   -New oct retinal nerve fiber  layer with global atrophy both eyes significant change from 3/2017. However last oct rnfl done within time of some residual swelling present. Unclear if this would represent repeat new ischemic event vs, natural progression of optic nerve atrophy.       -Of note, initial crp and esr elevated, however in setting of surgery and multiple etiologies for elevated inflammatory markers. Previous FA done at time of initial diagnosis was not consistent with Giant cell arteritis. Currently patient denies Giant cell arteritis risk factors however, had 1x mild occipital headache over the last 2 weeks.   -MD did not check pupils today, was seen post dilation, although afferent pupillary defect of left eye which is reversed from previous (per chart review trace afferent pupillary defect right eye with poor reactivity).  -Patient follows with Dr. Mclaughlin last seen 4/2017     Plan:  -Repeat ESR/CRP to be done today, patient will be notified of results. If elevated will start high dose steroids.   - If results normal, will have patient f/u with neurophthalmology next available with Goldmann visual field, MD pupil check and follow up. If field worsened or new deficits appreciated could consider additionally work up including repeat MRI brain and orbits.   BEST PHONE NUMBER: [phone]        ~~~~~~~~~~~~~~~~~~~~~~~~~~~~~~~~~~~~~~~~~~~~~~~~~~~~~~~~~~~~~~~~    Complete documentation of historical and exam elements from today's encounter can be found in the full encounter summary report (not reduplicated in this progress note). I personally obtained the chief complaint(s) and history of present illness.  I confirmed and edited as necessary the review of systems, past medical/surgical history, family history, social history, and examination findings as documented by others; and I examined the patient myself. I personally reviewed the relevant tests, images, and reports as documented above. I formulated and edited as necessary the  assessment and plan and discussed the findings and management plan with the patient and family.    I personally viewed the [imaging] and I agree with the interpretation as documented and edited by me as appropriate.    Adebayo Valdivia MD        Addendum:  CRP <2.9  ESR 30 (v. Mildly elevated)  Low suspicion for GCA - monitor

## 2018-03-09 NOTE — NURSING NOTE
Chief Complaints and History of Present Illnesses   Patient presents with     Follow Up For     vision getting dark and cloudy     HPI    Affected eye(s):  Both   Symptoms:     Decreased vision   Distorted vision   No floaters   Flashes (Comment: few over last few weeks)         Do you have eye pain now?:  No      Comments:  Vision getting darker and cloudy X1 week  No central vision darkness moving outward  Detail is going  Rosalie MCCLELLAN 9:43 AM March 9, 2018

## 2018-03-09 NOTE — MR AVS SNAPSHOT
After Visit Summary   3/9/2018    Gomez Turk    MRN: [id number]           Patient Information     Date Of Birth          1960        Visit Information        Provider Department      3/9/2018 2:20 PM Silvia Gold MD [facility]        Today's Diagnoses     Follow up    -  1       Follow-ups after your visit        Your next 10 appointments already scheduled     May 08, 2018  2:30 PM CDT   RETURN NEURO with Siddhartha Mclaughlin MD   Eye Clinic ([facility])    [address]  [facility] [address]   [phone]              Who to contact     Please call your clinic at [phone] to:    Ask questions about your health    Make or cancel appointments    Discuss your medicines    Learn about your test results    Speak to your doctor            Additional Information About Your Visit        MyChart Information     [id number] gives you secure access to your electronic health record. If you see a primary care provider, you can also send messages to your care team and make appointments. If you have questions, please call your primary care clinic.  If you do not have a primary care provider, please call [phone] and they will assist you.      [employer]ES46 is an electronic gateway that provides easy, online access to your medical records. With REES46, you can request a clinic appointment, read your test results, renew a prescription or communicate with your care team.     To access your existing account, please contact your [facility] or call [phone] for assistance.        Care EveryWhere ID     This is your Care EveryWhere ID. This could be used by other organizations to access your [place] medical records  [id number]         Blood Pressure from Last 3 Encounters:   02/15/18 133/73   01/17/18 130/64   11/09/17 150/75    Weight from Last 3 Encounters:   01/17/18 94.3 kg (208 lb)   05/30/17 90.5  kg (199 lb 8 oz)   05/17/17 90.7 kg (200 lb)              Today, you had the following     No orders found for display       Primary Care Provider Office Phone # Fax #    Tylor Roberts [phone]       [address] [facility] [address]        Equal Access to Services     STACY MIGUEL : Hadii aad ku hadasho Soomaali, waaxda luqadaha, qaybta kaalmada adeegyada, waxay idiin hayaan adeeg khlorish lachrissn . So [facility] [phone].    ATENCIÓN: Si habla español, tiene a nye disposición servicios gratuitos de asistencia lingüística. Apolloame al [phone].    We comply with applicable federal civil rights laws and [place] laws. We do not discriminate on the basis of race, color, national origin, age, disability, sex, sexual orientation, or gender identity.            Thank you!     Thank you for choosing EYE CLINIC  for your care. Our goal is always to provide you with excellent care. Hearing back from our patients is one way we can continue to improve our services. Please take a few minutes to complete the written survey that you may receive in the mail after your visit with us. Thank you!             Your Updated Medication List - Protect others around you: Learn how to safely use, store and throw away your medicines at www.disposemymeds.org.          This list is accurate as of 3/9/18 11:59 PM.  Always use your most recent med list.                   Brand Name Dispense Instructions for use Diagnosis    acetaminophen 325 MG tablet    TYLENOL    100 tablet    Take 2 tablets (650 mg) by mouth every 4 hours as needed for mild pain    PAD (peripheral artery disease) (H)       aspirin 81 MG EC tablet     30 tablet    Take 1 tablet (81 mg) by mouth daily    PAD (peripheral artery disease) (H)       atorvastatin 40 MG tablet    LIPITOR    30 tablet    Take 1 tablet (40 mg) by mouth daily    PAD (peripheral artery disease) (H)       augmented betamethasone dipropionate 0.05 % cream    DIPROLENE-AF    100  g    Apply sparingly to affected area twice daily as needed.  Do not apply to face.    Lichen simplex chronicus       citalopram 40 MG tablet    celeXA    90 tablet    Take 1 tablet (40 mg) by mouth daily    Vision loss, bilateral       clopidogrel 75 MG tablet    PLAVIX    30 tablet    Take 1 tablet (75 mg) by mouth daily    PAD (peripheral artery disease) (H)       ferrous sulfate 325 (65 FE) MG tablet    IRON    60 tablet    Take 1 tablet (325 mg) by mouth every other day    Anemia due to blood loss, acute       gabapentin 300 MG capsule    NEURONTIN    90 capsule    Take 1 capsule (300 mg) by mouth 3 times daily    PAD (peripheral artery disease) (H), Type 2 diabetes mellitus without complication, without long-term current use of insulin (H), Type 2 diabetes mellitus with diabetic neuropathy, with long-term current use of insulin (H)       glimepiride 2 MG tablet    AMARYL    180 tablet    Take 1 tablet (2 mg) by mouth 2 times daily    Type 2 diabetes mellitus with diabetic neuropathy, with long-term current use of insulin (H)       metFORMIN 1000 MG tablet    GLUCOPHAGE    180 tablet    Take 1 tablet (1,000 mg) by mouth 2 times daily (with meals)    Type 2 diabetes mellitus with diabetic neuropathy, with long-term current use of insulin (H)       * nicotine 7 MG/24HR 24 hr patch    NICODERM CQ    30 patch    Place 1 patch onto the skin daily    PAD (peripheral artery disease) (H)       * nicotine 21 MG/24HR 24 hr patch    NICODERM CQ    30 patch    Place 1 patch onto the skin every 24 hours    Tobacco use disorder       oxyCODONE IR 5 MG tablet    ROXICODONE    30 tablet    Take 1 tablet (5 mg) by mouth 2 times daily as needed for pain maximum 3 tablet(s) per day    PAD (peripheral artery disease) (H)       senna-docusate 8.6-50 MG per tablet    SENOKOT-S;PERICOLACE     Take 1-2 tablets by mouth 2 times daily as needed (constipation) While on oxycodone to prevent constipation    Acute cholecystitis        sildenafil 100 MG tablet    VIAGRA    12 tablet    Take 1 tablet (100 mg) by mouth daily as needed    Impotence       zolpidem 10 MG tablet    AMBIEN    30 tablet    Take 1 tablet (10 mg) by mouth nightly as needed for sleep    Anxiety       * Notice:  This list has 2 medication(s) that are the same as other medications prescribed for you. Read the directions carefully, and ask your doctor or other care provider to review them with you.

## 2018-03-09 NOTE — MR AVS SNAPSHOT
After Visit Summary   3/9/2018    Gomez Turk    MRN: [id number]           Patient Information     Date Of Birth          1960        Visit Information        Provider Department      3/9/2018 9:30 AM Polly Newell MD [facility]        Today's Diagnoses     Ischemic optic neuropathy of both eyes    -  1       Follow-ups after your visit        Follow-up notes from your care team     Return for Follow Up next available w/ Dr. Hitchcock, APD check by MD and GINA clemente.      Your next 10 appointments already scheduled     Mar 12, 2018  8:40 AM CDT   [facility] with Tylor Roberts MD   [facility]    [address] [address] [phone]            May 08, 2018  2:30 PM CDT   RETURN NEURO with Siddhartha Mclaughlin MD   Eye Clinic ([facility])    [address] [address] [phone]              Who to contact     Please call your clinic at [phone] to:    Ask questions about your health    Make or cancel appointments    Discuss your medicines    Learn about your test results    Speak to your doctor            Additional Information About Your Visit        [email or web address] Information     The [employer] gives you secure access to your electronic health record. If you see a primary care provider, you can also send messages to your care team and make appointments. If you have questions, please call your primary care clinic.  If you do not have a primary care provider, please call [phone] and they will assist you.      The [employer] is an electronic gateway that provides easy, online access to your medical records. With [employer], you can request a clinic appointment, read your test results, renew a prescription or communicate with your care team.     To access your existing account, please contact your [facility] or  call [phone] for assistance.        Care EveryWhere ID     This is your Care EveryWhere ID. This could be used by other organizations to access your [place] medical records  [id number]         Blood Pressure from Last 3 Encounters:   02/15/18 133/73   01/17/18 130/64   11/09/17 150/75    Weight from Last 3 Encounters:   01/17/18 94.3 kg (208 lb)   05/30/17 90.5 kg (199 lb 8 oz)   05/17/17 90.7 kg (200 lb)              We Performed the Following     OCT Optic Nerve RNFL Spectralis OU (both eyes)        Primary Care Provider Office Phone # Fax #    Tylor Roberts [phone]       [address] [place] [address]        Equal Access to Services     [facility] : Hadii aad ku hadasho Soshruti, waaxda luqadaha, qaybta kaalmada adeegyada, manuel calles . So [facility] [phone].    ATENCIÓN: Si habla español, tiene a nye disposición servicios gratuitos de asistencia lingüística. John al [phone].    We comply with applicable federal civil rights laws and [place] laws. We do not discriminate on the basis of race, color, national origin, age, disability, sex, sexual orientation, or gender identity.            Thank you!     Thank you for choosing EYE CLINIC  for your care. Our goal is always to provide you with excellent care. Hearing back from our patients is one way we can continue to improve our services. Please take a few minutes to complete the written survey that you may receive in the mail after your visit with us. Thank you!             Your Updated Medication List - Protect others around you: Learn how to safely use, store and throw away your medicines at www.disposemymeds.org.          This list is accurate as of 3/9/18 11:40 AM.  Always use your most recent med list.                   Brand Name Dispense Instructions for use Diagnosis    acetaminophen 325 MG tablet    TYLENOL    100 tablet    Take 2 tablets (650 mg) by mouth every 4 hours as needed  for mild pain    PAD (peripheral artery disease) (H)       aspirin 81 MG EC tablet     30 tablet    Take 1 tablet (81 mg) by mouth daily    PAD (peripheral artery disease) (H)       atorvastatin 40 MG tablet    LIPITOR    30 tablet    Take 1 tablet (40 mg) by mouth daily    PAD (peripheral artery disease) (H)       augmented betamethasone dipropionate 0.05 % cream    DIPROLENE-AF    100 g    Apply sparingly to affected area twice daily as needed.  Do not apply to face.    Lichen simplex chronicus       citalopram 40 MG tablet    celeXA    90 tablet    Take 1 tablet (40 mg) by mouth daily    Vision loss, bilateral       clopidogrel 75 MG tablet    PLAVIX    30 tablet    Take 1 tablet (75 mg) by mouth daily    PAD (peripheral artery disease) (H)       ferrous sulfate 325 (65 FE) MG tablet    IRON    60 tablet    Take 1 tablet (325 mg) by mouth every other day    Anemia due to blood loss, acute       gabapentin 300 MG capsule    NEURONTIN    90 capsule    Take 1 capsule (300 mg) by mouth 3 times daily    PAD (peripheral artery disease) (H), Type 2 diabetes mellitus without complication, without long-term current use of insulin (H), Type 2 diabetes mellitus with diabetic neuropathy, with long-term current use of insulin (H)       glimepiride 2 MG tablet    AMARYL    180 tablet    Take 1 tablet (2 mg) by mouth 2 times daily    Type 2 diabetes mellitus with diabetic neuropathy, with long-term current use of insulin (H)       metFORMIN 1000 MG tablet    GLUCOPHAGE    180 tablet    Take 1 tablet (1,000 mg) by mouth 2 times daily (with meals)    Type 2 diabetes mellitus with diabetic neuropathy, with long-term current use of insulin (H)       * nicotine 7 MG/24HR 24 hr patch    NICODERM CQ    30 patch    Place 1 patch onto the skin daily    PAD (peripheral artery disease) (H)       * nicotine 21 MG/24HR 24 hr patch    NICODERM CQ    30 patch    Place 1 patch onto the skin every 24 hours    Tobacco use disorder        oxyCODONE IR 5 MG tablet    ROXICODONE    30 tablet    Take 1 tablet (5 mg) by mouth 2 times daily as needed for pain maximum 3 tablet(s) per day    PAD (peripheral artery disease) (H)       senna-docusate 8.6-50 MG per tablet    SENOKOT-S;PERICOLACE     Take 1-2 tablets by mouth 2 times daily as needed (constipation) While on oxycodone to prevent constipation    Acute cholecystitis       sildenafil 100 MG tablet    VIAGRA    12 tablet    Take 1 tablet (100 mg) by mouth daily as needed    Impotence       zolpidem 10 MG tablet    AMBIEN    30 tablet    Take 1 tablet (10 mg) by mouth nightly as needed for sleep    Anxiety       * Notice:  This list has 2 medication(s) that are the same as other medications prescribed for you. Read the directions carefully, and ask your doctor or other care provider to review them with you.

## 2018-03-14 DIAGNOSIS — I73.9 PAD (PERIPHERAL ARTERY DISEASE) (H): ICD-10-CM

## 2018-03-14 RX ORDER — OXYCODONE HYDROCHLORIDE 5 MG/1
5 TABLET ORAL 2 TIMES DAILY PRN
Qty: 30 TABLET | Refills: 0 | Status: SHIPPED | OUTPATIENT
Start: 2018-03-14 | End: 2018-04-09

## 2018-03-14 NOTE — TELEPHONE ENCOUNTER
Pt hx of left foot and toes ulcer, left below-knee popliteal to dorsalis pedis bypass graft on 2/28/2017.     Pts wife called requesting contact information for orthotics and also for a refill of pts Oxycodone. Pts left foot pain 5/10, goes up to 9/10 with ambulation/exercise, pain relieved with Oxycodone.     Routing refill request to Dr. Aragon for approval.     Sylvia Nolan, VIDALN, RN

## 2018-04-06 ENCOUNTER — MYC REFILL (OUTPATIENT)
Dept: INTERNAL MEDICINE | Facility: CLINIC | Age: 58
End: 2018-04-06

## 2018-04-06 DIAGNOSIS — F41.9 ANXIETY: ICD-10-CM

## 2018-04-06 NOTE — TELEPHONE ENCOUNTER
Message from MyChart:  Original authorizing provider: Tylor Roberts MD    Gomez MOSELEYPasquale Turk would like a refill of the following medications:  zolpidem (AMBIEN) 10 MG tablet [Tylor Roberts MD]    Preferred pharmacy: [id number] - [facility] [address]    Comment:  Thank you

## 2018-04-08 NOTE — TELEPHONE ENCOUNTER
zolpidem (AMBIEN) 10 MG tablet    Last Written Prescription Date:  03/08/2018  Last Fill Quantity: 30,   # refills: 0  Last Office Visit: 01/17/2017 WITH DONTE   Future Office visit:       Routing refill request to provider for review/approval because:  Drug not on the [facility], [facility] or [facility] refill protocol or controlled substance

## 2018-04-09 DIAGNOSIS — I73.9 PAD (PERIPHERAL ARTERY DISEASE) (H): ICD-10-CM

## 2018-04-09 RX ORDER — ZOLPIDEM TARTRATE 10 MG/1
10 TABLET ORAL
Qty: 30 TABLET | Refills: 0 | Status: SHIPPED | OUTPATIENT
Start: 2018-04-09 | End: 2018-05-09

## 2018-04-09 NOTE — TELEPHONE ENCOUNTER
"History of left below-knee popliteal to dorsalis pedis bypass graft 2/28/17    Pt calling to request oxycodone refill.  He reports that the night and morning after he exercises he gets a \"thumping\" pain from his toe amputation site up his leg, he is adamant that the pain is not a nerve pain.  He reports that he only takes the oxycodone as supplementary to his normal pain regimen.  Pt states that he has had a similar pain since surgery, it has not gotten better or worse.    Per Dr. Aragon, he will do this refill, but then recommends that pt get additional refills from PCP.    Will route to Dr. Aragon.    Yin Valdes, VIDALN, RN    "

## 2018-04-10 RX ORDER — OXYCODONE HYDROCHLORIDE 5 MG/1
5 TABLET ORAL 2 TIMES DAILY PRN
Qty: 30 TABLET | Refills: 0 | Status: SHIPPED | OUTPATIENT
Start: 2018-04-10 | End: 2018-06-06

## 2018-05-09 ENCOUNTER — MYC REFILL (OUTPATIENT)
Dept: INTERNAL MEDICINE | Facility: CLINIC | Age: 58
End: 2018-05-09

## 2018-05-09 DIAGNOSIS — F41.9 ANXIETY: ICD-10-CM

## 2018-05-09 RX ORDER — ZOLPIDEM TARTRATE 10 MG/1
10 TABLET ORAL
Qty: 30 TABLET | Refills: 0 | Status: SHIPPED | OUTPATIENT
Start: 2018-05-09 | End: 2018-06-12

## 2018-05-09 NOTE — TELEPHONE ENCOUNTER
Ambien      Last Written Prescription Date:  4/9/18  Last Fill Quantity: 30,   # refills: 0  Last Office Visit: 3/12/18  Future Office visit:       Routing refill request to provider for review/approval because:  Drug not on the [facility], [facility] or [facility] refill protocol or controlled substance

## 2018-05-09 NOTE — TELEPHONE ENCOUNTER
Message from MyChart:  Original authorizing provider: Tylor Roberts MD    Gomez MOSELEYPasquale Turk would like a refill of the following medications:  zolpidem (AMBIEN) 10 MG tablet [Tylor Roberts MD]    Preferred pharmacy: [id number] - [facility] [address]    Comment:  Thank you

## 2018-05-30 ENCOUNTER — OFFICE VISIT (OUTPATIENT)
Dept: INTERNAL MEDICINE | Facility: CLINIC | Age: 58
End: 2018-05-30
Payer: COMMERCIAL

## 2018-05-30 VITALS
HEART RATE: 97 BPM | WEIGHT: 214 LBS | BODY MASS INDEX: 31.7 KG/M2 | RESPIRATION RATE: 16 BRPM | SYSTOLIC BLOOD PRESSURE: 124 MMHG | DIASTOLIC BLOOD PRESSURE: 74 MMHG | TEMPERATURE: 98.8 F | HEIGHT: 69 IN | OXYGEN SATURATION: 99 %

## 2018-05-30 DIAGNOSIS — N64.4 BREAST PAIN: ICD-10-CM

## 2018-05-30 DIAGNOSIS — I73.9 PAD (PERIPHERAL ARTERY DISEASE) (H): Primary | ICD-10-CM

## 2018-05-30 DIAGNOSIS — F17.200 TOBACCO USE DISORDER: ICD-10-CM

## 2018-05-30 DIAGNOSIS — E11.39 TYPE 2 DIABETES MELLITUS WITH OTHER OPHTHALMIC COMPLICATION, WITHOUT LONG-TERM CURRENT USE OF INSULIN (H): ICD-10-CM

## 2018-05-30 DIAGNOSIS — H54.3 VISION LOSS, BILATERAL: ICD-10-CM

## 2018-05-30 PROCEDURE — 99214 OFFICE O/P EST MOD 30 MIN: CPT | Performed by: INTERNAL MEDICINE

## 2018-05-30 RX ORDER — OXYCODONE HYDROCHLORIDE 5 MG/1
5 TABLET ORAL 2 TIMES DAILY PRN
Qty: 30 TABLET | Refills: 0 | Status: CANCELLED | OUTPATIENT
Start: 2018-05-30

## 2018-05-30 RX ORDER — GABAPENTIN 300 MG/1
300 CAPSULE ORAL 3 TIMES DAILY
Qty: 90 CAPSULE | Refills: 5 | Status: SHIPPED | OUTPATIENT
Start: 2018-05-30 | End: 2019-07-12

## 2018-05-30 RX ORDER — CITALOPRAM HYDROBROMIDE 40 MG/1
40 TABLET ORAL DAILY
Qty: 90 TABLET | Refills: 1 | Status: SHIPPED | OUTPATIENT
Start: 2018-05-30 | End: 2018-12-05

## 2018-05-30 ASSESSMENT — PAIN SCALES - GENERAL: PAINLEVEL: EXTREME PAIN (9)

## 2018-05-30 NOTE — MR AVS SNAPSHOT
After Visit Summary   5/30/2018    Gomez Turk    MRN: [id number]           Patient Information     Date Of Birth          1960        Visit Information        Provider Department      5/30/2018 10:20 AM Tylor Roberts MD [facility]        Today's Diagnoses     PAD (peripheral artery disease) (H)    -  1    Vision loss, bilateral        Type 2 diabetes mellitus with other ophthalmic complication, without long-term current use of insulin (H)        Breast pain        Tobacco use disorder           Follow-ups after your visit        Follow-up notes from your care team     Return if symptoms worsen or fail to improve.      Future tests that were ordered for you today     Open Future Orders        Priority Expected Expires Ordered    MA Diagnostic Digital Bilateral Routine  5/30/2019 5/30/2018            Who to contact     If you have questions or need follow up information about today's clinic visit or your schedule please contact [facility] directly at [phone].  Normal or non-critical lab and imaging results will be communicated to you by MyChart, letter or phone within 4 business days after the clinic has received the results. If you do not hear from us within 7 days, please contact the clinic through [employer] or phone. If you have a critical or abnormal lab result, we will notify you by phone as soon as possible.  Submit refill requests through [employer] or call your pharmacy and they will forward the refill request to us. Please allow 3 business days for your refill to be completed.          Additional Information About Your Visit        MyChart Information     [employer] gives you secure access to your electronic health record. If you see a primary care provider, you can also send messages to your care team and make appointments. If you have questions, please call your primary care clinic.  If you do not have a primary care provider,  "please call [phone] and they will assist you.        Care EveryWhere ID     This is your Care EveryWhere ID. This could be used by other organizations to access your [place] medical records  [id number]        Your Vitals Were     Pulse Temperature Respirations Height Pulse Oximetry BMI (Body Mass Index)    97 98.8  F (37.1  C) (Oral) 16 5' 9\" (1.753 m) 99% 31.6 kg/m2       Blood Pressure from Last 3 Encounters:   05/30/18 124/74   02/15/18 133/73   01/17/18 130/64    Weight from Last 3 Encounters:   05/30/18 214 lb (97.1 kg)   01/17/18 208 lb (94.3 kg)   05/30/17 199 lb 8 oz (90.5 kg)                 Where to get your medicines      These medications were sent to [id number] - [place], [place] - [address] [facility]  [address] [facility] [address]     Phone:  [phone]     citalopram 40 MG tablet    gabapentin 300 MG capsule          Primary Care Provider Office Phone # Fax #    Tylor Roberts [phone]       [address] [facility] [address]        Equal Access to Services     YUAVL MIGUEL AH: Hadii anita ku hadasho Soomaali, waaxda luqadaha, qaybta kaalmada adeegyada, waxay idiin hayjosen brigido caro. So [facility] [phone].    ATENCIÓN: Si habla español, tiene a nye disposición servicios gratuitos de asistencia lingüística. Llame al [phone].    We comply with applicable federal civil rights laws and [place] laws. We do not discriminate on the basis of race, color, national origin, age, disability, sex, sexual orientation, or gender identity.            Thank you!     Thank you for choosing [facility]  for your care. Our goal is always to provide you with excellent care. Hearing back from our patients is one way we can continue to improve our services. Please take a few minutes to complete the written survey that you may receive in the mail after your visit with us. Thank you!             Your Updated Medication List - " Protect others around you: Learn how to safely use, store and throw away your medicines at www.disposemymeds.org.          This list is accurate as of 5/30/18 11:16 AM.  Always use your most recent med list.                   Brand Name Dispense Instructions for use Diagnosis    acetaminophen 325 MG tablet    TYLENOL    100 tablet    Take 2 tablets (650 mg) by mouth every 4 hours as needed for mild pain    PAD (peripheral artery disease) (H)       aspirin 81 MG EC tablet     30 tablet    Take 1 tablet (81 mg) by mouth daily    PAD (peripheral artery disease) (H)       atorvastatin 40 MG tablet    LIPITOR    30 tablet    Take 1 tablet (40 mg) by mouth daily    PAD (peripheral artery disease) (H)       augmented betamethasone dipropionate 0.05 % cream    DIPROLENE-AF    100 g    Apply sparingly to affected area twice daily as needed.  Do not apply to face.    Lichen simplex chronicus       citalopram 40 MG tablet    celeXA    90 tablet    Take 1 tablet (40 mg) by mouth daily    Vision loss, bilateral       clopidogrel 75 MG tablet    PLAVIX    30 tablet    Take 1 tablet (75 mg) by mouth daily    PAD (peripheral artery disease) (H)       ferrous sulfate 325 (65 Fe) MG tablet    IRON    60 tablet    Take 1 tablet (325 mg) by mouth every other day    Anemia due to blood loss, acute       gabapentin 300 MG capsule    NEURONTIN    90 capsule    Take 1 capsule (300 mg) by mouth 3 times daily    PAD (peripheral artery disease) (H), Type 2 diabetes mellitus with other ophthalmic complication, without long-term current use of insulin (H)       glimepiride 2 MG tablet    AMARYL    180 tablet    Take 1 tablet (2 mg) by mouth 2 times daily    Type 2 diabetes mellitus with diabetic neuropathy, with long-term current use of insulin (H)       metFORMIN 1000 MG tablet    GLUCOPHAGE    180 tablet    Take 1 tablet (1,000 mg) by mouth 2 times daily (with meals)    Type 2 diabetes mellitus with diabetic neuropathy, with long-term current  use of insulin (H)       * nicotine 7 MG/24HR 24 hr patch    NICODERM CQ    30 patch    Place 1 patch onto the skin daily    PAD (peripheral artery disease) (H)       * nicotine 21 MG/24HR 24 hr patch    NICODERM CQ    30 patch    Place 1 patch onto the skin every 24 hours    Tobacco use disorder       oxyCODONE IR 5 MG tablet    ROXICODONE    30 tablet    Take 1 tablet (5 mg) by mouth 2 times daily as needed for pain maximum 3 tablet(s) per day    PAD (peripheral artery disease) (H)       senna-docusate 8.6-50 MG per tablet    SENOKOT-S;PERICOLACE     Take 1-2 tablets by mouth 2 times daily as needed (constipation) While on oxycodone to prevent constipation    Acute cholecystitis       sildenafil 100 MG tablet    VIAGRA    12 tablet    Take 1 tablet (100 mg) by mouth daily as needed    Impotence       zolpidem 10 MG tablet    AMBIEN    30 tablet    Take 1 tablet (10 mg) by mouth nightly as needed for sleep , Do not use if taking narcotics    Anxiety       * Notice:  This list has 2 medication(s) that are the same as other medications prescribed for you. Read the directions carefully, and ask your doctor or other care provider to review them with you.

## 2018-05-30 NOTE — PROGRESS NOTES
SUBJECTIVE:   Gomez Turk is a 57 year old male who presents to clinic today for the following health issues:    Nipple pain       Duration: 1 week     Description (location/character/radiation): Bilateral nipple pain     Intensity:  moderate    Accompanying signs and symptoms: none     History (similar episodes/previous evaluation): None    Precipitating or alleviating factors: None    Therapies tried and outcome: None     Other concerns:  1. None healing sore on L ankle  2. Requesting refills on Oxycodone HCl- states was previously prescribed by Dr. Aragon for pain related to PAD    Problem list and histories reviewed & adjusted, as indicated.  Additional history: as documented    Patient Active Problem List   Diagnosis     Diverticulosis of large intestine     Tobacco use disorder     HYPERLIPIDEMIA LDL GOAL <100     PAD (peripheral artery disease) (H)     Type 2 diabetes mellitus with other ophthalmic complication, without long-term current use of insulin (H)     Acute cholecystitis     Vision loss, bilateral     Counseling regarding advanced directives     Past Surgical History:   Procedure Laterality Date     AMPUTATE TOE(S) Left 2/28/2017    Procedure: AMPUTATE TOE(S);  Surgeon: Jesus Aragon MD;  Location:  OR     BYPASS GRAFT FEMOROPOPLITEAL Left 2/28/2017    Procedure: BYPASS GRAFT FEMOROPOPLITEAL;  Surgeon: Jesus Aragon MD;  Location:  OR     ENT SURGERY  1990    deviated septum repair     IRRIGATION AND DEBRIDEMENT FOOT, COMBINED Left 2/28/2017    Procedure: COMBINED IRRIGATION AND DEBRIDEMENT FOOT;  Surgeon: Jesus Aragon MD;  Location:  OR     LAPAROSCOPIC CHOLECYSTECTOMY N/A 3/18/2017    Procedure: LAPAROSCOPIC CHOLECYSTECTOMY;  Surgeon: Edin Hollingsworth MD;  Location:  OR       Social History   Substance Use Topics     Smoking status: Current Every Day Smoker     Packs/day: 1.00     Types: Cigarettes     Smokeless tobacco: Never Used     Alcohol use  No     Family History   Problem Relation Age of Onset     DIABETES Mother      Lipids Mother      Melanoma Mother      CANCER Paternal Grandmother      Neurologic Disorder Maternal Uncle      Glaucoma No family hx of      Macular Degeneration No family hx of      Retinal detachment No family hx of      Thyroid Disease No family hx of          Current Outpatient Prescriptions   Medication Sig Dispense Refill     aspirin EC 81 MG EC tablet Take 1 tablet (81 mg) by mouth daily 30 tablet 11     atorvastatin (LIPITOR) 40 MG tablet Take 1 tablet (40 mg) by mouth daily 30 tablet 11     augmented betamethasone dipropionate (DIPROLENE-AF) 0.05 % cream Apply sparingly to affected area twice daily as needed.  Do not apply to face. 100 g 3     citalopram (CELEXA) 40 MG tablet Take 1 tablet (40 mg) by mouth daily 90 tablet 1     clopidogrel (PLAVIX) 75 MG tablet Take 1 tablet (75 mg) by mouth daily 30 tablet 11     ferrous sulfate (IRON) 325 (65 FE) MG tablet Take 1 tablet (325 mg) by mouth every other day 60 tablet 0     gabapentin (NEURONTIN) 300 MG capsule Take 1 capsule (300 mg) by mouth 3 times daily 90 capsule 3     glimepiride (AMARYL) 2 MG tablet Take 1 tablet (2 mg) by mouth 2 times daily 180 tablet 3     metFORMIN (GLUCOPHAGE) 1000 MG tablet Take 1 tablet (1,000 mg) by mouth 2 times daily (with meals) 180 tablet 3     nicotine (NICODERM CQ) 21 MG/24HR 24 hr patch Place 1 patch onto the skin every 24 hours 30 patch 0     oxyCODONE IR (ROXICODONE) 5 MG tablet Take 1 tablet (5 mg) by mouth 2 times daily as needed for pain maximum 3 tablet(s) per day 30 tablet 0     sildenafil (VIAGRA) 100 MG tablet Take 1 tablet (100 mg) by mouth daily as needed 12 tablet 5     zolpidem (AMBIEN) 10 MG tablet Take 1 tablet (10 mg) by mouth nightly as needed for sleep , Do not use if taking narcotics 30 tablet 0     acetaminophen (TYLENOL) 325 MG tablet Take 2 tablets (650 mg) by mouth every 4 hours as needed for mild pain 100 tablet 0      "nicotine (NICODERM CQ) 7 MG/24HR 24 hr patch Place 1 patch onto the skin daily 30 patch 3     senna-docusate (SENOKOT-S;PERICOLACE) 8.6-50 MG per tablet Take 1-2 tablets by mouth 2 times daily as needed (constipation) While on oxycodone to prevent constipation       Allergies   Allergen Reactions     No Known Drug Allergies      BP Readings from Last 3 Encounters:   05/30/18 144/74   02/15/18 133/73   01/17/18 130/64    Wt Readings from Last 3 Encounters:   05/30/18 214 lb (97.1 kg)   01/17/18 208 lb (94.3 kg)   05/30/17 199 lb 8 oz (90.5 kg)                    Reviewed and updated as needed this visit by clinical staff  Tobacco  Allergies  Meds  Med Hx  Surg Hx  Fam Hx  Soc Hx      Reviewed and updated as needed this visit by Provider         ROS:  CONSTITUTIONAL: NEGATIVE for fever, chills, change in weight  ENT/MOUTH: NEGATIVE for ear, mouth and throat problems  RESP: NEGATIVE for significant cough or SOB  CV: NEGATIVE for chest pain, palpitations or peripheral edema  GI: NEGATIVE for nausea, abdominal pain, heartburn, or change in bowel habits  : NEGATIVE for frequency, dysuria, or hematuria  MUSCULOSKELETAL: NEGATIVE for significant arthralgias or myalgia  NEURO: NEGATIVE for weakness, dizziness or paresthesias  HEME: NEGATIVE for bleeding problems  PSYCHIATRIC: NEGATIVE for changes in mood or affect    OBJECTIVE:                                                    /74  Pulse 97  Temp 98.8  F (37.1  C) (Oral)  Resp 16  Ht 5' 9\" (1.753 m)  Wt 214 lb (97.1 kg)  SpO2 99%  BMI 31.6 kg/m2  Body mass index is 31.6 kg/(m^2).  GENERAL: alert and no distress  EYES: Decreased visual acuity each eye, using blind cane  RESP: lungs clear to auscultation - no rales, no rhonchi, no wheezes  CV: regular rates and rhythm, normal S1 S2, no S3 or S4 and no click or rub   Noted well-healed right great toe amputation site with some mild superficial excoriations noted to the lower extremities particularly the " right greater than left foot.  PSYCH: Alert and oriented times 3; speech- coherent , normal rate and volume; able to articulate logical thoughts, able to abstract reason, no tangential thoughts, no hallucinations or delusions, affect- normal     ASSESSMENT/PLAN:                                                      (I73.9) PAD (peripheral artery disease) (H)  (primary encounter diagnosis)  Comment: I did discuss with the patient the benefit of potentially increasing his gabapentin dose to help some of his secondary discomfort but did inform him that ongoing narcotic refills again in need to come from the vascular clinic as this is where he is been getting these refills for quite some time.  Plan: gabapentin (NEURONTIN) 300 MG capsule          Of note the patient became upset with me that I would not fill his narcotics.  I explained to the patient that I have not been prescribing these narcotics for him and that for quite some time these have been coming from Dr. Aragon's office and ongoing refills for issues of which he is treating him the need and further refills need to come from that clinic..  If Dr. Aragon feels that no narcotics are indicated anymore that I informed the patient that perhaps he needs to be seen at the pain clinic and look for other alternatives of pain control.    (H54.3) Vision loss, bilateral  Comment: Continuing with current medication as ordered.  Plan: citalopram (CELEXA) 40 MG tablet        Patient did meet candidacy for Social Security disability for visual acuity understaffed blind listing    (E11.39) Type 2 diabetes mellitus with other ophthalmic complication, without long-term current use of insulin (H)  Comment:   Lab Results   Component Value Date    A1C 6.0 03/06/2018    A1C 6.8 02/10/2017    A1C 9.6 10/17/2016    A1C 7.8 11/02/2015    A1C 7.1 01/19/2015     Plan: gabapentin (NEURONTIN) 300 MG capsule        Good control at present time continue with medications as ordered    (N64.4)  Breast pain  Comment: No focal changes on exam with the exception of potentially mild tissue noted right breast.  No focal nipple discharge or skin change noted  Plan: MA Diagnostic Digital Bilateral        Just observation and if no resolution diagnostic digital mammography with ultrasound    (F17.200) Tobacco use disorder  Comment: Smoking cessation was advised and the risks of continued smoking in regards to this patients health history was reiterated. Options of smoking cessation were also discussed. This time extended beyond the routine exam.  Plan:         See Patient Instructions    Tylor Roberts MD  [facility]

## 2018-05-30 NOTE — Clinical Note
Please abstract the following data from this visit with this patient into the appropriate field in Epic:  Foot exam 2/15/18

## 2018-06-06 ENCOUNTER — APPOINTMENT (OUTPATIENT)
Dept: GENERAL RADIOLOGY | Facility: CLINIC | Age: 58
DRG: 602 | End: 2018-06-06
Attending: EMERGENCY MEDICINE
Payer: COMMERCIAL

## 2018-06-06 ENCOUNTER — HOSPITAL ENCOUNTER (INPATIENT)
Facility: CLINIC | Age: 58
LOS: 2 days | Discharge: HOME | DRG: 602 | End: 2018-06-08
Attending: EMERGENCY MEDICINE | Admitting: HOSPITALIST
Payer: COMMERCIAL

## 2018-06-06 DIAGNOSIS — R55 SYNCOPE, UNSPECIFIED SYNCOPE TYPE: ICD-10-CM

## 2018-06-06 DIAGNOSIS — L03.116 CELLULITIS OF FOOT, LEFT: ICD-10-CM

## 2018-06-06 DIAGNOSIS — M86.9 OSTEOMYELITIS OF TOE OF LEFT FOOT (H): ICD-10-CM

## 2018-06-06 DIAGNOSIS — N17.0 ACUTE RENAL FAILURE WITH TUBULAR NECROSIS (H): Primary | ICD-10-CM

## 2018-06-06 PROBLEM — M86.172 ACUTE OSTEOMYELITIS OF TOE, LEFT (H): Status: ACTIVE | Noted: 2018-06-06

## 2018-06-06 LAB
ANION GAP SERPL CALCULATED.3IONS-SCNC: 6 MMOL/L (ref 3–14)
BASOPHILS # BLD AUTO: 0 10E9/L (ref 0–0.2)
BASOPHILS NFR BLD AUTO: 0.4 %
BUN SERPL-MCNC: 14 MG/DL (ref 7–30)
CALCIUM SERPL-MCNC: 8.4 MG/DL (ref 8.5–10.1)
CHLORIDE SERPL-SCNC: 106 MMOL/L (ref 94–109)
CO2 SERPL-SCNC: 23 MMOL/L (ref 20–32)
CREAT SERPL-MCNC: 1.35 MG/DL (ref 0.66–1.25)
CRP SERPL-MCNC: 76.8 MG/L (ref 0–8)
DIFFERENTIAL METHOD BLD: ABNORMAL
EOSINOPHIL # BLD AUTO: 0 10E9/L (ref 0–0.7)
EOSINOPHIL NFR BLD AUTO: 0.5 %
ERYTHROCYTE [DISTWIDTH] IN BLOOD BY AUTOMATED COUNT: 14.2 % (ref 10–15)
ERYTHROCYTE [SEDIMENTATION RATE] IN BLOOD BY WESTERGREN METHOD: 28 MM/H (ref 0–20)
GFR SERPL CREATININE-BSD FRML MDRD: 54 ML/MIN/1.7M2
GLUCOSE BLDC GLUCOMTR-MCNC: 141 MG/DL (ref 70–99)
GLUCOSE SERPL-MCNC: 154 MG/DL (ref 70–99)
HBA1C MFR BLD: 6.5 % (ref 0–5.6)
HCT VFR BLD AUTO: 35.2 % (ref 40–53)
HGB BLD-MCNC: 12.2 G/DL (ref 13.3–17.7)
IMM GRANULOCYTES # BLD: 0 10E9/L (ref 0–0.4)
IMM GRANULOCYTES NFR BLD: 0.1 %
INTERPRETATION ECG - MUSE: NORMAL
LACTATE BLD-SCNC: 0.8 MMOL/L (ref 0.7–2)
LYMPHOCYTES # BLD AUTO: 0.6 10E9/L (ref 0.8–5.3)
LYMPHOCYTES NFR BLD AUTO: 7.1 %
MCH RBC QN AUTO: 31.1 PG (ref 26.5–33)
MCHC RBC AUTO-ENTMCNC: 34.7 G/DL (ref 31.5–36.5)
MCV RBC AUTO: 90 FL (ref 78–100)
MONOCYTES # BLD AUTO: 0.6 10E9/L (ref 0–1.3)
MONOCYTES NFR BLD AUTO: 7.9 %
NEUTROPHILS # BLD AUTO: 6.9 10E9/L (ref 1.6–8.3)
NEUTROPHILS NFR BLD AUTO: 84 %
PLATELET # BLD AUTO: 244 10E9/L (ref 150–450)
POTASSIUM SERPL-SCNC: 3.9 MMOL/L (ref 3.4–5.3)
RBC # BLD AUTO: 3.92 10E12/L (ref 4.4–5.9)
SODIUM SERPL-SCNC: 135 MMOL/L (ref 133–144)
WBC # BLD AUTO: 8.2 10E9/L (ref 4–11)

## 2018-06-06 PROCEDURE — 96375 TX/PRO/DX INJ NEW DRUG ADDON: CPT

## 2018-06-06 PROCEDURE — 73660 X-RAY EXAM OF TOE(S): CPT | Mod: LT

## 2018-06-06 PROCEDURE — 83036 HEMOGLOBIN GLYCOSYLATED A1C: CPT | Performed by: HOSPITALIST

## 2018-06-06 PROCEDURE — 25000132 ZZH RX MED GY IP 250 OP 250 PS 637: Performed by: HOSPITALIST

## 2018-06-06 PROCEDURE — 86140 C-REACTIVE PROTEIN: CPT | Performed by: EMERGENCY MEDICINE

## 2018-06-06 PROCEDURE — 93005 ELECTROCARDIOGRAM TRACING: CPT

## 2018-06-06 PROCEDURE — 36415 COLL VENOUS BLD VENIPUNCTURE: CPT | Performed by: HOSPITALIST

## 2018-06-06 PROCEDURE — 87040 BLOOD CULTURE FOR BACTERIA: CPT | Performed by: EMERGENCY MEDICINE

## 2018-06-06 PROCEDURE — 96365 THER/PROPH/DIAG IV INF INIT: CPT | Mod: 59

## 2018-06-06 PROCEDURE — 96361 HYDRATE IV INFUSION ADD-ON: CPT

## 2018-06-06 PROCEDURE — 25000128 H RX IP 250 OP 636: Performed by: EMERGENCY MEDICINE

## 2018-06-06 PROCEDURE — 83605 ASSAY OF LACTIC ACID: CPT | Performed by: HOSPITALIST

## 2018-06-06 PROCEDURE — 99285 EMERGENCY DEPT VISIT HI MDM: CPT | Mod: 25

## 2018-06-06 PROCEDURE — 85025 COMPLETE CBC W/AUTO DIFF WBC: CPT | Performed by: EMERGENCY MEDICINE

## 2018-06-06 PROCEDURE — 00000146 ZZHCL STATISTIC GLUCOSE BY METER IP

## 2018-06-06 PROCEDURE — 12000000 ZZH R&B MED SURG/OB

## 2018-06-06 PROCEDURE — 85652 RBC SED RATE AUTOMATED: CPT | Performed by: EMERGENCY MEDICINE

## 2018-06-06 PROCEDURE — 96360 HYDRATION IV INFUSION INIT: CPT | Mod: 59

## 2018-06-06 PROCEDURE — 80048 BASIC METABOLIC PNL TOTAL CA: CPT | Performed by: EMERGENCY MEDICINE

## 2018-06-06 PROCEDURE — 99223 1ST HOSP IP/OBS HIGH 75: CPT | Mod: AI | Performed by: HOSPITALIST

## 2018-06-06 PROCEDURE — 25000128 H RX IP 250 OP 636: Performed by: HOSPITALIST

## 2018-06-06 RX ORDER — PIPERACILLIN SODIUM, TAZOBACTAM SODIUM 3; .375 G/15ML; G/15ML
3.38 INJECTION, POWDER, LYOPHILIZED, FOR SOLUTION INTRAVENOUS ONCE
Status: COMPLETED | OUTPATIENT
Start: 2018-06-06 | End: 2018-06-06

## 2018-06-06 RX ORDER — POLYETHYLENE GLYCOL 3350 17 G/17G
17 POWDER, FOR SOLUTION ORAL DAILY PRN
Status: DISCONTINUED | OUTPATIENT
Start: 2018-06-06 | End: 2018-06-08 | Stop reason: HOSPADM

## 2018-06-06 RX ORDER — SODIUM CHLORIDE 9 MG/ML
INJECTION, SOLUTION INTRAVENOUS CONTINUOUS
Status: DISCONTINUED | OUTPATIENT
Start: 2018-06-06 | End: 2018-06-08 | Stop reason: HOSPADM

## 2018-06-06 RX ORDER — POTASSIUM CHLORIDE 1500 MG/1
20-40 TABLET, EXTENDED RELEASE ORAL
Status: DISCONTINUED | OUTPATIENT
Start: 2018-06-06 | End: 2018-06-08 | Stop reason: HOSPADM

## 2018-06-06 RX ORDER — ONDANSETRON 2 MG/ML
4 INJECTION INTRAMUSCULAR; INTRAVENOUS EVERY 6 HOURS PRN
Status: DISCONTINUED | OUTPATIENT
Start: 2018-06-06 | End: 2018-06-08 | Stop reason: HOSPADM

## 2018-06-06 RX ORDER — POTASSIUM CHLORIDE 7.45 MG/ML
10 INJECTION INTRAVENOUS
Status: DISCONTINUED | OUTPATIENT
Start: 2018-06-06 | End: 2018-06-08 | Stop reason: HOSPADM

## 2018-06-06 RX ORDER — POTASSIUM CHLORIDE 1.5 G/1.58G
20-40 POWDER, FOR SOLUTION ORAL
Status: DISCONTINUED | OUTPATIENT
Start: 2018-06-06 | End: 2018-06-08 | Stop reason: HOSPADM

## 2018-06-06 RX ORDER — ZOLPIDEM TARTRATE 10 MG/1
10 TABLET ORAL
Status: DISCONTINUED | OUTPATIENT
Start: 2018-06-06 | End: 2018-06-08 | Stop reason: HOSPADM

## 2018-06-06 RX ORDER — PROCHLORPERAZINE 25 MG
25 SUPPOSITORY, RECTAL RECTAL EVERY 12 HOURS PRN
Status: DISCONTINUED | OUTPATIENT
Start: 2018-06-06 | End: 2018-06-08 | Stop reason: HOSPADM

## 2018-06-06 RX ORDER — BISACODYL 10 MG
10 SUPPOSITORY, RECTAL RECTAL DAILY PRN
Status: DISCONTINUED | OUTPATIENT
Start: 2018-06-06 | End: 2018-06-08 | Stop reason: HOSPADM

## 2018-06-06 RX ORDER — LIDOCAINE 40 MG/G
CREAM TOPICAL
Status: DISCONTINUED | OUTPATIENT
Start: 2018-06-06 | End: 2018-06-08 | Stop reason: HOSPADM

## 2018-06-06 RX ORDER — ONDANSETRON 4 MG/1
4 TABLET, ORALLY DISINTEGRATING ORAL EVERY 6 HOURS PRN
Status: DISCONTINUED | OUTPATIENT
Start: 2018-06-06 | End: 2018-06-08 | Stop reason: HOSPADM

## 2018-06-06 RX ORDER — NALOXONE HYDROCHLORIDE 0.4 MG/ML
.1-.4 INJECTION, SOLUTION INTRAMUSCULAR; INTRAVENOUS; SUBCUTANEOUS
Status: DISCONTINUED | OUTPATIENT
Start: 2018-06-06 | End: 2018-06-08 | Stop reason: HOSPADM

## 2018-06-06 RX ORDER — OXYCODONE AND ACETAMINOPHEN 5; 325 MG/1; MG/1
1-2 TABLET ORAL EVERY 4 HOURS PRN
Status: DISCONTINUED | OUTPATIENT
Start: 2018-06-06 | End: 2018-06-07

## 2018-06-06 RX ORDER — LISINOPRIL 20 MG/1
20 TABLET ORAL DAILY
Status: DISCONTINUED | OUTPATIENT
Start: 2018-06-07 | End: 2018-06-08 | Stop reason: HOSPADM

## 2018-06-06 RX ORDER — POTASSIUM CHLORIDE 29.8 MG/ML
20 INJECTION INTRAVENOUS
Status: DISCONTINUED | OUTPATIENT
Start: 2018-06-06 | End: 2018-06-08 | Stop reason: HOSPADM

## 2018-06-06 RX ORDER — ASPIRIN 81 MG/1
81 TABLET ORAL DAILY
Status: DISCONTINUED | OUTPATIENT
Start: 2018-06-07 | End: 2018-06-08 | Stop reason: HOSPADM

## 2018-06-06 RX ORDER — SODIUM CHLORIDE, SODIUM LACTATE, POTASSIUM CHLORIDE, CALCIUM CHLORIDE 600; 310; 30; 20 MG/100ML; MG/100ML; MG/100ML; MG/100ML
1000 INJECTION, SOLUTION INTRAVENOUS CONTINUOUS
Status: DISCONTINUED | OUTPATIENT
Start: 2018-06-06 | End: 2018-06-08 | Stop reason: HOSPADM

## 2018-06-06 RX ORDER — AMOXICILLIN 250 MG
1 CAPSULE ORAL 2 TIMES DAILY PRN
Status: DISCONTINUED | OUTPATIENT
Start: 2018-06-06 | End: 2018-06-08 | Stop reason: HOSPADM

## 2018-06-06 RX ORDER — POTASSIUM CL/LIDO/0.9 % NACL 10MEQ/0.1L
10 INTRAVENOUS SOLUTION, PIGGYBACK (ML) INTRAVENOUS
Status: DISCONTINUED | OUTPATIENT
Start: 2018-06-06 | End: 2018-06-08 | Stop reason: HOSPADM

## 2018-06-06 RX ORDER — ATORVASTATIN CALCIUM 40 MG/1
40 TABLET, FILM COATED ORAL DAILY
Status: DISCONTINUED | OUTPATIENT
Start: 2018-06-07 | End: 2018-06-08 | Stop reason: HOSPADM

## 2018-06-06 RX ORDER — ACETAMINOPHEN 325 MG/1
650 TABLET ORAL EVERY 4 HOURS PRN
Status: DISCONTINUED | OUTPATIENT
Start: 2018-06-06 | End: 2018-06-08 | Stop reason: HOSPADM

## 2018-06-06 RX ORDER — NICOTINE 21 MG/24HR
1 PATCH, TRANSDERMAL 24 HOURS TRANSDERMAL DAILY
Status: DISCONTINUED | OUTPATIENT
Start: 2018-06-06 | End: 2018-06-08 | Stop reason: HOSPADM

## 2018-06-06 RX ORDER — CITALOPRAM HYDROBROMIDE 40 MG/1
40 TABLET ORAL DAILY
Status: DISCONTINUED | OUTPATIENT
Start: 2018-06-07 | End: 2018-06-08 | Stop reason: HOSPADM

## 2018-06-06 RX ORDER — PROCHLORPERAZINE MALEATE 10 MG
10 TABLET ORAL EVERY 6 HOURS PRN
Status: DISCONTINUED | OUTPATIENT
Start: 2018-06-06 | End: 2018-06-08 | Stop reason: HOSPADM

## 2018-06-06 RX ORDER — NICOTINE POLACRILEX 4 MG
15-30 LOZENGE BUCCAL
Status: DISCONTINUED | OUTPATIENT
Start: 2018-06-06 | End: 2018-06-08 | Stop reason: HOSPADM

## 2018-06-06 RX ORDER — GABAPENTIN 300 MG/1
300 CAPSULE ORAL 3 TIMES DAILY
Status: DISCONTINUED | OUTPATIENT
Start: 2018-06-06 | End: 2018-06-08 | Stop reason: HOSPADM

## 2018-06-06 RX ORDER — PIPERACILLIN SODIUM, TAZOBACTAM SODIUM 3; .375 G/15ML; G/15ML
3.38 INJECTION, POWDER, LYOPHILIZED, FOR SOLUTION INTRAVENOUS EVERY 6 HOURS
Status: DISCONTINUED | OUTPATIENT
Start: 2018-06-06 | End: 2018-06-08 | Stop reason: HOSPADM

## 2018-06-06 RX ORDER — AMOXICILLIN 250 MG
2 CAPSULE ORAL 2 TIMES DAILY PRN
Status: DISCONTINUED | OUTPATIENT
Start: 2018-06-06 | End: 2018-06-08 | Stop reason: HOSPADM

## 2018-06-06 RX ORDER — ACETAMINOPHEN 650 MG/1
650 SUPPOSITORY RECTAL EVERY 4 HOURS PRN
Status: DISCONTINUED | OUTPATIENT
Start: 2018-06-06 | End: 2018-06-08 | Stop reason: HOSPADM

## 2018-06-06 RX ORDER — DEXTROSE MONOHYDRATE 25 G/50ML
25-50 INJECTION, SOLUTION INTRAVENOUS
Status: DISCONTINUED | OUTPATIENT
Start: 2018-06-06 | End: 2018-06-08 | Stop reason: HOSPADM

## 2018-06-06 RX ADMIN — OXYCODONE HYDROCHLORIDE AND ACETAMINOPHEN 1 TABLET: 5; 325 TABLET ORAL at 19:42

## 2018-06-06 RX ADMIN — SODIUM CHLORIDE, POTASSIUM CHLORIDE, SODIUM LACTATE AND CALCIUM CHLORIDE 1000 ML: 600; 310; 30; 20 INJECTION, SOLUTION INTRAVENOUS at 14:51

## 2018-06-06 RX ADMIN — ZOLPIDEM TARTRATE 10 MG: 10 TABLET, FILM COATED ORAL at 22:25

## 2018-06-06 RX ADMIN — NICOTINE 1 PATCH: 21 PATCH, EXTENDED RELEASE TRANSDERMAL at 22:25

## 2018-06-06 RX ADMIN — VANCOMYCIN HYDROCHLORIDE 2000 MG: 5 INJECTION, POWDER, LYOPHILIZED, FOR SOLUTION INTRAVENOUS at 18:10

## 2018-06-06 RX ADMIN — SODIUM CHLORIDE, POTASSIUM CHLORIDE, SODIUM LACTATE AND CALCIUM CHLORIDE 1000 ML: 600; 310; 30; 20 INJECTION, SOLUTION INTRAVENOUS at 16:30

## 2018-06-06 RX ADMIN — PIPERACILLIN SODIUM,TAZOBACTAM SODIUM 3.38 G: 3; .375 INJECTION, POWDER, FOR SOLUTION INTRAVENOUS at 17:29

## 2018-06-06 RX ADMIN — GABAPENTIN 300 MG: 300 CAPSULE ORAL at 22:25

## 2018-06-06 RX ADMIN — PIPERACILLIN SODIUM,TAZOBACTAM SODIUM 3.38 G: 3; .375 INJECTION, POWDER, FOR SOLUTION INTRAVENOUS at 22:25

## 2018-06-06 RX ADMIN — SODIUM CHLORIDE: 9 INJECTION, SOLUTION INTRAVENOUS at 20:16

## 2018-06-06 ASSESSMENT — ENCOUNTER SYMPTOMS
CHILLS: 1
COLOR CHANGE: 1
LIGHT-HEADEDNESS: 1
DIZZINESS: 1
FEVER: 1

## 2018-06-06 NOTE — H&P
[facility]    History and Physical  Hospitalist       Date of Admission:  6/6/2018    Assessment & Plan   Gomez Turk is a 57 year old male with past history DM II, PAD, hyperlipidemia, legal blindness who presents with syncope, found to have left toe and foot cellulitis/osteomyelitis.    Left foot cellulitis, probable left 2nd toe osteomyelitis: Erythema, warmth over left 2nd toe extending to dorsal foot with subjective fever/chills at home and marked increase in CRP.  Toe XR shows possible osteomyelitis at tip.  - Podiatry consult  - with history of left popliteal to dorsalis pedis bypass, will consult [facility] surgery for evaluation as well  - continue vanco and zosyn  - note that he was instructed he should avoid general anesthesia due to possible connection to his optic neuropathy  - resume aspirin once verified, hold Plavix pending [facility] input    Syncope:  Suspect due to hypovolemia, possible component cellulitis but no SIRS criteria at admission.     -  ml/hr  - orthostatic blood pressure  - telemetry for now  - check lactic acid    DEEPA:  Baseline Cr 0.9, admission Cr 1.35.  Suspect hypovolemic, received 1L IVF in ED.  - IVF as above, BMP in AM    PAD s/p left popliteal to dorsalis pedis bypass 2/2017  - Plavix, aspirin as above  - resume PTA atorvastatin once verified    DM II:  A1C 6.0% in March 2018.    - check A1C  - medium dose SSI  - hold metformin  - resume PTA gabapentin once verified    Depression:  Resume PTA Celexa once verified.    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    Tobacco abuse:  Resume PTA nicotine patch once verified.    DVT Prophylaxis: Pneumatic Compression Devices  Code Status: Full Code    # Pain Assessment:  Current Pain Score 6/6/2018   Patient currently in pain? -   Pain score (0-10) 0   Pain location -   Pain descriptors -   - Gomez is experiencing pain due to  left foot cellulitis; also reports chronic pain in left foot. Pain management was discussed and the plan was created in a collaborative fashion.  Gomez's response to the current recommendations: engaged  - Opioid regimen: prn oxycodone  - Response to opioid medications: Reduction of symptoms   - Bowel regimen: senna, miralax and docusate  - Pharmacologic adjuvants: Acetaminophen    Disposition: Expected discharge in 3 days pending need for surgical intervention.    Ramos Ross    Primary Care Physician   Tylor Roberts    Chief Complaint   syncope    History is obtained from the patient, his wife and chart review    History of Present Illness   Gomez Turk is a 57 year old male who presents with syncope.  Patient reports yesterday he experienced subjective fever and shaking chills in addition to generalized weakness.  He also endorses some poor appetite.  He woke this morning again feeling generally weak, though somewhat improved from yesterday.  He went to lie down for a nap this afternoon, used the restroom and was walking to his bedroom when he had onset of marked lightheadedness, dizziness and generalized weakness and next remembers waking up on the floor.  He denies any significant confusion upon awakening, denies any bowel or bladder incontinence and denies any history of seizure.  He denies any chest pain or pressure, palpitations or shortness of breath prior to syncope or after.  He reports one episode of prior syncope approximately 20 years ago which was attributed to exuberant laughter.      He notes the second toe of his left foot has been painful over the past 2 days.  He denies any trauma.  Due to his blindness, he did not note any other changes.  His wife, who changes the daily dressing over his prior bypass surgical site of the left leg had noted no significant change in the left toe last evening.  Today she notes marked increase in redness.  He also endorses a headache for the past 2 days.  Had  an episode of diarrhea today which he attributes to recently resuming his metformin.  He feels he has done well with fluid intake.  His remainder of review of systems is negative.    Past Medical History    Diabetes mellitus type 2  Hyperlipidemia  Peripheral arterial disease  Tobacco dependence  Ischemic optic neuropathy with legal blindness  Diverticulosis    Past Surgical History   Left popliteal to dorsalis pedis bypass in February 2017  Left toe amputation  Deviated septum  Cholecystectomy    Prior to Admission Medications   Prior to Admission Medications   Prescriptions Last Dose Informant Patient Reported? Taking?   acetaminophen (TYLENOL) 325 MG tablet  Spouse/Significant Other No No   Sig: Take 2 tablets (650 mg) by mouth every 4 hours as needed for mild pain   aspirin EC 81 MG EC tablet  Spouse/Significant Other No No   Sig: Take 1 tablet (81 mg) by mouth daily   atorvastatin (LIPITOR) 40 MG tablet  Spouse/Significant Other No No   Sig: Take 1 tablet (40 mg) by mouth daily   augmented betamethasone dipropionate (DIPROLENE-AF) 0.05 % cream   No No   Sig: Apply sparingly to affected area twice daily as needed.  Do not apply to face.   citalopram (CELEXA) 40 MG tablet   No No   Sig: Take 1 tablet (40 mg) by mouth daily   clopidogrel (PLAVIX) 75 MG tablet  Spouse/Significant Other No No   Sig: Take 1 tablet (75 mg) by mouth daily   ferrous sulfate (IRON) 325 (65 FE) MG tablet   No No   Sig: Take 1 tablet (325 mg) by mouth every other day   gabapentin (NEURONTIN) 300 MG capsule   No No   Sig: Take 1 capsule (300 mg) by mouth 3 times daily   glimepiride (AMARYL) 2 MG tablet   No No   Sig: Take 1 tablet (2 mg) by mouth 2 times daily   metFORMIN (GLUCOPHAGE) 1000 MG tablet   No No   Sig: Take 1 tablet (1,000 mg) by mouth 2 times daily (with meals)   nicotine (NICODERM CQ) 21 MG/24HR 24 hr patch   No No   Sig: Place 1 patch onto the skin every 24 hours   nicotine (NICODERM CQ) 7 MG/24HR 24 hr patch   Spouse/Significant Other No No   Sig: Place 1 patch onto the skin daily   oxyCODONE IR (ROXICODONE) 5 MG tablet   No No   Sig: Take 1 tablet (5 mg) by mouth 2 times daily as needed for pain maximum 3 tablet(s) per day   senna-docusate (SENOKOT-S;PERICOLACE) 8.6-50 MG per tablet   No No   Sig: Take 1-2 tablets by mouth 2 times daily as needed (constipation) While on oxycodone to prevent constipation   sildenafil (VIAGRA) 100 MG tablet  Spouse/Significant Other No No   Sig: Take 1 tablet (100 mg) by mouth daily as needed   zolpidem (AMBIEN) 10 MG tablet   No No   Sig: Take 1 tablet (10 mg) by mouth nightly as needed for sleep , Do not use if taking narcotics      Facility-Administered Medications: None     Allergies   Allergies   Allergen Reactions     No Known Drug Allergies        Social History   I have personally reviewed the social history with the patient showing 45-pack-year history of tobacco abuse, currently smoking 1 pack per day.  He reports drinking 2-3 alcoholic beverages per day and denies any history of withdrawal.  Reports very occasional marijuana use but denies any other illicit drug use.    Family History   Denies any significant family history.    Review of Systems   The 10 point Review of Systems is negative other than noted in the HPI or here.     Physical Exam   Temp: 99.3  F (37.4  C) Temp src: Oral BP: 113/51 Pulse: 80 Heart Rate: 67 Resp: 10 SpO2: 90 % O2 Device: None (Room air)    Vital Signs with Ranges  Temp:  [99.3  F (37.4  C)] 99.3  F (37.4  C)  Pulse:  [80] 80  Heart Rate:  [67] 67  Resp:  [10-18] 10  BP: (104-113)/(51-57) 113/51  SpO2:  [90 %-98 %] 90 %  0 lbs 0 oz    Constitutional: well developed, well nourished male in no acute distress  Eyes: pupils equal, round and reactive to light, no icterus  HEENT: head normocephalic, atraumatic, mucous membranes moist, no cervical lymphadenopathy  Respiratory: lungs clear to auscultation bilaterally, no crackles or wheezes, no  tachypnea  Cardiovascular: regular rate and rhythm, normal S1/S2, no murmur, rubs or gallops  GI: abdomen soft, non-tender, non-distended, normal bowel sounds, no hepatosplenomegaly  Lymph/Hematologic: No peripheral edema  Skin: erythema and tenderness of left 2nd toe extending to dorsum of left foot  Musculoskeletal: Extremities warm and well perfused  Neurologic: alert and appropriate, cranial nerves grossly intact, gross motor movements intact  Psychiatric: normal affect    Data   Data reviewed today:  I personally reviewed the EKG tracing showing normal sinus rhythm.    Recent Labs  Lab 06/06/18  1439   WBC 8.2   HGB 12.2*   MCV 90         POTASSIUM 3.9   CHLORIDE 106   CO2 23   BUN 14   CR 1.35*   ANIONGAP 6   KRYSTEN 8.4*   *       Recent Results (from the past 24 hour(s))   XR Toe Left G/E 2 Views    Narrative    XR TOE LT G/E 2 VW 6/6/2018 4:17 PM    COMPARISON: None.    HISTORY: Toe pain.      Impression    IMPRESSION: Postoperative changes of left great toe amputation.  Osteopenia is noted about the left foot, but no fractures are seen.  Soft tissue irregularity at the tip of the second toe with underlying  cortical irregularity suggesting erosions and possible osteomyelitis.    GLEN CUEVAS MD

## 2018-06-06 NOTE — PHARMACY-ADMISSION MEDICATION HISTORY
Admission medication history interview status for the 6/6/2018  admission is complete. See EPIC admission navigator for prior to admission medications     Medication history source reliability:Moderate- patient, wife, [employer]    Actions taken by pharmacist (provider contacted, etc): Reviewed [employer]    Additional medication history information not noted on PTA med list :  -He reports not taking many medications recently or consistently. Only recent meds are citalopram, gabapentin, zolpidem - he did not actually know names of meds but knew indications.  -He had not taken glimepiride or metformin for several weeks and then took this AM.  -He had been on ferrous sulfate in past but stopped due to constipation.  -He had Rx for lisinopril - filled #180 20mg tablets for 90 day supply. He reports only taking occasionally when BP high but has conversation with provider that he should be taking every day given DM.    Medication reconciliation/reorder completed by provider prior to medication history? No    Time spent in this activity: 20 min    Prior to Admission medications    Medication Sig Last Dose Taking? Auth Provider   citalopram (CELEXA) 40 MG tablet Take 1 tablet (40 mg) by mouth daily 6/6/2018 at am Yes Tylor Roberts MD   gabapentin (NEURONTIN) 300 MG capsule Take 1 capsule (300 mg) by mouth 3 times daily 6/6/2018 at x1 Yes Tylor Roberts MD   glimepiride (AMARYL) 2 MG tablet Take 1 tablet (2 mg) by mouth 2 times daily 6/6/2018 at x1 Yes Tylor Roberts MD   metFORMIN (GLUCOPHAGE) 1000 MG tablet Take 1 tablet (1,000 mg) by mouth 2 times daily (with meals) 6/6/2018 at x1 Yes Tylor Roberts MD   zolpidem (AMBIEN) 10 MG tablet Take 1 tablet (10 mg) by mouth nightly as needed for sleep , Do not use if taking narcotics Past Week at Unknown time Yes Tylor Roberts MD   aspirin EC 81 MG EC tablet Take 1 tablet (81 mg) by mouth daily More than a month at Unknown time  Valentine Cardenas MD    atorvastatin (LIPITOR) 40 MG tablet Take 1 tablet (40 mg) by mouth daily More than a month at Unknown time  Valentine Cardenas MD   augmented betamethasone dipropionate (DIPROLENE-AF) 0.05 % cream Apply sparingly to affected area twice daily as needed.  Do not apply to face. More than a month at Unknown time  Edin Perry MD   clopidogrel (PLAVIX) 75 MG tablet Take 1 tablet (75 mg) by mouth daily More than a month at Unknown time  Valentine Cardenas MD   LISINOPRIL PO Take 20-40 mg by mouth daily as needed More than a month at Unknown time  Unknown, Entered By History   sildenafil (VIAGRA) 100 MG tablet Take 1 tablet (100 mg) by mouth daily as needed Unknown at Unknown time  Tylor Roberts MD

## 2018-06-06 NOTE — ED NOTES
"[facility]  ED Nurse Handoff Report    ED Chief complaint: Loss of Consciousness (Pt went to bathroom and family heard a thump. Found pt on ground and slightly clenched. Family thought maybe seizure like activity but no post ictal period. Alert upon waking. Pt felt chilled and weak yesterday. This morning felt fine. Came in wearing a ResQ pod and in trendelenberg. Pt is legally blind. )      ED Diagnosis:   Final diagnoses:   Osteomyelitis of toe of left foot (H)   Cellulitis of foot, left   Syncope, unspecified syncope type       Code Status: Full Code    Allergies:   Allergies   Allergen Reactions     No Known Drug Allergies        Activity level - Baseline/Home:  Independent    Activity Level - Current:   Stand by assist- pt blind, orientation to new environment     Needed?: No    Isolation: No  Infection: Not Applicable    Bariatric?: No    Vital Signs:   Vitals:    06/06/18 1436 06/06/18 1445 06/06/18 1515   BP: 104/57 110/56 113/51   Pulse: 80     Resp: 18  10   Temp: 99.3  F (37.4  C)     TempSrc: Oral     SpO2: 98% 97% 90%   Height: 1.74 m (5' 8.5\")         Cardiac Rhythm: ,        Pain level: 0-10 Pain Scale: 0    Is this patient confused?: No   Suicidality: Screened negative    Patient Report: Initial Complaint: LOC  Focused Assessment:   Resp: WDL  Cardiac: pt hypotensive for EMS. Came in on resQ pod. Pressures WDL since fluids started  Neuro: WDL   Musculoskeletal: Left second toe swollen, red, skin peeling. Redness goes expands 1 inch down from toe on foot. Toe pain started yesterday. HX of previous great toe amputation.   Tests Performed: labs, xray  Abnormal Results: CRP 76.8, Sed Rate 28, Creatinine 1.35, xray  Treatments provided: antibiotics    Family Comments: present and supportive    OBS brochure/video discussed/provided to patient: N/A    ED Medications:   Medications   lactated ringers BOLUS 1,000 mL (0 mLs Intravenous Stopped 6/6/18 1630)     Followed by   lactated " ringers infusion (1,000 mLs Intravenous New Bag 6/6/18 2690)   piperacillin-tazobactam (ZOSYN) 3.375 g vial to attach to  mL bag (not administered)   vancomycin (VANCOCIN) 2,000 mg in sodium chloride 0.9 % 500 mL intermittent infusion (not administered)       Drips infusing?:  Yes    For the majority of the shift this patient was Green.   Interventions performed were n/a.    Severe Sepsis OR Septic Shock Diagnosis Present: No      ED NURSE PHONE NUMBER: [phone]

## 2018-06-06 NOTE — ED PROVIDER NOTES
History     Chief Complaint:  Loss of Consciousness    HPI   Gomez Turk is a 57 year old male who is type 2 diabetic who presents with loss of consciousness. EMS states that the patient was feeling weak and chilled yesterday but felt fine this morning. Later in the day, the patient went into the bathroom, and while there the family heard a thud. The family found the patient on the floor with clenched teeth. EMS stated the family worried that it might be a seizure, but no post ictal period was seen. The patient was woken up by the family. EMS brought the patient in wearing ResQ pod and in a Trendelenburg. Here in the ED, the patient states he was feeling cold, tired, and shivery. The patient states he felt better today, but had diarrhea about 4 times today. When coming back to the bathroom is when his syncopal episode occurred. The wife also noted that the patient has been developing a ring around the second toe on the left foot. It is painful to the touch. The wife also notes that the left foot appeared swollen yesterday. Of note: the patient notes that he missed his Metformin dose the last few days and just started taking it again.     Allergies:  No known allergies.      Medications:    Asprin  Atorvastatin  Diprolene   Citalopram  Clopidogrel  Neurontin  Amaryl  Metformin  Nicotine   Oxycodone  Viagra  Ambien     Past Medical History:    Diverticulosis of colon  Hyperlipidemia  Legally blind  Tobacco use disorder  Type 2 diabetes   PAD  Acute Cholecystitis     Past Surgical History:    Amputate toes  Bypass graft femoropopliteal  Deviated septum repair  Irrigation and debridement of foot  Laparoscopic cholecystectomy     Family History:    Mother: diabetes, lipids, melanoma  Paternal grandmother: cancer  Maternal uncle: neurological disorder     Social History:  Social History:  The patient was accompanied to the ED by wife.  Smoking Status: Current every day smoker   Packs/day: 1.00   Types:  "cigarettes  Smokeless Tobacco: Never Used  Alcohol Use: Yes  Marital Status:       Review of Systems   Constitutional: Positive for chills and fever.   Skin: Positive for color change (Left foot is swelling and hot. Ring around 2nd toe).   Neurological: Positive for dizziness, syncope and light-headedness.   All other systems reviewed and are negative.    Physical Exam     Patient Vitals for the past 24 hrs:   BP Temp Temp src Pulse Heart Rate Resp SpO2 Height   06/06/18 1515 113/51 - - - 67 10 90 % -   06/06/18 1445 110/56 - - - - - 97 % -   06/06/18 1436 104/57 99.3  F (37.4  C) Oral 80 - 18 98 % 1.74 m (5' 8.5\")     Physical Exam  Eye:  Patient is blind.  Extraocular movements intact.    ENT:  No rhinorrhea.  Moist mucus membranes.  Normal tongue and tonsil.    Cardiac:  Regular rate and rhythm.  No murmurs, gallops, or rubs.    Pulmonary:  Clear to auscultation bilaterally.  No wheezes, rales, or rhonchi.    Abdomen:  Positive bowel sounds.  Abdomen is soft and non-distended, without focal tenderness.    Musculoskeletal:  Normal movement of all extremities without evidence for deficit.      Skin:  Warm and dry without rashes, save for the left foot.  He is s/p amputation of the great toe.  The long toe is generally red with the distal dip gray in color.  There is an ulcer between the long and middle toe with purulent drainage.  There is associated cellulitis extending up the forefoot.    Neurologic:  Non-focal exam without asymmetric weakness or numbness.     Psychiatric:  Normal affect with appropriate interaction with examiner.      Emergency Department Course     ECG:  ECG taken at 1501, ECG read at 1504   Normal sinus rhythm  Septal infarct, age undetermined  Abnormal ECG  No significant change compared to EKG 03/09/18   Rate 65 bpm. ME interval 148 ms. QRS duration 82 ms. QT/QTc 402/418 ms. P-R-T axes 51 38 56.    Imaging:  Radiology findings were communicated with the patient who voiced " understanding of the findings.    XR Toe Left 2 Views  Postoperative changes of left great toe amputation.  Osteopenia is noted about the left foot, but no fractures are seen.  Soft tissue irregularity at the tip of the second toe with underlying  cortical irregularity suggesting erosions and possible osteomyelitis.  GLEN CUEVAS MD  Reading per radiology    Laboratory:  Laboratory findings were communicated with the patient who voiced understanding of the findings.    CBC: WBC 8.2, HGB 12.2 (L),   BMP: Glucose 154 (H), GFR Estimate 54(L), Calcium 8.4 (L) o/w WNL (Creatinine 1.35(H))  CRP inflammation 76.8 (H)  Erythrocyte sedimentation rate auto: 28 (H)  Blood culture: in progress x2    Interventions:  1451 NS 1000mL IV  1630 NS 1000mL IV  1729 Zosyn 2.275 g IV  1810 Vancocin 2000 mg IV    Emergency Department Course:    1444 Nursing notes and vitals reviewed.    1439 IV was inserted and blood was drawn for laboratory testing, results above.    1517 I performed an exam of the patient as documented above.     1617 The patient was sent for a xray of the left toe while in the emergency department, results above.     1705  I spoke with Dr. Ross of the Hospitalist service from [facility] regarding patient's presentation, findings, and plan of care.    1710 I personally reviewed the laboratory and imaging results with the patient and answered all related questions prior to admittance.    Impression & Plan      Medical Decision Making:  Gomez Turk is a 57 year old male who presents to the emergency department today for evaluation of a syncopal spell after using the bathroom today.  However, on further discussion, he has been having rigors and chills along with increasing redness and tenderness to his left foot where he show signs of osteomyelitis.  Antibiotics were initiated for the ostial and he will require admission with possible debridement or removal of the second digit.  He otherwise does  not show any signs of severe sepsis.  Regarding his syncope, the exact etiology is unclear, though considering that he had just had 4 episodes of diarrhea, poor oral intake today, and occurrence with postural changes, I believe that it is likely a combination of his mild dehydration along with vasovagal causes.  He will be placed on telemetry and watch closely to complete his workup for syncope.  I spoke with Dr. Ross of the hospitalist service who agrees to admit the patient to a medical telemetry bed.    Diagnosis:    ICD-10-CM    1. Osteomyelitis of toe of left foot (H) M86.9    2. Cellulitis of foot, left L03.116    3. Syncope, unspecified syncope type R55      Disposition:   The patient is admitted into the care of Dr. Ross.    Scribe Disclosure:  I, Isis Ayon, am serving as a scribe at 3:15 PM on 6/6/2018 to document services personally performed by Trierweiler, Chad A, MD based on my observations and the provider's statements to me.   EMERGENCY DEPARTMENT       Trierweiler, Chad A, MD  06/07/18 0399

## 2018-06-06 NOTE — IP AVS SNAPSHOT
MRN:[id number]                      After Visit Summary   6/6/2018    Gomez Turk    MRN: [id number]           Thank you!     Thank you for choosing [facility] for your care. Our goal is always to provide you with excellent care. Hearing back from our patients is one way we can continue to improve our services. Please take a few minutes to complete the written survey that you may receive in the mail after you visit with us. Thank you!        Patient Information     Date Of Birth          1960        Designated Caregiver       Most Recent Value    Caregiver    Will someone help with your care after discharge? yes    Name of designated caregiver Camelia Turk    Phone number of caregiver [phone]    Caregiver address [address] [place] [address]      About your hospital stay     You were admitted on:  June 6, 2018 You last received care in the:  [facility] [address] [facility]    You were discharged on:  June 8, 2018        Reason for your hospital stay       Cellulitis of toe                  Who to Call     For medical emergencies, please call 911.  For non-urgent questions about your medical care, please call your primary care provider or clinic, [phone]  For questions related to your surgery, please call your surgery clinic        Attending Provider     Provider Specialty    Trierweiler, Chad A, MD Emergency Medicine    [facility]       Primary Care Provider Office Phone # Fax #    Tylor Roberts [phone]       When to contact your care team       Call your primary doctor if you have any of the following:  increased shortness of breath, increased swelling or increased pain.                  After Care Instructions     Activity       Your activity upon discharge: activity as tolerated            Diet       Follow this diet upon discharge: Orders Placed This Encounter      Moderate Consistent CHO Diet             Discharge Instructions       Please make sure you get labs done on Monday 6/11, your creatinine is high at 1.44 which is worse than the day prior to admission, your baseline is 1 , need follow up with your primary care physician to get the medications on hold to be restarted, your metformin and lisinopril is on hold.            Monitor and record       blood glucose as prior to admission  blood pressure daily  fluid intake and output daily  Drink plenty of fluids and ensure that you are urinating 5-6 times a day            Wound care and dressings       Instructions to care for your wound at home: as directed.                  Follow-up Appointments     Follow-up and recommended labs and tests        Follow up with [facility] Dr. Arzate or colleague within 1-2 weeks of discharge.  Seek immediate care for worsening redness, drainage, swelling, pain, fever, chills, nausea, vomiting.  Wound Care Recommendations:    1)  Keep the wound covered by a bandage when bathing.    2)  Gently clean the wound with soap water, separate from bath/shower water.      3)  Each day, apply a topical antibiotic ointment to the wound (Neosporin, Triple antibiotic, Bacitracin).   Cover with large band-aid or gauze.      4)  Please seek immediate medical attention if any increasing redness, swelling, drainage, smell, or pain related to the wound.     5)  Please return to clinic in the period of time requested.    Keep pressure off toe.    If you have questions or concerns, please call: Podiatry/Foot & Ankle Surgery Triage Team at the [facility] at [phone] (option 2 > option 3 for triage).   If it is after 4:30PM you can reach a [facility] Nurse Advisor at [phone].   They can then page the podiatrist on call, if needed. There is a [facility] podiatrist on call 24/7.            Follow-up and recommended labs and tests        Follow up with primary care provider, Tylor Roberts, within 4 days to  evaluate medication change, for hospital follow- up and to follow up on results.  The following labs/tests are recommended: basic metabolic panel on 6/11/18.  Please follow up on creatinine , possible ATN, his metformin and lisinopril is on hold , need follow up on blood pressure and blood sugar.                  Your next 10 appointments already scheduled     Jun 09, 2018 10:45 AM CDT   [facility]   [facility]    [address] [place] [id number]   [phone]           Please do not eat 10-12 hours before your appointment if you are coming in fasting for labs on lipids, cholesterol, or glucose (sugar). This does not apply to pregnant women. Water, hot tea and black coffee (with nothing added) are okay. Do not drink other fluids, diet soda or chew gum.            Jun 12, 2018  9:40 AM CDT   Office Visit with Tylor Roberts MD   [facility]    [address] [place] [address]   [phone]           Bring a current list of meds and any records pertaining to this visit. For Physicals, please bring immunization records and any forms needing to be filled out. Please arrive 10 minutes early to complete paperwork.            Jun 13, 2018  9:30 AM CDT   MA DIAGNOSTIC DIGITAL BILATERAL with SHBCMA3   [facility] ([facility])    [address] [place] [address], [address] [place] [id number] [phone]           Do not use any powder, lotion or deodorant under your arms or on your breast. If you do, we will ask you to remove it before your exam.  Wear comfortable, two-piece clothing.  If you have any allergies, tell your care team.  Bring any previous mammograms from other facilities or have them mailed to the breast center.  Three-dimensional (3D) mammograms are available at [facility] locations in [facility],  "[place], [facility], [place], [place], and [place]. [facility] locations include [place] and [facility] in [place]. Benefits of 3D mammograms include: - Improved rate of cancer detection - Decreases your chance of having to go back for more tests, which means fewer: - \"False-positive\" results (This means that there is an abnormal area but it isn't cancer.) - Invasive testing procedures, such as a biopsy or surgery - Can provide clearer images of the breast if you have dense breast tissue. 3D mammography is an optional exam that anyone can have with a 2D mammogram. It doesn't replace or take the place of a 2D mammogram. 2D mammograms remain an effective screening test for all women.  Not all insurance companies cover the cost of a 3D mammogram. Check with your insurance.            Jun 13, 2018 10:00 AM CDT   US BREAST AALIYAH LIMITED 1-3 QUAD with SHBCUS1   [facility] ([facility])    [address] [place] [address], [address] [place] [id number] [phone]           Please bring a list of your medicines (including vitamins, minerals and over-the-counter drugs). Also, tell your doctor about any allergies you may have. Wear comfortable clothes and leave your valuables at home.  You do not need to do anything special to prepare for your exam.  Please call the Imaging Department at your exam site with any questions.              Future tests that were ordered for you     Basic metabolic panel                 Further instructions from your care team       Blind cane/stick and clothing at bedside.     Pending Results     Date and Time Order Name Status Description    6/6/2018 1553 Blood culture Preliminary     6/6/2018 1553 Blood culture Preliminary             Statement of Approval     Ordered          06/08/18 1113  I have reviewed and agree with all the recommendations and orders detailed in this document.  EFFECTIVE NOW     Approved and electronically " "signed by:  Angeline Spangler MD             Admission Information     Date & Time Provider Department Dept. Phone    6/6/2018 Ramos Ross MD [facility] [address] [facility] [phone]      Your Vitals Were     Blood Pressure Pulse Temperature Respirations Height Weight    145/74 66 98.3  F (36.8  C) 18 1.74 m (5' 8.5\") 98.6 kg (217 lb 6 oz)    Pulse Oximetry BMI (Body Mass Index)                98% 32.57 kg/m2          MyChart Information     [employer] gives you secure access to your electronic health record. If you see a primary care provider, you can also send messages to your care team and make appointments. If you have questions, please call your primary care clinic.  If you do not have a primary care provider, please call [phone] and they will assist you.        Care EveryWhere ID     This is your Care EveryWhere ID. This could be used by other organizations to access your [facility] medical records  [id number]        Equal Access to Services     YUVAL MIGUEL AH: Hadii anita jain hadasho Soomaali, waaxda luqadaha, qaybta kaalmada adeegyada, waxay dom calles . So [facility] [phone].    ATENCIÓN: Si habla español, tiene a nye disposición servicios gratuitos de asistencia lingüística. Llame al [phone].    We comply with applicable federal civil rights laws and [place] laws. We do not discriminate on the basis of race, color, national origin, age, disability, sex, sexual orientation, or gender identity.               Review of your medicines      START taking        Dose / Directions    acetaminophen 325 MG tablet   Commonly known as:  TYLENOL   Used for:  Cellulitis of foot, left        Dose:  650 mg   Take 2 tablets (650 mg) by mouth every 4 hours as needed for mild pain   Quantity:  100 tablet   Refills:  0       amoxicillin-clavulanate 875-125 MG per tablet   Commonly known as:  AUGMENTIN   Used for:  Cellulitis of foot, left        Dose:  1 tablet   Take 1 tablet by " mouth 2 times daily   Quantity:  20 tablet   Refills:  0         CONTINUE these medicines which have NOT CHANGED        Dose / Directions    aspirin 81 MG EC tablet   Used for:  PAD (peripheral artery disease) (H)        Dose:  81 mg   Take 1 tablet (81 mg) by mouth daily   Quantity:  30 tablet   Refills:  11       atorvastatin 40 MG tablet   Commonly known as:  LIPITOR   Used for:  PAD (peripheral artery disease) (H)        Dose:  40 mg   Take 1 tablet (40 mg) by mouth daily   Quantity:  30 tablet   Refills:  11       augmented betamethasone dipropionate 0.05 % cream   Commonly known as:  DIPROLENE-AF   Used for:  Lichen simplex chronicus        Apply sparingly to affected area twice daily as needed.  Do not apply to face.   Quantity:  100 g   Refills:  3       citalopram 40 MG tablet   Commonly known as:  celeXA   Used for:  Vision loss, bilateral        Dose:  40 mg   Take 1 tablet (40 mg) by mouth daily   Quantity:  90 tablet   Refills:  1       clopidogrel 75 MG tablet   Commonly known as:  PLAVIX   Used for:  PAD (peripheral artery disease) (H)        Dose:  75 mg   Take 1 tablet (75 mg) by mouth daily   Quantity:  30 tablet   Refills:  11       gabapentin 300 MG capsule   Commonly known as:  NEURONTIN   Used for:  PAD (peripheral artery disease) (H), Type 2 diabetes mellitus with other ophthalmic complication, without long-term current use of insulin (H)        Dose:  300 mg   Take 1 capsule (300 mg) by mouth 3 times daily   Quantity:  90 capsule   Refills:  5       glimepiride 2 MG tablet   Commonly known as:  AMARYL   Used for:  Type 2 diabetes mellitus with diabetic neuropathy, with long-term current use of insulin (H)        Dose:  2 mg   Take 1 tablet (2 mg) by mouth 2 times daily   Quantity:  180 tablet   Refills:  3       zolpidem 10 MG tablet   Commonly known as:  AMBIEN   Used for:  Anxiety        Dose:  10 mg   Take 1 tablet (10 mg) by mouth nightly as needed for sleep , Do not use if taking  narcotics   Quantity:  30 tablet   Refills:  0         STOP taking     LISINOPRIL PO           metFORMIN 1000 MG tablet   Commonly known as:  GLUCOPHAGE           sildenafil 100 MG tablet   Commonly known as:  VIAGRA                Where to get your medicines      These medications were sent to [facility] [place] - [address]  [address] [address]     Phone:  [phone]     amoxicillin-clavulanate 875-125 MG per tablet         Some of these will need a paper prescription and others can be bought over the counter. Ask your nurse if you have questions.     You don't need a prescription for these medications     acetaminophen 325 MG tablet                Protect others around you: Learn how to safely use, store and throw away your medicines at www.disposemymeds.org.        ANTIBIOTIC INSTRUCTION     You've Been Prescribed an Antibiotic - Now What?  Your healthcare team thinks that you or your loved one might have an infection. Some infections can be treated with antibiotics, which are powerful, life-saving drugs. Like all medications, antibiotics have side effects and should only be used when necessary. There are some important things you should know about your antibiotic treatment.      Your healthcare team may run tests before you start taking an antibiotic.    Your team may take samples (e.g., from your blood, urine or other areas) to run tests to look for bacteria. These test can be important to determine if you need an antibiotic at all and, if you do, which antibiotic will work best.      Within a few days, your healthcare team might change or even stop your antibiotic.    Your team may start you on an antibiotic while they are working to find out what is making you sick.    Your team might change your antibiotic because test results show that a different antibiotic would be better to treat your infection.    In some cases, once your team has more information,  they learn that you do not need an antibiotic at all. They may find out that you don't have an infection, or that the antibiotic you're taking won't work against your infection. For example, an infection caused by a virus can't be treated with antibiotics. Staying on an antibiotic when you don't need it is more likely to be harmful than helpful.      You may experience side effects from your antibiotic.    Like all medications, antibiotics have side effects. Some of these can be serious.    Let you healthcare team know if you have any known allergies when you are admitted to the hospital.    One significant side effect of nearly all antibiotics is the risk of severe and sometimes deadly diarrhea caused by Clostridium difficile (C. Difficile). This occurs when a person takes antibiotics because some good germs are destroyed. Antibiotic use allows C. diificile to take over, putting patients at high risk for this serious infection.    As a patient or caregiver, it is important to understand your or your loved one's antibiotic treatment. It is especially important for caregivers to speak up when patients can't speak for themselves. Here are some important questions to ask your healthcare team.    What infection is this antibiotic treating and how do you know I have that infection?    What side effects might occur from this antibiotic?    How long will I need to take this antibiotic?    Is it safe to take this antibiotic with other medications or supplements (e.g., vitamins) that I am taking?     Are there any special directions I need to know about taking this antibiotic? For example, should I take it with food?    How will I be monitored to know whether my infection is responding to the antibiotic?    What tests may help to make sure the right antibiotic is prescribed for me?      Information provided by:  www.cdc.gov/getsmart  U.S. Department of Health and Human Services  Centers for disease Control and  Prevention  National Center for Emerging and Zoonotic Infectious Diseases  Division of Healthcare Quality Promotion             Medication List: This is a list of all your medications and when to take them. Check marks below indicate your daily home schedule. Keep this list as a reference.      Medications           Morning Afternoon Evening Bedtime As Needed    acetaminophen 325 MG tablet   Commonly known as:  TYLENOL   Take 2 tablets (650 mg) by mouth every 4 hours as needed for mild pain                                amoxicillin-clavulanate 875-125 MG per tablet   Commonly known as:  AUGMENTIN   Take 1 tablet by mouth 2 times daily                                aspirin 81 MG EC tablet   Take 1 tablet (81 mg) by mouth daily   Last time this was given:  81 mg on 6/8/2018  8:11 AM                                   atorvastatin 40 MG tablet   Commonly known as:  LIPITOR   Take 1 tablet (40 mg) by mouth daily   Last time this was given:  40 mg on 6/8/2018  8:11 AM                                   augmented betamethasone dipropionate 0.05 % cream   Commonly known as:  DIPROLENE-AF   Apply sparingly to affected area twice daily as needed.  Do not apply to face.                                   citalopram 40 MG tablet   Commonly known as:  celeXA   Take 1 tablet (40 mg) by mouth daily   Last time this was given:  40 mg on 6/8/2018  8:10 AM                                   clopidogrel 75 MG tablet   Commonly known as:  PLAVIX   Take 1 tablet (75 mg) by mouth daily                                   gabapentin 300 MG capsule   Commonly known as:  NEURONTIN   Take 1 capsule (300 mg) by mouth 3 times daily   Last time this was given:  300 mg on 6/8/2018  8:11 AM                                         glimepiride 2 MG tablet   Commonly known as:  AMARYL   Take 1 tablet (2 mg) by mouth 2 times daily                                      zolpidem 10 MG tablet   Commonly known as:  AMBIEN   Take 1 tablet (10 mg) by mouth  nightly as needed for sleep , Do not use if taking narcotics   Last time this was given:  10 mg on 6/6/2018 10:25 PM

## 2018-06-06 NOTE — PLAN OF CARE
RECEIVING UNIT ED HANDOFF REVIEW    ED Nurse Handoff Report was reviewed by: Cee Nolan on June 6, 2018 at 5:53 PM

## 2018-06-06 NOTE — IP AVS SNAPSHOT
[facility] [address] [facility]    [address] [place] [address]    Phone:  [phone]                                       After Visit Summary   6/6/2018    Gomez Turk    MRN: [id number]           After Visit Summary Signature Page     I have received my discharge instructions, and my questions have been answered. I have discussed any challenges I see with this plan with the nurse or doctor.    ..........................................................................................................................................  Patient/Patient Representative Signature      ..........................................................................................................................................  Patient Representative Print Name and Relationship to Patient    ..................................................               ................................................  Date                                            Time    ..........................................................................................................................................  Reviewed by Signature/Title    ...................................................              ..............................................  Date                                                            Time

## 2018-06-06 NOTE — ED NOTES
Bed: ED23  Expected date:   Expected time:   Means of arrival:   Comments:  vashti - 535- 57M syncope eta 1438

## 2018-06-07 ENCOUNTER — MYC MEDICAL ADVICE (OUTPATIENT)
Dept: INTERNAL MEDICINE | Facility: CLINIC | Age: 58
End: 2018-06-07

## 2018-06-07 ENCOUNTER — APPOINTMENT (OUTPATIENT)
Dept: MRI IMAGING | Facility: CLINIC | Age: 58
DRG: 602 | End: 2018-06-07
Attending: PODIATRIST
Payer: COMMERCIAL

## 2018-06-07 ENCOUNTER — APPOINTMENT (OUTPATIENT)
Dept: ULTRASOUND IMAGING | Facility: CLINIC | Age: 58
DRG: 602 | End: 2018-06-07
Attending: SURGERY
Payer: COMMERCIAL

## 2018-06-07 LAB
ANION GAP SERPL CALCULATED.3IONS-SCNC: 6 MMOL/L (ref 3–14)
APTT PPP: 28 SEC (ref 22–37)
BUN SERPL-MCNC: 10 MG/DL (ref 7–30)
CALCIUM SERPL-MCNC: 8.1 MG/DL (ref 8.5–10.1)
CHLORIDE SERPL-SCNC: 110 MMOL/L (ref 94–109)
CO2 SERPL-SCNC: 24 MMOL/L (ref 20–32)
CREAT SERPL-MCNC: 1.07 MG/DL (ref 0.66–1.25)
ERYTHROCYTE [DISTWIDTH] IN BLOOD BY AUTOMATED COUNT: 14.1 % (ref 10–15)
GFR SERPL CREATININE-BSD FRML MDRD: 71 ML/MIN/1.7M2
GLUCOSE BLDC GLUCOMTR-MCNC: 107 MG/DL (ref 70–99)
GLUCOSE BLDC GLUCOMTR-MCNC: 107 MG/DL (ref 70–99)
GLUCOSE BLDC GLUCOMTR-MCNC: 109 MG/DL (ref 70–99)
GLUCOSE BLDC GLUCOMTR-MCNC: 122 MG/DL (ref 70–99)
GLUCOSE BLDC GLUCOMTR-MCNC: 154 MG/DL (ref 70–99)
GLUCOSE BLDC GLUCOMTR-MCNC: 217 MG/DL (ref 70–99)
GLUCOSE SERPL-MCNC: 111 MG/DL (ref 70–99)
HCT VFR BLD AUTO: 34.2 % (ref 40–53)
HGB BLD-MCNC: 11.8 G/DL (ref 13.3–17.7)
INR PPP: 0.89 (ref 0.86–1.14)
MCH RBC QN AUTO: 31.1 PG (ref 26.5–33)
MCHC RBC AUTO-ENTMCNC: 34.5 G/DL (ref 31.5–36.5)
MCV RBC AUTO: 90 FL (ref 78–100)
PLATELET # BLD AUTO: 227 10E9/L (ref 150–450)
POTASSIUM SERPL-SCNC: 3.8 MMOL/L (ref 3.4–5.3)
RBC # BLD AUTO: 3.8 10E12/L (ref 4.4–5.9)
SODIUM SERPL-SCNC: 140 MMOL/L (ref 133–144)
WBC # BLD AUTO: 7.2 10E9/L (ref 4–11)

## 2018-06-07 PROCEDURE — 25000131 ZZH RX MED GY IP 250 OP 636 PS 637: Performed by: HOSPITALIST

## 2018-06-07 PROCEDURE — 25000128 H RX IP 250 OP 636: Performed by: HOSPITALIST

## 2018-06-07 PROCEDURE — 99232 SBSQ HOSP IP/OBS MODERATE 35: CPT | Performed by: INTERNAL MEDICINE

## 2018-06-07 PROCEDURE — 93926 LOWER EXTREMITY STUDY: CPT | Mod: LT

## 2018-06-07 PROCEDURE — 85610 PROTHROMBIN TIME: CPT | Performed by: HOSPITALIST

## 2018-06-07 PROCEDURE — 00000146 ZZHCL STATISTIC GLUCOSE BY METER IP

## 2018-06-07 PROCEDURE — 85027 COMPLETE CBC AUTOMATED: CPT | Performed by: HOSPITALIST

## 2018-06-07 PROCEDURE — 36415 COLL VENOUS BLD VENIPUNCTURE: CPT | Performed by: HOSPITALIST

## 2018-06-07 PROCEDURE — 12000000 ZZH R&B MED SURG/OB

## 2018-06-07 PROCEDURE — 25000132 ZZH RX MED GY IP 250 OP 250 PS 637: Performed by: INTERNAL MEDICINE

## 2018-06-07 PROCEDURE — 99223 1ST HOSP IP/OBS HIGH 75: CPT | Performed by: PODIATRIST

## 2018-06-07 PROCEDURE — 73720 MRI LWR EXTREMITY W/O&W/DYE: CPT | Mod: LT

## 2018-06-07 PROCEDURE — A9585 GADOBUTROL INJECTION: HCPCS | Performed by: HOSPITALIST

## 2018-06-07 PROCEDURE — 25000132 ZZH RX MED GY IP 250 OP 250 PS 637: Performed by: HOSPITALIST

## 2018-06-07 PROCEDURE — 80048 BASIC METABOLIC PNL TOTAL CA: CPT | Performed by: HOSPITALIST

## 2018-06-07 PROCEDURE — 99221 1ST HOSP IP/OBS SF/LOW 40: CPT | Performed by: SURGERY

## 2018-06-07 PROCEDURE — 85730 THROMBOPLASTIN TIME PARTIAL: CPT | Performed by: HOSPITALIST

## 2018-06-07 RX ORDER — OXYCODONE HYDROCHLORIDE 5 MG/1
10-20 TABLET ORAL 4 TIMES DAILY PRN
Status: DISCONTINUED | OUTPATIENT
Start: 2018-06-07 | End: 2018-06-08 | Stop reason: HOSPADM

## 2018-06-07 RX ORDER — ACYCLOVIR 200 MG/1
60 CAPSULE ORAL ONCE
Status: DISCONTINUED | OUTPATIENT
Start: 2018-06-07 | End: 2018-06-08 | Stop reason: HOSPADM

## 2018-06-07 RX ORDER — OXYCODONE HCL 10 MG/1
10-20 TABLET, FILM COATED, EXTENDED RELEASE ORAL 4 TIMES DAILY PRN
Status: DISCONTINUED | OUTPATIENT
Start: 2018-06-07 | End: 2018-06-07

## 2018-06-07 RX ORDER — GADOBUTROL 604.72 MG/ML
10 INJECTION INTRAVENOUS ONCE
Status: COMPLETED | OUTPATIENT
Start: 2018-06-07 | End: 2018-06-07

## 2018-06-07 RX ADMIN — GADOBUTROL 10 ML: 604.72 INJECTION INTRAVENOUS at 15:13

## 2018-06-07 RX ADMIN — GABAPENTIN 300 MG: 300 CAPSULE ORAL at 17:04

## 2018-06-07 RX ADMIN — VANCOMYCIN HYDROCHLORIDE 2000 MG: 5 INJECTION, POWDER, LYOPHILIZED, FOR SOLUTION INTRAVENOUS at 18:59

## 2018-06-07 RX ADMIN — CITALOPRAM HYDROBROMIDE 40 MG: 40 TABLET ORAL at 09:39

## 2018-06-07 RX ADMIN — VANCOMYCIN HYDROCHLORIDE 2000 MG: 5 INJECTION, POWDER, LYOPHILIZED, FOR SOLUTION INTRAVENOUS at 05:10

## 2018-06-07 RX ADMIN — NICOTINE 1 PATCH: 21 PATCH, EXTENDED RELEASE TRANSDERMAL at 17:04

## 2018-06-07 RX ADMIN — GABAPENTIN 300 MG: 300 CAPSULE ORAL at 09:39

## 2018-06-07 RX ADMIN — SODIUM CHLORIDE: 9 INJECTION, SOLUTION INTRAVENOUS at 07:25

## 2018-06-07 RX ADMIN — NICOTINE 1 PATCH: 21 PATCH, EXTENDED RELEASE TRANSDERMAL at 09:39

## 2018-06-07 RX ADMIN — ATORVASTATIN CALCIUM 40 MG: 40 TABLET, FILM COATED ORAL at 09:39

## 2018-06-07 RX ADMIN — INSULIN ASPART 1 UNITS: 100 INJECTION, SOLUTION INTRAVENOUS; SUBCUTANEOUS at 18:19

## 2018-06-07 RX ADMIN — PIPERACILLIN SODIUM,TAZOBACTAM SODIUM 3.38 G: 3; .375 INJECTION, POWDER, FOR SOLUTION INTRAVENOUS at 23:47

## 2018-06-07 RX ADMIN — PIPERACILLIN SODIUM,TAZOBACTAM SODIUM 3.38 G: 3; .375 INJECTION, POWDER, FOR SOLUTION INTRAVENOUS at 18:19

## 2018-06-07 RX ADMIN — PIPERACILLIN SODIUM,TAZOBACTAM SODIUM 3.38 G: 3; .375 INJECTION, POWDER, FOR SOLUTION INTRAVENOUS at 10:47

## 2018-06-07 RX ADMIN — OXYCODONE HYDROCHLORIDE AND ACETAMINOPHEN 2 TABLET: 5; 325 TABLET ORAL at 00:27

## 2018-06-07 RX ADMIN — OXYCODONE HYDROCHLORIDE 20 MG: 5 TABLET ORAL at 10:46

## 2018-06-07 RX ADMIN — OXYCODONE HYDROCHLORIDE 20 MG: 5 TABLET ORAL at 17:04

## 2018-06-07 RX ADMIN — OXYCODONE HYDROCHLORIDE AND ACETAMINOPHEN 2 TABLET: 5; 325 TABLET ORAL at 06:36

## 2018-06-07 RX ADMIN — LISINOPRIL 20 MG: 20 TABLET ORAL at 09:39

## 2018-06-07 RX ADMIN — PIPERACILLIN SODIUM,TAZOBACTAM SODIUM 3.38 G: 3; .375 INJECTION, POWDER, FOR SOLUTION INTRAVENOUS at 04:36

## 2018-06-07 RX ADMIN — ASPIRIN 81 MG: 81 TABLET, COATED ORAL at 09:39

## 2018-06-07 NOTE — PROGRESS NOTES
Vascular Surgery Progress Note    S: Gomez Turk is very well-known to me following his left below-knee popliteal to dorsalis pedis bypass graft placed in 2017.  He underwent a left great toe amputation at that time.  He has a hammertoe deformity of his second toe and has had a  callus on the tip.  He is now noted infection in that toe with possible osteomyelitis.  He has been admitted and started on intravenous antibiotics.  He has been evaluated by podiatry.  I last saw him on 2/15/2018 at which time he had an excellent palpable dorsalis pedis pulse.  By duplex the graft was patent with no obvious stenoses but very small outflow arteries.  He was on aspirin and Plavix.  Known PAD to the right leg but palpable dorsalis pedis pulse in the past.  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    O:   Vitals:  BP  Min: 104/58  Max: 144/73  Temp  Av.4  F (36.9  C)  Min: 97.6  F (36.4  C)  Max: 99.3  F (37.4  C)  Pulse  Av  Min: 80  Max: 80  I/O last 3 completed shifts:  In: 1182 [I.V.:1182]  Out: -     Physical Exam: Alert and appropriate.  His wife is here with him.                           Chest=  Clear    CV=RR +3 palpable                            Right and left dorsalis pedis pulses and left graft pulse.  Triphasic Doppler signals in the left graft in both dorsalis pedis arteries with weak                          right posterior tibial monophasic signal.  Well-healed left great toe amputation site.  Swelling of second toe with callus the tip and erythema.            Serum creatinine= 1.07   Potassium= 3.8    WBC= 7.2   Hemoglobin=11.8                         Assessment/Plan: Probable cellulitis/osteomyelitis left second toe with chronic deformity.  He is very adequate blood supply following the bypass graft.  We will check a duplex of the graft today to ensure that is functioning well.  Podiatry is planning a second toe amputation tomorrow  pending MRI of the osteomyelitis.  No tourniquets should be used or general anesthetic.  This will need to be done with a local anesthetic field block and sedation and this was discussed with the patient is wife.  Plavix should be restarted as soon as feasible.                                  PAD right leg but adequate blood flow via dorsalis pedis artery.      Wm. Margo MD

## 2018-06-07 NOTE — PROGRESS NOTES
[facility]    Hospitalist Progress Note    Assessment & Plan   Gomez Turk is a 57 year old male with past history DM II, PAD, hyperlipidemia, legal blindness who presents with syncope, found to have left toe and foot cellulitis/osteomyelitis.     Left foot cellulitis, probable left 2nd toe osteomyelitis: Erythema, warmth over left 2nd toe extending to dorsal foot with subjective fever/chills at home and marked increase in CRP.  Toe XR shows possible osteomyelitis at tip.  - Podiatry consult, possible surgery later today or tomorrow depending on mri foot.  - with history of left popliteal to dorsalis pedis bypass  -seen by vascular surgery, good pulses bilateral lower extremity   - continue vanco and zosyn  - note that he was instructed he should avoid general anesthesia due to possible connection to his optic neuropathy  - hold Plavix   pending Vas Surg and Podiatry input, continue aspirin      Syncope:  Suspect due to hypovolemia, possible component cellulitis but no SIRS criteria at admission.     -  ml/hr  -patient  Wanted orthotast stopped      DEEPA:  Baseline Cr 0.9, admission Cr 1.35.  Suspect hypovolemic, received 1L IVF in ED.  - IVF to continue      PAD s/p left popliteal to dorsalis pedis bypass 2/2017  - Plavix, aspirin as above  - resume PTA atorvastatin once verified     DM II:  A1C 6.0% in March 2018.    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     Tobacco abuse:  Resume PTA nicotine patch .     DVT Prophylaxis: Pneumatic Compression Devices  Code Status: Full Code     # Pain Assessment:  Current Pain Score 6/6/2018   Patient currently in pain? -   Pain score (0-10) 0   Pain location -   Pain descriptors -   - Gomez is experiencing pain due to left foot cellulitis; also reports chronic  pain in left foot. Pain management was discussed and the plan was created in a collaborative fashion.  Gomez's response to the current recommendations: engaged  - Opioid regimen: prn oxycodone added   - Response to opioid medications: Reduction of symptoms   - Bowel regimen: senna, miralax and docusate  - Pharmacologic adjuvants: Acetaminophen     Disposition: Expected discharge in 1-2 days pending need for surgical intervention.       Angeline Spangler MD  Text Page   (7am to 6pm)    Interval History   Resting in room with wife, plan for mri later today, he is tired, pain uncontrolled with percocet, asking for oxycodone, used to take 20 mg every 6 hours as needed at home for pain control, discussed about doing so here , possible surgery planned by podiatry, no fever or chills today, no diarrhea, on antibiotics  .    -Data reviewed today: I reviewed all new labs and imaging results over the last 24 hours. I personally reviewed xray foot noted     MRI foot pending   Physical Exam   Temp: 97.7  F (36.5  C) Temp src: Oral BP: 142/76 Pulse: 80 Heart Rate: 62 Resp: 16 SpO2: 97 % O2 Device: None (Room air)    Vitals:    06/07/18 0707   Weight: 96.9 kg (213 lb 10 oz)     Vital Signs with Ranges  Temp:  [97.6  F (36.4  C)-99.3  F (37.4  C)] 97.7  F (36.5  C)  Pulse:  [80] 80  Heart Rate:  [62-76] 62  Resp:  [10-18] 16  BP: (104-144)/(51-86) 142/76  SpO2:  [90 %-98 %] 97 %  I/O last 3 completed shifts:  In: 1182 [I.V.:1182]  Out: -     Constitutional: Awake, alert, cooperative, no apparent distress  Respiratory: Clear to auscultation bilaterally, no crackles or wheezing  Cardiovascular: Regular rate and rhythm, normal S1 and S2, and no murmur noted  GI: Normal bowel sounds, soft, non-distended, non-tender  Skin/Integumen: No rashes, no cyanosis, left foot 2nd toe is edematous with erythema and tender.  Neuro : moving all 4 extremities, no focal deficit noted , has optic neuritis and vision loss.    Medications     lactated  ringers 1,000 mL (06/06/18 1630)     sodium chloride 125 mL/hr at 06/07/18 0725       aspirin  81 mg Oral Daily     atorvastatin  40 mg Oral Daily     citalopram  40 mg Oral Daily     gabapentin  300 mg Oral TID     insulin aspart  1-7 Units Subcutaneous TID AC     insulin aspart  1-5 Units Subcutaneous At Bedtime     lisinopril (PRINIVIL/ZESTRIL) tablet 20 mg  20 mg Oral Daily     nicotine  1 patch Transdermal Daily     nicotine   Transdermal Q8H     nicotine   Transdermal Daily     piperacillin-tazobactam  3.375 g Intravenous Q6H     sodium chloride (PF)  3 mL Intracatheter Q8H     vancomycin (VANCOCIN) IV  2,000 mg Intravenous Q12H       Data     Recent Labs  Lab 06/07/18  0818 06/06/18  1439   WBC 7.2 8.2   HGB 11.8* 12.2*   MCV 90 90    244   NA  --  135   POTASSIUM  --  3.9   CHLORIDE  --  106   CO2  --  23   BUN  --  14   CR  --  1.35*   ANIONGAP  --  6   KRYSTEN  --  8.4*   GLC  --  154*       Recent Labs  Lab 06/07/18  0724 06/07/18  0208 06/06/18  2110 06/06/18  1902 06/06/18  1439   GLC  --   --   --   --  154*   * 107* 217* 141*  --        Imaging:   Recent Results (from the past 24 hour(s))   XR Toe Left G/E 2 Views    Narrative    XR TOE LT G/E 2 VW 6/6/2018 4:17 PM    COMPARISON: None.    HISTORY: Toe pain.      Impression    IMPRESSION: Postoperative changes of left great toe amputation.  Osteopenia is noted about the left foot, but no fractures are seen.  Soft tissue irregularity at the tip of the second toe with underlying  cortical irregularity suggesting erosions and possible osteomyelitis.    GLEN CUEVAS MD

## 2018-06-07 NOTE — PLAN OF CARE
"Problem: Patient Care Overview  Goal: Plan of Care/Patient Progress Review  Outcome: No Change  A/O x4. Legally blind. VSS on RA. Tele SB. Up with SBA. No dizziness reported. Percocet and ice packs utilized for foot pain- effective. Also elevated. There is +1 edema in BLEs, closer to +2 in L foot. Pt has podiatry and vascular surgery consults. + for distal pulses. IVF. IV abx. RN will continue to monitor.     Note: Pt received Ambien at bedtime and was requesting more about 0300. RN told pt he had already received it and he proceeded to state that he \"takes more if he needs it\" at home, and that his provider \"gave him a whole bottle.\"       "

## 2018-06-07 NOTE — PLAN OF CARE
Problem: Patient Care Overview  Goal: Plan of Care/Patient Progress Review  Admission    Patient arrives to room 607-02 via cart from ED @ 1830.  Care plan note: A&Ox4. Legally blind. SBA. IVF infusing. Given PRN percocet x1 for left foot pain, elevated with ice pack. BGM: 141, 217. On IV abx. Nicotine patch on left arm. Podi and vascular surgery consulted.     Inpatient nursing criteria listed below were met:    PCD's Documented: Yes  Skin issues/needs documented :Yes  Isolation education started/completed NA  Patient allergies verified with patient: Yes  Fall Prevention: Care plan updated, Education given and documented Yes  Care Plan initiated: Yes  Home medications documented in belongings flowsheet: NA  Patient belongings documented in belongings flowsheet: Yes  Reminder note (belongings/ medications) placed in discharge instructions:Yes  Admission profile/ required documentation complete: Yes

## 2018-06-07 NOTE — PLAN OF CARE
Problem: Patient Care Overview  Goal: Plan of Care/Patient Progress Review  Outcome: No Change  Pt A&Ox4; pt is legally blind. VSS on room air; orthostatics negative. Tele NSR. Up with SBA to bathroom a few times this shift; up in halls once this shift. Complains of left foot pain with intermittent relief from PRN Oxycodone once this shift. No complaints of SOB. Lung sounds clear. Tolerating diabetic diet.  and 109. IVF running @ 125. Adequate output. BLE tingling intermittently at baseline. 1+ edema bilateral lower extremities with 2+ edema left foot. Left foot with redness and warmth. Vascular and podiatry following. US left extremity done; graft is patent. Pt currently at MRI of left foot. Plan for surgery tomorrow. On IV Zosyn and Vancomycin. Discharge plan pending. Nursing will continue to monitor.

## 2018-06-07 NOTE — CONSULTS
[facility]  June 7, 2018      ASSESSMENT: 56 y/o DMII male with hx of L hallux amputation with L 2nd toe ulcer, cellulitis, likely osteomyelitis.  Hx of PAD.     PLAN:  Reviewed patient's chart in epic.  -Discussed condition and treatment options including pros and cons.  -Given XR findings of osteomyelitis of L 2nd toe, advised L partial 2nd toe amputation for treatment.  Will obtain MRI to eval extent of infection.  -Potential risks associated with surgery include spreading of infection to non-infected areas, continued open wound at the surgical site, recurrent ulceration at the same or different location, hematoma, osteomyelitis, repeat surgery or amputation and blood clot.   -Pt agrees to surgery.  Scheduled for tomorrow afternoon.  NPO midnight.  -Continue IV abx.  -Pt to discuss chronic pain control options with hospitalist.  -Dr. Aragon has cleared pt for surgery from a Vascular standpoint.  -Thank you for the consult.    Sagar Arzate DPM, FACFAS  Pager: [phone]      PATIENT HISTORY:  Gomez Turk is a 57 year old male who was admitted after an episode of syncope.  He has a noted L 2nd toe infection with XR findings suggestive of osteomyelitis.  Noted hammertoe.  Pt reports wound/callus here for several months.  Hx of LLE bypass 2/2017.  He underwent a L great toe amputation at that time which healed.  No other treatments reported.  Pt reports hx of chronic LE pain and is quite concerned about his narcotic medications while in house.  Hx of smoking, visual impairment.    Review of Systems:  Patient denies f/c/n/v.  Rest of 10 pt ROS neg except for HPI.     PAST MEDICAL HISTORY:   Past Medical History:   Diagnosis Date     DIVERTICULOSIS OF COLON W/O BLEED 6/25/2003     Hyperlipidemia LDL goal <100 10/31/2010     Legally blind      Tobacco use disorder 6/25/2003     Type 2 diabetes, HbA1c goal < 7% (H) 10/31/2010        PAST SURGICAL HISTORY:   Past  Surgical History:   Procedure Laterality Date     AMPUTATE TOE(S) Left 2/28/2017    Procedure: AMPUTATE TOE(S);  Surgeon: Jesus Aragon MD;  Location:  OR     BYPASS GRAFT FEMOROPOPLITEAL Left 2/28/2017    Procedure: BYPASS GRAFT FEMOROPOPLITEAL;  Surgeon: Jesus Aragon MD;  Location:  OR     ENT SURGERY  1990    deviated septum repair     IRRIGATION AND DEBRIDEMENT FOOT, COMBINED Left 2/28/2017    Procedure: COMBINED IRRIGATION AND DEBRIDEMENT FOOT;  Surgeon: Jesus Aragon MD;  Location:  OR     LAPAROSCOPIC CHOLECYSTECTOMY N/A 3/18/2017    Procedure: LAPAROSCOPIC CHOLECYSTECTOMY;  Surgeon: Edin Hollingsworth MD;  Location:  OR        MEDICATIONS:   Current Facility-Administered Medications:      acetaminophen (TYLENOL) Suppository 650 mg, 650 mg, Rectal, Q4H PRN, Ramos Ross MD     acetaminophen (TYLENOL) tablet 650 mg, 650 mg, Oral, Q4H PRN, Ramos Ross MD     aspirin EC tablet 81 mg, 81 mg, Oral, Daily, Ramos Ross MD, 81 mg at 06/07/18 0939     atorvastatin (LIPITOR) tablet 40 mg, 40 mg, Oral, Daily, Ramos Ross MD, 40 mg at 06/07/18 0939     bisacodyl (DULCOLAX) Suppository 10 mg, 10 mg, Rectal, Daily PRN, Ramos Ross MD     citalopram (celeXA) tablet 40 mg, 40 mg, Oral, Daily, Ramos Ross MD, 40 mg at 06/07/18 0939     glucose gel 15-30 g, 15-30 g, Oral, Q15 Min PRN **OR** dextrose 50 % injection 25-50 mL, 25-50 mL, Intravenous, Q15 Min PRN **OR** glucagon injection 1 mg, 1 mg, Subcutaneous, Q15 Min PRN, Ramos Ross MD     gabapentin (NEURONTIN) capsule 300 mg, 300 mg, Oral, TID, Ramos Ross MD, 300 mg at 06/07/18 0939     insulin aspart (NovoLOG) inj (RAPID ACTING), 1-7 Units, Subcutaneous, TID AC, Ramos Ross MD     insulin aspart (NovoLOG) inj (RAPID ACTING), 1-5 Units, Subcutaneous, At Bedtime, Ramos Ross MD, 1 Units at 06/06/18 2229     [COMPLETED] lactated ringers BOLUS 1,000 mL, 1,000 mL, Intravenous, Once, Stopped at 06/06/18  1630 **FOLLOWED BY** lactated ringers infusion, 1,000 mL, Intravenous, Continuous, Trierweiler, Chad A, MD, Last Rate: 125 mL/hr at 06/06/18 1630, 1,000 mL at 06/06/18 1630     lidocaine (LMX4) cream, , Topical, Q1H PRN, Ramos Ross MD     lidocaine 1 % 1 mL, 1 mL, Other, Q1H PRN, Ramos Ross MD     lisinopril (PRINIVIL/ZESTRIL) tablet 20 mg, 20 mg, Oral, Daily, Ramos Ross MD, 20 mg at 06/07/18 0939     melatonin tablet 1 mg, 1 mg, Oral, At Bedtime PRN, Ramos Ross MD     naloxone (NARCAN) injection 0.1-0.4 mg, 0.1-0.4 mg, Intravenous, Q2 Min PRN, Ramos Ross MD     nicotine (NICODERM CQ) 21 MG/24HR 24 hr patch 1 patch, 1 patch, Transdermal, Daily, Ramos Ross MD, 1 patch at 06/07/18 0939     nicotine Patch in Place, , Transdermal, Q8H, Ramos Ross MD     nicotine patch REMOVAL, , Transdermal, Daily, Ramos Ross MD     ondansetron (ZOFRAN-ODT) ODT tab 4 mg, 4 mg, Oral, Q6H PRN **OR** ondansetron (ZOFRAN) injection 4 mg, 4 mg, Intravenous, Q6H PRN, Ramos Ross MD     oxyCODONE-acetaminophen (PERCOCET) 5-325 MG per tablet 1-2 tablet, 1-2 tablet, Oral, Q4H PRN, Ramos Ross MD, 2 tablet at 06/07/18 0636     piperacillin-tazobactam (ZOSYN) 3.375 g vial to attach to  mL bag, 3.375 g, Intravenous, Q6H, Ramos Ross MD, Last Rate: 200 mL/hr at 06/06/18 2225, 3.375 g at 06/07/18 0436     polyethylene glycol (MIRALAX/GLYCOLAX) Packet 17 g, 17 g, Oral, Daily PRN, Ramos Ross MD     potassium chloride (KLOR-CON) Packet 20-40 mEq, 20-40 mEq, Oral or Feeding Tube, Q2H PRN, Ramos Ross MD     potassium chloride 10 mEq in 100 mL intermittent infusion with 10 mg lidocaine, 10 mEq, Intravenous, Q1H PRN, Ramos Ross MD     potassium chloride 10 mEq in 100 mL sterile water intermittent infusion (premix), 10 mEq, Intravenous, Q1H PRN, Ramos Ross MD     potassium chloride 20 mEq in 50 mL intermittent infusion, 20 mEq, Intravenous, Q1H PRN, Ramos Ross MD     potassium chloride SA  (K-DUR/KLOR-CON M) CR tablet 20-40 mEq, 20-40 mEq, Oral, Q2H PRN, Ramos Ross MD     prochlorperazine (COMPAZINE) injection 10 mg, 10 mg, Intravenous, Q6H PRN **OR** prochlorperazine (COMPAZINE) tablet 10 mg, 10 mg, Oral, Q6H PRN **OR** prochlorperazine (COMPAZINE) Suppository 25 mg, 25 mg, Rectal, Q12H PRN, Ramos Ross MD     senna-docusate (SENOKOT-S;PERICOLACE) 8.6-50 MG per tablet 1 tablet, 1 tablet, Oral, BID PRN **OR** senna-docusate (SENOKOT-S;PERICOLACE) 8.6-50 MG per tablet 2 tablet, 2 tablet, Oral, BID PRN, Ramos Ross MD     sodium chloride (PF) 0.9% PF flush 3 mL, 3 mL, Intracatheter, Q1H PRN, Ramos Ross MD     sodium chloride (PF) 0.9% PF flush 3 mL, 3 mL, Intracatheter, Q8H, Ramos Ross MD     sodium chloride 0.9% infusion, , Intravenous, Continuous, Ramos Ross MD, Last Rate: 125 mL/hr at 06/07/18 0725     vancomycin (VANCOCIN) 2,000 mg in sodium chloride 0.9 % 500 mL intermittent infusion, 2,000 mg, Intravenous, Q12H, Ramos Ross MD, Last Rate: 250 mL/hr at 06/07/18 0510, 2,000 mg at 06/07/18 0510     zolpidem (AMBIEN) tablet 10 mg, 10 mg, Oral, At Bedtime PRN, Ramos Ross MD, 10 mg at 06/06/18 2225     ALLERGIES:    Allergies   Allergen Reactions     No Known Drug Allergies         SOCIAL HISTORY:   Social History     Social History     Marital status:      Spouse name: N/A     Number of children: N/A     Years of education: N/A     Occupational History     Not on file.     Social History Main Topics     Smoking status: Current Every Day Smoker     Packs/day: 1.00     Types: Cigarettes     Smokeless tobacco: Never Used     Alcohol use Yes      Comment: occassion - 1 drink today     Drug use: No     Sexual activity: Not Currently     Partners: Female     Other Topics Concern     Parent/Sibling W/ Cabg, Mi Or Angioplasty Before 65f 55m? No     Social History Narrative        FAMILY HISTORY:   Family History   Problem Relation Age of Onset     DIABETES Mother      Lipids  "Mother      Melanoma Mother      CANCER Paternal Grandmother      Neurologic Disorder Maternal Uncle      Glaucoma No family hx of      Macular Degeneration No family hx of      Retinal detachment No family hx of      Thyroid Disease No family hx of         EXAM:Vitals: /76 (BP Location: Left arm)  Pulse 80  Temp 97.7  F (36.5  C) (Oral)  Resp 16  Ht 1.74 m (5' 8.5\")  Wt 96.9 kg (213 lb 10 oz)  SpO2 97%  BMI 32.01 kg/m2  BMI= Body mass index is 32.01 kg/(m^2).    General appearance: Patient is alert and fully cooperative with history & exam.  No sign of distress is noted during the visit.     Psychiatric: Affect is pleasant & appropriate.  Patient appears motivated to improve health.     Respiratory: Breathing is regular & unlabored while sitting.     HEENT: Hearing is intact to spoken word.  Speech is clear.  Pt with visual impairment.     Dermatologic: Ulceration to tip of L 2nd toe with surrounding erythema and edema.  No probing to bone.  Hyperkeratosis.  Stable appearing small eschars to dorsal right 2nd toe, right lateral ankle area.  No SOI here.     Vascular: DP pulse palpable on the L.  I could not palpate a PT.  CFT minimally delayed.  Digital hair growth noted.     Neurologic: Lower extremity sensation is diminished to light touch.     Musculoskeletal:  S/p L hallux amputation.  Lesser hammertoes. Patient is ambulatory without assistive device or brace.  No gross ankle deformity noted.  No foot or ankle joint effusion is noted.      XRs reviewed with pt:    XR TOE LT G/E 2 VW 6/6/2018 4:17 PM     COMPARISON: None.     HISTORY: Toe pain.         IMPRESSION: Postoperative changes of left great toe amputation.  Osteopenia is noted about the left foot, but no fractures are seen.  Soft tissue irregularity at the tip of the second toe with underlying  cortical irregularity suggesting erosions and possible osteomyelitis.     GLEN CUEVAS MD      Lab Results   Component Value Date    WBC 7.2 " 06/07/2018     Lab Results   Component Value Date    RBC 3.80 06/07/2018     Lab Results   Component Value Date    HGB 11.8 06/07/2018     Lab Results   Component Value Date    HCT 34.2 06/07/2018     No components found for: MCT  Lab Results   Component Value Date    MCV 90 06/07/2018     Lab Results   Component Value Date    MCH 31.1 06/07/2018     Lab Results   Component Value Date    MCHC 34.5 06/07/2018     Lab Results   Component Value Date    RDW 14.1 06/07/2018     Lab Results   Component Value Date     06/07/2018     ESR 28  CRP 76

## 2018-06-08 ENCOUNTER — SURGERY (OUTPATIENT)
Age: 58
End: 2018-06-08

## 2018-06-08 ENCOUNTER — TELEPHONE (OUTPATIENT)
Dept: OTHER | Facility: CLINIC | Age: 58
End: 2018-06-08

## 2018-06-08 ENCOUNTER — TELEPHONE (OUTPATIENT)
Dept: INTERNAL MEDICINE | Facility: CLINIC | Age: 58
End: 2018-06-08

## 2018-06-08 VITALS
SYSTOLIC BLOOD PRESSURE: 145 MMHG | OXYGEN SATURATION: 98 % | WEIGHT: 217.37 LBS | TEMPERATURE: 98.3 F | HEIGHT: 69 IN | RESPIRATION RATE: 18 BRPM | BODY MASS INDEX: 32.2 KG/M2 | HEART RATE: 66 BPM | DIASTOLIC BLOOD PRESSURE: 74 MMHG

## 2018-06-08 DIAGNOSIS — I73.9 PAD (PERIPHERAL ARTERY DISEASE) (H): ICD-10-CM

## 2018-06-08 LAB
CREAT SERPL-MCNC: 1.44 MG/DL (ref 0.66–1.25)
GFR SERPL CREATININE-BSD FRML MDRD: 50 ML/MIN/1.7M2
GLUCOSE BLDC GLUCOMTR-MCNC: 118 MG/DL (ref 70–99)
GLUCOSE BLDC GLUCOMTR-MCNC: 133 MG/DL (ref 70–99)
VANCOMYCIN SERPL-MCNC: 25.6 MG/L

## 2018-06-08 PROCEDURE — 99231 SBSQ HOSP IP/OBS SF/LOW 25: CPT | Performed by: SURGERY

## 2018-06-08 PROCEDURE — 80202 ASSAY OF VANCOMYCIN: CPT | Performed by: HOSPITALIST

## 2018-06-08 PROCEDURE — 82565 ASSAY OF CREATININE: CPT | Performed by: HOSPITALIST

## 2018-06-08 PROCEDURE — 36415 COLL VENOUS BLD VENIPUNCTURE: CPT | Performed by: HOSPITALIST

## 2018-06-08 PROCEDURE — 25000128 H RX IP 250 OP 636: Performed by: HOSPITALIST

## 2018-06-08 PROCEDURE — 99239 HOSP IP/OBS DSCHRG MGMT >30: CPT | Performed by: INTERNAL MEDICINE

## 2018-06-08 PROCEDURE — 00000146 ZZHCL STATISTIC GLUCOSE BY METER IP

## 2018-06-08 PROCEDURE — 99232 SBSQ HOSP IP/OBS MODERATE 35: CPT | Performed by: PODIATRIST

## 2018-06-08 PROCEDURE — 25000132 ZZH RX MED GY IP 250 OP 250 PS 637: Performed by: HOSPITALIST

## 2018-06-08 RX ORDER — CLOPIDOGREL BISULFATE 75 MG/1
75 TABLET ORAL DAILY
Qty: 30 TABLET | Refills: 0 | Status: SHIPPED | OUTPATIENT
Start: 2018-06-08 | End: 2018-06-12

## 2018-06-08 RX ORDER — SODIUM CHLORIDE, SODIUM LACTATE, POTASSIUM CHLORIDE, CALCIUM CHLORIDE 600; 310; 30; 20 MG/100ML; MG/100ML; MG/100ML; MG/100ML
INJECTION, SOLUTION INTRAVENOUS CONTINUOUS
Status: CANCELLED | OUTPATIENT
Start: 2018-06-08

## 2018-06-08 RX ORDER — CLOPIDOGREL BISULFATE 75 MG/1
75 TABLET ORAL DAILY
Qty: 90 TABLET | Refills: 3 | Status: SHIPPED | OUTPATIENT
Start: 2018-06-08 | End: 2018-06-12

## 2018-06-08 RX ORDER — ACETAMINOPHEN 325 MG/1
650 TABLET ORAL EVERY 4 HOURS PRN
Qty: 100 TABLET | Refills: 0 | COMMUNITY
Start: 2018-06-08 | End: 2018-11-01

## 2018-06-08 RX ADMIN — PIPERACILLIN SODIUM,TAZOBACTAM SODIUM 3.38 G: 3; .375 INJECTION, POWDER, FOR SOLUTION INTRAVENOUS at 04:50

## 2018-06-08 RX ADMIN — SODIUM CHLORIDE: 9 INJECTION, SOLUTION INTRAVENOUS at 01:35

## 2018-06-08 RX ADMIN — CITALOPRAM HYDROBROMIDE 40 MG: 40 TABLET ORAL at 08:10

## 2018-06-08 RX ADMIN — NICOTINE 1 PATCH: 21 PATCH, EXTENDED RELEASE TRANSDERMAL at 08:11

## 2018-06-08 RX ADMIN — GABAPENTIN 300 MG: 300 CAPSULE ORAL at 08:11

## 2018-06-08 RX ADMIN — ATORVASTATIN CALCIUM 40 MG: 40 TABLET, FILM COATED ORAL at 08:11

## 2018-06-08 RX ADMIN — LISINOPRIL 20 MG: 20 TABLET ORAL at 08:11

## 2018-06-08 RX ADMIN — ASPIRIN 81 MG: 81 TABLET, COATED ORAL at 08:11

## 2018-06-08 NOTE — PLAN OF CARE
Problem: Patient Care Overview  Goal: Plan of Care/Patient Progress Review  Pt A&Ox4, VSS on RA. Legally blind, can see shadows. C/o L foot pain, oxy given and effective. BG monitored. Up SBA. IVF. Tele: NSR. Showered in evening, very appreciative. +1 pedal pulses, elevated L foot/leg during shift.

## 2018-06-08 NOTE — PROGRESS NOTES
"[facility]  June 8, 2018    A/P:  L 2nd toe ulcer, cellulitis - resolving; MRI neg for osteomyelitis    -Discussed condition and treatment options including pros and cons.  -MRI negative for osteomyelitis.  Surgery cancelled today.  -Toe is greatly improved.  No need for debridement at this time.  Discussed risk of future deep infection, amputation.  -Will placed foot relevant d/c orders.  -Pt declined offloading shoe - he has new DM shoes/inserts at home.  Offloading advised.  -May d/c on oral abx.  -Discussed wound care including daily dressing changes.  -f/u in clinic in 1-2 wks.  -Will sign off.    Sagar Arzate DPM, FACFAS  Pager: [phone]    S:  Pt seen at bedside.  No acute issues.  No f/c.    O:  /84 (BP Location: Left arm)  Pulse 80  Temp 97.6  F (36.4  C) (Oral)  Resp 16  Ht 1.74 m (5' 8.5\")  Wt 98.6 kg (217 lb 6 oz)  SpO2 100%  BMI 32.57 kg/m2  General appearance: Patient is alert and fully cooperative with history & exam.  No sign of distress is noted during the visit.      Dermatologic: Small ulceration to tip of L 2nd toe with nearly resolved erythema/edema.        Vascular: DP pulse palpable on the L.  I could not palpate a PT.  CFT minimally delayed.  Digital hair growth noted.      Neurologic: Lower extremity sensation is diminished to light touch.      Musculoskeletal:  S/p L hallux amputation.  Lesser hammertoes. Patient is ambulatory without assistive device or brace.  No gross ankle deformity noted.  No foot or ankle joint effusion is noted.      MRI reviewed with pt:        IMPRESSION:   1. Second toe soft tissue wound. Dorsal and deeper soft tissue edema  is noted within the forefoot. MR cannot distinguish reactive edema  from cellulitis. No soft tissue abscess or rim-enhancing fluid  collection is demonstrated.  2. No evidence of second toe osteomyelitis.  3. Great toe amputation.     PRO AVILA MD    "

## 2018-06-08 NOTE — PROGRESS NOTES
VASCULAR SURGERY    Duplex ultrasound confirms a widely patent popliteal to dorsalis pedis bypass graft with excellent flow and no stenoses.  This is consistent with the clinical findings of a strong graft and dorsalis pedis pulse with excellent Doppler signals.    MRI revealed swelling of the second toe but no obvious osteomyelitis.    With excellent blood supply to the foot patient should be able to heal a left second toe amputation.      Jesus Aragon MD

## 2018-06-08 NOTE — TELEPHONE ENCOUNTER
"Dr. Roberts,    Pt is calling requesting a refill for his RX clopidogrel (PLAVIX) 75 MG tablet.  He says he has been off this medication for \"awhile.\" And he was in the hospital this morning for an infection in his toe. He has been discharged from the hospital. But pt says that this morning,  when he was in the hospital, his Vascular doctor, Jesus Aragon MD , informed pt that he needs to be taking the plavix, and told pt \"to call your primary DR and have him refill the plavix for you.\" Pt has appt scheduled with you for next Tues 6/12. RX is pending.  "

## 2018-06-08 NOTE — TELEPHONE ENCOUNTER
Dr Aragon saw Gomez this morning before he was discharged from the hospital.  He told Gomez he should check with his PCP to refill his Plavix, which Gomez is out of.  Gomez is calling back because his PCP told him they never prescribed the Plavix and they won't refill an Rx that isn't theirs.    Gomez wants to know if Dr Aragon can refill it for him, pls call in to [facility] at [phone].  Can you please call if you cannot refill it, or also call when you refill so he can pick it up? Home [phone] or cell [phone].  Routing to [facility] RN Surgery Triage Porsha Chaney -  at [facility]

## 2018-06-08 NOTE — TELEPHONE ENCOUNTER
Requested Prescriptions   Pending Prescriptions Disp Refills     clopidogrel (PLAVIX) 75 MG tablet 30 tablet 0     Sig: Take 1 tablet (75 mg) by mouth daily    There is no refill protocol information for this order        Pt called and stated PCP would not refill prescription, as they did not originally prescribe it. Per Dr. Aragon's recommendation, pt needs to resume taking Plavix.  Pt aware he will need to have PCP refill going forward.    Dennise Lee, VIDALN, RN

## 2018-06-08 NOTE — PHARMACY-VANCOMYCIN DOSING SERVICE
Pharmacy Vancomycin Note  Date of Service 2018  Patient's  1960   57 year old, male    Indication: Osteomyelitis  Goal Trough Level: 15-20 mg/L  Day of Therapy: 3  Current Vancomycin regimen:  2000 mg IV q12h    Current estimated CrCl = Estimated Creatinine Clearance: 65 mL/min (based on Cr of 1.44).    Creatinine for last 3 days  2018:  2:39 PM Creatinine 1.35 mg/dL  2018:  8:18 AM Creatinine 1.07 mg/dL  2018:  5:00 AM Creatinine 1.44 mg/dL    Recent Vancomycin Levels (past 3 days)  2018:  5:00 AM Vancomycin Level 25.6 mg/L    Vancomycin IV Administrations (past 72 hours)                   vancomycin (VANCOCIN) 2,000 mg in sodium chloride 0.9 % 500 mL intermittent infusion (mg) 2,000 mg New Bag 18 1859     2,000 mg New Bag  0510    vancomycin (VANCOCIN) 2,000 mg in sodium chloride 0.9 % 500 mL intermittent infusion (mg) 2,000 mg New Bag 18 1810                Nephrotoxins and other renal medications (Future)    Start     Dose/Rate Route Frequency Ordered Stop    18 1200  vancomycin (VANCOCIN) 2,000 mg in sodium chloride 0.9 % 500 mL intermittent infusion      2,000 mg  over 2 Hours Intravenous EVERY 18 HOURS 18 0642      18 0900  lisinopril (PRINIVIL/ZESTRIL) tablet 20 mg      20 mg Oral DAILY 18 0600  vancomycin (VANCOCIN) 2,000 mg in sodium chloride 0.9 % 500 mL intermittent infusion      2,000 mg  over 2 Hours Intravenous EVERY 12 HOURS 18 1939      18 2300  piperacillin-tazobactam (ZOSYN) 3.375 g vial to attach to  mL bag      3.375 g  over 30 Minutes Intravenous EVERY 6 HOURS 18 1842               Contrast Orders - past 72 hours (72h ago through future)    Start     Dose/Rate Route Frequency Ordered Stop    18 1515  gadobutrol (GADAVIST) injection 10 mL      10 mL Intravenous ONCE 18 1512 18 1513          Interpretation of levels and current regimen:  Trough level is  25.6    Has  serum creatinine changed > 50% in last 72 hours: No    Urine output:  good urine output    Renal Function: Stable    Plan:  1.  Decrease Dose to 2gm q18h  2.  Pharmacy will check trough levels as appropriate in 1-3 Days.    3. Serum creatinine levels will be ordered daily for the first week of therapy and at least twice weekly for subsequent weeks.      Juan Lawrence        .

## 2018-06-08 NOTE — DISCHARGE SUMMARY
[facility]    Discharge Summary  Hospitalist    Date of Admission:  6/6/2018  Date of Discharge:  6/8/2018  Discharging Provider: Angeline Spangler MD    Discharge Diagnoses   Cellulitis of toe left   No osteomyelitis as per MRI 6/7    History of Present Illness   Gomez Turk is a 57 year old male who presents with syncope.  Patient reports yesterday he experienced subjective fever and shaking chills in addition to generalized weakness.  He also endorses some poor appetite.  He woke this morning again feeling generally weak, though somewhat improved from yesterday.  He went to lie down for a nap this afternoon, used the restroom and was walking to his bedroom when he had onset of marked lightheadedness, dizziness and generalized weakness and next remembers waking up on the floor.  He denies any significant confusion upon awakening, denies any bowel or bladder incontinence and denies any history of seizure.  He notes the second toe of his left foot has been painful over the past 2 days.  He denies any trauma.  Due to his blindness, he did not note any other changes.  His wife, who changes the daily dressing over his prior bypass surgical site of the left leg had noted no significant change in the left toe last evening.  Today she notes marked increase in redness.  Hospital Course   Gomez Turk was admitted on 6/6/2018.  The following problems were addressed during his hospitalization:    Cellulitis of toe 2nd on left foot   Following admission there was a concern whether he had osteomyelitis of his toe since the xray indicated so, he was started on vanco and zosyn for the cellulitis and possible osteomyelitis, seen by podiatry and vascular surgery, they ordered MRI and that didn't indicate any osteomyelitis, vascular surgery examined his lower extremity for blood flow and indicated that currently his grafts are [atent, since the mri didn't show any osteo ,podiatry dont plan any procedure,  patient  Is to follow up with them outpatient,currently his cellulitis has improved significantly on zosyn and vanco, no pain.  He had syncope which was assumed secondary to hypotension and dehydration, had acute kidney injury which improved with hydration, on 6/7 creatine was 1.07 but again on the day of discharge his creatinine went up to 1.4, I offered to keep him here and monitor for another day, nephrology consult etc, patient  Didn't want to stay here any further, his wife assured me that they would rather get the creatinine checked on Monday, made sure he drank enough fluids and follow up with his primary care physician on Tuesday, appointments made for above.  I am holding his metformin  and lisinopril and he will need to restart that once seen by primary care physician , current elevation in his creatinine possibly range of movement ATN vs medication effect.  # Discharge Pain Plan:   - Patient currently has NO PAIN and is not being prescribed pain medications on discharge.    Angeline Spangler MD    Significant Results and Procedures   MRI of foot     Pending Results   These results will be followed up by primary care physician   Basic metabolic panel in 3-4 days   Unresulted Labs Ordered in the Past 30 Days of this Admission     Date and Time Order Name Status Description    6/6/2018 1553 Blood culture Preliminary     6/6/2018 1553 Blood culture Preliminary           Code Status   Full Code       Primary Care Physician   Tylor Roberts    Physical Exam   Temp: 98.3  F (36.8  C) Temp src: Oral BP: 145/74 Pulse: 66 Heart Rate: 64 Resp: 18 SpO2: 98 % O2 Device: None (Room air)    Vitals:    06/07/18 0707 06/08/18 0500   Weight: 96.9 kg (213 lb 10 oz) 98.6 kg (217 lb 6 oz)     Vital Signs with Ranges  Temp:  [97.6  F (36.4  C)-98.3  F (36.8  C)] 98.3  F (36.8  C)  Pulse:  [66] 66  Heart Rate:  [63-64] 64  Resp:  [16-18] 18  BP: (122-145)/(74-84) 145/74  SpO2:  [98 %-100 %] 98 %  I/O last 3 completed  shifts:  In: 3100 [P.O.:480; I.V.:2620]  Out: -     The patient was examined on the day of discharge.    Discharge Disposition   Discharged to home  Condition at discharge: Stable    Consultations This Hospital Stay   PHARMACY TO DOSE VANCO  PHARMACY TO DOSE VANC  VASCULAR SURGERY IP CONSULT  PODIATRY IP CONSULT    Time Spent on this Encounter   IAngeline, personally saw the patient today and spent greater than 30 minutes discharging this patient.    Discharge Orders     Follow-up and recommended labs and tests    Follow up with [facility] Dr. Arzate or colleague within 1-2 weeks of discharge.  Seek immediate care for worsening redness, drainage, swelling, pain, fever, chills, nausea, vomiting.  Wound Care Recommendations:    1)  Keep the wound covered by a bandage when bathing.    2)  Gently clean the wound with soap water, separate from bath/shower water.      3)  Each day, apply a topical antibiotic ointment to the wound (Neosporin, Triple antibiotic, Bacitracin).   Cover with large band-aid or gauze.      4)  Please seek immediate medical attention if any increasing redness, swelling, drainage, smell, or pain related to the wound.     5)  Please return to clinic in the period of time requested.    Keep pressure off toe.    If you have questions or concerns, please call: Podiatry/Foot & Ankle Surgery Triage Team at the [facility] at [phone] (option 2 > option 3 for triage).   If it is after 4:30PM you can reach a [facility] Nurse Advisor at [phone].   They can then page the podiatrist on call, if needed. There is a [facility] podiatrist on call 24/7.     Reason for your hospital stay   Cellulitis of toe     Follow-up and recommended labs and tests    Follow up with primary care provider, Tylor Roberts, within 4 days to evaluate medication change, for hospital follow- up and to follow up on results.  The following labs/tests are recommended: basic metabolic panel  on 6/11/18.  Please follow up on creatinine , possible ATN, his metformin and lisinopril is on hold , need follow up on blood pressure and blood sugar.     Activity   Your activity upon discharge: activity as tolerated     Monitor and record   blood glucose as prior to admission  blood pressure daily  fluid intake and output daily  Drink plenty of fluids and ensure that you are urinating 5-6 times a day     When to contact your care team   Call your primary doctor if you have any of the following:  increased shortness of breath, increased swelling or increased pain.     Discharge Instructions   Please make sure you get labs done on Monday 6/11, your creatinine is high at 1.44 which is worse than the day prior to admission, your baseline is 1 , need follow up with your primary care physician to get the medications on hold to be restarted, your metformin and lisinopril is on hold.     Wound care and dressings   Instructions to care for your wound at home: as directed.     Full Code     Diet   Follow this diet upon discharge: Orders Placed This Encounter     Moderate Consistent CHO Diet       Discharge Medications   Current Discharge Medication List      START taking these medications    Details   acetaminophen (TYLENOL) 325 MG tablet Take 2 tablets (650 mg) by mouth every 4 hours as needed for mild pain  Qty: 100 tablet, Refills: 0    Associated Diagnoses: Cellulitis of foot, left      amoxicillin-clavulanate (AUGMENTIN) 875-125 MG per tablet Take 1 tablet by mouth 2 times daily  Qty: 20 tablet, Refills: 0    Associated Diagnoses: Cellulitis of foot, left         CONTINUE these medications which have NOT CHANGED    Details   citalopram (CELEXA) 40 MG tablet Take 1 tablet (40 mg) by mouth daily  Qty: 90 tablet, Refills: 1    Associated Diagnoses: Vision loss, bilateral      gabapentin (NEURONTIN) 300 MG capsule Take 1 capsule (300 mg) by mouth 3 times daily  Qty: 90 capsule, Refills: 5    Associated Diagnoses: PAD  (peripheral artery disease) (H); Type 2 diabetes mellitus with other ophthalmic complication, without long-term current use of insulin (H)      glimepiride (AMARYL) 2 MG tablet Take 1 tablet (2 mg) by mouth 2 times daily  Qty: 180 tablet, Refills: 3    Associated Diagnoses: Type 2 diabetes mellitus with diabetic neuropathy, with long-term current use of insulin (H)      zolpidem (AMBIEN) 10 MG tablet Take 1 tablet (10 mg) by mouth nightly as needed for sleep , Do not use if taking narcotics  Qty: 30 tablet, Refills: 0    Associated Diagnoses: Anxiety      aspirin EC 81 MG EC tablet Take 1 tablet (81 mg) by mouth daily  Qty: 30 tablet, Refills: 11    Associated Diagnoses: PAD (peripheral artery disease) (H)      atorvastatin (LIPITOR) 40 MG tablet Take 1 tablet (40 mg) by mouth daily  Qty: 30 tablet, Refills: 11    Associated Diagnoses: PAD (peripheral artery disease) (H)      augmented betamethasone dipropionate (DIPROLENE-AF) 0.05 % cream Apply sparingly to affected area twice daily as needed.  Do not apply to face.  Qty: 100 g, Refills: 3    Associated Diagnoses: Lichen simplex chronicus      clopidogrel (PLAVIX) 75 MG tablet Take 1 tablet (75 mg) by mouth daily  Qty: 30 tablet, Refills: 11    Associated Diagnoses: PAD (peripheral artery disease) (H)         STOP taking these medications       LISINOPRIL PO Comments:   Reason for Stopping:         metFORMIN (GLUCOPHAGE) 1000 MG tablet Comments:   Reason for Stopping:         sildenafil (VIAGRA) 100 MG tablet Comments:   Reason for Stopping:             Allergies   Allergies   Allergen Reactions     No Known Drug Allergies      Data   Most Recent 3 CBC's:  Recent Labs   Lab Test  06/07/18   0818  06/06/18   1439  11/07/17   0928   WBC  7.2  8.2  12.5*   HGB  11.8*  12.2*  12.5*   MCV  90  90  96   PLT  227  244  296      Most Recent 3 BMP's:  Recent Labs   Lab Test  06/08/18   0500  06/07/18   0818  06/06/18   1439  03/06/18   0818   NA   --   140  135  139    POTASSIUM   --   3.8  3.9  4.7   CHLORIDE   --   110*  106  106   CO2   --   24  23  26   BUN   --   10  14  14   CR  1.44*  1.07  1.35*  0.96   ANIONGAP   --   6  6  7   KRYSTEN   --   8.1*  8.4*  9.3   GLC   --   111*  154*  113*     Most Recent 2 LFT's:  Recent Labs   Lab Test  03/06/18   0818  11/07/17   0928   AST  29  31   ALT  24  49   ALKPHOS  79  96   BILITOTAL  0.4  0.2     Most Recent INR's and Anticoagulation Dosing History:  Anticoagulation Dose History     Recent Dosing and Labs Latest Ref Rng & Units 2/27/2017 2/27/2017 3/9/2017 3/18/2017 6/7/2018    INR 0.86 - 1.14 0.90 0.93 1.01 1.38(H) 0.89        Most Recent 3 Troponin's:  Recent Labs   Lab Test  05/17/17   1619   TROPI  <0.015  The 99th percentile for upper reference range is 0.045 ug/L.  Troponin values in   the range of 0.045 - 0.120 ug/L may be associated with risks of adverse   clinical events.       Most Recent Cholesterol Panel:  Recent Labs   Lab Test  03/06/18   0818   CHOL  99   LDL  22   HDL  57   TRIG  102     Most Recent 6 Bacteria Isolates From Any Culture (See EPIC Reports for Culture Details):  Recent Labs   Lab Test  06/06/18   1550  06/06/18   1439  03/17/17   1725  03/17/17   1700  04/25/12   1127   CULT  No growth after 2 days  No growth after 2 days  No growth  No growth  Heavy growth Staphylococcus aureus     Most Recent TSH, T4 and A1c Labs:  Recent Labs   Lab Test  06/06/18   1902  03/06/18   0818   TSH   --   0.80   A1C  6.5*  6.0     Results for orders placed or performed during the hospital encounter of 06/06/18   XR Toe Left G/E 2 Views    Narrative    XR TOE LT G/E 2 VW 6/6/2018 4:17 PM    COMPARISON: None.    HISTORY: Toe pain.      Impression    IMPRESSION: Postoperative changes of left great toe amputation.  Osteopenia is noted about the left foot, but no fractures are seen.  Soft tissue irregularity at the tip of the second toe with underlying  cortical irregularity suggesting erosions and possible  osteomyelitis.    GLEN CUEVAS MD    Lower Extremity Arterial Duplex Left    Narrative    ULTRASOUND LEFT LOWER EXTREMITY ARTERIAL DUPLEX June 7, 2018 11:56 AM     HISTORY: 57-year-old male status post a left below-knee popliteal  artery to dorsalis pedis artery bypass graft.    COMPARISON: Ultrasound dated 2/15/2018.    FINDINGS: Color Doppler and spectral waveform analysis performed.    The bypass graft is patent. Velocity measurements within the graft are  lower than noted on the previous exam and range between 37-66 cm/s  versus  cm/s on the prior exam. Waveforms are not significantly  changed.      Impression    IMPRESSION: Patent graft. Lower velocities within the graft but no  definite evidence for a significant stenosis.    KEANU DRAKE MD   MR Foot Left w/o & w Contrast    Narrative    MR FOOT LEFT WITHOUT AND WITH CONTRAST   6/7/2018 3:11 PM     HISTORY: Ulcer tip of second toe, evaluate for osteomyelitis.     DOSE: 10 mL Gadavist    FINDINGS: There has been an amputation of the great toe at the  metatarsophalangeal joint. There is a suspected wound at the tip of  the second toe. No adjacent marrow edema is seen to indicate  osteomyelitis. Second toe soft tissue edema with edema tracking along  the dorsum and deeper soft tissues of the forefoot is noted. However,  no rim-enhancing soft tissue fluid collection or abscess is  demonstrated. No joint effusion is demonstrated within the forefoot.      Impression    IMPRESSION:   1. Second toe soft tissue wound. Dorsal and deeper soft tissue edema  is noted within the forefoot. MR cannot distinguish reactive edema  from cellulitis. No soft tissue abscess or rim-enhancing fluid  collection is demonstrated.  2. No evidence of second toe osteomyelitis.  3. Great toe amputation.    PRO AVILA MD

## 2018-06-08 NOTE — PLAN OF CARE
Problem: Patient Care Overview  Goal: Plan of Care/Patient Progress Review  Outcome: No Change  A/O x4. Legally blind. Up SBA to bathroom. No dizziness. VSS on RA. Keeping LLE elevated. Some pain to L foot but declined intervention. Oxycodone available if necessary. NPO for possible L 2nd toe amputation tomorrow. OR has pt scheduled at 1440 and plan is for podiatry to come discuss with pt in am. Vascular surgery also following. + pedal pulses. Pt continues IVF, and Juanito Hughes. RN will continue to monitor.

## 2018-06-08 NOTE — PROGRESS NOTES
Vascular Surgery Progress Note    S: No complaints.  Very comfortable.    O:   Vitals:  BP  Min: 122/74  Max: 145/74  Temp  Av.9  F (36.6  C)  Min: 97.6  F (36.4  C)  Max: 98.3  F (36.8  C)  Pulse  Av  Min: 66  Max: 66  I/O last 3 completed shifts:  In: 3100 [P.O.:480; I.V.:2620]  Out: -     Physical Exam: Alert and appropriate.  Excellent pulses in graft and left DP.                Erythema of the left great toe has improved.  Duplex confirmed a widely patent left leg bypass graft.    MRI of the foot revealed no osteomyelitis of the second toe.                  Assessment/Plan: Excellent blood flow to the left foot.  Repeat duplex ultrasound in the office in 6 months.                                   No osteomyelitis of the left second toe with hammertoe deformity.  Improving cellulitis.Dr. Arzate of podiatry  has decided that no toe amputation is indicated.  Plan is to discharge the patient today with oral antibiotics.  Dr. Arzate will follow up with the patient in the clinic in 1-2 weeks      Wm. Margo MD

## 2018-06-08 NOTE — PLAN OF CARE
Problem: Patient Care Overview  Goal: Plan of Care/Patient Progress Review  Outcome: Adequate for Discharge Date Met: 06/08/18  Discharge    Patient discharged to home via car with wife  Care plan note discharge instructions reviewed. Copy sent with pt. Questions answered. IV removed.     Listed belongings gathered and returned to patient. yes  Care Plan and Patient education resolved: yes  Prescriptions if needed, hard copies sent with patient  yes  Home and hospital acquired medications returned to patient: NA  Medication Bin checked and emptied on discharge yes  Follow up appointment made for patient: yes

## 2018-06-09 DIAGNOSIS — L03.116 CELLULITIS OF FOOT, LEFT: ICD-10-CM

## 2018-06-09 DIAGNOSIS — N17.0 ACUTE RENAL FAILURE WITH TUBULAR NECROSIS (H): ICD-10-CM

## 2018-06-09 LAB
ANION GAP SERPL CALCULATED.3IONS-SCNC: 10 MMOL/L (ref 3–14)
BUN SERPL-MCNC: 11 MG/DL (ref 7–30)
CALCIUM SERPL-MCNC: 8.8 MG/DL (ref 8.5–10.1)
CHLORIDE SERPL-SCNC: 111 MMOL/L (ref 94–109)
CO2 SERPL-SCNC: 22 MMOL/L (ref 20–32)
CREAT SERPL-MCNC: 2.03 MG/DL (ref 0.66–1.25)
GFR SERPL CREATININE-BSD FRML MDRD: 34 ML/MIN/1.7M2
GLUCOSE SERPL-MCNC: 96 MG/DL (ref 70–99)
POTASSIUM SERPL-SCNC: 4.2 MMOL/L (ref 3.4–5.3)
SODIUM SERPL-SCNC: 143 MMOL/L (ref 133–144)

## 2018-06-09 PROCEDURE — 80048 BASIC METABOLIC PNL TOTAL CA: CPT | Performed by: INTERNAL MEDICINE

## 2018-06-09 PROCEDURE — 36415 COLL VENOUS BLD VENIPUNCTURE: CPT | Performed by: INTERNAL MEDICINE

## 2018-06-11 NOTE — TELEPHONE ENCOUNTER
RX for clopidogrel (PLAVIX) 75 MG tablet daily approved by PCP on 6/8/18 for 90 tabs and 3 refills.

## 2018-06-12 ENCOUNTER — OFFICE VISIT (OUTPATIENT)
Dept: INTERNAL MEDICINE | Facility: CLINIC | Age: 58
End: 2018-06-12
Payer: COMMERCIAL

## 2018-06-12 VITALS
OXYGEN SATURATION: 99 % | SYSTOLIC BLOOD PRESSURE: 130 MMHG | WEIGHT: 212 LBS | BODY MASS INDEX: 31.77 KG/M2 | TEMPERATURE: 98.6 F | DIASTOLIC BLOOD PRESSURE: 72 MMHG | RESPIRATION RATE: 16 BRPM | HEART RATE: 76 BPM

## 2018-06-12 DIAGNOSIS — F41.9 ANXIETY: ICD-10-CM

## 2018-06-12 DIAGNOSIS — L03.119 CELLULITIS OF LOWER EXTREMITY, UNSPECIFIED LATERALITY: ICD-10-CM

## 2018-06-12 DIAGNOSIS — H54.3 VISION LOSS, BILATERAL: ICD-10-CM

## 2018-06-12 DIAGNOSIS — N18.30 CKD (CHRONIC KIDNEY DISEASE) STAGE 3, GFR 30-59 ML/MIN (H): ICD-10-CM

## 2018-06-12 DIAGNOSIS — E11.39 TYPE 2 DIABETES MELLITUS WITH OTHER OPHTHALMIC COMPLICATION, WITHOUT LONG-TERM CURRENT USE OF INSULIN (H): ICD-10-CM

## 2018-06-12 DIAGNOSIS — I73.9 PAD (PERIPHERAL ARTERY DISEASE) (H): ICD-10-CM

## 2018-06-12 DIAGNOSIS — Z09 HOSPITAL DISCHARGE FOLLOW-UP: Primary | ICD-10-CM

## 2018-06-12 LAB
BACTERIA SPEC CULT: NO GROWTH
BACTERIA SPEC CULT: NO GROWTH
Lab: NORMAL
Lab: NORMAL
SPECIMEN SOURCE: NORMAL
SPECIMEN SOURCE: NORMAL

## 2018-06-12 PROCEDURE — 36415 COLL VENOUS BLD VENIPUNCTURE: CPT | Performed by: INTERNAL MEDICINE

## 2018-06-12 PROCEDURE — 80048 BASIC METABOLIC PNL TOTAL CA: CPT | Performed by: INTERNAL MEDICINE

## 2018-06-12 PROCEDURE — 99495 TRANSJ CARE MGMT MOD F2F 14D: CPT | Performed by: INTERNAL MEDICINE

## 2018-06-12 RX ORDER — CLOPIDOGREL BISULFATE 75 MG/1
75 TABLET ORAL DAILY
Qty: 90 TABLET | Refills: 1
Start: 2018-06-12 | End: 2020-02-19

## 2018-06-12 RX ORDER — ZOLPIDEM TARTRATE 10 MG/1
10 TABLET ORAL
Qty: 30 TABLET | Refills: 0 | Status: SHIPPED | OUTPATIENT
Start: 2018-06-12 | End: 2018-07-09

## 2018-06-12 ASSESSMENT — PAIN SCALES - GENERAL: PAINLEVEL: NO PAIN (0)

## 2018-06-12 NOTE — LETTER
[facility]  [address] [place] [address]  [phone]      6/13/2018       Gomez Turk  [address]  [facility] [id number]        Dear Gomez,    Your kidney function tests is slightly better than what it was when last checked but still above what I would consider to be normal and needs to be followed closely.  I would recheck it again in 2 weeks and will place orders for you but would recommend that you go forward and schedule an appointment to see the kidney specialist with the referral number that I gave you.    Sincerely,      Tylor Roberts MD  Internal Medicine

## 2018-06-12 NOTE — PROGRESS NOTES
SUBJECTIVE:   Gomez Turk is a 57 year old male who presents to clinic today for the following health issues:    Hospital Follow-up Visit:    Hospital/Nursing Home/IP Rehab Facility: [facility]  Date of Admission: 6/6/18  Date of Discharge: 6/8/18  Reason(s) for Admission: Acute osteomyelitis of toe, left            Problems taking medications regularly:  None       Medication changes since discharge: None       Problems adhering to non-medication therapy:  None    Summary of hospitalization:  [facility] discharge summary reviewed  Diagnostic Tests/Treatments reviewed.  Follow up needed: noted  Other Healthcare Providers Involved in Patient s Care:         None  Update since discharge: stable.     Post Discharge Medication Reconciliation: discharge medications reconciled, continue medications without change.  Plan of care communicated with patient     Coding guidelines for this visit:  Type of Medical   Decision Making Face-to-Face Visit       within 7 Days of discharge Face-to-Face Visit        within 14 days of discharge   Moderate Complexity [id number]   High Complexity [id number]          Cellulitis of toe 2nd on left foot:    Following admission there was a concern whether he had osteomyelitis of his toe since the xray indicated so, he was started on vanco and zosyn for the cellulitis and possible osteomyelitis, seen by podiatry and vascular surgery, they ordered MRI and that didn't indicate any osteomyelitis, vascular surgery examined his lower extremity for blood flow and indicated that currently his grafts are [atent, since the mri didn't show any osteo, podiatry dont plan any procedure, patient  Is to follow up with them outpatient,currently his cellulitis has improved significantly on zosyn and vanco, no pain.  He had syncope which was assumed secondary to hypotension and dehydration, had acute kidney injury which improved with hydration, on 6/7 creatine was 1.07 but again  on the day of discharge his creatinine went up to 1.4, I offered to keep him here and monitor for another day, nephrology consult etc, patient.  Didn't want to stay here any further, his wife assured me that they would rather get the creatinine checked on Monday, made sure he drank enough fluids and follow up with his primary care physician on Tuesday, appointments made for above.  I am holding his metformin and lisinopril and he will need to restart that once seen by primary care physician , current elevation in his creatinine possibly range of movement ATN vs medication effect.    Problem list and histories reviewed & adjusted, as indicated.  Additional history: as documented    Patient Active Problem List   Diagnosis     Diverticulosis of large intestine     Tobacco use disorder     HYPERLIPIDEMIA LDL GOAL <100     PAD (peripheral artery disease) (H)     Type 2 diabetes mellitus with other ophthalmic complication, without long-term current use of insulin (H)     Acute cholecystitis     Vision loss, bilateral     Counseling regarding advanced directives     Acute osteomyelitis of toe, left (H)     Past Surgical History:   Procedure Laterality Date     AMPUTATE TOE(S) Left 2/28/2017    Procedure: AMPUTATE TOE(S);  Surgeon: Jesus Aragon MD;  Location:  OR     BYPASS GRAFT FEMOROPOPLITEAL Left 2/28/2017    Procedure: BYPASS GRAFT FEMOROPOPLITEAL;  Surgeon: Jesus Aragon MD;  Location:  OR     ENT SURGERY  1990    deviated septum repair     IRRIGATION AND DEBRIDEMENT FOOT, COMBINED Left 2/28/2017    Procedure: COMBINED IRRIGATION AND DEBRIDEMENT FOOT;  Surgeon: Jesus Aragon MD;  Location:  OR     LAPAROSCOPIC CHOLECYSTECTOMY N/A 3/18/2017    Procedure: LAPAROSCOPIC CHOLECYSTECTOMY;  Surgeon: Edin Hollingsworth MD;  Location:  OR       Social History   Substance Use Topics     Smoking status: Current Every Day Smoker     Packs/day: 1.00     Types: Cigarettes     Smokeless  tobacco: Never Used     Alcohol use Yes      Comment: occassion - 1 drink today     Family History   Problem Relation Age of Onset     DIABETES Mother      Lipids Mother      Melanoma Mother      CANCER Paternal Grandmother      Neurologic Disorder Maternal Uncle      Glaucoma No family hx of      Macular Degeneration No family hx of      Retinal detachment No family hx of      Thyroid Disease No family hx of          Current Outpatient Prescriptions   Medication Sig Dispense Refill     acetaminophen (TYLENOL) 325 MG tablet Take 2 tablets (650 mg) by mouth every 4 hours as needed for mild pain 100 tablet 0     amoxicillin-clavulanate (AUGMENTIN) 875-125 MG per tablet Take 1 tablet by mouth 2 times daily 20 tablet 0     aspirin EC 81 MG EC tablet Take 1 tablet (81 mg) by mouth daily 30 tablet 11     atorvastatin (LIPITOR) 40 MG tablet Take 1 tablet (40 mg) by mouth daily 30 tablet 1     augmented betamethasone dipropionate (DIPROLENE-AF) 0.05 % cream Apply sparingly to affected area twice daily as needed.  Do not apply to face. 100 g 3     citalopram (CELEXA) 40 MG tablet Take 1 tablet (40 mg) by mouth daily 90 tablet 1     clopidogrel (PLAVIX) 75 MG tablet Take 1 tablet (75 mg) by mouth daily 90 tablet 1     gabapentin (NEURONTIN) 300 MG capsule Take 1 capsule (300 mg) by mouth 3 times daily 90 capsule 5     glimepiride (AMARYL) 2 MG tablet Take 1 tablet (2 mg) by mouth 2 times daily 180 tablet 3     zolpidem (AMBIEN) 10 MG tablet Take 1 tablet (10 mg) by mouth nightly as needed for sleep , Do not use if taking narcotics 30 tablet 0     [DISCONTINUED] atorvastatin (LIPITOR) 40 MG tablet Take 1 tablet (40 mg) by mouth daily 30 tablet 11     [DISCONTINUED] clopidogrel (PLAVIX) 75 MG tablet Take 1 tablet (75 mg) by mouth daily 90 tablet 3     [DISCONTINUED] clopidogrel (PLAVIX) 75 MG tablet Take 1 tablet (75 mg) by mouth daily 30 tablet 0     Allergies   Allergen Reactions     No Known Drug Allergies      BP Readings  from Last 3 Encounters:   06/08/18 145/74   05/30/18 124/74   02/15/18 133/73    Wt Readings from Last 3 Encounters:   06/08/18 217 lb 6 oz (98.6 kg)   05/30/18 214 lb (97.1 kg)   01/17/18 208 lb (94.3 kg)         Reviewed and updated as needed this visit by clinical staff  Tobacco  Allergies  Meds  Med Hx  Surg Hx  Fam Hx  Soc Hx      Reviewed and updated as needed this visit by Provider       ROS:  CONSTITUTIONAL: NEGATIVE for fever, chills, change in weight  ENT/MOUTH: NEGATIVE for ear, mouth and throat problems  RESP: NEGATIVE for significant cough or SOB  CV: NEGATIVE for chest pain, palpitations or peripheral edema  GI: NEGATIVE for nausea, abdominal pain, heartburn, or change in bowel habits  : NEGATIVE for frequency, dysuria, or hematuria  NEURO: NEGATIVE for weakness, dizziness or paresthesias  HEME: NEGATIVE for bleeding problems  PSYCHIATRIC: NEGATIVE for changes in mood or affect    OBJECTIVE:                                                    /72  Pulse 76  Temp 98.6  F (37  C) (Oral)  Resp 16  Wt 212 lb (96.2 kg)  SpO2 99%  BMI 31.77 kg/m2  Body mass index is 31.77 kg/(m^2).  GENERAL: alert and no distress  EYES: decreased VA OU using blind walking stick  RESP: lungs clear to auscultation - no rales, no rhonchi, no wheezes  CV: regular rates and rhythm, normal S1 S2 and  no click or rub   MS: extremities- no gross deformities noted with resolving lower extremity cellulitic change  PSYCH: Alert and oriented times 3; speech- coherent , normal rate and volume; able to articulate logical thoughts, able to abstract reason, no tangential thoughts, no hallucinations or delusions, affect- normal       Lab Results   Component Value Date    A1C 6.5 06/06/2018    A1C 6.0 03/06/2018    A1C 6.8 02/10/2017    A1C 9.6 10/17/2016    A1C 7.8 11/02/2015       ASSESSMENT/PLAN:                                                      (Z09) Hospital discharge follow-up  (primary encounter  diagnosis)  Comment: Stable at present time continuing as ordered  Plan: Basic metabolic panel            (L03.119) Cellulitis of lower extremity, unspecified laterality  Comment: Staying on oral antibiotics as directed and following up as medication and advised  Plan:     (E11.39) Type 2 diabetes mellitus with other ophthalmic complication, without long-term current use of insulin (H)  Comment: Will stay off metformin at present time due to recent creatinine and encourage continuing with Amaryl as dosed with close observation of blood sugar  Plan:     (N18.3) CKD (chronic kidney disease) stage 3, GFR 30-59 ml/min  Comment: Recheck basic metabolic panel and consider nephrology referral  Plan: Basic metabolic panel, NEPHROLOGY ADULT         REFERRAL            (H54.3) Vision loss, bilateral  Comment: Stable at present currently on SSI  Plan:     (F41.9) Anxiety  Comment: Refilled per patient request  Plan: zolpidem (AMBIEN) 10 MG tablet            (I73.9) PAD (peripheral artery disease) (H)  Comment: On Plavix as ordered  Plan: clopidogrel (PLAVIX) 75 MG tablet              See Patient Instructions    Tylor Roberts MD  [facility]    THE MEDICATION LIST HAS BEEN FULLY RECONCILED BY THE M.D. AND THE NURSING STAFF.

## 2018-06-12 NOTE — MR AVS SNAPSHOT
After Visit Summary   6/12/2018    Gomez Turk    MRN: [id number]           Patient Information     Date Of Birth          1960        Visit Information        Provider Department      6/12/2018 9:40 AM Tylor Roberts MD [facility]        Today's Diagnoses     Hospital discharge follow-up    -  1    Cellulitis of lower extremity, unspecified laterality        Type 2 diabetes mellitus with other ophthalmic complication, without long-term current use of insulin (H)        CKD (chronic kidney disease) stage 3, GFR 30-59 ml/min        Vision loss, bilateral        Anxiety        PAD (peripheral artery disease) (H)           Follow-ups after your visit        Additional Services     NEPHROLOGY ADULT REFERRAL       Your provider has referred you to: [facility] [phone]   [email or web address]    Please be aware that coverage of these services is subject to the terms and limitations of your health insurance plan.  Call member services at your health plan with any benefit or coverage questions.      Reason for referral:  ckd  Please bring the following to your appointment:    >>   Any x-rays, CTs or MRIs which have been performed.  Contact the facility where they were done to arrange for  prior to your scheduled appointment.   >>   List of current medications   >>   This referral request   >>   Any documents/labs given to you for this referral                  Your next 10 appointments already scheduled     Jun 13, 2018  9:30 AM CDT   MA DIAGNOSTIC DIGITAL BILATERAL with [id number]   [facility] ([facility])    [address] [place] [address], [address]  [facility] [address] [phone]           Do not use any powder, lotion or deodorant under your arms or on your breast. If you do, we will ask you to remove it before your exam.  Wear comfortable, two-piece clothing.  If you have any  "allergies, tell your care team.  Bring any previous mammograms from other facilities or have them mailed to the breast center.  Three-dimensional (3D) mammograms are available at [facility] locations in [facility], [place], [place], [facility], [place], [place], and [place]. [facility] locations include [place] and [facility] in [place]. Benefits of 3D mammograms include: - Improved rate of cancer detection - Decreases your chance of having to go back for more tests, which means fewer: - \"False-positive\" results (This means that there is an abnormal area but it isn't cancer.) - Invasive testing procedures, such as a biopsy or surgery - Can provide clearer images of the breast if you have dense breast tissue. 3D mammography is an optional exam that anyone can have with a 2D mammogram. It doesn't replace or take the place of a 2D mammogram. 2D mammograms remain an effective screening test for all women.  Not all insurance companies cover the cost of a 3D mammogram. Check with your insurance.            Jun 13, 2018 10:00 AM CDT   US BREAST AALIYAH LIMITED 1-3 QUAD with SHBCUS1   [facility] ([facility])    [address] [place] [address], [address] [place] [address] [phone]           Please bring a list of your medicines (including vitamins, minerals and over-the-counter drugs). Also, tell your doctor about any allergies you may have. Wear comfortable clothes and leave your valuables at home.  You do not need to do anything special to prepare for your exam.  Please call the Imaging Department at your exam site with any questions.              Who to contact     If you have questions or need follow up information about today's clinic visit or your schedule please contact [facility] directly at [phone].  Normal or non-critical lab and imaging results will be communicated to you by MyChart, letter or phone within 4 business " days after the clinic has received the results. If you do not hear from us within 7 days, please contact the clinic through [employer] or phone. If you have a critical or abnormal lab result, we will notify you by phone as soon as possible.  Submit refill requests through [employer] or call your pharmacy and they will forward the refill request to us. Please allow 3 business days for your refill to be completed.          Additional Information About Your Visit        [employer] Information     [employer] gives you secure access to your electronic health record. If you see a primary care provider, you can also send messages to your care team and make appointments. If you have questions, please call your primary care clinic.  If you do not have a primary care provider, please call [phone] and they will assist you.        Care EveryWhere ID     This is your Care EveryWhere ID. This could be used by other organizations to access your [facility] medical records  [id number]        Your Vitals Were     Pulse Temperature Respirations Pulse Oximetry BMI (Body Mass Index)       76 98.6  F (37  C) (Oral) 16 99% 31.77 kg/m2        Blood Pressure from Last 3 Encounters:   06/12/18 130/72   06/08/18 145/74   05/30/18 124/74    Weight from Last 3 Encounters:   06/12/18 212 lb (96.2 kg)   06/08/18 217 lb 6 oz (98.6 kg)   05/30/18 214 lb (97.1 kg)              We Performed the Following     Basic metabolic panel     NEPHROLOGY ADULT REFERRAL          Where to get your medicines      Some of these will need a paper prescription and others can be bought over the counter.  Ask your nurse if you have questions.     Bring a paper prescription for each of these medications     zolpidem 10 MG tablet       You don't need a prescription for these medications     clopidogrel 75 MG tablet          Primary Care Provider Office Phone # Fax #    Tylor Roberts [phone]       [address] [place] [address]        Equal  Access to Services     [facility]: Hadii anita jain david Araiza, wacurtisda luqadaha, qaybta kaalmabrenda ott, manuel caro. So [facility] [phone].    ATENCIÓN: Si habla kell, tiene a nye disposición servicios gratuitos de asistencia lingüística. Llame al [phone].    We comply with applicable federal civil rights laws and [place] laws. We do not discriminate on the basis of race, color, national origin, age, disability, sex, sexual orientation, or gender identity.            Thank you!     Thank you for choosing [facility]  for your care. Our goal is always to provide you with excellent care. Hearing back from our patients is one way we can continue to improve our services. Please take a few minutes to complete the written survey that you may receive in the mail after your visit with us. Thank you!             Your Updated Medication List - Protect others around you: Learn how to safely use, store and throw away your medicines at www.disposemymeds.org.          This list is accurate as of 6/12/18  9:55 AM.  Always use your most recent med list.                   Brand Name Dispense Instructions for use Diagnosis    acetaminophen 325 MG tablet    TYLENOL    100 tablet    Take 2 tablets (650 mg) by mouth every 4 hours as needed for mild pain    Cellulitis of foot, left       amoxicillin-clavulanate 875-125 MG per tablet    AUGMENTIN    20 tablet    Take 1 tablet by mouth 2 times daily    Cellulitis of foot, left       aspirin 81 MG EC tablet     30 tablet    Take 1 tablet (81 mg) by mouth daily    PAD (peripheral artery disease) (H)       atorvastatin 40 MG tablet    LIPITOR    30 tablet    Take 1 tablet (40 mg) by mouth daily    Hyperlipidemia LDL goal <100       augmented betamethasone dipropionate 0.05 % cream    DIPROLENE-AF    100 g    Apply sparingly to affected area twice daily as needed.  Do not apply to face.    Lichen simplex chronicus        citalopram 40 MG tablet    celeXA    90 tablet    Take 1 tablet (40 mg) by mouth daily    Vision loss, bilateral       clopidogrel 75 MG tablet    PLAVIX    90 tablet    Take 1 tablet (75 mg) by mouth daily    PAD (peripheral artery disease) (H)       gabapentin 300 MG capsule    NEURONTIN    90 capsule    Take 1 capsule (300 mg) by mouth 3 times daily    PAD (peripheral artery disease) (H), Type 2 diabetes mellitus with other ophthalmic complication, without long-term current use of insulin (H)       glimepiride 2 MG tablet    AMARYL    180 tablet    Take 1 tablet (2 mg) by mouth 2 times daily    Type 2 diabetes mellitus with diabetic neuropathy, with long-term current use of insulin (H)       zolpidem 10 MG tablet    AMBIEN    30 tablet    Take 1 tablet (10 mg) by mouth nightly as needed for sleep , Do not use if taking narcotics    Anxiety

## 2018-06-13 ENCOUNTER — HOSPITAL ENCOUNTER (OUTPATIENT)
Dept: MAMMOGRAPHY | Facility: CLINIC | Age: 58
Discharge: HOME | End: 2018-06-13
Attending: INTERNAL MEDICINE | Admitting: INTERNAL MEDICINE
Payer: COMMERCIAL

## 2018-06-13 ENCOUNTER — HOSPITAL ENCOUNTER (OUTPATIENT)
Dept: MAMMOGRAPHY | Facility: CLINIC | Age: 58
End: 2018-06-13
Attending: INTERNAL MEDICINE
Payer: COMMERCIAL

## 2018-06-13 DIAGNOSIS — N64.4 BREAST PAIN: ICD-10-CM

## 2018-06-13 LAB
ANION GAP SERPL CALCULATED.3IONS-SCNC: 10 MMOL/L (ref 3–14)
BUN SERPL-MCNC: 17 MG/DL (ref 7–30)
CALCIUM SERPL-MCNC: 8.8 MG/DL (ref 8.5–10.1)
CHLORIDE SERPL-SCNC: 110 MMOL/L (ref 94–109)
CO2 SERPL-SCNC: 23 MMOL/L (ref 20–32)
CREAT SERPL-MCNC: 1.86 MG/DL (ref 0.66–1.25)
GFR SERPL CREATININE-BSD FRML MDRD: 38 ML/MIN/1.7M2
GLUCOSE SERPL-MCNC: 159 MG/DL (ref 70–99)
POTASSIUM SERPL-SCNC: 3.8 MMOL/L (ref 3.4–5.3)
SODIUM SERPL-SCNC: 143 MMOL/L (ref 133–144)

## 2018-06-13 PROCEDURE — 76642 ULTRASOUND BREAST LIMITED: CPT | Mod: 50

## 2018-06-13 PROCEDURE — 77066 DX MAMMO INCL CAD BI: CPT

## 2018-06-15 ENCOUNTER — OFFICE VISIT (OUTPATIENT)
Dept: PODIATRY | Facility: CLINIC | Age: 58
End: 2018-06-15
Payer: COMMERCIAL

## 2018-06-15 VITALS
HEIGHT: 69 IN | SYSTOLIC BLOOD PRESSURE: 128 MMHG | BODY MASS INDEX: 31.4 KG/M2 | WEIGHT: 212 LBS | DIASTOLIC BLOOD PRESSURE: 76 MMHG

## 2018-06-15 DIAGNOSIS — M20.42 HAMMER TOE OF LEFT FOOT: ICD-10-CM

## 2018-06-15 DIAGNOSIS — L84 PRE-ULCERATIVE CALLUSES: Primary | ICD-10-CM

## 2018-06-15 DIAGNOSIS — E11.39 TYPE 2 DIABETES MELLITUS WITH OTHER OPHTHALMIC COMPLICATION, WITHOUT LONG-TERM CURRENT USE OF INSULIN (H): ICD-10-CM

## 2018-06-15 PROCEDURE — 11055 PARING/CUTG B9 HYPRKER LES 1: CPT | Performed by: PODIATRIST

## 2018-06-15 PROCEDURE — 99213 OFFICE O/P EST LOW 20 MIN: CPT | Mod: 25 | Performed by: PODIATRIST

## 2018-06-15 NOTE — PATIENT INSTRUCTIONS
"Thank you for choosing [facility]!    DR. ROCHA'S CLINIC LOCATIONS     MONDAY  [place] TUESDAY & FRIDAY AM  [place]   [address] [facility]   [address]   [place] [address] [place], [place] [address] [phone]  [phone]  [phone]       WEDNESDAY  [place] SCHEDULE SURGERY: [phone]   [address] [facility] APPOINTMENTS: [phone]   [place] [address] BILLING QUESTIONS: [phone]   [phone]         [facility] [facility] LOCATIONS  [facility]  [address]  [place] [address]  Phone: [phone]  Fax: [phone] [facility]  [address]  [place] [address]  Phone: [phone]   Fax: [phone]   [facility]  [address]  [place] [address]  Phone: [phone]  Fax: [phone] [facility]  [address]  [place] [address]  Phone: [phone]   Fax: [phone]   [facility]   [address]  [place] [address]  Phone: [phone]   Fax: [phone] * Please call any location listed to make an appointment for a casting/fitting. Your referral was sent to their central office and they will all have the order on file.     DIABETES AND YOUR FEET  Diabetes can result in several problems in the feet including ulcers (open sores) and amputations. Two of the most important reasons why people develop foot problems when they have diabetes is : 1. Neuropathy (loss of feeling)  2. Vascular disease (loss or decrease of blood flow).    Neuropathy is a term used to describe a loss of nerve function.  Patients with diabetes are at risk of developing neuropathy if their sugars continue to run high and are above the normal value. One theory for neuropathy is that the \"extra\" sugar in the body enters the nerves and is broken down. These by-products " build up in the nerve causing it to swell and impairing nerve function. Often times, this can be prevented by controlling your sugars, dieting and exercise.    When a person develops neuropathy, they usually begin to feel numbness or tingling in their feet and sometime in their legs.  Other symptoms may include painful burning or hot feet, tingling or feeling like insects or ants are crawling on your feet or legs.  If the diabetes is sever and the sugars run high for long periods of time, neuropathy can also occur in the hands.    Vascular disease  is a term used to describe a loss or decrease in circulation (blood flow). There is a problem in getting blood and oxygen to areas that need it. Similar to neuropathy, sugars can build up in the walls of the arteries (blood vessels) and cause them to become swollen, thickened and hardened. This decreases the amount of blood that can go to an area that needs it. Though this is common in the legs of diabetic patients, it can also affect other arteries (blood vessels) in the body such as in the heart and eyes.    In the legs, vascular disease usually results in cramping. Patients who develop leg cramps after walking the same distance every time (i.e. One block, half a mile, ect.) need to let their doctors know so that their circulation may be checked. Cramps causing severe pain in the feet and/or legs while sleeping and the cramps go away when you stand or hang your legs off the side of the bed, may also be a sign of poor blood circulation.  Occasional cramping in cold weather or on rare occasions with activity may not be due to poor circulation, but you should inform your doctor.    PREVENTION OF THESE DISEASES  The key to prevention is good blood sugar control. Poor blood sugar control is a big reason many of these problems start. Physical activity (exercise) is a very good way to help decrease your blood sugars. Exercise can lower your blood sugar, blood pressure, and  cholesterol. It also reduces your risk for heart disease and stroke, relieves stress, and strengthens your heart, muscles and bones.  In addition, regular activity helps insulin work better, improves your blood circulation, and keeps your joints flexible. If you're trying to lose weight, a combination of exercise and wise food choices can help you reach your target weight and maintain it.      PAIN MANAGEMENT  1.Blood Sugar Control - Most important  2. Medications such as:  Amytriptylline, duloxetine, gabapentin, lyrica, tramadol  3. Nutritional therapy:  Vitamin B6 (100mg daily), Vitamin B12 (75mcg daily), Vitamin D 2000 IU daily), Alpha-Lipoic Acid (600-1800mg daily), Acetyl-L-Carnitine (500-1000mg TID, L-methyl folate (1500mcg daily)    ** Metformin can block Vitamin B6 and B12 so it is important to supplement**    FOOT CARE RECOMMENDATIONS   1. Wash your feet with lukewarm water and a mild soap and then dry them thoroughly, especially between the toes.     2. Examine your feet daily looking for cuts, corns, blisters, cracks, ect, especially after wearing new shoes. Make sure to look between your toes. If you cannot see the bottom of your feet, set a mirror on the floor and hold your foot over it, or ask a spouse, friend or family member to examine your feet for you. Contact your doctor immediately if new problems are noted or if sores are not healing.     3. Immediately apply moisturizer to the tops and bottoms of your feet, avoiding areas between the toes. Hand lotion (Intesive Care, Jessie, Eucerin, Neutrogena, Curel, ect) is sufficient unless your doctor prescribes a medicated lotion. Apply sunscreen to your feet when going swimming outside.     4. Use clean comfortable shoes, wear white socks (if you have any bleeding or drainage, you will see it on white socks). Socks should not have thick seams or cut off the circulation around the leg. Break in new shoes slowly and rotate with older shoes until broken in.  Check the inside of your shoes with your hand to look for areas of irritation or objects that may have fallen into your shoes.       5. Keep slippers by the side of your bed for use during the night.     6.  Shoes should be fitted by a professional and should not cause areas of irritation.  Check your feet regularly when wearing a new pair of shoes and replace them as needed.     7.  Talk to your doctor about proper exercise. Exercise and stretching stimulate blood flow to your feet and maintain proper glucose levels.     8.  Monitor your blood glucose level as instructed by your doctor. Notify your doctor immediately if your blood sugar is abnormally high or low.    9. Cut your nails straight across, but then gently round any sharp edges with a cardboard nail file. If you have neuropathy, peripheral vascular disease or cannot see that well to trim your own toenails contact [facility] ([phone]) or [facility] ([phone]).      THINGS TO AVOID DOING   1.  Do not soak your feet if you have an open sore. Use only lukewarm water and always check the temperature with your hand as hot water can easily burn your feet.       2.  Never use a hot water bottle or heating pad on your feet. Also do not apply cold compresses to your feet. With decreased sensation, you could burn or freeze your feet.       3.  Do not apply any of these to your feet:    -  Over the counter medicine for corns or warts    -  Harsh chemicals like boric acid    -  Do not self-treat corns, cuts, blisters or infections. Always consult your doctor.       4.  Do not wear sandals, slippers or walk barefoot, especially on hot sand or concrete or other harsh surfaces.     5.  If you smoke, stop!!!            Body Mass Index (BMI)  Many things can cause foot and ankle problems. Foot structure, activity level, foot mechanics and injuries are common causes of pain.  One very important issue that often goes unmentioned, is body weight. Extra weight can  cause increased stress on muscles, ligaments, bones and tendons.  Sometimes just a few extra pounds is all it takes to put one over her/his threshold. Without reducing that stress, it can be difficult to alleviate pain. Some people are uncomfortable addressing this issue, but we feel it is important for you to think about it. As Foot &  Ankle specialists, our job is addressing the lower extremity problem and possible causes. Regarding extra body weight, we encourage patients to discuss diet and weight management plans with their primary care doctors. It is this team approach that gives you the best opportunity for pain relief and getting you back on your feet.

## 2018-06-15 NOTE — LETTER
6/15/2018         RE: Gomez Turk  [address]  [facility] [address]        Dear Colleague,    Thank you for referring your patient, Gomez Turk, to the [facility]. Please see a copy of my visit note below.    PATIENT HISTORY:  Gomez Turk is a 57 year old male who presents to clinic for recheck of a L 2nd toe wound following admission for cellulitis of the same toe.  Some concern for osteomyelitis on XR, but MRI was negative.  Hx of DM2, neuropathy, legally blind.  Wife present.  Minimal pain today.  He is finishing oral abx.  Denies drainage from the toe.  His wife helps care for his feet.    Review of Systems:  Patient denies fever, chills, rash, wound, stiffness, limping, weakness,  blood in stool, chest pain with activity, calf pain when walking, shortness of breath with activity, chronic cough, easy bleeding/bruising, excessive thirst, fatigue, depression.  Patient admits to heartburn, numbness, anxiety, swelling of ankles.     PAST MEDICAL HISTORY:   Past Medical History:   Diagnosis Date     DIVERTICULOSIS OF COLON W/O BLEED 6/25/2003     Hyperlipidemia LDL goal <100 10/31/2010     Legally blind      Tobacco use disorder 6/25/2003     Type 2 diabetes, HbA1c goal < 7% (H) 10/31/2010        PAST SURGICAL HISTORY:   Past Surgical History:   Procedure Laterality Date     AMPUTATE TOE(S) Left 2/28/2017    Procedure: AMPUTATE TOE(S);  Surgeon: Jesus Aragon MD;  Location:  OR     BYPASS GRAFT FEMOROPOPLITEAL Left 2/28/2017    Procedure: BYPASS GRAFT FEMOROPOPLITEAL;  Surgeon: Jesus Aragon MD;  Location:  OR     ENT SURGERY  1990    deviated septum repair     IRRIGATION AND DEBRIDEMENT FOOT, COMBINED Left 2/28/2017    Procedure: COMBINED IRRIGATION AND DEBRIDEMENT FOOT;  Surgeon: Jesus Aragon MD;  Location:  OR     LAPAROSCOPIC CHOLECYSTECTOMY N/A 3/18/2017    Procedure: LAPAROSCOPIC CHOLECYSTECTOMY;  Surgeon: Edin Hollingsworth MD;   Location:  OR        MEDICATIONS:   Current Outpatient Prescriptions:      acetaminophen (TYLENOL) 325 MG tablet, Take 2 tablets (650 mg) by mouth every 4 hours as needed for mild pain, Disp: 100 tablet, Rfl: 0     amoxicillin-clavulanate (AUGMENTIN) 875-125 MG per tablet, Take 1 tablet by mouth 2 times daily, Disp: 20 tablet, Rfl: 0     aspirin EC 81 MG EC tablet, Take 1 tablet (81 mg) by mouth daily, Disp: 30 tablet, Rfl: 11     atorvastatin (LIPITOR) 40 MG tablet, Take 1 tablet (40 mg) by mouth daily, Disp: 30 tablet, Rfl: 1     augmented betamethasone dipropionate (DIPROLENE-AF) 0.05 % cream, Apply sparingly to affected area twice daily as needed.  Do not apply to face., Disp: 100 g, Rfl: 3     citalopram (CELEXA) 40 MG tablet, Take 1 tablet (40 mg) by mouth daily, Disp: 90 tablet, Rfl: 1     clopidogrel (PLAVIX) 75 MG tablet, Take 1 tablet (75 mg) by mouth daily, Disp: 90 tablet, Rfl: 1     gabapentin (NEURONTIN) 300 MG capsule, Take 1 capsule (300 mg) by mouth 3 times daily, Disp: 90 capsule, Rfl: 5     glimepiride (AMARYL) 2 MG tablet, Take 1 tablet (2 mg) by mouth 2 times daily, Disp: 180 tablet, Rfl: 3     zolpidem (AMBIEN) 10 MG tablet, Take 1 tablet (10 mg) by mouth nightly as needed for sleep , Do not use if taking narcotics, Disp: 30 tablet, Rfl: 0     ALLERGIES:    Allergies   Allergen Reactions     No Known Drug Allergies         SOCIAL HISTORY:   Social History     Social History     Marital status:      Spouse name: N/A     Number of children: N/A     Years of education: N/A     Occupational History     Not on file.     Social History Main Topics     Smoking status: Current Every Day Smoker     Packs/day: 1.00     Types: Cigarettes     Smokeless tobacco: Never Used     Alcohol use Yes      Comment: occassion - 1 drink today     Drug use: No     Sexual activity: Not Currently     Partners: Female     Other Topics Concern     Parent/Sibling W/ Cabg, Mi Or Angioplasty Before 65f 55m? No  "    Social History Narrative        FAMILY HISTORY:   Family History   Problem Relation Age of Onset     DIABETES Mother      Lipids Mother      Melanoma Mother      CANCER Paternal Grandmother      Neurologic Disorder Maternal Uncle      Glaucoma No family hx of      Macular Degeneration No family hx of      Retinal detachment No family hx of      Thyroid Disease No family hx of         EXAM:Vitals: /76  Ht 5' 8.5\" (1.74 m)  Wt 212 lb (96.2 kg)  BMI 31.77 kg/m2  BMI= Body mass index is 31.77 kg/(m^2).    General appearance: Patient is alert and fully cooperative with history & exam.  No sign of distress is noted during the visit.     Psychiatric: Affect is pleasant & appropriate.  Patient appears motivated to improve health.     Respiratory: Breathing is regular & unlabored while sitting.     HEENT: Hearing is intact to spoken word.  Speech is clear.  No gross evidence of visual impairment that would impact ambulation.     Dermatologic:  Diffuse hyperkeratosis to tip of L 2nd toe with no wound noted.  Small dry eschar to anterior L ankle, R lateral ankle.  Skin is otherwise intact to both feet/ankles. No paronychia or evidence of soft tissue infection is noted.     Vascular: DP & PT pulses are intact & regular bilaterally.  No significant edema or varicosities noted.  CFT and skin temperature are normal to both lower extremities.     Neurologic: Lower extremity sensation is diminished to light touch.     Musculoskeletal: s/p L hallux amputation.  Lesser hammertoes.  Patient is ambulatory without assistive device or brace.  No gross ankle deformity noted.  No foot or ankle joint effusion is noted.     ASSESSMENT:   L 2nd toe callus, preulcerative; related hammertoe  DMII with neuropathy     PLAN:  Reviewed patient's chart in epic.  Discussed condition and treatment options including pros and cons.    With consent I debrided the L 2nd toe preulcerative callus with a 15 blade.  No wound noted.  L 2nd toe " appears resolved/healed.  Discussed risk of reulceration, infection, amputation.  Discussed periodic need for callus care/debridement.  Discussed option of tenotomy to further offload toe.  Pt deferred for now.  Will send to have his DM inserts modified to offload this better.  Discussed wound care should a wound arise.  No barefoot walking.  Check feet daily.  F/u 2-3 months, sooner with new concerns.    Sagar Arzate DPM, FACFAS    Weight management plan: Patient was referred to their PCP to discuss a diet and exercise plan.      Again, thank you for allowing me to participate in the care of your patient.        Sincerely,        Sagar Arzate DPM

## 2018-06-15 NOTE — PROGRESS NOTES
PATIENT HISTORY:  Gomez Turk is a 57 year old male who presents to clinic for recheck of a L 2nd toe wound following admission for cellulitis of the same toe.  Some concern for osteomyelitis on XR, but MRI was negative.  Hx of DM2, neuropathy, legally blind.  Wife present.  Minimal pain today.  He is finishing oral abx.  Denies drainage from the toe.  His wife helps care for his feet.    Review of Systems:  Patient denies fever, chills, rash, wound, stiffness, limping, weakness,  blood in stool, chest pain with activity, calf pain when walking, shortness of breath with activity, chronic cough, easy bleeding/bruising, excessive thirst, fatigue, depression.  Patient admits to heartburn, numbness, anxiety, swelling of ankles.     PAST MEDICAL HISTORY:   Past Medical History:   Diagnosis Date     DIVERTICULOSIS OF COLON W/O BLEED 6/25/2003     Hyperlipidemia LDL goal <100 10/31/2010     Legally blind      Tobacco use disorder 6/25/2003     Type 2 diabetes, HbA1c goal < 7% (H) 10/31/2010        PAST SURGICAL HISTORY:   Past Surgical History:   Procedure Laterality Date     AMPUTATE TOE(S) Left 2/28/2017    Procedure: AMPUTATE TOE(S);  Surgeon: Jesus Aragon MD;  Location:  OR     BYPASS GRAFT FEMOROPOPLITEAL Left 2/28/2017    Procedure: BYPASS GRAFT FEMOROPOPLITEAL;  Surgeon: Jesus Aragon MD;  Location:  OR     ENT SURGERY  1990    deviated septum repair     IRRIGATION AND DEBRIDEMENT FOOT, COMBINED Left 2/28/2017    Procedure: COMBINED IRRIGATION AND DEBRIDEMENT FOOT;  Surgeon: Jesus Aragon MD;  Location:  OR     LAPAROSCOPIC CHOLECYSTECTOMY N/A 3/18/2017    Procedure: LAPAROSCOPIC CHOLECYSTECTOMY;  Surgeon: Edin Hollingsworth MD;  Location:  OR        MEDICATIONS:   Current Outpatient Prescriptions:      acetaminophen (TYLENOL) 325 MG tablet, Take 2 tablets (650 mg) by mouth every 4 hours as needed for mild pain, Disp: 100 tablet, Rfl: 0     amoxicillin-clavulanate  (AUGMENTIN) 875-125 MG per tablet, Take 1 tablet by mouth 2 times daily, Disp: 20 tablet, Rfl: 0     aspirin EC 81 MG EC tablet, Take 1 tablet (81 mg) by mouth daily, Disp: 30 tablet, Rfl: 11     atorvastatin (LIPITOR) 40 MG tablet, Take 1 tablet (40 mg) by mouth daily, Disp: 30 tablet, Rfl: 1     augmented betamethasone dipropionate (DIPROLENE-AF) 0.05 % cream, Apply sparingly to affected area twice daily as needed.  Do not apply to face., Disp: 100 g, Rfl: 3     citalopram (CELEXA) 40 MG tablet, Take 1 tablet (40 mg) by mouth daily, Disp: 90 tablet, Rfl: 1     clopidogrel (PLAVIX) 75 MG tablet, Take 1 tablet (75 mg) by mouth daily, Disp: 90 tablet, Rfl: 1     gabapentin (NEURONTIN) 300 MG capsule, Take 1 capsule (300 mg) by mouth 3 times daily, Disp: 90 capsule, Rfl: 5     glimepiride (AMARYL) 2 MG tablet, Take 1 tablet (2 mg) by mouth 2 times daily, Disp: 180 tablet, Rfl: 3     zolpidem (AMBIEN) 10 MG tablet, Take 1 tablet (10 mg) by mouth nightly as needed for sleep , Do not use if taking narcotics, Disp: 30 tablet, Rfl: 0     ALLERGIES:    Allergies   Allergen Reactions     No Known Drug Allergies         SOCIAL HISTORY:   Social History     Social History     Marital status:      Spouse name: N/A     Number of children: N/A     Years of education: N/A     Occupational History     Not on file.     Social History Main Topics     Smoking status: Current Every Day Smoker     Packs/day: 1.00     Types: Cigarettes     Smokeless tobacco: Never Used     Alcohol use Yes      Comment: occassion - 1 drink today     Drug use: No     Sexual activity: Not Currently     Partners: Female     Other Topics Concern     Parent/Sibling W/ Cabg, Mi Or Angioplasty Before 65f 55m? No     Social History Narrative        FAMILY HISTORY:   Family History   Problem Relation Age of Onset     DIABETES Mother      Lipids Mother      Melanoma Mother      CANCER Paternal Grandmother      Neurologic Disorder Maternal Uncle      Glaucoma  "No family hx of      Macular Degeneration No family hx of      Retinal detachment No family hx of      Thyroid Disease No family hx of         EXAM:Vitals: /76  Ht 5' 8.5\" (1.74 m)  Wt 212 lb (96.2 kg)  BMI 31.77 kg/m2  BMI= Body mass index is 31.77 kg/(m^2).    General appearance: Patient is alert and fully cooperative with history & exam.  No sign of distress is noted during the visit.     Psychiatric: Affect is pleasant & appropriate.  Patient appears motivated to improve health.     Respiratory: Breathing is regular & unlabored while sitting.     HEENT: Hearing is intact to spoken word.  Speech is clear.  No gross evidence of visual impairment that would impact ambulation.     Dermatologic:  Diffuse hyperkeratosis to tip of L 2nd toe with no wound noted.  Small dry eschar to anterior L ankle, R lateral ankle.  Skin is otherwise intact to both feet/ankles. No paronychia or evidence of soft tissue infection is noted.     Vascular: DP & PT pulses are intact & regular bilaterally.  No significant edema or varicosities noted.  CFT and skin temperature are normal to both lower extremities.     Neurologic: Lower extremity sensation is diminished to light touch.     Musculoskeletal: s/p L hallux amputation.  Lesser hammertoes.  Patient is ambulatory without assistive device or brace.  No gross ankle deformity noted.  No foot or ankle joint effusion is noted.     ASSESSMENT:   L 2nd toe callus, preulcerative; related hammertoe  DMII with neuropathy     PLAN:  Reviewed patient's chart in epic.  Discussed condition and treatment options including pros and cons.    With consent I debrided the L 2nd toe preulcerative callus with a 15 blade.  No wound noted.  L 2nd toe appears resolved/healed.  Discussed risk of reulceration, infection, amputation.  Discussed periodic need for callus care/debridement.  Discussed option of tenotomy to further offload toe.  Pt deferred for now.  Will send to have his DM inserts " modified to offload this better.  Discussed wound care should a wound arise.  No barefoot walking.  Check feet daily.  F/u 2-3 months, sooner with new concerns.    Sagar Arzate DPM, FACFAS    Weight management plan: Patient was referred to their PCP to discuss a diet and exercise plan.

## 2018-06-15 NOTE — MR AVS SNAPSHOT
After Visit Summary   6/15/2018    Gomez Turk    MRN: [id number]           Patient Information     Date Of Birth          1960        Visit Information        Provider Department      6/15/2018 9:15 AM Sagar Rocha DPM [facility]        Today's Diagnoses     Pre-ulcerative calluses    -  1    Type 2 diabetes mellitus with other ophthalmic complication, without long-term current use of insulin (H)        Hammer toe of left foot          Care Instructions    Thank you for choosing [facility]!    DR. ROCHA'S CLINIC LOCATIONS     MONDAY  [place] TUESDAY & FRIDAY AM  [place]   [address] [facility]   [address]   [place] [address] [place] [address] [phone]  [phone]  [phone]       WEDNESDAY  [place] SCHEDULE SURGERY: [phone]   [address] APPOINTMENTS: [phone]   [place] [address] BILLING QUESTIONS: [phone]   [phone]         [place] [facility] LOCATIONS  [facility]  [address]  [place] [address]  Phone: [phone]  Fax: [phone] [facility]  [address]  [place] [address]  Phone: [phone]   Fax: [phone]   [facility]  [address]  [place] [address]  Phone: [phone]  Fax: [phone] [facility]  [address]  [place] [address]  Phone: [phone]   Fax: [phone]   [facility]   [address]  [place] [address]  Phone: [phone]   Fax: [phone] * Please call any location listed to make an appointment for a casting/fitting. Your referral was sent to their central office and they will all have the order on file.     DIABETES AND YOUR FEET  Diabetes can result in several problems in the feet including ulcers (open sores) and  "amputations. Two of the most important reasons why people develop foot problems when they have diabetes is : 1. Neuropathy (loss of feeling)  2. Vascular disease (loss or decrease of blood flow).    Neuropathy is a term used to describe a loss of nerve function.  Patients with diabetes are at risk of developing neuropathy if their sugars continue to run high and are above the normal value. One theory for neuropathy is that the \"extra\" sugar in the body enters the nerves and is broken down. These by-products build up in the nerve causing it to swell and impairing nerve function. Often times, this can be prevented by controlling your sugars, dieting and exercise.    When a person develops neuropathy, they usually begin to feel numbness or tingling in their feet and sometime in their legs.  Other symptoms may include painful burning or hot feet, tingling or feeling like insects or ants are crawling on your feet or legs.  If the diabetes is sever and the sugars run high for long periods of time, neuropathy can also occur in the hands.    Vascular disease  is a term used to describe a loss or decrease in circulation (blood flow). There is a problem in getting blood and oxygen to areas that need it. Similar to neuropathy, sugars can build up in the walls of the arteries (blood vessels) and cause them to become swollen, thickened and hardened. This decreases the amount of blood that can go to an area that needs it. Though this is common in the legs of diabetic patients, it can also affect other arteries (blood vessels) in the body such as in the heart and eyes.    In the legs, vascular disease usually results in cramping. Patients who develop leg cramps after walking the same distance every time (i.e. One block, half a mile, ect.) need to let their doctors know so that their circulation may be checked. Cramps causing severe pain in the feet and/or legs while sleeping and the cramps go away when you stand or hang your legs " off the side of the bed, may also be a sign of poor blood circulation.  Occasional cramping in cold weather or on rare occasions with activity may not be due to poor circulation, but you should inform your doctor.    PREVENTION OF THESE DISEASES  The key to prevention is good blood sugar control. Poor blood sugar control is a big reason many of these problems start. Physical activity (exercise) is a very good way to help decrease your blood sugars. Exercise can lower your blood sugar, blood pressure, and cholesterol. It also reduces your risk for heart disease and stroke, relieves stress, and strengthens your heart, muscles and bones.  In addition, regular activity helps insulin work better, improves your blood circulation, and keeps your joints flexible. If you're trying to lose weight, a combination of exercise and wise food choices can help you reach your target weight and maintain it.      PAIN MANAGEMENT  1.Blood Sugar Control - Most important  2. Medications such as:  Amytriptylline, duloxetine, gabapentin, lyrica, tramadol  3. Nutritional therapy:  Vitamin B6 (100mg daily), Vitamin B12 (75mcg daily), Vitamin D 2000 IU daily), Alpha-Lipoic Acid (600-1800mg daily), Acetyl-L-Carnitine (500-1000mg TID, L-methyl folate (1500mcg daily)    ** Metformin can block Vitamin B6 and B12 so it is important to supplement**    FOOT CARE RECOMMENDATIONS   1. Wash your feet with lukewarm water and a mild soap and then dry them thoroughly, especially between the toes.     2. Examine your feet daily looking for cuts, corns, blisters, cracks, ect, especially after wearing new shoes. Make sure to look between your toes. If you cannot see the bottom of your feet, set a mirror on the floor and hold your foot over it, or ask a spouse, friend or family member to examine your feet for you. Contact your doctor immediately if new problems are noted or if sores are not healing.     3. Immediately apply moisturizer to the tops and bottoms  of your feet, avoiding areas between the toes. Hand lotion (Intesive Care, Jessie, Eucerin, Neutrogena, Curel, ect) is sufficient unless your doctor prescribes a medicated lotion. Apply sunscreen to your feet when going swimming outside.     4. Use clean comfortable shoes, wear white socks (if you have any bleeding or drainage, you will see it on white socks). Socks should not have thick seams or cut off the circulation around the leg. Break in new shoes slowly and rotate with older shoes until broken in. Check the inside of your shoes with your hand to look for areas of irritation or objects that may have fallen into your shoes.       5. Keep slippers by the side of your bed for use during the night.     6.  Shoes should be fitted by a professional and should not cause areas of irritation.  Check your feet regularly when wearing a new pair of shoes and replace them as needed.     7.  Talk to your doctor about proper exercise. Exercise and stretching stimulate blood flow to your feet and maintain proper glucose levels.     8.  Monitor your blood glucose level as instructed by your doctor. Notify your doctor immediately if your blood sugar is abnormally high or low.    9. Cut your nails straight across, but then gently round any sharp edges with a cardboard nail file. If you have neuropathy, peripheral vascular disease or cannot see that well to trim your own toenails contact [facility] ([phone]) or [facility] ([phone]).      THINGS TO AVOID DOING   1.  Do not soak your feet if you have an open sore. Use only lukewarm water and always check the temperature with your hand as hot water can easily burn your feet.       2.  Never use a hot water bottle or heating pad on your feet. Also do not apply cold compresses to your feet. With decreased sensation, you could burn or freeze your feet.       3.  Do not apply any of these to your feet:    -  Over the counter medicine for corns or warts    -  Harsh chemicals  like boric acid    -  Do not self-treat corns, cuts, blisters or infections. Always consult your doctor.       4.  Do not wear sandals, slippers or walk barefoot, especially on hot sand or concrete or other harsh surfaces.     5.  If you smoke, stop!!!            Body Mass Index (BMI)  Many things can cause foot and ankle problems. Foot structure, activity level, foot mechanics and injuries are common causes of pain.  One very important issue that often goes unmentioned, is body weight. Extra weight can cause increased stress on muscles, ligaments, bones and tendons.  Sometimes just a few extra pounds is all it takes to put one over her/his threshold. Without reducing that stress, it can be difficult to alleviate pain. Some people are uncomfortable addressing this issue, but we feel it is important for you to think about it. As Foot &  Ankle specialists, our job is addressing the lower extremity problem and possible causes. Regarding extra body weight, we encourage patients to discuss diet and weight management plans with their primary care doctors. It is this team approach that gives you the best opportunity for pain relief and getting you back on your feet.              Follow-ups after your visit        Additional Services     ORTHOTICS REFERRAL       Please be aware that coverage of these services is subject to the terms and limitations of your health insurance plan.  Call member services at your health plan with any benefit or coverage questions.      Please bring the following to your appointment:    >>   Any x-rays, CTs or MRIs which have been performed.  Contact the facility where they were done to arrange for  prior to your scheduled appointment.  Any new CT, MRI or other procedures ordered by your specialist must be performed at a [facility] facility or coordinated by your clinic's referral office.    >>   List of current medications   >>   This referral request   >>   Any documents/labs given to you  "for this referral    ==This Referral PRINTS in the [facility] (ORTHOTICS & PROSTHETICS) Central scheduling office ==     The [facility] Central Scheduling staff will contact patient to arrange appointments. Central Scheduling Phone #:  SÁNCHEZ Stone  [phone]     Orthotics: Modify L foot insert to offload tip of 2nd toe                  Who to contact     If you have questions or need follow up information about today's clinic visit or your schedule please contact [facility] [place] directly at [phone].  Normal or non-critical lab and imaging results will be communicated to you by [employer], letter or phone within 4 business days after the clinic has received the results. If you do not hear from us within 7 days, please contact the clinic through [employer] (Closed)t or phone. If you have a critical or abnormal lab result, we will notify you by phone as soon as possible.  Submit refill requests through [employer] or call your pharmacy and they will forward the refill request to us. Please allow 3 business days for your refill to be completed.          Additional Information About Your Visit        [employer] Information     [employer] gives you secure access to your electronic health record. If you see a primary care provider, you can also send messages to your care team and make appointments. If you have questions, please call your primary care clinic.  If you do not have a primary care provider, please call [phone] and they will assist you.        Care EveryWhere ID     This is your Care EveryWhere ID. This could be used by other organizations to access your [facility] medical records  [id number]        Your Vitals Were     Height BMI (Body Mass Index)                5' 8.5\" (1.74 m) 31.77 kg/m2           Blood Pressure from Last 3 Encounters:   06/15/18 128/76   06/12/18 130/72   06/08/18 145/74    Weight from Last 3 Encounters:   06/15/18 212 lb (96.2 kg)   06/12/18 212 lb (96.2 kg)   06/08/18 217 " lb 6 oz (98.6 kg)              We Performed the Following     ORTHOTICS REFERRAL        Primary Care Provider Office Phone # Fax #    Tylor Roberts [phone]       [address] [facility] [address]        Equal Access to Services     KADIESTACY LAMONT : Hadii aad ku hadasho Soomaali, waaxda luqadaha, qaybta kaalmada adeegyada, waxay idiin hayaan adeedgardo hammonds lachrisssusan ah. So [facility] [phone].    ATENCIÓN: Si habla español, tiene a nye disposición servicios gratuitos de asistencia lingüística. Llame al [phone].    We comply with applicable federal civil rights laws and [place] laws. We do not discriminate on the basis of race, color, national origin, age, disability, sex, sexual orientation, or gender identity.            Thank you!     Thank you for choosing [facility]  for your care. Our goal is always to provide you with excellent care. Hearing back from our patients is one way we can continue to improve our services. Please take a few minutes to complete the written survey that you may receive in the mail after your visit with us. Thank you!             Your Updated Medication List - Protect others around you: Learn how to safely use, store and throw away your medicines at www.disposemymeds.org.          This list is accurate as of 6/15/18 10:00 AM.  Always use your most recent med list.                   Brand Name Dispense Instructions for use Diagnosis    acetaminophen 325 MG tablet    TYLENOL    100 tablet    Take 2 tablets (650 mg) by mouth every 4 hours as needed for mild pain    Cellulitis of foot, left       amoxicillin-clavulanate 875-125 MG per tablet    AUGMENTIN    20 tablet    Take 1 tablet by mouth 2 times daily    Cellulitis of foot, left       aspirin 81 MG EC tablet     30 tablet    Take 1 tablet (81 mg) by mouth daily    PAD (peripheral artery disease) (H)       atorvastatin 40 MG tablet    LIPITOR    30 tablet    Take 1 tablet (40 mg) by mouth daily     Hyperlipidemia LDL goal <100       augmented betamethasone dipropionate 0.05 % cream    DIPROLENE-AF    100 g    Apply sparingly to affected area twice daily as needed.  Do not apply to face.    Lichen simplex chronicus       citalopram 40 MG tablet    celeXA    90 tablet    Take 1 tablet (40 mg) by mouth daily    Vision loss, bilateral       clopidogrel 75 MG tablet    PLAVIX    90 tablet    Take 1 tablet (75 mg) by mouth daily    PAD (peripheral artery disease) (H)       gabapentin 300 MG capsule    NEURONTIN    90 capsule    Take 1 capsule (300 mg) by mouth 3 times daily    PAD (peripheral artery disease) (H), Type 2 diabetes mellitus with other ophthalmic complication, without long-term current use of insulin (H)       glimepiride 2 MG tablet    AMARYL    180 tablet    Take 1 tablet (2 mg) by mouth 2 times daily    Type 2 diabetes mellitus with diabetic neuropathy, with long-term current use of insulin (H)       zolpidem 10 MG tablet    AMBIEN    30 tablet    Take 1 tablet (10 mg) by mouth nightly as needed for sleep , Do not use if taking narcotics    Anxiety

## 2018-06-16 ENCOUNTER — MYC MEDICAL ADVICE (OUTPATIENT)
Dept: INTERNAL MEDICINE | Facility: CLINIC | Age: 58
End: 2018-06-16

## 2018-06-19 DIAGNOSIS — I73.9 PAD (PERIPHERAL ARTERY DISEASE) (H): Primary | ICD-10-CM

## 2018-07-03 DIAGNOSIS — N18.30 CKD (CHRONIC KIDNEY DISEASE) STAGE 3, GFR 30-59 ML/MIN (H): ICD-10-CM

## 2018-07-03 LAB
ANION GAP SERPL CALCULATED.3IONS-SCNC: 9 MMOL/L (ref 3–14)
BUN SERPL-MCNC: 21 MG/DL (ref 7–30)
CALCIUM SERPL-MCNC: 9.3 MG/DL (ref 8.5–10.1)
CHLORIDE SERPL-SCNC: 106 MMOL/L (ref 94–109)
CO2 SERPL-SCNC: 24 MMOL/L (ref 20–32)
CREAT SERPL-MCNC: 1.32 MG/DL (ref 0.66–1.25)
GFR SERPL CREATININE-BSD FRML MDRD: 56 ML/MIN/1.7M2
GLUCOSE SERPL-MCNC: 138 MG/DL (ref 70–99)
POTASSIUM SERPL-SCNC: 4.5 MMOL/L (ref 3.4–5.3)
SODIUM SERPL-SCNC: 139 MMOL/L (ref 133–144)

## 2018-07-03 PROCEDURE — 80048 BASIC METABOLIC PNL TOTAL CA: CPT | Performed by: INTERNAL MEDICINE

## 2018-07-03 PROCEDURE — 36415 COLL VENOUS BLD VENIPUNCTURE: CPT | Performed by: INTERNAL MEDICINE

## 2018-07-09 ENCOUNTER — MYC REFILL (OUTPATIENT)
Dept: INTERNAL MEDICINE | Facility: CLINIC | Age: 58
End: 2018-07-09

## 2018-07-09 ENCOUNTER — TELEPHONE (OUTPATIENT)
Dept: INTERNAL MEDICINE | Facility: CLINIC | Age: 58
End: 2018-07-09

## 2018-07-09 DIAGNOSIS — F41.9 ANXIETY: ICD-10-CM

## 2018-07-09 NOTE — TELEPHONE ENCOUNTER
nurse case bonilla called FYI closing the case hasn't been able to reach pt after several attemps any questions tele# [phone]

## 2018-07-10 RX ORDER — ZOLPIDEM TARTRATE 10 MG/1
10 TABLET ORAL
Qty: 30 TABLET | Refills: 0 | Status: SHIPPED | OUTPATIENT
Start: 2018-07-10 | End: 2018-08-06

## 2018-07-10 NOTE — TELEPHONE ENCOUNTER
rx zolpidem ambien 10mg faxed to [facility] [address] [place] FAX: [phone]

## 2018-07-10 NOTE — TELEPHONE ENCOUNTER
Message from MyChart:  Original authorizing provider: Tylor Roberts MD    Gomez MOSELEYPasquale Turk would like a refill of the following medications:  zolpidem (AMBIEN) 10 MG tablet [Tylor Roberts MD]    Preferred pharmacy: [id number] - [facility] [address]    Comment:  Thank you

## 2018-07-20 ENCOUNTER — TRANSFERRED RECORDS (OUTPATIENT)
Dept: HEALTH INFORMATION MANAGEMENT | Facility: CLINIC | Age: 58
End: 2018-07-20

## 2018-07-30 ENCOUNTER — TELEPHONE (OUTPATIENT)
Dept: INTERNAL MEDICINE | Facility: CLINIC | Age: 58
End: 2018-07-30

## 2018-07-30 NOTE — TELEPHONE ENCOUNTER
Our goal is to have forms completed within 72 hours, however some forms may require a visit or additional information.    The form was placed at [facility]    Who is the form from? [facility]  Where did the form come from? Mailed to clinic  The form was placed in the inbox of: Internal Medicine Providers - Dr. Tylor Membreno    Please mail to [place] [facility]- envelope included    Additional comments: none    Call take on 7/30/2018 at 4:11 PM by Zeenat Roberson

## 2018-08-06 ENCOUNTER — MYC REFILL (OUTPATIENT)
Dept: INTERNAL MEDICINE | Facility: CLINIC | Age: 58
End: 2018-08-06

## 2018-08-06 DIAGNOSIS — F41.9 ANXIETY: ICD-10-CM

## 2018-08-06 RX ORDER — ZOLPIDEM TARTRATE 10 MG/1
10 TABLET ORAL
Qty: 30 TABLET | Refills: 0 | Status: SHIPPED | OUTPATIENT
Start: 2018-08-06 | End: 2018-09-05

## 2018-08-06 NOTE — TELEPHONE ENCOUNTER
Message from MyChart:  Original authorizing provider: Tylor Roberts MD    Gomez MOSELEYPasquale Turk would like a refill of the following medications:  zolpidem (AMBIEN) 10 MG tablet [Tylor Roberts MD]    Preferred pharmacy: [id number] - [facility] [address]    Comment:  Thank you

## 2018-08-06 NOTE — TELEPHONE ENCOUNTER
Ambien       Last Written Prescription Date:  7/10/18  Last Fill Quantity: 30,   # refills: 0  Last Office Visit: 6/12/18  Future Office visit:       Routing refill request to provider for review/approval because:  Drug not on the [facility], P or [facility] refill protocol or controlled substance

## 2018-08-23 DIAGNOSIS — E78.5 HYPERLIPIDEMIA LDL GOAL <100: ICD-10-CM

## 2018-08-23 RX ORDER — ATORVASTATIN CALCIUM 40 MG/1
TABLET, FILM COATED ORAL
Qty: 30 TABLET | Refills: 5 | Status: SHIPPED | OUTPATIENT
Start: 2018-08-23 | End: 2019-06-24

## 2018-08-23 NOTE — TELEPHONE ENCOUNTER
"Requested Prescriptions   Pending Prescriptions Disp Refills     atorvastatin (LIPITOR) 40 MG tablet [Pharmacy Med Name: Atorvastatin Calcium Oral Tablet 40 MG] 30 tablet 0     Sig: Take 1 tablet (40 mg) by mouth daily    Statins Protocol Passed    8/23/2018 11:06 AM       Passed - LDL on file in past 12 months    Recent Labs   Lab Test  03/06/18   0818   LDL  22            Passed - No abnormal creatine kinase in past 12 months    No lab results found.            Passed - Recent (12 mo) or future (30 days) visit within the authorizing provider's specialty    Patient had office visit in the last 12 months or has a visit in the next 30 days with authorizing provider or within the authorizing provider's specialty.  See \"Patient Info\" tab in inbasket, or \"Choose Columns\" in Meds & Orders section of the refill encounter.           Passed - Patient is age 18 or older        Prescription approved per [facility] Refill Protocol.    "

## 2018-09-05 ENCOUNTER — MYC REFILL (OUTPATIENT)
Dept: INTERNAL MEDICINE | Facility: CLINIC | Age: 58
End: 2018-09-05

## 2018-09-05 DIAGNOSIS — F41.9 ANXIETY: ICD-10-CM

## 2018-09-05 RX ORDER — ZOLPIDEM TARTRATE 10 MG/1
10 TABLET ORAL
Qty: 30 TABLET | Refills: 0 | Status: SHIPPED | OUTPATIENT
Start: 2018-09-05 | End: 2018-10-04

## 2018-09-05 NOTE — TELEPHONE ENCOUNTER
Ambien      Last Written Prescription Date:  8/6/18  Last Fill Quantity: 30,   # refills: 0  Last Office Visit: 6/12/18  Future Office visit:       Routing refill request to provider for review/approval because:  Drug not on the [facility], P or [facility] refill protocol or controlled substance

## 2018-09-05 NOTE — TELEPHONE ENCOUNTER
Message from MyChart:  Original authorizing provider: Tylor Roberts MD    Gomez MOSELEYPasquale Turk would like a refill of the following medications:  zolpidem (AMBIEN) 10 MG tablet [Tylor Roberts MD]    Preferred pharmacy: [id number] [place] [facility] [address]    Comment:  Thank you

## 2018-10-04 ENCOUNTER — MYC REFILL (OUTPATIENT)
Dept: INTERNAL MEDICINE | Facility: CLINIC | Age: 58
End: 2018-10-04

## 2018-10-04 DIAGNOSIS — E11.39 TYPE 2 DIABETES MELLITUS WITH OTHER OPHTHALMIC COMPLICATION, WITHOUT LONG-TERM CURRENT USE OF INSULIN (H): Primary | ICD-10-CM

## 2018-10-04 DIAGNOSIS — F41.9 ANXIETY: ICD-10-CM

## 2018-10-05 ENCOUNTER — TELEPHONE (OUTPATIENT)
Dept: INTERNAL MEDICINE | Facility: CLINIC | Age: 58
End: 2018-10-05

## 2018-10-05 RX ORDER — ZOLPIDEM TARTRATE 10 MG/1
10 TABLET ORAL
Qty: 30 TABLET | Refills: 0 | Status: SHIPPED | OUTPATIENT
Start: 2018-10-05 | End: 2018-11-07

## 2018-10-05 NOTE — TELEPHONE ENCOUNTER
Zolpidem      Last Written Prescription Date:  9/5/2018  Last Fill Quantity: 30,   # refills: 0  Last Office Visit: 6/12/2018  Future Office visit:       Routing refill request to provider for review/approval because:  Drug not on the [facility], [facility] or [facility] refill protocol or controlled substance    Ana Paula TSAI RN, BSN, PHN

## 2018-10-05 NOTE — TELEPHONE ENCOUNTER
FMLA form for pts wife Camelia Turk filled out and faxed to [phone]. Camelia advised and form was left at IM  for , copy made and sent to be scanned into pts chart.

## 2018-10-05 NOTE — TELEPHONE ENCOUNTER
Message from [employer]:  Original authorizing provider: Tylor Roberts MD    Gomez Artisquin would like a refill of the following medications:  zolpidem (AMBIEN) 10 MG tablet [Tylor Roberts MD]    Preferred pharmacy: [facility][id number] - [facility] [address]    Comment:  can you send in his Lisinopril 20 mg tabs he is out of them also Please Thank you

## 2018-10-05 NOTE — TELEPHONE ENCOUNTER
PCP see below patient was also asking for refill of lisinopril, did send message asking if he is still taking this or if he ever stopped. Not on current med list. Can PCP advise on if he should be on it or not?

## 2018-10-08 RX ORDER — LISINOPRIL 20 MG/1
20 TABLET ORAL DAILY PRN
Qty: 90 TABLET | Refills: 0 | Status: SHIPPED | OUTPATIENT
Start: 2018-10-08 | End: 2019-02-05

## 2018-10-08 NOTE — TELEPHONE ENCOUNTER
Labs were ordered and med sent in by PCP but questions not answered.    -Would PCP like labs to be done now?  -What parameters would you recommend giving lisinopril for if ok with patient taking it? Right now wife gives if SBP >145. Wondering what his goal DBP is?

## 2018-10-08 NOTE — TELEPHONE ENCOUNTER
Patient's wife informed and patient has appt for nonfasting labs tomorrow.    What parameters would you recommend giving lisinopril for if ok with patient taking it? Right now wife gives if SBP >145. Wondering what his goal DBP is?

## 2018-10-08 NOTE — TELEPHONE ENCOUNTER
"Spoke with patient's wife regarding request for lisinopril, not on current med list. Says patient is out of them and would like more to have on hand. Does not take it every day. Patient's wife usually takes his BP every other day. Patient was not feeling right last Thursday and BP was 167/93. He took lisinopril 20 mg but it was . Takes lisinopril once a day as needed, does not take every day. BP came down to 129/high 60s later that day . BP has generally been good. BP was 139/78 last Tuesday. Patient had more ambition this weekend and seemed his normal self.    Can refill for lisinopril be sent? Not on med list, filled differently in [facility]. Pended as 20 mg daily PRN.   What parameters would you recommend giving lisinopril for if ok with patient taking it? Right now wife gives if SBP >145. Wondering what his goal DBP is?  Lisinopril was discontinued during  hospitalization. Wife says that was because she told them she does not give it to patient every day, but looks like it was on hold d/t creat. Notes patient will be seeing Kidney doctor on 10/31 for f.u visit. Should patient have repeat labs now? BMP pended, per 7/3 letter:  \"Your basic panel including your kidney function test is still slightly abnormal but has improved.  I would continue with what were doing and keep yourself well hydrated and make sure that you recheck this level in about 3 months.\"       "

## 2018-10-09 DIAGNOSIS — E11.39 TYPE 2 DIABETES MELLITUS WITH OTHER OPHTHALMIC COMPLICATION, WITHOUT LONG-TERM CURRENT USE OF INSULIN (H): ICD-10-CM

## 2018-10-09 LAB
ANION GAP SERPL CALCULATED.3IONS-SCNC: 8 MMOL/L (ref 3–14)
BUN SERPL-MCNC: 25 MG/DL (ref 7–30)
CALCIUM SERPL-MCNC: 9.1 MG/DL (ref 8.5–10.1)
CHLORIDE SERPL-SCNC: 104 MMOL/L (ref 94–109)
CO2 SERPL-SCNC: 24 MMOL/L (ref 20–32)
CREAT SERPL-MCNC: 1.29 MG/DL (ref 0.66–1.25)
GFR SERPL CREATININE-BSD FRML MDRD: 57 ML/MIN/1.7M2
GLUCOSE SERPL-MCNC: 122 MG/DL (ref 70–99)
POTASSIUM SERPL-SCNC: 4.1 MMOL/L (ref 3.4–5.3)
SODIUM SERPL-SCNC: 136 MMOL/L (ref 133–144)

## 2018-10-09 PROCEDURE — 36415 COLL VENOUS BLD VENIPUNCTURE: CPT | Performed by: INTERNAL MEDICINE

## 2018-10-09 PROCEDURE — 80048 BASIC METABOLIC PNL TOTAL CA: CPT | Performed by: INTERNAL MEDICINE

## 2018-10-31 ENCOUNTER — TRANSFERRED RECORDS (OUTPATIENT)
Dept: HEALTH INFORMATION MANAGEMENT | Facility: CLINIC | Age: 58
End: 2018-10-31

## 2018-11-01 ENCOUNTER — OFFICE VISIT (OUTPATIENT)
Dept: INTERNAL MEDICINE | Facility: CLINIC | Age: 58
End: 2018-11-01
Payer: COMMERCIAL

## 2018-11-01 VITALS
RESPIRATION RATE: 15 BRPM | HEART RATE: 76 BPM | WEIGHT: 218.9 LBS | OXYGEN SATURATION: 99 % | DIASTOLIC BLOOD PRESSURE: 78 MMHG | BODY MASS INDEX: 32.8 KG/M2 | SYSTOLIC BLOOD PRESSURE: 150 MMHG | TEMPERATURE: 98.2 F

## 2018-11-01 DIAGNOSIS — Z12.5 SCREENING PSA (PROSTATE SPECIFIC ANTIGEN): Primary | ICD-10-CM

## 2018-11-01 DIAGNOSIS — F41.9 ANXIETY: ICD-10-CM

## 2018-11-01 DIAGNOSIS — R14.0 ABDOMINAL BLOATING: ICD-10-CM

## 2018-11-01 DIAGNOSIS — N52.9 ERECTILE DYSFUNCTION, UNSPECIFIED ERECTILE DYSFUNCTION TYPE: ICD-10-CM

## 2018-11-01 DIAGNOSIS — L98.9 SKIN LESION: ICD-10-CM

## 2018-11-01 DIAGNOSIS — E11.39 TYPE 2 DIABETES MELLITUS WITH OTHER OPHTHALMIC COMPLICATION, WITHOUT LONG-TERM CURRENT USE OF INSULIN (H): ICD-10-CM

## 2018-11-01 LAB
HBA1C MFR BLD: 6.3 % (ref 0–5.6)
HGB BLD-MCNC: 11 G/DL (ref 13.3–17.7)
PSA SERPL-ACNC: 0.34 UG/L (ref 0–4)

## 2018-11-01 PROCEDURE — 36415 COLL VENOUS BLD VENIPUNCTURE: CPT | Performed by: INTERNAL MEDICINE

## 2018-11-01 PROCEDURE — 99214 OFFICE O/P EST MOD 30 MIN: CPT | Performed by: INTERNAL MEDICINE

## 2018-11-01 PROCEDURE — 84403 ASSAY OF TOTAL TESTOSTERONE: CPT | Performed by: INTERNAL MEDICINE

## 2018-11-01 PROCEDURE — 83036 HEMOGLOBIN GLYCOSYLATED A1C: CPT | Performed by: INTERNAL MEDICINE

## 2018-11-01 PROCEDURE — G0103 PSA SCREENING: HCPCS | Performed by: INTERNAL MEDICINE

## 2018-11-01 PROCEDURE — 85018 HEMOGLOBIN: CPT | Performed by: INTERNAL MEDICINE

## 2018-11-01 NOTE — PROGRESS NOTES
SUBJECTIVE:   Gomez Turk is a 57 year old male who presents to clinic today for the following health issues:    Patient has multiple concerns:  1. 2 nonhealing spots on left arm for several months  2. Facial itching x 1.5 month, no triggers  3. Increased fatigue over the last month (patient was concerned this was a side effect of BP medications so was holding up until yesterday)   4. Intermittent productive cough with wheezing     Problem list and histories reviewed & adjusted, as indicated.  Additional history: as documented    Patient Active Problem List   Diagnosis     Diverticulosis of large intestine     Tobacco use disorder     HYPERLIPIDEMIA LDL GOAL <100     PAD (peripheral artery disease) (H)     Type 2 diabetes mellitus with other ophthalmic complication, without long-term current use of insulin (H)     Acute cholecystitis     Vision loss, bilateral     Counseling regarding advanced directives     Acute osteomyelitis of toe, left (H)     Past Surgical History:   Procedure Laterality Date     AMPUTATE TOE(S) Left 2/28/2017    Procedure: AMPUTATE TOE(S);  Surgeon: Jesus Aragon MD;  Location:  OR     BYPASS GRAFT FEMOROPOPLITEAL Left 2/28/2017    Procedure: BYPASS GRAFT FEMOROPOPLITEAL;  Surgeon: Jesus Aragon MD;  Location:  OR     ENT SURGERY  1990    deviated septum repair     IRRIGATION AND DEBRIDEMENT FOOT, COMBINED Left 2/28/2017    Procedure: COMBINED IRRIGATION AND DEBRIDEMENT FOOT;  Surgeon: Jesus Aragon MD;  Location:  OR     LAPAROSCOPIC CHOLECYSTECTOMY N/A 3/18/2017    Procedure: LAPAROSCOPIC CHOLECYSTECTOMY;  Surgeon: Edin Hollingsworth MD;  Location:  OR       Social History   Substance Use Topics     Smoking status: Current Every Day Smoker     Packs/day: 0.50     Types: Cigarettes     Smokeless tobacco: Never Used     Alcohol use Yes      Comment: occassion - 1 drink today     Family History   Problem Relation Age of Onset     Diabetes Mother       Lipids Mother      Melanoma Mother      Cancer Paternal Grandmother      Neurologic Disorder Maternal Uncle      Glaucoma No family hx of      Macular Degeneration No family hx of      Retinal detachment No family hx of      Thyroid Disease No family hx of          Current Outpatient Prescriptions   Medication Sig Dispense Refill     aspirin EC 81 MG EC tablet Take 1 tablet (81 mg) by mouth daily 30 tablet 11     atorvastatin (LIPITOR) 40 MG tablet Take 1 tablet (40 mg) by mouth daily 30 tablet 5     augmented betamethasone dipropionate (DIPROLENE-AF) 0.05 % cream Apply sparingly to affected area twice daily as needed.  Do not apply to face. 100 g 3     citalopram (CELEXA) 40 MG tablet Take 1 tablet (40 mg) by mouth daily 90 tablet 1     clopidogrel (PLAVIX) 75 MG tablet Take 1 tablet (75 mg) by mouth daily 90 tablet 1     gabapentin (NEURONTIN) 300 MG capsule Take 1 capsule (300 mg) by mouth 3 times daily 90 capsule 5     glimepiride (AMARYL) 2 MG tablet Take 1 tablet (2 mg) by mouth 2 times daily 180 tablet 3     lisinopril (PRINIVIL/ZESTRIL) 20 MG tablet Take 1 tablet (20 mg) by mouth daily as needed 90 tablet 0     zolpidem (AMBIEN) 10 MG tablet Take 1 tablet (10 mg) by mouth nightly as needed for sleep , Do not use if taking narcotics 30 tablet 0     Allergies   Allergen Reactions     No Known Drug Allergies      BP Readings from Last 3 Encounters:   11/01/18 150/78   06/15/18 128/76   06/12/18 130/72    Wt Readings from Last 3 Encounters:   11/01/18 218 lb 14.4 oz (99.3 kg)   06/15/18 212 lb (96.2 kg)   06/12/18 212 lb (96.2 kg)         Reviewed and updated as needed this visit by clinical staff  Tobacco  Allergies  Meds  Med Hx  Surg Hx  Fam Hx  Soc Hx      Reviewed and updated as needed this visit by Provider       ROS:  CONSTITUTIONAL: NEGATIVE for fever, chills, change in weight  ENT/MOUTH: NEGATIVE for ear, mouth and throat problems  RESP: NEGATIVE for significant cough or SOB  CV: NEGATIVE for  chest pain, palpitations or peripheral edema  GI: NEGATIVE for nausea, abdominal pain, heartburn, or change in bowel habits  : NEGATIVE for frequency, dysuria, or hematuria, noted ED.  MUSCULOSKELETAL: NEGATIVE for significant arthralgias or myalgia  HEME: NEGATIVE for bleeding problems  PSYCHIATRIC: NEGATIVE for changes in mood or affect    OBJECTIVE:                                                    /78  Pulse 76  Temp 98.2  F (36.8  C) (Oral)  Resp 15  Wt 218 lb 14.4 oz (99.3 kg)  SpO2 99%  BMI 32.8 kg/m2  Body mass index is 32.8 kg/(m^2).  GENERAL:  alert and no distress  EYES: decreased VA OU  HENT: ear canals- normal; TMs- normal; Nose- normal; Mouth- no ulcers, no lesions  NECK: no tenderness, no adenopathy, no asymmetry, no masses, no stiffness; thyroid- normal to palpation  RESP: lungs clear to auscultation - no rales, no rhonchi, no wheezes  CV: regular rates and rhythm, normal S1 S2, no S3 or S4 and no click or rub   ABDOMEN: soft, no tenderness, no  hepatosplenomegaly, no masses, normal bowel sounds  MS: extremities- no gross deformities noted  SKIN: left lateral skin lesion, linear, 1 inch.  Mild erythema.  There is no obvious changes noted to the facial area.  Appears as a superficial laceration that is healing with granulation tissue.  PSYCH: Alert and oriented times 3; speech- coherent , normal rate and volume; able to articulate logical thoughts, able to abstract reason, no tangential thoughts, no hallucinations or delusions, affect- normal     Lab Results   Component Value Date    A1C 6.5 06/06/2018    A1C 6.0 03/06/2018    A1C 6.8 02/10/2017    A1C 9.6 10/17/2016    A1C 7.8 11/02/2015       ASSESSMENT/PLAN:                                                      (Z12.5) Screening PSA (prostate specific antigen)  (primary encounter diagnosis)  Comment: Ordered as routine screening  Plan: PSA, screen            (F41.9) Anxiety  Comment: Appears improved and at baseline  Plan:     (N52.9)  Erectile dysfunction, unspecified erectile dysfunction type  Comment: Suggested referral for further assess  Plan: UROLOGY ADULT REFERRAL, Testosterone, total            (R14.0) Abdominal bloating  Comment: Nonspecific symptoms noted prior colonoscopy suggest GI assessment  Plan: GASTROENTEROLOGY ADULT REF CONSULT ONLY            (E11.39) Type 2 diabetes mellitus with other ophthalmic complication, without long-term current use of insulin (H)  Comment: Labs ordered as fasting and restart metformin as per renal improved.  Patient believes A1c will be slightly higher as he has been off metformin due to recent issues with kidney dysfunction if so will need recheck in 3 months  Plan: Hemoglobin A1c, Hemoglobin, metFORMIN         (GLUCOPHAGE) 1000 MG tablet            (L98.9) Skin lesion  Comment: This appears to be healing although the patient states has been present for 2 months and keeps recurring thus I have suggested that he follow-up in the dermatology clinic to consider the need for potential biopsy and assessment  Plan:       See Patient Instructions    Tylor Roberts MD  [facility]

## 2018-11-01 NOTE — LETTER
[facility]  [address] [address]  [phone]      11/7/2018       Gomez Turk  [address]  [place] [facility] [address]        Dear Gomez,    Your Hemoglobin A1C and blood sugar tests look good and I would continue with your medication without change.  These tests should be repeated in 6 months.    In addition, your testosterone level and PSA or prostate screening antigen is normal and should be repeated annually.    Your hemoglobin function test remains slightly abnormal but stable and should be rechecked here in the clinic with a follow-up visit with me.  I will look forward to seeing you at that time and please call to make an appointment.      Sincerely,      Tylor Roberts MD  Internal Medicine

## 2018-11-01 NOTE — LETTER
[facility]  [address] [id number] [phone]        March 6, 2019    Gomez Turk  [address]  [facility] [address]              Dear Gomez Turk    This is to remind you that your non-fasting labs is due.    You may call our office at [phone] to schedule an appointment.    Please disregard this notice if you have already had your labs drawn or made an appointment.        Sincerely,        Tylor Roberts MD

## 2018-11-01 NOTE — MR AVS SNAPSHOT
After Visit Summary   11/1/2018    Gomez Turk    MRN: [id number]           Patient Information     Date Of Birth          1960        Visit Information        Provider Department      11/1/2018 11:20 AM Tylor Roberts MD [facility]        Today's Diagnoses     Screening PSA (prostate specific antigen)    -  1    Anxiety        Erectile dysfunction, unspecified erectile dysfunction type        Abdominal bloating        Type 2 diabetes mellitus with other ophthalmic complication, without long-term current use of insulin (H)           Follow-ups after your visit        Additional Services     GASTROENTEROLOGY ADULT REF CONSULT ONLY       Preferred Location: [facility] [phone]      Please be aware that coverage of these services is subject to the terms and limitations of your health insurance plan.  Call member services at your health plan with any benefit or coverage questions.  Any procedures must be performed at a [facility] facility OR coordinated by your clinic's referral office.    Please bring the following with you to your appointment:    (1) Any X-Rays, CTs or MRIs which have been performed.  Contact the facility where they were done to arrange for  prior to your scheduled appointment.    (2) List of current medications   (3) This referral request   (4) Any documents/labs given to you for this referral            UROLOGY ADULT REFERRAL       Your provider has referred you to: [facility]: [facility] [place] [phone]   [email or web address]    Please be aware that coverage of these services is subject to the terms and limitations of your health insurance plan.  Call member services at your health plan with any benefit or coverage questions.      Please bring the following with you to your appointment:    (1) Any X-Rays, CTs or MRIs which have been performed.  Contact the facility where they were done to arrange for   prior to your scheduled appointment.    (2) List of current medications  (3) This referral request   (4) Any documents/labs given to you for this referral                  Who to contact     If you have questions or need follow up information about today's clinic visit or your schedule please contact [facility] directly at [phone].  Normal or non-critical lab and imaging results will be communicated to you by MyChart, letter or phone within 4 business days after the clinic has received the results. If you do not hear from us within 7 days, please contact the clinic through [employer] or phone. If you have a critical or abnormal lab result, we will notify you by phone as soon as possible.  Submit refill requests through [employer] or call your pharmacy and they will forward the refill request to us. Please allow 3 business days for your refill to be completed.          Additional Information About Your Visit        [employer] Information     [employer] gives you secure access to your electronic health record. If you see a primary care provider, you can also send messages to your care team and make appointments. If you have questions, please call your primary care clinic.  If you do not have a primary care provider, please call [phone] and they will assist you.        Care EveryWhere ID     This is your Care EveryWhere ID. This could be used by other organizations to access your [facility] medical records  [id number]        Your Vitals Were     Pulse Temperature Respirations Pulse Oximetry BMI (Body Mass Index)       76 98.2  F (36.8  C) (Oral) 15 99% 32.8 kg/m2        Blood Pressure from Last 3 Encounters:   11/01/18 150/78   06/15/18 128/76   06/12/18 130/72    Weight from Last 3 Encounters:   11/01/18 218 lb 14.4 oz (99.3 kg)   06/15/18 212 lb (96.2 kg)   06/12/18 212 lb (96.2 kg)              We Performed the Following     GASTROENTEROLOGY ADULT REF CONSULT ONLY     Hemoglobin A1c      Hemoglobin     Testosterone, total     UROLOGY ADULT REFERRAL        Primary Care Provider Office Phone # Fax #    Tylor Roberts [phone] [address] [facility] [address]        Equal Access to Services     YUVAL MIGUEL : Hadii aad ku hadpedroo Soomaali, waaxda luqadaha, qaybta kaalmada adeegyada, waxmaksim sánchezn brigido hammonds laOleksandrsanjay caro. So [facility] [phone].    ATENCIÓN: Si habla español, tiene a nye disposición servicios gratuitos de asistencia lingüística. Llame al [phone].    We comply with applicable federal civil rights laws and [place] laws. We do not discriminate on the basis of race, color, national origin, age, disability, sex, sexual orientation, or gender identity.            Thank you!     Thank you for choosing [facility]  for your care. Our goal is always to provide you with excellent care. Hearing back from our patients is one way we can continue to improve our services. Please take a few minutes to complete the written survey that you may receive in the mail after your visit with us. Thank you!             Your Updated Medication List - Protect others around you: Learn how to safely use, store and throw away your medicines at www.disposemymeds.org.          This list is accurate as of 11/1/18 11:43 AM.  Always use your most recent med list.                   Brand Name Dispense Instructions for use Diagnosis    aspirin 81 MG EC tablet     30 tablet    Take 1 tablet (81 mg) by mouth daily    PAD (peripheral artery disease) (H)       atorvastatin 40 MG tablet    LIPITOR    30 tablet    Take 1 tablet (40 mg) by mouth daily    Hyperlipidemia LDL goal <100       augmented betamethasone dipropionate 0.05 % cream    DIPROLENE-AF    100 g    Apply sparingly to affected area twice daily as needed.  Do not apply to face.    Lichen simplex chronicus       citalopram 40 MG tablet    celeXA    90 tablet    Take 1 tablet (40 mg) by mouth daily     Vision loss, bilateral       clopidogrel 75 MG tablet    PLAVIX    90 tablet    Take 1 tablet (75 mg) by mouth daily    PAD (peripheral artery disease) (H)       gabapentin 300 MG capsule    NEURONTIN    90 capsule    Take 1 capsule (300 mg) by mouth 3 times daily    PAD (peripheral artery disease) (H), Type 2 diabetes mellitus with other ophthalmic complication, without long-term current use of insulin (H)       glimepiride 2 MG tablet    AMARYL    180 tablet    Take 1 tablet (2 mg) by mouth 2 times daily    Type 2 diabetes mellitus with diabetic neuropathy, with long-term current use of insulin (H)       lisinopril 20 MG tablet    PRINIVIL/ZESTRIL    90 tablet    Take 1 tablet (20 mg) by mouth daily as needed    Type 2 diabetes mellitus with other ophthalmic complication, without long-term current use of insulin (H)       zolpidem 10 MG tablet    AMBIEN    30 tablet    Take 1 tablet (10 mg) by mouth nightly as needed for sleep , Do not use if taking narcotics    Anxiety

## 2018-11-06 LAB — TESTOST SERPL-MCNC: 394 NG/DL (ref 240–950)

## 2018-11-07 ENCOUNTER — MYC REFILL (OUTPATIENT)
Dept: INTERNAL MEDICINE | Facility: CLINIC | Age: 58
End: 2018-11-07

## 2018-11-07 DIAGNOSIS — F41.9 ANXIETY: ICD-10-CM

## 2018-11-07 RX ORDER — ZOLPIDEM TARTRATE 10 MG/1
10 TABLET ORAL
Qty: 30 TABLET | Refills: 0 | Status: SHIPPED | OUTPATIENT
Start: 2018-11-07 | End: 2018-12-05

## 2018-11-07 NOTE — TELEPHONE ENCOUNTER
Message from MyChart:  Original authorizing provider: Tylor Roberts MD    Gomez MOSELEYPasquale Turk would like a refill of the following medications:  zolpidem (AMBIEN) 10 MG tablet [Tylor Roberts MD]    Preferred pharmacy: [id number] - [facility] [id number]    Comment:  Thank you

## 2018-11-07 NOTE — TELEPHONE ENCOUNTER
ambien      Last Written Prescription Date:  10/5/18  Last Fill Quantity: 30,   # refills: 0  Last Office Visit: 11/1/18  Future Office visit:    Next 5 appointments (look out 90 days)     Dec 06, 2018 11:30 AM CST   [facility]    [address] [place] [id number] [phone]                   Routing refill request to provider for review/approval because:  Drug not on the [facility], [facility] or [facility] refill protocol or controlled substance

## 2018-11-16 ENCOUNTER — OFFICE VISIT (OUTPATIENT)
Dept: UROLOGY | Facility: CLINIC | Age: 58
End: 2018-11-16
Payer: COMMERCIAL

## 2018-11-16 VITALS
HEIGHT: 69 IN | DIASTOLIC BLOOD PRESSURE: 80 MMHG | SYSTOLIC BLOOD PRESSURE: 148 MMHG | BODY MASS INDEX: 32.29 KG/M2 | WEIGHT: 218 LBS

## 2018-11-16 DIAGNOSIS — H54.3 VISION LOSS, BILATERAL: ICD-10-CM

## 2018-11-16 DIAGNOSIS — E11.39 TYPE 2 DIABETES MELLITUS WITH OTHER OPHTHALMIC COMPLICATION, WITHOUT LONG-TERM CURRENT USE OF INSULIN (H): ICD-10-CM

## 2018-11-16 DIAGNOSIS — N52.9 ERECTILE DYSFUNCTION, UNSPECIFIED ERECTILE DYSFUNCTION TYPE: Primary | ICD-10-CM

## 2018-11-16 DIAGNOSIS — I73.9 PAD (PERIPHERAL ARTERY DISEASE) (H): ICD-10-CM

## 2018-11-16 PROCEDURE — 99204 OFFICE O/P NEW MOD 45 MIN: CPT | Performed by: UROLOGY

## 2018-11-16 RX ORDER — SILDENAFIL 100 MG/1
100 TABLET, FILM COATED ORAL DAILY PRN
Qty: 6 TABLET | Refills: 3 | Status: SHIPPED | OUTPATIENT
Start: 2018-11-16 | End: 2021-10-12

## 2018-11-16 ASSESSMENT — PAIN SCALES - GENERAL: PAINLEVEL: NO PAIN (0)

## 2018-11-16 NOTE — NURSING NOTE
Chief Complaint   Patient presents with     Consult     pt is here due to numbness in private parts.      Jeannie Nuñez, RADHA

## 2018-11-16 NOTE — LETTER
11/16/2018       RE: Gomez Turk  [address]  [place] [address]     Dear Colleague,    Thank you for referring your patient, Gomez Turk, to the [facility] at [facility]. Please see a copy of my visit note below.    [facility] Consult [facility] and Physical  [facility]   Name: Gomez Turk    MRN: [id number]   YOB: 1960       We were asked to see Gomez Turk at the request of Dr. Roberts for evaluation and treatment of Erectile dysfunction.        Chief Complaint:   Erectile dysfunction     History is obtained from the patient            History of Present Illness:   Gomez Turk is a 57 year old male who is being seen for evaluation of Erectile dysfunction     He has had worsening Erectile dysfunction over the past few years. He has not tried any medications or treatment for this yet.     Urine stream with post void dribbling and prolonged emptying. He denies frequency, urgency, acute urinary retention, dysuria or hematuria. He is not too bothered by this at this time.    Medical history is complicated by DM, PAD s/p LEFT LE bypass and recent vision loss in 02/2017    Testosterone = 394 11/1/18    Component PSA   Latest Ref Rng & Units 0 - 4 ug/L   6/25/2003 0.5   11/30/2010 1.02   2/26/2013 0.56   1/19/2015 0.43   11/1/2018 0.34       (4 or >)  Location: Penis  Quality: Erectile dysfunction   Severity: unable to achieve erection   Duration: 1-2 years           Past Medical History:     Past Medical History:   Diagnosis Date     DIVERTICULOSIS OF COLON W/O BLEED 6/25/2003     Hyperlipidemia LDL goal <100 10/31/2010     Legally blind      Tobacco use disorder 6/25/2003     Type 2 diabetes, HbA1c goal < 7% (H) 10/31/2010            Past Surgical History:     Past Surgical History:   Procedure Laterality Date     AMPUTATE TOE(S) Left 2/28/2017    Procedure: AMPUTATE TOE(S);  Surgeon: Margo  Jesus Nj MD;  Location: [facility] OR     BYPASS GRAFT FEMOROPOPLITEAL Left 2/28/2017    Procedure: BYPASS GRAFT FEMOROPOPLITEAL;  Surgeon: Jesus Aragon MD;  Location:  OR     ENT SURGERY  1990    deviated septum repair     IRRIGATION AND DEBRIDEMENT FOOT, COMBINED Left 2/28/2017    Procedure: COMBINED IRRIGATION AND DEBRIDEMENT FOOT;  Surgeon: Jesus Aragon MD;  Location:  OR     LAPAROSCOPIC CHOLECYSTECTOMY N/A 3/18/2017    Procedure: LAPAROSCOPIC CHOLECYSTECTOMY;  Surgeon: Edin Hollingsworth MD;  Location:  OR            Social History:     Social History   Substance Use Topics     Smoking status: Current Every Day Smoker     Packs/day: 0.50     Types: Cigarettes     Smokeless tobacco: Never Used     Alcohol use Yes      Comment: occassion - 1 drink today       History   Smoking Status     Current Every Day Smoker     Packs/day: 0.50     Types: Cigarettes   Smokeless Tobacco     Never Used            Family History:     Family History   Problem Relation Age of Onset     Diabetes Mother      Lipids Mother      Melanoma Mother      Cancer Paternal Grandmother      Neurologic Disorder Maternal Uncle      Glaucoma No family hx of      Macular Degeneration No family hx of      Retinal detachment No family hx of      Thyroid Disease No family hx of               Allergies:     Allergies   Allergen Reactions     No Known Drug Allergies             Medications:     Current Outpatient Prescriptions   Medication Sig     aspirin EC 81 MG EC tablet Take 1 tablet (81 mg) by mouth daily     atorvastatin (LIPITOR) 40 MG tablet Take 1 tablet (40 mg) by mouth daily     augmented betamethasone dipropionate (DIPROLENE-AF) 0.05 % cream Apply sparingly to affected area twice daily as needed.  Do not apply to face.     citalopram (CELEXA) 40 MG tablet Take 1 tablet (40 mg) by mouth daily     clopidogrel (PLAVIX) 75 MG tablet Take 1 tablet (75 mg) by mouth daily     gabapentin (NEURONTIN) 300 MG capsule  "Take 1 capsule (300 mg) by mouth 3 times daily     glimepiride (AMARYL) 2 MG tablet Take 1 tablet (2 mg) by mouth 2 times daily     lisinopril (PRINIVIL/ZESTRIL) 20 MG tablet Take 1 tablet (20 mg) by mouth daily as needed     metFORMIN (GLUCOPHAGE) 1000 MG tablet Take 1 tablet (1,000 mg) by mouth 2 times daily (with meals)     zolpidem (AMBIEN) 10 MG tablet Take 1 tablet (10 mg) by mouth nightly as needed for sleep , Do not use if taking narcotics     No current facility-administered medications for this visit.              Review of Systems:     Skin: negative  Eyes: vision loss - cloudy vision   Ears/Nose/Throat: negative  Respiratory: No shortness of breath, dyspnea on exertion, cough, or hemoptysis  Cardiovascular: negative  Gastrointestinal: negative  Genitourinary: as above  Musculoskeletal: negative  Neurologic: negative  Psychiatric: negative  Hematologic/Lymphatic/Immunologic: negative  Endocrine: negative            Physical Exam:   Patient Vitals for the past 24 hrs:   BP Height Weight   11/16/18 1423 148/80 1.74 m (5' 8.5\") 98.9 kg (218 lb)     Body mass index is 32.66 kg/(m^2).     General: age-appropriate appearing male in NAD  HEENT: Head AT/NC, EOMI, CN Grossly intact  Lungs: no respiratory distress, or pursed lip breathing  Heart: No obvious jugular venous distension present  Back: no bony midline tenderness, no CVAT bilaterally.  Abdomen: soft, obesely-distended, non-tender. No organomegaly  : normal male external genitalia.   Lymph: no palpable inguinal lymphadenopathy.  LE: no edema.   Musculoskeltal: extremities normal, no peripheral edema  Skin: no suspicious lesions or rashes  Neuro: Alert, oriented, speech and mentation normal;  moving all 4 extremities equally.  Psych: affect and mood normal          Data:   All laboratory data reviewed:    Testosterone = 394 11/1/18    Component PSA   Latest Ref Rng & Units 0 - 4 ug/L   6/25/2003 0.5   11/30/2010 1.02   2/26/2013 0.56   1/19/2015 0.43 "   11/1/2018 0.34     Lab Results   Component Value Date    CR 1.29 10/09/2018             Impression and Plan:   Impression:   56 y/o man with history of DM type II with peripheral neuropathy, PAD s/p LE bypass, sudden vision loss in 02/2017 with ~2 years of worsening Erectile dysfunction and mild LUTS.      Plan:   Erectile dysfunction   - He is treatment naive at this time  - We discussed the options for treatment which include PDE-5i, vacuum assist, intracavernosal injections, and IPP (inflatable penile pump)  - We discussed that we started with PDE-5i and script for sildenafil 100mg (Viagra) printed and provided  - Given the other options he thinks that his second choice will be consideration for IPP  - We discussed the impact of DM and PAD on Erectile dysfunction  - F/U in 6 months    DM type II  - Follow with Dr. Roberts     Thank you for the kind consultation.    Time spent: 30 minutes of which >50% was spent counseling.    Neil Rodriguez MD   Urology  [facility]  [facility] Phone: [phone]  [facility] Phone: [phone]

## 2018-11-16 NOTE — PROGRESS NOTES
Urology Consult History and Physical  [facility]   Name: Gomez Turk    MRN: [id number]   YOB: 1960       We were asked to see Gomez Turk at the request of Dr. Roberts for evaluation and treatment of Erectile dysfunction.        Chief Complaint:   Erectile dysfunction     History is obtained from the patient            History of Present Illness:   Gomez Turk is a 57 year old male who is being seen for evaluation of Erectile dysfunction     He has had worsening Erectile dysfunction over the past few years. He has not tried any medications or treatment for this yet.     Urine stream with post void dribbling and prolonged emptying. He denies frequency, urgency, acute urinary retention, dysuria or hematuria. He is not too bothered by this at this time.    Medical history is complicated by DM, PAD s/p LEFT LE bypass and recent vision loss in 02/2017    Testosterone = 394 11/1/18    Component PSA   Latest Ref Rng & Units 0 - 4 ug/L   6/25/2003 0.5   11/30/2010 1.02   2/26/2013 0.56   1/19/2015 0.43   11/1/2018 0.34       (4 or >)  Location: Penis  Quality: Erectile dysfunction   Severity: unable to achieve erection   Duration: 1-2 years           Past Medical History:     Past Medical History:   Diagnosis Date     DIVERTICULOSIS OF COLON W/O BLEED 6/25/2003     Hyperlipidemia LDL goal <100 10/31/2010     Legally blind      Tobacco use disorder 6/25/2003     Type 2 diabetes, HbA1c goal < 7% (H) 10/31/2010            Past Surgical History:     Past Surgical History:   Procedure Laterality Date     AMPUTATE TOE(S) Left 2/28/2017    Procedure: AMPUTATE TOE(S);  Surgeon: Jesus Aragon MD;  Location:  OR     BYPASS GRAFT FEMOROPOPLITEAL Left 2/28/2017    Procedure: BYPASS GRAFT FEMOROPOPLITEAL;  Surgeon: Jesus Aragon MD;  Location:  OR     ENT SURGERY  1990    deviated septum repair     IRRIGATION AND DEBRIDEMENT FOOT, COMBINED Left 2/28/2017    Procedure: COMBINED  IRRIGATION AND DEBRIDEMENT FOOT;  Surgeon: Jesus Aragon MD;  Location:  OR     LAPAROSCOPIC CHOLECYSTECTOMY N/A 3/18/2017    Procedure: LAPAROSCOPIC CHOLECYSTECTOMY;  Surgeon: Edin Hollingsworth MD;  Location:  OR            Social History:     Social History   Substance Use Topics     Smoking status: Current Every Day Smoker     Packs/day: 0.50     Types: Cigarettes     Smokeless tobacco: Never Used     Alcohol use Yes      Comment: occassion - 1 drink today       History   Smoking Status     Current Every Day Smoker     Packs/day: 0.50     Types: Cigarettes   Smokeless Tobacco     Never Used            Family History:     Family History   Problem Relation Age of Onset     Diabetes Mother      Lipids Mother      Melanoma Mother      Cancer Paternal Grandmother      Neurologic Disorder Maternal Uncle      Glaucoma No family hx of      Macular Degeneration No family hx of      Retinal detachment No family hx of      Thyroid Disease No family hx of               Allergies:     Allergies   Allergen Reactions     No Known Drug Allergies             Medications:     Current Outpatient Prescriptions   Medication Sig     aspirin EC 81 MG EC tablet Take 1 tablet (81 mg) by mouth daily     atorvastatin (LIPITOR) 40 MG tablet Take 1 tablet (40 mg) by mouth daily     augmented betamethasone dipropionate (DIPROLENE-AF) 0.05 % cream Apply sparingly to affected area twice daily as needed.  Do not apply to face.     citalopram (CELEXA) 40 MG tablet Take 1 tablet (40 mg) by mouth daily     clopidogrel (PLAVIX) 75 MG tablet Take 1 tablet (75 mg) by mouth daily     gabapentin (NEURONTIN) 300 MG capsule Take 1 capsule (300 mg) by mouth 3 times daily     glimepiride (AMARYL) 2 MG tablet Take 1 tablet (2 mg) by mouth 2 times daily     lisinopril (PRINIVIL/ZESTRIL) 20 MG tablet Take 1 tablet (20 mg) by mouth daily as needed     metFORMIN (GLUCOPHAGE) 1000 MG tablet Take 1 tablet (1,000 mg) by mouth 2 times daily (with  "meals)     zolpidem (AMBIEN) 10 MG tablet Take 1 tablet (10 mg) by mouth nightly as needed for sleep , Do not use if taking narcotics     No current facility-administered medications for this visit.              Review of Systems:     Skin: negative  Eyes: vision loss - cloudy vision   Ears/Nose/Throat: negative  Respiratory: No shortness of breath, dyspnea on exertion, cough, or hemoptysis  Cardiovascular: negative  Gastrointestinal: negative  Genitourinary: as above  Musculoskeletal: negative  Neurologic: negative  Psychiatric: negative  Hematologic/Lymphatic/Immunologic: negative  Endocrine: negative    Answers for HPI/ROS submitted by the patient on 11/8/2018   General Symptoms: No  Skin Symptoms: No  HENT Symptoms: No  EYE SYMPTOMS: No  HEART SYMPTOMS: No  LUNG SYMPTOMS: No  INTESTINAL SYMPTOMS: No  URINARY SYMPTOMS: No  REPRODUCTIVE SYMPTOMS: No  SKELETAL SYMPTOMS: No  BLOOD SYMPTOMS: No  NERVOUS SYSTEM SYMPTOMS: No  MENTAL HEALTH SYMPTOMS: No            Physical Exam:   Patient Vitals for the past 24 hrs:   BP Height Weight   11/16/18 1423 148/80 1.74 m (5' 8.5\") 98.9 kg (218 lb)     Body mass index is 32.66 kg/(m^2).     General: age-appropriate appearing male in NAD  HEENT: Head AT/NC, EOMI, CN Grossly intact  Lungs: no respiratory distress, or pursed lip breathing  Heart: No obvious jugular venous distension present  Back: no bony midline tenderness, no CVAT bilaterally.  Abdomen: soft, obesely-distended, non-tender. No organomegaly  : normal male external genitalia.   Lymph: no palpable inguinal lymphadenopathy.  LE: no edema.   Musculoskeltal: extremities normal, no peripheral edema  Skin: no suspicious lesions or rashes  Neuro: Alert, oriented, speech and mentation normal;  moving all 4 extremities equally.  Psych: affect and mood normal          Data:   All laboratory data reviewed:    Testosterone = 394 11/1/18    Component PSA   Latest Ref Rng & Units 0 - 4 ug/L   6/25/2003 0.5   11/30/2010 1.02 "   2/26/2013 0.56   1/19/2015 0.43   11/1/2018 0.34     Lab Results   Component Value Date    CR 1.29 10/09/2018             Impression and Plan:   Impression:   56 y/o man with history of DM type II with peripheral neuropathy, PAD s/p LE bypass, sudden vision loss in 02/2017 with ~2 years of worsening Erectile dysfunction and mild LUTS.      Plan:   Erectile dysfunction   - He is treatment naive at this time  - We discussed the options for treatment which include PDE-5i, vacuum assist, intracavernosal injections, and IPP (inflatable penile pump)  - We discussed that we started with PDE-5i and script for sildenafil 100mg (Viagra) printed and provided  - Given the other options he thinks that his second choice will be consideration for IPP  - We discussed the impact of DM and PAD on Erectile dysfunction  - F/U in 6 months    DM type II  - Follow with Dr. Roberts     Thank you for the kind consultation.    Time spent: 30 minutes of which >50% was spent counseling.    Neil Rodriguez MD   Urology  [facility]  [facility] Phone: [phone]  [facility] Phone: [phone]

## 2018-11-16 NOTE — MR AVS SNAPSHOT
After Visit Summary   11/16/2018    Gomez Turk    MRN: [id number]           Patient Information     Date Of Birth          1960        Visit Information        Provider Department      11/16/2018 2:30 PM Neil Rodriguez MD [facility] [place]        Today's Diagnoses     Erectile dysfunction, unspecified erectile dysfunction type    -  1    Type 2 diabetes mellitus with other ophthalmic complication, without long-term current use of insulin (H)        PAD (peripheral artery disease) (H)        Vision loss, bilateral           Follow-ups after your visit        Follow-up notes from your care team     Return in about 6 months (around 5/16/2019).      Your next 10 appointments already scheduled     Dec 06, 2018 11:30 AM CST   Return Visit with Edin Perry MD   [facility] ([facility])    [address] [id number] [phone]            Dec 20, 2018 10:45 AM CST   US LOWER EXTREMITY ARTERIAL DUPLEX LEFT with SHVUS1   [facility] ([facility])    [address]  [facility] [address]   [phone]           How do I prepare for my exam? (Food and drink instructions) No Food and Drink Restrictions.  How do I prepare for my exam? (Other instructions) You do not need to do anything special to prepare for your exam.  What should I wear: Wear comfortable clothes.  How long does the exam take: Most ultrasounds take 30 to 60 minutes.  What should I bring: Bring a list of your medicines, including vitamins, minerals and over-the-counter drugs. It is safest to leave personal items at home.  Do I need a :  No  is needed.  What do I need to tell my doctor: Tell your doctor about any allergies you may have.  What should I do after the exam: No restrictions, You may resume normal activities.  What is this test:  An ultrasound uses sound waves to make pictures of the body. Sound waves do not cause pain. The only discomfort may be the pressure of the wand against your skin or full bladder.  Who should I call with questions: If you have any questions, please call the Imaging Department where you will have your exam. Directions, parking instructions, and other information is available on our website, [email or web address].            Dec 20, 2018 11:30 AM CST   Return Visit with Jesus Aragon MD   [facility] [place] [facility] ([facility])    [address]  [facility] [id number] [phone]            May 20, 2019 11:00 AM CDT   Return Visit with Neil Rodriguez MD   [facility] [place] ([facility])    [address]  [address]  [facility] [id number] [phone]              Who to contact     If you have questions or need follow up information about today's clinic visit or your schedule please contact [facility] [place] directly at [phone].  Normal or non-critical lab and imaging results will be communicated to you by [email or web address], letter or phone within 4 business days after the clinic has received the results. If you do not hear from us within 7 days, please contact the clinic through [email or web address] or phone. If you have a critical or abnormal lab result, we will notify you by phone as soon as possible.  Submit refill requests through Integrate or call your pharmacy and they will forward the refill request to us. Please allow 3 business days for your refill to be completed.          Additional Information About Your Visit        [email or web address] Information     Integrate gives you secure access to your electronic health record. If you see a primary care provider, you can also send messages to your care team and make appointments. If you have questions, please call your primary care clinic.  " If you do not have a primary care provider, please call [phone] and they will assist you.        Care EveryWhere ID     This is your Care EveryWhere ID. This could be used by other organizations to access your [facility] medical records  [id number]        Your Vitals Were     Height BMI (Body Mass Index)                1.74 m (5' 8.5\") 32.66 kg/m2           Blood Pressure from Last 3 Encounters:   11/16/18 148/80   11/01/18 150/78   06/15/18 128/76    Weight from Last 3 Encounters:   11/16/18 98.9 kg (218 lb)   11/01/18 99.3 kg (218 lb 14.4 oz)   06/15/18 96.2 kg (212 lb)              Today, you had the following     No orders found for display         Today's Medication Changes          These changes are accurate as of 11/16/18  3:01 PM.  If you have any questions, ask your nurse or doctor.               Start taking these medicines.        Dose/Directions    sildenafil 100 MG tablet   Commonly known as:  VIAGRA   Used for:  Erectile dysfunction, unspecified erectile dysfunction type        Dose:  100 mg   Take 1 tablet (100 mg) by mouth daily as needed (for sexual intercourse) 30 min to 4 hrs before sex. Do not use with nitroglycerin, terazosin or doxazosin.   Quantity:  6 tablet   Refills:  3            Where to get your medicines      Some of these will need a paper prescription and others can be bought over the counter.  Ask your nurse if you have questions.     Bring a paper prescription for each of these medications     sildenafil 100 MG tablet                Primary Care Provider Office Phone # Fax #    Tylor Roberts [phone]       [address] [place] [phone]        Equal Access to Services     [facility]: Hadale Araiza, wahugo fontana, qaybta manuel pastor. So [facility] [phone].    ATENCIÓN: Si habla español, tiene a nye disposición servicios gratuitos de asistencia lingüística. Llame al " [phone].    We comply with applicable federal civil rights laws and [place] laws. We do not discriminate on the basis of race, color, national origin, age, disability, sex, sexual orientation, or gender identity.            Thank you!     Thank you for choosing [facility]  for your care. Our goal is always to provide you with excellent care. Hearing back from our patients is one way we can continue to improve our services. Please take a few minutes to complete the written survey that you may receive in the mail after your visit with us. Thank you!             Your Updated Medication List - Protect others around you: Learn how to safely use, store and throw away your medicines at www.disposemymeds.org.          This list is accurate as of 11/16/18  3:01 PM.  Always use your most recent med list.                   Brand Name Dispense Instructions for use Diagnosis    aspirin 81 MG EC tablet     30 tablet    Take 1 tablet (81 mg) by mouth daily    PAD (peripheral artery disease) (H)       atorvastatin 40 MG tablet    LIPITOR    30 tablet    Take 1 tablet (40 mg) by mouth daily    Hyperlipidemia LDL goal <100       augmented betamethasone dipropionate 0.05 % cream    DIPROLENE-AF    100 g    Apply sparingly to affected area twice daily as needed.  Do not apply to face.    Lichen simplex chronicus       citalopram 40 MG tablet    celeXA    90 tablet    Take 1 tablet (40 mg) by mouth daily    Vision loss, bilateral       clopidogrel 75 MG tablet    PLAVIX    90 tablet    Take 1 tablet (75 mg) by mouth daily    PAD (peripheral artery disease) (H)       gabapentin 300 MG capsule    NEURONTIN    90 capsule    Take 1 capsule (300 mg) by mouth 3 times daily    PAD (peripheral artery disease) (H), Type 2 diabetes mellitus with other ophthalmic complication, without long-term current use of insulin (H)       glimepiride 2 MG tablet    AMARYL    180 tablet    Take 1 tablet (2 mg) by  mouth 2 times daily    Type 2 diabetes mellitus with diabetic neuropathy, with long-term current use of insulin (H)       lisinopril 20 MG tablet    PRINIVIL/ZESTRIL    90 tablet    Take 1 tablet (20 mg) by mouth daily as needed    Type 2 diabetes mellitus with other ophthalmic complication, without long-term current use of insulin (H)       metFORMIN 1000 MG tablet    GLUCOPHAGE    180 tablet    Take 1 tablet (1,000 mg) by mouth 2 times daily (with meals)    Type 2 diabetes mellitus with other ophthalmic complication, without long-term current use of insulin (H)       sildenafil 100 MG tablet    VIAGRA    6 tablet    Take 1 tablet (100 mg) by mouth daily as needed (for sexual intercourse) 30 min to 4 hrs before sex. Do not use with nitroglycerin, terazosin or doxazosin.    Erectile dysfunction, unspecified erectile dysfunction type       zolpidem 10 MG tablet    AMBIEN    30 tablet    Take 1 tablet (10 mg) by mouth nightly as needed for sleep , Do not use if taking narcotics    Anxiety

## 2018-11-19 ENCOUNTER — TRANSFERRED RECORDS (OUTPATIENT)
Dept: HEALTH INFORMATION MANAGEMENT | Facility: CLINIC | Age: 58
End: 2018-11-19

## 2018-12-05 ENCOUNTER — OFFICE VISIT (OUTPATIENT)
Dept: INTERNAL MEDICINE | Facility: CLINIC | Age: 58
End: 2018-12-05
Payer: COMMERCIAL

## 2018-12-05 VITALS
WEIGHT: 219.1 LBS | BODY MASS INDEX: 32.45 KG/M2 | HEIGHT: 69 IN | HEART RATE: 79 BPM | DIASTOLIC BLOOD PRESSURE: 78 MMHG | TEMPERATURE: 98 F | RESPIRATION RATE: 14 BRPM | OXYGEN SATURATION: 99 % | SYSTOLIC BLOOD PRESSURE: 144 MMHG

## 2018-12-05 DIAGNOSIS — F41.9 ANXIETY: ICD-10-CM

## 2018-12-05 DIAGNOSIS — E11.39 TYPE 2 DIABETES MELLITUS WITH OTHER OPHTHALMIC COMPLICATION, WITHOUT LONG-TERM CURRENT USE OF INSULIN (H): ICD-10-CM

## 2018-12-05 DIAGNOSIS — H54.3 VISION LOSS, BILATERAL: ICD-10-CM

## 2018-12-05 DIAGNOSIS — I10 ESSENTIAL HYPERTENSION, BENIGN: Primary | ICD-10-CM

## 2018-12-05 PROCEDURE — 99214 OFFICE O/P EST MOD 30 MIN: CPT | Performed by: INTERNAL MEDICINE

## 2018-12-05 RX ORDER — ZOLPIDEM TARTRATE 10 MG/1
10 TABLET ORAL
Qty: 30 TABLET | Refills: 0 | Status: SHIPPED | OUTPATIENT
Start: 2018-12-05 | End: 2018-12-05

## 2018-12-05 RX ORDER — GLIMEPIRIDE 2 MG/1
2 TABLET ORAL 2 TIMES DAILY
Qty: 180 TABLET | Refills: 3 | Status: CANCELLED | OUTPATIENT
Start: 2018-12-05

## 2018-12-05 RX ORDER — ZOLPIDEM TARTRATE 10 MG/1
10 TABLET ORAL
Qty: 30 TABLET | Refills: 0 | Status: SHIPPED | OUTPATIENT
Start: 2018-12-05 | End: 2019-01-05

## 2018-12-05 RX ORDER — AMLODIPINE BESYLATE 5 MG/1
5 TABLET ORAL DAILY
Qty: 90 TABLET | Refills: 3 | Status: SHIPPED | OUTPATIENT
Start: 2018-12-05 | End: 2020-04-26

## 2018-12-05 RX ORDER — CITALOPRAM HYDROBROMIDE 40 MG/1
40 TABLET ORAL DAILY
Qty: 90 TABLET | Refills: 3 | Status: SHIPPED | OUTPATIENT
Start: 2018-12-05 | End: 2020-01-21

## 2018-12-05 NOTE — MR AVS SNAPSHOT
After Visit Summary   12/5/2018    Gomez Turk    MRN: [id number]           Patient Information     Date Of Birth          1960        Visit Information        Provider Department      12/5/2018 8:20 AM Tylor Roberts MD [facility]        Today's Diagnoses     Essential hypertension, benign    -  1    Type 2 diabetes mellitus with other ophthalmic complication, without long-term current use of insulin (H)        Anxiety        Vision loss, bilateral           Follow-ups after your visit        Follow-up notes from your care team     Return in about 6 weeks (around 1/16/2019) for BP Recheck.      Your next 10 appointments already scheduled     Dec 06, 2018 11:30 AM CST   Return Visit with Edin Perry MD   [facility] ([facility])    [address] [id number] [phone]            Dec 20, 2018 10:45 AM CST   US LOWER EXTREMITY ARTERIAL DUPLEX LEFT with SHVUS1   [facility] ([facility])    [address]  [address]  [place] [address]   [phone]           How do I prepare for my exam? (Food and drink instructions) No Food and Drink Restrictions.  How do I prepare for my exam? (Other instructions) You do not need to do anything special to prepare for your exam.  What should I wear: Wear comfortable clothes.  How long does the exam take: Most ultrasounds take 30 to 60 minutes.  What should I bring: Bring a list of your medicines, including vitamins, minerals and over-the-counter drugs. It is safest to leave personal items at home.  Do I need a :  No  is needed.  What do I need to tell my doctor: Tell your doctor about any allergies you may have.  What should I do after the exam: No restrictions, You may resume normal activities.  What is this test: An ultrasound uses sound waves to make pictures of the body.  Sound waves do not cause pain. The only discomfort may be the pressure of the wand against your skin or full bladder.  Who should I call with questions: If you have any questions, please call the Imaging Department where you will have your exam. Directions, parking instructions, and other information is available on our website, [email or web address].            Dec 20, 2018 11:30 AM CST   Return Visit with Jesus Aragon MD   [facility] ([facility])    [address]  [facility] [id number] [phone]            May 20, 2019 11:00 AM CDT   Return Visit with Neil Rodriguez MD   [facility] ([facility])    [address]  [address]  [facility] [id number] [phone]              Who to contact     If you have questions or need follow up information about today's clinic visit or your schedule please contact [facility] directly at [phone].  Normal or non-critical lab and imaging results will be communicated to you by [employer], letter or phone within 4 business days after the clinic has received the results. If you do not hear from us within 7 days, please contact the clinic through [employer] or phone. If you have a critical or abnormal lab result, we will notify you by phone as soon as possible.  Submit refill requests through [employer] or call your pharmacy and they will forward the refill request to us. Please allow 3 business days for your refill to be completed.          Additional Information About Your Visit        [employer] Information     [employer] gives you secure access to your electronic health record. If you see a primary care provider, you can also send messages to your care team and make appointments. If you have questions, please call your primary care clinic.  If you do not have a primary care provider, please call [phone] and  "they will assist you.        Care EveryWhere ID     This is your Care EveryWhere ID. This could be used by other organizations to access your [facility] medical records  [id number]        Your Vitals Were     Pulse Temperature Respirations Height Pulse Oximetry BMI (Body Mass Index)    79 98  F (36.7  C) (Oral) 14 5' 8.5\" (1.74 m) 99% 32.83 kg/m2       Blood Pressure from Last 3 Encounters:   12/05/18 144/78   11/16/18 148/80   11/01/18 150/78    Weight from Last 3 Encounters:   12/05/18 219 lb 1.6 oz (99.4 kg)   11/16/18 218 lb (98.9 kg)   11/01/18 218 lb 14.4 oz (99.3 kg)              Today, you had the following     No orders found for display         Today's Medication Changes          These changes are accurate as of 12/5/18  8:40 AM.  If you have any questions, ask your nurse or doctor.               Start taking these medicines.        Dose/Directions    amLODIPine 5 MG tablet   Commonly known as:  NORVASC   Used for:  Essential hypertension, benign   Started by:  Tylor Roberts MD        Dose:  5 mg   Take 1 tablet (5 mg) by mouth daily   Quantity:  90 tablet   Refills:  3       zolpidem 10 MG tablet   Commonly known as:  AMBIEN   Used for:  Anxiety   Started by:  Tylor Roberts MD        Dose:  10 mg   Take 1 tablet (10 mg) by mouth nightly as needed for sleep , Do not use if taking narcotics   Quantity:  30 tablet   Refills:  0            Where to get your medicines      These medications were [facility] to [id number] [address] [facility]  [address] [facility] [address]     Phone:  [phone]     amLODIPine 5 MG tablet    citalopram 40 MG tablet         Some of these will need a paper prescription and others can be bought over the counter.  Ask your nurse if you have questions.     Bring a paper prescription for each of these medications     zolpidem 10 MG tablet                Primary Care Provider Office Phone # Fax #    Tylor Roberts [phone] " [phone]       [address] [facility] [address]        Equal Access to Services     YUVAL MIGUEL : Hadii aad ku hadpedroo Soshruti, wacurtisda luqadaha, qaybta kaalmada tru, manuel tommycorby hammonds laOleksandrsanjay caro. So [facility] [phone].    ATENCIÓN: Si habla español, tiene a nye disposición servicios gratuitos de asistencia lingüística. Llame al [phone].    We comply with applicable federal civil rights laws and [place] laws. We do not discriminate on the basis of race, color, national origin, age, disability, sex, sexual orientation, or gender identity.            Thank you!     Thank you for choosing [facility]  for your care. Our goal is always to provide you with excellent care. Hearing back from our patients is one way we can continue to improve our services. Please take a few minutes to complete the written survey that you may receive in the mail after your visit with us. Thank you!             Your Updated Medication List - Protect others around you: Learn how to safely use, store and throw away your medicines at www.disposemymeds.org.          This list is accurate as of 12/5/18  8:40 AM.  Always use your most recent med list.                   Brand Name Dispense Instructions for use Diagnosis    amLODIPine 5 MG tablet    NORVASC    90 tablet    Take 1 tablet (5 mg) by mouth daily    Essential hypertension, benign       aspirin 81 MG EC tablet    ASA    30 tablet    Take 1 tablet (81 mg) by mouth daily    PAD (peripheral artery disease) (H)       atorvastatin 40 MG tablet    LIPITOR    30 tablet    Take 1 tablet (40 mg) by mouth daily    Hyperlipidemia LDL goal <100       augmented betamethasone dipropionate 0.05 % external cream    DIPROLENE-AF    100 g    Apply sparingly to affected area twice daily as needed.  Do not apply to face.    Lichen simplex chronicus       citalopram 40 MG tablet    celeXA    90 tablet    Take 1 tablet (40 mg) by mouth daily    Vision  loss, bilateral       clopidogrel 75 MG tablet    PLAVIX    90 tablet    Take 1 tablet (75 mg) by mouth daily    PAD (peripheral artery disease) (H)       gabapentin 300 MG capsule    NEURONTIN    90 capsule    Take 1 capsule (300 mg) by mouth 3 times daily    PAD (peripheral artery disease) (H), Type 2 diabetes mellitus with other ophthalmic complication, without long-term current use of insulin (H)       glimepiride 2 MG tablet    AMARYL    180 tablet    Take 1 tablet (2 mg) by mouth 2 times daily    Type 2 diabetes mellitus with diabetic neuropathy, with long-term current use of insulin (H)       lisinopril 20 MG tablet    PRINIVIL/ZESTRIL    90 tablet    Take 1 tablet (20 mg) by mouth daily as needed    Type 2 diabetes mellitus with other ophthalmic complication, without long-term current use of insulin (H)       metFORMIN 1000 MG tablet    GLUCOPHAGE    180 tablet    Take 1 tablet (1,000 mg) by mouth 2 times daily (with meals)    Type 2 diabetes mellitus with other ophthalmic complication, without long-term current use of insulin (H)       sildenafil 100 MG tablet    VIAGRA    6 tablet    Take 1 tablet (100 mg) by mouth daily as needed (for sexual intercourse) 30 min to 4 hrs before sex. Do not use with nitroglycerin, terazosin or doxazosin.    Erectile dysfunction, unspecified erectile dysfunction type       zolpidem 10 MG tablet    AMBIEN    30 tablet    Take 1 tablet (10 mg) by mouth nightly as needed for sleep , Do not use if taking narcotics    Anxiety

## 2018-12-05 NOTE — PROGRESS NOTES
SUBJECTIVE:   Gomez Tukr is a 58 year old male who presents to clinic today for the following health issues:    Hypertension Follow-up      Outpatient blood pressures are being checked at home.  Results are 150's/90-80's .    Low Salt Diet: not monitoring salt      Amount of exercise or physical activity: None    Problems taking medications regularly: No    Medication side effects: none    Diet: regular (no restrictions)    Patient states he is in need of a refill of his Ambien.    Patient states that he has been checking his blood pressure at home and his systolic numbers consistently in the upper 150s.  He has been periodically taking his lisinopril twice daily on his own.  He denies any other major complaints.    Problem list and histories reviewed & adjusted, as indicated.  Additional history: as documented    Patient Active Problem List   Diagnosis     Diverticulosis of large intestine     Tobacco use disorder     HYPERLIPIDEMIA LDL GOAL <100     PAD (peripheral artery disease) (H)     Type 2 diabetes mellitus with other ophthalmic complication, without long-term current use of insulin (H)     Acute cholecystitis     Vision loss, bilateral     Counseling regarding advanced directives     Acute osteomyelitis of toe, left (H)     Past Surgical History:   Procedure Laterality Date     AMPUTATE TOE(S) Left 2/28/2017    Procedure: AMPUTATE TOE(S);  Surgeon: Jesus Aragon MD;  Location:  OR     BYPASS GRAFT FEMOROPOPLITEAL Left 2/28/2017    Procedure: BYPASS GRAFT FEMOROPOPLITEAL;  Surgeon: Jesus Aragon MD;  Location:  OR     ENT SURGERY  1990    deviated septum repair     IRRIGATION AND DEBRIDEMENT FOOT, COMBINED Left 2/28/2017    Procedure: COMBINED IRRIGATION AND DEBRIDEMENT FOOT;  Surgeon: Jesus Aragon MD;  Location:  OR     LAPAROSCOPIC CHOLECYSTECTOMY N/A 3/18/2017    Procedure: LAPAROSCOPIC CHOLECYSTECTOMY;  Surgeon: Edin Hollingsworth MD;  Location:  OR        Social History   Substance Use Topics     Smoking status: Current Every Day Smoker     Packs/day: 0.50     Types: Cigarettes     Smokeless tobacco: Never Used     Alcohol use Yes      Comment: occassion - 1 drink today     Family History   Problem Relation Age of Onset     Diabetes Mother      Lipids Mother      Melanoma Mother      Cancer Paternal Grandmother      Neurologic Disorder Maternal Uncle      Glaucoma No family hx of      Macular Degeneration No family hx of      Retinal detachment No family hx of      Thyroid Disease No family hx of          Current Outpatient Prescriptions   Medication Sig Dispense Refill     aspirin EC 81 MG EC tablet Take 1 tablet (81 mg) by mouth daily 30 tablet 11     atorvastatin (LIPITOR) 40 MG tablet Take 1 tablet (40 mg) by mouth daily 30 tablet 5     augmented betamethasone dipropionate (DIPROLENE-AF) 0.05 % cream Apply sparingly to affected area twice daily as needed.  Do not apply to face. 100 g 3     citalopram (CELEXA) 40 MG tablet Take 1 tablet (40 mg) by mouth daily 90 tablet 1     clopidogrel (PLAVIX) 75 MG tablet Take 1 tablet (75 mg) by mouth daily 90 tablet 1     gabapentin (NEURONTIN) 300 MG capsule Take 1 capsule (300 mg) by mouth 3 times daily 90 capsule 5     glimepiride (AMARYL) 2 MG tablet Take 1 tablet (2 mg) by mouth 2 times daily 180 tablet 3     lisinopril (PRINIVIL/ZESTRIL) 20 MG tablet Take 1 tablet (20 mg) by mouth daily as needed 90 tablet 0     metFORMIN (GLUCOPHAGE) 1000 MG tablet Take 1 tablet (1,000 mg) by mouth 2 times daily (with meals) 180 tablet 1     sildenafil (VIAGRA) 100 MG tablet Take 1 tablet (100 mg) by mouth daily as needed (for sexual intercourse) 30 min to 4 hrs before sex. Do not use with nitroglycerin, terazosin or doxazosin. 6 tablet 3     zolpidem (AMBIEN) 10 MG tablet Take 1 tablet (10 mg) by mouth nightly as needed for sleep , Do not use if taking narcotics 30 tablet 0     Allergies   Allergen Reactions     No Known Drug  "Allergies      BP Readings from Last 3 Encounters:   11/16/18 148/80   11/01/18 150/78   06/15/18 128/76    Wt Readings from Last 3 Encounters:   11/16/18 218 lb (98.9 kg)   11/01/18 218 lb 14.4 oz (99.3 kg)   06/15/18 212 lb (96.2 kg)                    Reviewed and updated as needed this visit by clinical staff       Reviewed and updated as needed this visit by Provider         ROS:  CONSTITUTIONAL: NEGATIVE for fever, chills, change in weight  ENT/MOUTH: NEGATIVE for ear, mouth and throat problems  RESP: NEGATIVE for significant cough or SOB  CV: NEGATIVE for chest pain, palpitations or peripheral edema  GI: NEGATIVE for nausea, abdominal pain, heartburn, or change in bowel habits  : NEGATIVE for frequency, dysuria, or hematuria  MUSCULOSKELETAL: NEGATIVE for significant arthralgias or myalgia  NEURO: NEGATIVE for weakness, dizziness or paresthesias  HEME: NEGATIVE for bleeding problems    OBJECTIVE:                                                    /78  Pulse 79  Temp 98  F (36.7  C) (Oral)  Resp 14  Ht 5' 8.5\" (1.74 m)  Wt 219 lb 1.6 oz (99.4 kg)  SpO2 99%  BMI 32.83 kg/m2  Body mass index is 32.83 kg/(m^2).  GENERAL: healthy, alert and no distress  EYES: There is decreased visual acuity bilaterally and the patient is using walking stick  HENT: ear canals- normal; TMs- normal; Nose- normal; Mouth- no ulcers, no lesions  NECK: no tenderness, no adenopathy, no asymmetry, no masses, no stiffness; thyroid- normal to palpation  RESP: lungs clear to auscultation - no rales, no rhonchi, no wheezes  CV: regular rates and rhythm, normal S1 S2, no S3 or S4 and no click or rub -  MS: extremities- no gross deformities noted  PSYCH: Alert and oriented times 3; speech- coherent , normal rate and volume; able to articulate logical thoughts, able to abstract reason, no tangential thoughts, no hallucinations or delusions, affect- normal       Lab Results   Component Value Date    A1C 6.3 11/01/2018    A1C 6.5 " 06/06/2018    A1C 6.0 03/06/2018    A1C 6.8 02/10/2017    A1C 9.6 10/17/2016       ASSESSMENT/PLAN:                                                      (I10) Essential hypertension, benign  (primary encounter diagnosis)  Comment: I have asked him not to take his lisinopril twice a day due to his creatinine being borderline and will add amlodipine 5 mg daily with a recheck of his blood pressure recommended in 4-6 weeks  Plan: amLODIPine (NORVASC) 5 MG tablet            (E11.39) Type 2 diabetes mellitus with other ophthalmic complication, without long-term current use of insulin (H)  Comment: Stable on current therapy continuing with medical management as ordered  Plan: A1c check at 6 months from prior    (F41.9) Anxiety  Comment: Refilled medication at 10 mg dose daily  Plan: zolpidem (AMBIEN) 10 MG tablet        He was advised that this is a preferential as needed medication    (H54.3) Vision loss, bilateral  Comment: Stable continue with current medical management in hopes of some improvement and benefit of SSRI due to mild anxiety associated with vision loss  Plan: citalopram (CELEXA) 40 MG tablet          See Patient Instructions    Tylor Roberts MD  [facility]    THE MEDICATION LIST HAS BEEN FULLY RECONCILED BY THE M.D. AND THE NURSING STAFF.    25 minutes spent with this patient, face to face, discussing treatment options for listed problems above as well as side effects of appropriate medications.  Counseling time extended beyond 50% of the clinic visit.  Medication dosing, treatment plan and follow-up were discussed. Also reviewed need for primary care testing for patient.

## 2018-12-06 ENCOUNTER — OFFICE VISIT (OUTPATIENT)
Dept: DERMATOLOGY | Facility: CLINIC | Age: 58
End: 2018-12-06
Payer: COMMERCIAL

## 2018-12-06 VITALS — SYSTOLIC BLOOD PRESSURE: 157 MMHG | DIASTOLIC BLOOD PRESSURE: 70 MMHG | HEART RATE: 71 BPM | OXYGEN SATURATION: 100 %

## 2018-12-06 DIAGNOSIS — L82.1 SK (SEBORRHEIC KERATOSIS): Primary | ICD-10-CM

## 2018-12-06 DIAGNOSIS — D18.00 ANGIOMA: ICD-10-CM

## 2018-12-06 DIAGNOSIS — D23.9 DERMAL NEVUS: ICD-10-CM

## 2018-12-06 DIAGNOSIS — L81.4 LENTIGO: ICD-10-CM

## 2018-12-06 PROCEDURE — 99213 OFFICE O/P EST LOW 20 MIN: CPT | Performed by: DERMATOLOGY

## 2018-12-06 NOTE — PROGRESS NOTES
Gomez Turk is a 58 year old year old male patient here today for brown spots on trunk .  Patient states this has been present for a while.  Patient reports the following symptoms:  none .  Patient reports the following previous treatments none.  Patient reports the following modifying factors none.  Associated symptoms: none.  Patient has no other skin complaints today.  Remainder of the HPI, Meds, PMH, Allergies, FH, and SH was reviewed in chart.      Past Medical History:   Diagnosis Date     DIVERTICULOSIS OF COLON W/O BLEED 6/25/2003     Hyperlipidemia LDL goal <100 10/31/2010     Legally blind      Tobacco use disorder 6/25/2003     Type 2 diabetes, HbA1c goal < 7% (H) 10/31/2010       Past Surgical History:   Procedure Laterality Date     AMPUTATE TOE(S) Left 2/28/2017    Procedure: AMPUTATE TOE(S);  Surgeon: Jesus Aragon MD;  Location:  OR     BYPASS GRAFT FEMOROPOPLITEAL Left 2/28/2017    Procedure: BYPASS GRAFT FEMOROPOPLITEAL;  Surgeon: Jesus Aragon MD;  Location:  OR     ENT SURGERY  1990    deviated septum repair     IRRIGATION AND DEBRIDEMENT FOOT, COMBINED Left 2/28/2017    Procedure: COMBINED IRRIGATION AND DEBRIDEMENT FOOT;  Surgeon: Jesus Aragon MD;  Location:  OR     LAPAROSCOPIC CHOLECYSTECTOMY N/A 3/18/2017    Procedure: LAPAROSCOPIC CHOLECYSTECTOMY;  Surgeon: Edin Hollingsworth MD;  Location:  OR        Family History   Problem Relation Age of Onset     Diabetes Mother      Lipids Mother      Melanoma Mother      Cancer Paternal Grandmother      Neurologic Disorder Maternal Uncle      Glaucoma No family hx of      Macular Degeneration No family hx of      Retinal detachment No family hx of      Thyroid Disease No family hx of        Social History     Social History     Marital status:      Spouse name: N/A     Number of children: N/A     Years of education: N/A     Occupational History     Not on file.     Social History Main Topics      Smoking status: Current Every Day Smoker     Packs/day: 0.50     Types: Cigarettes     Smokeless tobacco: Never Used     Alcohol use Yes      Comment: occassion - 1 drink today     Drug use: No     Sexual activity: Not Currently     Partners: Female     Other Topics Concern     Parent/Sibling W/ Cabg, Mi Or Angioplasty Before 65f 55m? No     Social History Narrative       Outpatient Encounter Prescriptions as of 12/6/2018   Medication Sig Dispense Refill     amLODIPine (NORVASC) 5 MG tablet Take 1 tablet (5 mg) by mouth daily 90 tablet 3     aspirin EC 81 MG EC tablet Take 1 tablet (81 mg) by mouth daily 30 tablet 11     atorvastatin (LIPITOR) 40 MG tablet Take 1 tablet (40 mg) by mouth daily 30 tablet 5     augmented betamethasone dipropionate (DIPROLENE-AF) 0.05 % cream Apply sparingly to affected area twice daily as needed.  Do not apply to face. 100 g 3     citalopram (CELEXA) 40 MG tablet Take 1 tablet (40 mg) by mouth daily 90 tablet 3     clopidogrel (PLAVIX) 75 MG tablet Take 1 tablet (75 mg) by mouth daily 90 tablet 1     gabapentin (NEURONTIN) 300 MG capsule Take 1 capsule (300 mg) by mouth 3 times daily 90 capsule 5     glimepiride (AMARYL) 2 MG tablet Take 1 tablet (2 mg) by mouth 2 times daily 180 tablet 3     lisinopril (PRINIVIL/ZESTRIL) 20 MG tablet Take 1 tablet (20 mg) by mouth daily as needed 90 tablet 0     metFORMIN (GLUCOPHAGE) 1000 MG tablet Take 1 tablet (1,000 mg) by mouth 2 times daily (with meals) 180 tablet 1     sildenafil (VIAGRA) 100 MG tablet Take 1 tablet (100 mg) by mouth daily as needed (for sexual intercourse) 30 min to 4 hrs before sex. Do not use with nitroglycerin, terazosin or doxazosin. 6 tablet 3     zolpidem (AMBIEN) 10 MG tablet Take 1 tablet (10 mg) by mouth nightly as needed for sleep , Do not use if taking narcotics 30 tablet 0     No facility-administered encounter medications on file as of 12/6/2018.              Review Of Systems  Skin: As above  Eyes:  negative  Ears/Nose/Throat: negative  Respiratory: No shortness of breath, dyspnea on exertion, cough, or hemoptysis  Cardiovascular: negative  Gastrointestinal: negative  Genitourinary: negative  Musculoskeletal: negative  Neurologic: negative  Psychiatric: negative  Hematologic/Lymphatic/Immunologic: negative  Endocrine: negative      O:   NAD, WDWN, Alert & Oriented, Mood & Affect wnl, Vitals stable   Here today alone   /70  Pulse 71  SpO2 100%   General appearance normal   Vitals stable   Alert, oriented and in no acute distress      Following lymph nodes palpated: Occipital, Cervical, Supraclavicular no lad     Stuck on papules and brown macules on trunk and ext   Red papules on trunk  Flesh colored papules on trunk     The remainder of the full exam was unremarkable; the following areas were examined:  conjunctiva/lids, oral mucosa, neck, peripheral vascular system, abdomen, lymph nodes, digits/nails, eccrine and apocrine glands, scalp/hair, face, neck, chest, abdomen, buttocks, back, RUE, LUE, RLE, LLE       Eyes: Conjunctivae/lids:Normal     ENT: Lips, buccal mucosa, tongue: normal    MSK:Normal    Cardiovascular: peripheral edema none    Pulm: Breathing Normal    Lymph Nodes: No Head and Neck Lymphadenopathy     Neuro/Psych: Orientation:Normal; Mood/Affect:Normal      A/P:  1. Seborrheic keratosis, lentigo, angioma, dermal nevus  BENIGN LESIONS DISCUSSED WITH PATIENT:  I discussed the specifics of tumor, prognosis, and genetics of benign lesions.  I explained that treatment of these lesions would be purely cosmetic and not medically neccessary.  I discussed with patient different removal options including excision, cautery and /or laser.      Nature and genetics of benign skin lesions dicussed with patient.  Signs and Symptoms of skin cancer discussed with patient.  ABCDEs of melanoma reviewed with patient.  Patient encouraged to perform monthly skin exams.  UV precautions reviewed with  patient.  Patient to follow up with Primary Care provider regarding elevated blood pressure.  Skin care regimen reviewed with patient: Eliminate harsh soaps, i.e. Dial, zest, irsih spring; Mild soaps such as Cetaphil or Dove sensitive skin, avoid hot or cold showers, aggressive use of emollients including vanicream, cetaphil or cerave discussed with patient.    Risks of non-melanoma skin cancer discussed with patient   Return to clinic 12 months

## 2018-12-06 NOTE — NURSING NOTE
"Initial /70  Pulse 71  SpO2 98% Estimated body mass index is 32.83 kg/(m^2) as calculated from the following:    Height as of 12/5/18: 1.74 m (5' 8.5\").    Weight as of 12/5/18: 99.4 kg (219 lb 1.6 oz). .      "

## 2018-12-06 NOTE — LETTER
12/6/2018         RE: Gomez Turk  [address]  [facility] [address]        Dear Colleague,    Thank you for referring your patient, Gomez Turk, to the [facility]. Please see a copy of my visit note below.    Gomez Turk is a 58 year old year old male patient here today for brown spots on trunk .  Patient states this has been present for a while.  Patient reports the following symptoms:  none .  Patient reports the following previous treatments none.  Patient reports the following modifying factors none.  Associated symptoms: none.  Patient has no other skin complaints today.  Remainder of the HPI, Meds, PMH, Allergies, FH, and SH was reviewed in chart.      Past Medical History:   Diagnosis Date     DIVERTICULOSIS OF COLON W/O BLEED 6/25/2003     Hyperlipidemia LDL goal <100 10/31/2010     Legally blind      Tobacco use disorder 6/25/2003     Type 2 diabetes, HbA1c goal < 7% (H) 10/31/2010       Past Surgical History:   Procedure Laterality Date     AMPUTATE TOE(S) Left 2/28/2017    Procedure: AMPUTATE TOE(S);  Surgeon: Jesus Aragon MD;  Location:  OR     BYPASS GRAFT FEMOROPOPLITEAL Left 2/28/2017    Procedure: BYPASS GRAFT FEMOROPOPLITEAL;  Surgeon: Jesus Aragon MD;  Location:  OR     ENT SURGERY  1990    deviated septum repair     IRRIGATION AND DEBRIDEMENT FOOT, COMBINED Left 2/28/2017    Procedure: COMBINED IRRIGATION AND DEBRIDEMENT FOOT;  Surgeon: Jesus Aragon MD;  Location:  OR     LAPAROSCOPIC CHOLECYSTECTOMY N/A 3/18/2017    Procedure: LAPAROSCOPIC CHOLECYSTECTOMY;  Surgeon: Edin Hollingsworth MD;  Location:  OR        Family History   Problem Relation Age of Onset     Diabetes Mother      Lipids Mother      Melanoma Mother      Cancer Paternal Grandmother      Neurologic Disorder Maternal Uncle      Glaucoma No family hx of      Macular Degeneration No family hx of      Retinal detachment No family hx of       Thyroid Disease No family hx of        Social History     Social History     Marital status:      Spouse name: N/A     Number of children: N/A     Years of education: N/A     Occupational History     Not on file.     Social History Main Topics     Smoking status: Current Every Day Smoker     Packs/day: 0.50     Types: Cigarettes     Smokeless tobacco: Never Used     Alcohol use Yes      Comment: occassion - 1 drink today     Drug use: No     Sexual activity: Not Currently     Partners: Female     Other Topics Concern     Parent/Sibling W/ Cabg, Mi Or Angioplasty Before 65f 55m? No     Social History Narrative       Outpatient Encounter Prescriptions as of 12/6/2018   Medication Sig Dispense Refill     amLODIPine (NORVASC) 5 MG tablet Take 1 tablet (5 mg) by mouth daily 90 tablet 3     aspirin EC 81 MG EC tablet Take 1 tablet (81 mg) by mouth daily 30 tablet 11     atorvastatin (LIPITOR) 40 MG tablet Take 1 tablet (40 mg) by mouth daily 30 tablet 5     augmented betamethasone dipropionate (DIPROLENE-AF) 0.05 % cream Apply sparingly to affected area twice daily as needed.  Do not apply to face. 100 g 3     citalopram (CELEXA) 40 MG tablet Take 1 tablet (40 mg) by mouth daily 90 tablet 3     clopidogrel (PLAVIX) 75 MG tablet Take 1 tablet (75 mg) by mouth daily 90 tablet 1     gabapentin (NEURONTIN) 300 MG capsule Take 1 capsule (300 mg) by mouth 3 times daily 90 capsule 5     glimepiride (AMARYL) 2 MG tablet Take 1 tablet (2 mg) by mouth 2 times daily 180 tablet 3     lisinopril (PRINIVIL/ZESTRIL) 20 MG tablet Take 1 tablet (20 mg) by mouth daily as needed 90 tablet 0     metFORMIN (GLUCOPHAGE) 1000 MG tablet Take 1 tablet (1,000 mg) by mouth 2 times daily (with meals) 180 tablet 1     sildenafil (VIAGRA) 100 MG tablet Take 1 tablet (100 mg) by mouth daily as needed (for sexual intercourse) 30 min to 4 hrs before sex. Do not use with nitroglycerin, terazosin or doxazosin. 6 tablet 3     zolpidem (AMBIEN) 10  MG tablet Take 1 tablet (10 mg) by mouth nightly as needed for sleep , Do not use if taking narcotics 30 tablet 0     No facility-administered encounter medications on file as of 12/6/2018.              Review Of Systems  Skin: As above  Eyes: negative  Ears/Nose/Throat: negative  Respiratory: No shortness of breath, dyspnea on exertion, cough, or hemoptysis  Cardiovascular: negative  Gastrointestinal: negative  Genitourinary: negative  Musculoskeletal: negative  Neurologic: negative  Psychiatric: negative  Hematologic/Lymphatic/Immunologic: negative  Endocrine: negative      O:   NAD, WDWN, Alert & Oriented, Mood & Affect wnl, Vitals stable   Here today alone   /70  Pulse 71  SpO2 100%   General appearance normal   Vitals stable   Alert, oriented and in no acute distress      Following lymph nodes palpated: Occipital, Cervical, Supraclavicular no lad     Stuck on papules and brown macules on trunk and ext   Red papules on trunk  Flesh colored papules on trunk     The remainder of the full exam was unremarkable; the following areas were examined:  conjunctiva/lids, oral mucosa, neck, peripheral vascular system, abdomen, lymph nodes, digits/nails, eccrine and apocrine glands, scalp/hair, face, neck, chest, abdomen, buttocks, back, RUE, LUE, RLE, LLE       Eyes: Conjunctivae/lids:Normal     ENT: Lips, buccal mucosa, tongue: normal    MSK:Normal    Cardiovascular: peripheral edema none    Pulm: Breathing Normal    Lymph Nodes: No Head and Neck Lymphadenopathy     Neuro/Psych: Orientation:Normal; Mood/Affect:Normal      A/P:  1. Seborrheic keratosis, lentigo, angioma, dermal nevus  BENIGN LESIONS DISCUSSED WITH PATIENT:  I discussed the specifics of tumor, prognosis, and genetics of benign lesions.  I explained that treatment of these lesions would be purely cosmetic and not medically neccessary.  I discussed with patient different removal options including excision, cautery and /or laser.      Nature and  genetics of benign skin lesions dicussed with patient.  Signs and Symptoms of skin cancer discussed with patient.  ABCDEs of melanoma reviewed with patient.  Patient encouraged to perform monthly skin exams.  UV precautions reviewed with patient.  Patient to follow up with Primary Care provider regarding elevated blood pressure.  Skin care regimen reviewed with patient: Eliminate harsh soaps, i.e. Dial, zest, irsih spring; Mild soaps such as Cetaphil or Dove sensitive skin, avoid hot or cold showers, aggressive use of emollients including vanicream, cetaphil or cerave discussed with patient.    Risks of non-melanoma skin cancer discussed with patient   Return to clinic 12 months        Again, thank you for allowing me to participate in the care of your patient.        Sincerely,        Edin Perry MD

## 2018-12-06 NOTE — MR AVS SNAPSHOT
After Visit Summary   12/6/2018    Gomez Turk    MRN: [id number]           Patient Information     Date Of Birth          1960        Visit Information        Provider Department      12/6/2018 11:30 AM Edin Perry MD [facility]        Today's Diagnoses     SK (seborrheic keratosis)    -  1    Lentigo        Angioma        Dermal nevus           Follow-ups after your visit        Your next 10 appointments already scheduled     Dec 20, 2018 10:45 AM CST   US LOWER EXTREMITY ARTERIAL DUPLEX LEFT with SHVUS1   [facility] ([facility])    [address]  [place] [address]   [phone]           How do I prepare for my exam? (Food and drink instructions) No Food and Drink Restrictions.  How do I prepare for my exam? (Other instructions) You do not need to do anything special to prepare for your exam.  What should I wear: Wear comfortable clothes.  How long does the exam take: Most ultrasounds take 30 to 60 minutes.  What should I bring: Bring a list of your medicines, including vitamins, minerals and over-the-counter drugs. It is safest to leave personal items at home.  Do I need a :  No  is needed.  What do I need to tell my doctor: Tell your doctor about any allergies you may have.  What should I do after the exam: No restrictions, You may resume normal activities.  What is this test: An ultrasound uses sound waves to make pictures of the body. Sound waves do not cause pain. The only discomfort may be the pressure of the wand against your skin or full bladder.  Who should I call with questions: If you have any questions, please call the Imaging Department where you will have your exam. Directions, parking instructions, and other information is available on our website, [email or web address].            Dec 20, 2018 11:30 AM CST   Return Visit with Jesus Aragon  MD   [facility] ([facility])    [address]  [place] [address] [phone]            May 20, 2019 11:00 AM CDT   Return Visit with Neil Rodriguez MD   [facility] [place] ([facility] [place])    [address]  [address]  [place] [address] [phone]              Who to contact     If you have questions or need follow up information about today's clinic visit or your schedule please contact [facility] directly at [phone].  Normal or non-critical lab and imaging results will be communicated to you by [employer], letter or phone within 4 business days after the clinic has received the results. If you do not hear from us within 7 days, please contact the clinic through [employer] or phone. If you have a critical or abnormal lab result, we will notify you by phone as soon as possible.  Submit refill requests through [employer] or call your pharmacy and they will forward the refill request to us. Please allow 3 business days for your refill to be completed.          Additional Information About Your Visit        [facility] Information     [employer] gives you secure access to your electronic health record. If you see a primary care provider, you can also send messages to your care team and make appointments. If you have questions, please call your primary care clinic.  If you do not have a primary care provider, please call [phone] and they will assist you.        Care EveryWhere ID     This is your Care EveryWhere ID. This could be used by other organizations to access your [facility] medical records  [id number]        Your Vitals Were     Pulse Pulse Oximetry                71 100%           Blood Pressure from Last 3 Encounters:   12/06/18 157/70   12/05/18 144/78   11/16/18 148/80    Weight from Last 3 Encounters:   12/05/18 99.4 kg (219 lb 1.6 oz)   11/16/18  98.9 kg (218 lb)   11/01/18 99.3 kg (218 lb 14.4 oz)              Today, you had the following     No orders found for display       Primary Care Provider Office Phone # Fax #    Tylor Roberts [phone]       [address] [facility] [address]        Equal Access to Services     [facility]: Hadii aad ku hadasho Soomaali, waaxda luqadaha, qaybta kaalmada adeegyada, waxay idiin hayaan adeeg kharash la'aan . So [facility] [phone].    ATENCIÓN: Si habla español, tiene a nye disposición servicios gratuitos de asistencia lingüística. Llame al [phone].    We comply with applicable federal civil rights laws and [place] laws. We do not discriminate on the basis of race, color, national origin, age, disability, sex, sexual orientation, or gender identity.            Thank you!     Thank you for choosing [facility]  for your care. Our goal is always to provide you with excellent care. Hearing back from our patients is one way we can continue to improve our services. Please take a few minutes to complete the written survey that you may receive in the mail after your visit with us. Thank you!             Your Updated Medication List - Protect others around you: Learn how to safely use, store and throw away your medicines at www.disposemymeds.org.          This list is accurate as of 12/6/18 11:59 AM.  Always use your most recent med list.                   Brand Name Dispense Instructions for use Diagnosis    amLODIPine 5 MG tablet    NORVASC    90 tablet    Take 1 tablet (5 mg) by mouth daily    Essential hypertension, benign       aspirin 81 MG EC tablet    ASA    30 tablet    Take 1 tablet (81 mg) by mouth daily    PAD (peripheral artery disease) (H)       atorvastatin 40 MG tablet    LIPITOR    30 tablet    Take 1 tablet (40 mg) by mouth daily    Hyperlipidemia LDL goal <100       augmented betamethasone dipropionate 0.05 % external cream    DIPROLENE-AF    100 g     Apply sparingly to affected area twice daily as needed.  Do not apply to face.    Lichen simplex chronicus       citalopram 40 MG tablet    celeXA    90 tablet    Take 1 tablet (40 mg) by mouth daily    Vision loss, bilateral       clopidogrel 75 MG tablet    PLAVIX    90 tablet    Take 1 tablet (75 mg) by mouth daily    PAD (peripheral artery disease) (H)       gabapentin 300 MG capsule    NEURONTIN    90 capsule    Take 1 capsule (300 mg) by mouth 3 times daily    PAD (peripheral artery disease) (H), Type 2 diabetes mellitus with other ophthalmic complication, without long-term current use of insulin (H)       glimepiride 2 MG tablet    AMARYL    180 tablet    Take 1 tablet (2 mg) by mouth 2 times daily    Type 2 diabetes mellitus with diabetic neuropathy, with long-term current use of insulin (H)       lisinopril 20 MG tablet    PRINIVIL/ZESTRIL    90 tablet    Take 1 tablet (20 mg) by mouth daily as needed    Type 2 diabetes mellitus with other ophthalmic complication, without long-term current use of insulin (H)       metFORMIN 1000 MG tablet    GLUCOPHAGE    180 tablet    Take 1 tablet (1,000 mg) by mouth 2 times daily (with meals)    Type 2 diabetes mellitus with other ophthalmic complication, without long-term current use of insulin (H)       sildenafil 100 MG tablet    VIAGRA    6 tablet    Take 1 tablet (100 mg) by mouth daily as needed (for sexual intercourse) 30 min to 4 hrs before sex. Do not use with nitroglycerin, terazosin or doxazosin.    Erectile dysfunction, unspecified erectile dysfunction type       zolpidem 10 MG tablet    AMBIEN    30 tablet    Take 1 tablet (10 mg) by mouth nightly as needed for sleep , Do not use if taking narcotics    Anxiety

## 2019-01-05 ENCOUNTER — MYC REFILL (OUTPATIENT)
Dept: INTERNAL MEDICINE | Facility: CLINIC | Age: 59
End: 2019-01-05

## 2019-01-05 DIAGNOSIS — F41.9 ANXIETY: ICD-10-CM

## 2019-01-07 RX ORDER — ZOLPIDEM TARTRATE 10 MG/1
10 TABLET ORAL
Qty: 30 TABLET | Refills: 0 | Status: SHIPPED | OUTPATIENT
Start: 2019-01-07 | End: 2019-02-05

## 2019-01-07 NOTE — TELEPHONE ENCOUNTER
Zolpidem      Last Written Prescription Date:  12/5/2018  Last Fill Quantity: 30,   # refills: 0  Last Office Visit: 12/5/2018  Future Office visit:       Routing refill request to provider for review/approval because:  Drug not on the [facility], [facility] or [facility] refill protocol or controlled substance    Ana Paula TSAI RN, BSN, PHN

## 2019-02-05 ENCOUNTER — MYC REFILL (OUTPATIENT)
Dept: INTERNAL MEDICINE | Facility: CLINIC | Age: 59
End: 2019-02-05

## 2019-02-05 DIAGNOSIS — F41.9 ANXIETY: ICD-10-CM

## 2019-02-05 DIAGNOSIS — E11.39 TYPE 2 DIABETES MELLITUS WITH OTHER OPHTHALMIC COMPLICATION, WITHOUT LONG-TERM CURRENT USE OF INSULIN (H): ICD-10-CM

## 2019-02-05 RX ORDER — ZOLPIDEM TARTRATE 10 MG/1
10 TABLET ORAL
Qty: 30 TABLET | Refills: 0 | Status: SHIPPED | OUTPATIENT
Start: 2019-02-05 | End: 2019-03-05

## 2019-02-05 RX ORDER — LISINOPRIL 20 MG/1
20 TABLET ORAL DAILY PRN
Qty: 90 TABLET | Refills: 0 | Status: SHIPPED | OUTPATIENT
Start: 2019-02-05 | End: 2019-05-13

## 2019-02-05 NOTE — TELEPHONE ENCOUNTER
Future Office visit:    Next 5 appointments (look out 90 days)    Feb 21, 2019 10:15 AM CST  Return Visit with Jesus Aragon MD  [facility] ([facility]) [address]  [place] [address]  [phone]           Routing refill request to provider for review/approval because:  Drug not on the [facility], [facility] or  Health refill protocol or controlled substance

## 2019-02-05 NOTE — TELEPHONE ENCOUNTER
"Requested Prescriptions   Pending Prescriptions Disp Refills     lisinopril (PRINIVIL/ZESTRIL) 20 MG tablet 90 tablet 0     Sig: Take 1 tablet (20 mg) by mouth daily as needed    ACE Inhibitors (Including Combos) Protocol Failed - 2/5/2019  9:21 AM       Failed - Blood pressure under 140/90 in past 12 months    BP Readings from Last 3 Encounters:   12/06/18 157/70   12/05/18 144/78   11/16/18 148/80                Failed - Normal serum creatinine on file in past 12 months    Recent Labs   Lab Test 10/09/18  1105   CR 1.29*            Passed - Recent (12 mo) or future (30 days) visit within the authorizing provider's specialty    Patient had office visit in the last 12 months or has a visit in the next 30 days with authorizing provider or within the authorizing provider's specialty.  See \"Patient Info\" tab in inbasket, or \"Choose Columns\" in Meds & Orders section of the refill encounter.             Passed - Medication is active on med list       Passed - Patient is age 18 or older       Passed - Normal serum potassium on file in past 12 months    Recent Labs   Lab Test 10/09/18  1105   POTASSIUM 4.1             Routing refill request to provider for review/approval because:  Labs out of range:  Creat, blood pressure          "

## 2019-02-11 ENCOUNTER — APPOINTMENT (OUTPATIENT)
Dept: CT IMAGING | Facility: CLINIC | Age: 59
End: 2019-02-11
Attending: EMERGENCY MEDICINE
Payer: COMMERCIAL

## 2019-02-11 ENCOUNTER — HOSPITAL ENCOUNTER (EMERGENCY)
Facility: CLINIC | Age: 59
Discharge: HOME | End: 2019-02-11
Attending: EMERGENCY MEDICINE | Admitting: EMERGENCY MEDICINE
Payer: COMMERCIAL

## 2019-02-11 VITALS
RESPIRATION RATE: 16 BRPM | BODY MASS INDEX: 32.43 KG/M2 | OXYGEN SATURATION: 98 % | HEIGHT: 68 IN | HEART RATE: 62 BPM | DIASTOLIC BLOOD PRESSURE: 78 MMHG | WEIGHT: 214 LBS | TEMPERATURE: 97.7 F | SYSTOLIC BLOOD PRESSURE: 143 MMHG

## 2019-02-11 DIAGNOSIS — M54.50 BILATERAL LOW BACK PAIN WITHOUT SCIATICA, UNSPECIFIED CHRONICITY: ICD-10-CM

## 2019-02-11 LAB
ALBUMIN SERPL-MCNC: 4.1 G/DL (ref 3.4–5)
ALBUMIN UR-MCNC: NEGATIVE MG/DL
ALP SERPL-CCNC: 118 U/L (ref 40–150)
ALT SERPL W P-5'-P-CCNC: 25 U/L (ref 0–70)
ANION GAP SERPL CALCULATED.3IONS-SCNC: 7 MMOL/L (ref 3–14)
APPEARANCE UR: CLEAR
AST SERPL W P-5'-P-CCNC: 26 U/L (ref 0–45)
BASOPHILS # BLD AUTO: 0.1 10E9/L (ref 0–0.2)
BASOPHILS NFR BLD AUTO: 0.7 %
BILIRUB SERPL-MCNC: 0.6 MG/DL (ref 0.2–1.3)
BILIRUB UR QL STRIP: NEGATIVE
BUN SERPL-MCNC: 16 MG/DL (ref 7–30)
CALCIUM SERPL-MCNC: 9.4 MG/DL (ref 8.5–10.1)
CHLORIDE SERPL-SCNC: 103 MMOL/L (ref 94–109)
CO2 SERPL-SCNC: 25 MMOL/L (ref 20–32)
COLOR UR AUTO: NORMAL
CREAT SERPL-MCNC: 1.09 MG/DL (ref 0.66–1.25)
DIFFERENTIAL METHOD BLD: ABNORMAL
EOSINOPHIL # BLD AUTO: 0.2 10E9/L (ref 0–0.7)
EOSINOPHIL NFR BLD AUTO: 2.2 %
ERYTHROCYTE [DISTWIDTH] IN BLOOD BY AUTOMATED COUNT: 13.6 % (ref 10–15)
GFR SERPL CREATININE-BSD FRML MDRD: 74 ML/MIN/{1.73_M2}
GLUCOSE SERPL-MCNC: 113 MG/DL (ref 70–99)
GLUCOSE UR STRIP-MCNC: NEGATIVE MG/DL
HCT VFR BLD AUTO: 36.3 % (ref 40–53)
HGB BLD-MCNC: 12.6 G/DL (ref 13.3–17.7)
HGB UR QL STRIP: NEGATIVE
IMM GRANULOCYTES # BLD: 0 10E9/L (ref 0–0.4)
IMM GRANULOCYTES NFR BLD: 0.3 %
KETONES UR STRIP-MCNC: NEGATIVE MG/DL
LEUKOCYTE ESTERASE UR QL STRIP: NEGATIVE
LIPASE SERPL-CCNC: 83 U/L (ref 73–393)
LYMPHOCYTES # BLD AUTO: 2.5 10E9/L (ref 0.8–5.3)
LYMPHOCYTES NFR BLD AUTO: 23.3 %
MCH RBC QN AUTO: 31.1 PG (ref 26.5–33)
MCHC RBC AUTO-ENTMCNC: 34.7 G/DL (ref 31.5–36.5)
MCV RBC AUTO: 90 FL (ref 78–100)
MONOCYTES # BLD AUTO: 0.8 10E9/L (ref 0–1.3)
MONOCYTES NFR BLD AUTO: 7.5 %
NEUTROPHILS # BLD AUTO: 7.2 10E9/L (ref 1.6–8.3)
NEUTROPHILS NFR BLD AUTO: 66 %
NITRATE UR QL: NEGATIVE
NRBC # BLD AUTO: 0 10*3/UL
NRBC BLD AUTO-RTO: 0 /100
PH UR STRIP: 6 PH (ref 5–7)
PLATELET # BLD AUTO: 317 10E9/L (ref 150–450)
POTASSIUM SERPL-SCNC: 4.1 MMOL/L (ref 3.4–5.3)
PROT SERPL-MCNC: 8.6 G/DL (ref 6.8–8.8)
RBC # BLD AUTO: 4.05 10E12/L (ref 4.4–5.9)
SODIUM SERPL-SCNC: 135 MMOL/L (ref 133–144)
SOURCE: NORMAL
SP GR UR STRIP: 1 (ref 1–1.03)
UROBILINOGEN UR STRIP-MCNC: NORMAL MG/DL (ref 0–2)
WBC # BLD AUTO: 10.9 10E9/L (ref 4–11)

## 2019-02-11 PROCEDURE — 83690 ASSAY OF LIPASE: CPT | Performed by: EMERGENCY MEDICINE

## 2019-02-11 PROCEDURE — 99285 EMERGENCY DEPT VISIT HI MDM: CPT | Mod: 25

## 2019-02-11 PROCEDURE — 25000132 ZZH RX MED GY IP 250 OP 250 PS 637: Performed by: EMERGENCY MEDICINE

## 2019-02-11 PROCEDURE — 74176 CT ABD & PELVIS W/O CONTRAST: CPT

## 2019-02-11 PROCEDURE — 96374 THER/PROPH/DIAG INJ IV PUSH: CPT

## 2019-02-11 PROCEDURE — 80053 COMPREHEN METABOLIC PANEL: CPT | Performed by: EMERGENCY MEDICINE

## 2019-02-11 PROCEDURE — 81003 URINALYSIS AUTO W/O SCOPE: CPT | Performed by: EMERGENCY MEDICINE

## 2019-02-11 PROCEDURE — 25000128 H RX IP 250 OP 636: Performed by: EMERGENCY MEDICINE

## 2019-02-11 PROCEDURE — 85025 COMPLETE CBC W/AUTO DIFF WBC: CPT | Performed by: EMERGENCY MEDICINE

## 2019-02-11 RX ORDER — LIDOCAINE 4 G/G
1 PATCH TOPICAL ONCE
Status: DISCONTINUED | OUTPATIENT
Start: 2019-02-11 | End: 2019-02-11 | Stop reason: HOSPADM

## 2019-02-11 RX ORDER — LIDOCAINE 50 MG/G
1 PATCH TOPICAL EVERY 24 HOURS
Qty: 10 PATCH | Refills: 0 | Status: SHIPPED | OUTPATIENT
Start: 2019-02-11 | End: 2019-02-28

## 2019-02-11 RX ORDER — NAPROXEN 500 MG/1
500 TABLET ORAL 2 TIMES DAILY WITH MEALS
Qty: 16 TABLET | Refills: 0 | Status: SHIPPED | OUTPATIENT
Start: 2019-02-11 | End: 2019-02-28

## 2019-02-11 RX ORDER — CYCLOBENZAPRINE HCL 10 MG
10 TABLET ORAL ONCE
Status: COMPLETED | OUTPATIENT
Start: 2019-02-11 | End: 2019-02-11

## 2019-02-11 RX ORDER — OXYCODONE HYDROCHLORIDE 5 MG/1
5 TABLET ORAL ONCE
Status: COMPLETED | OUTPATIENT
Start: 2019-02-11 | End: 2019-02-11

## 2019-02-11 RX ORDER — KETOROLAC TROMETHAMINE 15 MG/ML
15 INJECTION, SOLUTION INTRAMUSCULAR; INTRAVENOUS ONCE
Status: COMPLETED | OUTPATIENT
Start: 2019-02-11 | End: 2019-02-11

## 2019-02-11 RX ORDER — CYCLOBENZAPRINE HCL 10 MG
10 TABLET ORAL 3 TIMES DAILY PRN
Qty: 20 TABLET | Refills: 0 | Status: SHIPPED | OUTPATIENT
Start: 2019-02-11 | End: 2019-02-28

## 2019-02-11 RX ADMIN — CYCLOBENZAPRINE HYDROCHLORIDE 10 MG: 10 TABLET, FILM COATED ORAL at 15:11

## 2019-02-11 RX ADMIN — KETOROLAC TROMETHAMINE 15 MG: 15 INJECTION, SOLUTION INTRAMUSCULAR; INTRAVENOUS at 14:31

## 2019-02-11 RX ADMIN — OXYCODONE HYDROCHLORIDE 5 MG: 5 TABLET ORAL at 14:32

## 2019-02-11 RX ADMIN — LIDOCAINE 1 PATCH: 560 PATCH PERCUTANEOUS; TOPICAL; TRANSDERMAL at 14:33

## 2019-02-11 ASSESSMENT — ENCOUNTER SYMPTOMS
FREQUENCY: 0
ABDOMINAL PAIN: 1
BACK PAIN: 1
HEMATURIA: 0
ARTHRALGIAS: 0
DYSURIA: 0

## 2019-02-11 ASSESSMENT — MIFFLIN-ST. JEOR: SCORE: 1765.2

## 2019-02-11 NOTE — ED AVS SNAPSHOT
Emergency Department  [address] [place] [address]  Phone:  [phone]  Fax:  [phone]                                    Gomez Turk   MRN: [id number]    Department:   Emergency Department   Date of Visit:  2/11/2019           After Visit Summary Signature Page    I have received my discharge instructions, and my questions have been answered. I have discussed any challenges I see with this plan with the nurse or doctor.    ..........................................................................................................................................  Patient/Patient Representative Signature      ..........................................................................................................................................  Patient Representative Print Name and Relationship to Patient    ..................................................               ................................................  Date                                   Time    ..........................................................................................................................................  Reviewed by Signature/Title    ...................................................              ..............................................  Date                                               Time          22EPIC Rev 08/18

## 2019-02-11 NOTE — ED PROVIDER NOTES
"  History     Chief Complaint:  Back Pain    HPI   Gomez Turk is a 58 year old male with a history of hypertension, hyperlipidemia, and diabetes who presents to the ED for evaluation of back pain. The patient reports that he developed an onset of right sided low back pain 2 weeks ago after \"sleeping in a chair wrong.\" The pain has persisted, though had been improving during the day with movement. Recently, his pain has now worsened and been radiating into his groin and to his left side and abdomen, so he ultimately presented to the ED for evaluation. Here in the ED, he rates his pain a 9/10. His pain is no longer alleviated with movement. His pain is exacerbated with changing position. He has been taking aspirin and Icy-Hot at home, without any relief. He denies any history of kidney stones or hernia. His pain does not otherwise radiate down his legs. He additionally denies any groin numbness/tingling, incontinence, urinary symptoms, or other symptoms or concerns.    Allergies:  NKDA    Medications:    Amlodipine  Aspirin  Lipitor  Diprolene  Celexa  Plavix  Gabapentin  Amaryl  Lisinopril  Metformin  Viagra  Ambien    Past Medical History:    Diverticulitis  Hyperlipidemia  Osteomyelitis  HTN  Legally blind  Diabetes  Cholecystitis    Past Surgical History:    Toe amputation  Femoropopliteal bypass graft  ENT surgery  I&D  Cholecystectomy    Family History:    Diabetes  Hyperlipidemia  Melanoma    Social History:  Marital Status:   [2]  Current Smoker  Occasional alcohol use  Negative for drug use.     Review of Systems   Gastrointestinal: Positive for abdominal pain.   Genitourinary: Positive for testicular pain. Negative for dysuria, enuresis, frequency and hematuria.   Musculoskeletal: Positive for back pain. Negative for arthralgias.   All other systems reviewed and are negative.      Physical Exam     Patient Vitals for the past 24 hrs:   BP Temp Temp src Pulse Resp SpO2 Height Weight   02/11/19 " "1120 152/79 97.7  F (36.5  C) Temporal 68 16 97 % 1.727 m (5' 8\") 97.1 kg (214 lb)     Physical Exam  General: Alert, interactive in mild distress  Head:  Scalp is atraumatic  Eyes:  The pupils are equal, round, and reactive to light    EOM's intact    No scleral icterus  ENT:      Nose:  The external nose is normal  Ears:  External ears are normal  Mouth/Throat: The oropharynx is normal    Mucus membranes are moist      Neck:  Normal range of motion.      There is no rigidity.    Trachea is in the midline         CV:  Regular rate and rhythm    No murmur   Resp:  Breath sounds are clear bilaterally    Non-labored, no retractions or accessory muscle use      GI:  Abdomen is soft, no distension, no tenderness.     :  No testicular tenderness or swelling. No reproducible hernia.      MS:  Normal strength in all 4 extremities, No midline cervical, thoracic tenderness    Lumbosacral tenderness bilaterally. 2+ patellar and achilles reflexes.   Skin:  Warm and dry, No rash or lesions noted.  Neuro: Strength 5/5 x4.  Sensation intact  In all 4 extremities.        Psych:  Awake. Alert.  Normal affect.      Appropriate interactions.    Emergency Department Course   Imaging:  Radiographic findings were communicated with the patient who voiced understanding of the findings.  CT Abdomen/Pelvis w/o IV contrast-stone protocol:   Punctate nonobstructing left renal calculus, as per radiology.     Laboratory:  CBC: WBC: 10.9, HGB: 12.6 (L), PLT: 317  CMP: Glucose 113 (H), o/w WNL (Creatinine: 1.09)  Lipase: 83    UA with Microscopic: All WNL    Interventions:  1431 Toradol, 15 mg, IV injection  1432 Oxycodone 5 mg PO  1433 Lidocaine 4% patch 1 patch  1511 Flexeril 10 mg PO    Emergency Department Course:  Nursing notes and vitals reviewed. (0983) I performed an exam of the patient as documented above.     IV inserted. Medicine administered as documented above. Blood drawn. This was sent to the lab for further testing, results " above.    The patient provided a urine sample here in the emergency department. This was sent for laboratory testing, findings above.     The patient was sent for a CT abdomen/pelvis while in the emergency department, findings above.     (1535) I rechecked the patient and discussed the results of his workup thus far.     Findings and plan explained to the Patient. Patient discharged home with instructions regarding supportive care, medications, and reasons to return. The importance of close follow-up was reviewed. The patient was prescribed Flexeril, lidoderm, and naproxen.     I personally reviewed the laboratory results with the Patient and answered all related questions prior to discharge.     Impression & Plan      Medical Decision Making:  Gomez Turk is a 58 year old male who was seen and evaluated. He has had several weeks of back pain, which is worsening over time. The above workup was undertaken here, and returned largely unremarkable, with no signs or ureterolithiasis. He has no urinary symptoms to suggest pyelonephritis and there are no signs of intraabdominal catastrophe. His lab workup was reassuring and there are no signs of AAA. He has no radicular symptoms to suggest significant bulging disc. There are no signs of cauda equina syndrome, discitis, epidural abscess, or more concerning illness. After the medications above, he was feeling improved and was subsequently discharged home with the medications as below. He will follow up with his PCP and return if new symptoms develop.     Diagnosis:    ICD-10-CM    1. Bilateral low back pain without sciatica, unspecified chronicity M54.5      Disposition:  discharged to home    Discharge Medications:     Medication List      Started    cyclobenzaprine 10 MG tablet  Commonly known as:  FLEXERIL  10 mg, Oral, 3 TIMES DAILY PRN     lidocaine 5 % patch  Commonly known as:  LIDODERM  1 patch, Transdermal, EVERY 24 HOURS     naproxen 500 MG tablet  Commonly  known as:  NAPROSYN  500 mg, Oral, 2 TIMES DAILY WITH MEALS          Scribe Disclosure:  I, Chela Tapia, am serving as a scribe on 2/11/2019 at 12:24 PM to personally document services performed by Bill Rudolph MD based on my observations and the provider's statements to me.     Chela Tapia  2/11/2019    EMERGENCY DEPARTMENT       Bill Rudolph MD  02/11/19 [id number]

## 2019-02-20 NOTE — PROGRESS NOTES
[facility]    Gomez Turk returns for vascular follow-up.  He has type II diabetes mellitus with diabetic PAD.  He developed ulcerations on his left foot and underwent a left below-knee popliteal to dorsalis pedis NTRSV bypass on 2/28/2017.  He underwent excision of medial ankle and second toe ulcer with eventual healing.  Also partial amputation of the right great toe.    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  Underwent a laparoscopic cholecystectomy for necrotic gallbladder on 3/18/2017.    Last hospitalized in June 2018 for cellulitis to his left second toe.  MRI was normal with no osteomyelitis and the bypass graft was widely patent by duplex.  This resolved with antibiotics.  He does wear appropriate footwear.  With his vision issues his wife does check his feet on a regular basis.      PMH: Medications: Lisinopril, Neurontin, metformin, Norvasc, Lipitor, Ambien, Plavix,                                    aspirin, Celexa, Amaryl     Still does smoke.  Encouraged to quit completely.      2/11/2019 laboratory: K=4.1   SCR=1.09   Ehp=467   Hgb=12.6  3/6/2018:  LDL= 22    Exam: Alert and appropriate.  Here with his wife.  Blood pressure 156/83.  Pulse 66             Chest= clear              Cardiovascular= regular rate              No carotid bruits.              +3 left graft and bilateral dorsalis pedis pulses.                Mild left second hammertoe with no ulcer.   \          Right foot is normal with no ischemic changes or ulcerations.              Well-healed left great toe amputation site  .             Intact 5.07 SW probe sensation to both feet.          Excellent bedside essentially triphasic signals in the graft in both dorsalis pedis arteries.  Weak right posterior tibial signal.  Duplex reveals that the left      Bypass graft is widely patent.  Small outflow artery which is unchanged good flow velocities.      Impression: #1.   Widely patent left popliteal to dorsalis pedis bypass graft.  Continue on dual antiplatelet therapy including the Plavix.  Good protective sensation in both of his feet.  No significant PAD in the right leg.                        #2.  Good risk factor control including LDL less than 70 and good diabetic control.                        #3.  No carotid bruits bit higher risk for silent cerebrovascular carotid disease.  This is never been evaluated.  This will be checked on his visit in 6 months.    Discussed at length with the patient and his wife today and answered all questions.       Jesus Aragon MD       Patient Care Team:  Tylor Roberts MD as PCP - General  Tylor Roberts MD as PCP - Assigned PCP  Jesus Aragon MD as Surgeon (Surgery)  Siddhartha Mclaughlin MD as MD (Ophthalmology)

## 2019-02-21 ENCOUNTER — OFFICE VISIT (OUTPATIENT)
Dept: OTHER | Facility: CLINIC | Age: 59
End: 2019-02-21
Attending: SURGERY
Payer: COMMERCIAL

## 2019-02-21 ENCOUNTER — HOSPITAL ENCOUNTER (OUTPATIENT)
Dept: ULTRASOUND IMAGING | Facility: CLINIC | Age: 59
Discharge: HOME | End: 2019-02-21
Attending: SURGERY | Admitting: SURGERY
Payer: COMMERCIAL

## 2019-02-21 VITALS — SYSTOLIC BLOOD PRESSURE: 156 MMHG | DIASTOLIC BLOOD PRESSURE: 82 MMHG | HEART RATE: 66 BPM

## 2019-02-21 DIAGNOSIS — E78.5 HYPERLIPIDEMIA LDL GOAL <70: ICD-10-CM

## 2019-02-21 DIAGNOSIS — I73.9 PAD (PERIPHERAL ARTERY DISEASE) (H): ICD-10-CM

## 2019-02-21 DIAGNOSIS — I73.9 PAD (PERIPHERAL ARTERY DISEASE) (H): Primary | ICD-10-CM

## 2019-02-21 PROCEDURE — 99214 OFFICE O/P EST MOD 30 MIN: CPT | Mod: ZP | Performed by: SURGERY

## 2019-02-21 PROCEDURE — 93926 LOWER EXTREMITY STUDY: CPT | Mod: LT

## 2019-02-21 PROCEDURE — G0463 HOSPITAL OUTPT CLINIC VISIT: HCPCS | Mod: 25

## 2019-02-21 NOTE — LETTER
[facility] [address]  [place] [address]  Phone: [phone]  Fax: [phone]    2019       Re: Gomez Turk - 1960    oGmez Turk returns for vascular follow-up.  He has type II diabetes mellitus with diabetic PAD.  He developed ulcerations on his left foot and underwent a left below-knee popliteal to dorsalis pedis NTRSV bypass on 2017.  He underwent excision of medial ankle and second toe ulcer with eventual healing.  Also partial amputation of the right great toe.     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 Underwent a laparoscopic cholecystectomy for necrotic gallbladder on 3/18/2017.     Last hospitalized in 2018 for cellulitis to his left second toe.  MRI was normal with no osteomyelitis and the bypass graft was widely patent by duplex.  This resolved with antibiotics.  He does wear appropriate footwear.  With his vision issues his wife does check his feet on a regular basis.      PMH: Medications: Lisinopril, Neurontin, metformin, Norvasc, Lipitor, Ambien, Plavix,  aspirin, Celexa, Amaryl      Still does smoke.  Encouraged to quit completely.      2019 laboratory: K=4.1   SCR=1.09   Wsd=649   Hgb=12.6  3/6/2018:  LDL= 22     Exam: Alert and appropriate.  Here with his wife.  Blood pressure 156/83.  Pulse 66  Chest= clear  Cardiovascular= regular rate  No carotid bruits.  +3 left graft and bilateral dorsalis pedis pulses.    Mild left second hammertoe with no ulcer.   Right foot is normal with no ischemic changes or ulcerations.  Well-healed left great toe amputation site Intact 5.07 SW probe sensation to both feet.     Excellent bedside essentially triphasic signals in the graft in both dorsalis pedis arteries.  Weak right posterior tibial signal.  Duplex reveals that the left      Bypass graft is widely patent.  Small outflow artery which is unchanged good flow  velocities.     Impression: #1.  Widely patent left popliteal to dorsalis pedis bypass graft.  Continue on dual antiplatelet therapy including the Plavix.  Good protective sensation in both of his feet.  No significant PAD in the right leg.                         #2.  Good risk factor control including LDL less than 70 and good diabetic control.                         #3.  No carotid bruits bit higher risk for silent cerebrovascular carotid disease.  This is never been evaluated.  This will be checked on his visit in 6 months.     Discussed at length with the patient and his wife today and answered all questions.        Jesus Aragon MD

## 2019-02-21 NOTE — NURSING NOTE
"Gomez Turk is a 58 year old male who presents for:  Chief Complaint   Patient presents with     RECHECK     L pop-DP (9:30 [facility]; 10:15 [facility]) History of left below-knee popliteal to dorsalis pedis bypass graft on 2/28/17; 6 month follow up to imaging on 6/7/18. Coordinate imaging and appointment with Dr. Margo Bagleys:    Vitals:    02/21/19 1019 02/21/19 1020   BP: 168/85 156/82   BP Location: Left arm Left arm   Patient Position: Chair Chair   Cuff Size: Adult Regular Adult Regular   Pulse: 66 66       BMI:  Estimated body mass index is 32.54 kg/m  as calculated from the following:    Height as of 2/11/19: 5' 8\" (1.727 m).    Weight as of 2/11/19: 214 lb (97.1 kg).    Pain Score:  Data Unavailable        Mariah Rockwell MA    "

## 2019-02-28 ENCOUNTER — OFFICE VISIT (OUTPATIENT)
Dept: INTERNAL MEDICINE | Facility: CLINIC | Age: 59
End: 2019-02-28
Payer: COMMERCIAL

## 2019-02-28 VITALS
HEART RATE: 79 BPM | BODY MASS INDEX: 33.4 KG/M2 | WEIGHT: 220.4 LBS | DIASTOLIC BLOOD PRESSURE: 74 MMHG | OXYGEN SATURATION: 98 % | TEMPERATURE: 98.3 F | RESPIRATION RATE: 15 BRPM | SYSTOLIC BLOOD PRESSURE: 134 MMHG | HEIGHT: 68 IN

## 2019-02-28 DIAGNOSIS — I73.9 PAD (PERIPHERAL ARTERY DISEASE) (H): ICD-10-CM

## 2019-02-28 DIAGNOSIS — G89.29 CHRONIC MIDLINE LOW BACK PAIN WITHOUT SCIATICA: Primary | ICD-10-CM

## 2019-02-28 DIAGNOSIS — M54.50 CHRONIC MIDLINE LOW BACK PAIN WITHOUT SCIATICA: Primary | ICD-10-CM

## 2019-02-28 DIAGNOSIS — H54.3 VISION LOSS, BILATERAL: ICD-10-CM

## 2019-02-28 DIAGNOSIS — E11.39 TYPE 2 DIABETES MELLITUS WITH OTHER OPHTHALMIC COMPLICATION, WITHOUT LONG-TERM CURRENT USE OF INSULIN (H): ICD-10-CM

## 2019-02-28 DIAGNOSIS — Z13.89 SCREENING FOR DIABETIC PERIPHERAL NEUROPATHY: ICD-10-CM

## 2019-02-28 PROCEDURE — 99214 OFFICE O/P EST MOD 30 MIN: CPT | Performed by: INTERNAL MEDICINE

## 2019-02-28 PROCEDURE — 99207 C FOOT EXAM  NO CHARGE: CPT | Mod: 25 | Performed by: INTERNAL MEDICINE

## 2019-02-28 RX ORDER — NAPROXEN 500 MG/1
500 TABLET ORAL 2 TIMES DAILY WITH MEALS
Qty: 16 TABLET | Refills: 0 | Status: SHIPPED | OUTPATIENT
Start: 2019-02-28 | End: 2020-02-19

## 2019-02-28 RX ORDER — LIDOCAINE 50 MG/G
1 PATCH TOPICAL EVERY 24 HOURS
Qty: 10 PATCH | Refills: 0 | Status: SHIPPED | OUTPATIENT
Start: 2019-02-28 | End: 2020-02-19

## 2019-02-28 RX ORDER — CYCLOBENZAPRINE HCL 10 MG
10 TABLET ORAL 3 TIMES DAILY PRN
Qty: 20 TABLET | Refills: 0 | Status: SHIPPED | OUTPATIENT
Start: 2019-02-28 | End: 2020-02-19

## 2019-02-28 ASSESSMENT — MIFFLIN-ST. JEOR: SCORE: 1794.23

## 2019-02-28 NOTE — PROGRESS NOTES
SUBJECTIVE:   Gomez Turk is a 58 year old male who presents to clinic today for the following health issues:    ED/UC Followup:    Facility:  2/11/19  Date of visit: [facility]  Reason for visit: Bilateral lower back pain   Current Status: Slight improvement. Patient states still experiencing a lot of pain with transition from sitting or laying to standing but it is slowly getting better with the treatment that he has been receiving.      Medical Decision Making:  Gomez Turk is a 58 year old male who was seen and evaluated. He has had several weeks of back pain, which is worsening over time. The above workup was undertaken here, and returned largely unremarkable, with no signs or ureterolithiasis. He has no urinary symptoms to suggest pyelonephritis and there are no signs of intraabdominal catastrophe. His lab workup was reassuring and there are no signs of AAA. He has no radicular symptoms to suggest significant bulging disc. There are no signs of cauda equina syndrome, discitis, epidural abscess, or more concerning illness. After the medications above, he was feeling improved and was subsequently discharged home with the medications as below. He will follow up with his PCP and return if new symptoms develop.      Diagnosis:      ICD-10-CM     1. Bilateral low back pain without sciatica, unspecified chronicity         Problem list and histories reviewed & adjusted, as indicated.  Additional history: as documented    Patient Active Problem List   Diagnosis     Diverticulosis of large intestine     Tobacco use disorder     HYPERLIPIDEMIA LDL GOAL <100     PAD (peripheral artery disease) (H)     Type 2 diabetes mellitus with other ophthalmic complication, without long-term current use of insulin (H)     Acute cholecystitis     Vision loss, bilateral     Counseling regarding advanced directives     Acute osteomyelitis of toe, left (H)     Past Surgical History:   Procedure Laterality Date     AMPUTATE TOE(S)  Left 2/28/2017    Procedure: AMPUTATE TOE(S);  Surgeon: Jesus Aragon MD;  Location:  OR     BYPASS GRAFT FEMOROPOPLITEAL Left 2/28/2017    Procedure: BYPASS GRAFT FEMOROPOPLITEAL;  Surgeon: Jesus Aragon MD;  Location:  OR     ENT SURGERY  1990    deviated septum repair     IRRIGATION AND DEBRIDEMENT FOOT, COMBINED Left 2/28/2017    Procedure: COMBINED IRRIGATION AND DEBRIDEMENT FOOT;  Surgeon: Jesus Aragon MD;  Location:  OR     LAPAROSCOPIC CHOLECYSTECTOMY N/A 3/18/2017    Procedure: LAPAROSCOPIC CHOLECYSTECTOMY;  Surgeon: Edin Hollingsworth MD;  Location:  OR       Social History     Tobacco Use     Smoking status: Current Every Day Smoker     Packs/day: 1.00     Types: Cigarettes     Smokeless tobacco: Never Used   Substance Use Topics     Alcohol use: Yes     Comment: occassion - 1 drink today     Family History   Problem Relation Age of Onset     Diabetes Mother      Lipids Mother      Melanoma Mother      Cancer Paternal Grandmother      Neurologic Disorder Maternal Uncle      Glaucoma No family hx of      Macular Degeneration No family hx of      Retinal detachment No family hx of      Thyroid Disease No family hx of          Current Outpatient Medications   Medication Sig Dispense Refill     amLODIPine (NORVASC) 5 MG tablet Take 1 tablet (5 mg) by mouth daily 90 tablet 3     aspirin EC 81 MG EC tablet Take 1 tablet (81 mg) by mouth daily 30 tablet 11     atorvastatin (LIPITOR) 40 MG tablet Take 1 tablet (40 mg) by mouth daily 30 tablet 5     augmented betamethasone dipropionate (DIPROLENE-AF) 0.05 % cream Apply sparingly to affected area twice daily as needed.  Do not apply to face. 100 g 3     citalopram (CELEXA) 40 MG tablet Take 1 tablet (40 mg) by mouth daily 90 tablet 3     clopidogrel (PLAVIX) 75 MG tablet Take 1 tablet (75 mg) by mouth daily 90 tablet 1     gabapentin (NEURONTIN) 300 MG capsule Take 1 capsule (300 mg) by mouth 3 times daily 90 capsule 5      "glimepiride (AMARYL) 2 MG tablet Take 1 tablet (2 mg) by mouth 2 times daily 180 tablet 3     lisinopril (PRINIVIL/ZESTRIL) 20 MG tablet Take 1 tablet (20 mg) by mouth daily as needed 90 tablet 0     metFORMIN (GLUCOPHAGE) 1000 MG tablet Take 1 tablet (1,000 mg) by mouth 2 times daily (with meals) 180 tablet 1     sildenafil (VIAGRA) 100 MG tablet Take 1 tablet (100 mg) by mouth daily as needed (for sexual intercourse) 30 min to 4 hrs before sex. Do not use with nitroglycerin, terazosin or doxazosin. 6 tablet 3     zolpidem (AMBIEN) 10 MG tablet Take 1 tablet (10 mg) by mouth nightly as needed for sleep , Do not use if taking narcotics 30 tablet 0     Allergies   Allergen Reactions     No Known Drug Allergies      BP Readings from Last 3 Encounters:   02/21/19 156/82   02/11/19 143/78   12/06/18 157/70    Wt Readings from Last 3 Encounters:   02/11/19 97.1 kg (214 lb)   12/05/18 99.4 kg (219 lb 1.6 oz)   11/16/18 98.9 kg (218 lb)               Reviewed and updated as needed this visit by clinical staff       Reviewed and updated as needed this visit by Provider         ROS:  CONSTITUTIONAL: NEGATIVE for fever, chills, change in weight  ENT/MOUTH: NEGATIVE for ear, mouth and throat problems  RESP: NEGATIVE for significant cough or SOB  CV: NEGATIVE for chest pain, palpitations or peripheral edema  GI: NEGATIVE for nausea, abdominal pain, heartburn, or change in bowel habits  : NEGATIVE for frequency, dysuria, or hematuria  MUSCULOSKELETAL: NEGATIVE for significant arthralgias or myalgia  HEME: NEGATIVE for bleeding problems  PSYCHIATRIC: NEGATIVE for changes in mood or affect    OBJECTIVE:                                                    /74   Pulse 79   Temp 98.3  F (36.8  C) (Oral)   Resp 15   Ht 1.727 m (5' 8\")   Wt 100 kg (220 lb 6.4 oz)   SpO2 98%   BMI 33.51 kg/m    Body mass index is 33.51 kg/m .  GENERAL: alert and no distress  EYES: Decreased visual acuity bilaterally using a walking " stick  HENT: ear canals- normal; TMs- normal; Nose- normal; Mouth- no ulcers, no lesions  NECK: no tenderness, no adenopathy, no asymmetry, no masses, no stiffness; thyroid- normal to palpation  RESP: lungs clear to auscultation - no rales, no rhonchi, no wheezes  CV: regular rates and rhythm, normal S1 S2, no S3 or S4 and no click or rub   MS: extremities- no gross deformities noted, no edema  NEURO:  No focal changes  PSYCH: Alert and oriented times 3; speech- coherent , normal rate and volume; able to articulate logical thoughts, able to abstract reason, no tangential thoughts, no hallucinations or delusions, affect- normal       Lab Results   Component Value Date    A1C 6.3 11/01/2018    A1C 6.5 06/06/2018    A1C 6.0 03/06/2018    A1C 6.8 02/10/2017    A1C 9.6 10/17/2016       ASSESSMENT/PLAN:                                                        (M54.5,  G89.29) Chronic midline low back pain without sciatica  (primary encounter diagnosis)  Comment: Supportive care continue with ongoing treatment and psych expect resolution.  Plan: cyclobenzaprine (FLEXERIL) 10 MG tablet,         lidocaine (LIDODERM) 5 % patch, naproxen         (NAPROSYN) 500 MG tablet, [facility], AND         CHIROPRACTIC REFERRAL        If symptoms do not improve consider physical therapy referral    (E11.39) Type 2 diabetes mellitus with other ophthalmic complication, without long-term current use of insulin (H)  Comment: Currently at goal on therapy lipid and urinalysis for urine protein recommended fast  Plan: Lipid panel reflex to direct LDL Fasting,         Albumin Random Urine Quantitative with Creat         Ratio            (I73.9) PAD (peripheral artery disease) (H)  Comment: Stable with recent follow-up in vascular clinic demonstrating patent stent placement  Plan:     (H54.3) Vision loss, bilateral  Comment: Stable and ongoing with continued functional impairment  Plan: FMLA forms were filled out for the patient's wife upon  transportation    (Z13.89) Screening for diabetic peripheral neuropathy  Comment: Recommended ongoing vascular care and routine foot care.  Plan: FOOT EXAM  NO CHARGE [41179.114]              See Patient Instructions    Tylor Roberts MD  [facility]

## 2019-03-01 ENCOUNTER — TELEPHONE (OUTPATIENT)
Dept: INTERNAL MEDICINE | Facility: CLINIC | Age: 59
End: 2019-03-01

## 2019-03-01 NOTE — TELEPHONE ENCOUNTER
Prior Authorization Retail Medication Request    Medication/Dose: lidocaine (LIDODERM) 5 % patch  ICD code (if different than what is on RX):  M54.5,G89.29  Previously Tried and Failed:    Rationale:      Insurance Name:  [email or web address]  Insurance ID:  [id number]  Key: [id number]    Pharmacy Information (if different than what is on RX)  Name:  [facility]  Phone:  [phone]

## 2019-03-04 ENCOUNTER — MYC REFILL (OUTPATIENT)
Dept: INTERNAL MEDICINE | Facility: CLINIC | Age: 59
End: 2019-03-04

## 2019-03-04 DIAGNOSIS — F41.9 ANXIETY: ICD-10-CM

## 2019-03-04 RX ORDER — ZOLPIDEM TARTRATE 10 MG/1
10 TABLET ORAL
Qty: 30 TABLET | Refills: 0 | Status: CANCELLED | OUTPATIENT
Start: 2019-03-04

## 2019-03-05 DIAGNOSIS — F41.9 ANXIETY: ICD-10-CM

## 2019-03-05 RX ORDER — ZOLPIDEM TARTRATE 10 MG/1
TABLET ORAL
Qty: 30 TABLET | Refills: 0 | Status: SHIPPED | OUTPATIENT
Start: 2019-03-05 | End: 2019-04-04

## 2019-03-05 NOTE — TELEPHONE ENCOUNTER
Ambien       Last Written Prescription Date:  2/5/19  Last Fill Quantity: 30,   # refills: 0  Last Office Visit: 2/28/19  Future Office visit:       Routing refill request to provider for review/approval because:  Drug not on the [facility], P or [facility] refill protocol or controlled substance

## 2019-03-05 NOTE — TELEPHONE ENCOUNTER
PRIOR AUTHORIZATION DENIED    Medication: lidocaine (LIDODERM) 5 % patch - DENIED    Denial Date: 3/5/2019    Denial Rational: Patient must have: (1) a diagnosis of post-herpetic neuralgia (nerve pain after a shingles infection), or (2) for patients with neuropathic pain (nerve pain) and an inadequate response to gabapentin.    Appeal Information:

## 2019-03-05 NOTE — TELEPHONE ENCOUNTER
PA Initiation    Medication: lidocaine (LIDODERM) 5 % patch - INITIATED  Insurance Company: [employer] - Phone [phone] Fax [phone]  Pharmacy Filling the Rx: [id number] - [facility] [address] [facility]  Filling Pharmacy Phone: [phone]  Filling Pharmacy Fax:    Start Date: 3/5/2019

## 2019-04-02 ENCOUNTER — TELEPHONE (OUTPATIENT)
Dept: INTERNAL MEDICINE | Facility: CLINIC | Age: 59
End: 2019-04-02

## 2019-04-02 NOTE — TELEPHONE ENCOUNTER
Prior Authorization Retail Medication Request    Medication/Dose: lidocaine (LIDODERM) 5 % patch  ICD code (if different than what is on RX):  M54.5, G89.29  Previously Tried and Failed:    Rationale:      Insurance Name:  [employer]  Insurance ID:  [id number]    KEY: [id number]    Pharmacy Information (if different than what is on RX)  [facility]  Phone:  [phone]

## 2019-04-04 ENCOUNTER — MYC REFILL (OUTPATIENT)
Dept: INTERNAL MEDICINE | Facility: CLINIC | Age: 59
End: 2019-04-04

## 2019-04-04 DIAGNOSIS — F41.9 ANXIETY: ICD-10-CM

## 2019-04-04 RX ORDER — ZOLPIDEM TARTRATE 10 MG/1
10 TABLET ORAL
Qty: 30 TABLET | Refills: 0 | Status: SHIPPED | OUTPATIENT
Start: 2019-04-04 | End: 2019-05-06

## 2019-04-04 NOTE — TELEPHONE ENCOUNTER
ambien      Last Written Prescription Date:  3/5/19  Last Fill Quantity: 30,   # refills: 0  Last Office Visit: 2/28/19  Future Office visit:       Routing refill request to provider for review/approval because:  Drug not on the [facility], P or [facility] refill protocol or controlled substance

## 2019-05-06 ENCOUNTER — MYC REFILL (OUTPATIENT)
Dept: INTERNAL MEDICINE | Facility: CLINIC | Age: 59
End: 2019-05-06

## 2019-05-06 DIAGNOSIS — F41.9 ANXIETY: ICD-10-CM

## 2019-05-06 RX ORDER — ZOLPIDEM TARTRATE 10 MG/1
10 TABLET ORAL
Qty: 30 TABLET | Refills: 0 | Status: SHIPPED | OUTPATIENT
Start: 2019-05-06 | End: 2019-06-04

## 2019-05-06 NOTE — TELEPHONE ENCOUNTER
Ambien      Last Written Prescription Date:  4/4/2019  Last Fill Quantity: 30,   # refills: 0  Last Office Visit: 2/28/2019  Future Office visit:       Routing refill request to provider for review/approval because:  Drug not on the [facility], [facility] or [facility] refill protocol or controlled substance    Ana Paula TSAI RN, BSN, PHN

## 2019-05-13 ENCOUNTER — TRANSFERRED RECORDS (OUTPATIENT)
Dept: HEALTH INFORMATION MANAGEMENT | Facility: CLINIC | Age: 59
End: 2019-05-13

## 2019-05-13 DIAGNOSIS — E11.39 TYPE 2 DIABETES MELLITUS WITH OTHER OPHTHALMIC COMPLICATION, WITHOUT LONG-TERM CURRENT USE OF INSULIN (H): ICD-10-CM

## 2019-05-13 NOTE — TELEPHONE ENCOUNTER
"Requested Prescriptions   Pending Prescriptions Disp Refills     lisinopril (PRINIVIL/ZESTRIL) 20 MG tablet [Pharmacy Med Name: Lisinopril Oral Tablet 20 MG] 90 tablet 0     Sig: Take 1 tablet (20 mg) by mouth daily as needed       ACE Inhibitors (Including Combos) Protocol Passed - 5/13/2019  5:54 AM        Passed - Blood pressure under 140/90 in past 12 months     BP Readings from Last 3 Encounters:   02/28/19 134/74   02/21/19 156/82   02/11/19 143/78                 Passed - Recent (12 mo) or future (30 days) visit within the authorizing provider's specialty     Patient had office visit in the last 12 months or has a visit in the next 30 days with authorizing provider or within the authorizing provider's specialty.  See \"Patient Info\" tab in inbasket, or \"Choose Columns\" in Meds & Orders section of the refill encounter.              Passed - Medication is active on med list        Passed - Patient is age 18 or older        Passed - Normal serum creatinine on file in past 12 months     Recent Labs   Lab Test 02/11/19  1400   CR 1.09             Passed - Normal serum potassium on file in past 12 months     Recent Labs   Lab Test 02/11/19  1400   POTASSIUM 4.1             "

## 2019-05-14 RX ORDER — LISINOPRIL 20 MG/1
20 TABLET ORAL DAILY PRN
Qty: 90 TABLET | Refills: 2 | Status: SHIPPED | OUTPATIENT
Start: 2019-05-14 | End: 2020-03-19

## 2019-06-04 ENCOUNTER — MYC REFILL (OUTPATIENT)
Dept: INTERNAL MEDICINE | Facility: CLINIC | Age: 59
End: 2019-06-04

## 2019-06-04 DIAGNOSIS — F41.9 ANXIETY: ICD-10-CM

## 2019-06-05 RX ORDER — ZOLPIDEM TARTRATE 10 MG/1
10 TABLET ORAL
Qty: 30 TABLET | Refills: 0 | Status: SHIPPED | OUTPATIENT
Start: 2019-06-05 | End: 2019-07-05

## 2019-06-05 NOTE — TELEPHONE ENCOUNTER
Ambien      Last Written Prescription Date:  5-6-19  Last Fill Quantity: 30,   # refills: 0  Last Office Visit: 2-28-19  Future Office visit:       Routing refill request to provider for review/approval because:  Drug not on the [facility], P or [facility] refill protocol or controlled substance

## 2019-06-18 ENCOUNTER — DOCUMENTATION ONLY (OUTPATIENT)
Dept: LAB | Facility: CLINIC | Age: 59
End: 2019-06-18

## 2019-06-18 DIAGNOSIS — E11.9 TYPE 2 DIABETES MELLITUS WITHOUT COMPLICATION, WITHOUT LONG-TERM CURRENT USE OF INSULIN (H): ICD-10-CM

## 2019-06-18 DIAGNOSIS — E11.39 TYPE 2 DIABETES MELLITUS WITH OTHER OPHTHALMIC COMPLICATION, WITHOUT LONG-TERM CURRENT USE OF INSULIN (H): Primary | ICD-10-CM

## 2019-06-18 NOTE — TELEPHONE ENCOUNTER
"Requested Prescriptions   Pending Prescriptions Disp Refills     blood glucose (ACCU-CHEK SUNITA) test strip 1 Box 11     Si strips by In Vitro route 4 times daily       Diabetic Supplies Protocol Failed - 2019 10:48 AM        Failed - Medication is active on med list        Passed - Patient is 18 years of age or older        Passed - Recent (6 mo) or future (30 days) visit within the authorizing provider's specialty     Patient had office visit in the last 6 months or has a visit in the next 30 days with authorizing provider.  See \"Patient Info\" tab in inbasket, or \"Choose Columns\" in Meds & Orders section of the refill encounter.              "

## 2019-06-19 DIAGNOSIS — E11.39 TYPE 2 DIABETES MELLITUS WITH OTHER OPHTHALMIC COMPLICATION, WITHOUT LONG-TERM CURRENT USE OF INSULIN (H): ICD-10-CM

## 2019-06-19 LAB
CHOLEST SERPL-MCNC: 114 MG/DL
HBA1C MFR BLD: 6.5 % (ref 0–5.6)
HDLC SERPL-MCNC: 59 MG/DL
LDLC SERPL CALC-MCNC: 38 MG/DL
NONHDLC SERPL-MCNC: 55 MG/DL
TRIGL SERPL-MCNC: 85 MG/DL

## 2019-06-19 PROCEDURE — 36415 COLL VENOUS BLD VENIPUNCTURE: CPT | Performed by: INTERNAL MEDICINE

## 2019-06-19 PROCEDURE — 80061 LIPID PANEL: CPT | Performed by: INTERNAL MEDICINE

## 2019-06-19 PROCEDURE — 83036 HEMOGLOBIN GLYCOSYLATED A1C: CPT | Performed by: INTERNAL MEDICINE

## 2019-06-24 DIAGNOSIS — E78.5 HYPERLIPIDEMIA LDL GOAL <100: ICD-10-CM

## 2019-06-24 RX ORDER — ATORVASTATIN CALCIUM 40 MG/1
TABLET, FILM COATED ORAL
Qty: 30 TABLET | Refills: 11 | Status: SHIPPED | OUTPATIENT
Start: 2019-06-24 | End: 2021-10-12

## 2019-06-24 NOTE — TELEPHONE ENCOUNTER
"Requested Prescriptions   Pending Prescriptions Disp Refills     atorvastatin (LIPITOR) 40 MG tablet [Pharmacy Med Name: Atorvastatin Calcium Oral Tablet 40 MG] 30 tablet 4     Sig: Take 1 tablet (40 mg) by mouth daily       Statins Protocol Passed - 6/24/2019  9:26 AM        Passed - LDL on file in past 12 months     Recent Labs   Lab Test 06/19/19  0852   LDL 38             Passed - No abnormal creatine kinase in past 12 months     No lab results found.             Passed - Recent (12 mo) or future (30 days) visit within the authorizing provider's specialty     Patient had office visit in the last 12 months or has a visit in the next 30 days with authorizing provider or within the authorizing provider's specialty.  See \"Patient Info\" tab in inbasket, or \"Choose Columns\" in Meds & Orders section of the refill encounter.              Passed - Medication is active on med list        Passed - Patient is age 18 or older          Prescription approved per [facility] Refill Protocol.    Ana Paula TSAI RN, BSN, PHN      "

## 2019-07-05 ENCOUNTER — MYC REFILL (OUTPATIENT)
Dept: INTERNAL MEDICINE | Facility: CLINIC | Age: 59
End: 2019-07-05

## 2019-07-05 DIAGNOSIS — F41.9 ANXIETY: ICD-10-CM

## 2019-07-05 RX ORDER — ZOLPIDEM TARTRATE 10 MG/1
10 TABLET ORAL
Qty: 30 TABLET | Refills: 0 | Status: SHIPPED | OUTPATIENT
Start: 2019-07-05 | End: 2019-08-06

## 2019-07-05 NOTE — TELEPHONE ENCOUNTER
Please approve .    Routing refill request to provider for review/approval because:  Drug not on the [facility], P or [facility] refill protocol or controlled substance

## 2019-07-12 DIAGNOSIS — I73.9 PAD (PERIPHERAL ARTERY DISEASE) (H): ICD-10-CM

## 2019-07-12 DIAGNOSIS — E11.39 TYPE 2 DIABETES MELLITUS WITH OTHER OPHTHALMIC COMPLICATION, WITHOUT LONG-TERM CURRENT USE OF INSULIN (H): ICD-10-CM

## 2019-07-12 RX ORDER — GABAPENTIN 300 MG/1
300 CAPSULE ORAL 3 TIMES DAILY
Qty: 90 CAPSULE | Refills: 4 | Status: SHIPPED | OUTPATIENT
Start: 2019-07-12 | End: 2020-04-26

## 2019-07-12 NOTE — TELEPHONE ENCOUNTER
gabapentin (NEURONTIN) 300 MG capsule      Last Written Prescription Date:  5/30/2018  Last Fill Quantity: 90,   # refills: 5  Last Office Visit: 2/28/2019  Future Office visit:       Routing refill request to provider for review/approval because:  Drug not on the [facility], [facility] or [facility] refill protocol or controlled substance

## 2019-07-16 DIAGNOSIS — I73.9 PAD (PERIPHERAL ARTERY DISEASE) (H): ICD-10-CM

## 2019-07-16 RX ORDER — CLOPIDOGREL BISULFATE 75 MG/1
75 TABLET ORAL DAILY
Qty: 90 TABLET | Refills: 2 | Status: SHIPPED | OUTPATIENT
Start: 2019-07-16 | End: 2020-07-06

## 2019-07-16 NOTE — TELEPHONE ENCOUNTER
"Requested Prescriptions   Pending Prescriptions Disp Refills     clopidogrel (PLAVIX) 75 MG tablet [Pharmacy Med Name: Clopidogrel Bisulfate Oral Tablet 75 MG] 90 tablet 2     Sig: Take 1 tablet (75 mg) by mouth daily       Plavix Failed - 7/16/2019  1:59 PM        Failed - Normal HGB on file in past 12 months     Recent Labs   Lab Test 02/11/19  1400   HGB 12.6*               Passed - No active PPI on record unless is Protonix        Passed - Normal Platelets on file in past 12 months     Recent Labs   Lab Test 02/11/19  1400                  Passed - Recent (12 mo) or future (30 days) visit within the authorizing provider's specialty     Patient had office visit in the last 12 months or has a visit in the next 30 days with authorizing provider or within the authorizing provider's specialty.  See \"Patient Info\" tab in inbasket, or \"Choose Columns\" in Meds & Orders section of the refill encounter.              Passed - Medication is active on med list        Passed - Patient is age 18 or older        Routing refill request to provider for review/approval because:  Labs out of range:  hgb        "

## 2019-08-06 ENCOUNTER — MYC REFILL (OUTPATIENT)
Dept: INTERNAL MEDICINE | Facility: CLINIC | Age: 59
End: 2019-08-06

## 2019-08-06 DIAGNOSIS — F41.9 ANXIETY: ICD-10-CM

## 2019-08-06 RX ORDER — ZOLPIDEM TARTRATE 10 MG/1
10 TABLET ORAL
Qty: 30 TABLET | Refills: 0 | Status: SHIPPED | OUTPATIENT
Start: 2019-08-06 | End: 2019-09-05

## 2019-08-06 NOTE — TELEPHONE ENCOUNTER
ambien      Last Written Prescription Date:  7/5/19  Last Fill Quantity: 30,   # refills: 0  Last Office Visit: 2/28/19  Future Office visit:    Next 5 appointments (look out 90 days)    Aug 22, 2019 11:45 AM CDT  Return Visit with Jesus Aragon MD  [facility] ([facility]) [address]  [facility] [address]  [phone]           Routing refill request to provider for review/approval because:  Drug not on the [facility], [facility] or [facility] refill protocol or controlled substance

## 2019-08-12 ENCOUNTER — OFFICE VISIT (OUTPATIENT)
Dept: UROLOGY | Facility: CLINIC | Age: 59
End: 2019-08-12
Payer: COMMERCIAL

## 2019-08-12 VITALS
HEIGHT: 68 IN | DIASTOLIC BLOOD PRESSURE: 72 MMHG | SYSTOLIC BLOOD PRESSURE: 150 MMHG | BODY MASS INDEX: 33.34 KG/M2 | WEIGHT: 220 LBS

## 2019-08-12 DIAGNOSIS — H54.3 VISION LOSS, BILATERAL: ICD-10-CM

## 2019-08-12 DIAGNOSIS — E11.39 TYPE 2 DIABETES MELLITUS WITH OTHER OPHTHALMIC COMPLICATION, WITHOUT LONG-TERM CURRENT USE OF INSULIN (H): ICD-10-CM

## 2019-08-12 DIAGNOSIS — N52.9 ERECTILE DYSFUNCTION, UNSPECIFIED ERECTILE DYSFUNCTION TYPE: Primary | ICD-10-CM

## 2019-08-12 PROCEDURE — 99213 OFFICE O/P EST LOW 20 MIN: CPT | Performed by: UROLOGY

## 2019-08-12 ASSESSMENT — PAIN SCALES - GENERAL: PAINLEVEL: NO PAIN (0)

## 2019-08-12 ASSESSMENT — MIFFLIN-ST. JEOR: SCORE: 1792.41

## 2019-08-12 NOTE — Clinical Note
Hi Dr. Roberts,I saw Gomez back today for follow-up of his erectile dysfunction.  At our last visit I provided him with a prescription for sildenafil 100 mg strength, which he tried, with no benefit.  We reviewed treatment options and he would like to pursue a Inflatable Penile Prosthesis (IPP).  We discussed that this is a surgery that 1 of my partners, Dr. Adebayo Robert, does at higher volume and I will refer Gomez to him for evaluation.Thanks, Neil Rodriguez M.D.Cell: [phone]

## 2019-08-12 NOTE — NURSING NOTE
"Chief Complaint   Patient presents with     Follow Up     Pt is here for a 6 mo check in due to ED     Erectile Dysfunction       Patient Active Problem List   Diagnosis     Diverticulosis of large intestine     Tobacco use disorder     HYPERLIPIDEMIA LDL GOAL <100     PAD (peripheral artery disease) (H)     Type 2 diabetes mellitus with other ophthalmic complication, without long-term current use of insulin (H)     Acute cholecystitis     Vision loss, bilateral     Counseling regarding advanced directives     Acute osteomyelitis of toe, left (H)     Chronic midline low back pain without sciatica       Allergies   Allergen Reactions     No Known Drug Allergies        BP (!) 150/72   Ht 1.727 m (5' 8\")   Wt 99.8 kg (220 lb)   BMI 33.45 kg/m            Siddhartha Sawyer, EMT-B  8/12/2019         "

## 2019-08-12 NOTE — PROGRESS NOTES
"[facility]  CHIEF COMPLAINT   It was my pleasure to see Gomez Turk who is a 58 year old male for follow-up of Erectile dysfunction .      HPI   Gomez Turk is a very pleasant 58 year old male who presents with a history of Erectile dysfunction.    Initially seen on 11/16/18 for Erectile dysfunction:  \"Gomez Turk is a 57 year old male who is being seen for evaluation of Erectile dysfunction      He has had worsening Erectile dysfunction over the past few years. He has not tried any medications or treatment for this yet.      Urine stream with post void dribbling and prolonged emptying. He denies frequency, urgency, acute urinary retention, dysuria or hematuria. He is not too bothered by this at this time.     Medical history is complicated by DM, PAD s/p LEFT LE bypass and recent vision loss in 02/2017     Testosterone = 394 11/1/18\"    He was counseled on treatment options and started on sildenafil 100mg (Viagra). He returns today for follow up and notes no real effect or benefit from the sildenafil. No other issues today.     PHYSICAL EXAM  Patient is a 58 year old  male   Vitals: Blood pressure (!) 150/72, height 1.727 m (5' 8\"), weight 99.8 kg (220 lb).  General Appearance Adult: Body mass index is 33.45 kg/m .  Alert, no acute distress, oriented  HENT: throat/mouth:normal, good dentition  Lungs: no respiratory distress, or pursed lip breathing  Heart: No obvious jugular venous distension present  Abdomen: soft, nontender, no organomegaly or masses  Musculoskeltal: extremities normal, no peripheral edema  Skin: no suspicious lesions or rashes  Neuro: Alert, oriented, speech and mentation normal  Psych: affect and mood normal  Gait: Legally blind, walks with a cane    UA RESULTS:  Recent Labs   Lab Test 02/11/19  1342   COLOR Light Yellow   APPEARANCE Clear   URINEGLC Negative   URINEBILI Negative   URINEKETONE Negative   SG 1.005   UBLD Negative   URINEPH 6.0   PROTEIN Negative   NITRITE Negative "   LEUKEST Negative       ASSESSMENT and PLAN   59 y/o man with history of DM type II with peripheral neuropathy, PAD s/p LE bypass, sudden vision loss in 02/2017 with ~2 years of worsening Erectile dysfunction and mild LUTS.     Erectile dysfunction  -He was trialed on the PDE 5 inhibitor at last visit, Viagra 100 mg strength.  He reports no benefit from this medication  -We reviewed other treatment options for erectile dysfunction which include other PDE 5 inhibitors, intracavernosal injection, or Inflatable Penile Prosthesis (IPP).  -Given his lack of response to the sildenafil I do not believe another PDE 5 inhibitor would be of benefit  -He is not interested in intracavernosal injection, and given his vision loss I think this would be technically challenging  -He is interested in pursuing an inflatable penile prosthesis, we discussed the risks and benefits of surgery, and that this is not a surgery that I performed high-volume and so will refer to 1 of my partners, Dr. Adebayo Robert at the [facility].    I spent over 15 minutes with the patient.  Over half this time was spent on counseling regarding the above.    Neil Rodriguez   Urology  [facility]  Clinic Phone [phone]

## 2019-08-12 NOTE — LETTER
"8/12/2019       RE: Gomez Turk  [address]  [facility] [address]     Dear Colleague,    Thank you for referring your patient, Gomez Turk, to the [facility] at [facility]. Please see a copy of my visit note below.    [place]  CHIEF COMPLAINT   It was my pleasure to see Gmoez Turk who is a 58 year old male for follow-up of Erectile dysfunction .      HPI   Gomez Turk is a very pleasant 58 year old male who presents with a history of Erectile dysfunction.    Initially seen on 11/16/18 for Erectile dysfunction:  \"Gomez Turk is a 57 year old male who is being seen for evaluation of Erectile dysfunction      He has had worsening Erectile dysfunction over the past few years. He has not tried any medications or treatment for this yet.      Urine stream with post void dribbling and prolonged emptying. He denies frequency, urgency, acute urinary retention, dysuria or hematuria. He is not too bothered by this at this time.     Medical history is complicated by DM, PAD s/p LEFT LE bypass and recent vision loss in 02/2017     Testosterone = 394 11/1/18\"    He was counseled on treatment options and started on sildenafil 100mg (Viagra). He returns today for follow up and notes no real effect or benefit from the sildenafil. No other issues today.     PHYSICAL EXAM  Patient is a 58 year old  male   Vitals: Blood pressure (!) 150/72, height 1.727 m (5' 8\"), weight 99.8 kg (220 lb).  General Appearance Adult: Body mass index is 33.45 kg/m .  Alert, no acute distress, oriented  HENT: throat/mouth:normal, good dentition  Lungs: no respiratory distress, or pursed lip breathing  Heart: No obvious jugular venous distension present  Abdomen: soft, nontender, no organomegaly or masses  Musculoskeltal: extremities normal, no peripheral edema  Skin: no suspicious lesions or rashes  Neuro: Alert, oriented, speech and " mentation normal  Psych: affect and mood normal  Gait: Legally blind, walks with a cane    UA RESULTS:  Recent Labs   Lab Test 02/11/19  1342   COLOR Light Yellow   APPEARANCE Clear   URINEGLC Negative   URINEBILI Negative   URINEKETONE Negative   SG 1.005   UBLD Negative   URINEPH 6.0   PROTEIN Negative   NITRITE Negative   LEUKEST Negative       ASSESSMENT and PLAN   57 y/o man with history of DM type II with peripheral neuropathy, PAD s/p LE bypass, sudden vision loss in 02/2017 with ~2 years of worsening Erectile dysfunction and mild LUTS.     Erectile dysfunction  -He was trialed on the PDE 5 inhibitor at last visit, Viagra 100 mg strength.  He reports no benefit from this medication  -We reviewed other treatment options for erectile dysfunction which include other PDE 5 inhibitors, intracavernosal injection, or Inflatable Penile Prosthesis (IPP).  -Given his lack of response to the sildenafil I do not believe another PDE 5 inhibitor would be of benefit  -He is not interested in intracavernosal injection, and given his vision loss I think this would be technically challenging  -He is interested in pursuing an inflatable penile prosthesis, we discussed the risks and benefits of surgery, and that this is not a surgery that I performed high-volume and so will [facility]    I spent over 15 minutes with the patient.  Over half this time was spent on counseling regarding the above.    Neil Rodriguez   Urology  [facility]  Clinic Phone [phone]

## 2019-08-21 ENCOUNTER — TELEPHONE (OUTPATIENT)
Dept: INTERNAL MEDICINE | Facility: CLINIC | Age: 59
End: 2019-08-21

## 2019-08-21 NOTE — TELEPHONE ENCOUNTER
Reason for Call:  Form, our goal is to have forms completed with 72 hours, however, some forms may require a visit or additional information.    Type of letter, form or note:  disability    Who is the form from?: [facility] (if other please explain)    Where did the form come from: Patient or family brought in       What clinic location was the form placed at?: Internal Medicine    Where the form was placed: Given to physician    What number is listed as a contact on the form?: [phone]       Additional comments:     Call taken on 8/21/2019 at 9:28 AM by Mar Lara

## 2019-08-22 ENCOUNTER — HOSPITAL ENCOUNTER (OUTPATIENT)
Dept: ULTRASOUND IMAGING | Facility: CLINIC | Age: 59
End: 2019-08-22
Attending: SURGERY
Payer: COMMERCIAL

## 2019-08-22 ENCOUNTER — OFFICE VISIT (OUTPATIENT)
Dept: OTHER | Facility: CLINIC | Age: 59
End: 2019-08-22
Attending: SURGERY
Payer: COMMERCIAL

## 2019-08-22 ENCOUNTER — HOSPITAL ENCOUNTER (OUTPATIENT)
Dept: ULTRASOUND IMAGING | Facility: CLINIC | Age: 59
Discharge: HOME | End: 2019-08-22
Attending: SURGERY | Admitting: SURGERY
Payer: COMMERCIAL

## 2019-08-22 VITALS
HEART RATE: 68 BPM | BODY MASS INDEX: 33.34 KG/M2 | RESPIRATION RATE: 16 BRPM | DIASTOLIC BLOOD PRESSURE: 82 MMHG | OXYGEN SATURATION: 98 % | SYSTOLIC BLOOD PRESSURE: 175 MMHG | HEIGHT: 68 IN | WEIGHT: 220 LBS

## 2019-08-22 DIAGNOSIS — I73.9 PAD (PERIPHERAL ARTERY DISEASE) (H): ICD-10-CM

## 2019-08-22 DIAGNOSIS — I73.9 PAD (PERIPHERAL ARTERY DISEASE) (H): Primary | ICD-10-CM

## 2019-08-22 DIAGNOSIS — I10 BENIGN ESSENTIAL HYPERTENSION: ICD-10-CM

## 2019-08-22 DIAGNOSIS — E78.5 HYPERLIPIDEMIA LDL GOAL <70: ICD-10-CM

## 2019-08-22 PROCEDURE — 93926 LOWER EXTREMITY STUDY: CPT | Mod: LT

## 2019-08-22 PROCEDURE — 99214 OFFICE O/P EST MOD 30 MIN: CPT | Mod: ZP | Performed by: SURGERY

## 2019-08-22 PROCEDURE — 93880 EXTRACRANIAL BILAT STUDY: CPT

## 2019-08-22 PROCEDURE — G0463 HOSPITAL OUTPT CLINIC VISIT: HCPCS

## 2019-08-22 ASSESSMENT — MIFFLIN-ST. JEOR: SCORE: 1792.41

## 2019-08-22 NOTE — PROGRESS NOTES
[facility]    Gomez Turk returns for vascular follow-up. He had nonhealing ulcerations in his left foot due to type II diabetic PAD.  He underwent a left below-knee popliteal to dorsalis pedis NRTSV bypass on 2/28/2017.  Partial amputation right great toe was performed along with debridement of the ulcers with eventual healing.    Postoperative course complicated by ischemic optic neuritis with significant vision loss.  Laparoscopic cholecystectomy for necrotic cholecystitis several months after surgery with good recovery.  Hospitalized June 2018 for cellulitis of his left second toe with no osteomyelitis treated successfully with antibiotics.    He is done well since his visit 6 months ago.  Very well controlled diabetes.  Also LDL at goal on statin.  Wears appropriate footwear for his diabetic peripheral neuropathy and PAD.  Non-smoker.    2/11/2019 laboratory: K= 4.1   SCr= 1.09  6/19/2019 laboratory: A1c= 6.5  LDL= 38                PMH: Medications: Lisinopril, aspirin, Plavix, Glucophage, Neurontin, Norvasc, Flexeril, Naprosyn, Celexa      He had some problems with the left second hammertoe (healed amputation of great toe ).  He does have appropriate orthotics to prevent any pressure over the dorsum of the toe to prevent ulcerations.      Exam: Alert and appropriate.  Here with his wife.  Ongoing vision issues.             Blood pressure 175/82 (reports lower at home)   P= 68             No carotid bruits.                Chest= clear             Cardiovascular = regular rate very good risk factor control with LDL less than 70 and well-controlled              Extremities= no ulcerations.  Stable fixed left second hammertoe.                  Well-healed left great toe amputation site and all ulcers.                  +3 palpable right dorsalis pedis-left graft-left dorsalis pedis artery    Bedside Doppler with triphasic signals in the right and left dorsalis pedis arteries.         Duplex today reveals a widely patent left leg bypass graft with no stenoses.    Carotid duplex reveals mild bilateral calcified carotid bulb and bifurcation disease extending into the ICA with no high-grade stenoses.      Impression: #1.  Widely patent left popliteal to dorsalis pedis bypass graft.  Good blood supply to the right leg.  Hammertoe deformity that is doing well with conservative treatment.  However, if he has issues with dorsal ulceration podiatry could operate on the patient with a good chance of healing with the blood supply.  Continue on present anticoagulation with Plavix.  Plan repeat duplex in 6 months.                           #2.  Mild bilateral carotid calcified stenoses.  Of no clinical concern.  Recheck in approximately 3 years.                           #3.  Aware the importance of good risk factor control.  LDL is less than 70 which is at goal on his present Lipitor dose.  Also very well controlled diabetes.  Does wear appropriate orthotics and is ready for a new pair to prevent any diabetic neuropathic ulcerations.    20 minutes spent with the patient is wife today with over 50% of counseling.       Jesus Aragon MD     CC  Patient Care Team:  Tylor Roberts MD as PCP - General  Tylor Roberts MD as Assigned PCP  Jesus Aragon MD as Surgeon (Surgery)  Siddhartha Mclaughlin MD as MD (Ophthalmology)  Bill Lin RN as Registered Nurse

## 2019-08-22 NOTE — PROGRESS NOTES
"Gomez Turk is a 58 year old male who presents for:  Chief Complaint   Patient presents with     RECHECK     Lawrence Horn, ATILIO art US (10:20FSH; 11:45WRO)Hx left below-knee popliteal to dorsalis pedis NTRSV bypass on 2/28/2017; 6 month follow up to 2/21/19 appointment with Dr. Margo Bagleys:    Vitals:    08/22/19 1117   BP: (!) 175/82   BP Location: Left arm   Patient Position: Chair   Cuff Size: Adult Regular   Pulse: 68   Resp: 16   SpO2: 98%   Weight: 220 lb (99.8 kg)   Height: 5' 8\" (1.727 m)       BMI:  Estimated body mass index is 33.45 kg/m  as calculated from the following:    Height as of this encounter: 5' 8\" (1.727 m).    Weight as of this encounter: 220 lb (99.8 kg).    Pain Score:  Data Unavailable        Odalis Kent CMA    "

## 2019-08-23 DIAGNOSIS — I73.9 PAD (PERIPHERAL ARTERY DISEASE) (H): Primary | ICD-10-CM

## 2019-09-05 ENCOUNTER — MYC REFILL (OUTPATIENT)
Dept: INTERNAL MEDICINE | Facility: CLINIC | Age: 59
End: 2019-09-05

## 2019-09-05 DIAGNOSIS — F41.9 ANXIETY: ICD-10-CM

## 2019-09-06 ENCOUNTER — PRE VISIT (OUTPATIENT)
Dept: UROLOGY | Facility: CLINIC | Age: 59
End: 2019-09-06

## 2019-09-06 RX ORDER — ZOLPIDEM TARTRATE 10 MG/1
10 TABLET ORAL
Qty: 30 TABLET | Refills: 0 | Status: SHIPPED | OUTPATIENT
Start: 2019-09-06 | End: 2019-10-09

## 2019-09-06 NOTE — TELEPHONE ENCOUNTER
Routing refill request to provider for review/approval because:  Drug not on the [facility], [facility] or [facility] refill protocol or controlled substance

## 2019-09-06 NOTE — TELEPHONE ENCOUNTER
Reason for Visit: discuss IPP surgery    Diagnosis: ED    Orders/Procedures/Records: in system    Contact Patient: n/a    Rooming Requirements: normal      Pennie Padgett LPN  09/06/19  10:32 AM

## 2019-10-09 ENCOUNTER — MYC REFILL (OUTPATIENT)
Dept: INTERNAL MEDICINE | Facility: CLINIC | Age: 59
End: 2019-10-09

## 2019-10-09 DIAGNOSIS — F41.9 ANXIETY: ICD-10-CM

## 2019-10-10 RX ORDER — ZOLPIDEM TARTRATE 10 MG/1
10 TABLET ORAL
Qty: 30 TABLET | Refills: 0 | Status: SHIPPED | OUTPATIENT
Start: 2019-10-10 | End: 2019-11-11

## 2019-10-10 NOTE — TELEPHONE ENCOUNTER
ambien      Last Written Prescription Date:  9/6/19  Last Fill Quantity: 30,   # refills: 0  Last Office Visit: 2/28/19  Future Office visit:       Routing refill request to provider for review/approval because:  Drug not on the [facility], P or [facility] refill protocol or controlled substance

## 2019-10-27 ENCOUNTER — HEALTH MAINTENANCE LETTER (OUTPATIENT)
Age: 59
End: 2019-10-27

## 2019-11-11 ENCOUNTER — MYC REFILL (OUTPATIENT)
Dept: INTERNAL MEDICINE | Facility: CLINIC | Age: 59
End: 2019-11-11

## 2019-11-11 DIAGNOSIS — F41.9 ANXIETY: ICD-10-CM

## 2019-11-11 RX ORDER — ZOLPIDEM TARTRATE 10 MG/1
10 TABLET ORAL
Qty: 30 TABLET | Refills: 0 | Status: SHIPPED | OUTPATIENT
Start: 2019-11-11 | End: 2019-12-10

## 2019-11-11 NOTE — TELEPHONE ENCOUNTER
ambien      Last Written Prescription Date:  10/10/109  Last Fill Quantity: 30,   # refills: 0  Last Office Visit: 2/28/19  Future Office visit:       Routing refill request to provider for review/approval because:  Drug not on the [facility], P or [facility] refill protocol or controlled substance

## 2019-12-10 ENCOUNTER — MYC REFILL (OUTPATIENT)
Dept: INTERNAL MEDICINE | Facility: CLINIC | Age: 59
End: 2019-12-10

## 2019-12-10 DIAGNOSIS — F41.9 ANXIETY: ICD-10-CM

## 2019-12-11 RX ORDER — ZOLPIDEM TARTRATE 10 MG/1
10 TABLET ORAL
Qty: 30 TABLET | Refills: 0 | Status: SHIPPED | OUTPATIENT
Start: 2019-12-11 | End: 2020-01-12

## 2019-12-11 NOTE — TELEPHONE ENCOUNTER
ambien      Last Written Prescription Date:  11/11/19  Last Fill Quantity: 30,   # refills: 0  Last Office Visit: 2/28/19  Future Office visit:       Routing refill request to provider for review/approval because:  Drug not on the [facility], [facility] or [facility] refill protocol or controlled substance

## 2020-01-12 ENCOUNTER — MYC REFILL (OUTPATIENT)
Dept: INTERNAL MEDICINE | Facility: CLINIC | Age: 60
End: 2020-01-12

## 2020-01-12 DIAGNOSIS — F41.9 ANXIETY: ICD-10-CM

## 2020-01-13 NOTE — PROGRESS NOTES
"   04/20/17 0900   Signing Clinician's Name / Credentials   Signing clinician's name / credentials Alejandrina Salinas OTR/ADIS   Session Number   Session Number 1   Additional Session Number Discharge: did not return to complete plan of care   Recertification   Recertification Due 07/13/17   GOALS   Goals Near Vision;Environmental Modification;Resource Education   Goals Addressed this Session Near vision   Goal 1 - Near Vision   Goal Description: Near Vision Patient will verbalize awareness of visual field Loss and demonstrate improved use of visual skills/adaptive equipment for increased independence in reading-based activities of daily living, written communication and detail ADL tasks.   Near Vision Goal Comment initiated with introduction of OrCam ocr technology   Target Date 07/13/17   Goal 3 - Environmental modification   Goal Description: Environment modification Patient will demonstrate understanding of the impact of lighting, contrast and glare on ADL activities, in conjunction with environmentally-based ADL modifications   Environmental Modification Goal Comment not addressed   Target Date 07/13/17   Goal 4 - Resource education   Goal Description: Resource education Patient will verbalize awareness of community resources for the following:;Audio access to print materials;Access to low vision devices;Transportation alternatives   Resource Education Goal Comment not addressed   Target Date 07/13/17       Present No   Therapy Diagnosis   Therapy  Diagnosis: Impaired ADL/IADL with deficits in Reading based ADL;Written communication;Home management  (cooking, computer and phone communications, light managment)   Subjective Report   Subjective Report \"I was so anxious for this visit. I am anxious to learn everything I can to be more independent\"   Visual Rehab Treatment Andrew   Daily Treatment Andrew Self Care/Home Management   Self Care/Home Management   Skilled Intervention Training in use of " "electronic aids for reading   Patient Response verbalized good understanding   Treatment Detail Provided pt education on functional vision status and therapuetic interventions available. Initiated treatment towards increased ind. with cell phone communicaitons. Pt currently using Dragon, and reports success 50% of the time. Provided pt and family education on Jitterbug phone options, and Google Now. Will activiate phone answering and ending calls function next session. Provided introduction to function of Orcam. Will demonstrate next visit   Progress intiated towards goal 1   Therapeutic Activity: Dynavision   Dynavision Cascade Dynavision Mode A (single stimulus attention, 1 minute   Dynavision Mode A (single stimulus attention, 1 minute)   Patient Score (Mode A, 1 min) 26   MN Read   Smallest print size read unable   Functional Reading Screen   Reading screen comments Self Report Assessment of Functional Visual Performance Profle: 54% visual impairment.   Education   Learner Patient;Significant  (wife Mila)   Readiness Eager;Acceptance   Method Explanation;Demonstration   Response Verbalizes understanding;Demonstrates understanding;Needs reinforcement   Communication with other professionals   Communication with other professionals per EHR   Plan   Plan for next session Orcam, Phone \"Answering ending calls\" function in Accessibility options, Google Now for lists, \"my day\" functions., filters, lighting, contrast game and strategies, cooking,    Comments   Comments discharge: did not return to complete plan of care   Total Session Time   Total Treatment Time (sum of timed and untimed services) 25     "

## 2020-01-13 NOTE — ADDENDUM NOTE
Encounter addended by: Alejandrina Salinas, OT on: 1/13/2020 4:59 PM   Actions taken: Clinical Note Signed, Flowsheet accepted, Episode resolved

## 2020-01-14 RX ORDER — ZOLPIDEM TARTRATE 10 MG/1
10 TABLET ORAL
Qty: 30 TABLET | Refills: 0 | Status: SHIPPED | OUTPATIENT
Start: 2020-01-14 | End: 2020-02-19

## 2020-01-14 NOTE — TELEPHONE ENCOUNTER
ambien      Last Written Prescription Date:  12/11/19  Last Fill Quantity: 30,   # refills: 0  Last Office Visit: 2/28/19  Future Office visit:       Routing refill request to provider for review/approval because:  Drug not on the [facility], [facility] or [facility] refill protocol or controlled substance

## 2020-01-16 ENCOUNTER — DOCUMENTATION ONLY (OUTPATIENT)
Dept: LAB | Facility: CLINIC | Age: 60
End: 2020-01-16

## 2020-01-16 DIAGNOSIS — E11.39 TYPE 2 DIABETES MELLITUS WITH OTHER OPHTHALMIC COMPLICATION, WITHOUT LONG-TERM CURRENT USE OF INSULIN (H): Primary | ICD-10-CM

## 2020-01-20 DIAGNOSIS — E11.39 TYPE 2 DIABETES MELLITUS WITH OTHER OPHTHALMIC COMPLICATION, WITHOUT LONG-TERM CURRENT USE OF INSULIN (H): ICD-10-CM

## 2020-01-20 LAB
CREAT UR-MCNC: 41 MG/DL
HBA1C MFR BLD: 5.8 % (ref 0–5.6)
HGB BLD-MCNC: 12.4 G/DL (ref 13.3–17.7)
MICROALBUMIN UR-MCNC: 5 MG/L
MICROALBUMIN/CREAT UR: 12.88 MG/G CR (ref 0–17)

## 2020-01-20 PROCEDURE — 85018 HEMOGLOBIN: CPT | Performed by: INTERNAL MEDICINE

## 2020-01-20 PROCEDURE — 83036 HEMOGLOBIN GLYCOSYLATED A1C: CPT | Performed by: INTERNAL MEDICINE

## 2020-01-20 PROCEDURE — 82043 UR ALBUMIN QUANTITATIVE: CPT | Performed by: INTERNAL MEDICINE

## 2020-01-20 PROCEDURE — 36415 COLL VENOUS BLD VENIPUNCTURE: CPT | Performed by: INTERNAL MEDICINE

## 2020-01-21 DIAGNOSIS — H54.3 VISION LOSS, BILATERAL: ICD-10-CM

## 2020-01-21 RX ORDER — CITALOPRAM HYDROBROMIDE 40 MG/1
TABLET ORAL
Qty: 90 TABLET | Refills: 0 | Status: ON HOLD | OUTPATIENT
Start: 2020-01-21 | End: 2020-04-27

## 2020-01-21 NOTE — TELEPHONE ENCOUNTER
"Requested Prescriptions   Pending Prescriptions Disp Refills     citalopram (CELEXA) 40 MG tablet [Pharmacy Med Name: Citalopram Hydrobromide Oral Tablet 40 MG] 90 tablet 2     Sig: TAKE ONE TABLET BY MOUTH ONE TIME DAILY       SSRIs Protocol Passed - 1/21/2020  2:57 PM        Passed - Recent (12 mo) or future (30 days) visit within the authorizing provider's specialty     Patient has had an office visit with the authorizing provider or a provider within the authorizing providers department within the previous 12 mos or has a future within next 30 days. See \"Patient Info\" tab in inbasket, or \"Choose Columns\" in Meds & Orders section of the refill encounter.              Passed - Medication is active on med list        Passed - Patient is age 18 or older          Prescription approved per [facility] Refill Protocol.    Ana Paula DRAKEN, RN, PHN      "

## 2020-02-13 ENCOUNTER — MYC REFILL (OUTPATIENT)
Dept: INTERNAL MEDICINE | Facility: CLINIC | Age: 60
End: 2020-02-13

## 2020-02-13 DIAGNOSIS — F41.9 ANXIETY: ICD-10-CM

## 2020-02-13 RX ORDER — ZOLPIDEM TARTRATE 10 MG/1
10 TABLET ORAL
Qty: 30 TABLET | Refills: 0 | OUTPATIENT
Start: 2020-02-13

## 2020-02-13 NOTE — TELEPHONE ENCOUNTER
Ambien     Last Written Prescription Date:  1/14/2020  Last Fill Quantity: 30,   # refills: 0  Last Office Visit: 1/28/2020  Future Office visit:    Next 5 appointments (look out 90 days)    Feb 27, 2020 10:45 AM CST  Return Visit with Jesus Aragon MD  [facility] ([facility]) [address]  [facility] [address]  [phone]           Routing refill request to provider for review/approval because:  Drug not on the [facility], [facility] or [facility] refill protocol or controlled substance      Ana Paula DRAKEN, RN, PHN

## 2020-02-14 NOTE — TELEPHONE ENCOUNTER
[employer] message sent informing pt that OV needed before refills can be approved    Purvi Nguyen, RADHA

## 2020-02-19 ENCOUNTER — OFFICE VISIT (OUTPATIENT)
Dept: INTERNAL MEDICINE | Facility: CLINIC | Age: 60
End: 2020-02-19
Payer: MEDICARE

## 2020-02-19 VITALS
HEIGHT: 68 IN | HEART RATE: 82 BPM | TEMPERATURE: 98.4 F | OXYGEN SATURATION: 98 % | DIASTOLIC BLOOD PRESSURE: 70 MMHG | SYSTOLIC BLOOD PRESSURE: 136 MMHG | BODY MASS INDEX: 32.05 KG/M2 | WEIGHT: 211.5 LBS | RESPIRATION RATE: 16 BRPM

## 2020-02-19 DIAGNOSIS — F41.9 ANXIETY: ICD-10-CM

## 2020-02-19 DIAGNOSIS — H54.3 VISION LOSS, BILATERAL: Primary | ICD-10-CM

## 2020-02-19 DIAGNOSIS — E11.39 TYPE 2 DIABETES MELLITUS WITH OTHER OPHTHALMIC COMPLICATION, WITHOUT LONG-TERM CURRENT USE OF INSULIN (H): ICD-10-CM

## 2020-02-19 DIAGNOSIS — I10 ESSENTIAL HYPERTENSION, BENIGN: ICD-10-CM

## 2020-02-19 PROCEDURE — G0008 ADMIN INFLUENZA VIRUS VAC: HCPCS | Performed by: INTERNAL MEDICINE

## 2020-02-19 PROCEDURE — 99214 OFFICE O/P EST MOD 30 MIN: CPT | Mod: 25 | Performed by: INTERNAL MEDICINE

## 2020-02-19 PROCEDURE — 90682 RIV4 VACC RECOMBINANT DNA IM: CPT | Performed by: INTERNAL MEDICINE

## 2020-02-19 RX ORDER — ZOLPIDEM TARTRATE 10 MG/1
10 TABLET ORAL
Qty: 30 TABLET | Refills: 0 | Status: SHIPPED | OUTPATIENT
Start: 2020-02-19 | End: 2020-04-10

## 2020-02-19 ASSESSMENT — MIFFLIN-ST. JEOR: SCORE: 1748.86

## 2020-02-19 NOTE — PROGRESS NOTES
Subjective     Gomez Turk is a 59 year old male who presents to clinic today for the following health issues:    HPI   Diabetes Follow-up    How often are you checking your blood sugar? Two times daily  Blood sugar testing frequency justification:  Adjustment of medication(s)  What time of day are you checking your blood sugars (select all that apply)?  Before and after meals  Have you had any blood sugars above 200?  No  Have you had any blood sugars below 70?  No    What symptoms do you notice when your blood sugar is low?  None    What concerns do you have today about your diabetes? None     Do you have any of these symptoms? (Select all that apply)  Numbness in feet    Have you had a diabetic eye exam in the last 12 months? No      BP Readings from Last 2 Encounters:   08/22/19 (!) 175/82   08/12/19 (!) 150/72     Hemoglobin A1C (%)   Date Value   01/20/2020 5.8 (H)   06/19/2019 6.5 (H)     LDL Cholesterol Calculated (mg/dL)   Date Value   06/19/2019 38   03/06/2018 22                 How many servings of fruits and vegetables do you eat daily?  0-1    On average, how many sweetened beverages do you drink each day (Examples: soda, juice, sweet tea, etc.  Do NOT count diet or artificially sweetened beverages)?   1    How many days per week do you exercise enough to make your heart beat faster? none    How many minutes a day do you exercise enough to make your heart beat faster? N/a  How many days per week do you miss taking your medication? 7    What makes it hard for you to take your medications?  side effects- patient states only taking Lipitor, plavix and metformin intermittently.    Other concerns:  1. Wheezing, shortness of breath, a lot of mucus in chest when waking up, patient would like to discuss having a chest x-ray.  But after discussion he states he feels well today and is decided not to go forward with any further assessment of this complaint    Patient Active Problem List   Diagnosis      Diverticulosis of large intestine     Tobacco use disorder     HYPERLIPIDEMIA LDL GOAL <100     PAD (peripheral artery disease) (H)     Type 2 diabetes mellitus with other ophthalmic complication, without long-term current use of insulin (H)     Acute cholecystitis     Vision loss, bilateral     Counseling regarding advanced directives     Acute osteomyelitis of toe, left (H)     Chronic midline low back pain without sciatica     Past Surgical History:   Procedure Laterality Date     AMPUTATE TOE(S) Left 2/28/2017    Procedure: AMPUTATE TOE(S);  Surgeon: Jesus Aragon MD;  Location:  OR     BYPASS GRAFT FEMOROPOPLITEAL Left 2/28/2017    Procedure: BYPASS GRAFT FEMOROPOPLITEAL;  Surgeon: Jesus Aragon MD;  Location:  OR     ENT SURGERY  1990    deviated septum repair     IRRIGATION AND DEBRIDEMENT FOOT, COMBINED Left 2/28/2017    Procedure: COMBINED IRRIGATION AND DEBRIDEMENT FOOT;  Surgeon: Jesus Aragon MD;  Location:  OR     LAPAROSCOPIC CHOLECYSTECTOMY N/A 3/18/2017    Procedure: LAPAROSCOPIC CHOLECYSTECTOMY;  Surgeon: Edin Hollingsworth MD;  Location:  OR       Social History     Tobacco Use     Smoking status: Current Every Day Smoker     Packs/day: 1.00     Types: Cigarettes     Smokeless tobacco: Never Used   Substance Use Topics     Alcohol use: Yes     Comment: occassion - 1 drink today     Family History   Problem Relation Age of Onset     Diabetes Mother      Lipids Mother      Melanoma Mother      Cancer Paternal Grandmother      Neurologic Disorder Maternal Uncle      Glaucoma No family hx of      Macular Degeneration No family hx of      Retinal detachment No family hx of      Thyroid Disease No family hx of          Current Outpatient Medications   Medication Sig Dispense Refill     amLODIPine (NORVASC) 5 MG tablet Take 1 tablet (5 mg) by mouth daily 90 tablet 3     aspirin EC 81 MG EC tablet Take 1 tablet (81 mg) by mouth daily 30 tablet 11     atorvastatin  (LIPITOR) 40 MG tablet Take 1 tablet (40 mg) by mouth daily  30 tablet 11     augmented betamethasone dipropionate (DIPROLENE-AF) 0.05 % cream Apply sparingly to affected area twice daily as needed.  Do not apply to face. 100 g 3     blood glucose (ACCU-CHEK SUNITA) test strip Use to test blood sugar 3 times daily or as directed. 100 strip 3     citalopram (CELEXA) 40 MG tablet TAKE ONE TABLET BY MOUTH ONE TIME DAILY  90 tablet 0     clopidogrel (PLAVIX) 75 MG tablet Take 1 tablet (75 mg) by mouth daily 90 tablet 2     cyclobenzaprine (FLEXERIL) 10 MG tablet Take 1 tablet (10 mg) by mouth 3 times daily as needed for muscle spasms 20 tablet 0     gabapentin (NEURONTIN) 300 MG capsule Take 1 capsule (300 mg) by mouth 3 times daily 90 capsule 4     glimepiride (AMARYL) 2 MG tablet Take 1 tablet (2 mg) by mouth 2 times daily 180 tablet 3     lidocaine (LIDODERM) 5 % patch Place 1 patch onto the skin every 24 hours 10 patch 0     lisinopril (PRINIVIL/ZESTRIL) 20 MG tablet Take 1 tablet (20 mg) by mouth daily as needed 90 tablet 2     metFORMIN (GLUCOPHAGE) 1000 MG tablet Take 1 tablet (1,000 mg) by mouth 2 times daily (with meals) 180 tablet 1     sildenafil (VIAGRA) 100 MG tablet Take 1 tablet (100 mg) by mouth daily as needed (for sexual intercourse) 30 min to 4 hrs before sex. Do not use with nitroglycerin, terazosin or doxazosin. 6 tablet 3     zolpidem (AMBIEN) 10 MG tablet Take 1 tablet (10 mg) by mouth nightly as needed for sleep 30 tablet 0     Allergies   Allergen Reactions     No Known Drug Allergies      BP Readings from Last 3 Encounters:   08/22/19 (!) 175/82   08/12/19 (!) 150/72   02/28/19 134/74    Wt Readings from Last 3 Encounters:   08/22/19 99.8 kg (220 lb)   08/12/19 99.8 kg (220 lb)   02/28/19 100 kg (220 lb 6.4 oz)           Reviewed and updated as needed this visit by Provider         Review of Systems   ROS COMP: CONSTITUTIONAL: NEGATIVE for fever, chills, change in weight  ENT/MOUTH: NEGATIVE for  "ear, mouth and throat problems  RESP: NEGATIVE for significant cough or SOB  CV: NEGATIVE for chest pain, palpitations or peripheral edema  GI: NEGATIVE for nausea, abdominal pain, heartburn, or change in bowel habits  : NEGATIVE for frequency, dysuria, or hematuria  MUSCULOSKELETAL: NEGATIVE for significant arthralgias or myalgia  NEURO: NEGATIVE for weakness, dizziness or paresthesias  ENDOCRINE: NEGATIVE for temperature intolerance, skin/hair changes  HEME: NEGATIVE for bleeding problems  PSYCHIATRIC: NEGATIVE for changes in mood or affect      Objective    /70   Pulse 82   Temp 98.4  F (36.9  C) (Oral)   Resp 16   Ht 1.727 m (5' 8\")   Wt 95.9 kg (211 lb 8 oz)   SpO2 98%   BMI 32.16 kg/m    There is no height or weight on file to calculate BMI.  Physical Exam   GENERAL: alert and no distress  EYES: Decreased visual acuity with noted use of walking stick  HENT: ear canals and TM's normal, nose and mouth without ulcers or lesions  NECK: no adenopathy, no asymmetry, masses, or scars and thyroid normal to palpation  RESP: lungs clear to auscultation - no rales, rhonchi or wheezes  CV: regular rate and rhythm, normal S1 S2, no S3 or S4, no click or rub, no peripheral edema and peripheral pulses strong  MS: no gross musculoskeletal defects noted, no edema  NEURO: Normal strength and tone, mentation intact and speech normal  PSYCH: mentation appears normal, affect normal/bright    Lab Results   Component Value Date    A1C 5.8 01/20/2020    A1C 6.5 06/19/2019    A1C 6.3 11/01/2018    A1C 6.5 06/06/2018    A1C 6.0 03/06/2018             Assessment & Plan     1. Anxiety  Labile and continuing with current medication and discussed need for follow-up every 6 months  - zolpidem 10 MG PO tablet; Take 1 tablet (10 mg) by mouth nightly as needed for sleep  Dispense: 30 tablet; Refill: 0    2. Vision loss, bilateral  Altered sleep patterns due to visual loss.  Continuing with current therapy  - zolpidem 10 MG PO " "tablet; Take 1 tablet (10 mg) by mouth nightly as needed for sleep  Dispense: 30 tablet; Refill: 0    3. Essential hypertension, benign  At goal on therapy continuing with medical management.    4. Type 2 diabetes mellitus with other ophthalmic complication, without long-term current use of insulin (H)  Stable with A1c listed above.  I have placed fasting orders for patient to get follow-up routine labs done for which she will schedule an appointment  I recommended an updated shingles vaccination and influenza vaccine     Tobacco Cessation:   reports that he has been smoking cigarettes. He has been smoking about 1.00 pack per day. He has never used smokeless tobacco.  Tobacco Cessation Action Plan: Information offered: Patient not interested at this time      BMI:   Estimated body mass index is 32.16 kg/m  as calculated from the following:    Height as of this encounter: 1.727 m (5' 8\").    Weight as of this encounter: 95.9 kg (211 lb 8 oz).   Weight management plan: Discussed healthy diet and exercise guidelines        See Patient Instructions    Return in about 1 month (around 3/19/2020) for Lab Work appointment.    Tylor Roberts MD  [facility]      "

## 2020-03-15 ENCOUNTER — HEALTH MAINTENANCE LETTER (OUTPATIENT)
Age: 60
End: 2020-03-15

## 2020-03-19 DIAGNOSIS — E11.39 TYPE 2 DIABETES MELLITUS WITH OTHER OPHTHALMIC COMPLICATION, WITHOUT LONG-TERM CURRENT USE OF INSULIN (H): ICD-10-CM

## 2020-03-19 RX ORDER — LISINOPRIL 20 MG/1
20 TABLET ORAL DAILY PRN
Qty: 90 TABLET | Refills: 0 | Status: ON HOLD | OUTPATIENT
Start: 2020-03-19 | End: 2020-04-27

## 2020-03-19 NOTE — TELEPHONE ENCOUNTER
I have refilled the patient's prescription but he is due to get his labs done which were placed as future orders last visit, please verify the patient makes lab appointment

## 2020-03-19 NOTE — TELEPHONE ENCOUNTER
Spoke with patient. He is aware that he has to have labs drawn. He states he will schedule these when his wife has recuperated from serious fall at home.

## 2020-04-10 ENCOUNTER — MYC REFILL (OUTPATIENT)
Dept: INTERNAL MEDICINE | Facility: CLINIC | Age: 60
End: 2020-04-10

## 2020-04-10 DIAGNOSIS — H54.3 VISION LOSS, BILATERAL: ICD-10-CM

## 2020-04-10 DIAGNOSIS — F41.9 ANXIETY: ICD-10-CM

## 2020-04-13 RX ORDER — ZOLPIDEM TARTRATE 10 MG/1
10 TABLET ORAL
Qty: 30 TABLET | Refills: 0 | Status: SHIPPED | OUTPATIENT
Start: 2020-04-13 | End: 2020-05-13

## 2020-04-16 ENCOUNTER — TELEPHONE (OUTPATIENT)
Dept: OTHER | Facility: CLINIC | Age: 60
End: 2020-04-16

## 2020-04-16 NOTE — TELEPHONE ENCOUNTER
[facility]    I called Gomez Turk was being followed for his right leg bypass procedure.  He missed his appointment but this may have been due to the COVID 19 pandemic.  I called and left a message on both the mobile phone of his wife Camelia and her home number.  Asked that they call us back and let us know how things are going.      Jesus Aragon MD

## 2020-04-25 DIAGNOSIS — H54.3 VISION LOSS, BILATERAL: ICD-10-CM

## 2020-04-26 ENCOUNTER — APPOINTMENT (OUTPATIENT)
Dept: GENERAL RADIOLOGY | Facility: CLINIC | Age: 60
DRG: 683 | End: 2020-04-26
Attending: EMERGENCY MEDICINE
Payer: MEDICARE

## 2020-04-26 ENCOUNTER — HOSPITAL ENCOUNTER (INPATIENT)
Facility: CLINIC | Age: 60
LOS: 1 days | Discharge: HOME | DRG: 683 | End: 2020-04-27
Attending: EMERGENCY MEDICINE | Admitting: HOSPITALIST
Payer: MEDICARE

## 2020-04-26 DIAGNOSIS — E11.39 TYPE 2 DIABETES MELLITUS WITH OTHER OPHTHALMIC COMPLICATION, WITHOUT LONG-TERM CURRENT USE OF INSULIN (H): ICD-10-CM

## 2020-04-26 DIAGNOSIS — R19.7 NAUSEA VOMITING AND DIARRHEA: ICD-10-CM

## 2020-04-26 DIAGNOSIS — R79.89 ELEVATED LACTIC ACID LEVEL: ICD-10-CM

## 2020-04-26 DIAGNOSIS — Z20.822 SUSPECTED COVID-19 VIRUS INFECTION: ICD-10-CM

## 2020-04-26 DIAGNOSIS — R19.7 DIARRHEA, UNSPECIFIED TYPE: Primary | ICD-10-CM

## 2020-04-26 DIAGNOSIS — R11.2 NAUSEA VOMITING AND DIARRHEA: ICD-10-CM

## 2020-04-26 LAB
ALBUMIN SERPL-MCNC: 3.4 G/DL (ref 3.4–5)
ALP SERPL-CCNC: 72 U/L (ref 40–150)
ALT SERPL W P-5'-P-CCNC: 30 U/L (ref 0–70)
ANION GAP SERPL CALCULATED.3IONS-SCNC: 14 MMOL/L (ref 3–14)
AST SERPL W P-5'-P-CCNC: 26 U/L (ref 0–45)
BACTERIA SPEC CULT: ABNORMAL
BACTERIA SPEC CULT: ABNORMAL
BASE DEFICIT BLDV-SCNC: 8.2 MMOL/L
BASOPHILS # BLD AUTO: 0.1 10E9/L (ref 0–0.2)
BASOPHILS NFR BLD AUTO: 0.6 %
BILIRUB SERPL-MCNC: 0.4 MG/DL (ref 0.2–1.3)
BUN SERPL-MCNC: 19 MG/DL (ref 7–30)
CALCIUM SERPL-MCNC: 8.6 MG/DL (ref 8.5–10.1)
CHLORIDE SERPL-SCNC: 106 MMOL/L (ref 94–109)
CO2 SERPL-SCNC: 15 MMOL/L (ref 20–32)
CREAT SERPL-MCNC: 1.38 MG/DL (ref 0.66–1.25)
CRP SERPL-MCNC: <2.9 MG/L (ref 0–8)
DIFFERENTIAL METHOD BLD: ABNORMAL
EOSINOPHIL # BLD AUTO: 0.2 10E9/L (ref 0–0.7)
EOSINOPHIL NFR BLD AUTO: 1.7 %
ERYTHROCYTE [DISTWIDTH] IN BLOOD BY AUTOMATED COUNT: 13 % (ref 10–15)
FERRITIN SERPL-MCNC: 345 NG/ML (ref 26–388)
GFR SERPL CREATININE-BSD FRML MDRD: 55 ML/MIN/{1.73_M2}
GLUCOSE BLDC GLUCOMTR-MCNC: 156 MG/DL (ref 70–99)
GLUCOSE SERPL-MCNC: 177 MG/DL (ref 70–99)
GRAM STN SPEC: ABNORMAL
HBA1C MFR BLD: 6.4 % (ref 0–5.6)
HCO3 BLDV-SCNC: 16 MMOL/L (ref 21–28)
HCT VFR BLD AUTO: 31 % (ref 40–53)
HGB BLD-MCNC: 10.7 G/DL (ref 13.3–17.7)
IMM GRANULOCYTES # BLD: 0 10E9/L (ref 0–0.4)
IMM GRANULOCYTES NFR BLD: 0.2 %
INTERPRETATION ECG - MUSE: NORMAL
LACTATE BLD-SCNC: 2.5 MMOL/L (ref 0.7–2)
LACTATE BLD-SCNC: 5.4 MMOL/L (ref 0.7–2)
LDH SERPL L TO P-CCNC: 159 U/L (ref 85–227)
LYMPHOCYTES # BLD AUTO: 2.7 10E9/L (ref 0.8–5.3)
LYMPHOCYTES NFR BLD AUTO: 28.7 %
Lab: ABNORMAL
MAGNESIUM SERPL-MCNC: 1.6 MG/DL (ref 1.6–2.3)
MCH RBC QN AUTO: 32.8 PG (ref 26.5–33)
MCHC RBC AUTO-ENTMCNC: 34.5 G/DL (ref 31.5–36.5)
MCV RBC AUTO: 95 FL (ref 78–100)
MONOCYTES # BLD AUTO: 0.8 10E9/L (ref 0–1.3)
MONOCYTES NFR BLD AUTO: 8.5 %
NEUTROPHILS # BLD AUTO: 5.8 10E9/L (ref 1.6–8.3)
NEUTROPHILS NFR BLD AUTO: 60.3 %
NRBC # BLD AUTO: 0 10*3/UL
NRBC BLD AUTO-RTO: 0 /100
PCO2 BLDV: 29 MM HG (ref 40–50)
PH BLDV: 7.36 PH (ref 7.32–7.43)
PLATELET # BLD AUTO: 253 10E9/L (ref 150–450)
PO2 BLDV: 42 MM HG (ref 25–47)
POTASSIUM SERPL-SCNC: 3.3 MMOL/L (ref 3.4–5.3)
PROCALCITONIN SERPL-MCNC: 0.07 NG/ML
PROT SERPL-MCNC: 6.9 G/DL (ref 6.8–8.8)
RBC # BLD AUTO: 3.26 10E12/L (ref 4.4–5.9)
SARS-COV-2 PCR COMMENT: NORMAL
SARS-COV-2 RNA SPEC QL NAA+PROBE: NEGATIVE
SARS-COV-2 RNA SPEC QL NAA+PROBE: NORMAL
SODIUM SERPL-SCNC: 135 MMOL/L (ref 133–144)
SPECIMEN SOURCE: ABNORMAL
SPECIMEN SOURCE: ABNORMAL
SPECIMEN SOURCE: NORMAL
SPECIMEN SOURCE: NORMAL
TROPONIN I SERPL-MCNC: <0.015 UG/L (ref 0–0.04)
WBC # BLD AUTO: 9.5 10E9/L (ref 4–11)

## 2020-04-26 PROCEDURE — 87635 SARS-COV-2 COVID-19 AMP PRB: CPT | Performed by: EMERGENCY MEDICINE

## 2020-04-26 PROCEDURE — 87040 BLOOD CULTURE FOR BACTERIA: CPT | Performed by: EMERGENCY MEDICINE

## 2020-04-26 PROCEDURE — 25800030 ZZH RX IP 258 OP 636: Performed by: HOSPITALIST

## 2020-04-26 PROCEDURE — 99292 CRITICAL CARE ADDL 30 MIN: CPT

## 2020-04-26 PROCEDURE — 25000128 H RX IP 250 OP 636: Performed by: EMERGENCY MEDICINE

## 2020-04-26 PROCEDURE — 86140 C-REACTIVE PROTEIN: CPT | Performed by: EMERGENCY MEDICINE

## 2020-04-26 PROCEDURE — 83036 HEMOGLOBIN GLYCOSYLATED A1C: CPT | Performed by: EMERGENCY MEDICINE

## 2020-04-26 PROCEDURE — 80053 COMPREHEN METABOLIC PANEL: CPT | Performed by: EMERGENCY MEDICINE

## 2020-04-26 PROCEDURE — 82728 ASSAY OF FERRITIN: CPT | Performed by: EMERGENCY MEDICINE

## 2020-04-26 PROCEDURE — 93005 ELECTROCARDIOGRAM TRACING: CPT

## 2020-04-26 PROCEDURE — 99291 CRITICAL CARE FIRST HOUR: CPT

## 2020-04-26 PROCEDURE — 82803 BLOOD GASES ANY COMBINATION: CPT | Performed by: EMERGENCY MEDICINE

## 2020-04-26 PROCEDURE — 87506 IADNA-DNA/RNA PROBE TQ 6-11: CPT | Performed by: HOSPITALIST

## 2020-04-26 PROCEDURE — 25800030 ZZH RX IP 258 OP 636: Performed by: EMERGENCY MEDICINE

## 2020-04-26 PROCEDURE — 84145 PROCALCITONIN (PCT): CPT | Performed by: EMERGENCY MEDICINE

## 2020-04-26 PROCEDURE — 00000146 ZZHCL STATISTIC GLUCOSE BY METER IP

## 2020-04-26 PROCEDURE — 87205 SMEAR GRAM STAIN: CPT | Performed by: EMERGENCY MEDICINE

## 2020-04-26 PROCEDURE — 12000000 ZZH R&B MED SURG/OB

## 2020-04-26 PROCEDURE — 25000132 ZZH RX MED GY IP 250 OP 250 PS 637: Mod: GY | Performed by: HOSPITALIST

## 2020-04-26 PROCEDURE — 25000128 H RX IP 250 OP 636: Performed by: HOSPITALIST

## 2020-04-26 PROCEDURE — 71045 X-RAY EXAM CHEST 1 VIEW: CPT

## 2020-04-26 PROCEDURE — 83605 ASSAY OF LACTIC ACID: CPT | Performed by: EMERGENCY MEDICINE

## 2020-04-26 PROCEDURE — 99223 1ST HOSP IP/OBS HIGH 75: CPT | Mod: AI | Performed by: HOSPITALIST

## 2020-04-26 PROCEDURE — 85025 COMPLETE CBC W/AUTO DIFF WBC: CPT | Performed by: EMERGENCY MEDICINE

## 2020-04-26 PROCEDURE — 83615 LACTATE (LD) (LDH) ENZYME: CPT | Performed by: EMERGENCY MEDICINE

## 2020-04-26 PROCEDURE — 84484 ASSAY OF TROPONIN QUANT: CPT | Performed by: EMERGENCY MEDICINE

## 2020-04-26 PROCEDURE — 83735 ASSAY OF MAGNESIUM: CPT | Performed by: EMERGENCY MEDICINE

## 2020-04-26 PROCEDURE — 96365 THER/PROPH/DIAG IV INF INIT: CPT

## 2020-04-26 PROCEDURE — 96361 HYDRATE IV INFUSION ADD-ON: CPT

## 2020-04-26 RX ORDER — ZOLPIDEM TARTRATE 5 MG/1
5 TABLET ORAL
Status: DISCONTINUED | OUTPATIENT
Start: 2020-04-26 | End: 2020-04-27 | Stop reason: HOSPADM

## 2020-04-26 RX ORDER — GUAIFENESIN 600 MG/1
1200 TABLET, EXTENDED RELEASE ORAL 2 TIMES DAILY
Status: DISCONTINUED | OUTPATIENT
Start: 2020-04-26 | End: 2020-04-27 | Stop reason: HOSPADM

## 2020-04-26 RX ORDER — LIDOCAINE 40 MG/G
CREAM TOPICAL
Status: DISCONTINUED | OUTPATIENT
Start: 2020-04-26 | End: 2020-04-27 | Stop reason: HOSPADM

## 2020-04-26 RX ORDER — AMOXICILLIN 250 MG
2 CAPSULE ORAL 2 TIMES DAILY PRN
Status: DISCONTINUED | OUTPATIENT
Start: 2020-04-26 | End: 2020-04-27 | Stop reason: HOSPADM

## 2020-04-26 RX ORDER — POTASSIUM CHLORIDE 29.8 MG/ML
20 INJECTION INTRAVENOUS
Status: DISCONTINUED | OUTPATIENT
Start: 2020-04-26 | End: 2020-04-27 | Stop reason: HOSPADM

## 2020-04-26 RX ORDER — IPRATROPIUM BROMIDE AND ALBUTEROL SULFATE 2.5; .5 MG/3ML; MG/3ML
3 SOLUTION RESPIRATORY (INHALATION)
Status: DISCONTINUED | OUTPATIENT
Start: 2020-04-27 | End: 2020-04-27 | Stop reason: HOSPADM

## 2020-04-26 RX ORDER — GABAPENTIN 300 MG/1
300 CAPSULE ORAL 2 TIMES DAILY PRN
Status: CANCELLED | OUTPATIENT
Start: 2020-04-26

## 2020-04-26 RX ORDER — GLIMEPIRIDE 2 MG/1
2 TABLET ORAL 2 TIMES DAILY PRN
COMMUNITY
End: 2020-08-10

## 2020-04-26 RX ORDER — ASPIRIN 81 MG/1
81 TABLET ORAL DAILY
Status: DISCONTINUED | OUTPATIENT
Start: 2020-04-27 | End: 2020-04-27 | Stop reason: HOSPADM

## 2020-04-26 RX ORDER — NALOXONE HYDROCHLORIDE 0.4 MG/ML
.1-.4 INJECTION, SOLUTION INTRAMUSCULAR; INTRAVENOUS; SUBCUTANEOUS
Status: DISCONTINUED | OUTPATIENT
Start: 2020-04-26 | End: 2020-04-27 | Stop reason: HOSPADM

## 2020-04-26 RX ORDER — CITALOPRAM HYDROBROMIDE 40 MG/1
40 TABLET ORAL DAILY
Status: DISCONTINUED | OUTPATIENT
Start: 2020-04-27 | End: 2020-04-27 | Stop reason: HOSPADM

## 2020-04-26 RX ORDER — POTASSIUM CHLORIDE 7.45 MG/ML
10 INJECTION INTRAVENOUS
Status: DISCONTINUED | OUTPATIENT
Start: 2020-04-26 | End: 2020-04-27 | Stop reason: HOSPADM

## 2020-04-26 RX ORDER — ACETAMINOPHEN 325 MG/1
650 TABLET ORAL EVERY 4 HOURS PRN
Status: DISCONTINUED | OUTPATIENT
Start: 2020-04-26 | End: 2020-04-27 | Stop reason: HOSPADM

## 2020-04-26 RX ORDER — POTASSIUM CL/LIDO/0.9 % NACL 10MEQ/0.1L
10 INTRAVENOUS SOLUTION, PIGGYBACK (ML) INTRAVENOUS
Status: DISCONTINUED | OUTPATIENT
Start: 2020-04-26 | End: 2020-04-27 | Stop reason: HOSPADM

## 2020-04-26 RX ORDER — ONDANSETRON 2 MG/ML
4 INJECTION INTRAMUSCULAR; INTRAVENOUS EVERY 6 HOURS PRN
Status: DISCONTINUED | OUTPATIENT
Start: 2020-04-26 | End: 2020-04-27 | Stop reason: HOSPADM

## 2020-04-26 RX ORDER — CLOPIDOGREL BISULFATE 75 MG/1
75 TABLET ORAL DAILY
Status: DISCONTINUED | OUTPATIENT
Start: 2020-04-27 | End: 2020-04-27 | Stop reason: HOSPADM

## 2020-04-26 RX ORDER — AMOXICILLIN 250 MG
1 CAPSULE ORAL 2 TIMES DAILY PRN
Status: DISCONTINUED | OUTPATIENT
Start: 2020-04-26 | End: 2020-04-27 | Stop reason: HOSPADM

## 2020-04-26 RX ORDER — NITROGLYCERIN 0.4 MG/1
0.4 TABLET SUBLINGUAL EVERY 5 MIN PRN
Status: DISCONTINUED | OUTPATIENT
Start: 2020-04-26 | End: 2020-04-27 | Stop reason: HOSPADM

## 2020-04-26 RX ORDER — PANTOPRAZOLE SODIUM 40 MG/1
40 TABLET, DELAYED RELEASE ORAL
Status: DISCONTINUED | OUTPATIENT
Start: 2020-04-27 | End: 2020-04-27 | Stop reason: HOSPADM

## 2020-04-26 RX ORDER — ONDANSETRON 4 MG/1
4 TABLET, ORALLY DISINTEGRATING ORAL EVERY 6 HOURS PRN
Status: DISCONTINUED | OUTPATIENT
Start: 2020-04-26 | End: 2020-04-27 | Stop reason: HOSPADM

## 2020-04-26 RX ORDER — PIPERACILLIN SODIUM, TAZOBACTAM SODIUM 3; .375 G/15ML; G/15ML
3.38 INJECTION, POWDER, LYOPHILIZED, FOR SOLUTION INTRAVENOUS ONCE
Status: COMPLETED | OUTPATIENT
Start: 2020-04-26 | End: 2020-04-26

## 2020-04-26 RX ORDER — LIDOCAINE 40 MG/G
CREAM TOPICAL
Status: DISCONTINUED | OUTPATIENT
Start: 2020-04-26 | End: 2020-04-26

## 2020-04-26 RX ORDER — ACETAMINOPHEN 650 MG/1
650 SUPPOSITORY RECTAL EVERY 4 HOURS PRN
Status: DISCONTINUED | OUTPATIENT
Start: 2020-04-26 | End: 2020-04-27 | Stop reason: HOSPADM

## 2020-04-26 RX ORDER — ATORVASTATIN CALCIUM 40 MG/1
40 TABLET, FILM COATED ORAL DAILY
Status: DISCONTINUED | OUTPATIENT
Start: 2020-04-27 | End: 2020-04-27 | Stop reason: HOSPADM

## 2020-04-26 RX ORDER — NICOTINE 21 MG/24HR
1 PATCH, TRANSDERMAL 24 HOURS TRANSDERMAL DAILY
Status: DISCONTINUED | OUTPATIENT
Start: 2020-04-27 | End: 2020-04-27 | Stop reason: HOSPADM

## 2020-04-26 RX ORDER — POTASSIUM CHLORIDE 1500 MG/1
20-40 TABLET, EXTENDED RELEASE ORAL
Status: DISCONTINUED | OUTPATIENT
Start: 2020-04-26 | End: 2020-04-27 | Stop reason: HOSPADM

## 2020-04-26 RX ORDER — NICOTINE POLACRILEX 4 MG
15-30 LOZENGE BUCCAL
Status: DISCONTINUED | OUTPATIENT
Start: 2020-04-26 | End: 2020-04-27 | Stop reason: HOSPADM

## 2020-04-26 RX ORDER — SODIUM CHLORIDE AND POTASSIUM CHLORIDE 150; 900 MG/100ML; MG/100ML
INJECTION, SOLUTION INTRAVENOUS CONTINUOUS
Status: DISCONTINUED | OUTPATIENT
Start: 2020-04-26 | End: 2020-04-27 | Stop reason: HOSPADM

## 2020-04-26 RX ORDER — MAGNESIUM SULFATE HEPTAHYDRATE 40 MG/ML
4 INJECTION, SOLUTION INTRAVENOUS EVERY 4 HOURS PRN
Status: DISCONTINUED | OUTPATIENT
Start: 2020-04-26 | End: 2020-04-27 | Stop reason: HOSPADM

## 2020-04-26 RX ORDER — SODIUM CHLORIDE 9 MG/ML
INJECTION, SOLUTION INTRAVENOUS CONTINUOUS
Status: DISCONTINUED | OUTPATIENT
Start: 2020-04-26 | End: 2020-04-26

## 2020-04-26 RX ORDER — GABAPENTIN 300 MG/1
300 CAPSULE ORAL
COMMUNITY
End: 2020-06-01

## 2020-04-26 RX ORDER — GABAPENTIN 300 MG/1
300 CAPSULE ORAL
Status: DISCONTINUED | OUTPATIENT
Start: 2020-04-27 | End: 2020-04-27 | Stop reason: HOSPADM

## 2020-04-26 RX ORDER — PROCHLORPERAZINE MALEATE 10 MG
10 TABLET ORAL EVERY 6 HOURS PRN
Status: DISCONTINUED | OUTPATIENT
Start: 2020-04-26 | End: 2020-04-27 | Stop reason: HOSPADM

## 2020-04-26 RX ORDER — POTASSIUM CHLORIDE 1.5 G/1.58G
20-40 POWDER, FOR SOLUTION ORAL
Status: DISCONTINUED | OUTPATIENT
Start: 2020-04-26 | End: 2020-04-27 | Stop reason: HOSPADM

## 2020-04-26 RX ORDER — GABAPENTIN 300 MG/1
300 CAPSULE ORAL 2 TIMES DAILY PRN
COMMUNITY
End: 2021-01-04

## 2020-04-26 RX ORDER — PROCHLORPERAZINE 25 MG
25 SUPPOSITORY, RECTAL RECTAL EVERY 12 HOURS PRN
Status: DISCONTINUED | OUTPATIENT
Start: 2020-04-26 | End: 2020-04-27 | Stop reason: HOSPADM

## 2020-04-26 RX ORDER — DEXTROSE MONOHYDRATE 25 G/50ML
25-50 INJECTION, SOLUTION INTRAVENOUS
Status: DISCONTINUED | OUTPATIENT
Start: 2020-04-26 | End: 2020-04-27 | Stop reason: HOSPADM

## 2020-04-26 RX ADMIN — SODIUM CHLORIDE 2877 ML: 9 INJECTION, SOLUTION INTRAVENOUS at 18:05

## 2020-04-26 RX ADMIN — PIPERACILLIN SODIUM AND TAZOBACTAM SODIUM 3.38 G: 3; .375 INJECTION, POWDER, LYOPHILIZED, FOR SOLUTION INTRAVENOUS at 18:39

## 2020-04-26 RX ADMIN — SODIUM CHLORIDE 1000 ML: 9 INJECTION, SOLUTION INTRAVENOUS at 22:28

## 2020-04-26 RX ADMIN — ACETAMINOPHEN 650 MG: 325 TABLET, FILM COATED ORAL at 22:37

## 2020-04-26 RX ADMIN — POTASSIUM CHLORIDE 40 MEQ: 1.5 POWDER, FOR SOLUTION ORAL at 23:26

## 2020-04-26 RX ADMIN — POTASSIUM CHLORIDE AND SODIUM CHLORIDE: 900; 150 INJECTION, SOLUTION INTRAVENOUS at 23:55

## 2020-04-26 ASSESSMENT — ENCOUNTER SYMPTOMS
BLOOD IN STOOL: 0
SHORTNESS OF BREATH: 0
NAUSEA: 1
VOMITING: 1
DIARRHEA: 1
WHEEZING: 1
BACK PAIN: 0
COUGH: 1
SORE THROAT: 0
EYE REDNESS: 0
NECK PAIN: 1
FEVER: 0
ABDOMINAL PAIN: 0
DYSURIA: 0

## 2020-04-26 ASSESSMENT — MIFFLIN-ST. JEOR: SCORE: 1793.38

## 2020-04-26 NOTE — ED TRIAGE NOTES
Pt presents from home w/1 day of N/V/D, SOB, weakness, neck pain, and headache. Current every day smoker. Hypotensive for EMS. Pt is blind.

## 2020-04-27 ENCOUNTER — APPOINTMENT (OUTPATIENT)
Dept: OCCUPATIONAL THERAPY | Facility: CLINIC | Age: 60
DRG: 683 | End: 2020-04-27
Attending: HOSPITALIST
Payer: MEDICARE

## 2020-04-27 VITALS
HEIGHT: 69 IN | HEART RATE: 92 BPM | BODY MASS INDEX: 32.26 KG/M2 | SYSTOLIC BLOOD PRESSURE: 123 MMHG | RESPIRATION RATE: 18 BRPM | DIASTOLIC BLOOD PRESSURE: 50 MMHG | WEIGHT: 217.81 LBS | TEMPERATURE: 98.4 F | OXYGEN SATURATION: 97 %

## 2020-04-27 PROBLEM — R55 SYNCOPE: Status: ACTIVE | Noted: 2020-04-27

## 2020-04-27 LAB
ALBUMIN UR-MCNC: 10 MG/DL
ANION GAP SERPL CALCULATED.3IONS-SCNC: 7 MMOL/L (ref 3–14)
APPEARANCE UR: CLEAR
BASE DEFICIT BLDV-SCNC: 3.5 MMOL/L
BILIRUB UR QL STRIP: NEGATIVE
BUN SERPL-MCNC: 13 MG/DL (ref 7–30)
C COLI+JEJUNI+LARI FUSA STL QL NAA+PROBE: NOT DETECTED
CALCIUM SERPL-MCNC: 8.2 MG/DL (ref 8.5–10.1)
CHLORIDE SERPL-SCNC: 113 MMOL/L (ref 94–109)
CO2 SERPL-SCNC: 19 MMOL/L (ref 20–32)
COLOR UR AUTO: YELLOW
CREAT SERPL-MCNC: 1.22 MG/DL (ref 0.66–1.25)
EC STX1 GENE STL QL NAA+PROBE: NOT DETECTED
EC STX2 GENE STL QL NAA+PROBE: NOT DETECTED
ENTERIC PATHOGEN COMMENT: NORMAL
ERYTHROCYTE [DISTWIDTH] IN BLOOD BY AUTOMATED COUNT: 13.3 % (ref 10–15)
GFR SERPL CREATININE-BSD FRML MDRD: 64 ML/MIN/{1.73_M2}
GLUCOSE BLDC GLUCOMTR-MCNC: 138 MG/DL (ref 70–99)
GLUCOSE SERPL-MCNC: 160 MG/DL (ref 70–99)
GLUCOSE UR STRIP-MCNC: 150 MG/DL
GRAM STN SPEC: NORMAL
HCO3 BLDV-SCNC: 22 MMOL/L (ref 21–28)
HCT VFR BLD AUTO: 28.4 % (ref 40–53)
HGB BLD-MCNC: 9.7 G/DL (ref 13.3–17.7)
HGB UR QL STRIP: NEGATIVE
KETONES UR STRIP-MCNC: NEGATIVE MG/DL
LACTATE BLD-SCNC: 1.4 MMOL/L (ref 0.7–2)
LEUKOCYTE ESTERASE UR QL STRIP: NEGATIVE
Lab: NORMAL
MCH RBC QN AUTO: 32.3 PG (ref 26.5–33)
MCHC RBC AUTO-ENTMCNC: 34.2 G/DL (ref 31.5–36.5)
MCV RBC AUTO: 95 FL (ref 78–100)
MUCOUS THREADS #/AREA URNS LPF: PRESENT /LPF
NITRATE UR QL: NEGATIVE
NOROV GI+II ORF1-ORF2 JNC STL QL NAA+PR: NOT DETECTED
O2/TOTAL GAS SETTING VFR VENT: ABNORMAL %
OXYHGB MFR BLDV: 71 %
PCO2 BLDV: 39 MM HG (ref 40–50)
PH BLDV: 7.35 PH (ref 7.32–7.43)
PH UR STRIP: 5.5 PH (ref 5–7)
PLATELET # BLD AUTO: 257 10E9/L (ref 150–450)
PO2 BLDV: 39 MM HG (ref 25–47)
POTASSIUM SERPL-SCNC: 4.2 MMOL/L (ref 3.4–5.3)
RBC # BLD AUTO: 3 10E12/L (ref 4.4–5.9)
RBC #/AREA URNS AUTO: <1 /HPF (ref 0–2)
RVA NSP5 STL QL NAA+PROBE: NOT DETECTED
SALMONELLA SP RPOD STL QL NAA+PROBE: NOT DETECTED
SHIGELLA SP+EIEC IPAH STL QL NAA+PROBE: NOT DETECTED
SODIUM SERPL-SCNC: 139 MMOL/L (ref 133–144)
SOURCE: ABNORMAL
SP GR UR STRIP: 1.02 (ref 1–1.03)
SPECIMEN SOURCE: NORMAL
UROBILINOGEN UR STRIP-MCNC: NORMAL MG/DL (ref 0–2)
V CHOL+PARA RFBL+TRKH+TNAA STL QL NAA+PR: NOT DETECTED
WBC # BLD AUTO: 9.1 10E9/L (ref 4–11)
WBC #/AREA URNS AUTO: 1 /HPF (ref 0–5)
Y ENTERO RECN STL QL NAA+PROBE: NOT DETECTED

## 2020-04-27 PROCEDURE — 25000125 ZZHC RX 250: Performed by: HOSPITALIST

## 2020-04-27 PROCEDURE — 81001 URINALYSIS AUTO W/SCOPE: CPT | Performed by: HOSPITALIST

## 2020-04-27 PROCEDURE — 25000132 ZZH RX MED GY IP 250 OP 250 PS 637: Mod: GY | Performed by: HOSPITALIST

## 2020-04-27 PROCEDURE — 00000146 ZZHCL STATISTIC GLUCOSE BY METER IP

## 2020-04-27 PROCEDURE — 80048 BASIC METABOLIC PNL TOTAL CA: CPT | Performed by: HOSPITALIST

## 2020-04-27 PROCEDURE — 83605 ASSAY OF LACTIC ACID: CPT | Performed by: HOSPITALIST

## 2020-04-27 PROCEDURE — 85027 COMPLETE CBC AUTOMATED: CPT | Performed by: HOSPITALIST

## 2020-04-27 PROCEDURE — 40000275 ZZH STATISTIC RCP TIME EA 10 MIN

## 2020-04-27 PROCEDURE — 36415 COLL VENOUS BLD VENIPUNCTURE: CPT | Performed by: HOSPITALIST

## 2020-04-27 PROCEDURE — 87070 CULTURE OTHR SPECIMN AEROBIC: CPT | Performed by: HOSPITALIST

## 2020-04-27 PROCEDURE — 97530 THERAPEUTIC ACTIVITIES: CPT | Mod: GO

## 2020-04-27 PROCEDURE — 94640 AIRWAY INHALATION TREATMENT: CPT

## 2020-04-27 PROCEDURE — 87205 SMEAR GRAM STAIN: CPT | Performed by: HOSPITALIST

## 2020-04-27 PROCEDURE — 97165 OT EVAL LOW COMPLEX 30 MIN: CPT | Mod: GO

## 2020-04-27 PROCEDURE — 94640 AIRWAY INHALATION TREATMENT: CPT | Mod: 76

## 2020-04-27 PROCEDURE — 99239 HOSP IP/OBS DSCHRG MGMT >30: CPT | Performed by: HOSPITALIST

## 2020-04-27 PROCEDURE — 97535 SELF CARE MNGMENT TRAINING: CPT | Mod: GO

## 2020-04-27 PROCEDURE — 82805 BLOOD GASES W/O2 SATURATION: CPT | Performed by: HOSPITALIST

## 2020-04-27 RX ORDER — LOPERAMIDE HYDROCHLORIDE 2 MG/1
2 TABLET ORAL 4 TIMES DAILY PRN
Qty: 60 TABLET | Refills: 0 | Status: SHIPPED | OUTPATIENT
Start: 2020-04-27 | End: 2021-10-12

## 2020-04-27 RX ORDER — LANOLIN ALCOHOL/MO/W.PET/CERES
100 CREAM (GRAM) TOPICAL DAILY
Status: DISCONTINUED | OUTPATIENT
Start: 2020-04-27 | End: 2020-04-27 | Stop reason: HOSPADM

## 2020-04-27 RX ORDER — LANOLIN ALCOHOL/MO/W.PET/CERES
100 CREAM (GRAM) TOPICAL DAILY
Qty: 30 TABLET | Refills: 0 | Status: SHIPPED | OUTPATIENT
Start: 2020-04-28 | End: 2021-10-12

## 2020-04-27 RX ORDER — LISINOPRIL 5 MG/1
5 TABLET ORAL DAILY
Qty: 30 TABLET | Refills: 0 | Status: SHIPPED | OUTPATIENT
Start: 2020-04-27 | End: 2020-10-23

## 2020-04-27 RX ORDER — CITALOPRAM HYDROBROMIDE 40 MG/1
TABLET ORAL
Qty: 90 TABLET | Refills: 3 | Status: SHIPPED | OUTPATIENT
Start: 2020-04-27 | End: 2021-04-22

## 2020-04-27 RX ADMIN — IPRATROPIUM BROMIDE AND ALBUTEROL SULFATE 3 ML: .5; 3 SOLUTION RESPIRATORY (INHALATION) at 07:45

## 2020-04-27 RX ADMIN — ATORVASTATIN CALCIUM 40 MG: 40 TABLET, FILM COATED ORAL at 08:55

## 2020-04-27 RX ADMIN — GABAPENTIN 300 MG: 300 CAPSULE ORAL at 06:58

## 2020-04-27 RX ADMIN — IPRATROPIUM BROMIDE AND ALBUTEROL SULFATE 3 ML: .5; 3 SOLUTION RESPIRATORY (INHALATION) at 11:18

## 2020-04-27 RX ADMIN — ZOLPIDEM TARTRATE 5 MG: 5 TABLET, FILM COATED ORAL at 02:47

## 2020-04-27 RX ADMIN — CITALOPRAM HYDROBROMIDE 40 MG: 40 TABLET ORAL at 08:55

## 2020-04-27 RX ADMIN — NICOTINE 1 PATCH: 21 PATCH, EXTENDED RELEASE TRANSDERMAL at 08:57

## 2020-04-27 RX ADMIN — Medication 100 MG: at 10:55

## 2020-04-27 RX ADMIN — ASPIRIN 81 MG: 81 TABLET, DELAYED RELEASE ORAL at 08:55

## 2020-04-27 RX ADMIN — PANTOPRAZOLE SODIUM 40 MG: 40 TABLET, DELAYED RELEASE ORAL at 06:58

## 2020-04-27 RX ADMIN — CLOPIDOGREL BISULFATE 75 MG: 75 TABLET, FILM COATED ORAL at 08:55

## 2020-04-27 ASSESSMENT — ACTIVITIES OF DAILY LIVING (ADL)
ADLS_ACUITY_SCORE: 12
ADLS_ACUITY_SCORE: 12
PREVIOUS_RESPONSIBILITIES: MEAL PREP;HOUSEKEEPING
ADLS_ACUITY_SCORE: 12
ADLS_ACUITY_SCORE: 13
IADL_COMMENTS: SPOUSE A WITH IADL'S

## 2020-04-27 NOTE — PLAN OF CARE
Care Plan Nursing Note    Patient Information  Name: Gomez Turk  Age: 59 year old  Reason for admission: Dehydration, diarrhea, syncope    Patient Assessment   DATE & TIME: 04/26/20 7732-9996   Cognitive Concerns/ Orientation : A & O times four  BEHAVIOR & AGGRESSION TOOL COLOR: Green  ABNL VS/O2: vitals WDL, on room air  MOBILITY: Stand by assist  PAIN MANAGMENT: c/o neck pain, and after a PRN tylenol, he verbalized decreased pain  DIET: CHO  BOWEL/BLADDER: occasional incontinent per patient report  ABNL LAB/BG: WPG=215, Lactate 2.5, K+ 3.3, Cr= 1.38, hgb 10.7  DRAIN/DEVICES: PIV one infusing and one SL  TELEMETRY RHYTHM: Sinus rhythm, unable to print strip   SKIN: intact  TESTS/PROCEDURES:   D/C DAY/GOALS/PLACE: pending  OTHER IMPORTANT INFO: legally blind, he had a bolus for increased lactate level, K+ was replaced, he need a follow up lab on both and needs sputum and urine specimen to be collected. Continue to monitor     Irasema Edmondson RN

## 2020-04-27 NOTE — PLAN OF CARE
Discharge Planner PT   Patient plan for discharge: Defer to OT  Current status:     PT eval orders received, chart reviewed. Per discussion with OT, pt is mobilizing at baseline, no skilled PT needs identified. PT to complete eval orders at this time   Barriers to return to prior living situation: defer to OT   Recommendations for discharge: Defer to OT   Rationale for recommendations: See above       Entered by: Cece Olson 04/27/2020 9:11 AM

## 2020-04-27 NOTE — PLAN OF CARE
Discharge Planner OT   Patient plan for discharge: home  Current status: eval completed. Pt lives in house with spouse and two adult children and reports IND with all ADL's and helps with some IADL's however spouse completes most IADL's as pt is blind at baseline. Pt has 4ww at home he uses occasionally.     IND with bed mobiltiy supine <> sit. O2 sats at 99% on RA. Pt ambulated x 100 ft with close SBA and verbal cues for direction as pt is blind at baseline. Pt demonstrated ability to complete 2 stairs with SBA and use of railing. PT screen completed, no LOB with dynamic movement or picking object off ground. Pt at baseline with mobility. Encouraged pt to use 4ww at home if feeling unsteady, pt receptive. Pt able to don/doff socks IND sitting EOB. Pt ambulated to bathroom with SBA. Pt stood at sink x 5 minutes for g/h tasks with emphasis on improving ax tolerance. Pt completed tub transfer with use of grab bars and SBA, no LOB noted. Pt at functional baseline. Will d/c from OT     Barriers to return to prior living situation: none    Recommendations for discharge: home with continued spouse A for IADL's as needed. Rec using 4ww if pt feeling unsteady     Rationale for recommendations: anticipate pt is at functional baseline as he is completing ADL's and mobility with SBA this date. Pt is blind at baseline and has his home set-up appropriately to function IND. Spouse in good health to A with IADL's as needed. Pt has met OT goals, will discharge.        Entered by: Nidhi Flood 04/27/2020 9:06 AM     Occupational Therapy Discharge Summary    Reason for therapy discharge:    All goals and outcomes met, no further needs identified.    Progress towards therapy goal(s). See goals on Care Plan in [facility] electronic health record for goal details.  Goals met    Therapy recommendation(s):    No further therapy is recommended.

## 2020-04-27 NOTE — PROGRESS NOTES
04/27/20 0830   Quick Adds   Type of Visit Initial Occupational Therapy Evaluation   Living Environment   Lives With spouse   Living Arrangements house   Transportation Anticipated family or friend will provide   Living Environment Comment no stairs. spouse works during the day. 1-2 stairs to enter home. occasionally will use walker.    Self-Care   Usual Activity Tolerance moderate   Current Activity Tolerance fair   Regular Exercise No   Equipment Currently Used at Home none;walker, standard   Activity/Exercise/Self-Care Comment does have walker at home    Functional Level   Ambulation 1-->assistive equipment   Transferring 1-->assistive equipment   Toileting 0-->independent   Bathing 0-->independent   Dressing 0-->independent   Eating 0-->independent   Communication 0-->understands/communicates without difficulty   Swallowing 0-->swallows foods/liquids without difficulty   Cognition 0 - no cognition issues reported   Fall history within last six months yes   Number of times patient has fallen within last six months 5   Which of the above functional risks had a recent onset or change? fall history   Prior Functional Level Comment IND with ADL's    General Information   Onset of Illness/Injury or Date of Surgery - Date 04/26/20   Referring Physician Green   Patient/Family Goals Statement return home   Additional Occupational Profile Info/Pertinent History of Current Problem Gomez Turk is a 59 year old male who presents with nausea, vomiting and near syncopal event at home, prompting ED presentation.   Precautions/Limitations fall precautions   Cognitive Status Examination   Orientation orientation to person, place and time   Level of Consciousness alert   Follows Commands (Cognition) WNL   Memory intact   Attention No deficits were identified   Organization/Problem Solving No deficits were identified   Executive Function No deficits were identified   Visual Perception   Visual Perception Comments blind at  "baseline    Sensory Examination   Sensory Quick Adds No deficits were identified   Pain Assessment   Patient Currently in Pain Yes, see Vital Sign flowsheet   Integumentary/Edema   Integumentary/Edema no deficits were identifed   Posture   Posture not impaired   Range of Motion (ROM)   ROM Quick Adds No deficits were identified   Strength   Manual Muscle Testing Quick Adds No deficits were identified   Muscle Tone Assessment   Muscle Tone Quick Adds No deficits were identified   Coordination   Upper Extremity Coordination No deficits were identified   Instrumental Activities of Daily Living (IADL)   Previous Responsibilities meal prep;housekeeping   IADL Comments spouse A with IADL's    Activities of Daily Living Analysis   Impairments Contributing to Impaired Activities of Daily Living balance impaired;pain   General Therapy Interventions   Planned Therapy Interventions ADL retraining;transfer training   Clinical Impression   Criteria for Skilled Therapeutic Interventions Met yes, treatment indicated   OT Diagnosis dec IND with ADL's and transfers   Influenced by the following impairments pain, dec ax tolerance   Assessment of Occupational Performance 1-3 Performance Deficits   Identified Performance Deficits toilet transfer, tub transfer   Clinical Decision Making (Complexity) Low complexity   Therapy Frequency   (1 tx only)   Predicted Duration of Therapy Intervention (days/wks) 1 tx only   Anticipated Discharge Disposition Home with Assist   Risks and Benefits of Treatment have been explained. Yes   Patient, Family & other staff in agreement with plan of care Yes   [employer] TM \"6 Clicks\"   2016, Trustees of [employer], under license to [employer].  All rights reserved.   6 Clicks Short Forms Daily Activity Inpatient Short Form   [employer]  \"6 Clicks\" Daily Activity Inpatient Short Form   1. Putting on and taking off regular lower body clothing? 4 - None   2. Bathing " (including washing, rinsing, drying)? 4 - None   3. Toileting, which includes using toilet, bedpan or urinal? 4 - None   4. Putting on and taking off regular upper body clothing? 4 - None   5. Taking care of personal grooming such as brushing teeth? 4 - None   6. Eating meals? 4 - None   Daily Activity Raw Score (Score out of 24.Lower scores equate to lower levels of function) 24   Total Evaluation Time   Total Evaluation Time (Minutes) 8

## 2020-04-27 NOTE — PLAN OF CARE
Care Plan Nursing Note    Patient Information  Name: Gomez Turk  Age: 59 year old  Reason for admission: Dehydration, diarrhea, syncope    Patient Assessment   DATE & TIME: 04/26/20 nights   Cognitive Concerns/ Orientation : A & O times four  BEHAVIOR & AGGRESSION TOOL COLOR: Green  ABNL VS/O2: vitals WDL, on room air  MOBILITY: Stand by assist  PAIN MANAGMENT: denies  DIET: CHO  BOWEL/BLADDER: occasional incontinent per patient report  ABNL LAB/BG: PXI=394, Lactate 2.5, K+ 3.3, Cr= 1.38, hgb 10.7  DRAIN/DEVICES: PIV one infusing and one SL  TELEMETRY RHYTHM: Sinus rhythm  SKIN: intact  TESTS/PROCEDURES:   D/C DAY/GOALS/PLACE: pending  OTHER IMPORTANT INFO: legally blind, he had a bolus for increased lactate level, K+ was replaced, he need a follow up lab on both and needs sputum and urine specimen to be collected. Continue to monitor

## 2020-04-27 NOTE — PROGRESS NOTES
Discharge    Patient discharged to home via  Private transport with  Wife   Care plan note done     Listed belongings gathered and returned to patient. Yes  Care Plan and Patient education resolved: Yes  Prescriptions if needed, hard copies sent with patient  NA  Home and hospital acquired medications returned to patient: Yes  Medication Bin checked and emptied on discharge Yes  Follow up appointment made for patient: No

## 2020-04-27 NOTE — H&P
[facility]    History and Physical  Hospitalist       Date of Admission:  4/26/2020    Assessment & Plan   Gomez Turk is a 59 year old male who presents with nausea, vomiting and near syncopal event at home, prompting ED presentation.    Visit/Communication Style   VIRTUAL (VIDEO) communication was used to evaluate Gomez due to the COVID pandemic.    Gomez consented to the use of video communication: yes  Video START time: 1930, 4/26/2020  Video STOP time: 1950, 4/26/2020   Patient's location: [facility]   Provider's location during the visit: [facility]         Dehydration  Nausea/vomiting  Acute on chronic diarrhea?  COVID-19 rule out  Elevated lactic acid  Hypotension  Presents with one day history of nausea/vomiting and >1mo hx of diarrhea that he describes as not changed. SBP on presentation in 80s. Lactic acid 5.4-->2.5, WBC 9.5, CRP <2.9, procalcitonin 0.07. Received 30ml/kg fluid resuscitation and single dose of zosyn in ED. CXR without infiltrate or effusion. Moderate risk for COVID as wife works for [employer] and could potentially have brought infection home, otherwise no known sick contacts. + chills past few days. Likely dehydration from vomiting/diarrhea promoted elevated lactate and DEEPA.  - Admit inpatient, [facility] [place]  - COVID-19 results returned negative, discontinued precautions  - Follow up blood cultures, sputum culture collected in ED (Likely low yield)  - Check UA  - Additional 1L IVF bolus for lactic acid 2.5, repeat LA at 2200  - IVF @100ml/hr  - Check enteric panel for stool (no previous work-up), does not sound like c-diff based on report of stools (no abdominal pain or elevated WBC either)  - Hold off further antibiotics at this time at no source found, if spikes fever will add back  - Check orthostatic vitals with report of almost passing out (likely due to low BPs on admission)  - Repeat VBG (slightly low CO2 on  "admission)    DEEPA  Baseline creatinine seems closer to 1-1.1. Creatinine 1.38 on admission, likely pre-renal with above vomiting/diarrhea  - IVF NS with 20meq KCl @100ml/hr    Hypokalemia  Likely secondary to vomiting  - Replete per protocol    Tobacco Cessation  Sputum production  Self reported chain smoker, not interested in quitting. He does not currently carry diagnosis of COPD, but likely has it. No hyperinflation seen on CXR  - Open to nicotine patch while inpatient, 21mg nicotine patch ordered  - duonebs qid for now due to wheezes heard on admission with admitting provider  - Mucinex BID    Neck pain  Reports constant neck discomfort midline from base of skull to middle of shoulders. No increase in pain with flexion/extension of head, less suspicious for meningitis.    Essential hypertension, benign  PTA amlodipine 5mg daily, lisinopril 20mg daily  - Hold PTA medications with low BPs on admission, slowly add back as BPs allow     Type 2 diabetes mellitus with other ophthalmic complication, without long-term current use of insulin  PTA metformin 1000mg BID and glimepiride 2mg BID--but only takes \"when he needs it\". Last A1C was 5.8, reasonable to recheck now given hx of medication non-compliance  - Hold PTA meds  - Sliding scale insulin, medium resistance  - moderate carb diet.    PAD  Hx left leg bypass procedure in past, plans for right leg bypass procedure in future with Dr Aragon  - Not compliant with PTA statin, plavix per pharmacy notes  - Will continue PTA statin, plavix as these are important for his PAD  - Continue PTA 81mg ASA  - PTA gabapentin continued, would hold if no improvement in kidney function in AM    Anxiety  Sleep disturbance  PTA zolpidem 10mg at bedtime prn  - Decrease dose to 5mg and continue PRN while in hospital    GERD  Reports frequent indigestion, uses prilosec occasionally at home  - Protonix 40mg daily started    Vision loss, bilateral  Noted complication secondary to DM    DVT " Prophylaxis: Pneumatic Compression Devices  Code Status: Full Code  Expected discharge: 2 - 3 days, recommended to prior living arrangement once infectious work-up completed    Radhika Comer DO    Primary Care Physician   Tylor Roberts    Chief Complaint   Nausea, vomiting diarrhea    History is obtained from the patient, ED physician    History of Present Illness   Gmoez Turk is a 59 year old male who presents with 1 day history of nausea/vomiting and >1mo hx of diarrhea. He is somewhat limited in his history, stating he felt terrible today with extreme fatigue and that EMS was called and he vomited when they moved him. That he doesn't recall anything about transfer to hospital, but that since then he has felt much better (intervention was IVF). He reports chills for last few days, but denies fevers. Reports increased sputum over past few weeks with chronic cough. He reports diarrhea that seems like it comes and goes, but he isn't able to describe particular pattern, only that it seems to take a week before stools go back to normal. Stools occur frequently within a few moments of each other when they come. No recent antibiotic use. No change in dietary intake (per patient).   He has neck discomfort, but this does not change with movement of his neck.   From a COVID standpoint, patient stays in the home. His wife leaves the home for work ([employer]) and she is thought to have most potential to bring something into the home. He also reports two children that live in the home with them.   Although he reported shortness of breath to ED, he does not state this to me. Only that he is extremely fatigued.    Past Medical History    I have reviewed this patient's medical history and updated it with pertinent information if needed.   Past Medical History:   Diagnosis Date     Anxiety      DIVERTICULOSIS OF COLON W/O BLEED 6/25/2003     GERD (gastroesophageal reflux disease)      Hyperlipidemia LDL goal <100  10/31/2010     Hypertension      Legally blind      PAD (peripheral artery disease) (H)      Tobacco use disorder 6/25/2003     Type 2 diabetes, HbA1c goal < 7% (H) 10/31/2010       Past Surgical History   I have reviewed this patient's surgical history and updated it with pertinent information if needed.  Past Surgical History:   Procedure Laterality Date     AMPUTATE TOE(S) Left 2/28/2017    Procedure: AMPUTATE TOE(S);  Surgeon: Jesus Aragon MD;  Location:  OR     BYPASS GRAFT FEMOROPOPLITEAL Left 2/28/2017    Procedure: BYPASS GRAFT FEMOROPOPLITEAL;  Surgeon: Jesus Aragon MD;  Location:  OR     ENT SURGERY  1990    deviated septum repair     IRRIGATION AND DEBRIDEMENT FOOT, COMBINED Left 2/28/2017    Procedure: COMBINED IRRIGATION AND DEBRIDEMENT FOOT;  Surgeon: Jesus Aragon MD;  Location:  OR     LAPAROSCOPIC CHOLECYSTECTOMY N/A 3/18/2017    Procedure: LAPAROSCOPIC CHOLECYSTECTOMY;  Surgeon: Edin Hollingsworth MD;  Location:  OR       Prior to Admission Medications   Prior to Admission Medications   Prescriptions Last Dose Informant Patient Reported? Taking?   aspirin EC 81 MG EC tablet Past Week at Unknown time Spouse/Significant Other No Yes   Sig: Take 1 tablet (81 mg) by mouth daily   atorvastatin (LIPITOR) 40 MG tablet  Spouse/Significant Other No No   Sig: Take 1 tablet (40 mg) by mouth daily    blood glucose (ACCU-CHEK SUNITA) test strip dme at dme Spouse/Significant Other No Yes   Sig: Use to test blood sugar 3 times daily or as directed.   citalopram (CELEXA) 40 MG tablet 4/26/2020 at Unknown time Spouse/Significant Other No Yes   Sig: TAKE ONE TABLET BY MOUTH ONE TIME DAILY    clopidogrel (PLAVIX) 75 MG tablet  Spouse/Significant Other No No   Sig: Take 1 tablet (75 mg) by mouth daily   gabapentin (NEURONTIN) 300 MG capsule 4/26/2020 at Unknown time Spouse/Significant Other Yes Yes   Sig: Take 300 mg by mouth daily before breakfast   gabapentin (NEURONTIN)  300 MG capsule Past Week at Unknown time Spouse/Significant Other Yes Yes   Sig: Take 300 mg by mouth 2 times daily as needed (neuropathy/pain)   glimepiride (AMARYL) 2 MG tablet prn at prn Spouse/Significant Other Yes Yes   Sig: Take 2 mg by mouth 2 times daily as needed   lisinopril (ZESTRIL) 20 MG tablet 4/26/2020 at Unknown time Spouse/Significant Other No Yes   Sig: Take 1 tablet (20 mg) by mouth daily as needed   metFORMIN (GLUCOPHAGE) 1000 MG tablet Past Month at Unknown time Spouse/Significant Other Yes Yes   Sig: Take 1,000 mg by mouth 2 times daily as needed (high blood sugar)   sildenafil (VIAGRA) 100 MG tablet prn at prn Spouse/Significant Other No Yes   Sig: Take 1 tablet (100 mg) by mouth daily as needed (for sexual intercourse) 30 min to 4 hrs before sex. Do not use with nitroglycerin, terazosin or doxazosin.   zolpidem (AMBIEN) 10 MG tablet Past Week at prn Spouse/Significant Other No Yes   Sig: Take 1 tablet (10 mg) by mouth nightly as needed for sleep      Facility-Administered Medications: None     Allergies   Allergies   Allergen Reactions     No Known Drug Allergies        Social History   I have reviewed this patient's social history and updated it with pertinent information if needed. Gomez MOSELEY Burke  reports that he has been smoking cigarettes. He has been smoking about 1.00 pack per day. He has never used smokeless tobacco. He reports current alcohol use. He reports that he does not use drugs.    Family History   I have reviewed this patient's family history and updated it with pertinent information if needed.   Family History   Problem Relation Age of Onset     Diabetes Mother      Lipids Mother      Melanoma Mother      Cancer Paternal Grandmother      Neurologic Disorder Maternal Uncle      Glaucoma No family hx of      Macular Degeneration No family hx of      Retinal detachment No family hx of      Thyroid Disease No family hx of        Review of Systems   The 10 point Review of  Systems is negative other than noted in the HPI    Physical Exam   Temp: 98.3  F (36.8  C) Temp src: Oral BP: 117/57 Pulse: 68 Heart Rate: 68 Resp: 18 SpO2: 98 % O2 Device: None (Room air)    Vital Signs with Ranges  Temp:  [97.8  F (36.6  C)-98.3  F (36.8  C)] 98.3  F (36.8  C)  Pulse:  [58-78] 68  Heart Rate:  [58-78] 68  Resp:  [14-27] 18  BP: ()/(50-73) 117/57  SpO2:  [97 %-100 %] 98 %  217 lbs 13.03 oz    *Due to the current COVID-19 pandemic, with need to conserve PPE and minimize non-essential exposure to patients diagnosed or under investigation, a limited examination was performed on this patient. Interview was done via patient's hospital room telephone, and patient was visualized through hospital door window. Emergency room  Physician's physical exam findings were noted with asterix*    Constitutional: awake, somewhat fatigued appearing, cooperative, no apparent distress, lying on side  Eyes: Conjunctivae and EOM are normal. Pupils are equal, round, and reactive*   HEENT: external ears normal bilaterally*, mask in place  Respiratory: scattered wheezes and course breath sounds B/L *  Cardiovascular: Normal heart sounds. No murmur heard *  GI: Soft. No distension. There is no tenderness. There is no rigidity, no rebound and no guarding. Neg Gilbert's and Neg McBurney's. *  Lymph/Hematologic: No anterior cervical or supraclavicular adenopathy.  Skin: Skin is warm and dry. No rash noted. Not diaphoretic. *  Musculoskeletal: Normal range of motion*  Neurologic: Alert, moving all extremities, no meningeal signs  Psychiatric: Alert, oriented to person, place and time, no obvious anxiety or depression.    Data   Data reviewed today:  I personally reviewed CXR no infiltrate or effusion. EKG: HR 60bpm, normal sinus rhythm  Recent Labs   Lab 04/26/20  1758   WBC 9.5   HGB 10.7*   MCV 95         POTASSIUM 3.3*   CHLORIDE 106   CO2 15*   BUN 19   CR 1.38*   ANIONGAP 14   KRYSTEN 8.6   *   ALBUMIN  3.4   PROTTOTAL 6.9   BILITOTAL 0.4   ALKPHOS 72   ALT 30   AST 26   TROPI <0.015       Recent Results (from the past 24 hour(s))   XR Chest Port 1 View    Narrative    XR PORTABLE CHEST ONE VIEW   4/26/2020 6:24 PM     HISTORY: Dyspnea/shortness of breath.      COMPARISON: 5/17/2017.      Impression    IMPRESSION: No acute cardiopulmonary disease.    EDUARDO SLOAN MD

## 2020-04-27 NOTE — ED NOTES
[facility]  ED Nurse Handoff Report    ED Chief complaint: Nausea, Vomiting, & Diarrhea and Shortness of Breath      ED Diagnosis:   Final diagnoses:   Nausea vomiting and diarrhea   Elevated lactic acid level   Suspected Covid-19 Virus Infection       Code Status: as per hospitalist    Allergies:   Allergies   Allergen Reactions     No Known Drug Allergies        Patient Story: pt from home c/o generalized weakness, shortness of breath, N/V x1 day, also neck pain and diarrhea for a month. Had a syncope episode today, was hypotensive at EMS arrival. Pt reports a chronic cough as he is a smoker.  Hx. Of DM, HTN, HLD and CKD stage 3       Focused Assessment:  Legally blind A&Ox4, respirations even and unlabored, occasional dry cough noted. Skin cool and clammy, color pale.   Results for orders placed or performed during the hospital encounter of 04/26/20   XR Chest Port 1 View     Status: None (Preliminary result)    Narrative    XR PORTABLE CHEST ONE VIEW   4/26/2020 6:24 PM     HISTORY: Dyspnea/shortness of breath.      COMPARISON: 5/17/2017.      Impression    IMPRESSION: No acute cardiopulmonary disease.   CBC with platelets differential     Status: Abnormal   Result Value Ref Range    WBC 9.5 4.0 - 11.0 10e9/L    RBC Count 3.26 (L) 4.4 - 5.9 10e12/L    Hemoglobin 10.7 (L) 13.3 - 17.7 g/dL    Hematocrit 31.0 (L) 40.0 - 53.0 %    MCV 95 78 - 100 fl    MCH 32.8 26.5 - 33.0 pg    MCHC 34.5 31.5 - 36.5 g/dL    RDW 13.0 10.0 - 15.0 %    Platelet Count 253 150 - 450 10e9/L    Diff Method Automated Method     % Neutrophils 60.3 %    % Lymphocytes 28.7 %    % Monocytes 8.5 %    % Eosinophils 1.7 %    % Basophils 0.6 %    % Immature Granulocytes 0.2 %    Nucleated RBCs 0 0 /100    Absolute Neutrophil 5.8 1.6 - 8.3 10e9/L    Absolute Lymphocytes 2.7 0.8 - 5.3 10e9/L    Absolute Monocytes 0.8 0.0 - 1.3 10e9/L    Absolute Eosinophils 0.2 0.0 - 0.7 10e9/L    Absolute Basophils 0.1 0.0 - 0.2 10e9/L    Abs  Immature Granulocytes 0.0 0 - 0.4 10e9/L    Absolute Nucleated RBC 0.0    Comprehensive metabolic panel     Status: Abnormal   Result Value Ref Range    Sodium 135 133 - 144 mmol/L    Potassium 3.3 (L) 3.4 - 5.3 mmol/L    Chloride 106 94 - 109 mmol/L    Carbon Dioxide 15 (L) 20 - 32 mmol/L    Anion Gap 14 3 - 14 mmol/L    Glucose 177 (H) 70 - 99 mg/dL    Urea Nitrogen 19 7 - 30 mg/dL    Creatinine 1.38 (H) 0.66 - 1.25 mg/dL    GFR Estimate 55 (L) >60 mL/min/[1.73_m2]    GFR Estimate If Black 64 >60 mL/min/[1.73_m2]    Calcium 8.6 8.5 - 10.1 mg/dL    Bilirubin Total 0.4 0.2 - 1.3 mg/dL    Albumin 3.4 3.4 - 5.0 g/dL    Protein Total 6.9 6.8 - 8.8 g/dL    Alkaline Phosphatase 72 40 - 150 U/L    ALT 30 0 - 70 U/L    AST 26 0 - 45 U/L   Lactic acid whole blood     Status: Abnormal   Result Value Ref Range    Lactic Acid 5.4 (HH) 0.7 - 2.0 mmol/L   Magnesium     Status: None   Result Value Ref Range    Magnesium 1.6 1.6 - 2.3 mg/dL   Blood gas venous     Status: Abnormal   Result Value Ref Range    Ph Venous 7.36 7.32 - 7.43 pH    PCO2 Venous 29 (L) 40 - 50 mm Hg    PO2 Venous 42 25 - 47 mm Hg    Bicarbonate Venous 16 (L) 21 - 28 mmol/L    Base Deficit Venous 8.2 mmol/L   Troponin I     Status: None   Result Value Ref Range    Troponin I ES <0.015 0.000 - 0.045 ug/L   Lactate Dehydrogenase     Status: None   Result Value Ref Range    Lactate Dehydrogenase 159 85 - 227 U/L   Procalcitonin     Status: None   Result Value Ref Range    Procalcitonin 0.07 ng/ml   CRP inflammation     Status: None   Result Value Ref Range    CRP Inflammation <2.9 0.0 - 8.0 mg/L   Ferritin     Status: None   Result Value Ref Range    Ferritin 345 26 - 388 ng/mL   Lactic acid whole blood     Status: Abnormal   Result Value Ref Range    Lactic Acid 2.5 (H) 0.7 - 2.0 mmol/L   EKG 12-lead, tracing only     Status: None   Result Value Ref Range    Interpretation ECG Click View Image link to view waveform and result        Treatments and/or  interventions provided: fluids, IV antibiotics.  Patient's response to treatments and/or interventions: VSS, reports feeling better, comfortably lying down on stretcher. NAD at this moment.     To be done/followed up on inpatient unit:  continue IV fluids, antibiotics.     Does this patient have any cognitive concerns?: n/a    Activity level - Baseline/Home:  Cane  Activity Level - Current:   Total Care    Patient's Preferred language: English   Needed?: No    Isolation: Contact  and Droplet  Infection: Other: COVID-19 rule out  Bariatric?: No    Vital Signs:   Vitals:    04/26/20 1815 04/26/20 1845 04/26/20 1900 04/26/20 1937   BP:  98/51 125/65 109/50   Pulse:  58  68   Resp: 14  26 19   Temp:    97.8  F (36.6  C)   TempSrc:    Oral   SpO2: 100% 98% 100% 99%   Weight:    95.3 kg (210 lb)       Cardiac Rhythm:Cardiac Rhythm: Normal sinus rhythm    Was the PSS-3 completed:   Yes  What interventions are required if any?               Family Comments: pt by himself in ED  OBS brochure/video discussed/provided to patient/family: N/A                For the majority of the shift this patient's behavior was Green.   Behavioral interventions performed were N/A.    ED NURSE PHONE NUMBER: [phone]

## 2020-04-27 NOTE — UTILIZATION REVIEW
Admission Status; Secondary Review Determination       As part of the [place] Utilization review plan, a self-audit is done on Medicare inpatient admission with less than 2 midnights stay. The 2014 IPPS Final Rule allows outpatient billing in the event that a hospital determines that an inpatient admission was not medically necessary under utilization review process.      (x) Outpatient status would be Appropriate- Short Stay- Post discharge review.     RATIONALE FOR DETERMINATION 59-year-old male presented with acute gastroenteritis symptoms associated with significant hypotension, lactic acidosis.  Initial management IV fluid repletion.  Rule out worrisome etiologies or refractive symptoms.  Patient was admitted and discharge after one night stay. Record was sent by  for a PA review. Based on the  severity of illness, intensity of service provided, expected LOS and risk for adverse outcome make the care appropriate for further outpatient/observation; however, doesn't meet criteria for hospital inpatient admission.         The information on this document is developed by the utilization review team in order for the business office to ensure compliance.  This only denotes the appropriateness of proper admission status and does not reflect the quality of care rendered.         The definitions of Inpatient Status and Observation Status used in making the determination above are those provided in the CMS Coverage Manual, Chapter 1 and Chapter 6, section 70.4.      Sincerely,   Jose D Schulte MD  Physician Advisor  [facility]

## 2020-04-27 NOTE — PHARMACY-ADMISSION MEDICATION HISTORY
"Pharmacy Medication History  Admission medication history interview status for the 4/26/2020  admission is complete. See EPIC admission navigator for prior to admission medications     Medication history sources: Patient's family/friend (wife) Camelia  Medication history source reliability: Moderate  Adherence assessment: Unknown    Additional information and significant changes made to the medication list:  **Flagged atorvastatin and clopidogrel for MD review** Appears patient is filling these on a regular basis with [employer] information, however patient's wife states \"he won't take it\"  - Patient's wife states the only medications she can get him to take is citalopram, gabapentin (one dose a day) and lisinopril.   - She states that Gomez will only take metformin and glimepiride when \"he needs it\" but that is rarely now as she states his blood sugars haven't been high.     Medication reconciliation completed by provider prior to medication history? No    Time spent in this activity: 25 minutes       Prior to Admission medications    Medication Sig Last Dose Taking? Auth Provider   aspirin EC 81 MG EC tablet Take 1 tablet (81 mg) by mouth daily Past Week at Unknown time Yes Valentine Buenrostro MD   blood glucose (ACCU-CHEK SUNITA) test strip Use to test blood sugar 3 times daily or as directed. dme at dme Yes Tylor Roberts MD   citalopram (CELEXA) 40 MG tablet TAKE ONE TABLET BY MOUTH ONE TIME DAILY  4/26/2020 at Unknown time Yes Tylor Roberts MD   gabapentin (NEURONTIN) 300 MG capsule Take 300 mg by mouth daily before breakfast 4/26/2020 at Unknown time Yes Unknown, Entered By History   gabapentin (NEURONTIN) 300 MG capsule Take 300 mg by mouth 2 times daily as needed (neuropathy/pain) Past Week at Unknown time Yes Unknown, Entered By History   glimepiride (AMARYL) 2 MG tablet Take 2 mg by mouth 2 times daily as needed prn at prn Yes Unknown, Entered By History   lisinopril (ZESTRIL) 20 MG tablet Take 1 tablet (20 " mg) by mouth daily as needed 4/26/2020 at Unknown time Yes Tylor Roberts MD   metFORMIN (GLUCOPHAGE) 1000 MG tablet Take 1,000 mg by mouth 2 times daily as needed (high blood sugar) Past Month at Unknown time Yes Unknown, Entered By History   sildenafil (VIAGRA) 100 MG tablet Take 1 tablet (100 mg) by mouth daily as needed (for sexual intercourse) 30 min to 4 hrs before sex. Do not use with nitroglycerin, terazosin or doxazosin. prn at prn Yes Neil Rodriguez MD   zolpidem (AMBIEN) 10 MG tablet Take 1 tablet (10 mg) by mouth nightly as needed for sleep Past Week at prn Yes Tylor Roberts MD   atorvastatin (LIPITOR) 40 MG tablet Take 1 tablet (40 mg) by mouth daily    Tylor Roberts MD   clopidogrel (PLAVIX) 75 MG tablet Take 1 tablet (75 mg) by mouth daily   Tylor Roberts MD

## 2020-04-27 NOTE — DISCHARGE SUMMARY
[facility]  Hospitalist Discharge Summary      Date of Admission:  4/26/2020  Date of Discharge:  4/27/2020 12:44 PM  Discharging Provider: Silvestre Pugh,       Discharge Diagnoses   Syncope likely secondary to orthostatic hypotension  Chronic loose stools  Alcohol use disorder  Chronic early satiety    Follow-ups Needed After Discharge   Follow-up Appointments     Follow-up and recommended labs and tests       Follow up with primary care provider, Tylor Roberts, within 7 days to   evaluate medication change and for hospital follow- up.  The following   labs/tests are recommended: repeat BMP.    Please provide office number for [facility]. The patient and his wife are   advised to call that number to set up EGD as an outpatient             Unresulted Labs Ordered in the Past 30 Days of this Admission     Date and Time Order Name Status Description    4/26/2020 2337 Sputum Culture Aerobic Bacterial In process     4/26/2020 1804 Blood culture Preliminary     4/26/2020 1804 Blood culture Preliminary       These results will be followed up by PCP    Discharge Disposition   Discharged to home  Condition at discharge: Stable      Hospital Course   Likely orthostatic hypotension in the context of chronic diarrhea and early satiety  COVID-19 ruled out  Elevated lactic acid and DEEPA secondary to prerenal azotemia  Hypotension  Patient had a fainting episode after standing up at home and walking to the kitchen.  He had been feeling lightheaded and been drinking alcohol  CXR without infiltrate or effusion. Moderate risk for COVID as wife works for [employer] and could potentially have brought infection home, otherwise no known sick contacts. + chills past few days.   His wife also endorsed other episodes of falling, often in the context of excessive alcohol intake and lightheadedness  COVID was ruled out.  The patient's lactic acidosis and renal injury inproved  He ambulated with PT well and  "tolerated a large meal  Stool testing for infection was negative  plan  - most likely a combination of dehydration, alcohol, and orthostatic hypotension caused his presentation. His chronic diarrhea and early satiety are contributing  - based on recurrent symptomatology, I recommended workup for his chronic loose stools and feelings of early satiety  - recommended alcohol cessation  - procalcitonin and crp were both WNL  - COVID negative  - he felt back to normal on discharge     Chronic diarrhea  - referred to GI as outpatient for further workup  - ? Related to undertreated diarrhea for many years    Chronic early satiety after eating  No weight change  ?gastroparesis  Plan  - set him up with [facility] for outpatient EGD in the next week  - offered inpatient consultation, which he declined  - I think this contributes to recurrent orthostasis in this gentleman     Tobacco Cessation  Sputum production  Self reported chain smoker, not interested in quitting. He does not currently carry diagnosis of COPD, but likely has it. No hyperinflation seen on CXR  - urged cessation     Neck pain  This resolved by discharge     Essential hypertension, benign  PTA amlodipine 5mg daily, lisinopril 20mg daily  - reduced lisinopril to 5 mg daily     Type 2 diabetes mellitus with other ophthalmic complication, without long-term current use of insulin  PTA metformin 1000mg BID and glimepiride 2mg BID--but only takes \"when he needs it\". Last A1C was 5.8, reasonable to recheck now given hx of medication non-compliance  - resume PTA medications     PAD  Hx left leg bypass procedure in past, plans for right leg bypass procedure in future with Dr Aragon  - Not compliant with PTA statin, plavix per pharmacy notes  - Will continue PTA statin, plavix as these are important for his PAD  - Continue PTA 81mg ASA  - PTA gabapentin continued, would hold if no improvement in kidney function in AM     Anxiety  Sleep disturbance  PTA zolpidem 10mg at " bedtime prn  - Decrease dose to 5mg and continue PRN while in hospital     GERD  Reports frequent indigestion, uses prilosec occasionally at home  - Protonix 40mg daily started     Vision loss, bilateral  Noted complication secondary to DM    Consultations This Hospital Stay   PHYSICAL THERAPY ADULT IP CONSULT  OCCUPATIONAL THERAPY ADULT IP CONSULT    Code Status   Full Code    Time Spent on this Encounter   I, Silvestre Pugh DO, personally saw the patient today and spent greater than 30 minutes discharging this patient.       Silvestre Pugh DO  [facility]  ______________________________________________________________________    Physical Exam   Vital Signs: Temp: 98.4  F (36.9  C) Temp src: Oral BP: 123/50 Pulse: 92 Heart Rate: 71 Resp: 18 SpO2: 97 % O2 Device: None (Room air)    Weight: 217 lbs 13.03 oz  Constitutional: awake, alert, no distress  Eyes: Conjunctivae and EOM are normal. Pupils are equal, round, and reactive*   HEENT: external ears normal bilaterally*, mask in place  Respiratory: scattered wheezes and course breath sounds B/L *  Cardiovascular: Normal heart sounds. No murmur heard *  GI: Soft. No distension. There is no tenderness. There is no rigidity, no rebound and no guarding. Neg Gilbert's and Neg McBurney's. *    Primary Care Physician   Tylor Roberts    Discharge Orders      Reason for your hospital stay    Syncope likely orthostatic     Follow-up and recommended labs and tests     Follow up with primary care provider, Tylor Roberts, within 7 days to evaluate medication change and for hospital follow- up.  The following labs/tests are recommended: repeat BMP.    Please provide office number for [facility]. The patient and his wife are advised to call that number to set up EGD as an outpatient     Activity    Your activity upon discharge: activity as tolerated     Full Code     Diet    Follow this diet upon discharge: Orders Placed This Encounter      Moderate  Consistent CHO Diet       Significant Results and Procedures   Most Recent 3 CBC's:  Recent Labs   Lab Test 04/27/20  1030 04/26/20  1758 01/20/20  1008 02/11/19  1400   WBC 9.1 9.5  --  10.9   HGB 9.7* 10.7* 12.4* 12.6*   MCV 95 95  --  90    253  --  317     Most Recent 3 BMP's:  Recent Labs   Lab Test 04/27/20  1030 04/26/20  1758 02/11/19  1400    135 135   POTASSIUM 4.2 3.3* 4.1   CHLORIDE 113* 106 103   CO2 19* 15* 25   BUN 13 19 16   CR 1.22 1.38* 1.09   ANIONGAP 7 14 7   KRYSTEN 8.2* 8.6 9.4   * 177* 113*     Most Recent 2 LFT's:  Recent Labs   Lab Test 04/26/20  1758 02/11/19  1400   AST 26 26   ALT 30 25   ALKPHOS 72 118   BILITOTAL 0.4 0.6     Most Recent 3 INR's:  Recent Labs   Lab Test 06/07/18  0818 03/18/17  0615 03/09/17  1218   INR 0.89 1.38* 1.01   ,   Results for orders placed or performed during the hospital encounter of 04/26/20   XR Chest Port 1 View    Narrative    XR PORTABLE CHEST ONE VIEW   4/26/2020 6:24 PM     HISTORY: Dyspnea/shortness of breath.      COMPARISON: 5/17/2017.      Impression    IMPRESSION: No acute cardiopulmonary disease.    EDUARDO SLOAN MD       Discharge Medications   Discharge Medication List as of 4/27/2020 12:20 PM      START taking these medications    Details   loperamide (IMODIUM A-D) 2 MG tablet Take 1 tablet (2 mg) by mouth 4 times daily as needed for diarrhea, Disp-60 tablet,R-0, E-Prescribe      vitamin B1 (THIAMINE) 100 MG tablet Take 1 tablet (100 mg) by mouth daily, Disp-30 tablet,R-0, E-Prescribe         CONTINUE these medications which have CHANGED    Details   citalopram (CELEXA) 40 MG tablet TAKE ONE TABLET BY MOUTH ONE TIME DAILY , Disp-90 tablet,R-3, E-Prescribe      lisinopril (ZESTRIL) 5 MG tablet Take 1 tablet (5 mg) by mouth daily, Disp-30 tablet,R-0, E-Prescribe         CONTINUE these medications which have NOT CHANGED    Details   aspirin EC 81 MG EC tablet Take 1 tablet (81 mg) by mouth daily, Disp-30 tablet, R-11, E-Prescribe       atorvastatin (LIPITOR) 40 MG tablet Take 1 tablet (40 mg) by mouth daily , Disp-30 tablet,R-11, E-Prescribe      blood glucose (ACCU-CHEK SUNITA) test strip Use to test blood sugar 3 times daily or as directed., Disp-100 strip,R-3, E-Prescribe      clopidogrel (PLAVIX) 75 MG tablet Take 1 tablet (75 mg) by mouth daily, Disp-90 tablet,R-2, E-Prescribe      !! gabapentin (NEURONTIN) 300 MG capsule Take 300 mg by mouth daily before breakfast, Historical      !! gabapentin (NEURONTIN) 300 MG capsule Take 300 mg by mouth 2 times daily as needed (neuropathy/pain), Historical      glimepiride (AMARYL) 2 MG tablet Take 2 mg by mouth 2 times daily as needed, Historical      metFORMIN (GLUCOPHAGE) 1000 MG tablet Take 1,000 mg by mouth 2 times daily as needed (high blood sugar), Historical      sildenafil (VIAGRA) 100 MG tablet Take 1 tablet (100 mg) by mouth daily as needed (for sexual intercourse) 30 min to 4 hrs before sex. Do not use with nitroglycerin, terazosin or doxazosin., Disp-6 tablet,R-3, Local Print      zolpidem (AMBIEN) 10 MG tablet Take 1 tablet (10 mg) by mouth nightly as needed for sleep, Disp-30 tablet,R-0, E-Prescribe       !! - Potential duplicate medications found. Please discuss with provider.        Allergies   Allergies   Allergen Reactions     No Known Drug Allergies

## 2020-04-28 ENCOUNTER — TELEPHONE (OUTPATIENT)
Dept: INTERNAL MEDICINE | Facility: CLINIC | Age: 60
End: 2020-04-28

## 2020-04-28 ENCOUNTER — TRANSFERRED RECORDS (OUTPATIENT)
Dept: HEALTH INFORMATION MANAGEMENT | Facility: CLINIC | Age: 60
End: 2020-04-28

## 2020-04-28 LAB
ALT SERPL-CCNC: 20 IU/L (ref 0–44)
AST SERPL-CCNC: 23 IU/L (ref 0–40)
CREAT SERPL-MCNC: 1.09 MG/DL (ref 0.76–1.27)
GFR SERPL CREATININE-BSD FRML MDRD: 74 ML/MIN/1.73
GLUCOSE SERPL-MCNC: 110 MG/DL (ref 65–99)
HBA1C MFR BLD: 6.5 % (ref 4.8–5.6)
POTASSIUM SERPL-SCNC: 5 MMOL/L (ref 3.5–5.2)

## 2020-04-28 NOTE — TELEPHONE ENCOUNTER
Patient Contact    Attempt # 1    Was call answered?  No.  Left message on voicemail with information to call triage back.    On call back, hospital follow-up.

## 2020-04-29 ENCOUNTER — PATIENT OUTREACH (OUTPATIENT)
Dept: CARE COORDINATION | Facility: CLINIC | Age: 60
End: 2020-04-29

## 2020-04-29 DIAGNOSIS — Z71.89 COUNSELING AND COORDINATION OF CARE: Primary | ICD-10-CM

## 2020-04-29 LAB
BACTERIA SPEC CULT: NORMAL
SPECIMEN SOURCE: NORMAL

## 2020-04-29 NOTE — PROGRESS NOTES
Clinic Care Coordination Contact    Situation: Patient chart reviewed by care coordinator.    Background: Recent ED/IP visit. COVID-19 Test performed.     Assessment: COVID-19 testing. Results: Negative.  No outstanding Care Coordination needs identified at this time. Patient is connected to care.     Plan/Recommendations: Patient meets criteria for GetWell Loop based on current COVID-19 testing status protocol; referral placed by Care Coordinator.  No patient outreach will be made at this time; Care Coordination to remain available should a change in patient status occur or patient needs arise.    LUCIE Heart   Social Work Clinic Care Coordinator   [facility]   PH: [phone]  [email or web address]

## 2020-05-01 ENCOUNTER — TRANSFERRED RECORDS (OUTPATIENT)
Dept: HEALTH INFORMATION MANAGEMENT | Facility: CLINIC | Age: 60
End: 2020-05-01

## 2020-05-02 LAB
BACTERIA SPEC CULT: NO GROWTH
BACTERIA SPEC CULT: NO GROWTH
SPECIMEN SOURCE: NORMAL
SPECIMEN SOURCE: NORMAL

## 2020-05-06 ENCOUNTER — MEDICAL CORRESPONDENCE (OUTPATIENT)
Dept: HEALTH INFORMATION MANAGEMENT | Facility: CLINIC | Age: 60
End: 2020-05-06

## 2020-05-13 ENCOUNTER — MYC REFILL (OUTPATIENT)
Dept: INTERNAL MEDICINE | Facility: CLINIC | Age: 60
End: 2020-05-13

## 2020-05-13 DIAGNOSIS — F41.9 ANXIETY: ICD-10-CM

## 2020-05-13 DIAGNOSIS — H54.3 VISION LOSS, BILATERAL: ICD-10-CM

## 2020-05-13 RX ORDER — ZOLPIDEM TARTRATE 10 MG/1
10 TABLET ORAL
Qty: 30 TABLET | Refills: 0 | Status: SHIPPED | OUTPATIENT
Start: 2020-05-13 | End: 2020-06-17

## 2020-05-13 NOTE — TELEPHONE ENCOUNTER
ambien      Last Written Prescription Date:  4/13/20  Last Fill Quantity: 30,   # refills: 0  Last Office Visit: 2/19/20  Future Office visit:       Routing refill request to provider for review/approval because:  Drug not on the [facility], P or [facility] refill protocol or controlled substance

## 2020-05-26 NOTE — PROGRESS NOTES
[facility]      OUTPATIENT OCCUPATIONAL THERAPY  EVALUATION  PLAN OF TREATMENT FOR OUTPATIENT REHABILITATION  (COMPLETE FOR INITIAL CLAIMS ONLY)  Patient's Last Name, First Name, M.I.  YOB: 1960  Gomez Turk                          Provider's Name  [facility] Medical Record No.  [id number]                               Onset Date:  04/26/20   Start of Care Date:  04/27/20     Type:     ___PT   _X_OT   ___SLP Medical Diagnosis:  Gomez Turk is a 59 year old male who presents with nausea, vomiting and near syncopal event at home, prompting ED presentation.                        OT Diagnosis:  dec IND with ADL's and transfers   Visits from SOC:  1   _________________________________________________________________________________  Plan of Treatment/Functional Goals    Planned Interventions: ADL retraining, transfer training,       Goals: See Occupational Therapy Goals on Care Plan in [facility] electronic health record.    Therapy Frequency: (1 tx only)  Predicted Duration of Therapy Intervention: 1 tx only  _________________________________________________________________________________    I CERTIFY THE NEED FOR THESE SERVICES FURNISHED UNDER        THIS PLAN OF TREATMENT AND WHILE UNDER MY CARE     (Physician co-signature of this document indicates review and certification of the therapy plan).                Certification date from: 04/27/20, Certification date to: 04/27/20    Referring Physician: Silvestre Pugh (P)             Initial Assessment        See Occupational Therapy evaluation dated 04/27/20 in Epic electronic health record.

## 2020-06-01 DIAGNOSIS — E11.39 TYPE 2 DIABETES MELLITUS WITH OTHER OPHTHALMIC COMPLICATION, WITHOUT LONG-TERM CURRENT USE OF INSULIN (H): Primary | ICD-10-CM

## 2020-06-01 RX ORDER — GABAPENTIN 300 MG/1
300 CAPSULE ORAL 2 TIMES DAILY
Qty: 180 CAPSULE | Refills: 0 | Status: SHIPPED | OUTPATIENT
Start: 2020-06-01 | End: 2021-03-16

## 2020-06-01 NOTE — TELEPHONE ENCOUNTER
gabapentin      Last Written Prescription Date:  na  Last Fill Quantity: na,   # refills: na  Last Office Visit: 2/19/20  Future Office visit:    Next 5 appointments (look out 90 days)    Jun 11, 2020  9:00 AM CDT  Return Visit with Jesus Aragon MD  [facility] ([facility] [address] [place]  [facility] [id number] [phone]           Routing refill request to provider for review/approval because:  Medication is reported/historical

## 2020-06-17 ENCOUNTER — MYC REFILL (OUTPATIENT)
Dept: INTERNAL MEDICINE | Facility: CLINIC | Age: 60
End: 2020-06-17

## 2020-06-17 DIAGNOSIS — F41.9 ANXIETY: ICD-10-CM

## 2020-06-17 DIAGNOSIS — H54.3 VISION LOSS, BILATERAL: ICD-10-CM

## 2020-06-17 RX ORDER — ZOLPIDEM TARTRATE 10 MG/1
10 TABLET ORAL
Qty: 30 TABLET | Refills: 0 | Status: SHIPPED | OUTPATIENT
Start: 2020-06-17 | End: 2020-07-15

## 2020-07-04 DIAGNOSIS — I73.9 PAD (PERIPHERAL ARTERY DISEASE) (H): ICD-10-CM

## 2020-07-06 RX ORDER — CLOPIDOGREL BISULFATE 75 MG/1
TABLET ORAL
Qty: 90 TABLET | Refills: 0 | Status: SHIPPED | OUTPATIENT
Start: 2020-07-06 | End: 2021-10-12

## 2020-07-06 NOTE — TELEPHONE ENCOUNTER
Routing refill request to provider for review/approval because:  Labs out of range:  Hgb A1C

## 2020-07-15 ENCOUNTER — MYC REFILL (OUTPATIENT)
Dept: INTERNAL MEDICINE | Facility: CLINIC | Age: 60
End: 2020-07-15

## 2020-07-15 DIAGNOSIS — H54.3 VISION LOSS, BILATERAL: ICD-10-CM

## 2020-07-15 DIAGNOSIS — F41.9 ANXIETY: ICD-10-CM

## 2020-07-15 RX ORDER — ZOLPIDEM TARTRATE 10 MG/1
10 TABLET ORAL
Qty: 30 TABLET | Refills: 0 | Status: SHIPPED | OUTPATIENT
Start: 2020-07-15 | End: 2020-08-18

## 2020-07-15 NOTE — TELEPHONE ENCOUNTER
ambien      Last Written Prescription Date:  6/17/20  Last Fill Quantity: 30,   # refills: 0  Last Office Visit: 2/19/20  Future Office visit:       Routing refill request to provider for review/approval because:  Drug not on the [facility], P or [facility] refill protocol or controlled substance

## 2020-08-10 DIAGNOSIS — E11.39 TYPE 2 DIABETES MELLITUS WITH OTHER OPHTHALMIC COMPLICATION, WITHOUT LONG-TERM CURRENT USE OF INSULIN (H): Primary | ICD-10-CM

## 2020-08-10 RX ORDER — GLIMEPIRIDE 2 MG/1
2 TABLET ORAL
Qty: 60 TABLET | Refills: 5 | Status: SHIPPED | OUTPATIENT
Start: 2020-08-10 | End: 2021-10-12

## 2020-08-10 NOTE — TELEPHONE ENCOUNTER
Reason for Call:  Medication or medication refill:  Do you use a [facility]?  Name of the pharmacy and phone number for the current request:  [id number] [place], [place] - [address]  [phone]  Name of the medication requested: glimepiride (AMARYL) 2 MG tablet  Other request: none  Can we leave a detailed message on this number? YES  Phone number patient can be reached at: Home number on file [phone] (home)  Best Time: any  Call taken on 8/10/2020 at 11:25 AM by CALIN GLOVER

## 2020-08-18 ENCOUNTER — MYC REFILL (OUTPATIENT)
Dept: INTERNAL MEDICINE | Facility: CLINIC | Age: 60
End: 2020-08-18

## 2020-08-18 DIAGNOSIS — F41.9 ANXIETY: ICD-10-CM

## 2020-08-18 DIAGNOSIS — H54.3 VISION LOSS, BILATERAL: ICD-10-CM

## 2020-08-18 RX ORDER — ZOLPIDEM TARTRATE 10 MG/1
10 TABLET ORAL
Qty: 30 TABLET | Refills: 0 | Status: SHIPPED | OUTPATIENT
Start: 2020-08-18 | End: 2020-09-16

## 2020-08-18 NOTE — TELEPHONE ENCOUNTER
ambien      Last Written Prescription Date:  7/15/20  Last Fill Quantity: 30,   # refills: 0  Last Office Visit: 2/19/20  Future Office visit:       Routing refill request to provider for review/approval because:  Drug not on the [facility], P or [facility] refill protocol or controlled substance

## 2020-09-16 ENCOUNTER — MYC REFILL (OUTPATIENT)
Dept: INTERNAL MEDICINE | Facility: CLINIC | Age: 60
End: 2020-09-16

## 2020-09-16 DIAGNOSIS — H54.3 VISION LOSS, BILATERAL: ICD-10-CM

## 2020-09-16 DIAGNOSIS — F41.9 ANXIETY: ICD-10-CM

## 2020-09-16 RX ORDER — ZOLPIDEM TARTRATE 10 MG/1
10 TABLET ORAL
Qty: 30 TABLET | Refills: 0 | Status: SHIPPED | OUTPATIENT
Start: 2020-09-16 | End: 2020-10-18

## 2020-09-16 NOTE — TELEPHONE ENCOUNTER
ambien      Last Written Prescription Date:  8/18/20  Last Fill Quantity: 30,   # refills: 0  Last Office Visit: 2/19/20  Future Office visit:       Routing refill request to provider for review/approval because:  Drug not on the [facility], P or [facility] refill protocol or controlled substance

## 2020-10-18 ENCOUNTER — MYC REFILL (OUTPATIENT)
Dept: INTERNAL MEDICINE | Facility: CLINIC | Age: 60
End: 2020-10-18

## 2020-10-18 DIAGNOSIS — H54.3 VISION LOSS, BILATERAL: ICD-10-CM

## 2020-10-18 DIAGNOSIS — F41.9 ANXIETY: ICD-10-CM

## 2020-10-19 RX ORDER — ZOLPIDEM TARTRATE 10 MG/1
10 TABLET ORAL
Qty: 30 TABLET | Refills: 0 | Status: SHIPPED | OUTPATIENT
Start: 2020-10-19 | End: 2020-11-17

## 2020-10-19 NOTE — TELEPHONE ENCOUNTER
Zolpidem    Routing refill request to provider for review/approval because:  Drug not on the [facility] refill protocol

## 2020-10-23 ENCOUNTER — TELEPHONE (OUTPATIENT)
Dept: OTHER | Facility: CLINIC | Age: 60
End: 2020-10-23

## 2020-10-23 DIAGNOSIS — I73.9 PAD (PERIPHERAL ARTERY DISEASE) (H): Primary | ICD-10-CM

## 2020-10-23 DIAGNOSIS — E11.39 TYPE 2 DIABETES MELLITUS WITH OTHER OPHTHALMIC COMPLICATION, WITHOUT LONG-TERM CURRENT USE OF INSULIN (H): ICD-10-CM

## 2020-10-23 RX ORDER — LISINOPRIL 5 MG/1
5 TABLET ORAL DAILY
Qty: 30 TABLET | Refills: 0 | Status: SHIPPED | OUTPATIENT
Start: 2020-10-23 | End: 2020-11-18

## 2020-10-23 NOTE — TELEPHONE ENCOUNTER
Pt is overdue for follow up.  Pt's wife, Camelia, is also a patient of Dr. Aragon's.  Please contact Camelia to scheduled LLE arterial US and in clinic appointment with Dr. Aragon to discuss results.  Please allow images to be scheduled on same day (patient is blind).    Routing to scheduling to coordinate.    VIDAL MirandaN, RN-[facility] [place]

## 2020-10-23 NOTE — TELEPHONE ENCOUNTER
Reason for Call:  Medication or medication refill    Do you use a [facility]? No       Name of the pharmacy and phone number for the current request  [facility]     Name of the medication requested  Lisinopril 5mgs     Other request asap please pt is out of medication     Can we leave a detailed message on this number? YES    Phone number patient can be reached at: Home number on file [phone] (home)    Best Time   anytime    Call taken on 10/23/2020 at 11:22 AM by Snow Celeste

## 2020-10-23 NOTE — TELEPHONE ENCOUNTER
Lisinopril    Routing refill request to provider for review/approval because:  New Rx post Hospital    Please advise if ok with signing patient did not follow up after hospital stay.    Ana Paula DRAKEN, RN, PHN

## 2020-11-17 ENCOUNTER — MYC REFILL (OUTPATIENT)
Dept: INTERNAL MEDICINE | Facility: CLINIC | Age: 60
End: 2020-11-17

## 2020-11-17 DIAGNOSIS — F41.9 ANXIETY: ICD-10-CM

## 2020-11-17 DIAGNOSIS — H54.3 VISION LOSS, BILATERAL: ICD-10-CM

## 2020-11-17 RX ORDER — ZOLPIDEM TARTRATE 10 MG/1
10 TABLET ORAL
Qty: 30 TABLET | Refills: 0 | Status: SHIPPED | OUTPATIENT
Start: 2020-11-17 | End: 2020-12-21

## 2020-11-17 NOTE — TELEPHONE ENCOUNTER
ambien      Last Written Prescription Date:  10/19/20  Last Fill Quantity: 30,   # refills: 0  Last Office Visit: 2/19/20  Future Office visit:       Routing refill request to provider for review/approval because:  Drug not on the [facility], P or [facility] refill protocol or controlled substance

## 2020-11-18 DIAGNOSIS — E11.39 TYPE 2 DIABETES MELLITUS WITH OTHER OPHTHALMIC COMPLICATION, WITHOUT LONG-TERM CURRENT USE OF INSULIN (H): ICD-10-CM

## 2020-11-18 RX ORDER — LISINOPRIL 5 MG/1
5 TABLET ORAL DAILY
Qty: 30 TABLET | Refills: 3 | Status: SHIPPED | OUTPATIENT
Start: 2020-11-18 | End: 2021-01-22

## 2020-11-18 NOTE — TELEPHONE ENCOUNTER
Prescription approved per [facility] Refill Protocol.    Ana Paula DRAKEN, RN, PHN

## 2020-12-13 ENCOUNTER — MYC REFILL (OUTPATIENT)
Dept: INTERNAL MEDICINE | Facility: CLINIC | Age: 60
End: 2020-12-13

## 2020-12-13 DIAGNOSIS — E11.9 TYPE 2 DIABETES MELLITUS WITHOUT COMPLICATION, WITHOUT LONG-TERM CURRENT USE OF INSULIN (H): ICD-10-CM

## 2020-12-13 NOTE — LETTER
[facility]  [address] [id number] [phone]            Gomez Turk  [address]  [facility] [address]        December 15, 2020    Dear Gomez,    While refilling your prescription today, we noticed that you are due for an appointment with your provider.  We will refill your prescription for 30 days, but a follow-up appointment must be made before any additional refills can be approved.     Taking care of your health is important to us and we look forward to seeing you in the near future.  Please call us at [phone] or [phone] (or use [facility]) to schedule an appointment.     Please disregard this notice if you have already made an appointment.    Sincerely,        [facility]

## 2020-12-21 ENCOUNTER — MYC REFILL (OUTPATIENT)
Dept: INTERNAL MEDICINE | Facility: CLINIC | Age: 60
End: 2020-12-21

## 2020-12-21 DIAGNOSIS — H54.3 VISION LOSS, BILATERAL: ICD-10-CM

## 2020-12-21 DIAGNOSIS — F41.9 ANXIETY: ICD-10-CM

## 2020-12-22 RX ORDER — ZOLPIDEM TARTRATE 10 MG/1
10 TABLET ORAL
Qty: 30 TABLET | Refills: 0 | Status: SHIPPED | OUTPATIENT
Start: 2020-12-22 | End: 2021-01-28

## 2020-12-22 NOTE — TELEPHONE ENCOUNTER
ambien      Last Written Prescription Date:  11/17/20  Last Fill Quantity: 30,   # refills: 0  Last Office Visit: 2/19/20  Future Office visit:       Routing refill request to provider for review/approval because:  Drug not on the [facility], P or [facility] refill protocol or controlled substance

## 2020-12-28 ENCOUNTER — TELEPHONE (OUTPATIENT)
Dept: INTERNAL MEDICINE | Facility: CLINIC | Age: 60
End: 2020-12-28

## 2020-12-28 NOTE — TELEPHONE ENCOUNTER
Reason for Call:  Other call back    Detailed comments: Pharmacy called needs orders for diabetic test strips for patient    Phone Number Patient can be reached at: Home number on file [phone] (home) [facility] pharmacy - [phone]    Best Time: anytime     Can we leave a detailed message on this number? YES    Call taken on 12/28/2020 at 2:11 PM by Simona Linn

## 2021-01-04 NOTE — TELEPHONE ENCOUNTER
Per Pharmacy this rx needs to be resent.  They have not received it.  Please call to advise.  Thank you

## 2021-01-04 NOTE — TELEPHONE ENCOUNTER
Pharmacy was called, they need a form filled out for Medicare Part B which they are faxing to [facility]. Pt is out of test strips. The pharmacist did not want to discuss the contents of the form with triage nurse.

## 2021-01-06 ENCOUNTER — TELEPHONE (OUTPATIENT)
Dept: INTERNAL MEDICINE | Facility: CLINIC | Age: 61
End: 2021-01-06

## 2021-01-06 ENCOUNTER — NURSE TRIAGE (OUTPATIENT)
Dept: NURSING | Facility: CLINIC | Age: 61
End: 2021-01-06

## 2021-01-06 NOTE — TELEPHONE ENCOUNTER
[facility] is calling and states that Gomez is getting blood glucose test strips for diabetes and are billing medicare and medicare needs a specific form filled out and [facility] pharmacy did fax, but form is incomplete.  Quantity is missing for the day supply.  How often he takes it.  Pharmacy is requesting to speak with clinic direct.  [facility]I transferred pharmacist to [facility].    COVID 19 Nurse Triage Plan/Patient Instructions    Please be aware that novel coronavirus (COVID-19) may be circulating in the community. If you develop symptoms such as fever, cough, or SOB or if you have concerns about the presence of another infection including coronavirus (COVID-19), please contact your health care provider or visit [email or web address].     Disposition/Instructions    Home care recommended. Follow home care protocol based instructions.    Thank you for taking steps to prevent the spread of this virus.  o Limit your contact with others.  o Wear a simple mask to cover your cough.  o Wash your hands well and often.    Resources    [facility]: About COVID-19: [email or web address]    CDC: What to Do If You're Sick: www.cdc.gov/coronavirus/2019-ncov/about/steps-when-sick.html    CDC: Ending Home Isolation: www.cdc.gov/coronavirus/2019-ncov/hcp/disposition-in-home-patients.html     CDC: Caring for Someone: www.cdc.gov/coronavirus/2019-ncov/if-you-are-sick/care-for-someone.html     [facility]: Interim Guidance for Hospital Discharge to Home: www.health.[facility].[place]/diseases/coronavirus/hcp/hospdischarge.pdf    [facility] clinical trials (COVID-19 research studies): [email or web address].[email or web address]/umn-clinical-trials     Below are the COVID-19 hotlines at the [facility] of Health ([facility]). Interpreters are available.   o For health questions: Call 726-165-3215 or 1-485.422.3696 (7 a.m. to 7 p.m.)  o For questions about schools and childcare: Call 282-657-3061 or 1-192.950.2644 (7 a.m. to 7 p.m.)                          Additional Information    Negative: MORE THAN A DOUBLE DOSE of a prescription or over-the-counter (OTC) drug    Negative: DOUBLE DOSE (an extra dose or lesser amount) of over-the-counter (OTC) drug and any symptoms (e.g., dizziness, nausea, pain, sleepiness)    Negative: DOUBLE DOSE (an extra dose or lesser amount) of prescription drug and any symptoms (e.g., dizziness, nausea, pain, sleepiness)    Negative: Took another person's prescription drug    Negative: DOUBLE DOSE (an extra dose or lesser amount) of prescription drug and NO symptoms (Exception: a double dose of antibiotics)    Negative: Diabetes drug error or overdose (e.g., took wrong type of insulin or took extra dose)    Negative: Caller has medication question about med not prescribed by PCP and triager unable to answer question (e.g., compatibility with other med, storage)    Request for URGENT new prescription or refill of 'essential' medication (i.e., likelihood of harm to patient if not taken) and triager unable to fill per department policy    Protocols used: MEDICATION QUESTION CALL-A-[place]

## 2021-01-06 NOTE — TELEPHONE ENCOUNTER
Brandy from [facility] pharmacy is faxing back a form for the diabetic test strips--the form has missing pieces of information  Brandy placed arrows where the info. Is needed    She needs this form faxed back asap (today)    This form is needed to bill Medicare.    Please be on the look out for the form sent back to your clinic and please fill out and fax back to [phone]    Thanks! Susana Llamas RN

## 2021-01-14 ENCOUNTER — HEALTH MAINTENANCE LETTER (OUTPATIENT)
Age: 61
End: 2021-01-14

## 2021-01-22 DIAGNOSIS — E11.39 TYPE 2 DIABETES MELLITUS WITH OTHER OPHTHALMIC COMPLICATION, WITHOUT LONG-TERM CURRENT USE OF INSULIN (H): ICD-10-CM

## 2021-01-26 RX ORDER — LISINOPRIL 5 MG/1
5 TABLET ORAL DAILY
Qty: 90 TABLET | Refills: 0 | Status: SHIPPED | OUTPATIENT
Start: 2021-01-26 | End: 2021-03-25

## 2021-01-28 ENCOUNTER — OFFICE VISIT (OUTPATIENT)
Dept: INTERNAL MEDICINE | Facility: CLINIC | Age: 61
End: 2021-01-28
Payer: COMMERCIAL

## 2021-01-28 VITALS
TEMPERATURE: 97.4 F | RESPIRATION RATE: 15 BRPM | HEART RATE: 70 BPM | SYSTOLIC BLOOD PRESSURE: 138 MMHG | WEIGHT: 216 LBS | OXYGEN SATURATION: 99 % | HEIGHT: 69 IN | DIASTOLIC BLOOD PRESSURE: 82 MMHG | BODY MASS INDEX: 31.99 KG/M2

## 2021-01-28 DIAGNOSIS — Z23 NEED FOR PROPHYLACTIC VACCINATION AND INOCULATION AGAINST INFLUENZA: ICD-10-CM

## 2021-01-28 DIAGNOSIS — H54.3 VISION LOSS, BILATERAL: ICD-10-CM

## 2021-01-28 DIAGNOSIS — E78.5 HYPERLIPIDEMIA LDL GOAL <100: ICD-10-CM

## 2021-01-28 DIAGNOSIS — Z12.5 SCREENING PSA (PROSTATE SPECIFIC ANTIGEN): ICD-10-CM

## 2021-01-28 DIAGNOSIS — F41.9 ANXIETY: ICD-10-CM

## 2021-01-28 DIAGNOSIS — E11.39 TYPE 2 DIABETES MELLITUS WITH OTHER OPHTHALMIC COMPLICATION, WITHOUT LONG-TERM CURRENT USE OF INSULIN (H): Primary | ICD-10-CM

## 2021-01-28 PROBLEM — R19.7 DIARRHEA: Status: RESOLVED | Noted: 2020-04-26 | Resolved: 2021-01-28

## 2021-01-28 LAB
ALBUMIN SERPL-MCNC: 3.7 G/DL (ref 3.4–5)
ALP SERPL-CCNC: 94 U/L (ref 40–150)
ALT SERPL W P-5'-P-CCNC: 53 U/L (ref 0–70)
ANION GAP SERPL CALCULATED.3IONS-SCNC: 2 MMOL/L (ref 3–14)
AST SERPL W P-5'-P-CCNC: 46 U/L (ref 0–45)
BILIRUB SERPL-MCNC: 0.5 MG/DL (ref 0.2–1.3)
BUN SERPL-MCNC: 16 MG/DL (ref 7–30)
CALCIUM SERPL-MCNC: 9.8 MG/DL (ref 8.5–10.1)
CHLORIDE SERPL-SCNC: 103 MMOL/L (ref 94–109)
CHOLEST SERPL-MCNC: 195 MG/DL
CO2 SERPL-SCNC: 27 MMOL/L (ref 20–32)
CREAT SERPL-MCNC: 1.24 MG/DL (ref 0.66–1.25)
GFR SERPL CREATININE-BSD FRML MDRD: 63 ML/MIN/{1.73_M2}
GLUCOSE SERPL-MCNC: 104 MG/DL (ref 70–99)
HBA1C MFR BLD: 5.7 % (ref 0–5.6)
HDLC SERPL-MCNC: 60 MG/DL
LDLC SERPL CALC-MCNC: 118 MG/DL
NONHDLC SERPL-MCNC: 135 MG/DL
POTASSIUM SERPL-SCNC: 5.4 MMOL/L (ref 3.4–5.3)
PROT SERPL-MCNC: 8 G/DL (ref 6.8–8.8)
PSA SERPL-ACNC: 1.67 UG/L (ref 0–4)
SODIUM SERPL-SCNC: 132 MMOL/L (ref 133–144)
TRIGL SERPL-MCNC: 87 MG/DL
TSH SERPL DL<=0.005 MIU/L-ACNC: 1.44 MU/L (ref 0.4–4)

## 2021-01-28 PROCEDURE — 99213 OFFICE O/P EST LOW 20 MIN: CPT | Mod: 25 | Performed by: INTERNAL MEDICINE

## 2021-01-28 PROCEDURE — 83036 HEMOGLOBIN GLYCOSYLATED A1C: CPT | Performed by: INTERNAL MEDICINE

## 2021-01-28 PROCEDURE — 84443 ASSAY THYROID STIM HORMONE: CPT | Performed by: INTERNAL MEDICINE

## 2021-01-28 PROCEDURE — G0103 PSA SCREENING: HCPCS | Performed by: INTERNAL MEDICINE

## 2021-01-28 PROCEDURE — 36415 COLL VENOUS BLD VENIPUNCTURE: CPT | Performed by: INTERNAL MEDICINE

## 2021-01-28 PROCEDURE — G0008 ADMIN INFLUENZA VIRUS VAC: HCPCS | Performed by: INTERNAL MEDICINE

## 2021-01-28 PROCEDURE — 90682 RIV4 VACC RECOMBINANT DNA IM: CPT | Performed by: INTERNAL MEDICINE

## 2021-01-28 PROCEDURE — 80053 COMPREHEN METABOLIC PANEL: CPT | Performed by: INTERNAL MEDICINE

## 2021-01-28 PROCEDURE — 80061 LIPID PANEL: CPT | Performed by: INTERNAL MEDICINE

## 2021-01-28 RX ORDER — ZOLPIDEM TARTRATE 10 MG/1
10 TABLET ORAL
Qty: 30 TABLET | Refills: 0 | Status: SHIPPED | OUTPATIENT
Start: 2021-01-28 | End: 2021-02-25

## 2021-01-28 ASSESSMENT — MIFFLIN-ST. JEOR: SCORE: 1780.15

## 2021-01-28 NOTE — PROGRESS NOTES
"    Assessment & Plan     Type 2 diabetes mellitus with other ophthalmic complication, without long-term current use of insulin (H)  Recommend annual eye exam  - Comprehensive metabolic panel  - Lipid Profile  - Hemoglobin A1c  - Albumin Random Urine Quantitative with Creat Ratio  - TSH with free T4 reflex    Vision loss, bilateral  Stable and noted chronic sleep issues from such  - zolpidem (AMBIEN) 10 MG tablet; Take 1 tablet (10 mg) by mouth nightly as needed for sleep    Hyperlipidemia LDL goal <100  Labs as fasting  - Lipid Profile    Screening PSA (prostate specific antigen)  Ordered as screenign  - PSA, screen    Anxiety  Refilled due to vision loss.  - zolpidem (AMBIEN) 10 MG tablet; Take 1 tablet (10 mg) by mouth nightly as needed for sleep      20 minutes spent on the date of the encounter doing chart review, review of test results, interpretation of tests, patient visit and documentation        Tobacco Cessation:   reports that he has been smoking cigarettes. He has been smoking about 1.00 pack per day. He has never used smokeless tobacco.  Tobacco Cessation Action Plan: Information offered: Patient not interested at this time    BMI:   Estimated body mass index is 31.9 kg/m  as calculated from the following:    Height as of this encounter: 1.753 m (5' 9\").    Weight as of this encounter: 98 kg (216 lb).   Weight management plan: Discussed healthy diet and exercise guidelines      Work on weight loss  Regular exercise    Return in about 6 months (around 7/28/2021) for diabetes check 6 months.    Tylor Roberts MD  [facility]    Chaya Dyer is a 60 year old who presents to clinic today for the following health issues  accompanied by his wife:    HPI     Patient only taking gabapentin, lisinopril, metformin and Ambien currently     Diabetes Follow-up    How often are you checking your blood sugar? A few times a week  What time of day are you checking your blood " "sugars (select all that apply)?  Before meals  Have you had any blood sugars above 200?  No  Have you had any blood sugars below 70?  No    What symptoms do you notice when your blood sugar is low?  None    What concerns do you have today about your diabetes? Other: new tingling in feet, only taking metformin PRN      Do you have any of these symptoms? (Select all that apply)  Redness, sores, or blisters on feet    Have you had a diabetic eye exam in the last 12 months? No      BP Readings from Last 2 Encounters:   04/27/20 123/50   02/19/20 136/70     Hemoglobin A1C (%)   Date Value   04/28/2020 6.5 (H)   04/26/2020 6.4 (H)     LDL Cholesterol Calculated (mg/dL)   Date Value   06/19/2019 38   03/06/2018 22       Review of Systems   CONSTITUTIONAL: NEGATIVE for fever, chills, change in weight  ENT/MOUTH: NEGATIVE for ear, mouth and throat problems  RESP: NEGATIVE for significant cough or SOB  CV: NEGATIVE for chest pain, palpitations or peripheral edema  GI: NEGATIVE for nausea, abdominal pain, heartburn, or change in bowel habits  : NEGATIVE for frequency, dysuria, or hematuria  MUSCULOSKELETAL: NEGATIVE for significant arthralgias or myalgia  NEURO: NEGATIVE for weakness, dizziness or paresthesias  HEME: NEGATIVE for bleeding problems  PSYCHIATRIC: NEGATIVE for changes in mood or affect      Objective    /82   Pulse 70   Temp 97.4  F (36.3  C) (Temporal)   Resp 15   Ht 1.753 m (5' 9\")   Wt 98 kg (216 lb)   SpO2 99%   BMI 31.90 kg/m    There is no height or weight on file to calculate BMI.  Physical Exam   GENERAL: alert and no distress  EYES: Noted decreased visual acuity with use of walking stick  NECK: no adenopathy, no asymmetry, masses, or scars and thyroid normal to palpation  RESP: lungs clear to auscultation - no rales, rhonchi or wheezes  CV: regular rate and rhythm, normal S1 S2, no S3 or S4, no click or rub  MS: no gross musculoskeletal defects noted  NEURO: No focal changes  PSYCH: " mentation appears normal, affect normal/bright

## 2021-02-25 ENCOUNTER — MYC REFILL (OUTPATIENT)
Dept: INTERNAL MEDICINE | Facility: CLINIC | Age: 61
End: 2021-02-25

## 2021-02-25 DIAGNOSIS — H54.3 VISION LOSS, BILATERAL: ICD-10-CM

## 2021-02-25 DIAGNOSIS — F41.9 ANXIETY: ICD-10-CM

## 2021-02-25 RX ORDER — ZOLPIDEM TARTRATE 10 MG/1
10 TABLET ORAL
Qty: 30 TABLET | Refills: 0 | Status: SHIPPED | OUTPATIENT
Start: 2021-02-25 | End: 2021-03-25

## 2021-03-13 DIAGNOSIS — E11.39 TYPE 2 DIABETES MELLITUS WITH OTHER OPHTHALMIC COMPLICATION, WITHOUT LONG-TERM CURRENT USE OF INSULIN (H): ICD-10-CM

## 2021-03-16 RX ORDER — GABAPENTIN 300 MG/1
300 CAPSULE ORAL 2 TIMES DAILY
Qty: 180 CAPSULE | Refills: 0 | Status: SHIPPED | OUTPATIENT
Start: 2021-03-16 | End: 2021-10-12

## 2021-03-16 NOTE — TELEPHONE ENCOUNTER
Routing refill request to provider for review/approval because:  Drug not on the [facility] refill protocol   Controlled Substance     Last Written Prescription Date:  6/1/2020  Last Fill Quantity: 180,  # refills: 0   Last office visit: 1/28/2021     Porsha Paul, MSN, RN  [facility]

## 2021-03-18 ENCOUNTER — MYC MEDICAL ADVICE (OUTPATIENT)
Dept: INTERNAL MEDICINE | Facility: CLINIC | Age: 61
End: 2021-03-18

## 2021-03-18 DIAGNOSIS — F41.9 ANXIETY: Primary | ICD-10-CM

## 2021-03-18 DIAGNOSIS — E11.39 TYPE 2 DIABETES MELLITUS WITH OTHER OPHTHALMIC COMPLICATION, WITHOUT LONG-TERM CURRENT USE OF INSULIN (H): ICD-10-CM

## 2021-03-19 RX ORDER — LANCETS
EACH MISCELLANEOUS
Qty: 200 EACH | Refills: 6 | Status: SHIPPED | OUTPATIENT
Start: 2021-03-19 | End: 2023-01-01

## 2021-03-19 NOTE — TELEPHONE ENCOUNTER
PCP please see mychart message. Patient needs new diabetic supplies.     Please sign.    Ana Paula MACHADO, RN, PHN

## 2021-03-25 ENCOUNTER — MYC REFILL (OUTPATIENT)
Dept: INTERNAL MEDICINE | Facility: CLINIC | Age: 61
End: 2021-03-25

## 2021-03-25 DIAGNOSIS — H54.3 VISION LOSS, BILATERAL: ICD-10-CM

## 2021-03-25 DIAGNOSIS — E11.39 TYPE 2 DIABETES MELLITUS WITH OTHER OPHTHALMIC COMPLICATION, WITHOUT LONG-TERM CURRENT USE OF INSULIN (H): ICD-10-CM

## 2021-03-25 DIAGNOSIS — F41.9 ANXIETY: ICD-10-CM

## 2021-03-25 RX ORDER — ZOLPIDEM TARTRATE 10 MG/1
10 TABLET ORAL
Qty: 30 TABLET | Refills: 0 | Status: SHIPPED | OUTPATIENT
Start: 2021-03-25 | End: 2021-04-22

## 2021-03-25 RX ORDER — LISINOPRIL 5 MG/1
5 TABLET ORAL DAILY
Qty: 30 TABLET | Refills: 0 | Status: SHIPPED | OUTPATIENT
Start: 2021-03-25 | End: 2021-06-21

## 2021-03-25 NOTE — TELEPHONE ENCOUNTER
Routing refill request to provider for review/approval because:  Drug not on the [facility] refill protocol   Last Written Prescription Date:  2/25/21  Last Fill Quantity: 30 # refills: 0   Last office visit: 1/28/2021 with prescribing provider:     Future Office Visit:      Thanks!    Porsha Paul, MSN, RN  Triage RN  [facility]

## 2021-03-25 NOTE — TELEPHONE ENCOUNTER
Left detailed VM (permission granted) telling pt refill was sent but he will need to have labs completed prior to next refill.  Pt to call clinic with questions.      Porsha Paul, MSN, RN  Triage RN  [facility]

## 2021-04-13 DIAGNOSIS — E11.39 TYPE 2 DIABETES MELLITUS WITH OTHER OPHTHALMIC COMPLICATION, WITHOUT LONG-TERM CURRENT USE OF INSULIN (H): Primary | ICD-10-CM

## 2021-04-13 DIAGNOSIS — E11.39 TYPE 2 DIABETES MELLITUS WITH OTHER OPHTHALMIC COMPLICATION, WITHOUT LONG-TERM CURRENT USE OF INSULIN (H): ICD-10-CM

## 2021-04-13 LAB
ALBUMIN SERPL-MCNC: 3.9 G/DL (ref 3.4–5)
ALP SERPL-CCNC: 86 U/L (ref 40–150)
ALT SERPL W P-5'-P-CCNC: 40 U/L (ref 0–70)
ANION GAP SERPL CALCULATED.3IONS-SCNC: 3 MMOL/L (ref 3–14)
AST SERPL W P-5'-P-CCNC: 49 U/L (ref 0–45)
BILIRUB DIRECT SERPL-MCNC: 0.2 MG/DL (ref 0–0.2)
BILIRUB SERPL-MCNC: 0.3 MG/DL (ref 0.2–1.3)
BUN SERPL-MCNC: 15 MG/DL (ref 7–30)
CALCIUM SERPL-MCNC: 9.4 MG/DL (ref 8.5–10.1)
CHLORIDE SERPL-SCNC: 103 MMOL/L (ref 94–109)
CO2 SERPL-SCNC: 24 MMOL/L (ref 20–32)
CREAT SERPL-MCNC: 1.04 MG/DL (ref 0.66–1.25)
CREAT UR-MCNC: 67 MG/DL
GFR SERPL CREATININE-BSD FRML MDRD: 77 ML/MIN/{1.73_M2}
GLUCOSE SERPL-MCNC: 104 MG/DL (ref 70–99)
MICROALBUMIN UR-MCNC: 38 MG/L
MICROALBUMIN/CREAT UR: 56.04 MG/G CR (ref 0–17)
POTASSIUM SERPL-SCNC: 3.9 MMOL/L (ref 3.4–5.3)
PROT SERPL-MCNC: 8.4 G/DL (ref 6.8–8.8)
SODIUM SERPL-SCNC: 130 MMOL/L (ref 133–144)

## 2021-04-13 PROCEDURE — 36415 COLL VENOUS BLD VENIPUNCTURE: CPT | Performed by: INTERNAL MEDICINE

## 2021-04-13 PROCEDURE — 80048 BASIC METABOLIC PNL TOTAL CA: CPT | Performed by: INTERNAL MEDICINE

## 2021-04-13 PROCEDURE — 82043 UR ALBUMIN QUANTITATIVE: CPT | Performed by: INTERNAL MEDICINE

## 2021-04-13 PROCEDURE — 80076 HEPATIC FUNCTION PANEL: CPT | Performed by: INTERNAL MEDICINE

## 2021-04-15 ENCOUNTER — APPOINTMENT (OUTPATIENT)
Dept: CT IMAGING | Facility: CLINIC | Age: 61
End: 2021-04-15
Attending: PHYSICIAN ASSISTANT
Payer: COMMERCIAL

## 2021-04-15 ENCOUNTER — HOSPITAL ENCOUNTER (EMERGENCY)
Facility: CLINIC | Age: 61
Discharge: HOME | End: 2021-04-16
Attending: PHYSICIAN ASSISTANT | Admitting: PHYSICIAN ASSISTANT
Payer: COMMERCIAL

## 2021-04-15 DIAGNOSIS — S22.080A COMPRESSION FRACTURE OF T11 VERTEBRA, INITIAL ENCOUNTER (H): ICD-10-CM

## 2021-04-15 DIAGNOSIS — S22.080A COMPRESSION FRACTURE OF T12 VERTEBRA, INITIAL ENCOUNTER (H): ICD-10-CM

## 2021-04-15 DIAGNOSIS — S32.010A COMPRESSION FRACTURE OF L1 VERTEBRA, INITIAL ENCOUNTER (H): ICD-10-CM

## 2021-04-15 LAB
ALBUMIN SERPL-MCNC: 3.6 G/DL (ref 3.4–5)
ALP SERPL-CCNC: 82 U/L (ref 40–150)
ALT SERPL W P-5'-P-CCNC: 40 U/L (ref 0–70)
ANION GAP SERPL CALCULATED.3IONS-SCNC: 5 MMOL/L (ref 3–14)
AST SERPL W P-5'-P-CCNC: 48 U/L (ref 0–45)
BASOPHILS # BLD AUTO: 0.1 10E9/L (ref 0–0.2)
BASOPHILS NFR BLD AUTO: 0.7 %
BILIRUB SERPL-MCNC: 0.5 MG/DL (ref 0.2–1.3)
BUN SERPL-MCNC: 21 MG/DL (ref 7–30)
CALCIUM SERPL-MCNC: 9.4 MG/DL (ref 8.5–10.1)
CHLORIDE SERPL-SCNC: 101 MMOL/L (ref 94–109)
CO2 SERPL-SCNC: 25 MMOL/L (ref 20–32)
CREAT SERPL-MCNC: 1.02 MG/DL (ref 0.66–1.25)
DIFFERENTIAL METHOD BLD: ABNORMAL
EOSINOPHIL # BLD AUTO: 0.1 10E9/L (ref 0–0.7)
EOSINOPHIL NFR BLD AUTO: 1.3 %
ERYTHROCYTE [DISTWIDTH] IN BLOOD BY AUTOMATED COUNT: 13.6 % (ref 10–15)
GFR SERPL CREATININE-BSD FRML MDRD: 79 ML/MIN/{1.73_M2}
GLUCOSE SERPL-MCNC: 108 MG/DL (ref 70–99)
HCT VFR BLD AUTO: 36.4 % (ref 40–53)
HGB BLD-MCNC: 12.9 G/DL (ref 13.3–17.7)
IMM GRANULOCYTES # BLD: 0 10E9/L (ref 0–0.4)
IMM GRANULOCYTES NFR BLD: 0.3 %
INTERPRETATION ECG - MUSE: NORMAL
LYMPHOCYTES # BLD AUTO: 1.6 10E9/L (ref 0.8–5.3)
LYMPHOCYTES NFR BLD AUTO: 18.4 %
MCH RBC QN AUTO: 35.7 PG (ref 26.5–33)
MCHC RBC AUTO-ENTMCNC: 35.4 G/DL (ref 31.5–36.5)
MCV RBC AUTO: 101 FL (ref 78–100)
MONOCYTES # BLD AUTO: 0.8 10E9/L (ref 0–1.3)
MONOCYTES NFR BLD AUTO: 8.6 %
NEUTROPHILS # BLD AUTO: 6.3 10E9/L (ref 1.6–8.3)
NEUTROPHILS NFR BLD AUTO: 70.7 %
NRBC # BLD AUTO: 0 10*3/UL
NRBC BLD AUTO-RTO: 0 /100
PLATELET # BLD AUTO: 257 10E9/L (ref 150–450)
POTASSIUM SERPL-SCNC: 4.2 MMOL/L (ref 3.4–5.3)
PROT SERPL-MCNC: 7.7 G/DL (ref 6.8–8.8)
RBC # BLD AUTO: 3.61 10E12/L (ref 4.4–5.9)
SODIUM SERPL-SCNC: 131 MMOL/L (ref 133–144)
WBC # BLD AUTO: 8.9 10E9/L (ref 4–11)

## 2021-04-15 PROCEDURE — 99284 EMERGENCY DEPT VISIT MOD MDM: CPT | Mod: 25

## 2021-04-15 PROCEDURE — 85025 COMPLETE CBC W/AUTO DIFF WBC: CPT | Performed by: PHYSICIAN ASSISTANT

## 2021-04-15 PROCEDURE — 70450 CT HEAD/BRAIN W/O DYE: CPT

## 2021-04-15 PROCEDURE — 250N000011 HC RX IP 250 OP 636: Performed by: PHYSICIAN ASSISTANT

## 2021-04-15 PROCEDURE — 250N000013 HC RX MED GY IP 250 OP 250 PS 637: Performed by: EMERGENCY MEDICINE

## 2021-04-15 PROCEDURE — 96374 THER/PROPH/DIAG INJ IV PUSH: CPT | Mod: 59

## 2021-04-15 PROCEDURE — 80053 COMPREHEN METABOLIC PANEL: CPT | Performed by: PHYSICIAN ASSISTANT

## 2021-04-15 PROCEDURE — 81001 URINALYSIS AUTO W/SCOPE: CPT | Performed by: PHYSICIAN ASSISTANT

## 2021-04-15 PROCEDURE — 72131 CT LUMBAR SPINE W/O DYE: CPT

## 2021-04-15 PROCEDURE — 22310 CLOSED TX VERT FX W/O MANJ: CPT

## 2021-04-15 PROCEDURE — 250N000013 HC RX MED GY IP 250 OP 250 PS 637: Performed by: PHYSICIAN ASSISTANT

## 2021-04-15 RX ORDER — OXYCODONE HYDROCHLORIDE 5 MG/1
5 TABLET ORAL ONCE
Status: COMPLETED | OUTPATIENT
Start: 2021-04-15 | End: 2021-04-15

## 2021-04-15 RX ORDER — ACETAMINOPHEN 500 MG
1000 TABLET ORAL ONCE
Status: COMPLETED | OUTPATIENT
Start: 2021-04-15 | End: 2021-04-15

## 2021-04-15 RX ORDER — KETOROLAC TROMETHAMINE 15 MG/ML
15 INJECTION, SOLUTION INTRAMUSCULAR; INTRAVENOUS ONCE
Status: COMPLETED | OUTPATIENT
Start: 2021-04-15 | End: 2021-04-15

## 2021-04-15 RX ORDER — LIDOCAINE 4 G/G
1 PATCH TOPICAL ONCE
Status: DISCONTINUED | OUTPATIENT
Start: 2021-04-15 | End: 2021-04-16 | Stop reason: HOSPADM

## 2021-04-15 RX ADMIN — OXYCODONE HYDROCHLORIDE 5 MG: 5 TABLET ORAL at 21:51

## 2021-04-15 RX ADMIN — ACETAMINOPHEN 1000 MG: 500 TABLET, FILM COATED ORAL at 19:10

## 2021-04-15 RX ADMIN — KETOROLAC TROMETHAMINE 15 MG: 15 INJECTION, SOLUTION INTRAMUSCULAR; INTRAVENOUS at 22:16

## 2021-04-15 RX ADMIN — LIDOCAINE 1 PATCH: 560 PATCH PERCUTANEOUS; TOPICAL; TRANSDERMAL at 21:58

## 2021-04-15 ASSESSMENT — ENCOUNTER SYMPTOMS
BACK PAIN: 1
FEVER: 0
ABDOMINAL PAIN: 0
CHILLS: 0
DIZZINESS: 0
SHORTNESS OF BREATH: 0
HEADACHES: 0
LIGHT-HEADEDNESS: 0

## 2021-04-15 ASSESSMENT — MIFFLIN-ST. JEOR: SCORE: 1755.2

## 2021-04-16 VITALS
TEMPERATURE: 98.2 F | DIASTOLIC BLOOD PRESSURE: 69 MMHG | RESPIRATION RATE: 18 BRPM | BODY MASS INDEX: 32.43 KG/M2 | HEART RATE: 82 BPM | HEIGHT: 68 IN | OXYGEN SATURATION: 100 % | SYSTOLIC BLOOD PRESSURE: 116 MMHG | WEIGHT: 214 LBS

## 2021-04-16 LAB
ALBUMIN UR-MCNC: 50 MG/DL
APPEARANCE UR: CLEAR
BILIRUB UR QL STRIP: NEGATIVE
COLOR UR AUTO: YELLOW
GLUCOSE UR STRIP-MCNC: NEGATIVE MG/DL
HGB UR QL STRIP: NEGATIVE
HYALINE CASTS #/AREA URNS LPF: 15 /LPF (ref 0–2)
KETONES UR STRIP-MCNC: 10 MG/DL
LEUKOCYTE ESTERASE UR QL STRIP: NEGATIVE
MUCOUS THREADS #/AREA URNS LPF: PRESENT /LPF
NITRATE UR QL: NEGATIVE
PH UR STRIP: 5.5 PH (ref 5–7)
RBC #/AREA URNS AUTO: 2 /HPF (ref 0–2)
SOURCE: ABNORMAL
SP GR UR STRIP: 1.03 (ref 1–1.03)
SQUAMOUS #/AREA URNS AUTO: <1 /HPF (ref 0–1)
UROBILINOGEN UR STRIP-MCNC: 0 MG/DL (ref 0–2)
WBC #/AREA URNS AUTO: 2 /HPF (ref 0–5)

## 2021-04-16 RX ORDER — METHOCARBAMOL 750 MG/1
750 TABLET, FILM COATED ORAL 3 TIMES DAILY PRN
Qty: 15 TABLET | Refills: 0 | Status: SHIPPED | OUTPATIENT
Start: 2021-04-16 | End: 2021-04-21

## 2021-04-16 RX ORDER — OXYCODONE HYDROCHLORIDE 5 MG/1
2.5 TABLET ORAL EVERY 6 HOURS PRN
Qty: 8 TABLET | Refills: 0 | Status: SHIPPED | OUTPATIENT
Start: 2021-04-16 | End: 2021-10-12

## 2021-04-16 RX ORDER — LIDOCAINE 50 MG/G
1 PATCH TOPICAL EVERY 24 HOURS
Qty: 10 PATCH | Refills: 0 | Status: SHIPPED | OUTPATIENT
Start: 2021-04-16 | End: 2021-04-26

## 2021-04-16 NOTE — ED PROVIDER NOTES
"  History   Chief Complaint  Back Pain    HPI  Gomez Turk is a 60 year old male with a history of bilateral midline low back pain, type II diabetes, hyperlipidemia, and hypertension, who presents for evaluation of low back pain. The patient reports that he has fallen several times in the past month or so. He reports that his legs feel weak which leads to the falls. Since his most recent fall, he has been experiencing extensive low back pain that stretches from his buttocks to the middle of his back. The pain has been getting progressively worse and is significantly exacerbated by moving his legs, prompting his presentation this evening.  He describes the pain as a sharp piercing sensation. He denies any abdominal pain but notes that his \"insides hurt.\" He also endorses some scrotal pain but denies any penile pain, fevers, chills, chest pain, shortness of breath, lightheadedness, dizziness, or headaches. He has been taking Tylenol without any relief in his symptoms.     Review of Systems   Constitutional: Negative for chills and fever.   Respiratory: Negative for shortness of breath.    Cardiovascular: Negative for chest pain.   Gastrointestinal: Negative for abdominal pain.   Genitourinary: Negative for penile swelling.   Musculoskeletal: Positive for back pain.   Neurological: Negative for dizziness, light-headedness and headaches.   All other systems reviewed and are negative.    Allergies  The patient has no known allergies.     Medications  Aspirin  Lipitor  Celexa  Plavix  Gabapentin  Amaryl  Lisinopril  Metformin  Viagra  Ambien    Past Medical History  Anxiety  Diverticulosis  GERD  Hyperlipidemia  Hypertension  PAD  Tobacco use disorder  Type II diabetes  Legally blind  Cholecystitis  Bilateral midline low back pain    Past Surgical History  Toe Amputation  Bypass graft femoropopliteal  Deviated septum repair  I&D foot  cholecystectomy    Family History  Diabetes  Lipids  Melanoma    Social " "History  Presents to the ED with his wife.     Physical Exam     Patient Vitals for the past 24 hrs:   BP Temp Temp src Pulse Resp SpO2 Height Weight   04/15/21 1901 116/69 98.2  F (36.8  C) Oral 82 18 98 % 1.727 m (5' 8\") 97.1 kg (214 lb)     Physical Exam  General: Alert and interactive.   Eyes: The pupils are equal, round, and reactive to light. Blind. No scleral icterus.   ENT:      Nose: No obvious sign of deformity. Nares are patent.   Ears: No erythema or swelling to the exterior ear.   Mouth/Throat: No erythema of posterior oropharynx. No obvious exudate.   Mucus membranes are moist.    Neck: Normal range of motion. No nuchal rigidity.Trachea is in the midline       CV: Regular rate and rhythm. S1 and S2 normal without murmur, click, gallop or rub.   Resp: Breath sounds are clear bilaterally, without rhonchi, wheezes, rales. Non-labored, no retractions or accessory muscle use.     GI: Abdomen is soft without distension. No tenderness to palpation. No peritoneal signs.    MS: Normal strength in all 4 extremities. No midline cervical, thoracic tenderness. Tenderness diffusely to lumbar paraspinal muscles, worse with movement.   Skin:  Warm and dry. No rash or lesions noted.  Neuro: Alert and oriented x 3. Normal sensation in the lower extremities bilaterally. Normal dorsiflexion, plantarflexion, and great toe extension bilaterally. SLR positive for inducing pain in back, but no radicular symptoms. CN II-XII grossly intact. Strength 5/5 and sensation intact in all four extremities.     Psych: Awake. Alert.  Normal affect. Appropriate interactions.  Lymph: No anterior or posterior cervical lymphadenopathy noted.    Emergency Department Course   ECG:   ECG taken at 2143  Normal sinus rhythm. Normal ECG.   No significant changes as compared to prior, dated 4/26/20.   Rate 65 bpm. NV interval 158 ms. QRS duration 66 ms. QT/QTc 400/416 ms. P-R-T axes 55 46 61.    Imaging:  CT Head without contrast:   1.  No CT " evidence for acute intracranial process.   2.  Brain atrophy and presumed chronic microvascular ischemic changes as above. as per radiology.    CT Lumbar Spine without contrast:   1.  Acute superior endplate, anterior wedge compression fracture deformity involving T12 with approximately 30-40% height loss anteriorly. No dorsal osseous retropulsion or spinal canal compromise.   2.  Age-indeterminate superior endplate compression fracture deformity involving the L1 vertebral body, asymmetric to the left, with approximately 50% height loss noted anteriorly. No dorsal osseous retropulsion or spinal canal compromise. Consider MRI of the lumbar spine for further assessment.   3.  Age-indeterminate anterior wedge compression fracture deformity involving the T11 vertebral body with approximately 30-40% height loss noted anteriorly. as per radiology.    Laboratory:  CBC: WBC: 8.9, HGB: 12.9 (L), PLT: 257  CMP: Glucose 108 (H), Na 131 (L), AST 48 (H), o/w WNL (Creatinine: 1.02)    UA with Microscopic: ketones 10 (A), protein albumin 50 (A), mucous present (A), hyaline casts 15 (H), o/w WNL    Emergency Department Course:  Reviewed:  I reviewed nursing notes, vitals and past medical history    Assessments:  2120 I physically examined the patient as documented above.    0006 I rechecked the patient.     0038 I updated the patient on the results of his imaging.     Consults:   0037 Dr. Zayas called me to give an update on the results of the patient's lumbar CT spine.     Interventions:  1910 Tylenol 1,000 mg PO  2151 Roxicodone 5 mg PO  2159 Lidocare patch transdermal  2216 Toradol 15 mg IV    Disposition:  The patient was discharged to home.     Impression & Plan   Medical Decision Making:  Gomez Turk is a 60 year old male who presents for evaluation of back pain after multiple falls at home. CT reveals multiple compression fractures. L1 compression fracture appears acute. T11 and T12 are subacute or chronic. Labs are  reassuring without any significant leukocytosis or anemia. He has mild hyponatremia and a slightly elevated AST at 48. No signs of urinary tract infection. EKG shows no new ischemic changes or arrhythmias as a cause for his generalized weakness. CT of the head is obtained that shows no signs of intracranial hemorrhage. I went to recheck the patient, at which point I was told that the patient's wife is worried about his alcohol consumption. The patient states that he is drinking because he is in so much pain. He is not clinically intoxicated here and does not appear to be withdrawing from alcohol. He is not interested in detox or any services for alcohol cessation. I encouraged him to stop drinking, as this may be leading to his weakness. No saddle anesthesia, weakness in LE or CT evidence of canal compromise to suggest cauda equina. Discussed care for the back fractures, which includes medications, PT, orthopedic referral. Return for worsening pain, fevers, weakness in LE.     Diagnosis:    ICD-10-CM    1. Compression fracture of L1 vertebra, initial encounter (H)  S32.010A    2. Compression fracture of T12 vertebra, initial encounter (H)  S22.080A    3. Compression fracture of T11 vertebra, initial encounter (H)  S22.080A      Discharge Medications:  New Prescriptions    LIDOCAINE (LIDODERM) 5 % PATCH    Place 1 patch onto the skin every 24 hours for 10 days    METHOCARBAMOL (ROBAXIN) 750 MG TABLET    Take 1 tablet (750 mg) by mouth 3 times daily as needed for muscle spasms    OXYCODONE (ROXICODONE) 5 MG TABLET    Take 0.5 tablets (2.5 mg) by mouth every 6 hours as needed for pain     Scribe Disclosure:  I, Omar Clark, am serving as a scribe at 9:07 PM on 4/15/2021 to document services personally performed by Deirdre Schaffer, * based on my observations and the provider's statements to me.      Deirdre Schaffer PA-C  04/16/21 0142

## 2021-04-16 NOTE — DISCHARGE INSTRUCTIONS
Take Robaxin, Ibuprofen, Tylenol for pain.   Use Lidocaine patches for pain.   Take 2.5 to 5 mg Oxycodone as needed for breakthrough pain. Beware that this can make you constipation and it can make you dizzy.   Follow up with [facility] or [facility], whoever can see you quicker!    Discharge Instructions  Back Pain  You were seen today for back pain. Back pain can have many causes, but most will get better without surgery or other specific treatment. Sometimes there is a herniated ( slipped ) disc. We do not usually do MRI scans to look for these right away, since most herniated discs will get better on their own with time.  Today, we did not find any evidence that your back pain was caused by a serious condition. However, sometimes symptoms develop over time and cannot be found during an emergency visit, so it is very important that you follow up with your primary provider.  Generally, every Emergency Department visit should have a follow-up clinic visit with either a primary or a specialty clinic/provider. Please follow-up as instructed by your emergency provider today.    Return to the Emergency Department if:  You develop a fever with your back pain.   You have weakness or change in sensation in one or both legs.  You lose control of your bowels or bladder, or cannot empty your bladder (cannot pee).  Your pain gets much worse.     Follow-up with your provider:  Unless your pain has completely gone away, please make an appointment with your provider within one week. Most of the routine care for back pain is available in a clinic and not the Emergency Department. You may need further management of your back pain, such as more pain medication, imaging such as an X-ray or MRI, or physical therapy.    What can I do to help myself?  Remain Active -- People are often afraid that they will hurt their back further or delay recovery by remaining active, but this is one of the best things you can do  for your back. In fact, staying in bed for a long time to rest is not recommended. Studies have shown that people with low back pain recover faster when they remain active. Movement helps to bring blood flow to the muscles and relieve muscle spasms as well as preventing loss of muscle strength.  Heat -- Using a heating pad can help with low back pain during the first few weeks. Do not sleep with a heating pad, as you can be burned.   Pain medications - You may take a pain medication such as Tylenol  (acetaminophen), Advil , Motrin  (ibuprofen) or Aleve  (naproxen).  If you were given a prescription for medicine here today, be sure to read all of the information (including the package insert) that comes with your prescription.  This will include important information about the medicine, its side effects, and any warnings that you need to know about.  The pharmacist who fills the prescription can provide more information and answer questions you may have about the medicine.  If you have questions or concerns that the pharmacist cannot address, please call or return to the Emergency Department.   Remember that you can always come back to the Emergency Department if you are not able to see your regular provider in the amount of time listed above, if you get any new symptoms, or if there is anything that worries you.

## 2021-04-16 NOTE — ED TRIAGE NOTES
Wife states pt has had 2 falls in the last month, last one was 3 weeks. Has not been evaluated since the fall, states pain is getting worse. States his legs just gave out for the first fall.

## 2021-04-16 NOTE — ED NOTES
md in to f/u with pt. Pt calm and alert w/o complaint. Iv removed. Pt dcd with rx and instructions. Pt assisted to MV by wc.

## 2021-04-22 ENCOUNTER — MYC REFILL (OUTPATIENT)
Dept: INTERNAL MEDICINE | Facility: CLINIC | Age: 61
End: 2021-04-22

## 2021-04-22 DIAGNOSIS — H54.3 VISION LOSS, BILATERAL: ICD-10-CM

## 2021-04-22 DIAGNOSIS — F41.9 ANXIETY: ICD-10-CM

## 2021-04-22 RX ORDER — CITALOPRAM HYDROBROMIDE 40 MG/1
TABLET ORAL
Qty: 90 TABLET | Refills: 0 | Status: SHIPPED | OUTPATIENT
Start: 2021-04-22 | End: 2021-08-09

## 2021-04-22 RX ORDER — ZOLPIDEM TARTRATE 10 MG/1
10 TABLET ORAL
Qty: 30 TABLET | Refills: 0 | Status: SHIPPED | OUTPATIENT
Start: 2021-04-22 | End: 2021-05-23

## 2021-04-22 NOTE — TELEPHONE ENCOUNTER
Routing refill request to provider for review/approval because:  Drug not on the [facility] or [facility] refill protocol or controlled substance

## 2021-05-05 DIAGNOSIS — E11.39 TYPE 2 DIABETES MELLITUS WITH OTHER OPHTHALMIC COMPLICATION, WITHOUT LONG-TERM CURRENT USE OF INSULIN (H): ICD-10-CM

## 2021-05-05 LAB
ALBUMIN SERPL-MCNC: 3.5 G/DL (ref 3.4–5)
ALP SERPL-CCNC: 104 U/L (ref 40–150)
ALT SERPL W P-5'-P-CCNC: 39 U/L (ref 0–70)
ANION GAP SERPL CALCULATED.3IONS-SCNC: 5 MMOL/L (ref 3–14)
AST SERPL W P-5'-P-CCNC: 41 U/L (ref 0–45)
BILIRUB SERPL-MCNC: 0.4 MG/DL (ref 0.2–1.3)
BUN SERPL-MCNC: 18 MG/DL (ref 7–30)
CALCIUM SERPL-MCNC: 8.9 MG/DL (ref 8.5–10.1)
CHLORIDE SERPL-SCNC: 100 MMOL/L (ref 94–109)
CO2 SERPL-SCNC: 27 MMOL/L (ref 20–32)
CREAT SERPL-MCNC: 1.28 MG/DL (ref 0.66–1.25)
GFR SERPL CREATININE-BSD FRML MDRD: 60 ML/MIN/{1.73_M2}
GLUCOSE SERPL-MCNC: 103 MG/DL (ref 70–99)
POTASSIUM SERPL-SCNC: 3.8 MMOL/L (ref 3.4–5.3)
PROT SERPL-MCNC: 7.6 G/DL (ref 6.8–8.8)
SODIUM SERPL-SCNC: 132 MMOL/L (ref 133–144)

## 2021-05-05 PROCEDURE — 80053 COMPREHEN METABOLIC PANEL: CPT | Performed by: INTERNAL MEDICINE

## 2021-05-05 PROCEDURE — 36415 COLL VENOUS BLD VENIPUNCTURE: CPT | Performed by: INTERNAL MEDICINE

## 2021-05-08 ENCOUNTER — HEALTH MAINTENANCE LETTER (OUTPATIENT)
Age: 61
End: 2021-05-08

## 2021-05-18 ENCOUNTER — TRANSFERRED RECORDS (OUTPATIENT)
Dept: HEALTH INFORMATION MANAGEMENT | Facility: CLINIC | Age: 61
End: 2021-05-18

## 2021-05-23 ENCOUNTER — MYC REFILL (OUTPATIENT)
Dept: INTERNAL MEDICINE | Facility: CLINIC | Age: 61
End: 2021-05-23

## 2021-05-23 DIAGNOSIS — H54.3 VISION LOSS, BILATERAL: ICD-10-CM

## 2021-05-23 DIAGNOSIS — F41.9 ANXIETY: ICD-10-CM

## 2021-05-24 RX ORDER — ZOLPIDEM TARTRATE 10 MG/1
10 TABLET ORAL
Qty: 30 TABLET | Refills: 0 | Status: SHIPPED | OUTPATIENT
Start: 2021-05-24 | End: 2021-06-21

## 2021-05-26 ENCOUNTER — DOCUMENTATION ONLY (OUTPATIENT)
Dept: LAB | Facility: CLINIC | Age: 61
End: 2021-05-26

## 2021-05-26 ENCOUNTER — MEDICAL CORRESPONDENCE (OUTPATIENT)
Dept: HEALTH INFORMATION MANAGEMENT | Facility: CLINIC | Age: 61
End: 2021-05-26

## 2021-05-26 DIAGNOSIS — M54.50 LUMBAGO: Primary | ICD-10-CM

## 2021-06-04 ENCOUNTER — IMMUNIZATION (OUTPATIENT)
Dept: NURSING | Facility: CLINIC | Age: 61
End: 2021-06-04
Payer: COMMERCIAL

## 2021-06-04 PROCEDURE — 0001A PR COVID VAC PFIZER DIL RECON 30 MCG/0.3 ML IM: CPT

## 2021-06-04 PROCEDURE — 91300 PR COVID VAC PFIZER DIL RECON 30 MCG/0.3 ML IM: CPT

## 2021-06-21 ENCOUNTER — MYC REFILL (OUTPATIENT)
Dept: INTERNAL MEDICINE | Facility: CLINIC | Age: 61
End: 2021-06-21

## 2021-06-21 DIAGNOSIS — F41.9 ANXIETY: ICD-10-CM

## 2021-06-21 DIAGNOSIS — H54.3 VISION LOSS, BILATERAL: ICD-10-CM

## 2021-06-21 DIAGNOSIS — E11.39 TYPE 2 DIABETES MELLITUS WITH OTHER OPHTHALMIC COMPLICATION, WITHOUT LONG-TERM CURRENT USE OF INSULIN (H): ICD-10-CM

## 2021-06-21 RX ORDER — LISINOPRIL 5 MG/1
5 TABLET ORAL DAILY
Qty: 90 TABLET | Refills: 0 | Status: SHIPPED | OUTPATIENT
Start: 2021-06-21 | End: 2021-10-14

## 2021-06-21 RX ORDER — ZOLPIDEM TARTRATE 10 MG/1
10 TABLET ORAL
Qty: 30 TABLET | Refills: 0 | Status: SHIPPED | OUTPATIENT
Start: 2021-06-21 | End: 2021-07-17

## 2021-06-21 NOTE — TELEPHONE ENCOUNTER
Zolpidem  Routing refill request to provider for review/approval because:  Drug not on the [facility] refill protocol   Last Written Prescription Date:  5/24/2021  Last Fill Quantity: 30,  # refills: 0   Last office visit: 1/28/2021 with prescribing provider:  Dr. Roberts   Future Office Visit:  None

## 2021-06-21 NOTE — TELEPHONE ENCOUNTER
Lisinopril   Routing refill request to provider for review/approval because:  Labs out of range:  CR    Creatinine   Date Value Ref Range Status   05/05/2021 1.28 (H) 0.66 - 1.25 mg/dL Final

## 2021-06-25 ENCOUNTER — IMMUNIZATION (OUTPATIENT)
Dept: NURSING | Facility: CLINIC | Age: 61
End: 2021-06-25
Attending: INTERNAL MEDICINE
Payer: COMMERCIAL

## 2021-06-25 PROCEDURE — 0002A PR COVID VAC PFIZER DIL RECON 30 MCG/0.3 ML IM: CPT

## 2021-06-25 PROCEDURE — 91300 PR COVID VAC PFIZER DIL RECON 30 MCG/0.3 ML IM: CPT

## 2021-07-17 ENCOUNTER — MYC REFILL (OUTPATIENT)
Dept: INTERNAL MEDICINE | Facility: CLINIC | Age: 61
End: 2021-07-17

## 2021-07-17 DIAGNOSIS — F41.9 ANXIETY: ICD-10-CM

## 2021-07-17 DIAGNOSIS — H54.3 VISION LOSS, BILATERAL: ICD-10-CM

## 2021-07-18 DIAGNOSIS — E11.39 TYPE 2 DIABETES MELLITUS WITH OTHER OPHTHALMIC COMPLICATION, WITHOUT LONG-TERM CURRENT USE OF INSULIN (H): ICD-10-CM

## 2021-07-19 RX ORDER — LISINOPRIL 5 MG/1
5 TABLET ORAL DAILY
Qty: 30 TABLET | Refills: 0 | OUTPATIENT
Start: 2021-07-19

## 2021-07-19 RX ORDER — ZOLPIDEM TARTRATE 10 MG/1
10 TABLET ORAL
Qty: 30 TABLET | Refills: 0 | Status: SHIPPED | OUTPATIENT
Start: 2021-07-19 | End: 2021-08-18

## 2021-07-19 NOTE — TELEPHONE ENCOUNTER
Routing refill request to provider for review/approval because:  Drug not on the [facility] refill protocol     Pending Prescriptions:                       Disp   Refills    zolpidem (AMBIEN) 10 MG tablet            30 tab*0            Sig: Take 1 tablet (10 mg) by mouth nightly as needed           for sleep , do not take with pain medications    Last Written Prescription Date: 06/21/21  Last Fill Quantity:30,  # refills: 0  Last office visit: 1/28/2021 with prescribing provider:    Future Office Visit:  none    Kandi Pires RN  [facility]

## 2021-07-19 NOTE — TELEPHONE ENCOUNTER
Routing refill request to provider for review/approval because:  Labs out of range and due for office visit per last note.   Creatinine   Date Value Ref Range Status   05/05/2021 1.28 (H) 0.66 - 1.25 mg/dL Final         Also routing to TC to reach out and schedule patient.    Kandi Pires, RN  [facility]

## 2021-07-19 NOTE — TELEPHONE ENCOUNTER
Unclear on refill request this patient was given 90 tablets per last refill and appears should not be out     Disp Refills Start End KHARI   lisinopril (ZESTRIL) 5 MG tablet 90 tablet 0 6/21/2021  No   Sig - Route: Take 1 tablet (5 mg) by mouth daily - Oral   Sent to pharmacy as: Lisinopril 5 MG Oral Tablet (ZESTRIL)   Class: E-Prescribe   Order: [id number]   E-Prescribing Status: Receipt confirmed by pharmacy (6/21/2021 12:08 PM CDT)

## 2021-07-20 NOTE — ED PROVIDER NOTES
"  History     Chief Complaint:  Nausea, Vomiting, & Diarrhea and Shortness of Breath      The history is provided by the EMS personnel. The history is limited by the condition of the patient.      Gomez Turk is a 59 year old male with a history of type two diabetes, hypertension, hyperlipidemia, and CKD stage 3 who presents via EMS from home for nausea, vomiting, diarrhea, shortness of breath, weakness, neck pain with an onset this morning. Patient was hypotensive for EMS in the 80's with a blood sugar in the 190's, and spO2 in 98 on RA.  The patient is retired.  His wife works for [employer].  The patient has had diarrhea for over a month.  He has not been on antibiotics recently.  The patient has had multiple episodes of emesis and dry heaves this morning.  The patient had a syncopal episode.  He did not have any injury from this.  The patient reports a chronic cough.  The patient reports that he smokes cigarettes.  He reports \"I am a chain smoker.\" The patient does not feel that he has a worse cough compared to baseline.  He is not short of breath laying down, but is fatigued when walking and short of breath.  The patient mentions some neck stiffness, but no headache.  The patient does report he took his blood pressure medicine this afternoon.    Allergies:  No known Drug allergies     Medications:    amlodipine  aspirin EC 81  atorvastatin  citalopram  clopidogrel  gabapentin  glimepiride  lisinopril  metformin  sildenafil  zolpidem    Past Medical History:    Diverticulosis without bleeding  Hyperlipidemia  Legally blind  Type two diabetes  CKD3  hypertension    Past Surgical History:    Amputate toes, left  Bypass graft, femoropopliteal, left  Deviated septum repair  Irrigation and debridement foot, left  Laparoscopic cholecystectomy    Family History:    Mother - diabetes, lipids, melanoma    Social History:  Tobacco use: Current every day smoker, 1 pack/day  Alcohol use: yes, once a day  Drug use: " no  Marital Status:   [2]     Review of Systems   Constitutional: Negative for fever.   HENT: Negative for ear pain and sore throat.    Eyes: Negative for redness.   Respiratory: Positive for cough and wheezing. Negative for shortness of breath.    Cardiovascular: Negative for chest pain.   Gastrointestinal: Positive for diarrhea, nausea and vomiting. Negative for abdominal pain and blood in stool.   Genitourinary: Negative for dysuria.   Musculoskeletal: Positive for neck pain. Negative for back pain.   Skin: Negative for rash.   All other systems reviewed and are negative.    Physical Exam     Patient Vitals for the past 24 hrs:   BP Temp Temp src Pulse Heart Rate Resp SpO2 Weight   04/26/20 1937 109/50 97.8  F (36.6  C) Oral 68 68 19 99 % 95.3 kg (210 lb)   04/26/20 1900 125/65 -- -- -- -- 26 100 % --   04/26/20 1845 98/51 -- -- 58 -- -- 98 % --   04/26/20 1815 -- -- -- -- 62 14 100 % --   04/26/20 1812 -- -- -- -- 58 27 97 % --   04/26/20 1800 94/53 -- -- 61 -- -- 98 % --   04/26/20 1752 (!) 86/52 97.8  F (36.6  C) Temporal 59 -- 14 98 % --       Physical Exam  Physical Exam   General:  Sitting on bed.   HENT:  No obvious trauma to head  Right Ear:  External ear normal.   Left Ear:  External ear normal.   Nose:  Nose normal.   Eyes:  Conjunctivae and EOM are normal. Pupils are equal, round, and reactive.   Neck: Normal range of motion. Neck supple. No tracheal deviation present.   CV:  Normal heart sounds. No murmur heard.  Pulm/Chest: scattered wheezes and course breath sounds B/L.  Abd: Soft. No distension. There is no tenderness. There is no rigidity, no rebound and no guarding. Neg Gilbert's and Neg McBurney's.  M/S: Normal range of motion.   Neuro: Alert. GCS 15. No meningeal signs.  Skin: Skin is warm and dry. No rash noted. Not diaphoretic.   Psych: Normal mood and affect. Behavior is normal.       Emergency Department Course     ECG:  Indication: Shortness of Breath  Time: 1805  Vent. Rate 60 bpm.  WI interval 172. QRS duration 78. QT/QTc 472/472. P-R-T axis 43 20 40.    Normal Sinus Rhythm. No significant change compared to EKG dated 06/08/18. Read time: 1800     Imaging:  Radiology findings were communicated with the patient who voiced understanding of the findings.    XR Chest Port 1 View    IMPRESSION: No acute cardiopulmonary disease.    Reading per Radiology    Laboratory:  Laboratory findings were communicated with the patient who voiced understanding of the findings.    CBC: WBC: 9.5, HGB: 10.7 (L), PLT: 253  CMP: Glucose 177 (H), Potassium: 3.3 (L), Carbon Dioxide: 15 (L), GFR estimate: 55 (L) o/w WNL (Creatinine: 1.38 (H))  Troponin I (Collected at 1758): <0.015  CRP: <2.9  Ferritin: 345  Procalcitonin: 0.07  Lactate Dehydrogenase: 159  Magnesium: 1.6  Lactic acid (Resulted at 1758): 5.4 (critically H)  Repeat Lactic acid: (Resulted at 1933): 2.5  Blood gas venous: pCO2 Venous 29 (L), Bicarbonate Venous 16 (L), o/w WNL    Sputum Culture: Pending    COVID-19: Pending  Blood culture (x2): pending    Interventions:  1805 NS 2.87 L IV  1839 Zosyn 3.375 g IV    Emergency Department Course:  Past medical records, nursing notes, and vitals reviewed.    1746 Patient arrived in the Stabilization room    1812 Rechecked patient. Patient with some fluids in now. Patient feeling a little better.    1818 Portable chest xray performed.    1821 Call from lab with critical lab. Lactate 5.4    1822 Zosyn ordered.    1642 Rechecked patient. Fluids infusing. Reviewed plan with RN.    1650 Patient able to provide sputum culture    1910 Talked with Dr. Comer who has accepted the patient for admission.    1933 Lactate showing improvement.    1940 Dr. Comer talking with patient.    2010 pts wife called. More history obtained. No seizure. Reviewed pts presentation, labs, etc.    EKG obtained in the ED, see results above.   IV was inserted and blood was drawn for laboratory testing, results above.  The patient was sent for a  "XR chest port 1 view while in the emergency department, results above.     1903 I rechecked the patient and discussed the results of his workup thus far including plans for admission    Findings and plan explained to the Patient who consents to admission. Discussed the patient with Dr. Comer, Hospitalist who will admit the patient to a ICU step [place] [facility] [place] for further monitoring, evaluation, and treatment.    I personally reviewed the laboratory and imaging results with the Patient and answered all related questions prior to admission.     Impression & Plan   Medical Decision Making:  Gomez Turk is a very pleasant 59 year old year old patient who presents to the emergency department with concern of nausea, vomiting and diarrhea.  This started today. The patient had a syncopal episode because of his generalized weakness.  He has had multiple episodes of emesis and now dry heaves.  He has had several loose stools as well.  He has not been on antibiotics or out of the house and C. difficile is unlikely.  His wife works for [employer] and may have brought something home.  The patient does report that he is a \"chain smoker \"and does not have any new cough, but is coughing up phlegm.  The patient feels weak and short of breath when he walks.  The patient mentions some neck pain as well but no headache.  I appreciate the nurse put headache in her note, when I ask him he denies any headache.  The patient has no meningeal signs to suggest meningitis.  The patient does have scattered wheezes throughout his lungs.    With the nausea vomiting and diarrhea I considered condition such as mesenteric ischemia, diverticulitis, appendicitis, cholecystitis, etc.  The patient denies any abdominal pain and he had no tenderness on exam.  He had no tenderness to suggest any of these conditions.  I do not believe any advanced imaging is indicated.  Chest x-ray shows no pneumonia.  The patient's lactate is markedly elevated which " by definition would be concerning for septic shock, but I do not have any source of infection.  The patient was given a one-time dose of Zosyn after blood cultures were obtained.  He has no urinary symptoms to suggest UTI.  He has no flank pain to suggest pyelonephritis.  He has no rash to suggest cellulitis.  At this time, I suspect elevation in his lactate is secondary to significant dehydration from a GI illness.  Since we are currently in a pandemic COVID-19 disease is certainly a possibility.  A swab was obtained and sent off.  Repeat lactate revealed improvement.  Fortunately, after fluid resuscitation, the patient's blood pressure normalized.  The patient maintained a normal pulse oximetry and did not require supplemental oxygen.  Certainly fluids can be discontinued if the COVID19 test returns positive.  I spoke to the hospitalist, Dr. Comer, who has agreed to admit the patient for continued evaluation and treatment.    >>>Critical Care time:  Total critical care time exclusive of procedures was 35 minutes for this patient.<<<    Diagnosis:    ICD-10-CM    1. Nausea vomiting and diarrhea  R11.2 Blood culture    R19.7 Blood culture     Procalcitonin     Lactic acid whole blood     Sputum Culture Aerobic Bacterial   2. Elevated lactic acid level  R79.89    3. Suspected Covid-19 Virus Infection  Z20.828        Disposition:  Admitted to ICU step down, [facility], under the care of Dr. Comer.    Discharge Medications:  New Prescriptions    No medications on file       Scribe Disclosure:  Wagner GREEN, am serving as a scribe at 5:49 PM on 4/26/2020 to document services personally performed by  Shamar Gonzales DO based on my observations and the provider's statements to me.     4/26/2020    EMERGENCY DEPARTMENT       Shamar Gonzales DO  04/26/20 2016     Performed Resulted

## 2021-08-03 ENCOUNTER — TELEPHONE (OUTPATIENT)
Dept: INTERNAL MEDICINE | Facility: CLINIC | Age: 61
End: 2021-08-03

## 2021-08-03 NOTE — TELEPHONE ENCOUNTER
Forms brought in on 08/02/2021 re: Disability for Sight Loss.  will completes forms and mail back to [facility].

## 2021-08-07 DIAGNOSIS — H54.3 VISION LOSS, BILATERAL: ICD-10-CM

## 2021-08-09 RX ORDER — CITALOPRAM HYDROBROMIDE 40 MG/1
TABLET ORAL
Qty: 90 TABLET | Refills: 1 | Status: SHIPPED | OUTPATIENT
Start: 2021-08-09 | End: 2022-02-20

## 2021-08-09 NOTE — TELEPHONE ENCOUNTER
Prescription approved per [facility] Refill Protocol.  Luis Rodas RN  [facility] Triage Nurse

## 2021-08-18 ENCOUNTER — MYC REFILL (OUTPATIENT)
Dept: INTERNAL MEDICINE | Facility: CLINIC | Age: 61
End: 2021-08-18

## 2021-08-18 DIAGNOSIS — F41.9 ANXIETY: ICD-10-CM

## 2021-08-18 DIAGNOSIS — H54.3 VISION LOSS, BILATERAL: ICD-10-CM

## 2021-08-19 RX ORDER — ZOLPIDEM TARTRATE 10 MG/1
10 TABLET ORAL
Qty: 30 TABLET | Refills: 0 | Status: SHIPPED | OUTPATIENT
Start: 2021-08-19 | End: 2021-09-20

## 2021-08-19 NOTE — TELEPHONE ENCOUNTER
Routing refill request to provider for review/approval because:  Drug not on the [facility] refill protocol     Pending Prescriptions:                       Disp   Refills    zolpidem (AMBIEN) 10 MG tablet            30 tab*0            Sig: Take 1 tablet (10 mg) by mouth nightly as needed           for sleep , do not take with pain medications    Last Written Prescription Date:  07/19/21  Last Fill Quantity: 30,  # refills: 0  Last office visit: 1/28/2021 with prescribing provider:   Future Office Visit:  none    Kandi Pires RN  [facility]

## 2021-08-28 ENCOUNTER — HEALTH MAINTENANCE LETTER (OUTPATIENT)
Age: 61
End: 2021-08-28

## 2021-09-20 ENCOUNTER — MYC REFILL (OUTPATIENT)
Dept: INTERNAL MEDICINE | Facility: CLINIC | Age: 61
End: 2021-09-20

## 2021-09-20 DIAGNOSIS — F41.9 ANXIETY: ICD-10-CM

## 2021-09-20 DIAGNOSIS — H54.3 VISION LOSS, BILATERAL: ICD-10-CM

## 2021-09-21 RX ORDER — ZOLPIDEM TARTRATE 10 MG/1
10 TABLET ORAL
Qty: 30 TABLET | Refills: 0 | Status: SHIPPED | OUTPATIENT
Start: 2021-09-21 | End: 2021-10-19

## 2021-09-21 NOTE — TELEPHONE ENCOUNTER
Routing refill request to provider for review/approval because:  Drug not on the [facility] refill protocol   Netta Stallworth RN

## 2021-10-01 ENCOUNTER — DOCUMENTATION ONLY (OUTPATIENT)
Dept: LAB | Facility: CLINIC | Age: 61
End: 2021-10-01

## 2021-10-01 DIAGNOSIS — E11.39 TYPE 2 DIABETES MELLITUS WITH OTHER OPHTHALMIC COMPLICATION, WITHOUT LONG-TERM CURRENT USE OF INSULIN (H): Primary | ICD-10-CM

## 2021-10-05 ENCOUNTER — LAB (OUTPATIENT)
Dept: LAB | Facility: CLINIC | Age: 61
End: 2021-10-05
Payer: COMMERCIAL

## 2021-10-05 ENCOUNTER — TELEPHONE (OUTPATIENT)
Dept: INTERNAL MEDICINE | Facility: CLINIC | Age: 61
End: 2021-10-05

## 2021-10-05 DIAGNOSIS — E11.39 TYPE 2 DIABETES MELLITUS WITH OTHER OPHTHALMIC COMPLICATION, WITHOUT LONG-TERM CURRENT USE OF INSULIN (H): ICD-10-CM

## 2021-10-05 DIAGNOSIS — E11.39 TYPE 2 DIABETES MELLITUS WITH OTHER OPHTHALMIC COMPLICATION, WITHOUT LONG-TERM CURRENT USE OF INSULIN (H): Primary | ICD-10-CM

## 2021-10-05 LAB
ALBUMIN SERPL-MCNC: 3.6 G/DL (ref 3.4–5)
ALP SERPL-CCNC: 106 U/L (ref 40–150)
ALT SERPL W P-5'-P-CCNC: 35 U/L (ref 0–70)
ANION GAP SERPL CALCULATED.3IONS-SCNC: 6 MMOL/L (ref 3–14)
AST SERPL W P-5'-P-CCNC: 32 U/L (ref 0–45)
BILIRUB SERPL-MCNC: 0.6 MG/DL (ref 0.2–1.3)
BUN SERPL-MCNC: 12 MG/DL (ref 7–30)
CALCIUM SERPL-MCNC: 9.7 MG/DL (ref 8.5–10.1)
CHLORIDE BLD-SCNC: 99 MMOL/L (ref 94–109)
CHOLEST SERPL-MCNC: 223 MG/DL
CO2 SERPL-SCNC: 26 MMOL/L (ref 20–32)
CREAT SERPL-MCNC: 0.91 MG/DL (ref 0.66–1.25)
FASTING STATUS PATIENT QL REPORTED: YES
GFR SERPL CREATININE-BSD FRML MDRD: >90 ML/MIN/1.73M2
GLUCOSE BLD-MCNC: 129 MG/DL (ref 70–99)
HBA1C MFR BLD: 6 % (ref 0–5.6)
HDLC SERPL-MCNC: 57 MG/DL
HGB BLD-MCNC: 15.3 G/DL (ref 13.3–17.7)
LDLC SERPL CALC-MCNC: 140 MG/DL
NONHDLC SERPL-MCNC: 166 MG/DL
POTASSIUM BLD-SCNC: 3.6 MMOL/L (ref 3.4–5.3)
PROT SERPL-MCNC: 8.2 G/DL (ref 6.8–8.8)
SODIUM SERPL-SCNC: 131 MMOL/L (ref 133–144)
TRIGL SERPL-MCNC: 129 MG/DL

## 2021-10-05 PROCEDURE — 36415 COLL VENOUS BLD VENIPUNCTURE: CPT

## 2021-10-05 PROCEDURE — 80053 COMPREHEN METABOLIC PANEL: CPT

## 2021-10-05 PROCEDURE — 85018 HEMOGLOBIN: CPT

## 2021-10-05 PROCEDURE — 83036 HEMOGLOBIN GLYCOSYLATED A1C: CPT

## 2021-10-05 PROCEDURE — 80061 LIPID PANEL: CPT

## 2021-10-12 ENCOUNTER — OFFICE VISIT (OUTPATIENT)
Dept: INTERNAL MEDICINE | Facility: CLINIC | Age: 61
End: 2021-10-12
Payer: COMMERCIAL

## 2021-10-12 VITALS
SYSTOLIC BLOOD PRESSURE: 132 MMHG | DIASTOLIC BLOOD PRESSURE: 78 MMHG | TEMPERATURE: 98.9 F | HEART RATE: 77 BPM | RESPIRATION RATE: 15 BRPM | WEIGHT: 205.7 LBS | BODY MASS INDEX: 31.28 KG/M2 | OXYGEN SATURATION: 98 %

## 2021-10-12 DIAGNOSIS — E11.39 TYPE 2 DIABETES MELLITUS WITH OTHER OPHTHALMIC COMPLICATION, WITHOUT LONG-TERM CURRENT USE OF INSULIN (H): ICD-10-CM

## 2021-10-12 DIAGNOSIS — H54.3 VISION LOSS, BILATERAL: ICD-10-CM

## 2021-10-12 DIAGNOSIS — I73.9 PAD (PERIPHERAL ARTERY DISEASE) (H): ICD-10-CM

## 2021-10-12 DIAGNOSIS — Z12.5 SCREENING PSA (PROSTATE SPECIFIC ANTIGEN): ICD-10-CM

## 2021-10-12 DIAGNOSIS — E78.5 HYPERLIPIDEMIA LDL GOAL <100: ICD-10-CM

## 2021-10-12 DIAGNOSIS — Z00.00 ROUTINE GENERAL MEDICAL EXAMINATION AT A HEALTH CARE FACILITY: Primary | ICD-10-CM

## 2021-10-12 DIAGNOSIS — Z12.11 COLON CANCER SCREENING: ICD-10-CM

## 2021-10-12 DIAGNOSIS — Z23 NEED FOR PROPHYLACTIC VACCINATION AND INOCULATION AGAINST INFLUENZA: ICD-10-CM

## 2021-10-12 DIAGNOSIS — F17.200 TOBACCO USE DISORDER: ICD-10-CM

## 2021-10-12 PROCEDURE — 90682 RIV4 VACC RECOMBINANT DNA IM: CPT | Performed by: INTERNAL MEDICINE

## 2021-10-12 PROCEDURE — G0008 ADMIN INFLUENZA VIRUS VAC: HCPCS | Performed by: INTERNAL MEDICINE

## 2021-10-12 PROCEDURE — 99396 PREV VISIT EST AGE 40-64: CPT | Mod: 25 | Performed by: INTERNAL MEDICINE

## 2021-10-12 RX ORDER — GABAPENTIN 300 MG/1
300 CAPSULE ORAL 3 TIMES DAILY
Qty: 90 CAPSULE | Refills: 0 | Status: SHIPPED | OUTPATIENT
Start: 2021-10-12 | End: 2021-12-09

## 2021-10-12 RX ORDER — ATORVASTATIN CALCIUM 40 MG/1
40 TABLET, FILM COATED ORAL DAILY
Qty: 90 TABLET | Refills: 3 | Status: SHIPPED | OUTPATIENT
Start: 2021-10-12 | End: 2022-10-13

## 2021-10-12 RX ORDER — GABAPENTIN 300 MG/1
300 CAPSULE ORAL 2 TIMES DAILY
Qty: 180 CAPSULE | Refills: 0 | OUTPATIENT
Start: 2021-10-12

## 2021-10-12 ASSESSMENT — ACTIVITIES OF DAILY LIVING (ADL)
CURRENT_FUNCTION: PREPARING MEALS REQUIRES ASSISTANCE
CURRENT_FUNCTION: MEDICATION ADMINISTRATION REQUIRES ASSISTANCE
CURRENT_FUNCTION: TRANSPORTATION REQUIRES ASSISTANCE
CURRENT_FUNCTION: SHOPPING REQUIRES ASSISTANCE

## 2021-10-12 NOTE — TELEPHONE ENCOUNTER
Routing refill request to provider for review/approval because:  Drug not on the [facility] refill protocol     Pending Prescriptions:                       Disp   Refills    gabapentin (NEURONTIN) 300 MG capsule [Ph*180 ca*0            Sig: Take 1 capsule (300 mg) by mouth 2 times daily    Last Written Prescription Date:  03/16/21  Last Fill Quantity: 180,  # refills: 0  Last office visit: 1/28/2021 with prescribing provider:    Future Office Visit:   Next 5 appointments (look out 90 days)    Oct 12, 2021  1:40 PM  PHYSICAL with Tylor Roberts MD  [facility] ([facility] ) [address] [address] [phone]               Kandi Pires RN  [facility]

## 2021-10-12 NOTE — TELEPHONE ENCOUNTER
Unclear on medication request refill as it appears that this prescription has not been requested since March 2021.  Patient also has an appointment today.  Unclear on the lapse in treatment

## 2021-10-12 NOTE — TELEPHONE ENCOUNTER
This request is a duplicate- on hold- patient has an appointment today with PCP.  Sent denial notification to pharmacy.  Kandi Pires RN

## 2021-10-12 NOTE — PROGRESS NOTES
"SUBJECTIVE:   CC: Gomez Turk is an 60 year old male who presents for preventative health visit.     Patient has been advised of split billing requirements and indicates understanding: Yes  Healthy Habits:     In general, how would you rate your overall health?  Good    Frequency of exercise:  None    Do you usually eat at least 4 servings of fruit and vegetables a day, include whole grains    & fiber and avoid regularly eating high fat or \"junk\" foods?  No    Taking medications regularly:  Yes    Medication side effects:  None    Ability to successfully perform activities of daily living:  Transportation requires assistance, shopping requires assistance, preparing meals requires assistance and medication administration requires assistance    Home Safety:  No safety concerns identified    Hearing Impairment:  No hearing concerns    In the past 6 months, have you been bothered by leaking of urine?  No    In general, how would you rate your overall mental or emotional health?  Excellent      PHQ-2 Total Score: 0    Additional concerns today:  Yes      PROBLEMS TO ADD ON...  1. Back pain- patient had 2 falls within the last year and compression fractures. Bilateral lower back, using tylenol with minimal relief   2. Would like to discuss alternative glucose meters, patient is legally blind and cannot read his blood sugars on his own   3. Disability forms need date corrected and initialed      Today's PHQ-2 Score:   PHQ-2 ( 1999 Pf[employer]) 2/19/2020   Q1: Little interest or pleasure in doing things 0   Q2: Feeling down, depressed or hopeless 0   PHQ-2 Score 0       Abuse: Current or Past(Physical, Sexual or Emotional)- No  Do you feel safe in your environment? Yes        Social History     Tobacco Use     Smoking status: Current Every Day Smoker     Packs/day: 1.00     Types: Cigarettes     Smokeless tobacco: Never Used   Substance Use Topics     Alcohol use: Yes     Comment: occassion -2 drink daily         Alcohol " Use 1/19/2015   Prescreen: >3 drinks/day or >7 drinks/week? The patient does not drink >3 drinks per day nor >7 drinks per week.       Last PSA:   PSA   Date Value Ref Range Status   01/28/2021 1.67 0 - 4 ug/L Final     Comment:     Assay Method:  Chemiluminescence using Siemens Vista analyzer       Reviewed orders with patient. Reviewed health maintenance and updated orders accordingly - Yes      Reviewed and updated as needed this visit by clinical staff                 Reviewed and updated as needed this visit by Provider                    Review of Systems  CONSTITUTIONAL: NEGATIVE for fever, chills, change in weight  ENT: NEGATIVE for ear, mouth and throat problems  RESP: NEGATIVE for significant cough or SOB  CV: NEGATIVE for chest pain, palpitations or peripheral edema  GI: NEGATIVE for nausea, abdominal pain, heartburn, or change in bowel habits   male: negative for dysuria, hematuria, decreased urinary stream, erectile dysfunction, urethral discharge  MUSCULOSKELETAL: NEGATIVE for significant arthralgias or myalgia  NEURO: NEGATIVE for weakness, dizziness or paresthesias  PSYCHIATRIC: NEGATIVE for changes in mood or affect    OBJECTIVE:   /78   Pulse 77   Temp 98.9  F (37.2  C) (Temporal)   Resp 15   Wt 93.3 kg (205 lb 11.2 oz)   SpO2 98%   BMI 31.28 kg/m      Physical Exam  GENERAL: alert and no distress  EYES: Noted use of a walking stick with bilateral decrease of visual acuity  HENT: ear canals and TM's normal, nose and mouth without ulcers or lesions  NECK: no adenopathy, no asymmetry, masses, or scars and thyroid normal to palpation  RESP: lungs clear to auscultation - no rales, rhonchi or wheezes  CV: regular rate and rhythm, normal S1 S2, no S3 or S4, n click or rub  ABDOMEN: soft, nontender, no hepatosplenomegaly, no masses and bowel sounds normal  RECTAL: normal sphincter tone, no rectal masses and prostate of normal size for age, smooth, nontender without masses/nodules  NEURO:  Normal strength and tone, mentation intact and speech normal  PSYCH: mentation appears normal, affect normal/bright      ASSESSMENT/PLAN:     (Z00.00) Routine general medical examination at a health care facility  (primary encounter diagnosis)  Comment: Patient's disability forms were filled out on his behalf primarily due to his visual loss.  Plan: Advised timing of updated Covid vaccine, third dose.  Vies consideration of shingles vaccine    (E11.39) Type 2 diabetes mellitus with other ophthalmic complication, without long-term current use of insulin (H)  Comment: Continue with current dietary changes.  A1c level 6 months from last  Plan: gabapentin (NEURONTIN) 300 MG capsule,         atorvastatin (LIPITOR) 40 MG tablet, Basic         metabolic panel  (Ca, Cl, CO2, Creat, Gluc, K,         Na, BUN), AMB Adult Diabetes Educator Referral            (I73.9) PAD (peripheral artery disease) (H)  Comment: Labile without active complaints.  Patient was advised that he really would benefit from considering to follow-up with vascular surgery and resume Plavix  Plan: Basic metabolic panel  (Ca, Cl, CO2, Creat,         Gluc, K, Na, BUN)            (H54.3) Vision loss, bilateral  Comment: Stable without improvement.  Disability forms filled out  Plan:     (E78.5) Hyperlipidemia LDL goal <100  Comment: Labs ordered as fasting and discussed.  Patient needs to restart Lipitor.  New prescription sent  Plan: atorvastatin (LIPITOR) 40 MG tablet            (F17.200) Tobacco use disorder  Comment:   Plan: Smoking cessation was advised and the risks of continued smoking in regards to this patients health history was reiterated. Options of smoking cessation were also discussed. This time extended beyond the routine exam.    (Z12.11) Colon cancer screening  Comment: Colonoscopy reviewed recommend FIT testingPlan: Fecal colorectal cancer screen (FIT)            (Z12.5) Screening PSA (prostate specific antigen)  Comment: Ordered PSA as  "routine screening  Plan: PSA, screen            (Z23) Need for prophylactic vaccination and inoculation against influenza  Comment: Update influenza vaccine today.  Plan:         Patient has been advised of split billing requirements and indicates understanding: Yes  COUNSELING:   Reviewed preventive health counseling, as reflected in patient instructions       Regular exercise       Healthy diet/nutrition    Estimated body mass index is 32.54 kg/m  as calculated from the following:    Height as of 4/15/21: 1.727 m (5' 8\").    Weight as of 4/15/21: 97.1 kg (214 lb).     Weight management plan: Discussed healthy diet and exercise guidelines    He reports that he has been smoking cigarettes. He has been smoking about 1.00 pack per day. He has never used smokeless tobacco.  Tobacco Cessation Action Plan:   Information offered: Patient not interested at this time      Counseling Resources:  ATP IV Guidelines  Pooled Cohorts Equation Calculator  FRAX Risk Assessment  ICSI Preventive Guidelines  Dietary Guidelines for Americans, 2010  USDA's MyPlate  ASA Prophylaxis  Lung CA Screening    Tylor Roberts MD  [facility]  "

## 2021-10-13 ENCOUNTER — TELEPHONE (OUTPATIENT)
Dept: INTERNAL MEDICINE | Facility: CLINIC | Age: 61
End: 2021-10-13
Payer: COMMERCIAL

## 2021-10-13 DIAGNOSIS — E11.39 TYPE 2 DIABETES MELLITUS WITH OTHER OPHTHALMIC COMPLICATION, WITHOUT LONG-TERM CURRENT USE OF INSULIN (H): ICD-10-CM

## 2021-10-13 NOTE — TELEPHONE ENCOUNTER
Diabetes Education Scheduling Outreach #1:    Call to patient to schedule. Left message with phone number to call to schedule.    Plan for 2nd outreach attempt within 1 week.    Alejandrina Kathleen  [facility]  Diabetes and Nutrition Scheduling

## 2021-10-14 RX ORDER — LISINOPRIL 5 MG/1
5 TABLET ORAL DAILY
Qty: 90 TABLET | Refills: 2 | Status: SHIPPED | OUTPATIENT
Start: 2021-10-14 | End: 2022-08-23

## 2021-10-19 ENCOUNTER — TELEPHONE (OUTPATIENT)
Dept: INTERNAL MEDICINE | Facility: CLINIC | Age: 61
End: 2021-10-19
Payer: COMMERCIAL

## 2021-10-19 ENCOUNTER — MYC REFILL (OUTPATIENT)
Dept: INTERNAL MEDICINE | Facility: CLINIC | Age: 61
End: 2021-10-19

## 2021-10-19 DIAGNOSIS — F41.9 ANXIETY: ICD-10-CM

## 2021-10-19 DIAGNOSIS — H54.3 VISION LOSS, BILATERAL: ICD-10-CM

## 2021-10-19 RX ORDER — ZOLPIDEM TARTRATE 10 MG/1
10 TABLET ORAL
Qty: 30 TABLET | Refills: 0 | Status: SHIPPED | OUTPATIENT
Start: 2021-10-19 | End: 2021-11-17

## 2021-10-19 NOTE — TELEPHONE ENCOUNTER
Diabetes Education Scheduling Outreach #2:    Call to patient to schedule. Left message with phone number to call to schedule.      Alejandrina Kathleen  [facility]  Diabetes and Nutrition Scheduling

## 2021-10-19 NOTE — TELEPHONE ENCOUNTER
Routing refill request to provider for review/approval because:  Drug not on the [facility] refill protocol   Last filled on 9/21/21 #30 with 0 refills   Last OV 10/12    Porsha Paul, MSN, RN   [facility]

## 2021-11-17 ENCOUNTER — MYC REFILL (OUTPATIENT)
Dept: INTERNAL MEDICINE | Facility: CLINIC | Age: 61
End: 2021-11-17
Payer: COMMERCIAL

## 2021-11-17 DIAGNOSIS — H54.3 VISION LOSS, BILATERAL: ICD-10-CM

## 2021-11-17 DIAGNOSIS — F41.9 ANXIETY: ICD-10-CM

## 2021-11-18 RX ORDER — ZOLPIDEM TARTRATE 10 MG/1
10 TABLET ORAL
Qty: 30 TABLET | Refills: 3 | Status: SHIPPED | OUTPATIENT
Start: 2021-11-18 | End: 2022-03-10

## 2021-12-08 DIAGNOSIS — E11.39 TYPE 2 DIABETES MELLITUS WITH OTHER OPHTHALMIC COMPLICATION, WITHOUT LONG-TERM CURRENT USE OF INSULIN (H): ICD-10-CM

## 2021-12-09 RX ORDER — GABAPENTIN 300 MG/1
300 CAPSULE ORAL 3 TIMES DAILY
Qty: 90 CAPSULE | Refills: 0 | Status: SHIPPED | OUTPATIENT
Start: 2021-12-09 | End: 2022-02-21

## 2021-12-09 NOTE — TELEPHONE ENCOUNTER
Routing refill request to provider for review/approval because:  Drug not on the [facility] refill protocol   Lexii Morfin RN

## 2021-12-18 ENCOUNTER — HEALTH MAINTENANCE LETTER (OUTPATIENT)
Age: 61
End: 2021-12-18

## 2022-02-15 ENCOUNTER — TRANSFERRED RECORDS (OUTPATIENT)
Dept: HEALTH INFORMATION MANAGEMENT | Facility: CLINIC | Age: 62
End: 2022-02-15
Payer: COMMERCIAL

## 2022-02-15 LAB — RETINOPATHY: NEGATIVE

## 2022-02-19 DIAGNOSIS — E11.39 TYPE 2 DIABETES MELLITUS WITH OTHER OPHTHALMIC COMPLICATION, WITHOUT LONG-TERM CURRENT USE OF INSULIN (H): ICD-10-CM

## 2022-02-19 DIAGNOSIS — H54.3 VISION LOSS, BILATERAL: ICD-10-CM

## 2022-02-20 RX ORDER — CITALOPRAM HYDROBROMIDE 40 MG/1
TABLET ORAL
Qty: 90 TABLET | Refills: 1 | Status: SHIPPED | OUTPATIENT
Start: 2022-02-20 | End: 2022-09-15

## 2022-02-20 NOTE — TELEPHONE ENCOUNTER
Failed protocol.  please route to  team if patient needs an appointment     Amtia CABRALESRN BSN  [facility]  [phone]

## 2022-02-21 RX ORDER — GABAPENTIN 300 MG/1
300 CAPSULE ORAL 3 TIMES DAILY
Qty: 90 CAPSULE | Refills: 0 | Status: SHIPPED | OUTPATIENT
Start: 2022-02-21 | End: 2022-04-28

## 2022-03-10 DIAGNOSIS — F41.9 ANXIETY: ICD-10-CM

## 2022-03-10 DIAGNOSIS — H54.3 VISION LOSS, BILATERAL: ICD-10-CM

## 2022-03-10 RX ORDER — ZOLPIDEM TARTRATE 10 MG/1
10 TABLET ORAL
Qty: 30 TABLET | Refills: 0 | Status: SHIPPED | OUTPATIENT
Start: 2022-03-10 | End: 2022-04-11

## 2022-03-10 NOTE — TELEPHONE ENCOUNTER
Controlled Substance Refill Request for zolpidem (AMBIEN) 10 MG tablet      Last Written Prescription Date:  11/18/21  Last Fill Quantity: 30,   # refills: 3      [place] [facility] [place]: 10/12/21    Future Office visit:     Controlled substance agreement:   CSA -- Encounter Level:    CSA: None found at the encounter level.     CSA -- Patient Level:    CSA: None found at the patient level.         Last Urine Drug Screen: No results found for: CDAUT, No results found for: COMDAT, No results found for: THC13, PCP13, COC13, MAMP13, OPI13, AMP13, BZO13, TCA13, MTD13, BAR13, OXY13, PPX13, BUP13     Processing:  Rx to be electronically transmitted to pharmacy by provider     [email or web address]   checked in past 3 months?  Per provider      Charity Patino RN

## 2022-04-09 ENCOUNTER — HEALTH MAINTENANCE LETTER (OUTPATIENT)
Age: 62
End: 2022-04-09

## 2022-04-11 DIAGNOSIS — H54.3 VISION LOSS, BILATERAL: ICD-10-CM

## 2022-04-11 DIAGNOSIS — F41.9 ANXIETY: ICD-10-CM

## 2022-04-11 RX ORDER — ZOLPIDEM TARTRATE 10 MG/1
10 TABLET ORAL
Qty: 30 TABLET | Refills: 0 | Status: SHIPPED | OUTPATIENT
Start: 2022-04-11 | End: 2022-05-11

## 2022-04-28 DIAGNOSIS — E11.39 TYPE 2 DIABETES MELLITUS WITH OTHER OPHTHALMIC COMPLICATION, WITHOUT LONG-TERM CURRENT USE OF INSULIN (H): ICD-10-CM

## 2022-04-28 RX ORDER — GABAPENTIN 300 MG/1
300 CAPSULE ORAL 3 TIMES DAILY
Qty: 90 CAPSULE | Refills: 0 | Status: SHIPPED | OUTPATIENT
Start: 2022-04-28 | End: 2022-06-06

## 2022-05-11 DIAGNOSIS — F41.9 ANXIETY: ICD-10-CM

## 2022-05-11 DIAGNOSIS — H54.3 VISION LOSS, BILATERAL: ICD-10-CM

## 2022-05-11 RX ORDER — ZOLPIDEM TARTRATE 10 MG/1
10 TABLET ORAL
Qty: 30 TABLET | Refills: 0 | Status: SHIPPED | OUTPATIENT
Start: 2022-05-11 | End: 2022-06-14

## 2022-05-11 NOTE — TELEPHONE ENCOUNTER
Routing refill request to provider for review/approval because:  Drug not on the [facility] refill protocol     Romaine Smith RN

## 2022-05-24 ENCOUNTER — OFFICE VISIT (OUTPATIENT)
Dept: INTERNAL MEDICINE | Facility: CLINIC | Age: 62
End: 2022-05-24
Payer: COMMERCIAL

## 2022-05-24 VITALS
RESPIRATION RATE: 16 BRPM | SYSTOLIC BLOOD PRESSURE: 138 MMHG | BODY MASS INDEX: 30.76 KG/M2 | HEART RATE: 80 BPM | OXYGEN SATURATION: 97 % | DIASTOLIC BLOOD PRESSURE: 72 MMHG | TEMPERATURE: 98.6 F | WEIGHT: 202.3 LBS

## 2022-05-24 DIAGNOSIS — Z87.891 PERSONAL HISTORY OF TOBACCO USE, PRESENTING HAZARDS TO HEALTH: ICD-10-CM

## 2022-05-24 DIAGNOSIS — F10.10 ETOH ABUSE: ICD-10-CM

## 2022-05-24 DIAGNOSIS — I73.9 PAD (PERIPHERAL ARTERY DISEASE) (H): ICD-10-CM

## 2022-05-24 DIAGNOSIS — Z12.5 SCREENING PSA (PROSTATE SPECIFIC ANTIGEN): ICD-10-CM

## 2022-05-24 DIAGNOSIS — Z01.818 PRE-OPERATIVE GENERAL PHYSICAL EXAMINATION: Primary | ICD-10-CM

## 2022-05-24 DIAGNOSIS — H54.3 VISION LOSS, BILATERAL: ICD-10-CM

## 2022-05-24 DIAGNOSIS — H26.9 CATARACT OF BOTH EYES, UNSPECIFIED CATARACT TYPE: ICD-10-CM

## 2022-05-24 DIAGNOSIS — E11.39 TYPE 2 DIABETES MELLITUS WITH OTHER OPHTHALMIC COMPLICATION, WITHOUT LONG-TERM CURRENT USE OF INSULIN (H): ICD-10-CM

## 2022-05-24 LAB
ALBUMIN SERPL-MCNC: 3.6 G/DL (ref 3.4–5)
ALP SERPL-CCNC: 132 U/L (ref 40–150)
ALT SERPL W P-5'-P-CCNC: 44 U/L (ref 0–70)
ANION GAP SERPL CALCULATED.3IONS-SCNC: 8 MMOL/L (ref 3–14)
AST SERPL W P-5'-P-CCNC: 71 U/L (ref 0–45)
BILIRUB SERPL-MCNC: 1.2 MG/DL (ref 0.2–1.3)
BUN SERPL-MCNC: 17 MG/DL (ref 7–30)
CALCIUM SERPL-MCNC: 8.8 MG/DL (ref 8.5–10.1)
CHLORIDE BLD-SCNC: 98 MMOL/L (ref 94–109)
CO2 SERPL-SCNC: 27 MMOL/L (ref 20–32)
CREAT SERPL-MCNC: 1 MG/DL (ref 0.66–1.25)
ERYTHROCYTE [DISTWIDTH] IN BLOOD BY AUTOMATED COUNT: 13.5 % (ref 10–15)
GFR SERPL CREATININE-BSD FRML MDRD: 86 ML/MIN/1.73M2
GLUCOSE BLD-MCNC: 134 MG/DL (ref 70–99)
HBA1C MFR BLD: 5.9 % (ref 0–5.6)
HCT VFR BLD AUTO: 31.5 % (ref 40–53)
HGB BLD-MCNC: 11 G/DL (ref 13.3–17.7)
MCH RBC QN AUTO: 36.9 PG (ref 26.5–33)
MCHC RBC AUTO-ENTMCNC: 34.9 G/DL (ref 31.5–36.5)
MCV RBC AUTO: 106 FL (ref 78–100)
PLATELET # BLD AUTO: 211 10E3/UL (ref 150–450)
POTASSIUM BLD-SCNC: 4.1 MMOL/L (ref 3.4–5.3)
PROT SERPL-MCNC: 7.9 G/DL (ref 6.8–8.8)
PSA SERPL-MCNC: 0.36 UG/L (ref 0–4)
RBC # BLD AUTO: 2.98 10E6/UL (ref 4.4–5.9)
SODIUM SERPL-SCNC: 133 MMOL/L (ref 133–144)
WBC # BLD AUTO: 10.1 10E3/UL (ref 4–11)

## 2022-05-24 PROCEDURE — 91305 COVID-19,PF,PFIZER (12+ YRS): CPT | Performed by: INTERNAL MEDICINE

## 2022-05-24 PROCEDURE — 83036 HEMOGLOBIN GLYCOSYLATED A1C: CPT | Performed by: INTERNAL MEDICINE

## 2022-05-24 PROCEDURE — 99214 OFFICE O/P EST MOD 30 MIN: CPT | Mod: 25 | Performed by: INTERNAL MEDICINE

## 2022-05-24 PROCEDURE — 85027 COMPLETE CBC AUTOMATED: CPT | Performed by: INTERNAL MEDICINE

## 2022-05-24 PROCEDURE — 0054A COVID-19,PF,PFIZER (12+ YRS): CPT | Performed by: INTERNAL MEDICINE

## 2022-05-24 PROCEDURE — 36415 COLL VENOUS BLD VENIPUNCTURE: CPT | Performed by: INTERNAL MEDICINE

## 2022-05-24 PROCEDURE — G0103 PSA SCREENING: HCPCS | Performed by: INTERNAL MEDICINE

## 2022-05-24 PROCEDURE — 80053 COMPREHEN METABOLIC PANEL: CPT | Performed by: INTERNAL MEDICINE

## 2022-05-24 NOTE — PROGRESS NOTES
[address] [address]  Phone: [phone]  Primary Provider: David Roberts  Pre-op Performing Provider: DAVID ROBERTS    PREOPERATIVE EVALUATION:  Today's date: 5/24/2022    Gomez Turk is a 61 year old male who presents for a preoperative evaluation.    Surgical Information:  Surgery/Procedure: cataract lens replacement   Surgery Location: [facility]   Surgeon: Dr. Jones   Surgery Date: 6/6/22  Time of Surgery: TBD   Where patient plans to recover: At home with family  Fax number for surgical facility: TBD    Type of Anesthesia Anticipated: Local with MAC    Assessment & Plan     The proposed surgical procedure is considered LOW risk.    Pre-operative general physical examination  Surgery to proceed as scheduled.  Routine preoperative assessment as ordered.  - Comprehensive metabolic panel, CBC with         platelets    Cataract of both eyes, unspecified cataract type  Routine postoperative care per ophthalmology as directed    Vision loss, bilateral  Note as baseline.    Type 2 diabetes mellitus with other ophthalmic complication, without long-term current use of insulin (H)  Holding metformin in the a.m. if advised to be n.p.o.    PAD (peripheral artery disease) (H)  Stable at present time without active complaints.    Personal history of tobacco use, presenting hazards to health  Routine low-dose screening CT scan of chest recommended.  Ordered and patient advised to follow-up  - CT Chest Lung Cancer Scrn Low Dose wo    (F10.10) ETOH abuse  Comment: family concerns  Plan: Discussed with patient extensively his issues of alcohol use.  He is not interested in treatment options.  We discussed the risks associated with ongoing alcohol use.  Suggest checking his liver function test today as well as his routine lab work as he has not been compliant with following up.  Discussed the risks associated with alcohol use in combination  with his current medications.    (Z12.5) Screening PSA (prostate specific antigen)  Comment: Order as screening as patient has not followed up to get routine lab work  Plan: PSA, screen            Risks and Recommendations:  The patient has the following additional risks and recommendations for perioperative complications:  Diabetes:  - Patient is not on insulin therapy: regular NPO guidelines can be followed.     Medication Instructions:   - metformin: HOLD day of surgery. if NPO    We will update patient's COVID booster today.    RECOMMENDATION:  APPROVAL GIVEN to proceed with proposed procedure, without further diagnostic evaluation.    30 minutes spent on the date of the encounter doing chart review, review of test results, interpretation of tests, patient visit and documentation       Subjective     HPI related to upcoming procedure: vision change    Please note that prior to the clinic visit I was asked to talk to the family member accompanying Gomez today the clinic.  They have concerns regarding Gomez's alcohol use.  They report that he is drinking quite a bit at home.  They also had reported some issues of balance.    They did not want me to confront Gomez about the issue although ask him an indirect way.  In talking to Gomez he initially stated he only had 3 drinks per day but then in pushing the issue a little bit more he admitted to 4 double Jimbo Palacios and Coke on a daily basis.  He does report some balance issues but primarily equates this to issues of his visual impairment.  He is not interested in any intervention.    Health Care Directive:  Patient does not have a Health Care Directive or Living Will: Discussed advance care planning with patient; however, patient declined at this time.[id number]}    Status of Chronic Conditions:  See problem list for active medical problems.  Problems all longstanding and stable, except as noted/documented.  See ROS for pertinent symptoms related to these  conditions.      Review of Systems:    CONSTITUTIONAL: NEGATIVE for fever, chills, change in weight  ENT/MOUTH: NEGATIVE for ear, mouth and throat problems  RESP: NEGATIVE for significant cough or SOB  CV: NEGATIVE for chest pain, palpitations or peripheral edema  GI: NEGATIVE for nausea, abdominal pain, heartburn, or change in bowel habits  : NEGATIVE for frequency, dysuria, or hematuria  NEURO: NEGATIVE for weakness, dizziness or paresthesias  HEME: NEGATIVE for bleeding problems  PSYCHIATRIC: NEGATIVE for changes in mood or affect    Patient Active Problem List    Diagnosis Date Noted     Syncope 04/27/2020     Priority: Medium     Chronic midline low back pain without sciatica 02/28/2019     Priority: Medium     Acute osteomyelitis of toe, left (H) 06/06/2018     Priority: Medium     Counseling regarding advanced directives 05/30/2017     Priority: Medium     Advance Care Planning 5/30/2017: ACP Review of Chart / Resources Provided:  Reviewed chart for advance care plan.  Gomez Turk has been provided information and resources to begin or update their advance care plan.  Added by Zeenat Roberson             Vision loss, bilateral 03/23/2017     Priority: Medium     Acute cholecystitis 03/17/2017     Priority: Medium     Type 2 diabetes mellitus with other ophthalmic complication, without long-term current use of insulin (H) 03/16/2017     Priority: Medium     PAD (peripheral artery disease) (H) 02/27/2017     Priority: Medium     HYPERLIPIDEMIA LDL GOAL <100 10/31/2010     Priority: Medium     Diverticulosis of large intestine 06/25/2003     Priority: Medium     Problem list name updated by automated process. Provider to review       Tobacco use disorder 06/25/2003     Priority: Medium      Past Medical History:   Diagnosis Date     Anxiety      DIVERTICULOSIS OF COLON W/O BLEED 6/25/2003     GERD (gastroesophageal reflux disease)      Hyperlipidemia LDL goal <100 10/31/2010     Hypertension      Legally  blind      PAD (peripheral artery disease) (H)      Tobacco use disorder 6/25/2003     Type 2 diabetes, HbA1c goal < 7% (H) 10/31/2010     Past Surgical History:   Procedure Laterality Date     AMPUTATE TOE(S) Left 2/28/2017    Procedure: AMPUTATE TOE(S);  Surgeon: Jesus Aragon MD;  Location:  OR     BYPASS GRAFT FEMOROPOPLITEAL Left 2/28/2017    Procedure: BYPASS GRAFT FEMOROPOPLITEAL;  Surgeon: Jesus Aragon MD;  Location:  OR     ENT SURGERY  1990    deviated septum repair     IRRIGATION AND DEBRIDEMENT FOOT, COMBINED Left 2/28/2017    Procedure: COMBINED IRRIGATION AND DEBRIDEMENT FOOT;  Surgeon: Jesus Aragon MD;  Location:  OR     LAPAROSCOPIC CHOLECYSTECTOMY N/A 3/18/2017    Procedure: LAPAROSCOPIC CHOLECYSTECTOMY;  Surgeon: Edin Hollingsworth MD;  Location:  OR     Current Outpatient Medications   Medication Sig Dispense Refill     atorvastatin (LIPITOR) 40 MG tablet Take 1 tablet (40 mg) by mouth daily 90 tablet 3     blood glucose (NO BRAND SPECIFIED) test strip Use to test blood sugar 3 times daily or as directed. To accompany: Blood Glucose Monitor Brands: per insurance. 100 strip 6     blood glucose monitoring (NO BRAND SPECIFIED) meter device kit Use to test blood sugar 3 times daily or as directed. Preferred blood glucose meter OR supplies to accompany: Blood Glucose Monitor Brands: per insurance. 1 kit 0     citalopram (CELEXA) 40 MG tablet TAKE ONE TABLET BY MOUTH ONE TIME DAILY 90 tablet 1     gabapentin (NEURONTIN) 300 MG capsule Take 1 capsule (300 mg) by mouth 3 times daily 90 capsule 0     lisinopril (ZESTRIL) 5 MG tablet Take 1 tablet (5 mg) by mouth daily 90 tablet 2     metFORMIN (GLUCOPHAGE) 1000 MG tablet Take 1,000 mg by mouth 2 times daily as needed (high blood sugar)       thin (NO BRAND SPECIFIED) lancets Use with lanceting device. To accompany: Blood Glucose Monitor Brands: per insurance. 200 each 6     zolpidem (AMBIEN) 10 MG tablet Take 1  tablet (10 mg) by mouth nightly as needed for sleep, do not take with pain medications 30 tablet 0       Allergies   Allergen Reactions     No Known Drug Allergies         Social History     Tobacco Use     Smoking status: Current Every Day Smoker     Packs/day: 1.00     Types: Cigarettes     Smokeless tobacco: Never Used   Substance Use Topics     Alcohol use: Yes     Comment: occassion -2 drink daily       History   Drug Use No         Objective     /72   Pulse 80   Temp 98.6  F (37  C) (Temporal)   Resp 16   Wt 91.8 kg (202 lb 4.8 oz)   SpO2 97%   BMI 30.76 kg/m      Physical Exam:      GENERAL APPEARANCE: alert and no distress     EYES: Decreased visual acuity bilaterally, using a walking stick,      + cataract each eye.     HENT: ear canals and TM's normal and nose and mouth without ulcers or lesions     NECK: no adenopathy, no asymmetry, masses, or scars and thyroid normal to palpation     RESP: lungs clear to auscultation - no rales, rhonchi or wheezes     CV: regular rates and rhythm, normal S1 S2, no S3 or S4 and no click or rub     ABDOMEN:  soft, nontender, no HSM or masses and bowel sounds normal     NEURO: No focal changes     PSYCH: mentation appears normal and affect normal/bright    Recent Labs   Lab Test 10/05/21  0930 05/05/21  1118 04/15/21  2201 04/13/21  1212 01/28/21  1049   HGB 15.3  --  12.9*  --   --    PLT  --   --  257  --   --    * 132* 131*   < > 132*   POTASSIUM 3.6 3.8 4.2   < > 5.4*   CR 0.91 1.28* 1.02   < > 1.24   A1C 6.0*  --   --   --  5.7*    < > = values in this interval not displayed.        Diagnostics:  Labs pending at this time.  Results will be reviewed when available.   No EKG required for low risk surgery (cataract, skin procedure, breast biopsy, etc).    Revised Cardiac Risk Index (RCRI):  The patient has the following serious cardiovascular risks for perioperative complications:   - Diabetes Mellitus (on oral therapy) = 1 point     RCRI Interpretation:  1 point: Class II (low risk - 0.9% complication rate)       Signed Electronically by: Tylor Roberts MD  Copy of this evaluation report is provided to requesting physician, Dr. Jones.

## 2022-06-03 ENCOUNTER — HOSPITAL ENCOUNTER (OUTPATIENT)
Dept: CT IMAGING | Facility: CLINIC | Age: 62
Discharge: HOME | End: 2022-06-03
Attending: INTERNAL MEDICINE | Admitting: INTERNAL MEDICINE
Payer: COMMERCIAL

## 2022-06-03 DIAGNOSIS — Z87.891 PERSONAL HISTORY OF TOBACCO USE, PRESENTING HAZARDS TO HEALTH: ICD-10-CM

## 2022-06-03 PROCEDURE — 71271 CT THORAX LUNG CANCER SCR C-: CPT

## 2022-06-04 DIAGNOSIS — E11.39 TYPE 2 DIABETES MELLITUS WITH OTHER OPHTHALMIC COMPLICATION, WITHOUT LONG-TERM CURRENT USE OF INSULIN (H): ICD-10-CM

## 2022-06-06 ENCOUNTER — TELEPHONE (OUTPATIENT)
Dept: INTERNAL MEDICINE | Facility: CLINIC | Age: 62
End: 2022-06-06

## 2022-06-06 DIAGNOSIS — R91.1 LUNG NODULE: ICD-10-CM

## 2022-06-06 RX ORDER — GABAPENTIN 300 MG/1
300 CAPSULE ORAL 3 TIMES DAILY
Qty: 90 CAPSULE | Refills: 0 | Status: SHIPPED | OUTPATIENT
Start: 2022-06-06 | End: 2022-07-15

## 2022-06-06 NOTE — TELEPHONE ENCOUNTER
[facility] Radiology Lung Cancer Screening CT result - Return call    Patient/parent Name  Gomez    Reason for call  Notify of CT result    Radiology Result  See note from Dr Roberts.  RN contacted Dr Loredo and conformed that CT impression is Lung RADS 2, annual screening.    Recommendations/Instructions  Mila notified of recommendation.     Job Villar RN  [facility]  Lung Nodule and Emergency Dept Lab Result RN  Ph# [phone]

## 2022-06-06 NOTE — TELEPHONE ENCOUNTER
Routing refill request to provider for review/approval because:  Drug not on the [facility] refill protocol     Controlled Substance Refill Request for gabapentin (NEURONTIN) 300 MG capsule      Last Written Prescription Date:  4/28/22  Last Fill Quantity: 90,   # refills: 0      Last Office Visit with [facility] primary care provider: 5/24/22    Future Office visit:     Controlled substance agreement:   CSA -- Encounter Level:    CSA: None found at the encounter level.     CSA -- Patient Level:    CSA: None found at the patient level.         Last Urine Drug Screen: No results found for: CDAUT, No results found for: COMDAT, No results found for: THC13, PCP13, COC13, MAMP13, OPI13, AMP13, BZO13, TCA13, MTD13, BAR13, OXY13, PPX13, BUP13     Processing:  Rx to be electronically transmitted to pharmacy by provider      [email or web address]       checked in past 3 months?  Per provider.

## 2022-06-06 NOTE — TELEPHONE ENCOUNTER
[facility] Radiology Lung Cancer Screening CT result notification:     LDCT/Lung Cancer Screening CT Exam date: 6/3/22  Radiologist Impression AND Recommendations:   ACR Assessment Category:  Lung-RADS Category 4A. Suspicious.  Additional diagnostic testing and/or tissue sampling is recommended. 3  month LDCT; PET/CT may be used when there is a >/= 8 mm solid  component.    Pertinent Findings:  Lungs: 4 mm nodule posterolateral right lower lobe image 148 series 4.  No infiltrates. Benign fissural nodule on the right image 126.     Ordering Provider: Tylor Roberts MD John LORELEI Turk did not receive the remaining radiology results from her provider.     Lung Nodule Program Protocol recommendation [Pertaining to lung nodules]:    Lung RADS 4A Protocol:  Results RN to notify Patient of results/recommendations and offer a referral to [facility] Lung Nodule Clinic within 4 to 6 weeks  o Offer referral to [facility] Lung Nodule clinic instead of the 3 month CT. (Yes/No/NA):  Pended  o If Patient agrees to a referral to [facility] Lung Nodule Clinic, place referral to [facility] Lung Nodule Clinic to be seen within 4 to 6 weeks.  (Yes/No/NA):  Await call back  o If Patient refuses referral, place order for 3 month CT ([id number]).   (Yes/No/NA):  Pended, await call back  o Relay information to Patient:  [facility] Scheduling team, [phone], will be calling Patient within 1 week to schedule appt - appt only M-F. (Yes/No/NA): NA  o Results RN routed telephone encounter as FYI to CNS team (Yes/No): NA    RN communicated the lung nodule finding to the patient (Yes/No):  At 12:50P, Left voicemail message requesting a call back to [facility] ED Lab Result RN at [phone].  RN is available every day between 9 a.m. and 5:30 p.m.  The patient had the following questions: NA  Correct letter sent as per [facility] Lung nodule protocol (Yes/No):  Yes  Did Patient have any CT's of chest previous?  (Yes/No/NA) No  If no comparisons used, inquire if patient has had any chest CT's in the past and if so, request priors.    If scheduling LDCT only, RN will contact Image Scheduling Team  Hours available (all sites below):  Mon-Fri 7A to 7P; Sat 7A to 3:30P.  No schedulers available on Sunday.    [facility] ([facility]): [phone]    [place] ([place], [place], [place]): [phone]    [place] region ([place] and [place]): [phone]    [place] region ([facility]): [phone]    Lung Nodule Clinics    [facility] (Lung Care) Clinic at [facility]  Floor 2, Check-In D, [address], [place], [place]    Hours: M-F 7AM to 5PM    [facility]     Floor 2, [address], [place]  [phone]    Hours: M - F 7AM - 7PM;  Sat 8A to [facility] at [facility] [place]    [address] [place], [place], [phone]    Hours: M - F 7AM - 7PM;  Sat 8A to noviraj Villar RN  [facility]  Lung Nodule and Emergency Dept Lab Result RN  Ph# [phone]

## 2022-06-06 NOTE — TELEPHONE ENCOUNTER
Addended report from Dr Gertrude CHAPARRO  Accession # [id number]     The original report on this patient was dictated by Dr. Loredo.       First line in impression should read: ACR assessment category  Lung-RADS category 2. Benign. Routine yearly low-dose follow-up  recommended.     AYAZ LOREDO MD (Date of Addendum: 6/6/2022 1:36 PM)     AYAZ LOREDO MD

## 2022-06-13 DIAGNOSIS — F41.9 ANXIETY: ICD-10-CM

## 2022-06-13 DIAGNOSIS — H54.3 VISION LOSS, BILATERAL: ICD-10-CM

## 2022-06-14 RX ORDER — ZOLPIDEM TARTRATE 10 MG/1
TABLET ORAL
Qty: 30 TABLET | Refills: 0 | Status: SHIPPED | OUTPATIENT
Start: 2022-06-14 | End: 2022-07-13

## 2022-06-15 ENCOUNTER — TELEPHONE (OUTPATIENT)
Dept: INTERNAL MEDICINE | Facility: CLINIC | Age: 62
End: 2022-06-15
Payer: COMMERCIAL

## 2022-06-15 NOTE — TELEPHONE ENCOUNTER
Reason for Call:  Form, our goal is to have forms completed with 72 hours, however, some forms may require a visit or additional information.    Type of letter, form or note:  disability    Who is the form from?: Patient    Where did the form come from: Patient or family brought in       What clinic location was the form placed at?: Internal Medicine    Where the form was placed: Folder Box/Folder    What number is listed as a contact on the form?: [phone]       Additional comments:     Call taken on 6/15/2022 at 10:53 AM by DEVIN VELASQUEZ

## 2022-06-15 NOTE — TELEPHONE ENCOUNTER
Appears these are annual HIPAA disclosure forms. Forwarded to [facility] via mail: [address], [address] [facility] [address]

## 2022-07-13 DIAGNOSIS — H54.3 VISION LOSS, BILATERAL: ICD-10-CM

## 2022-07-13 DIAGNOSIS — F41.9 ANXIETY: ICD-10-CM

## 2022-07-13 RX ORDER — ZOLPIDEM TARTRATE 10 MG/1
TABLET ORAL
Qty: 30 TABLET | Refills: 0 | Status: SHIPPED | OUTPATIENT
Start: 2022-07-13 | End: 2022-08-15

## 2022-07-15 DIAGNOSIS — E11.39 TYPE 2 DIABETES MELLITUS WITH OTHER OPHTHALMIC COMPLICATION, WITHOUT LONG-TERM CURRENT USE OF INSULIN (H): ICD-10-CM

## 2022-07-15 RX ORDER — GABAPENTIN 300 MG/1
300 CAPSULE ORAL 3 TIMES DAILY
Qty: 90 CAPSULE | Refills: 0 | Status: SHIPPED | OUTPATIENT
Start: 2022-07-15 | End: 2022-09-06

## 2022-07-25 ENCOUNTER — NURSE TRIAGE (OUTPATIENT)
Dept: INTERNAL MEDICINE | Facility: CLINIC | Age: 62
End: 2022-07-25

## 2022-07-25 NOTE — TELEPHONE ENCOUNTER
Provider Response to 2nd Level Triage Request    I have reviewed the RN documentation. My recommendation is:  If issues are acute the suggest ER assessment, if chronic and going then suggest f/u next available appt, may use same day appt as available

## 2022-07-25 NOTE — TELEPHONE ENCOUNTER
Pt's wife called requesting appt with doctor Membreno. Notes pt has ongoing weakness for two months and has had a few falls. Also reports concerns with pt's alcohol use. Wife notes weakness has been discussed with PCP on previous visits. Is requesting directives on what to do now.   Triage advised pt see ER if worsening weakness.     Routing message to provider for advice. Would provider agree to have pt evaluated at ER or approve office visit this week using virtual visit slot?      Wife is requesting a call back today with providers response.     Reason for Disposition    Sounds like a life-threatening emergency to the triager    Additional Information    Negative: Severe difficulty breathing (e.g., struggling for each breath, speaks in single words)    Negative: Shock suspected (e.g., cold/pale/clammy skin, too weak to stand, low BP, rapid pulse)    Negative: Difficult to awaken or acting confused (e.g., disoriented, slurred speech)    Negative: [1] Fainted > 15 minutes ago AND [2] still feels too weak or dizzy to stand    Negative: [1] SEVERE weakness (i.e., unable to walk or barely able to walk, requires support) AND [2] new onset or worsening    Commented on: All Negative - Call  Now     Wife is concerned with weakness and pt's alcohol consumption.    Protocols used: WEAKNESS (GENERALIZED) AND FATIGUE-A-AH

## 2022-07-25 NOTE — TELEPHONE ENCOUNTER
"Called and spoke to pts wife. Pts wife stated she will take him to the ER (TO[facility] Gomez) tomorrow morning once he \"bashir up\" to find out why he's falling all the time. Advised pts wife to call us back if she has any further questions. Pts wife agrees to plan.   "

## 2022-08-12 DIAGNOSIS — H54.3 VISION LOSS, BILATERAL: ICD-10-CM

## 2022-08-12 DIAGNOSIS — F41.9 ANXIETY: ICD-10-CM

## 2022-08-15 RX ORDER — ZOLPIDEM TARTRATE 10 MG/1
TABLET ORAL
Qty: 30 TABLET | Refills: 0 | Status: SHIPPED | OUTPATIENT
Start: 2022-08-15 | End: 2022-09-12

## 2022-08-19 ENCOUNTER — HOSPITAL ENCOUNTER (INPATIENT)
Facility: CLINIC | Age: 62
LOS: 4 days | Discharge: HOME | DRG: 074 | End: 2022-08-23
Attending: EMERGENCY MEDICINE | Admitting: INTERNAL MEDICINE
Payer: COMMERCIAL

## 2022-08-19 ENCOUNTER — APPOINTMENT (OUTPATIENT)
Dept: CT IMAGING | Facility: CLINIC | Age: 62
DRG: 074 | End: 2022-08-19
Attending: EMERGENCY MEDICINE
Payer: COMMERCIAL

## 2022-08-19 ENCOUNTER — APPOINTMENT (OUTPATIENT)
Dept: GENERAL RADIOLOGY | Facility: CLINIC | Age: 62
DRG: 074 | End: 2022-08-19
Attending: EMERGENCY MEDICINE
Payer: COMMERCIAL

## 2022-08-19 DIAGNOSIS — R56.9 SEIZURE-LIKE ACTIVITY (H): ICD-10-CM

## 2022-08-19 DIAGNOSIS — E87.6 HYPOKALEMIA: ICD-10-CM

## 2022-08-19 DIAGNOSIS — D64.9 ANEMIA, UNSPECIFIED TYPE: ICD-10-CM

## 2022-08-19 DIAGNOSIS — R29.6 MULTIPLE FALLS: ICD-10-CM

## 2022-08-19 DIAGNOSIS — M62.81 GENERALIZED MUSCLE WEAKNESS: ICD-10-CM

## 2022-08-19 DIAGNOSIS — G89.29 CHRONIC RIGHT SHOULDER PAIN: ICD-10-CM

## 2022-08-19 DIAGNOSIS — E11.39 TYPE 2 DIABETES MELLITUS WITH OTHER OPHTHALMIC COMPLICATION, WITHOUT LONG-TERM CURRENT USE OF INSULIN (H): ICD-10-CM

## 2022-08-19 DIAGNOSIS — F10.20 ALCOHOL USE DISORDER, MODERATE, DEPENDENCE (H): ICD-10-CM

## 2022-08-19 DIAGNOSIS — F17.200 TOBACCO USE DISORDER: Primary | ICD-10-CM

## 2022-08-19 DIAGNOSIS — S09.90XA CLOSED HEAD INJURY, INITIAL ENCOUNTER: ICD-10-CM

## 2022-08-19 DIAGNOSIS — M25.511 CHRONIC RIGHT SHOULDER PAIN: ICD-10-CM

## 2022-08-19 LAB
ABO/RH(D): NORMAL
ALBUMIN SERPL-MCNC: 2.4 G/DL (ref 3.4–5)
ALBUMIN UR-MCNC: NEGATIVE MG/DL
ALP SERPL-CCNC: 145 U/L (ref 40–150)
ALT SERPL W P-5'-P-CCNC: 26 U/L (ref 0–70)
ANION GAP SERPL CALCULATED.3IONS-SCNC: 8 MMOL/L (ref 3–14)
ANTIBODY SCREEN: NEGATIVE
APPEARANCE UR: CLEAR
AST SERPL W P-5'-P-CCNC: 49 U/L (ref 0–45)
ATRIAL RATE - MUSE: 60 BPM
BASOPHILS # BLD AUTO: 0 10E3/UL (ref 0–0.2)
BASOPHILS NFR BLD AUTO: 1 %
BILIRUB SERPL-MCNC: 1 MG/DL (ref 0.2–1.3)
BILIRUB UR QL STRIP: NEGATIVE
BUN SERPL-MCNC: 6 MG/DL (ref 7–30)
CALCIUM SERPL-MCNC: 8 MG/DL (ref 8.5–10.1)
CHLORIDE BLD-SCNC: 102 MMOL/L (ref 94–109)
CO2 SERPL-SCNC: 24 MMOL/L (ref 20–32)
COLOR UR AUTO: YELLOW
CREAT SERPL-MCNC: 0.61 MG/DL (ref 0.66–1.25)
DIASTOLIC BLOOD PRESSURE - MUSE: NORMAL MMHG
EOSINOPHIL # BLD AUTO: 0 10E3/UL (ref 0–0.7)
EOSINOPHIL NFR BLD AUTO: 0 %
ERYTHROCYTE [DISTWIDTH] IN BLOOD BY AUTOMATED COUNT: 16.3 % (ref 10–15)
ETHANOL SERPL-MCNC: 0.06 G/DL
FERRITIN SERPL-MCNC: 648 NG/ML (ref 26–388)
GFR SERPL CREATININE-BSD FRML MDRD: >90 ML/MIN/1.73M2
GLUCOSE BLD-MCNC: 120 MG/DL (ref 70–99)
GLUCOSE BLDC GLUCOMTR-MCNC: 119 MG/DL (ref 70–99)
GLUCOSE UR STRIP-MCNC: NEGATIVE MG/DL
HCT VFR BLD AUTO: 23.4 % (ref 40–53)
HGB BLD-MCNC: 8.5 G/DL (ref 13.3–17.7)
HGB UR QL STRIP: NEGATIVE
HOLD SPECIMEN: NORMAL
HYALINE CASTS: 4 /LPF
IMM GRANULOCYTES # BLD: 0 10E3/UL
IMM GRANULOCYTES NFR BLD: 0 %
INTERPRETATION ECG - MUSE: NORMAL
IRON SATN MFR SERPL: 105 % (ref 15–46)
IRON SERPL-MCNC: 192 UG/DL (ref 35–180)
KETONES UR STRIP-MCNC: NEGATIVE MG/DL
LEUKOCYTE ESTERASE UR QL STRIP: NEGATIVE
LIPASE SERPL-CCNC: 79 U/L (ref 73–393)
LYMPHOCYTES # BLD AUTO: 1 10E3/UL (ref 0.8–5.3)
LYMPHOCYTES NFR BLD AUTO: 12 %
MAGNESIUM SERPL-MCNC: 1.4 MG/DL (ref 1.6–2.3)
MCH RBC QN AUTO: 40.3 PG (ref 26.5–33)
MCHC RBC AUTO-ENTMCNC: 36.3 G/DL (ref 31.5–36.5)
MCV RBC AUTO: 111 FL (ref 78–100)
MONOCYTES # BLD AUTO: 0.6 10E3/UL (ref 0–1.3)
MONOCYTES NFR BLD AUTO: 7 %
MUCOUS THREADS #/AREA URNS LPF: PRESENT /LPF
NEUTROPHILS # BLD AUTO: 6.7 10E3/UL (ref 1.6–8.3)
NEUTROPHILS NFR BLD AUTO: 80 %
NITRATE UR QL: NEGATIVE
NRBC # BLD AUTO: 0 10E3/UL
NRBC BLD AUTO-RTO: 0 /100
P AXIS - MUSE: 41 DEGREES
PH UR STRIP: 7 [PH] (ref 5–7)
PHOSPHATE SERPL-MCNC: 2.6 MG/DL (ref 2.5–4.5)
PLATELET # BLD AUTO: 217 10E3/UL (ref 150–450)
POTASSIUM BLD-SCNC: 2.6 MMOL/L (ref 3.4–5.3)
PR INTERVAL - MUSE: 148 MS
PROT SERPL-MCNC: 6.1 G/DL (ref 6.8–8.8)
QRS DURATION - MUSE: 72 MS
QT - MUSE: 516 MS
QTC - MUSE: 516 MS
R AXIS - MUSE: 19 DEGREES
RBC # BLD AUTO: 2.11 10E6/UL (ref 4.4–5.9)
RBC URINE: <1 /HPF
SARS-COV-2 RNA RESP QL NAA+PROBE: NEGATIVE
SODIUM SERPL-SCNC: 134 MMOL/L (ref 133–144)
SP GR UR STRIP: 1.01 (ref 1–1.03)
SPECIMEN EXPIRATION DATE: NORMAL
SQUAMOUS EPITHELIAL: <1 /HPF
SYSTOLIC BLOOD PRESSURE - MUSE: NORMAL MMHG
T AXIS - MUSE: 56 DEGREES
TIBC SERPL-MCNC: 182 UG/DL (ref 240–430)
TRANSFERRIN SERPL-MCNC: 161 MG/DL (ref 200–360)
TROPONIN I SERPL HS-MCNC: 6 NG/L
TSH SERPL DL<=0.005 MIU/L-ACNC: 1.51 MU/L (ref 0.4–4)
UROBILINOGEN UR STRIP-MCNC: 4 MG/DL
VENTRICULAR RATE- MUSE: 60 BPM
VIT B12 SERPL-MCNC: 463 PG/ML (ref 232–1245)
WBC # BLD AUTO: 8.4 10E3/UL (ref 4–11)
WBC URINE: 1 /HPF

## 2022-08-19 PROCEDURE — 80053 COMPREHEN METABOLIC PANEL: CPT | Performed by: EMERGENCY MEDICINE

## 2022-08-19 PROCEDURE — 82746 ASSAY OF FOLIC ACID SERUM: CPT | Performed by: INTERNAL MEDICINE

## 2022-08-19 PROCEDURE — 73030 X-RAY EXAM OF SHOULDER: CPT | Mod: RT

## 2022-08-19 PROCEDURE — 250N000011 HC RX IP 250 OP 636: Performed by: INTERNAL MEDICINE

## 2022-08-19 PROCEDURE — 84100 ASSAY OF PHOSPHORUS: CPT | Performed by: EMERGENCY MEDICINE

## 2022-08-19 PROCEDURE — 258N000003 HC RX IP 258 OP 636: Performed by: EMERGENCY MEDICINE

## 2022-08-19 PROCEDURE — 86850 RBC ANTIBODY SCREEN: CPT | Performed by: EMERGENCY MEDICINE

## 2022-08-19 PROCEDURE — 93005 ELECTROCARDIOGRAM TRACING: CPT

## 2022-08-19 PROCEDURE — 96368 THER/DIAG CONCURRENT INF: CPT

## 2022-08-19 PROCEDURE — 99223 1ST HOSP IP/OBS HIGH 75: CPT | Mod: AI | Performed by: INTERNAL MEDICINE

## 2022-08-19 PROCEDURE — 250N000013 HC RX MED GY IP 250 OP 250 PS 637: Performed by: INTERNAL MEDICINE

## 2022-08-19 PROCEDURE — 70486 CT MAXILLOFACIAL W/O DYE: CPT

## 2022-08-19 PROCEDURE — 82040 ASSAY OF SERUM ALBUMIN: CPT | Performed by: EMERGENCY MEDICINE

## 2022-08-19 PROCEDURE — 96365 THER/PROPH/DIAG IV INF INIT: CPT

## 2022-08-19 PROCEDURE — 250N000009 HC RX 250: Performed by: EMERGENCY MEDICINE

## 2022-08-19 PROCEDURE — 83735 ASSAY OF MAGNESIUM: CPT | Performed by: EMERGENCY MEDICINE

## 2022-08-19 PROCEDURE — 84466 ASSAY OF TRANSFERRIN: CPT | Performed by: INTERNAL MEDICINE

## 2022-08-19 PROCEDURE — 81001 URINALYSIS AUTO W/SCOPE: CPT | Performed by: EMERGENCY MEDICINE

## 2022-08-19 PROCEDURE — 250N000011 HC RX IP 250 OP 636: Performed by: EMERGENCY MEDICINE

## 2022-08-19 PROCEDURE — 36415 COLL VENOUS BLD VENIPUNCTURE: CPT | Performed by: EMERGENCY MEDICINE

## 2022-08-19 PROCEDURE — 84443 ASSAY THYROID STIM HORMONE: CPT | Performed by: INTERNAL MEDICINE

## 2022-08-19 PROCEDURE — 83550 IRON BINDING TEST: CPT | Performed by: INTERNAL MEDICINE

## 2022-08-19 PROCEDURE — 96376 TX/PRO/DX INJ SAME DRUG ADON: CPT

## 2022-08-19 PROCEDURE — 84484 ASSAY OF TROPONIN QUANT: CPT | Performed by: EMERGENCY MEDICINE

## 2022-08-19 PROCEDURE — 96375 TX/PRO/DX INJ NEW DRUG ADDON: CPT

## 2022-08-19 PROCEDURE — 99285 EMERGENCY DEPT VISIT HI MDM: CPT | Mod: 25

## 2022-08-19 PROCEDURE — 250N000013 HC RX MED GY IP 250 OP 250 PS 637: Performed by: EMERGENCY MEDICINE

## 2022-08-19 PROCEDURE — 120N000001 HC R&B MED SURG/OB

## 2022-08-19 PROCEDURE — 83690 ASSAY OF LIPASE: CPT | Performed by: EMERGENCY MEDICINE

## 2022-08-19 PROCEDURE — C9803 HOPD COVID-19 SPEC COLLECT: HCPCS

## 2022-08-19 PROCEDURE — 82607 VITAMIN B-12: CPT | Performed by: INTERNAL MEDICINE

## 2022-08-19 PROCEDURE — 96366 THER/PROPH/DIAG IV INF ADDON: CPT

## 2022-08-19 PROCEDURE — 250N000009 HC RX 250: Performed by: INTERNAL MEDICINE

## 2022-08-19 PROCEDURE — 82077 ASSAY SPEC XCP UR&BREATH IA: CPT | Performed by: INTERNAL MEDICINE

## 2022-08-19 PROCEDURE — U0005 INFEC AGEN DETEC AMPLI PROBE: HCPCS | Performed by: EMERGENCY MEDICINE

## 2022-08-19 PROCEDURE — 82728 ASSAY OF FERRITIN: CPT | Performed by: INTERNAL MEDICINE

## 2022-08-19 PROCEDURE — 85014 HEMATOCRIT: CPT | Performed by: EMERGENCY MEDICINE

## 2022-08-19 PROCEDURE — 258N000003 HC RX IP 258 OP 636: Performed by: INTERNAL MEDICINE

## 2022-08-19 PROCEDURE — 36415 COLL VENOUS BLD VENIPUNCTURE: CPT | Performed by: INTERNAL MEDICINE

## 2022-08-19 PROCEDURE — 70450 CT HEAD/BRAIN W/O DYE: CPT

## 2022-08-19 PROCEDURE — 73080 X-RAY EXAM OF ELBOW: CPT | Mod: RT

## 2022-08-19 RX ORDER — OXYCODONE HYDROCHLORIDE 5 MG/1
5 TABLET ORAL EVERY 4 HOURS PRN
Status: DISCONTINUED | OUTPATIENT
Start: 2022-08-19 | End: 2022-08-23 | Stop reason: HOSPADM

## 2022-08-19 RX ORDER — HYDROMORPHONE HYDROCHLORIDE 1 MG/ML
0.5 INJECTION, SOLUTION INTRAMUSCULAR; INTRAVENOUS; SUBCUTANEOUS ONCE
Status: COMPLETED | OUTPATIENT
Start: 2022-08-19 | End: 2022-08-19

## 2022-08-19 RX ORDER — POTASSIUM CHLORIDE 1.5 G/1.58G
40 POWDER, FOR SOLUTION ORAL ONCE
Status: COMPLETED | OUTPATIENT
Start: 2022-08-19 | End: 2022-08-19

## 2022-08-19 RX ORDER — ACETAMINOPHEN 325 MG/1
650 TABLET ORAL EVERY 6 HOURS PRN
Status: DISCONTINUED | OUTPATIENT
Start: 2022-08-19 | End: 2022-08-23 | Stop reason: HOSPADM

## 2022-08-19 RX ORDER — CITALOPRAM HYDROBROMIDE 40 MG/1
40 TABLET ORAL DAILY
Status: DISCONTINUED | OUTPATIENT
Start: 2022-08-20 | End: 2022-08-23 | Stop reason: HOSPADM

## 2022-08-19 RX ORDER — ATORVASTATIN CALCIUM 40 MG/1
40 TABLET, FILM COATED ORAL DAILY
Status: DISCONTINUED | OUTPATIENT
Start: 2022-08-20 | End: 2022-08-23 | Stop reason: HOSPADM

## 2022-08-19 RX ORDER — ZOLPIDEM TARTRATE 5 MG/1
10 TABLET ORAL
Status: DISCONTINUED | OUTPATIENT
Start: 2022-08-19 | End: 2022-08-20

## 2022-08-19 RX ORDER — LORAZEPAM 2 MG/ML
1-2 INJECTION INTRAMUSCULAR EVERY 30 MIN PRN
Status: DISCONTINUED | OUTPATIENT
Start: 2022-08-19 | End: 2022-08-23 | Stop reason: HOSPADM

## 2022-08-19 RX ORDER — GABAPENTIN 300 MG/1
300 CAPSULE ORAL 3 TIMES DAILY
Status: DISCONTINUED | OUTPATIENT
Start: 2022-08-19 | End: 2022-08-23 | Stop reason: HOSPADM

## 2022-08-19 RX ORDER — NICOTINE 21 MG/24HR
1 PATCH, TRANSDERMAL 24 HOURS TRANSDERMAL DAILY
Status: DISCONTINUED | OUTPATIENT
Start: 2022-08-19 | End: 2022-08-20

## 2022-08-19 RX ORDER — LORAZEPAM 0.5 MG/1
1-2 TABLET ORAL EVERY 30 MIN PRN
Status: DISCONTINUED | OUTPATIENT
Start: 2022-08-19 | End: 2022-08-23 | Stop reason: HOSPADM

## 2022-08-19 RX ORDER — ACETAMINOPHEN 650 MG/1
650 SUPPOSITORY RECTAL EVERY 6 HOURS PRN
Status: DISCONTINUED | OUTPATIENT
Start: 2022-08-19 | End: 2022-08-23 | Stop reason: HOSPADM

## 2022-08-19 RX ORDER — POTASSIUM CHLORIDE 7.45 MG/ML
10 INJECTION INTRAVENOUS ONCE
Status: COMPLETED | OUTPATIENT
Start: 2022-08-19 | End: 2022-08-19

## 2022-08-19 RX ADMIN — OXYCODONE HYDROCHLORIDE 5 MG: 5 TABLET ORAL at 19:34

## 2022-08-19 RX ADMIN — HYDROMORPHONE HYDROCHLORIDE 0.5 MG: 1 INJECTION, SOLUTION INTRAMUSCULAR; INTRAVENOUS; SUBCUTANEOUS at 17:31

## 2022-08-19 RX ADMIN — HYDROMORPHONE HYDROCHLORIDE 0.5 MG: 1 INJECTION, SOLUTION INTRAMUSCULAR; INTRAVENOUS; SUBCUTANEOUS at 15:24

## 2022-08-19 RX ADMIN — GABAPENTIN 300 MG: 300 CAPSULE ORAL at 22:32

## 2022-08-19 RX ADMIN — FOLIC ACID: 5 INJECTION, SOLUTION INTRAMUSCULAR; INTRAVENOUS; SUBCUTANEOUS at 22:13

## 2022-08-19 RX ADMIN — NICOTINE 1 PATCH: 14 PATCH, EXTENDED RELEASE TRANSDERMAL at 15:58

## 2022-08-19 RX ADMIN — FOLIC ACID: 5 INJECTION, SOLUTION INTRAMUSCULAR; INTRAVENOUS; SUBCUTANEOUS at 15:40

## 2022-08-19 RX ADMIN — POTASSIUM CHLORIDE 10 MEQ: 7.46 INJECTION, SOLUTION INTRAVENOUS at 16:03

## 2022-08-19 RX ADMIN — POTASSIUM CHLORIDE 40 MEQ: 1.5 POWDER, FOR SOLUTION ORAL at 15:55

## 2022-08-19 ASSESSMENT — ACTIVITIES OF DAILY LIVING (ADL)
ADLS_ACUITY_SCORE: 37
ADLS_ACUITY_SCORE: 35
ADLS_ACUITY_SCORE: 37

## 2022-08-19 ASSESSMENT — ENCOUNTER SYMPTOMS
ARTHRALGIAS: 1
FREQUENCY: 0
ABDOMINAL PAIN: 0
DYSURIA: 1
FEVER: 0
BACK PAIN: 0
BLOOD IN STOOL: 0
CHILLS: 0
ROS GI COMMENTS: (-) HEMATEMESIS
DIARRHEA: 1
COLOR CHANGE: 1
HEMATURIA: 0

## 2022-08-19 NOTE — PHARMACY-ADMISSION MEDICATION HISTORY
Pharmacy Medication History  Admission medication history interview status for the 8/19/2022  admission is complete. See EPIC admission navigator for prior to admission medications     Location of Interview: Patient room  Medication history sources: Patient    Significant changes made to the medication list:    Added aspirin 650 mg and glipizide unknown strength, and metformin unknown strength     In the past week, patient estimated taking medication this percent of the time: greater than 90%    Additional medication history information:     Patient is a poor historian. He only know his medications by what it is for except for metformine and glipizide which he states he takes as needed. Unsure of all his medicaitons doses and directions. No record found for metformin and glipizide but added it since patient is positive he takes both as needed.     Medication reconciliation completed by provider prior to medication history? No    Time spent in this activity: 20 minutes     Prior to Admission medications    Medication Sig Last Dose Taking? Auth Provider Long Term End Date   aspirin (ASA) 325 MG EC tablet Take 650 mg by mouth daily 8/19/2022 at am Yes Unknown, Entered By History     atorvastatin (LIPITOR) 40 MG tablet Take 1 tablet (40 mg) by mouth daily Past Week at Unknown time Yes Tylor Roberts MD Yes    citalopram (CELEXA) 40 MG tablet TAKE ONE TABLET BY MOUTH ONE TIME DAILY 8/19/2022 at am Yes Tylor Roberts MD Yes    gabapentin (NEURONTIN) 300 MG capsule Take 1 capsule (300 mg) by mouth 3 times daily 8/19/2022 at am Yes Tylor Roberts MD Yes    GLIPIZIDE PO Daily as needed  Yes Unknown, Entered By History No    lisinopril (ZESTRIL) 5 MG tablet Take 1 tablet (5 mg) by mouth daily  Patient taking differently: Take 5 mg by mouth daily as needed  at PRN Yes Tylor Roberts MD Yes    METFORMIN HCL PO Daily as needed  Yes Unknown, Entered By History No    zolpidem (AMBIEN) 10 MG tablet Take 1 tablet by mouth  nightly as needed for sleep. Do not take with pain medications. Past Week at Unknown time Yes Tylor Roberts MD     ACCU-CHEK GUIDE test strip USE TO TEST BLOOD SUGAR 3 TIMES DAILY OR AS DIRECTED.   Tylor Roberts MD     blood glucose monitoring (NO BRAND SPECIFIED) meter device kit Use to test blood sugar 3 times daily or as directed. Preferred blood glucose meter OR supplies to accompany: Blood Glucose Monitor Brands: per insurance.   Tylor Roberts MD     thin (NO BRAND SPECIFIED) lancets Use with lanceting device. To accompany: Blood Glucose Monitor Brands: per insurance.   Tylor Roberts MD         The information provided in this note is only as accurate as the sources available at the time of update(s)   Laura Díaz, PharmD

## 2022-08-19 NOTE — ED PROVIDER NOTES
History   Chief Complaint:  Fall, head injury, seizure       HPI   Gomez Turk is a 61 year old male with history of type II diabetes mellitus who presents by ambulance with his wife.  His wife reports that he has had frequent falls over the past 6 months and despite of using his walker usually.  His wife reports that he walked into the kitchen shortly prior to arrival to mix himself and drink when he fell in the kitchen and hit his head.  The patient's son witnessed this and reported that he had some brief seizure activity after hitting his head where his head was turned to the right and he was shaking.  He then vomited once.  His wife also reports that he has a history of alcohol abuse and the patient admits to drinking 3 alcoholic drinks per day. His wife states that he has been mixing sleeping pill with alcohol. No known history of alcohol withdrawal seizures however she states after falls he has had some seizure-like activity in the past but usually snaps out of it.  He fell on Saturday and sustained significant bruising of the left side of his face.  He also injured his right shoulder and elbow and has had pain without any x-rays performed.  He denies leg pain other than the abrasions on his knees.  He states that when he gets up to walk sometimes his legs will give out on him.  He has had some chronic left arm numbness for several months which he thinks is due to neuropathy.  He denies any neck pain or injury.  He denies chest pain or rib pain.  He denies abdominal pain or back pain.  He has chronic diarrhea.  No recent fevers or chills.  No dysuria, urgency or frequency of urination.  No blood in his stools or urine.  No blood in the vomit.  No recent COVID-19 exposure. He smokes 4 packs of cigarettes in 24 hours.     Review of Systems   Constitutional: Negative for chills and fever.   Cardiovascular: Negative for chest pain.   Gastrointestinal: Positive for diarrhea. Negative for abdominal pain and  blood in stool.        (-) hematemesis    Genitourinary: Positive for dysuria. Negative for frequency, hematuria and urgency.   Musculoskeletal: Positive for arthralgias. Negative for back pain.   Skin: Positive for color change.   Neurological:        (+) head injury   All other systems reviewed and are negative.      Allergies:  The patient has no known allergies.     Medications:  Lipitor   Celexa   Gabapentin   Zestril   Metformin   Ambien     Past Medical History:     Diverticulosis of large intestine  Tobacco use disorder  Hyperlipidemia   PAD (peripheral artery disease)   Type 2 diabetes mellitus   Acute cholecystitis  Vision loss, bilateral  Acute osteomyelitis of toe, left   Chronic midline low back pain without sciatica  Syncope  Lung nodule    Past Surgical History:    Toe amputation   Graft femoropopliteal bypass   Deviated septum repair   Irrigation and debridement foot   Cholecystectomy     Family History:    Diabetes   Lipids   Melanoma     Social History:  The patient presents to the ED with his wife.   PCP: Tylor Roberts     Physical Exam     Patient Vitals for the past 24 hrs:   BP Temp Temp src Pulse Resp SpO2   08/19/22 1730 -- -- -- 70 14 99 %   08/19/22 1630 -- -- -- 69 8 99 %   08/19/22 1600 118/70 -- -- 68 11 99 %   08/19/22 1530 123/79 -- -- -- -- 100 %   08/19/22 1500 -- -- -- -- -- 91 %   08/19/22 1430 -- -- -- -- -- 100 %   08/19/22 1400 -- -- -- -- -- 100 %   08/19/22 1358 125/72 97.5  F (36.4  C) Oral 60 18 100 %       Physical Exam  Nursing note and vitals reviewed.  Constitutional:  Appears well-developed and well-nourished.   HENT:   Head:    Bruising and tenderness to the left side of the face diffusely with tenderness also to the bridge of the nose without septal hematoma or deviation.  Mouth/Throat:   Oropharynx is clear and moist. No oropharyngeal exudate.   Eyes:    Pupils are equal, round, and reactive to light.   Neck:    Nontender neck.  Normal range of motion. Neck supple.       No tracheal deviation present. No thyromegaly present.   Cardiovascular:  Normal rate, regular rhythm, no murmur   Pulmonary/Chest: Breath sounds are clear and equal without wheezes or crackles.  Abdominal:   Soft. Bowel sounds are normal. Exhibits no distension and      no mass. There is no tenderness.      There is no rebound and no guarding.   Musculoskeletal:  Tenderness to the right shoulder and right elbow without any deformity.  Abrasion to the right elbow.  Abrasion to both knees without any tenderness to the legs.  Lymphadenopathy:  No cervical adenopathy.   Neurological:   Alert and oriented to person, place, and time. GCS 15.  CN 2-12 intact.  strong and equal.  Bilateral lower extremity strength strong and equal, including strong dorsiflexion and plantarflexion strength.  Sensation intact and equal to the face, arms and legs.  No facial droop or weakness. Normal speech.  Follows commands and answers questions normally.    Skin:    Skin is warm and dry. No rash noted.  Slightly pale skin.  No obvious jaundice    Emergency Department Course   ECG  ECG results from 08/19/22   EKG 12-lead, tracing only     Value    Systolic Blood Pressure     Diastolic Blood Pressure     Ventricular Rate 60    Atrial Rate 60    IA Interval 148    QRS Duration 72        QTc 516    P Axis 41    R AXIS 19    T Axis 56    Interpretation ECG      Sinus rhythm  Cannot rule out Anterior infarct , age undetermined  Abnormal ECG  When compared with ECG of 15-APR-2021 21:43,  T wave amplitude has decreased in Anterior leads  QT has lengthened  Confirmed by GENERATED REPORT, COMPUTER ([id number]),  BEATRIZ CARRILLO ([id number]) on 8/19/2022 5:22:29 PM       Imaging:  Elbow XR, G/E 3 views, right   Final Result   IMPRESSION: Normal joint spaces and alignment. No fracture or joint effusion.      XR Shoulder Right G/E 3 Views   Final Result   IMPRESSION: No fracture or dislocation. Mild AC joint arthrosis.       Head CT w/o contrast    Final Result   IMPRESSION:   HEAD CT:   1.  No acute intracranial process.      FACIAL BONE CT:   1.  Left malar and periorbital soft tissue swelling without acute facial fracture.         CT Facial Bones without Contrast   Final Result   IMPRESSION:   HEAD CT:   1.  No acute intracranial process.      FACIAL BONE CT:   1.  Left malar and periorbital soft tissue swelling without acute facial fracture.           Report per radiology    Laboratory:  Labs Ordered and Resulted from Time of ED Arrival to Time of ED Departure   COMPREHENSIVE METABOLIC PANEL - Abnormal       Result Value    Sodium 134      Potassium 2.6 (*)     Chloride 102      Carbon Dioxide (CO2) 24      Anion Gap 8      Urea Nitrogen 6 (*)     Creatinine 0.61 (*)     Calcium 8.0 (*)     Glucose 120 (*)     Alkaline Phosphatase 145      AST 49 (*)     ALT 26      Protein Total 6.1 (*)     Albumin 2.4 (*)     Bilirubin Total 1.0      GFR Estimate >90     MAGNESIUM - Abnormal    Magnesium 1.4 (*)    CBC WITH PLATELETS AND DIFFERENTIAL - Abnormal    WBC Count 8.4      RBC Count 2.11 (*)     Hemoglobin 8.5 (*)     Hematocrit 23.4 (*)      (*)     MCH 40.3 (*)     MCHC 36.3      RDW 16.3 (*)     Platelet Count 217      % Neutrophils 80      % Lymphocytes 12      % Monocytes 7      % Eosinophils 0      % Basophils 1      % Immature Granulocytes 0      NRBCs per 100 WBC 0      Absolute Neutrophils 6.7      Absolute Lymphocytes 1.0      Absolute Monocytes 0.6      Absolute Eosinophils 0.0      Absolute Basophils 0.0      Absolute Immature Granulocytes 0.0      Absolute NRBCs 0.0     LIPASE - Normal    Lipase 79     PHOSPHORUS - Normal    Phosphorus 2.6     COVID-19 VIRUS (CORONAVIRUS) BY PCR - Normal    SARS CoV2 PCR Negative     TROPONIN I - Normal    Troponin I High Sensitivity 6     ROUTINE UA WITH MICROSCOPIC REFLEX TO CULTURE   ETHYL ALCOHOL LEVEL   TYPE AND SCREEN, ADULT   ABO/RH TYPE AND SCREEN      Emergency Department Course:    Reviewed:  I  reviewed nursing notes, vitals, past medical history and Care Everywhere    Assessments:  1510 I obtained history and examined the patient as noted above.   1540 I rechecked the patient and explained findings.     Consults:  1756 I consulted with Dr. Flores, hospitalist, regarding the patient's history and presentation in the emergency department today.     Interventions:  1524 Dilaudid 0.5 mg IV  1540 NS w/ Infuvite 1,000 mL IV    1555 Potassium chloride 40 mEq Oral   1558 Nicoderm 1 patch TD  1603 Potassium chloride 10 mEq Oral   1731 Dilaudid 0.5 mg IV    Disposition:  The patient was admitted to the hospital under the care of Dr. Flores.     Impression & Plan     Medical Decision Making:  This patient arrives because of a fall today with generalized weakness in the setting of a history of alcohol abuse and multiple recent falls.  He had a closed head injury with seizure-like activity afterward which I feel was likely due to the head injury but there is no sign of intracranial bleed or skull fracture on head CT imaging.  I feel the alcohol withdrawal seizure was unlikely considering he does not appear to be in active alcohol withdrawal.  I found him to have hypokalemia and anemia which I feel are both contributing to his generalized weakness and falls.  He does not have any signs of active bleeding at this time however his hemoglobin has dropped to 8.5 today down from 11.0 on 5/24/2022 which is which is concerning for possible occult GI bleed.  He has a right shoulder and elbow injury but no sign of fracture or dislocation on x-rays.  He does not appear to have any sign of infection or sepsis at this time.  His cervical spine was cleared clinically and he is not having any concerning stroke symptoms.  He has motor function of his legs intact and I did not feel there was any spinal cord problem or Guillain-Barré.      Diagnosis:    ICD-10-CM    1. Generalized muscle weakness  M62.81    2. Multiple falls  R29.6    3.  Hypokalemia  E87.6    4. Anemia, unspecified type  D64.9    5. Seizure-like activity (H)  R56.9    6. Closed head injury, initial encounter  S09.90XA    7. Alcohol use disorder, moderate, dependence (H)  F10.20        Scribe Disclosure:  I, Wilder Bonilla, am serving as a scribe at 3:14 PM on 8/19/2022 to document services personally performed by Lucero Walls MD based on my observations and the provider's statements to me.            Lucero Walls MD  08/19/22 [id number]

## 2022-08-19 NOTE — ED NOTES
Bed: ED21  Expected date: 8/19/22  Expected time: 1:27 PM  Means of arrival:   Comments:  Jamila 511 61M poss seizure 1330

## 2022-08-19 NOTE — ED NOTES
Seizure pads placed. Wife reported that patient is mixing his sleeping medication with alcohol which is causing him to have gait instability and falls. Wife also reports pt smokes 4 packs in a 24 hour period. Patient is legally blind.     MD updated.

## 2022-08-19 NOTE — H&P
[facility]    History and Physical  Hospitalist       Date of Admission:  8/19/2022    Assessment & Plan     This is a 61-year-old male with history of anxiety, GERD, hypertension, hyperlipidemia, peripheral artery disease, status post left fem-pop bypass, diabetes mellitus type 2, legally blind, came to the ER with complaint of generalized weakness and recurrent falls.  ASSESSMENT AND PLAN:    1.  Generalized weakness and recurrent falls:  This is multifactorial.  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  At this time, the patient quite dehydrated.  He was given IV fluids.  His electrolytes will be replaced.  He will have PT, OT evaluate him.  We will encourage him to quit drinking.  We will see how he does.  We will check his TSH, B12, folate and vitamin D levels.  2.  Alcohol abuse:  The patient has been drinking heavily for months now at least 6 months or more.  Alcohol cessation counseling given.  We will start him on IV fluids.  We will give him IV vitamins, folic acid, thiamine, keep him on alcohol withdrawal protocol.  We will use lorazepam according to the CIWA.  Keep him on seizures precautions, fall precaution and aspiration precaution.  2.  Possible seizures:  With the falls he had some involuntary movement in which he has drooling, loud snoring and some abnormal activity and rolling of the eyes.  We cannot rule out seizures completely.  We will have Neurology evaluate him, keep him on seizures precautions.  This may be related to alcohol.  We will do MRI of the brain, EEG.  CT scan of the head reviewed, no acute abnormality noted.  There is chronic small vessel ischemic changes with tiny remote lacunar infarct on the left thalamus.  We will have Neurology evaluate him.    3.  Recurrent falls:  As mentioned, most likely secondary to multiple problems as mentioned above.  Trauma workup in the ED, CT head, CT facial bones,  right shoulder, right elbow x-ray negative for any acute fracture.  4.  Hypertension:  Has been stable.  He is on lisinopril.  We will continue with that.  5.  Hyperlipidemia:  On Lipitor.  We will continue with hypokalemia and hypomagnesemia.  This is likely related to alcohol abuse.  We will replace the potassium and magnesium.  Keep him on electrolyte replacement protocol.  6.  Anemia:  The patient's hemoglobin 8.5 right now.  A few months ago was 11.0 and last year was normal.  His MCV slightly elevated as well.  At this time, we will check B12 level, folate level, iron and transferrin and ferritin level as well.  Peripheral smear.  We will see how he does  7.  Diabetes mellitus type 2:  On metformin.  Hold the metformin and keep him on sliding scale insulin for correction and hypoglycemia protocol.  Peripheral neuropathy, on gabapentin.  We will continue with that.  8.  History of peripheral artery disease, status post left lower extremity fem-pop bypass.  Continue with aspirin and Lipitor.    9.  GI prophylaxis with Protonix.  10.  DVT prophylaxis with SCDs.    CODE STATUS:  FULL CODE.    The patient will be admitted to the hospital.  Replace electrolytes IV fluids, seizures precautions, fall precautions.  EEG, MRI.  Neurology evaluation, psychiatry evaluation as well as GI evaluation.    The case discussed with ER physician and the nursing staff taking care of the patient.    Kevin Flores MD    DVT Prophylaxis: Pneumatic Compression Devices  Code Status: Full Code    Disposition: Expected discharge in 2 days once stable    Kevin Flores MD, MD    Primary Care Physician   Tylor Roberts    Chief Complaint   Falls and weakness     History is obtained from the patient    History of Present Illness   This is a 61-year-old male with history of anxiety, GERD, hypertension, hyperlipidemia, peripheral artery disease, status post left fem-pop bypass, diabetes mellitus type 2, legally blind, came to the ER with  complaint of generalized weakness and recurrent falls.    According to the patient has been blind for the last 5 years.  He never know the cause and since then, he has increase in his alcohol intake.  His wife noticed that he has been falling down quite frequently now.  For the month, he has been falling off and on, but now for the last month or so, he has been falling every night.  He fall about a week ago and bruised his left side of the face, but did not seek attention.  He had multiple falls.  He may have hit his right shoulder radiographs, but never sought any attention.    The patient's wife noticed that the patient has been drinking a lot.  Although the patient is not very forthcoming in his drinking habit.  He has had 2-3 drinks, but his wife told me that he has been drinking a 1.75 L of whiskey every 3 days.  If he is sitting down for 8-10 hours, he keeps drinking.  He has been drinking early in the morning as well.  He was in the kitchen today around 10 mixing his drink and all of a sudden feeling weak, tired, lightheaded and fell down.  The son noticed that when he fell down, his eyes rolled up, his face rolled up.  He has some abnormal body movement and was drooling from his side and breathing loud.  Wife noticed that whenever he fell down on his back, he had some seizure-like activity as described by his son.    On 1 occasion, patient did have incontinence of urine, but mostly he remains continent.  He denies any fever, chills, cough, chest pain, shortness of breath, abdominal pain, nausea, vomiting.  No hematemesis, melena, hematochezia has not been noticed by his wife.  He is legally blind, so cannot see.  The patient told me that the alcohol only thing that he had a bad as he cannot go anywhere because of blindness, cannot play golf and other stuff.   Rest of the review of system is negative at this time other than he does have some weight loss as per the patient's wife, but never they checked it.   He is have imbalanced gait.  He has some numbness and tingling on the left side of his face as well as his left arm.  Very unstable on his gait.  He used a walker and despite that he has been falling down and have broken couple of walkers in the past.      Past Medical History    I have reviewed this patient's medical history and updated it with pertinent information if needed.   Past Medical History:   Diagnosis Date     Anxiety      DIVERTICULOSIS OF COLON W/O BLEED 6/25/2003     GERD (gastroesophageal reflux disease)      Hyperlipidemia LDL goal <100 10/31/2010     Hypertension      Legally blind      PAD (peripheral artery disease) (H)      Tobacco use disorder 6/25/2003     Type 2 diabetes, HbA1c goal < 7% (H) 10/31/2010       Past Surgical History   I have reviewed this patient's surgical history and updated it with pertinent information if needed.  Past Surgical History:   Procedure Laterality Date     AMPUTATE TOE(S) Left 2/28/2017    Procedure: AMPUTATE TOE(S);  Surgeon: Jesus Aragon MD;  Location:  OR     BYPASS GRAFT FEMOROPOPLITEAL Left 2/28/2017    Procedure: BYPASS GRAFT FEMOROPOPLITEAL;  Surgeon: Jesus Aragon MD;  Location:  OR     ENT SURGERY  1990    deviated septum repair     IRRIGATION AND DEBRIDEMENT FOOT, COMBINED Left 2/28/2017    Procedure: COMBINED IRRIGATION AND DEBRIDEMENT FOOT;  Surgeon: Jesus Aragon MD;  Location:  OR     LAPAROSCOPIC CHOLECYSTECTOMY N/A 3/18/2017    Procedure: LAPAROSCOPIC CHOLECYSTECTOMY;  Surgeon: Edin Hollingsworth MD;  Location:  OR       Prior to Admission Medications   Prior to Admission Medications   Prescriptions Last Dose Informant Patient Reported? Taking?   ACCU-CHEK GUIDE test strip  Self No No   Sig: USE TO TEST BLOOD SUGAR 3 TIMES DAILY OR AS DIRECTED.   GLIPIZIDE PO  Self Yes Yes   Sig: Daily as needed   METFORMIN HCL PO  Self Yes Yes   Sig: Daily as needed   aspirin (ASA) 325 MG EC tablet 8/19/2022 at am Self  Yes Yes   Sig: Take 650 mg by mouth daily   atorvastatin (LIPITOR) 40 MG tablet Past Week at Unknown time Self No Yes   Sig: Take 1 tablet (40 mg) by mouth daily   blood glucose monitoring (NO BRAND SPECIFIED) meter device kit  Self No No   Sig: Use to test blood sugar 3 times daily or as directed. Preferred blood glucose meter OR supplies to accompany: Blood Glucose Monitor Brands: per insurance.   citalopram (CELEXA) 40 MG tablet 8/19/2022 at am Self No Yes   Sig: TAKE ONE TABLET BY MOUTH ONE TIME DAILY   gabapentin (NEURONTIN) 300 MG capsule 8/19/2022 at am Self No Yes   Sig: Take 1 capsule (300 mg) by mouth 3 times daily   lisinopril (ZESTRIL) 5 MG tablet  at PRN Self No Yes   Sig: Take 1 tablet (5 mg) by mouth daily   Patient taking differently: Take 5 mg by mouth daily as needed   thin (NO BRAND SPECIFIED) lancets  Self No No   Sig: Use with lanceting device. To accompany: Blood Glucose Monitor Brands: per insurance.   zolpidem (AMBIEN) 10 MG tablet Past Week at Unknown time Self No Yes   Sig: Take 1 tablet by mouth nightly as needed for sleep. Do not take with pain medications.      Facility-Administered Medications: None     Allergies   Allergies   Allergen Reactions     No Known Drug Allergies        Social History   I have reviewed this patient's social history and updated it with pertinent information if needed. Gomez MOSELEY Burke  reports that he has been smoking cigarettes. He has been smoking about 1.00 pack per day. He has never used smokeless tobacco. He reports current alcohol use. He reports that he does not use drugs.    Family History   I have reviewed this patient's family history and updated it with pertinent information if needed.   Family History   Problem Relation Age of Onset     Diabetes Mother      Lipids Mother      Melanoma Mother      Cancer Paternal Grandmother      Neurologic Disorder Maternal Uncle      Glaucoma No family hx of      Macular Degeneration No family hx of      Retinal  detachment No family hx of      Thyroid Disease No family hx of        Review of Systems   CONSTITUTIONAL:  positive for  fatigue, malaise, anorexia and weight loss  EYES:  negative  HEENT:  negative  RESPIRATORY:  negative  CARDIOVASCULAR:  negative  GASTROINTESTINAL:  negative  GENITOURINARY:  negative  INTEGUMENT/BREAST:  negative  HEMATOLOGIC/LYMPHATIC:  negative  ALLERGIC/IMMUNOLOGIC:  negative  ENDOCRINE:  negative  MUSCULOSKELETAL:  positive for  myalgias and arthralgias  NEUROLOGICAL:  positive for weakness and numbness  BEHAVIOR/PSYCH:  negative    Physical Exam   Temp: 97.5  F (36.4  C) Temp src: Oral BP: 129/75 Pulse: 71   Resp: 10 SpO2: 98 % O2 Device: None (Room air)    Vital Signs with Ranges  Temp:  [97.5  F (36.4  C)] 97.5  F (36.4  C)  Pulse:  [60-71] 71  Resp:  [8-18] 10  BP: (118-129)/(70-79) 129/75  SpO2:  [91 %-100 %] 98 %  0 lbs 0 oz    Constitutional: Awake, alert, cooperative, no apparent distress.  Eyes: legally blind  HEENT: Moist mucous membranes, bruise on left side of the face   Respiratory: Clear to auscultation bilaterally, no crackles or wheezing.  Cardiovascular: Regular rate and rhythm, normal S1 and S2, and no murmur noted.  GI: Soft, non-distended, non-tender, normal bowel sounds.  Lymph/Hematologic: No anterior cervical or supraclavicular adenopathy.  Skin: No rashes, no cyanosis, Left LE 1+ edema.  Musculoskeletal: No joint swelling, erythema or tenderness.  Neurologic: Cranial nerves 2-12 intact, normal strength and sensation.  Psychiatric: Alert, oriented to person, place and time, no obvious anxiety or depression.    Data   Data reviewed today:  I personally reviewed the EKG tracing showing NSR, low voltage, no acute ischemic changes.  Recent Labs   Lab 08/19/22  1453   WBC 8.4   HGB 8.5*   *         POTASSIUM 2.6*   CHLORIDE 102   CO2 24   BUN 6*   CR 0.61*   ANIONGAP 8   KRYSTEN 8.0*   *   ALBUMIN 2.4*   PROTTOTAL 6.1*   BILITOTAL 1.0   ALKPHOS 145    ALT 26   AST 49*   LIPASE 79       Recent Results (from the past 24 hour(s))   Head CT w/o contrast    Narrative    EXAM: CT FACIAL BONES WITHOUT CONTRAST, CT HEAD W/O CONTRAST  LOCATION: [facility]  DATE/TIME: 8/19/2022 4:50 PM    INDICATION: Left face and nose pain after fall  COMPARISON: None.  TECHNIQUE:   1) Routine CT Head without IV contrast. Multiplanar reformats. Dose reduction techniques were used.  2) Routine CT Facial Bones without IV contrast. Multiplanar reformats. Dose reduction techniques were used.    FINDINGS:  HEAD CT:   INTRACRANIAL CONTENTS: No intracranial hemorrhage, extraaxial collection, or mass effect.  No CT evidence of acute infarct. Mild presumed chronic small vessel ischemic changes with a tiny remote lacunar infarct in the right thalamus. The ventricles and   sulci are normal for age.     OSSEOUS STRUCTURES/SOFT TISSUES: Left malar soft tissue swelling. No large scalp hematoma. No skull fracture.     FACIAL BONE CT:  OSSEOUS STRUCTURES/SOFT TISSUES: Left malar and periorbital soft tissue swelling. No facial bone fracture or malalignment. No evidence for dental trauma or periapical abscess.    ORBITAL CONTENTS: Prior bilateral cataract surgery. Visualized portions of the orbits are otherwise unremarkable.    SINUSES: No significant paranasal sinus mucosal disease.    OTHER: Multilevel cervical spondylosis.      Impression    IMPRESSION:  HEAD CT:  1.  No acute intracranial process.    FACIAL BONE CT:  1.  Left malar and periorbital soft tissue swelling without acute facial fracture.     CT Facial Bones without Contrast    Narrative    EXAM: CT FACIAL BONES WITHOUT CONTRAST, CT HEAD W/O CONTRAST  LOCATION: [facility]  DATE/TIME: 8/19/2022 4:50 PM    INDICATION: Left face and nose pain after fall  COMPARISON: None.  TECHNIQUE:   1) Routine CT Head without IV contrast. Multiplanar reformats. Dose reduction techniques were used.  2)  Routine CT Facial Bones without IV contrast. Multiplanar reformats. Dose reduction techniques were used.    FINDINGS:  HEAD CT:   INTRACRANIAL CONTENTS: No intracranial hemorrhage, extraaxial collection, or mass effect.  No CT evidence of acute infarct. Mild presumed chronic small vessel ischemic changes with a tiny remote lacunar infarct in the right thalamus. The ventricles and   sulci are normal for age.     OSSEOUS STRUCTURES/SOFT TISSUES: Left malar soft tissue swelling. No large scalp hematoma. No skull fracture.     FACIAL BONE CT:  OSSEOUS STRUCTURES/SOFT TISSUES: Left malar and periorbital soft tissue swelling. No facial bone fracture or malalignment. No evidence for dental trauma or periapical abscess.    ORBITAL CONTENTS: Prior bilateral cataract surgery. Visualized portions of the orbits are otherwise unremarkable.    SINUSES: No significant paranasal sinus mucosal disease.    OTHER: Multilevel cervical spondylosis.      Impression    IMPRESSION:  HEAD CT:  1.  No acute intracranial process.    FACIAL BONE CT:  1.  Left malar and periorbital soft tissue swelling without acute facial fracture.     XR Shoulder Right G/E 3 Views    Narrative    EXAM: XR SHOULDER RIGHT G/E 3 VIEWS  LOCATION: [facility]  DATE/TIME: 8/19/2022 5:19 PM    INDICATION: check for fracture, pain after fall  COMPARISON: None.      Impression    IMPRESSION: No fracture or dislocation. Mild AC joint arthrosis.    Elbow XR, G/E 3 views, right    Narrative    EXAM: XR ELBOW RIGHT G/E 3 VIEWS  LOCATION: [facility]  DATE/TIME: 8/19/2022 5:20 PM    INDICATION: check for fx, pain after fall  COMPARISON: None.      Impression    IMPRESSION: Normal joint spaces and alignment. No fracture or joint effusion.

## 2022-08-19 NOTE — ED TRIAGE NOTES
Pt brought in by EMS from home, witnessed fall/seizure, no hx of same, +Etoh, GCS 15 at this time. Pt c/o all over pain and weakness states has fallen several times over the past few days because his knees give out.     Triage Assessment     Row Name 08/19/22 1400       Triage Assessment (Adult)    Airway WDL WDL       Respiratory WDL    Respiratory WDL WDL       Skin Circulation/Temperature WDL    Skin Circulation/Temperature WDL WDL       Cardiac WDL    Cardiac WDL WDL       Peripheral/Neurovascular WDL    Peripheral Neurovascular WDL WDL       Cognitive/Neuro/Behavioral WDL    Cognitive/Neuro/Behavioral WDL WDL       Ирина Coma Scale    Best Eye Response 4-->(E4) spontaneous    Best Motor Response 6-->(M6) obeys commands    Best Verbal Response 5-->(V5) oriented    Ирина Coma Scale Score 15

## 2022-08-19 NOTE — ED NOTES
[facility]  ED Nurse Handoff Report    ED Chief complaint: Fall, Seizures, and generalized pain      ED Diagnosis:   Final diagnoses:   Generalized muscle weakness   Multiple falls   Hypokalemia   Anemia, unspecified type   Seizure-like activity (H)   Closed head injury, initial encounter   Alcohol use disorder, moderate, dependence (H)       Code Status: To be evaluated by admitting provider.    Allergies:   Allergies   Allergen Reactions     No Known Drug Allergies        Patient Story:  Patient BIBA from home after family called for reported fall and seizure-like activity. Pt reports several falls over the last week. No previous Hx of seizures , Hx of ETOH, Diabetes and neuropathy, anxiety. Patient is legally blind.  Focused Assessment:  Pt. Complains of tingling and weakness in all four extremities, stating it is worse in his legs. He complains as well of pain of 9/10 in the right elbow and knee as result of a fall two days ago. Pt has bruising and small abrasions to left occiput and right knee. Pt. Denies CP, dizziness, N/V, vision changes. VS normal during ED visit. Initial potassium of 2.8.    Treatments and/or interventions provided: Pt. Given dilaudid for the pain and potassium replacement and placed on seizure precautions and cardiac monitoring.  Patient's response to treatments and/or interventions: Pt. Reports pain medications improve pain.    To be done/followed up on inpatient unit:  Continue cardiac monitoring and seizure precautions. Further interventions to be assessed by admitting provider.    Does this patient have any cognitive concerns?: None    Activity level - Baseline/Home:  Cane  Activity Level - Current:   Total Care    Patient's Preferred language: English   Needed?: No    Isolation: None  Infection: Not Applicable  Patient tested for COVID 19 prior to admission: YES  Bariatric?: No    Vital Signs:   Vitals:    08/19/22 1530 08/19/22 1600 08/19/22 1630 08/19/22  1730   BP: 123/79 118/70     Pulse:  68 69 70   Resp:  11 8 14   Temp:       TempSrc:       SpO2: 100% 99% 99% 99%       Cardiac Rhythm: Normal sinus    Was the PSS-3 completed:   Yes  What interventions are required if any?  None             Family Comments: Wife at bedside for duration of ED visit  OBS brochure/video discussed/provided to patient/family: Yes              For the majority of the shift this patient's behavior was Green.   Behavioral interventions performed were None.    ED NURSE PHONE NUMBER: [phone]

## 2022-08-20 ENCOUNTER — APPOINTMENT (OUTPATIENT)
Dept: MRI IMAGING | Facility: CLINIC | Age: 62
DRG: 074 | End: 2022-08-20
Attending: INTERNAL MEDICINE
Payer: COMMERCIAL

## 2022-08-20 LAB
ALBUMIN SERPL-MCNC: 2.2 G/DL (ref 3.4–5)
ANION GAP SERPL CALCULATED.3IONS-SCNC: 5 MMOL/L (ref 3–14)
BASOPHILS # BLD AUTO: 0 10E3/UL (ref 0–0.2)
BASOPHILS NFR BLD AUTO: 1 %
BUN SERPL-MCNC: 6 MG/DL (ref 7–30)
CALCIUM SERPL-MCNC: 7.6 MG/DL (ref 8.5–10.1)
CHLORIDE BLD-SCNC: 105 MMOL/L (ref 94–109)
CO2 SERPL-SCNC: 26 MMOL/L (ref 20–32)
CREAT SERPL-MCNC: 0.54 MG/DL (ref 0.66–1.25)
EOSINOPHIL # BLD AUTO: 0.2 10E3/UL (ref 0–0.7)
EOSINOPHIL NFR BLD AUTO: 2 %
ERYTHROCYTE [DISTWIDTH] IN BLOOD BY AUTOMATED COUNT: 16.5 % (ref 10–15)
FOLATE SERPL-MCNC: 20.2 NG/ML (ref 4.6–34.8)
GFR SERPL CREATININE-BSD FRML MDRD: >90 ML/MIN/1.73M2
GLUCOSE BLD-MCNC: 89 MG/DL (ref 70–99)
GLUCOSE BLDC GLUCOMTR-MCNC: 106 MG/DL (ref 70–99)
GLUCOSE BLDC GLUCOMTR-MCNC: 107 MG/DL (ref 70–99)
GLUCOSE BLDC GLUCOMTR-MCNC: 109 MG/DL (ref 70–99)
GLUCOSE BLDC GLUCOMTR-MCNC: 158 MG/DL (ref 70–99)
HCT VFR BLD AUTO: 20.1 % (ref 40–53)
HGB BLD-MCNC: 7.4 G/DL (ref 13.3–17.7)
IMM GRANULOCYTES # BLD: 0 10E3/UL
IMM GRANULOCYTES NFR BLD: 0 %
LYMPHOCYTES # BLD AUTO: 2.3 10E3/UL (ref 0.8–5.3)
LYMPHOCYTES NFR BLD AUTO: 26 %
MAGNESIUM SERPL-MCNC: 1.7 MG/DL (ref 1.6–2.3)
MAGNESIUM SERPL-MCNC: 1.7 MG/DL (ref 1.6–2.3)
MCH RBC QN AUTO: 41.3 PG (ref 26.5–33)
MCHC RBC AUTO-ENTMCNC: 36.8 G/DL (ref 31.5–36.5)
MCV RBC AUTO: 112 FL (ref 78–100)
MONOCYTES # BLD AUTO: 0.7 10E3/UL (ref 0–1.3)
MONOCYTES NFR BLD AUTO: 8 %
NEUTROPHILS # BLD AUTO: 5.4 10E3/UL (ref 1.6–8.3)
NEUTROPHILS NFR BLD AUTO: 63 %
NRBC # BLD AUTO: 0 10E3/UL
NRBC BLD AUTO-RTO: 0 /100
PHOSPHATE SERPL-MCNC: 2.6 MG/DL (ref 2.5–4.5)
PLATELET # BLD AUTO: 204 10E3/UL (ref 150–450)
POTASSIUM BLD-SCNC: 3.1 MMOL/L (ref 3.4–5.3)
POTASSIUM BLD-SCNC: 3.2 MMOL/L (ref 3.4–5.3)
POTASSIUM BLD-SCNC: 3.6 MMOL/L (ref 3.4–5.3)
RBC # BLD AUTO: 1.79 10E6/UL (ref 4.4–5.9)
SODIUM SERPL-SCNC: 136 MMOL/L (ref 133–144)
WBC # BLD AUTO: 8.6 10E3/UL (ref 4–11)

## 2022-08-20 PROCEDURE — 36415 COLL VENOUS BLD VENIPUNCTURE: CPT | Performed by: EMERGENCY MEDICINE

## 2022-08-20 PROCEDURE — 83735 ASSAY OF MAGNESIUM: CPT | Performed by: INTERNAL MEDICINE

## 2022-08-20 PROCEDURE — 250N000013 HC RX MED GY IP 250 OP 250 PS 637: Performed by: INTERNAL MEDICINE

## 2022-08-20 PROCEDURE — 84132 ASSAY OF SERUM POTASSIUM: CPT | Performed by: INTERNAL MEDICINE

## 2022-08-20 PROCEDURE — 70553 MRI BRAIN STEM W/O & W/DYE: CPT

## 2022-08-20 PROCEDURE — 36415 COLL VENOUS BLD VENIPUNCTURE: CPT | Performed by: INTERNAL MEDICINE

## 2022-08-20 PROCEDURE — 120N000001 HC R&B MED SURG/OB

## 2022-08-20 PROCEDURE — A9585 GADOBUTROL INJECTION: HCPCS | Performed by: EMERGENCY MEDICINE

## 2022-08-20 PROCEDURE — 85025 COMPLETE CBC W/AUTO DIFF WBC: CPT | Performed by: INTERNAL MEDICINE

## 2022-08-20 PROCEDURE — 250N000013 HC RX MED GY IP 250 OP 250 PS 637: Performed by: EMERGENCY MEDICINE

## 2022-08-20 PROCEDURE — 99232 SBSQ HOSP IP/OBS MODERATE 35: CPT | Performed by: INTERNAL MEDICINE

## 2022-08-20 PROCEDURE — 84132 ASSAY OF SERUM POTASSIUM: CPT | Performed by: EMERGENCY MEDICINE

## 2022-08-20 PROCEDURE — 255N000002 HC RX 255 OP 636: Performed by: EMERGENCY MEDICINE

## 2022-08-20 RX ORDER — NALOXONE HYDROCHLORIDE 0.4 MG/ML
0.2 INJECTION, SOLUTION INTRAMUSCULAR; INTRAVENOUS; SUBCUTANEOUS
Status: DISCONTINUED | OUTPATIENT
Start: 2022-08-20 | End: 2022-08-23 | Stop reason: HOSPADM

## 2022-08-20 RX ORDER — FLUMAZENIL 0.1 MG/ML
0.2 INJECTION, SOLUTION INTRAVENOUS
Status: DISCONTINUED | OUTPATIENT
Start: 2022-08-20 | End: 2022-08-21

## 2022-08-20 RX ORDER — NICOTINE 21 MG/24HR
1 PATCH, TRANSDERMAL 24 HOURS TRANSDERMAL DAILY
Status: DISCONTINUED | OUTPATIENT
Start: 2022-08-21 | End: 2022-08-23 | Stop reason: HOSPADM

## 2022-08-20 RX ORDER — DEXTROSE MONOHYDRATE 25 G/50ML
25-50 INJECTION, SOLUTION INTRAVENOUS
Status: DISCONTINUED | OUTPATIENT
Start: 2022-08-20 | End: 2022-08-23 | Stop reason: HOSPADM

## 2022-08-20 RX ORDER — ZOLPIDEM TARTRATE 5 MG/1
5 TABLET ORAL
Status: DISCONTINUED | OUTPATIENT
Start: 2022-08-20 | End: 2022-08-20

## 2022-08-20 RX ORDER — AMOXICILLIN 250 MG
1 CAPSULE ORAL 2 TIMES DAILY PRN
Status: DISCONTINUED | OUTPATIENT
Start: 2022-08-20 | End: 2022-08-23 | Stop reason: HOSPADM

## 2022-08-20 RX ORDER — MULTIPLE VITAMINS W/ MINERALS TAB 9MG-400MCG
1 TAB ORAL DAILY
Status: DISCONTINUED | OUTPATIENT
Start: 2022-08-21 | End: 2022-08-23 | Stop reason: HOSPADM

## 2022-08-20 RX ORDER — POLYETHYLENE GLYCOL 3350 17 G/17G
17 POWDER, FOR SOLUTION ORAL DAILY PRN
Status: DISCONTINUED | OUTPATIENT
Start: 2022-08-20 | End: 2022-08-23 | Stop reason: HOSPADM

## 2022-08-20 RX ORDER — NALOXONE HYDROCHLORIDE 0.4 MG/ML
0.4 INJECTION, SOLUTION INTRAMUSCULAR; INTRAVENOUS; SUBCUTANEOUS
Status: DISCONTINUED | OUTPATIENT
Start: 2022-08-20 | End: 2022-08-23 | Stop reason: HOSPADM

## 2022-08-20 RX ORDER — FOLIC ACID 1 MG/1
1 TABLET ORAL DAILY
Status: DISCONTINUED | OUTPATIENT
Start: 2022-08-21 | End: 2022-08-23 | Stop reason: HOSPADM

## 2022-08-20 RX ORDER — OLANZAPINE 5 MG/1
5-10 TABLET, ORALLY DISINTEGRATING ORAL EVERY 6 HOURS PRN
Status: DISCONTINUED | OUTPATIENT
Start: 2022-08-20 | End: 2022-08-23 | Stop reason: HOSPADM

## 2022-08-20 RX ORDER — PROCHLORPERAZINE 25 MG
25 SUPPOSITORY, RECTAL RECTAL EVERY 12 HOURS PRN
Status: DISCONTINUED | OUTPATIENT
Start: 2022-08-20 | End: 2022-08-23 | Stop reason: HOSPADM

## 2022-08-20 RX ORDER — AMOXICILLIN 250 MG
2 CAPSULE ORAL 2 TIMES DAILY PRN
Status: DISCONTINUED | OUTPATIENT
Start: 2022-08-20 | End: 2022-08-23 | Stop reason: HOSPADM

## 2022-08-20 RX ORDER — ZOLPIDEM TARTRATE 5 MG/1
5 TABLET ORAL ONCE
Status: COMPLETED | OUTPATIENT
Start: 2022-08-20 | End: 2022-08-20

## 2022-08-20 RX ORDER — NICOTINE POLACRILEX 4 MG
15-30 LOZENGE BUCCAL
Status: DISCONTINUED | OUTPATIENT
Start: 2022-08-20 | End: 2022-08-23 | Stop reason: HOSPADM

## 2022-08-20 RX ORDER — LIDOCAINE 40 MG/G
CREAM TOPICAL
Status: DISCONTINUED | OUTPATIENT
Start: 2022-08-20 | End: 2022-08-23 | Stop reason: HOSPADM

## 2022-08-20 RX ORDER — DEXTROSE MONOHYDRATE, SODIUM CHLORIDE, AND POTASSIUM CHLORIDE 50; 1.49; 9 G/1000ML; G/1000ML; G/1000ML
100 INJECTION, SOLUTION INTRAVENOUS CONTINUOUS
Status: DISCONTINUED | OUTPATIENT
Start: 2022-08-20 | End: 2022-08-20

## 2022-08-20 RX ORDER — POTASSIUM CHLORIDE 1500 MG/1
40 TABLET, EXTENDED RELEASE ORAL ONCE
Status: DISCONTINUED | OUTPATIENT
Start: 2022-08-20 | End: 2022-08-20

## 2022-08-20 RX ORDER — PROCHLORPERAZINE MALEATE 5 MG
10 TABLET ORAL EVERY 6 HOURS PRN
Status: DISCONTINUED | OUTPATIENT
Start: 2022-08-20 | End: 2022-08-23 | Stop reason: HOSPADM

## 2022-08-20 RX ORDER — POTASSIUM CHLORIDE 1500 MG/1
40 TABLET, EXTENDED RELEASE ORAL ONCE
Status: COMPLETED | OUTPATIENT
Start: 2022-08-20 | End: 2022-08-20

## 2022-08-20 RX ORDER — ONDANSETRON 2 MG/ML
4 INJECTION INTRAMUSCULAR; INTRAVENOUS EVERY 6 HOURS PRN
Status: DISCONTINUED | OUTPATIENT
Start: 2022-08-20 | End: 2022-08-20

## 2022-08-20 RX ORDER — ONDANSETRON 4 MG/1
4 TABLET, ORALLY DISINTEGRATING ORAL EVERY 6 HOURS PRN
Status: DISCONTINUED | OUTPATIENT
Start: 2022-08-20 | End: 2022-08-20

## 2022-08-20 RX ORDER — GADOBUTROL 604.72 MG/ML
9 INJECTION INTRAVENOUS ONCE
Status: COMPLETED | OUTPATIENT
Start: 2022-08-20 | End: 2022-08-20

## 2022-08-20 RX ORDER — HALOPERIDOL 5 MG/ML
2.5-5 INJECTION INTRAMUSCULAR EVERY 6 HOURS PRN
Status: DISCONTINUED | OUTPATIENT
Start: 2022-08-20 | End: 2022-08-23 | Stop reason: HOSPADM

## 2022-08-20 RX ADMIN — ASPIRIN 325 MG: 325 TABLET, COATED ORAL at 08:05

## 2022-08-20 RX ADMIN — CITALOPRAM HYDROBROMIDE 40 MG: 40 TABLET ORAL at 08:05

## 2022-08-20 RX ADMIN — NICOTINE 1 PATCH: 14 PATCH, EXTENDED RELEASE TRANSDERMAL at 08:06

## 2022-08-20 RX ADMIN — POTASSIUM CHLORIDE 40 MEQ: 1500 TABLET, EXTENDED RELEASE ORAL at 19:02

## 2022-08-20 RX ADMIN — GABAPENTIN 300 MG: 300 CAPSULE ORAL at 08:05

## 2022-08-20 RX ADMIN — GADOBUTROL 9 ML: 604.72 INJECTION INTRAVENOUS at 04:02

## 2022-08-20 RX ADMIN — OXYCODONE HYDROCHLORIDE 5 MG: 5 TABLET ORAL at 12:44

## 2022-08-20 RX ADMIN — THIAMINE HCL TAB 100 MG 100 MG: 100 TAB at 08:05

## 2022-08-20 RX ADMIN — OXYCODONE HYDROCHLORIDE 5 MG: 5 TABLET ORAL at 07:35

## 2022-08-20 RX ADMIN — GABAPENTIN 300 MG: 300 CAPSULE ORAL at 20:43

## 2022-08-20 RX ADMIN — GABAPENTIN 300 MG: 300 CAPSULE ORAL at 15:02

## 2022-08-20 RX ADMIN — ZOLPIDEM TARTRATE 5 MG: 5 TABLET ORAL at 22:12

## 2022-08-20 RX ADMIN — OXYCODONE HYDROCHLORIDE 5 MG: 5 TABLET ORAL at 18:55

## 2022-08-20 RX ADMIN — OXYCODONE HYDROCHLORIDE 5 MG: 5 TABLET ORAL at 01:35

## 2022-08-20 RX ADMIN — ATORVASTATIN CALCIUM 40 MG: 40 TABLET, FILM COATED ORAL at 08:05

## 2022-08-20 ASSESSMENT — ACTIVITIES OF DAILY LIVING (ADL)
CONCENTRATING,_REMEMBERING_OR_MAKING_DECISIONS_DIFFICULTY: NO
CHANGE_IN_FUNCTIONAL_STATUS_SINCE_ONSET_OF_CURRENT_ILLNESS/INJURY: YES
ADLS_ACUITY_SCORE: 37
WALKING_OR_CLIMBING_STAIRS: AMBULATION DIFFICULTY, REQUIRES EQUIPMENT;AMBULATION DIFFICULTY, ASSISTANCE 1 PERSON
TRANSFERRING: 1-->ASSISTANCE (EQUIPMENT/PERSON) NEEDED (NOT DEVELOPMENTALLY APPROPRIATE)
ADLS_ACUITY_SCORE: 37
ADLS_ACUITY_SCORE: 37
DOING_ERRANDS_INDEPENDENTLY_DIFFICULTY: NO
TOILETING_ISSUES: NO
ADLS_ACUITY_SCORE: 37
DIFFICULTY_EATING/SWALLOWING: NO
ADLS_ACUITY_SCORE: 37
ADLS_ACUITY_SCORE: 37
ADLS_ACUITY_SCORE: 24
FALL_HISTORY_WITHIN_LAST_SIX_MONTHS: YES
EQUIPMENT_CURRENTLY_USED_AT_HOME: GRAB BAR, TOILET;GRAB BAR, TUB/SHOWER;WALKER, ROLLING
DRESSING/BATHING_DIFFICULTY: NO
NUMBER_OF_TIMES_PATIENT_HAS_FALLEN_WITHIN_LAST_SIX_MONTHS: 12
ADLS_ACUITY_SCORE: 37
WEAR_GLASSES_OR_BLIND: YES
ADLS_ACUITY_SCORE: 24
ADLS_ACUITY_SCORE: 37
WALKING_OR_CLIMBING_STAIRS_DIFFICULTY: YES
ADLS_ACUITY_SCORE: 24
ADLS_ACUITY_SCORE: 37
TRANSFERRING: 1-->ASSISTANCE (EQUIPMENT/PERSON) NEEDED

## 2022-08-20 NOTE — H&P
Admitted: 08/19/2022    HISTORY OF PRESENT ILLNESS:  This is a 61-year-old male with history of anxiety, GERD, hypertension, hyperlipidemia, peripheral artery disease, status post left fem-pop bypass, diabetes mellitus type 2, legally blind, came to the ER with complaint of generalized weakness and recurrent falls.    According to the patient has been blind for the last 5 years.  He never know the cause and since then, he has increase in his alcohol intake.  His wife noticed that he has been falling down quite frequently now.  For the month, he has been falling off and on, but now for the last month or so, he has been falling every night.  He fall about a week ago and bruised his left side of the face, but did not seek attention.  He had multiple falls.  He may have hit his right shoulder radiographs, but never sought any attention.    The patient's wife noticed that the patient has been drinking a lot.  Although the patient is not very forthcoming in his drinking habit.  He has had 2-3 drinks, but his wife told me that he has been drinking a 1.75 L of whiskey every 3 days.  If he is sitting down for 8-10 hours, he keeps drinking.  He has been drinking early in the morning as well.  He was in the kitchen today around 10 mixing his drink and all of a sudden feeling weak, tired, lightheaded and fell down.  The son noticed that when he fell down, his eyes rolled up, his face rolled up.  He has some abnormal body movement and was drooling from his side and breathing loud.  Wife noticed that whenever he fell down on his back, he had some seizure-like activity as described by his son.    On 1 occasion, patient did have incontinence of urine, but mostly he remains continent.  He denies any fever, chills, cough, chest pain, shortness of breath, abdominal pain, nausea, vomiting.  No hematemesis, melena, hematochezia has not been noticed by his wife.  He is legally blind, so cannot see.  The patient told me that the alcohol  only thing that he had a bad as he cannot go anywhere because of blindness, cannot play golf and other stuff.   Rest of the review of system is negative at this time other than he does have some weight loss as per the patient's wife, but never they checked it.  He is have imbalanced gait.  He has some numbness and tingling on the left side of his face as well as his left arm.  Very unstable on his gait.  He used a walker and despite that he has been falling down and have broken couple of walkers in the past.    ASSESSMENT AND PLAN:    1.  Generalized weakness and recurrent falls:  This is multifactorial.  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  At this time, the patient quite dehydrated.  He was given IV fluids.  His electrolytes will be replaced.  He will have PT, OT evaluate him.  We will encourage him to quit drinking.  We will see how he does.  We will check his TSH, B12, folate and vitamin D levels.  2.  Alcohol abuse:  The patient has been drinking heavily for months now at least 6 months or more.  Alcohol cessation counseling given.  We will start him on IV fluids.  We will give him IV vitamins, folic acid, thiamine, keep him on alcohol withdrawal protocol.  We will use lorazepam according to the [facility].  Keep him on seizures precautions, fall precaution and aspiration precaution.  2.  Possible seizures:  With the falls he had some involuntary movement in which he has drooling, loud snoring and some abnormal activity and rolling of the eyes.  We cannot rule out seizures completely.  We will have Neurology evaluate him, keep him on seizures precautions.  This may be related to alcohol.  We will do MRI of the brain, EEG.  CT scan of the head reviewed, no acute abnormality noted.  There is chronic small vessel ischemic changes with tiny remote lacunar infarct on the left thalamus.  We will have Neurology evaluate him.    3.  Recurrent falls:  As mentioned,  most likely secondary to multiple problems as mentioned above.  Trauma workup in the ED, CT head, CT facial bones, right shoulder, right elbow x-ray negative for any acute fracture.  4.  Hypertension:  Has been stable.  He is on lisinopril.  We will continue with that.  5.  Hyperlipidemia:  On Lipitor.  We will continue with hypokalemia and hypomagnesemia.  This is likely related to alcohol abuse.  We will replace the potassium and magnesium.  Keep him on electrolyte replacement protocol.  6.  Anemia:  The patient's hemoglobin 8.5 right now.  A few months ago was 11.0 and last year was normal.  His MCV slightly elevated as well.  At this time, we will check B12 level, folate level, iron and transferrin and ferritin level as well.  Peripheral smear.  We will see how he does  7.  Diabetes mellitus type 2:  On metformin.  Hold the metformin and keep him on sliding scale insulin for correction and hypoglycemia protocol.  Peripheral neuropathy, on gabapentin.  We will continue with that.  8.  History of peripheral artery disease, status post left lower extremity fem-pop bypass.  Continue with aspirin and Lipitor.    9.  GI prophylaxis with Protonix.  10.  DVT prophylaxis with SCDs.    CODE STATUS:  FULL CODE.    The patient will be admitted to the hospital.  Replace electrolytes IV fluids, seizures precautions, fall precautions.  EEG, MRI.  Neurology evaluation, psychiatry evaluation as well as GI evaluation.    The case discussed with ER physician and the nursing staff taking care of the patient.    Kevin Flores MD        D: 2022   T: 2022   MT: EUGENIO    Name:     AYAZ CHAPARRO  MRN:      [id number]        Account:     [id number]   :      1960           Admitted:    2022       Document: [id number]

## 2022-08-20 NOTE — CONSULTS
[facility]    Neurology Consultation     Date of Admission:  8/19/2022    Assessment & Plan   Gomez Turk is a 61 year old male who was admitted on 8/19/2022. I was asked to see the patient for alcohol abuse with recurrent falls, numbness, ? Seizures.  Patient with recent loss of vision, increasing alcohol abuse with frequent falls, admitted with dehydration and weight loss.  Probable depression related to his visual loss.  Possible alcohol related seizures versus of syncope.  His unsteady gait might be related to alcoholic or vitamin deficiency, neuropathy.  I would recommend the following:    - EEG to evaluate for encephalopathy and to look for epileptiform activity.  - Orthostatic blood pressure and heart rate  - Work-up for severe anemia exclude GI bleed  - In the context of multiple falls I will recommend to check also an MRI of the cervical spine  - OT and PT to evaluate patient  - Alcohol withdrawal protocol and chemical dependency.  - As an outpatient the patient is to follow-up with neurology for further studies with a EMG.      Stacey Bautista MD    Code Status    Full Code    Reason for Consult   Reason for consult: I was asked by Dr Flores to evaluate this patient for alcohol abuse with recurrent falls, numbness, ? seizures    Primary Care Physician   Tylor Roberts    Chief Complaint   alcohol abuse with recurrent falls, numbness, ? seizures    History is obtained from the patient and electronic health record    History of Present Illness   Gomez Turk is a 61 year old male who presents with alcohol abuse with recurrent falls, numbness, ? Seizures.  The patient is a gentleman with history of hypertension, hyperlipidemia diabetes and peripheral artery disease status post left femoral popliteal bypass who states that he has been blind for the last 5 years.  Apparently since this happened the patient started to drink more less heavily.  For the last months the  patient started to have falls and feel weak in lower extremities as well as having unsteady gait.  Reviewing the chart it is mentioned that the patient has been drinking about 1.75 L of whiskey every 3 days.  He has had a fall every night but did not seek medical attention until yesterday.      Yesterday morning prior to admission the patient was in the kitchen mixing his drink and suddenly felt weak tired lightheaded and fell down.  Patient's son and wife witnessed the event apparently the patient will not his eyes back and had some abnormal movements.    With one of his Past falls has had an episode of incontinence but usually this does not happen.  He has been using a walker but despite this he continues to fall down and has broken the walkers in the past.  The patient states that he feels lightheaded prior to falling.    The patient also complains of numbness and tingling in the left side of the face and left arm.  He states that this started about 2 months ago suddenly and it he never recuperated.  He states that all of his left hand, all the fingers are numb as well as left side of his face.    The patient states that he has had numbness and tingling in both lower extremities for a long time.  He was diagnosed in the past with neuropathy.  He states that he has occasionally pain in lower extremities and has taken gabapentin 300 mg 3 times a day.  He states that if he misses dosages of gabapentin the pain will be back.    The patient smokes.  He denies drinking alcohol excessively he is not very willing and accepting to discontinue.  He would want to decrease the amount.  He states he does not sleep very well and this has been a pattern for most of his life.  Patient's father was killed in a car crash when the patient was 6 years old.    The patient has had an MRI of the head today which showed no acute abnormality.  Mild small vessel ischemic disease.  The films were reviewed by me.    Vitamin B12 was 463,  folate normal, TSH normal, hemoglobin today is 7.4, it was 11 2 months ago.    Past Medical History   I have reviewed this patient's medical history and updated it with pertinent information if needed.   Past Medical History:   Diagnosis Date     Anxiety      DIVERTICULOSIS OF COLON W/O BLEED 6/25/2003     GERD (gastroesophageal reflux disease)      Hyperlipidemia LDL goal <100 10/31/2010     Hypertension      Legally blind      PAD (peripheral artery disease) (H)      Tobacco use disorder 6/25/2003     Type 2 diabetes, HbA1c goal < 7% (H) 10/31/2010       Past Surgical History   I have reviewed this patient's surgical history and updated it with pertinent information if needed.  Past Surgical History:   Procedure Laterality Date     AMPUTATE TOE(S) Left 2/28/2017    Procedure: AMPUTATE TOE(S);  Surgeon: Jesus Aragon MD;  Location:  OR     BYPASS GRAFT FEMOROPOPLITEAL Left 2/28/2017    Procedure: BYPASS GRAFT FEMOROPOPLITEAL;  Surgeon: Jesus Aragon MD;  Location:  OR     ENT SURGERY  1990    deviated septum repair     IRRIGATION AND DEBRIDEMENT FOOT, COMBINED Left 2/28/2017    Procedure: COMBINED IRRIGATION AND DEBRIDEMENT FOOT;  Surgeon: Jesus Aragon MD;  Location:  OR     LAPAROSCOPIC CHOLECYSTECTOMY N/A 3/18/2017    Procedure: LAPAROSCOPIC CHOLECYSTECTOMY;  Surgeon: Edin Hollingsworth MD;  Location:  OR       Prior to Admission Medications   Prior to Admission Medications   Prescriptions Last Dose Informant Patient Reported? Taking?   ACCU-CHEK GUIDE test strip  Self No No   Sig: USE TO TEST BLOOD SUGAR 3 TIMES DAILY OR AS DIRECTED.   GLIPIZIDE PO  Self Yes Yes   Sig: Daily as needed   METFORMIN HCL PO  Self Yes Yes   Sig: Daily as needed   aspirin (ASA) 325 MG EC tablet 8/19/2022 at am Self Yes Yes   Sig: Take 650 mg by mouth daily   atorvastatin (LIPITOR) 40 MG tablet Past Week at Unknown time Self No Yes   Sig: Take 1 tablet (40 mg) by mouth daily   blood glucose  monitoring (NO BRAND SPECIFIED) meter device kit  Self No No   Sig: Use to test blood sugar 3 times daily or as directed. Preferred blood glucose meter OR supplies to accompany: Blood Glucose Monitor Brands: per insurance.   citalopram (CELEXA) 40 MG tablet 8/19/2022 at am Self No Yes   Sig: TAKE ONE TABLET BY MOUTH ONE TIME DAILY   gabapentin (NEURONTIN) 300 MG capsule 8/19/2022 at am Self No Yes   Sig: Take 1 capsule (300 mg) by mouth 3 times daily   lisinopril (ZESTRIL) 5 MG tablet  at PRN Self No Yes   Sig: Take 1 tablet (5 mg) by mouth daily   Patient taking differently: Take 5 mg by mouth daily as needed   thin (NO BRAND SPECIFIED) lancets  Self No No   Sig: Use with lanceting device. To accompany: Blood Glucose Monitor Brands: per insurance.   zolpidem (AMBIEN) 10 MG tablet Past Week at Unknown time Self No Yes   Sig: Take 1 tablet by mouth nightly as needed for sleep. Do not take with pain medications.      Facility-Administered Medications: None     Allergies   Allergies   Allergen Reactions     No Known Drug Allergies        Social History   I have reviewed this patient's social history and updated it with pertinent information if needed. Gomez Artisquin  reports that he has been smoking cigarettes. He has been smoking about 1.00 pack per day. He has never used smokeless tobacco. He reports current alcohol use. He reports that he does not use drugs.    Family History   I have reviewed this patient's family history and updated it with pertinent information if needed.   Family History   Problem Relation Age of Onset     Diabetes Mother      Lipids Mother      Melanoma Mother      Cancer Paternal Grandmother      Neurologic Disorder Maternal Uncle      Glaucoma No family hx of      Macular Degeneration No family hx of      Retinal detachment No family hx of      Thyroid Disease No family hx of        Review of Systems   The 10 point Review of Systems is negative other than noted in the HPI or here.      Physical Exam       BP: 100/61 Pulse: 62   Resp: 14 SpO2: 94 % O2 Device: None (Room air)    Vital Signs with Ranges  Pulse:  [59-85] 62  Resp:  [0-47] 14  BP: ()/(56-95) 100/61  SpO2:  [89 %-100 %] 94 %  0 lbs 0 oz  The patient is laying down in bed.  He states that he sees shadows of my face in the video of the iPad but no details.  He does have a bruise over his left cheek.  The patient has had toes amputated on the left side.  He does have left lower extremity swelling.  The patient is alert oriented x3 in no acute distress.  Speech is fluent there is no aphasia.  He is tangential at times.  His history lacks details.  He knows the name of the president.  He was able to spell world forward and backward.  Memory was 4 out of 4 at 1 minute and 4 out of 4 at 5 minutes.  Face is slightly symmetric, the left palpebral fissure is slightly more open than the right and there is slight asymmetric blinking.  Tongue protrudes midline.  The left nasolabial fold seems slightly less prominent than the right.  There is no pronator drift.  There is no asterixis.  Finger-nose-finger without dysmetria.  Fine movements are normal.      Data   Results for orders placed or performed during the hospital encounter of 08/19/22 (from the past 24 hour(s))   Head CT w/o contrast    Narrative    EXAM: CT FACIAL BONES WITHOUT CONTRAST, CT HEAD W/O CONTRAST  LOCATION: [facility]  DATE/TIME: 8/19/2022 4:50 PM    INDICATION: Left face and nose pain after fall  COMPARISON: None.  TECHNIQUE:   1) Routine CT Head without IV contrast. Multiplanar reformats. Dose reduction techniques were used.  2) Routine CT Facial Bones without IV contrast. Multiplanar reformats. Dose reduction techniques were used.    FINDINGS:  HEAD CT:   INTRACRANIAL CONTENTS: No intracranial hemorrhage, extraaxial collection, or mass effect.  No CT evidence of acute infarct. Mild presumed chronic small vessel ischemic changes with a tiny  remote lacunar infarct in the right thalamus. The ventricles and   sulci are normal for age.     OSSEOUS STRUCTURES/SOFT TISSUES: Left malar soft tissue swelling. No large scalp hematoma. No skull fracture.     FACIAL BONE CT:  OSSEOUS STRUCTURES/SOFT TISSUES: Left malar and periorbital soft tissue swelling. No facial bone fracture or malalignment. No evidence for dental trauma or periapical abscess.    ORBITAL CONTENTS: Prior bilateral cataract surgery. Visualized portions of the orbits are otherwise unremarkable.    SINUSES: No significant paranasal sinus mucosal disease.    OTHER: Multilevel cervical spondylosis.      Impression    IMPRESSION:  HEAD CT:  1.  No acute intracranial process.    FACIAL BONE CT:  1.  Left malar and periorbital soft tissue swelling without acute facial fracture.     CT Facial Bones without Contrast    Narrative    EXAM: CT FACIAL BONES WITHOUT CONTRAST, CT HEAD W/O CONTRAST  LOCATION: [facility]  DATE/TIME: 8/19/2022 4:50 PM    INDICATION: Left face and nose pain after fall  COMPARISON: None.  TECHNIQUE:   1) Routine CT Head without IV contrast. Multiplanar reformats. Dose reduction techniques were used.  2) Routine CT Facial Bones without IV contrast. Multiplanar reformats. Dose reduction techniques were used.    FINDINGS:  HEAD CT:   INTRACRANIAL CONTENTS: No intracranial hemorrhage, extraaxial collection, or mass effect.  No CT evidence of acute infarct. Mild presumed chronic small vessel ischemic changes with a tiny remote lacunar infarct in the right thalamus. The ventricles and   sulci are normal for age.     OSSEOUS STRUCTURES/SOFT TISSUES: Left malar soft tissue swelling. No large scalp hematoma. No skull fracture.     FACIAL BONE CT:  OSSEOUS STRUCTURES/SOFT TISSUES: Left malar and periorbital soft tissue swelling. No facial bone fracture or malalignment. No evidence for dental trauma or periapical abscess.    ORBITAL CONTENTS: Prior bilateral  cataract surgery. Visualized portions of the orbits are otherwise unremarkable.    SINUSES: No significant paranasal sinus mucosal disease.    OTHER: Multilevel cervical spondylosis.      Impression    IMPRESSION:  HEAD CT:  1.  No acute intracranial process.    FACIAL BONE CT:  1.  Left malar and periorbital soft tissue swelling without acute facial fracture.     XR Shoulder Right G/E 3 Views    Narrative    EXAM: XR SHOULDER RIGHT G/E 3 VIEWS  LOCATION: [facility]  DATE/TIME: 8/19/2022 5:19 PM    INDICATION: check for fracture, pain after fall  COMPARISON: None.      Impression    IMPRESSION: No fracture or dislocation. Mild AC joint arthrosis.    Elbow XR, G/E 3 views, right    Narrative    EXAM: XR ELBOW RIGHT G/E 3 VIEWS  LOCATION: [facility]  DATE/TIME: 8/19/2022 5:20 PM    INDICATION: check for fx, pain after fall  COMPARISON: None.      Impression    IMPRESSION: Normal joint spaces and alignment. No fracture or joint effusion.   ABO/Rh type and screen    Narrative    The following orders were created for panel order ABO/Rh type and screen.  Procedure                               Abnormality         Status                     ---------                               -----------         ------                     Adult Type and Screen[[id number]]                            Final result                 Please view results for these tests on the individual orders.   Adult Type and Screen   Result Value Ref Range    ABO/RH(D) A POS     Antibody Screen Negative Negative    SPECIMEN EXPIRATION DATE [id number]    Folate   Result Value Ref Range    Folic Acid 20.2 4.6 - 34.8 ng/mL   [place] Draw *Canceled*    Narrative    The following orders were created for panel order [place] Draw.  Procedure                               Abnormality         Status                     ---------                               -----------         ------                        Please view results for these tests on the individual orders.   [place] Draw    Narrative    The following orders were created for panel order [place] Draw.  Procedure                               Abnormality         Status                     ---------                               -----------         ------                     Extra Purple Top Tube[[id number]]                            Final result                 Please view results for these tests on the individual orders.   Extra Purple Top Tube   Result Value Ref Range    Hold Specimen JIC    Glucose by meter   Result Value Ref Range    GLUCOSE BY METER POCT 119 (H) 70 - 99 mg/dL   UA with Microscopic reflex to Culture    Specimen: Urine, Midstream   Result Value Ref Range    Color Urine Yellow Colorless, Straw, Light Yellow, Yellow    Appearance Urine Clear Clear    Glucose Urine Negative Negative mg/dL    Bilirubin Urine Negative Negative    Ketones Urine Negative Negative mg/dL    Specific Gravity Urine 1.012 1.003 - 1.035    Blood Urine Negative Negative    pH Urine 7.0 5.0 - 7.0    Protein Albumin Urine Negative Negative mg/dL    Urobilinogen Urine 4.0 (A) Normal, 2.0 mg/dL    Nitrite Urine Negative Negative    Leukocyte Esterase Urine Negative Negative    Mucus Urine Present (A) None Seen /LPF    RBC Urine <1 <=2 /HPF    WBC Urine 1 <=5 /HPF    Squamous Epithelials Urine <1 <=1 /HPF    Hyaline Casts Urine 4 (H) <=2 /LPF    Narrative    Urine Culture not indicated   Potassium   Result Value Ref Range    Potassium 3.6 3.4 - 5.3 mmol/L   MR Brain w/o & w Contrast    Narrative    EXAM: MR BRAIN W/O and W CONTRAST  LOCATION: [facility]  DATE/TIME: 8/20/2022 4:03 AM    INDICATION: recurrent falls, alcohol abuse, left sided numbness  COMPARISON: CT head 08/19/2022  CONTRAST: 9mL Gadavist  TECHNIQUE: Routine multiplanar multisequence head MRI without and with intravenous contrast.    FINDINGS:  INTRACRANIAL CONTENTS: No acute or  subacute infarct. No mass, acute hemorrhage, or extra-axial fluid collections. Normal brain parenchymal signal. Mild volume loss and presumed chronic small vessel ischemia. No pathologic contrast enhancement.    SELLA: No abnormality accounting for technique.    OSSEOUS STRUCTURES/SOFT TISSUES: Normal marrow signal. The major intracranial vascular flow voids are maintained.     ORBITS: No abnormality accounting for technique.     SINUSES/MASTOIDS: Trace paranasal sinus mucosal disease. No middle ear or mastoid effusion.       Impression    IMPRESSION:  1.  No acute intracranial abnormality.    2.  Mild age-related change.   CBC with platelets differential    Narrative    The following orders were created for panel order CBC with platelets differential.  Procedure                               Abnormality         Status                     ---------                               -----------         ------                     CBC with platelets and d...[[id number]]  Abnormal            Final result                 Please view results for these tests on the individual orders.   Renal panel   Result Value Ref Range    Sodium 136 133 - 144 mmol/L    Potassium 3.1 (L) 3.4 - 5.3 mmol/L    Chloride 105 94 - 109 mmol/L    Carbon Dioxide (CO2) 26 20 - 32 mmol/L    Anion Gap 5 3 - 14 mmol/L    Urea Nitrogen 6 (L) 7 - 30 mg/dL    Creatinine 0.54 (L) 0.66 - 1.25 mg/dL    Calcium 7.6 (L) 8.5 - 10.1 mg/dL    Glucose 89 70 - 99 mg/dL    Albumin 2.2 (L) 3.4 - 5.0 g/dL    Phosphorus 2.6 2.5 - 4.5 mg/dL    GFR Estimate >90 >60 mL/min/1.73m2   Magnesium   Result Value Ref Range    Magnesium 1.7 1.6 - 2.3 mg/dL   CBC with platelets and differential   Result Value Ref Range    WBC Count 8.6 4.0 - 11.0 10e3/uL    RBC Count 1.79 (L) 4.40 - 5.90 10e6/uL    Hemoglobin 7.4 (L) 13.3 - 17.7 g/dL    Hematocrit 20.1 (L) 40.0 - 53.0 %     (H) 78 - 100 fL    MCH 41.3 (H) 26.5 - 33.0 pg    MCHC 36.8 (H) 31.5 - 36.5 g/dL    RDW 16.5 (H) 10.0 -  15.0 %    Platelet Count 204 150 - 450 10e3/uL    % Neutrophils 63 %    % Lymphocytes 26 %    % Monocytes 8 %    % Eosinophils 2 %    % Basophils 1 %    % Immature Granulocytes 0 %    NRBCs per 100 WBC 0 <1 /100    Absolute Neutrophils 5.4 1.6 - 8.3 10e3/uL    Absolute Lymphocytes 2.3 0.8 - 5.3 10e3/uL    Absolute Monocytes 0.7 0.0 - 1.3 10e3/uL    Absolute Eosinophils 0.2 0.0 - 0.7 10e3/uL    Absolute Basophils 0.0 0.0 - 0.2 10e3/uL    Absolute Immature Granulocytes 0.0 <=0.4 10e3/uL    Absolute NRBCs 0.0 10e3/uL   Glucose by meter   Result Value Ref Range    GLUCOSE BY METER POCT 109 (H) 70 - 99 mg/dL   Glucose by meter   Result Value Ref Range    GLUCOSE BY METER POCT 107 (H) 70 - 99 mg/dL     This is a telemedicine visit that was performed with the originating site at patient's hospital room at [facility] and the distant side in [place].  Verbal consent to participate in video visit was obtained.  This visit occurred during the coronavirus (COVID-19)  public health emergency.  I discussed with the patient the nature of our telemedicine visits, that:  - I would evaluate the patient and recommend diagnostics and treatments based on my assessment.   - Our sessions are not being recorded and that personal health information is protected.    - Our team would provide follow-up care in person if and when the patient need it.  Patient identification was verified before the start of the encounter.    70  minutes spent, in a combination of the following activities: reviewing records, interpreting test results, I reviewed the MRI films myself ,discussing and coordinating care with the patient, discussing plans with the patient, reviewing care plan.

## 2022-08-20 NOTE — ED NOTES
[facility]  ED Nurse Handoff Report    ED Chief complaint: Fall, Seizures, and generalized pain      ED Diagnosis:   Final diagnoses:   Generalized muscle weakness   Multiple falls   Hypokalemia   Anemia, unspecified type   Seizure-like activity (H)   Closed head injury, initial encounter   Alcohol use disorder, moderate, dependence (H)       Code Status: per admitting provider    Allergies:   Allergies   Allergen Reactions     No Known Drug Allergies        Patient Story: Pt brought in by EMS from home, witnessed fall/seizure, no hx of same, +Etoh, GCS 15 at this time. Pt c/o all over pain and weakness states has fallen several times over the past few days because his knees give out.  Focused Assessment:  Withdrawal, seizure    Treatments and/or interventions provided: monitor for seizure activity, pain control  Patient's response to treatments and/or interventions: improved    To be done/followed up on inpatient unit:  as above    Does this patient have any cognitive concerns?: legally blind    Activity level - Baseline/Home:  Independent  Activity Level - Current:   Stand with Assist    Patient's Preferred language: English   Needed?: No    Isolation: None  Infection: Not Applicable  Patient tested for COVID 19 prior to admission: yes  Bariatric?: No    Vital Signs:   Vitals:    08/20/22 0500 08/20/22 0600 08/20/22 0645 08/20/22 0700   BP: 111/73 122/74  111/58   Pulse: 61 61 65 62   Resp: 12 10 27 14   Temp:       TempSrc:       SpO2: 97% 96% 98% 97%       Cardiac Rhythm:     Was the PSS-3 completed:   Yes  What interventions are required if any?               Family Comments: n/a  OBS brochure/video discussed/provided to patient/family: N/A              Name of person given brochure if not patient:               Relationship to patient:     For the majority of the shift this patient's behavior was Green.   Behavioral interventions performed were none.    ED NURSE PHONE NUMBER:  [phone]

## 2022-08-20 NOTE — PROGRESS NOTES
[facility]    Hospitalist Progress Note    Assessment & Plan   Gomez Turk is a 61 year old male with PMHx of hypertension, hyperlipidemia, PAD with hx of fem-pop bypass, DM II and legally blind who was admitted on 8/19/2022 for evaluation of generalized weakness and recurrent falls in setting of alcohol use disorder.     Generalized weakness and recurrent falls, likely multifactorial  * Suspected secondary to alcohol use and alcohol-related peripheral neuropathy, decreased po intake with associated dehydration and hypokalemia.   * Presented to ED with report of increased frequency of falls. Wife stated patient had been falling frequently over the past month or so. Trauma evaluation done in ED --  Head CT, CT facial bones, xrays of R shoulder and elbow were all neg for fracture.   * TSH, B12/folate levels all nl this stay.  * Supportive cares started on admission with IVFs.   -- PT/OT consulted -- anticipate TCU stay will be needed  -- address EtOH use disorder as below    Hypokalemia  Hypomagnesemia  * K 2.6, Mag 1.4 on presentation. Secondary to EtOH use.  -- on K, Mag replacement protocols    Alcohol use disorder  * Wife reports patient has been drinking heavily for months now at least 6 months or more -- upwards of 1.75L whiskey every 3 days.   * BAL 0.06 in ED. Started on IVFs, MVI, thiamine/folate on admission. Placed on CIWA protocol  -- monitoring on CIWA protocol with prn Ativan  -- consider chem dep consult this stay once medical issues stabilized    Possible seizures  * Wife had noted that with his falls, he has had some involuntary movements in which he has drooling, loud snoring and some abnormal activity and rolling of the eyes.   * Unclear if these episodes represent seizures and whether this may be related to alcohol use.   * Head CT mentioned findings of tiny remove lacunar infarct in the R thalamus. MRI brain neg for acute abnormality.   -- general neurology  "consulted  -- further evaluation with EEG?  -- cont seizure precautions    Hypertension  Hyperlipidemia  PAD with hx of left fem-pop bypass in 2017  * Chronic and stable on full dose ASA, statin, lisinopril 5mg daily    DM II  * A1c 5.9 in 5/2022. Home meds reportedly include metformin and glipizide but stated to pharmacist on admission that he takes them \"only as needed\".   * Sliding scale insulin started on admission.    Recent Labs   Lab 08/20/22  1337 08/20/22  0845 08/20/22  0609 08/19/22  1916 08/19/22  1453   * 109* 89 119* 120*       Peripheral neuropathy  * Likely multifactorial, dt DM II and alcohol use.   * Chronic and stable on gabapentin 300mg TID    Macrocytic anemia  * Hgb 7-8 this stay. No s/sx of bleeding. Hgb had been 11 in 5/2022.   * Suspect some degree of marrow suppression from EtOH use.   * B12/folate levels nl. Iron studies this stay showed elevated iron, TIBC and ferritin levels  -- some drop in hgb on admission likely dilutional dt IVFs  -- peripheral smear pending  -- [facility] following this stay -- planning for further evaluation with EGD on 8/21    GERD  * Started on PPI this stay.    Anxiety   * Chronic and stable on Celexa  * Psych consulted    FEN: Had been due to start IVFs but boarding all day in ED and order never released, eating/drinking well so will defer initiation of IVFs for now and encourage continued po intake; regular diet -- NPO at midnight for possible EGD in AM  DVT Prophylaxis: PCDs  Code Status: Full Code    Disposition: Discharge in 2-4d, pending hospital course. PT/OT assessments pending.    Wife was on the phone with patient during my visit.    Geri Randolph, DO    Interval History   Seen this afternoon. Resting comfortably. Some R shoulder pain but otherwise feeling okay. No cp/sob/cough, abd pain/n/v. Eating/drinking okay. Seems to be underplaying his drinking, doesn't think he has a problems and that if \" I start walking on the treadmill and " "eating better, I won't drink so much\".    -Data reviewed today: I reviewed all new labs and imaging results over the last 24 hours. I personally reviewed no images or EKG's today.    Physical Exam       BP: (!) 104/92 Pulse: 62   Resp: 11 SpO2: 98 % O2 Device: None (Room air)    There were no vitals filed for this visit.  Vital Signs with Ranges  Pulse:  [59-85] 62  Resp:  [0-47] 11  BP: ()/(56-95) 104/92  SpO2:  [89 %-100 %] 98 %  No intake/output data recorded.    Constitutional: Resting comfortably, alert and conversing appropriately, NAD  Respiratory: CTAB, no wheeze/rales/rhonchi, no increased work of breathing  Cardiovascular: HRRR, no MGR, no LE edema  GI: S, NT, ND, +BS  Skin/Integumen: warm/dry, scattered ecchymoses on face/extremities  Other: CNs 2-12 grossly intact, moving all extremities, no tremor    Medications       aspirin  325 mg Oral Daily     atorvastatin  40 mg Oral Daily     citalopram  40 mg Oral Daily     gabapentin  300 mg Oral TID     insulin aspart  1-7 Units Subcutaneous TID AC     insulin aspart  1-5 Units Subcutaneous At Bedtime     nicotine  1 patch Transdermal Daily     nicotine   Transdermal Q8H     thiamine  100 mg Oral Daily       Data   Recent Labs   Lab 08/20/22  1337 08/20/22  0845 08/20/22  0609 08/20/22  0123 08/19/22  1916 08/19/22  1453   WBC  --   --  8.6  --   --  8.4   HGB  --   --  7.4*  --   --  8.5*   MCV  --   --  112*  --   --  111*   PLT  --   --  204  --   --  217   NA  --   --  136  --   --  134   POTASSIUM  --   --  3.1* 3.6  --  2.6*   CHLORIDE  --   --  105  --   --  102   CO2  --   --  26  --   --  24   BUN  --   --  6*  --   --  6*   CR  --   --  0.54*  --   --  0.61*   ANIONGAP  --   --  5  --   --  8   KRYSTEN  --   --  7.6*  --   --  8.0*   * 109* 89  --    < > 120*   ALBUMIN  --   --  2.2*  --   --  2.4*   PROTTOTAL  --   --   --   --   --  6.1*   BILITOTAL  --   --   --   --   --  1.0   ALKPHOS  --   --   --   --   --  145   ALT  --   --   --   " --   --  26   AST  --   --   --   --   --  49*   LIPASE  --   --   --   --   --  79    < > = values in this interval not displayed.       Recent Results (from the past 24 hour(s))   Head CT w/o contrast    Narrative    EXAM: CT FACIAL BONES WITHOUT CONTRAST, CT HEAD W/O CONTRAST  LOCATION: [facility]  DATE/TIME: 8/19/2022 4:50 PM    INDICATION: Left face and nose pain after fall  COMPARISON: None.  TECHNIQUE:   1) Routine CT Head without IV contrast. Multiplanar reformats. Dose reduction techniques were used.  2) Routine CT Facial Bones without IV contrast. Multiplanar reformats. Dose reduction techniques were used.    FINDINGS:  HEAD CT:   INTRACRANIAL CONTENTS: No intracranial hemorrhage, extraaxial collection, or mass effect.  No CT evidence of acute infarct. Mild presumed chronic small vessel ischemic changes with a tiny remote lacunar infarct in the right thalamus. The ventricles and   sulci are normal for age.     OSSEOUS STRUCTURES/SOFT TISSUES: Left malar soft tissue swelling. No large scalp hematoma. No skull fracture.     FACIAL BONE CT:  OSSEOUS STRUCTURES/SOFT TISSUES: Left malar and periorbital soft tissue swelling. No facial bone fracture or malalignment. No evidence for dental trauma or periapical abscess.    ORBITAL CONTENTS: Prior bilateral cataract surgery. Visualized portions of the orbits are otherwise unremarkable.    SINUSES: No significant paranasal sinus mucosal disease.    OTHER: Multilevel cervical spondylosis.      Impression    IMPRESSION:  HEAD CT:  1.  No acute intracranial process.    FACIAL BONE CT:  1.  Left malar and periorbital soft tissue swelling without acute facial fracture.     CT Facial Bones without Contrast    Narrative    EXAM: CT FACIAL BONES WITHOUT CONTRAST, CT HEAD W/O CONTRAST  LOCATION: [facility]  DATE/TIME: 8/19/2022 4:50 PM    INDICATION: Left face and nose pain after fall  COMPARISON: None.  TECHNIQUE:   1)  Routine CT Head without IV contrast. Multiplanar reformats. Dose reduction techniques were used.  2) Routine CT Facial Bones without IV contrast. Multiplanar reformats. Dose reduction techniques were used.    FINDINGS:  HEAD CT:   INTRACRANIAL CONTENTS: No intracranial hemorrhage, extraaxial collection, or mass effect.  No CT evidence of acute infarct. Mild presumed chronic small vessel ischemic changes with a tiny remote lacunar infarct in the right thalamus. The ventricles and   sulci are normal for age.     OSSEOUS STRUCTURES/SOFT TISSUES: Left malar soft tissue swelling. No large scalp hematoma. No skull fracture.     FACIAL BONE CT:  OSSEOUS STRUCTURES/SOFT TISSUES: Left malar and periorbital soft tissue swelling. No facial bone fracture or malalignment. No evidence for dental trauma or periapical abscess.    ORBITAL CONTENTS: Prior bilateral cataract surgery. Visualized portions of the orbits are otherwise unremarkable.    SINUSES: No significant paranasal sinus mucosal disease.    OTHER: Multilevel cervical spondylosis.      Impression    IMPRESSION:  HEAD CT:  1.  No acute intracranial process.    FACIAL BONE CT:  1.  Left malar and periorbital soft tissue swelling without acute facial fracture.     XR Shoulder Right G/E 3 Views    Narrative    EXAM: XR SHOULDER RIGHT G/E 3 VIEWS  LOCATION: [facility]  DATE/TIME: 8/19/2022 5:19 PM    INDICATION: check for fracture, pain after fall  COMPARISON: None.      Impression    IMPRESSION: No fracture or dislocation. Mild AC joint arthrosis.    Elbow XR, G/E 3 views, right    Narrative    EXAM: XR ELBOW RIGHT G/E 3 VIEWS  LOCATION: [facility]  DATE/TIME: 8/19/2022 5:20 PM    INDICATION: check for fx, pain after fall  COMPARISON: None.      Impression    IMPRESSION: Normal joint spaces and alignment. No fracture or joint effusion.   MR Brain w/o & w Contrast    Narrative    EXAM: MR BRAIN W/O and W CONTRAST  LOCATION:  [facility]  DATE/TIME: 8/20/2022 4:03 AM    INDICATION: recurrent falls, alcohol abuse, left sided numbness  COMPARISON: CT head 08/19/2022  CONTRAST: 9mL Gadavist  TECHNIQUE: Routine multiplanar multisequence head MRI without and with intravenous contrast.    FINDINGS:  INTRACRANIAL CONTENTS: No acute or subacute infarct. No mass, acute hemorrhage, or extra-axial fluid collections. Normal brain parenchymal signal. Mild volume loss and presumed chronic small vessel ischemia. No pathologic contrast enhancement.    SELLA: No abnormality accounting for technique.    OSSEOUS STRUCTURES/SOFT TISSUES: Normal marrow signal. The major intracranial vascular flow voids are maintained.     ORBITS: No abnormality accounting for technique.     SINUSES/MASTOIDS: Trace paranasal sinus mucosal disease. No middle ear or mastoid effusion.       Impression    IMPRESSION:  1.  No acute intracranial abnormality.    2.  Mild age-related change.

## 2022-08-20 NOTE — CONSULTS
61-year-old male with history of anxiety, GERD, hypertension, hyperlipidemia, peripheral artery disease, status post left fem-pop bypass, diabetes mellitus type 2, legally blind, came to the ER with complaint of generalized weakness and recurrent falls.  Patient was brought to the emergency due to significant weakness multiple falls patient is legally blind patient has been drinking moderate to heavily about 1.75 mL every 2 to 3 days patient has noticed to be significantly anemic patient has a history of chronic anemia with acute worsening.  No obvious clinical signs of active bleeding.  Patient is hemodynamically stable.  Potassium is up to 3.1 was 2.6 on arrival hemoglobin is down to 7.4 was 8.5 yesterday.  Patient's chart reviewed.  I will recommend the patient to be evaluated with upper GI endoscopy tomorrow.  Continue on IV Protonix monitor for alcohol withdrawal serial hemoglobins.    Nikolas Ramachandran MD FACP  [facility]

## 2022-08-20 NOTE — ED NOTES
Pt dropped dinner on the floor. Stated he was done with it anyway. Writer cleaned it up and situated pt comfortably in bed.

## 2022-08-20 NOTE — ED NOTES
Pt sitting up in bed eating breakfast, ate small amount.  No c/o nausea.  Pt now resting with eyes closed equal chest rise and fall.  Awaiting admission.

## 2022-08-21 ENCOUNTER — HOSPITAL ENCOUNTER (INPATIENT)
Dept: NEUROLOGY | Facility: CLINIC | Age: 62
Discharge: HOME | DRG: 074 | End: 2022-08-21
Attending: PSYCHIATRY & NEUROLOGY
Payer: COMMERCIAL

## 2022-08-21 LAB
ANION GAP SERPL CALCULATED.3IONS-SCNC: 2 MMOL/L (ref 3–14)
BUN SERPL-MCNC: 7 MG/DL (ref 7–30)
CALCIUM SERPL-MCNC: 7.7 MG/DL (ref 8.5–10.1)
CHLORIDE BLD-SCNC: 108 MMOL/L (ref 94–109)
CO2 SERPL-SCNC: 27 MMOL/L (ref 20–32)
CREAT SERPL-MCNC: 0.71 MG/DL (ref 0.66–1.25)
ERYTHROCYTE [DISTWIDTH] IN BLOOD BY AUTOMATED COUNT: 16.8 % (ref 10–15)
GFR SERPL CREATININE-BSD FRML MDRD: >90 ML/MIN/1.73M2
GLUCOSE BLD-MCNC: 88 MG/DL (ref 70–99)
GLUCOSE BLDC GLUCOMTR-MCNC: 115 MG/DL (ref 70–99)
GLUCOSE BLDC GLUCOMTR-MCNC: 137 MG/DL (ref 70–99)
GLUCOSE BLDC GLUCOMTR-MCNC: 144 MG/DL (ref 70–99)
GLUCOSE BLDC GLUCOMTR-MCNC: 83 MG/DL (ref 70–99)
GLUCOSE BLDC GLUCOMTR-MCNC: 96 MG/DL (ref 70–99)
HCT VFR BLD AUTO: 19.6 % (ref 40–53)
HGB BLD-MCNC: 7 G/DL (ref 13.3–17.7)
MAGNESIUM SERPL-MCNC: 1.6 MG/DL (ref 1.6–2.3)
MCH RBC QN AUTO: 40.9 PG (ref 26.5–33)
MCHC RBC AUTO-ENTMCNC: 35.7 G/DL (ref 31.5–36.5)
MCV RBC AUTO: 115 FL (ref 78–100)
PLATELET # BLD AUTO: 187 10E3/UL (ref 150–450)
POTASSIUM BLD-SCNC: 3.3 MMOL/L (ref 3.4–5.3)
POTASSIUM BLD-SCNC: 4 MMOL/L (ref 3.4–5.3)
POTASSIUM BLD-SCNC: 4 MMOL/L (ref 3.4–5.3)
RBC # BLD AUTO: 1.71 10E6/UL (ref 4.4–5.9)
SODIUM SERPL-SCNC: 137 MMOL/L (ref 133–144)
UPPER GI ENDOSCOPY: NORMAL
WBC # BLD AUTO: 7.2 10E3/UL (ref 4–11)

## 2022-08-21 PROCEDURE — 999N000099 HC STATISTIC MODERATE SEDATION < 10 MIN: Performed by: INTERNAL MEDICINE

## 2022-08-21 PROCEDURE — 43235 EGD DIAGNOSTIC BRUSH WASH: CPT | Performed by: INTERNAL MEDICINE

## 2022-08-21 PROCEDURE — 80048 BASIC METABOLIC PNL TOTAL CA: CPT | Performed by: INTERNAL MEDICINE

## 2022-08-21 PROCEDURE — 99232 SBSQ HOSP IP/OBS MODERATE 35: CPT | Performed by: INTERNAL MEDICINE

## 2022-08-21 PROCEDURE — 0DJ08ZZ INSPECTION OF UPPER INTESTINAL TRACT, VIA NATURAL OR ARTIFICIAL OPENING ENDOSCOPIC: ICD-10-PCS | Performed by: INTERNAL MEDICINE

## 2022-08-21 PROCEDURE — C9113 INJ PANTOPRAZOLE SODIUM, VIA: HCPCS | Performed by: INTERNAL MEDICINE

## 2022-08-21 PROCEDURE — HZ2ZZZZ DETOXIFICATION SERVICES FOR SUBSTANCE ABUSE TREATMENT: ICD-10-PCS | Performed by: INTERNAL MEDICINE

## 2022-08-21 PROCEDURE — 95816 EEG AWAKE AND DROWSY: CPT

## 2022-08-21 PROCEDURE — 250N000013 HC RX MED GY IP 250 OP 250 PS 637: Performed by: INTERNAL MEDICINE

## 2022-08-21 PROCEDURE — 36415 COLL VENOUS BLD VENIPUNCTURE: CPT | Performed by: INTERNAL MEDICINE

## 2022-08-21 PROCEDURE — 120N000001 HC R&B MED SURG/OB

## 2022-08-21 PROCEDURE — 250N000011 HC RX IP 250 OP 636: Performed by: INTERNAL MEDICINE

## 2022-08-21 PROCEDURE — 85014 HEMATOCRIT: CPT | Performed by: INTERNAL MEDICINE

## 2022-08-21 PROCEDURE — 83735 ASSAY OF MAGNESIUM: CPT | Performed by: INTERNAL MEDICINE

## 2022-08-21 PROCEDURE — 84132 ASSAY OF SERUM POTASSIUM: CPT | Performed by: INTERNAL MEDICINE

## 2022-08-21 RX ORDER — FENTANYL CITRATE 50 UG/ML
INJECTION, SOLUTION INTRAMUSCULAR; INTRAVENOUS PRN
Status: COMPLETED | OUTPATIENT
Start: 2022-08-21 | End: 2022-08-21

## 2022-08-21 RX ORDER — POTASSIUM CHLORIDE 1500 MG/1
40 TABLET, EXTENDED RELEASE ORAL ONCE
Status: COMPLETED | OUTPATIENT
Start: 2022-08-21 | End: 2022-08-21

## 2022-08-21 RX ORDER — LIDOCAINE 40 MG/G
CREAM TOPICAL
Status: DISCONTINUED | OUTPATIENT
Start: 2022-08-21 | End: 2022-08-21 | Stop reason: HOSPADM

## 2022-08-21 RX ORDER — FLUMAZENIL 0.1 MG/ML
0.2 INJECTION, SOLUTION INTRAVENOUS
Status: ACTIVE | OUTPATIENT
Start: 2022-08-21 | End: 2022-08-22

## 2022-08-21 RX ORDER — ZOLPIDEM TARTRATE 5 MG/1
5 TABLET ORAL
Status: DISCONTINUED | OUTPATIENT
Start: 2022-08-21 | End: 2022-08-23 | Stop reason: HOSPADM

## 2022-08-21 RX ADMIN — ASPIRIN 325 MG: 325 TABLET, COATED ORAL at 07:56

## 2022-08-21 RX ADMIN — THIAMINE HCL TAB 100 MG 100 MG: 100 TAB at 07:56

## 2022-08-21 RX ADMIN — MULTIPLE VITAMINS W/ MINERALS TAB 1 TABLET: TAB at 07:58

## 2022-08-21 RX ADMIN — ZOLPIDEM TARTRATE 5 MG: 5 TABLET ORAL at 21:43

## 2022-08-21 RX ADMIN — MIDAZOLAM 2 MG: 1 INJECTION INTRAMUSCULAR; INTRAVENOUS at 10:36

## 2022-08-21 RX ADMIN — GABAPENTIN 300 MG: 300 CAPSULE ORAL at 13:52

## 2022-08-21 RX ADMIN — GABAPENTIN 300 MG: 300 CAPSULE ORAL at 20:34

## 2022-08-21 RX ADMIN — PANTOPRAZOLE SODIUM 40 MG: 40 INJECTION, POWDER, FOR SOLUTION INTRAVENOUS at 07:58

## 2022-08-21 RX ADMIN — MIDAZOLAM 2 MG: 1 INJECTION INTRAMUSCULAR; INTRAVENOUS at 10:33

## 2022-08-21 RX ADMIN — OXYCODONE HYDROCHLORIDE 5 MG: 5 TABLET ORAL at 07:56

## 2022-08-21 RX ADMIN — FOLIC ACID 1 MG: 1 TABLET ORAL at 07:58

## 2022-08-21 RX ADMIN — CITALOPRAM HYDROBROMIDE 40 MG: 40 TABLET ORAL at 07:56

## 2022-08-21 RX ADMIN — OXYCODONE HYDROCHLORIDE 5 MG: 5 TABLET ORAL at 01:11

## 2022-08-21 RX ADMIN — OXYCODONE HYDROCHLORIDE 5 MG: 5 TABLET ORAL at 18:38

## 2022-08-21 RX ADMIN — NICOTINE 1 PATCH: 21 PATCH, EXTENDED RELEASE TRANSDERMAL at 07:57

## 2022-08-21 RX ADMIN — FENTANYL CITRATE 100 MCG: 50 INJECTION, SOLUTION INTRAMUSCULAR; INTRAVENOUS at 10:33

## 2022-08-21 RX ADMIN — ATORVASTATIN CALCIUM 40 MG: 40 TABLET, FILM COATED ORAL at 07:55

## 2022-08-21 RX ADMIN — OXYCODONE HYDROCHLORIDE 5 MG: 5 TABLET ORAL at 11:52

## 2022-08-21 RX ADMIN — POTASSIUM CHLORIDE 40 MEQ: 1500 TABLET, EXTENDED RELEASE ORAL at 01:29

## 2022-08-21 RX ADMIN — GABAPENTIN 300 MG: 300 CAPSULE ORAL at 07:56

## 2022-08-21 ASSESSMENT — ACTIVITIES OF DAILY LIVING (ADL)
ADLS_ACUITY_SCORE: 25
ADLS_ACUITY_SCORE: 24
ADLS_ACUITY_SCORE: 24
ADLS_ACUITY_SCORE: 25
ADLS_ACUITY_SCORE: 24
ADLS_ACUITY_SCORE: 25
ADLS_ACUITY_SCORE: 25
ADLS_ACUITY_SCORE: 24

## 2022-08-21 NOTE — PROGRESS NOTES
[facility]  Gastroenterology Progress Note     Gomez Turk MRN# [id number]   YOB: 1960 Age: 61 year old          Assessment and Plan:       Hypokalemia    Generalized muscle weakness    Seizure-like activity (H)    Multiple falls    Closed head injury, initial encounter    Alcohol use disorder, moderate, dependence (H)    Anemia, unspecified type  Patient appears to be confused no significant clinical change patient's hemoglobin is slowly drifting down.  Upper GI endoscopy was completely unremarkable I suspect patient's anemia is due to bone marrow suppression and chronic alcohol use.  Continue on regular diet.            Generalized muscle weakness  Multiple falls  Hypokalemia  Anemia, unspecified type  Seizure-like activity (H)  Closed head injury, initial encounter  Alcohol use disorder, moderate, dependence (H)      Interval History:     no new complaints, doing well and doing well; no cp, sob, n/v/d, or abd pain.              Review of Systems:     C: NEGATIVE for fever, chills, change in weight  E/M: NEGATIVE for ear, mouth and throat problems  R: NEGATIVE for significant cough or SOB  CV: NEGATIVE for chest pain, palpitations or peripheral edema             Medications:   I have reviewed this patient's current medications    aspirin  325 mg Oral Daily     atorvastatin  40 mg Oral Daily     citalopram  40 mg Oral Daily     folic acid  1 mg Oral Daily     gabapentin  300 mg Oral TID     insulin aspart  1-7 Units Subcutaneous TID AC     insulin aspart  1-5 Units Subcutaneous At Bedtime     multivitamin w/minerals  1 tablet Oral Daily     nicotine  1 patch Transdermal Daily     nicotine   Transdermal Q8H     pantoprazole (PROTONIX) IV  40 mg Intravenous Daily with breakfast     sodium chloride (PF)  3 mL Intracatheter Q8H     sodium chloride (PF)  3 mL Intracatheter Q8H     thiamine  100 mg Oral Daily                  Physical Exam:   Vitals were reviewed  Vital  Signs with Ranges  Temp:  [98  F (36.7  C)-98.7  F (37.1  C)] 98.3  F (36.8  C)  Pulse:  [59-78] 68  Resp:  [7-36] 15  BP: ()/(61-92) 99/62  SpO2:  [89 %-100 %] 95 %  I/O last 3 completed shifts:  In: 500 [P.O.:500]  Out: -   Constitutional: healthy, alert and no distress   Cardiovascular: negative, PMI normal. No lifts, heaves, or thrills. RRR. No murmurs, clicks gallops or rub  Respiratory: negative, Percussion normal. Good diaphragmatic excursion. Lungs clear  Head: Normocephalic. No masses, lesions, tenderness or abnormalities  Neck: Neck supple. No adenopathy. Thyroid symmetric, normal size,, Carotids without bruits.  Abdomen: Abdomen soft, non-tender. BS normal. No masses, organomegaly  SKIN: no suspicious lesions or rashes           Data:   I reviewed the patient's new clinical lab test results.   Recent Labs   Lab Test 08/21/22  0604 08/20/22  0609 08/19/22  1453 11/01/18  1156 06/07/18  0818 03/18/17  1302 03/18/17  0615 03/16/17  0833 03/09/17  1218   WBC 7.2 8.6 8.4   < > 7.2   < > 28.6*   < > 11.2*   HGB 7.0* 7.4* 8.5*   < > 11.8*   < > 8.6*   < > 9.5*   * 112* 111*   < > 90   < > 88   < > 90    204 217   < > 227   < > 568*   < > 450   INR  --   --   --   --  0.89  --  1.38*  --  1.01    < > = values in this interval not displayed.     Recent Labs   Lab Test 08/21/22  0604 08/20/22  2345 08/20/22  1742 08/20/22  0609 08/20/22  0123 08/19/22  1453   POTASSIUM 4.0  4.0 3.3* 3.2* 3.1*   < > 2.6*   CHLORIDE 108  --   --  105  --  102   CO2 27  --   --  26  --  24   BUN 7  --   --  6*  --  6*   ANIONGAP 2*  --   --  5  --  8    < > = values in this interval not displayed.     Recent Labs   Lab Test 08/20/22  0609 08/19/22  2021 08/19/22  1453 05/24/22  1104 10/05/21  0930 05/05/21  1118 04/15/21  2357 04/28/20  0000 04/27/20  1040 04/26/20  1758 02/11/19  1400 03/19/17  0615 03/17/17  1700   ALBUMIN 2.2*  --  2.4* 3.6 3.6   < >  --    < >  --    < > 4.1   < > 2.4*   BILITOTAL  --   --   1.0 1.2 0.6   < >  --    < >  --    < > 0.6   < > 0.7   ALT  --   --  26 44 35   < >  --    < >  --    < > 25   < > 111*   AST  --   --  49* 71* 32   < >  --    < >  --    < > 26   < > 51*   PROTEIN  --  Negative  --   --   --   --  50*  --  10*  --   --    < >  --    LIPASE  --   --  79  --   --   --   --   --   --   --  83  --  73    < > = values in this interval not displayed.       I reviewed the patient's new imaging results.    All laboratory data reviewed  All imaging studies reviewed by me.    Nikolas Ramachandran MD,  8/21/2022  [facility]  Office : [phone]  Cell: [phone]      [facility]  Ph: [phone] Fax: [phone]

## 2022-08-21 NOTE — PROGRESS NOTES
[facility]    Hospitalist Progress Note    Assessment & Plan   Gomez Turk is a 61 year old male with PMHx of hypertension, hyperlipidemia, PAD with hx of fem-pop bypass, DM II and legally blind who was admitted on 8/19/2022 for evaluation of generalized weakness and recurrent falls in setting of alcohol use disorder.     Generalized weakness and recurrent falls, likely multifactorial  R Shoulder pain  * Suspected secondary to alcohol use and alcohol-related peripheral neuropathy, decreased po intake with associated dehydration and hypokalemia.   * Presented to ED with report of increased frequency of falls. Wife stated patient had been falling frequently over the past month or so. Trauma evaluation done in ED --  Head CT, CT facial bones, xrays of R shoulder and elbow were all neg for fracture.   * TSH, B12/folate levels all nl this stay.  * Supportive cares started on admission with IVFs, lyte replacement.  -- PT/OT consulted to determine if TCU stay will be needed  -- address EtOH use disorder as below  -- neurology recommended OP follow up for EMG    Hypokalemia  Hypomagnesemia  * K 2.6, Mag 1.4 on presentation. Secondary to EtOH use.  -- on K, Mag replacement protocols    Alcohol use disorder  * Wife reports patient has been drinking heavily for months now at least 6 months or more -- upwards of 1.75L whiskey every 3 days.    * BAL 0.06 in ED. Started on IVFs, MVI, thiamine/folate on admission. Placed on CIWA protocol  -- monitoring on CIWA protocol with prn Ativan -- CIWA scores remained 0 overnight  -- consider chem dep consult this stay once medical issues stabilized    Possible seizures  * Wife had noted that with his falls, he has had some involuntary movements in which he has drooling, loud snoring and some abnormal activity and rolling of the eyes.   * Unclear if these episodes represent seizures and whether this may be related to alcohol use.   * Head CT mentioned findings  "of tiny remove lacunar infarct in the R thalamus. MRI brain neg for acute abnormality.   -- general neurology consulted, recommended further evaluation with EEG  -- cont seizure precautions    Hypertension  Hyperlipidemia  PAD with hx of left fem-pop bypass in 2017  * Chronic and stable on full dose ASA, statin.   * Reportedly takes lisinopril 5mg daily as needed. BP meds held on admission.     DM II  * A1c 5.9 in 5/2022. Home meds reportedly include metformin and glipizide but stated to pharmacist on admission that he takes them \"only as needed\".   * Sliding scale insulin started on admission.    Recent Labs   Lab 08/21/22  0729 08/21/22  0604 08/21/22  0311 08/20/22  2118 08/20/22  1843 08/20/22  1337   GLC 83 88 144* 158* 106* 107*       Peripheral neuropathy  * Likely multifactorial, dt DM II and alcohol use.   * Chronic and stable on gabapentin 300mg TID  -- neurology recommended OP follow up for EMG as above    Macrocytic anemia  * Hgb 7-8 this stay. No s/sx of bleeding. Hgb had been 11 in 5/2022.   * Suspect some degree of marrow suppression from EtOH use.   * B12/folate levels nl. Iron studies this stay showed elevated iron, TIBC and ferritin levels  -- some drop in hgb on admission likely dilutional dt IVFs, hgb stable at 7 this stay  -- [facility] following this stay -- to have further evaluation with EGD today (8/21)    GERD  * Started on PPI this stay.    Anxiety   Insomnia  * Chronic and stable on Celexa  * [facility] held this stay dt above issues  -- psych consulted on admission    FEN: no IVFs, lytes stable, had been tolerating regular diet -- currently NPO for possible EGD today  DVT Prophylaxis: PCDs  Code Status: Full Code    Disposition: Discharge in 2-4d, pending hospital course. PT/OT assessments pending.    Geri Randolph,     Interval History    Seen this morning. Resting comfortably. Continues to report R shoulder pain. Meds helping but pain returns when they wear off. Otherwise " "feeling okay. No cp/sob/cough, abd pain/n/v. CIWA scores remain 0. PAtient seems to have somewhat poor insight into his EtOH use -- continues to tell me \"I just need to eat better because when I eat I don't drink\".  Says he and his wife had also had conversations about his EtOH use.     -Data reviewed today: I reviewed all new labs and imaging results over the last 24 hours. I personally reviewed no images or EKG's today.    Physical Exam   Temp: 98.3  F (36.8  C) Temp src: Oral BP: 110/65 Pulse: 78   Resp: 18 SpO2: 94 % O2 Device: None (Room air)    Vitals:    08/20/22 1714 08/21/22 0300   Weight: 83.6 kg (184 lb 4.9 oz) 83.6 kg (184 lb 4.9 oz)     Vital Signs with Ranges  Temp:  [98  F (36.7  C)-98.7  F (37.1  C)] 98.3  F (36.8  C)  Pulse:  [59-78] 78  Resp:  [0-36] 18  BP: ()/(56-92) 110/65  SpO2:  [89 %-100 %] 94 %  I/O last 3 completed shifts:  In: 500 [P.O.:500]  Out: -     Constitutional: Resting comfortably, alert and conversing appropriately, NAD  Respiratory: CTAB, no wheeze/rales/rhonchi, no increased work of breathing  Cardiovascular: HRRR, no MGR, no LE edema  GI: S, NT, ND, +BS  Skin/Integumen: warm/dry, scattered ecchymoses on face/extremities  Other: CNs 2-12 grossly intact, moving all extremities    Medications       aspirin  325 mg Oral Daily     atorvastatin  40 mg Oral Daily     citalopram  40 mg Oral Daily     folic acid  1 mg Oral Daily     gabapentin  300 mg Oral TID     insulin aspart  1-7 Units Subcutaneous TID AC     insulin aspart  1-5 Units Subcutaneous At Bedtime     multivitamin w/minerals  1 tablet Oral Daily     nicotine  1 patch Transdermal Daily     nicotine   Transdermal Q8H     pantoprazole (PROTONIX) IV  40 mg Intravenous Daily with breakfast     sodium chloride (PF)  3 mL Intracatheter Q8H     sodium chloride (PF)  3 mL Intracatheter Q8H     thiamine  100 mg Oral Daily       Data   Recent Labs   Lab 08/21/22  0729 08/21/22  0604 08/21/22  0311 08/20/22  2345 08/20/22  1843 " 08/20/22  1742 08/20/22  0845 08/20/22  0609 08/19/22  1916 08/19/22  1453   WBC  --  7.2  --   --   --   --   --  8.6  --  8.4   HGB  --  7.0*  --   --   --   --   --  7.4*  --  8.5*   MCV  --  115*  --   --   --   --   --  112*  --  111*   PLT  --  187  --   --   --   --   --  204  --  217   NA  --  137  --   --   --   --   --  136  --  134   POTASSIUM  --  4.0  4.0  --  3.3*  --  3.2*  --  3.1*   < > 2.6*   CHLORIDE  --  108  --   --   --   --   --  105  --  102   CO2  --  27  --   --   --   --   --  26  --  24   BUN  --  7  --   --   --   --   --  6*  --  6*   CR  --  0.71  --   --   --   --   --  0.54*  --  0.61*   ANIONGAP  --  2*  --   --   --   --   --  5  --  8   KRYSTEN  --  7.7*  --   --   --   --   --  7.6*  --  8.0*   GLC 83 88 144*  --    < >  --    < > 89   < > 120*   ALBUMIN  --   --   --   --   --   --   --  2.2*  --  2.4*   PROTTOTAL  --   --   --   --   --   --   --   --   --  6.1*   BILITOTAL  --   --   --   --   --   --   --   --   --  1.0   ALKPHOS  --   --   --   --   --   --   --   --   --  145   ALT  --   --   --   --   --   --   --   --   --  26   AST  --   --   --   --   --   --   --   --   --  49*   LIPASE  --   --   --   --   --   --   --   --   --  79    < > = values in this interval not displayed.       No results found for this or any previous visit (from the past 24 hour(s)).

## 2022-08-21 NOTE — PROGRESS NOTES
[facility] EEG #  portable, ordered by Dr. Cheatham.    Pt is blind.    Cooperative and able to follow commands.    Awake, drowsy, asleep.   No activations.    2 left arm jerks recorded

## 2022-08-21 NOTE — PROGRESS NOTES
Summary: Generalized weakness and recurrent falls in setting of alcohol use disorder.     DATE & TIME: 8/21/22 6202-9293  Cognitive Concerns/ Orientation : A&O x4  BEHAVIOR & AGGRESSION TOOL COLOR: Green  CIWA SCORE: 0  ABNL VS/O2: VSS on RA  MOBILITY:Assit x1 with GB and walker   PAIN MANAGMENT: C/O of R shoulder pain after a fall a couple of days ago. PRN Oxycodone x2  DIET: Regular.  BOWEL/BLADDER: continent.   ABNL LAB/BG: hbg 7.0, RBC 1.71  DRAIN/DEVICES: PIV   TELEMETRY RHYTHM: NSR  SKIN: intact, L great toe amputation.  TESTS/PROCEDURES: EGD 8/21/22- no significant findings. EEG Pending.  D/C DAY/GOALS/PLACE: Pending.   OTHER IMPORTANT INFO: Pt. Is Blind. Psych and chem dep consulted. Neurology following. Neuro checks q4hrs. PT/OT consult.

## 2022-08-21 NOTE — PROGRESS NOTES
Paged for PTA Ambien   Reviewed chart , pta on 10 mg ambien . Will give one time dose of 5 mg ambien tonight

## 2022-08-21 NOTE — PLAN OF CARE
Goal Outcome Evaluation:    Summary: Generalized weakness and recurrent falls in setting of alcohol use disorder.     DATE & TIME: 22- 22 4655-1013  Cognitive Concerns/ Orientation : A&O x4  BEHAVIOR & AGGRESSION TOOL COLOR: Green  CIWA SCORE: 0,0  ABNL VS/O2: VSS on RA  MOBILITY:Assit x1 with GB and walker   PAIN MANAGMENT: C/O of severe R shoulder pain after a fall a couple of days ago. PRN Oxycodone x1  DIET: Regular. NPO @ 0130   BOWEL/BLADDER: continent.   ABNL LAB/BG: B, K+ 3.3, replaced orally. Recheck in AM- 4.0. Cr 0.54, Calcium 7.6, Hgb 7.4, and BAL on admission 0.06.   DRAIN/DEVICES: New PIV placed and SL.  TELEMETRY RHYTHM: NSR  SKIN:   TESTS/PROCEDURES: EGD 22, time unknown with Dr. Ramachandran  D/C DAY/GOALS/PLACE: Pending.   OTHER IMPORTANT INFO: New admit 22 Blind. Stable. Psych consulted. Neurology following. Neuro checks q4hrs. PT/OT consult.

## 2022-08-21 NOTE — PLAN OF CARE
Summary: Generalized weakness and recurrent falls in setting of alcohol use disorder.     DATE & TIME: 08/20/22 0754-2310  Cognitive Concerns/ Orientation : A & O x4  BEHAVIOR & AGGRESSION TOOL COLOR: Green  CIWA SCORE: 0s  ABNL VS/O2: VSS on RA  MOBILITY: Unknown has not gotten out of bed. Extremities very weak.   PAIN MANAGMENT: C/O of severe R shoulder pain after a fall a couple of days ago. PRN Oxycodone x1. Attempted to give Tylenol but pt did not want. Wanted oxy only.   DIET: Regular. NPO @ midnight.   BOWEL/BLADDER: Due to void.   ABNL LAB/BG: K+ 3.2, replaced orally. Recheck 2330. Cr 0.54, Calcium 7.6, Hgb 7.4, and BAL on admission 0.06. /158.   DRAIN/DEVICES: New PIV placed and SL.  TELEMETRY RHYTHM: NSR  SKIN: Pale/dusky. LLE +2 edema, prior great toe amputation on that foot. Bruising/abrasions scattered throughout body. Bilateral knees and face mainly.   TESTS/PROCEDURES: EGD tomorrow with Dr. Ramachandran  D/C DAY/GOALS/PLACE: Pending.   OTHER IMPORTANT INFO: New admit. Blind. Stable. Psych consulted. Neurology following. Neuro checks q4hrs. PT/OT consult.

## 2022-08-22 ENCOUNTER — APPOINTMENT (OUTPATIENT)
Dept: PHYSICAL THERAPY | Facility: CLINIC | Age: 62
DRG: 074 | End: 2022-08-22
Attending: INTERNAL MEDICINE
Payer: COMMERCIAL

## 2022-08-22 LAB
ANION GAP SERPL CALCULATED.3IONS-SCNC: 3 MMOL/L (ref 3–14)
BUN SERPL-MCNC: 5 MG/DL (ref 7–30)
CALCIUM SERPL-MCNC: 7.9 MG/DL (ref 8.5–10.1)
CHLORIDE BLD-SCNC: 106 MMOL/L (ref 94–109)
CO2 SERPL-SCNC: 27 MMOL/L (ref 20–32)
CREAT SERPL-MCNC: 0.65 MG/DL (ref 0.66–1.25)
ERYTHROCYTE [DISTWIDTH] IN BLOOD BY AUTOMATED COUNT: 17.2 % (ref 10–15)
GFR SERPL CREATININE-BSD FRML MDRD: >90 ML/MIN/1.73M2
GLUCOSE BLD-MCNC: 100 MG/DL (ref 70–99)
GLUCOSE BLDC GLUCOMTR-MCNC: 109 MG/DL (ref 70–99)
GLUCOSE BLDC GLUCOMTR-MCNC: 110 MG/DL (ref 70–99)
GLUCOSE BLDC GLUCOMTR-MCNC: 132 MG/DL (ref 70–99)
GLUCOSE BLDC GLUCOMTR-MCNC: 138 MG/DL (ref 70–99)
GLUCOSE BLDC GLUCOMTR-MCNC: 161 MG/DL (ref 70–99)
GLUCOSE BLDC GLUCOMTR-MCNC: 172 MG/DL (ref 70–99)
HCT VFR BLD AUTO: 20.5 % (ref 40–53)
HGB BLD-MCNC: 7.2 G/DL (ref 13.3–17.7)
MAGNESIUM SERPL-MCNC: 1.6 MG/DL (ref 1.6–2.3)
MCH RBC QN AUTO: 40.4 PG (ref 26.5–33)
MCHC RBC AUTO-ENTMCNC: 35.1 G/DL (ref 31.5–36.5)
MCV RBC AUTO: 115 FL (ref 78–100)
PLATELET # BLD AUTO: 196 10E3/UL (ref 150–450)
POTASSIUM BLD-SCNC: 3.9 MMOL/L (ref 3.4–5.3)
RBC # BLD AUTO: 1.78 10E6/UL (ref 4.4–5.9)
SODIUM SERPL-SCNC: 136 MMOL/L (ref 133–144)
WBC # BLD AUTO: 7.1 10E3/UL (ref 4–11)

## 2022-08-22 PROCEDURE — 250N000011 HC RX IP 250 OP 636: Performed by: INTERNAL MEDICINE

## 2022-08-22 PROCEDURE — 99232 SBSQ HOSP IP/OBS MODERATE 35: CPT | Performed by: INTERNAL MEDICINE

## 2022-08-22 PROCEDURE — 250N000013 HC RX MED GY IP 250 OP 250 PS 637: Performed by: INTERNAL MEDICINE

## 2022-08-22 PROCEDURE — 97161 PT EVAL LOW COMPLEX 20 MIN: CPT | Mod: GP

## 2022-08-22 PROCEDURE — 250N000012 HC RX MED GY IP 250 OP 636 PS 637: Performed by: INTERNAL MEDICINE

## 2022-08-22 PROCEDURE — 36415 COLL VENOUS BLD VENIPUNCTURE: CPT | Performed by: INTERNAL MEDICINE

## 2022-08-22 PROCEDURE — 83735 ASSAY OF MAGNESIUM: CPT | Performed by: INTERNAL MEDICINE

## 2022-08-22 PROCEDURE — 97116 GAIT TRAINING THERAPY: CPT | Mod: GP

## 2022-08-22 PROCEDURE — C9113 INJ PANTOPRAZOLE SODIUM, VIA: HCPCS | Performed by: INTERNAL MEDICINE

## 2022-08-22 PROCEDURE — 999N000216 HC STATISTIC ADULT CD FACE TO FACE-NO CHRG

## 2022-08-22 PROCEDURE — 97530 THERAPEUTIC ACTIVITIES: CPT | Mod: GP

## 2022-08-22 PROCEDURE — 120N000001 HC R&B MED SURG/OB

## 2022-08-22 PROCEDURE — 80048 BASIC METABOLIC PNL TOTAL CA: CPT | Performed by: INTERNAL MEDICINE

## 2022-08-22 PROCEDURE — 85027 COMPLETE CBC AUTOMATED: CPT | Performed by: INTERNAL MEDICINE

## 2022-08-22 RX ORDER — PANTOPRAZOLE SODIUM 40 MG/1
40 TABLET, DELAYED RELEASE ORAL
Status: DISCONTINUED | OUTPATIENT
Start: 2022-08-23 | End: 2022-08-23 | Stop reason: HOSPADM

## 2022-08-22 RX ADMIN — OXYCODONE HYDROCHLORIDE 5 MG: 5 TABLET ORAL at 12:01

## 2022-08-22 RX ADMIN — PANTOPRAZOLE SODIUM 40 MG: 40 INJECTION, POWDER, FOR SOLUTION INTRAVENOUS at 08:12

## 2022-08-22 RX ADMIN — OXYCODONE HYDROCHLORIDE 5 MG: 5 TABLET ORAL at 16:32

## 2022-08-22 RX ADMIN — OXYCODONE HYDROCHLORIDE 5 MG: 5 TABLET ORAL at 21:30

## 2022-08-22 RX ADMIN — ASPIRIN 325 MG: 325 TABLET, COATED ORAL at 08:12

## 2022-08-22 RX ADMIN — GABAPENTIN 300 MG: 300 CAPSULE ORAL at 14:07

## 2022-08-22 RX ADMIN — OXYCODONE HYDROCHLORIDE 5 MG: 5 TABLET ORAL at 00:24

## 2022-08-22 RX ADMIN — INSULIN ASPART 1 UNITS: 100 INJECTION, SOLUTION INTRAVENOUS; SUBCUTANEOUS at 18:36

## 2022-08-22 RX ADMIN — NICOTINE 1 PATCH: 21 PATCH, EXTENDED RELEASE TRANSDERMAL at 08:24

## 2022-08-22 RX ADMIN — CITALOPRAM HYDROBROMIDE 40 MG: 40 TABLET ORAL at 08:13

## 2022-08-22 RX ADMIN — ATORVASTATIN CALCIUM 40 MG: 40 TABLET, FILM COATED ORAL at 08:13

## 2022-08-22 RX ADMIN — GABAPENTIN 300 MG: 300 CAPSULE ORAL at 08:13

## 2022-08-22 RX ADMIN — MULTIPLE VITAMINS W/ MINERALS TAB 1 TABLET: TAB at 08:13

## 2022-08-22 RX ADMIN — ZOLPIDEM TARTRATE 5 MG: 5 TABLET ORAL at 22:08

## 2022-08-22 RX ADMIN — FOLIC ACID 1 MG: 1 TABLET ORAL at 08:13

## 2022-08-22 RX ADMIN — GABAPENTIN 300 MG: 300 CAPSULE ORAL at 21:30

## 2022-08-22 RX ADMIN — OXYCODONE HYDROCHLORIDE 5 MG: 5 TABLET ORAL at 06:46

## 2022-08-22 RX ADMIN — THIAMINE HCL TAB 100 MG 100 MG: 100 TAB at 08:13

## 2022-08-22 ASSESSMENT — ACTIVITIES OF DAILY LIVING (ADL)
ADLS_ACUITY_SCORE: 25
ADLS_ACUITY_SCORE: 24
ADLS_ACUITY_SCORE: 25
ADLS_ACUITY_SCORE: 24

## 2022-08-22 NOTE — PROGRESS NOTES
[facility]  Gastroenterology Progress Note     Gomez Turk MRN# [id number]   YOB: 1960 Age: 61 year old          Assessment and Plan:   Gomez Turk is a 61 year old male has past medical history of GERD, hypertension, hyperlipidemia, peripheral artery disease, status post left fem-pop bypass, diabetes mellitus type 2, legally blind, came to the ER with complaint of generalized weakness and recurrent falls and admitted on 8/19/22.    Acute on chronic anemia  Alcohol use disorder, moderate, dependence (H)  Drinks 1.75 L of hard alcohol in 2-3 days  Has history of chronic anemia and had acute worsening of hemoglobin with no obvious source of blood loss.   He has been hemodynamically stable   Hemoglobin stable in mid 7 range. Suspect patient's anemia is due to bone marrow suppression and chronic alcohol use.  8/21 EGD was unremarkable    -- regular diet  -- Alcohol cessation  -- GI will follow along peripherally              Interval History:   doing well; no cp, sob, n/v/d, or abd pain.              Review of Systems:   C: NEGATIVE for fever, chills, change in weight  E/M: NEGATIVE for ear, mouth and throat problems  R: NEGATIVE for significant cough or SOB  CV: NEGATIVE for chest pain, palpitations or peripheral edema             Medications:   I have reviewed this patient's current medications    aspirin  325 mg Oral Daily     atorvastatin  40 mg Oral Daily     citalopram  40 mg Oral Daily     folic acid  1 mg Oral Daily     gabapentin  300 mg Oral TID     insulin aspart  1-7 Units Subcutaneous TID AC     insulin aspart  1-5 Units Subcutaneous At Bedtime     multivitamin w/minerals  1 tablet Oral Daily     nicotine  1 patch Transdermal Daily     nicotine   Transdermal Q8H     pantoprazole (PROTONIX) IV  40 mg Intravenous Daily with breakfast     sodium chloride (PF)  3 mL Intracatheter Q8H     thiamine  100 mg Oral Daily                  Physical Exam:   Vitals were  reviewed  Vital Signs with Ranges  Temp:  [98  F (36.7  C)-98.5  F (36.9  C)] 98.5  F (36.9  C)  Pulse:  [68-77] 77  Resp:  [10-18] 16  BP: ()/(56-78) 114/69  SpO2:  [95 %-100 %] 95 %  I/O last 3 completed shifts:  In: 296 [P.O.:296]  Out: -   Constitutional: healthy, alert and no distress   Cardiovascular: negative, PMI normal. No lifts, heaves, or thrills. RRR. No murmurs, clicks gallops or rub  Respiratory: negative, Percussion normal. Good diaphragmatic excursion. Lungs clear  Abdomen: Abdomen soft, non-tender. BS normal. No masses, organomegaly           Data:   I reviewed the patient's new clinical lab test results.   Recent Labs   Lab Test 08/22/22  0734 08/21/22  0604 08/20/22  0609 11/01/18  1156 06/07/18  0818 03/18/17  1302 03/18/17  0615 03/16/17  0833 03/09/17  1218   WBC 7.1 7.2 8.6   < > 7.2   < > 28.6*   < > 11.2*   HGB 7.2* 7.0* 7.4*   < > 11.8*   < > 8.6*   < > 9.5*   * 115* 112*   < > 90   < > 88   < > 90    187 204   < > 227   < > 568*   < > 450   INR  --   --   --   --  0.89  --  1.38*  --  1.01    < > = values in this interval not displayed.     Recent Labs   Lab Test 08/22/22  0734 08/21/22  0604 08/20/22  2345 08/20/22  1742 08/20/22  0609   POTASSIUM 3.9 4.0  4.0 3.3*   < > 3.1*   CHLORIDE 106 108  --   --  105   CO2 27 27  --   --  26   BUN 5* 7  --   --  6*   ANIONGAP 3 2*  --   --  5    < > = values in this interval not displayed.     Recent Labs   Lab Test 08/20/22  0609 08/19/22 2021 08/19/22  1453 05/24/22  1104 10/05/21  0930 05/05/21  1118 04/15/21  2357 04/28/20  0000 04/27/20  1040 04/26/20  1758 02/11/19  1400 03/19/17  0615 03/17/17  1700   ALBUMIN 2.2*  --  2.4* 3.6 3.6   < >  --    < >  --    < > 4.1   < > 2.4*   BILITOTAL  --   --  1.0 1.2 0.6   < >  --    < >  --    < > 0.6   < > 0.7   ALT  --   --  26 44 35   < >  --    < >  --    < > 25   < > 111*   AST  --   --  49* 71* 32   < >  --    < >  --    < > 26   < > 51*   PROTEIN  --  Negative  --   --   --    --  50*  --  10*  --   --    < >  --    LIPASE  --   --  79  --   --   --   --   --   --   --  83  --  73    < > = values in this interval not displayed.       I reviewed the patient's new imaging results.    All laboratory data reviewed  All imaging studies reviewed by me.    Pennie Aguilar PA-C,  8/22/2022  [facility]  Office : [phone]  Cell: [phone] (Dr. Ramachandran)  Cell: [phone] (Pennie Aguilar PA-C)

## 2022-08-22 NOTE — PROGRESS NOTES
[facility]    Hospitalist Progress Note    Assessment & Plan   Gomez Turk is a 61 year old male with PMHx of hypertension, hyperlipidemia, PAD with hx of fem-pop bypass, DM II and legally blind who was admitted on 8/19/2022 for evaluation of generalized weakness and recurrent falls in setting of alcohol use disorder.     Generalized weakness and recurrent falls, likely multifactorial  R Shoulder pain  * Suspected secondary to alcohol use and alcohol-related peripheral neuropathy, decreased po intake with associated dehydration and hypokalemia.   * Presented to ED with report of increased frequency of falls. Wife stated patient had been falling frequently over the past month or so. Trauma evaluation done in ED --  Head CT, CT facial bones, xrays of R shoulder and elbow were all neg for fracture.   * TSH, B12/folate levels all nl this stay.  * Supportive cares started on admission with IVFs, lyte replacement.  -- PT/OT consulted to determine if TCU stay will be needed  -- address EtOH use disorder as below  -- neurology recommended OP follow up for EMG  Overall improve now, continue PT and OT  Consult Orthopedic to evaluate him, has some tendinitis may benefit from steroid injection     Hypokalemia  Hypomagnesemia  * K 2.6, Mag 1.4 on presentation. Secondary to EtOH use.  -- on K, Mag replacement protocols  Replaced now.     Alcohol use disorder  * Wife reports patient has been drinking heavily for months now at least 6 months or more -- upwards of 1.75L whiskey every 3 days.    * BAL 0.06 in ED. Started on IVFs, MVI, thiamine/folate on admission. Placed on CIWA protocol  -- monitoring on CIWA protocol with prn Ativan -- CIWA scores remained 0 overnight  -- CD evaluation today     Possible seizures  * Wife had noted that with his falls, he has had some involuntary movements in which he has drooling, loud snoring and some abnormal activity and rolling of the eyes.   * Unclear if these  "episodes represent seizures and whether this may be related to alcohol use.   * Head CT mentioned findings of tiny remove lacunar infarct in the R thalamus. MRI brain neg for acute abnormality.   -- general neurology consulted, recommended further evaluation with EEG  --EEG negative for Seizures at this time.     Hypertension  Hyperlipidemia  PAD with hx of left fem-pop bypass in 2017  * Chronic and stable on full dose ASA, statin.   * Reportedly takes lisinopril 5mg daily as needed. BP meds held on admission.     DM II  * A1c 5.9 in 5/2022. Home meds reportedly include metformin and glipizide but stated to pharmacist on admission that he takes them \"only as needed\".   * Sliding scale insulin started on admission.    Recent Labs   Lab 08/22/22  0745 08/22/22  0734 08/22/22  0225 08/21/22  2117 08/21/22  1725 08/21/22  1137   * 100* 132* 137* 115* 96       Peripheral neuropathy  * Likely multifactorial, dt DM II and alcohol use.   * Chronic and stable on gabapentin 300mg TID  -- neurology recommended OP follow up for EMG as above    Macrocytic anemia  * Hgb 7-8 this stay. No s/sx of bleeding. Hgb had been 11 in 5/2022.   * Suspect some degree of marrow suppression from EtOH use.   * B12/folate levels nl. Iron studies this stay showed elevated iron, TIBC and ferritin levels  -- some drop in hgb on admission likely dilutional dt IVFs, hgb stable at 7 this stay  -- [facility] following this stay -- EGD negative     GERD  * Started on PPI this stay.    Anxiety   Insomnia  * Chronic and stable on Celexa  * Aleydaien held this stay dt above issues  -- psych consulted on admission    FEN: no IVFs, lytes stable, had been tolerating regular diet -- currently NPO for possible EGD today  DVT Prophylaxis: PCDs  Code Status: Full Code    Disposition: Discharge in 2-4d, pending hospital course. PT/OT assessments pending.    Kevin Flores MD    Interval History    Patient seen and evaluated in his room today, feeling much better " now, able to walk and has improve appetite.  Denies any fever, chills, chest pain, SOB, nausea, vomiting, headache or dizziness.   Complaints of right shoulder pain.     No other significant event overnight.     -Data reviewed today: I reviewed all new labs and imaging results over the last 24 hours. I personally reviewed no images or EKG's today.    Physical Exam   Temp: 98.5  F (36.9  C) Temp src: Oral BP: 114/69 Pulse: 77   Resp: 16 SpO2: 95 % O2 Device: None (Room air)    Vitals:    08/20/22 1714 08/21/22 0300 08/22/22 0017   Weight: 83.6 kg (184 lb 4.9 oz) 83.6 kg (184 lb 4.9 oz) 89.7 kg (197 lb 11.2 oz)     Vital Signs with Ranges  Temp:  [98  F (36.7  C)-98.5  F (36.9  C)] 98.5  F (36.9  C)  Pulse:  [68-77] 77  Resp:  [14-16] 16  BP: (102-114)/(63-69) 114/69  SpO2:  [95 %-98 %] 95 %  I/O last 3 completed shifts:  In: 296 [P.O.:296]  Out: -     Constitutional: Resting comfortably, alert and conversing appropriately, NAD  Respiratory: CTAB, no wheeze/rales/rhonchi, no increased work of breathing  Cardiovascular: HRRR, no MGR, no LE edema  GI: S, NT, ND, +BS  Skin/Integumen: warm/dry, scattered ecchymoses on face/extremities, Right shoulder has  Tenderness, mild effusion,   Other: CNs 2-12 grossly intact, moving all extremities    Medications       aspirin  325 mg Oral Daily     atorvastatin  40 mg Oral Daily     citalopram  40 mg Oral Daily     folic acid  1 mg Oral Daily     gabapentin  300 mg Oral TID     insulin aspart  1-7 Units Subcutaneous TID AC     insulin aspart  1-5 Units Subcutaneous At Bedtime     multivitamin w/minerals  1 tablet Oral Daily     nicotine  1 patch Transdermal Daily     nicotine   Transdermal Q8H     pantoprazole (PROTONIX) IV  40 mg Intravenous Daily with breakfast     sodium chloride (PF)  3 mL Intracatheter Q8H     thiamine  100 mg Oral Daily       Data   Recent Labs   Lab 08/22/22  0745 08/22/22  0734 08/22/22  0225 08/21/22  0729 08/21/22  0604 08/21/22  0311 08/20/22  2345  08/20/22  0845 08/20/22  0609 08/19/22  1916 08/19/22  1453   WBC  --  7.1  --   --  7.2  --   --   --  8.6  --  8.4   HGB  --  7.2*  --   --  7.0*  --   --   --  7.4*  --  8.5*   MCV  --  115*  --   --  115*  --   --   --  112*  --  111*   PLT  --  196  --   --  187  --   --   --  204  --  217   NA  --  136  --   --  137  --   --   --  136  --  134   POTASSIUM  --  3.9  --   --  4.0  4.0  --  3.3*   < > 3.1*   < > 2.6*   CHLORIDE  --  106  --   --  108  --   --   --  105  --  102   CO2  --  27  --   --  27  --   --   --  26  --  24   BUN  --  5*  --   --  7  --   --   --  6*  --  6*   CR  --  0.65*  --   --  0.71  --   --   --  0.54*  --  0.61*   ANIONGAP  --  3  --   --  2*  --   --   --  5  --  8   KRYSTEN  --  7.9*  --   --  7.7*  --   --   --  7.6*  --  8.0*   * 100* 132*   < > 88   < >  --    < > 89   < > 120*   ALBUMIN  --   --   --   --   --   --   --   --  2.2*  --  2.4*   PROTTOTAL  --   --   --   --   --   --   --   --   --   --  6.1*   BILITOTAL  --   --   --   --   --   --   --   --   --   --  1.0   ALKPHOS  --   --   --   --   --   --   --   --   --   --  145   ALT  --   --   --   --   --   --   --   --   --   --  26   AST  --   --   --   --   --   --   --   --   --   --  49*   LIPASE  --   --   --   --   --   --   --   --   --   --  79    < > = values in this interval not displayed.       No results found for this or any previous visit (from the past 24 hour(s)).

## 2022-08-22 NOTE — PLAN OF CARE
Goal Outcome Evaluation:    Plan of Care Reviewed With: patient     Overall Patient Progress: improving       A&O x4, VSS. Pain controlled with prn oxy. Tele: SR. Lytes in range, recheck in AM. Neruros intact, baseline neuropathy. LS: clear. BS: audible. +void, +bm. Up A-1 gb/w. CIWAs 0.

## 2022-08-22 NOTE — CONSULTS
"Triage and Transition - Consult and Liaison     Gomez Turk  August 22, 2022    Session start: 2:13 pm  Session end: 2:40 pm  Session duration in minutes: 27  CPT utilized: 62631 - Brief diagnostic assessment (modifier 52)  Patient was seen Virtually via [facility] [employer] [facility].    Diagnosis:   300.02 (F41.1) Generalized Anxiety Disorder, present, primary;  Substance-Related & Addictive Disorders Alcohol Use Disorder   303.90 (F10.20) Moderate ., by history;     Plan/Recommendations:     Continue care coordination.     Pt denies any MH sx and/or desire for MH services at this time.    Maintain current transition plan per care team.    Please re-consult as needed.    Reason for consult: Psychiatry consult was requested due to \"adjustment disorder, alcohol abuse\". Patient was seen by Triage and Transition Consult & Liaison team.     Identifying information: Gomez Turk is 61 year old Choose not to Answer  male   followed related to \"adjustment disorder, alcohol abuse\".  Pt reports he lives with his wife and family in a home in [place].  Pt reports he worked at [employer] for 23 years, he states \"I am the luckiest chika in the world, I have a lot of friends, my phone is constantly ringing\".     Summary of Patient Situation  Pt agreeable to meet via  today.  Pt sitting in bed states \"I am doing a lot better today\".      Per chart review pt with a hx of anxiety and alcohol use disorder, moderate - CD consult completed-please see CD consult note.      Pt today presents with anxiety sx including worry and difficulty controlling worry, pt reports he is not experiencing many sx because \"I take celexa once a day, and have for a long time, it works\".  Pt anxiety appears chronic and managed, pt reports his anxiety as \"stable at this time\".  Pt denies any depression sx.  Pt states \"I am the luckiest chika in the world, I am financially secured, I have a wife, and supportive friends and family, I get together with buddies once a " "week, and my phone is constantly ringing from friends\".  Pt denies any SI, HI, AH/VH.  Per chart review, pt does not appear to endorse a psych hosp hx, with info available.  Pt reports he is hopeful and future oriented that things are going to get better, and looking forward to seeing his family and dog.      Pt reports he does not see a therapist \"for what? There is nothing wrong\".  He does not express desire to see one at this time.  Pt reports his PCP prescribes celexa and that \"I take celexa daily and that works\".  Pt declined any additional med review/eval.      Upon inquiry regarding supports, pt reports he has adequate involved supports including \"my wife, my 2 sons, my buddies, we joke around I have a great life, we do not have any problems\".  Pt talks about historical coping skills including listening to podcasts, routine/structure, radio shows, and Pharmacopeia game over the phone with friends, pt states \"I love Fall season and football, my buddies and I get together on the phone and listen to Youku games together, its fun\".  Clinician psychoeducated on additional coping skills including deep breathing and mindfulness.    Pt declined any other further needs at this time.    Collateral information:   Reviewed chart and coordinated with team.      Mental Status Exam   Affect: Appropriate  Appearance: Appropriate   Attention Span/Concentration: Attentive    Eye Contact: Engaged in session -pt reports he is blind  Fund of Knowledge: Appropriate   Language /Speech Content: Fluent  Language /Speech Volume: Normal   Language /Speech Rate/Productions: Normal   Recent Memory: Intact  Remote Memory: Intact  Mood: Normal   Orientation:   Person: Yes   Place: Yes  Time of Day: Yes   Date: Yes   Situation (Do they understand why they are here?): Yes   Psychomotor Behavior: Normal   Thought Content: Clear  Thought Form: Intact    Current medications: Per EHR  Current Facility-Administered Medications   Medication     " acetaminophen (TYLENOL) tablet 650 mg    Or     acetaminophen (TYLENOL) Suppository 650 mg     aspirin (ASA) EC tablet 325 mg     atorvastatin (LIPITOR) tablet 40 mg     citalopram (celeXA) tablet 40 mg     glucose gel 15-30 g    Or     dextrose 50 % injection 25-50 mL    Or     glucagon injection 1 mg     folic acid (FOLVITE) tablet 1 mg     gabapentin (NEURONTIN) capsule 300 mg     OLANZapine zydis (zyPREXA) ODT tab 5-10 mg    Or     haloperidol lactate (HALDOL) injection 2.5-5 mg     insulin aspart (NovoLOG) injection (RAPID ACTING)     insulin aspart (NovoLOG) injection (RAPID ACTING)     lidocaine (LMX4) cream     lidocaine 1 % 0.1-1 mL     LORazepam (ATIVAN) tablet 1-2 mg    Or     LORazepam (ATIVAN) injection 1-2 mg     melatonin tablet 5 mg     multivitamin w/minerals (THERA-VIT-M) tablet 1 tablet     naloxone (NARCAN) injection 0.2 mg    Or     naloxone (NARCAN) injection 0.4 mg    Or     naloxone (NARCAN) injection 0.2 mg    Or     naloxone (NARCAN) injection 0.4 mg     nicotine (NICODERM CQ) 21 MG/24HR 24 hr patch 1 patch     nicotine Patch in Place     oxyCODONE (ROXICODONE) tablet 5 mg     [START ON 8/23/2022] pantoprazole (PROTONIX) EC tablet 40 mg     polyethylene glycol (MIRALAX) Packet 17 g     prochlorperazine (COMPAZINE) injection 10 mg    Or     prochlorperazine (COMPAZINE) tablet 10 mg    Or     prochlorperazine (COMPAZINE) suppository 25 mg     senna-docusate (SENOKOT-S/PERICOLACE) 8.6-50 MG per tablet 1 tablet    Or     senna-docusate (SENOKOT-S/PERICOLACE) 8.6-50 MG per tablet 2 tablet     sodium chloride (PF) 0.9% PF flush 3 mL     sodium chloride (PF) 0.9% PF flush 3 mL     thiamine (B-1) tablet 100 mg     zolpidem (AMBIEN) tablet 5 mg        Therapeutic intervention and progress:  Therapeutic intervention consisted of building therapeutic rapport, active listening, validation, thought reframing and CBT concepts. Patient engaged in assessment today.      Flory Pham MSW, LGSW  Triage and  Transition - Consult and Liaison   [phone]

## 2022-08-22 NOTE — PLAN OF CARE
Goal Outcome Evaluation:    Summary: Generalized weakness and recurrent falls in setting of alcohol use disorder.     DATE & TIME: 8/21/22-8/22/22, 1155-4524  Cognitive Concerns/ Orientation : A&O x4  BEHAVIOR & AGGRESSION TOOL COLOR: Green  CIWA SCORE: 0,0  ABNL VS/O2: VSS on RA  MOBILITY:Assit x1 with GB/W  PAIN MANAGMENT: reporting R shoulder pain after a fall a couple of days ago. PRN Oxycodone x2  provided with some relief  DIET: Regular. Tolerating   BOWEL/BLADDER: continent.   ABNL LAB/BG: hbg 7.0,   DRAIN/DEVICES: PIV/SL    TELEMETRY RHYTHM: NSR  SKIN: L great toe amputation.  TESTS/PROCEDURES: EGD 8/21/22- no significant findings. EEG compelted- results pending.  D/C DAY/GOALS/PLACE: Pending.   OTHER IMPORTANT INFO: Legally blind. Nicotine patch in place RU shoulder blade. Psych consulted. Neurology following. Neuro checks q4hrs, unchanged from previous assessments. PT/OT consult. Chem dep consult.

## 2022-08-22 NOTE — CONSULTS
8/22/2022      Spoke with pt via telephone to offer CD services. Pt reports he would not be interested in attending inpatient CD treatment. Pt reports he has never attended CD treatment. Pt reports he doesn't need any CD resources. Pt reports he has a plan to manage his drinking.     KAE Da Silva  Phone: [phone]  Email: [email or web address]

## 2022-08-22 NOTE — PROGRESS NOTES
08/22/22 1327   Quick Adds   Type of Visit Initial PT Evaluation   Living Environment   People in Home spouse   Current Living Arrangements house  (lives in [facility])   Home Accessibility stairs to enter home   Number of Stairs, Main Entrance 3   Stair Railings, Main Entrance railings safe and in good condition   Transportation Anticipated family or friend will provide   Living Environment Comments Patient lives in single story home with 3 steps to enter and handrail. Patient does not use basement. Patient has grab bars and tub shower.   Self-Care   Usual Activity Tolerance moderate   Current Activity Tolerance fair   Equipment Currently Used at Home grab bar, toilet;grab bar, tub/shower;walker, rolling   Fall history within last six months yes   Number of times patient has fallen within last six months 12   Activity/Exercise/Self-Care Comment Patient has caregiver/spouse since becoming legally blind in 2017. Patient's wife/caregiver is with him providing assist 24/7. Helps with cooking, cleaning, bathing, etc. Patient's wife reports that they live with 7 different people at home and patient is always able to have supervision. Patient has not been taking showers in several months due to falls risk.   General Information   Onset of Illness/Injury or Date of Surgery 08/09/22   Referring Physician Kevin Flores MD   Patient/Family Therapy Goals Statement (PT) Patient would like to go home   Pertinent History of Current Problem (include personal factors and/or comorbidities that impact the POC) 61 year old male with PMHx of hypertension, hyperlipidemia, PAD with hx of fem-pop bypass, DM II and legally blind who was admitted on 8/19/2022 for evaluation of generalized weakness and recurrent falls in setting of alcohol use disorder.   Existing Precautions/Restrictions fall  (legally blind)   General Observations Patient is legally blind   Cognition   Affect/Mental Status (Cognition) WFL   Orientation Status  (Cognition) oriented x 4   Follows Commands (Cognition) WFL   Pain Assessment   Patient Currently in Pain Yes, see Vital Sign flowsheet  (Patient reports some R shoulder pain)   Strength (Manual Muscle Testing)   Strength Comments WFL   Bed Mobility   Bed Mobility supine-sit;sit-supine   Supine-Sit [place] (Bed Mobility) contact guard   Sit-Supine [place] (Bed Mobility) contact guard   Comment, (Bed Mobility) Patient completing bed mobility with CGA.   Transfers   Transfers sit-stand transfer   Sit-Stand Transfer   Sit-Stand [place] (Transfers) contact guard   Assistive Device (Sit-Stand Transfers) walker, front-wheeled   Comment, (Sit-Stand Transfer) Patient completing sit<>stand transfers with FWW and CGA.   Gait/Stairs (Locomotion)   [place] Level (Gait) contact guard;minimum assist (75% patient effort)   Assistive Device (Gait) walker, front-wheeled   Distance in Feet (Required for LE Total Joints) 10' eval + 25' and 60' treatment   Comment, (Gait/Stairs) Patient completing bout of ambulation for 10'   Sensory Examination   Sensory Perception Comments impaired sensation on LLE, slightly impaired on RLE.   Clinical Impression   Criteria for Skilled Therapeutic Intervention Yes, treatment indicated   PT Diagnosis (PT) impaired mobility   Influenced by the following impairments generalized weakness, reduced activity tolerance, impaired vision, impaired light touch sensation, impaired balance   Functional limitations due to impairments impaired functional mobility, impaired transfers, gait, and stair navigation   Clinical Presentation (PT Evaluation Complexity) Stable/Uncomplicated   Clinical Presentation Rationale clinical judgement   Clinical Decision Making (Complexity) low complexity   Planned Therapy Interventions (PT) balance training;bed mobility training;gait training;neuromuscular re-education;patient/family education;stair training;progressive activity/exercise;transfer  training;strengthening   Risk & Benefits of therapy have been explained evaluation/treatment results reviewed;care plan/treatment goals reviewed;risks/benefits reviewed;participants voiced agreement with care plan;participants included;current/potential barriers reviewed;patient;spouse/significant other   PT Discharge Planning   PT Discharge Recommendation (DC Rec) home with assist;home with home care physical therapy;Transitional Care Facility   PT Rationale for DC Rec Patient below baseline functional status. Patient with significant amount of falls in past and is notable falls risk at this time. Patient presenting with impaired awareness of deficits, generalized weakness, impaired balance, and reduced activity tolerance. Patient reporting regular alcohol consumption which is contributing to falls history. Patient has 24/7 supervision and assist as needed at home. Recommend that patient return home with 24/7 supervision and assist for mobility at this time to avoid falls. Would also recommend  PT for improvement of strength, balance, and safe functional mobility. If patient does not have 24/7 supervision and assist for mobility and cares, patient may need TCU for improvement of aforementioned areas. Consider CD placement for management of alcohol use.   PT Brief overview of current status assist for all mobility and cares due to legally blind and falls risk   Plan of Care Review   Plan of Care Reviewed With patient;spouse   Total Evaluation Time   Total Evaluation Time (Minutes) 13   Physical Therapy Goals   PT Frequency 6x/week   PT Predicted Duration/Target Date for Goal Attainment 08/29/22   PT Goals Bed Mobility;Transfers;Gait;Stairs   PT: Bed Mobility Supervision/stand-by assist;Supine to/from sit   PT: Transfers Supervision/stand-by assist;Assistive device;Sit to/from stand   PT: Gait Supervision/stand-by assist;150 feet;Rolling walker   PT: Stairs 3 stairs;Supervision/stand-by assist;Rail on right

## 2022-08-22 NOTE — PLAN OF CARE
Goal Outcome Evaluation:    Plan of Care Reviewed With: patient     Summary: Generalized weakness and recurrent falls in setting of alcohol use disorder.     DATE & TIME: 8/21/22, 9160-5031  Cognitive Concerns/ Orientation : A&O x4  BEHAVIOR & AGGRESSION TOOL COLOR: Green  CIWA SCORE: 0,0  ABNL VS/O2: VSS on RA  MOBILITY:Assit x1 with GB/W  PAIN MANAGMENT: reporting R shoulder pain after a fall a couple of days ago. PRN Oxycodone  provided with some relief  DIET: Regular. Tolerating   BOWEL/BLADDER: continent.   ABNL LAB/BG: hbg 7.0, , 137   DRAIN/DEVICES: PIV/SL    TELEMETRY RHYTHM: NSR  SKIN: L great toe amputation. Scabs/abrasions to bilateral knees. Purple bruising to L. Side of lower face/neck  TESTS/PROCEDURES: EGD 8/21/22- no significant findings. EEG compelted- results pending.  D/C DAY/GOALS/PLACE: Pending.   OTHER IMPORTANT INFO: Legally blind. Nicotine patch in place RU shoulder blade. Psych consulted. Neurology following. Neuro checks q4hrs, unchanged from previous assessments. PT/OT consult. Chem dep consult.

## 2022-08-23 ENCOUNTER — PATIENT OUTREACH (OUTPATIENT)
Dept: INTERNAL MEDICINE | Facility: CLINIC | Age: 62
End: 2022-08-23

## 2022-08-23 VITALS
OXYGEN SATURATION: 98 % | DIASTOLIC BLOOD PRESSURE: 64 MMHG | HEIGHT: 69 IN | WEIGHT: 197.53 LBS | HEART RATE: 69 BPM | SYSTOLIC BLOOD PRESSURE: 114 MMHG | RESPIRATION RATE: 16 BRPM | BODY MASS INDEX: 29.26 KG/M2 | TEMPERATURE: 98.8 F

## 2022-08-23 LAB
GLUCOSE BLDC GLUCOMTR-MCNC: 105 MG/DL (ref 70–99)
GLUCOSE BLDC GLUCOMTR-MCNC: 99 MG/DL (ref 70–99)
MAGNESIUM SERPL-MCNC: 1.7 MG/DL (ref 1.6–2.3)
POTASSIUM BLD-SCNC: 4 MMOL/L (ref 3.4–5.3)

## 2022-08-23 PROCEDURE — 99239 HOSP IP/OBS DSCHRG MGMT >30: CPT | Performed by: INTERNAL MEDICINE

## 2022-08-23 PROCEDURE — 36415 COLL VENOUS BLD VENIPUNCTURE: CPT | Performed by: INTERNAL MEDICINE

## 2022-08-23 PROCEDURE — 83735 ASSAY OF MAGNESIUM: CPT | Performed by: INTERNAL MEDICINE

## 2022-08-23 PROCEDURE — 84132 ASSAY OF SERUM POTASSIUM: CPT | Performed by: INTERNAL MEDICINE

## 2022-08-23 PROCEDURE — 250N000013 HC RX MED GY IP 250 OP 250 PS 637: Performed by: INTERNAL MEDICINE

## 2022-08-23 RX ORDER — FOLIC ACID 1 MG/1
1 TABLET ORAL DAILY
Qty: 30 TABLET | Refills: 0 | Status: ON HOLD | OUTPATIENT
Start: 2022-08-24 | End: 2024-01-01

## 2022-08-23 RX ORDER — OXYCODONE HYDROCHLORIDE 5 MG/1
5 TABLET ORAL EVERY 6 HOURS PRN
Qty: 12 TABLET | Refills: 0 | Status: ON HOLD | OUTPATIENT
Start: 2022-08-23 | End: 2024-01-01

## 2022-08-23 RX ORDER — PANTOPRAZOLE SODIUM 40 MG/1
40 TABLET, DELAYED RELEASE ORAL
Qty: 30 TABLET | Refills: 0 | Status: ON HOLD | OUTPATIENT
Start: 2022-08-24 | End: 2024-01-01

## 2022-08-23 RX ORDER — NICOTINE 21 MG/24HR
1 PATCH, TRANSDERMAL 24 HOURS TRANSDERMAL DAILY
Qty: 20 PATCH | Refills: 0 | Status: ON HOLD | OUTPATIENT
Start: 2022-08-24 | End: 2024-01-01

## 2022-08-23 RX ORDER — MULTIPLE VITAMINS W/ MINERALS TAB 9MG-400MCG
1 TAB ORAL DAILY
Qty: 90 TABLET | Refills: 0 | Status: SHIPPED | OUTPATIENT
Start: 2022-08-24 | End: 2023-01-03

## 2022-08-23 RX ADMIN — GABAPENTIN 300 MG: 300 CAPSULE ORAL at 09:04

## 2022-08-23 RX ADMIN — FOLIC ACID 1 MG: 1 TABLET ORAL at 09:03

## 2022-08-23 RX ADMIN — NICOTINE 1 PATCH: 21 PATCH, EXTENDED RELEASE TRANSDERMAL at 09:06

## 2022-08-23 RX ADMIN — PANTOPRAZOLE SODIUM 40 MG: 40 TABLET, DELAYED RELEASE ORAL at 06:41

## 2022-08-23 RX ADMIN — THIAMINE HCL TAB 100 MG 100 MG: 100 TAB at 09:03

## 2022-08-23 RX ADMIN — OXYCODONE HYDROCHLORIDE 5 MG: 5 TABLET ORAL at 06:41

## 2022-08-23 RX ADMIN — OXYCODONE HYDROCHLORIDE 5 MG: 5 TABLET ORAL at 01:44

## 2022-08-23 RX ADMIN — ASPIRIN 325 MG: 325 TABLET, COATED ORAL at 09:04

## 2022-08-23 RX ADMIN — CITALOPRAM HYDROBROMIDE 40 MG: 40 TABLET ORAL at 09:03

## 2022-08-23 RX ADMIN — ATORVASTATIN CALCIUM 40 MG: 40 TABLET, FILM COATED ORAL at 09:04

## 2022-08-23 RX ADMIN — MULTIPLE VITAMINS W/ MINERALS TAB 1 TABLET: TAB at 09:04

## 2022-08-23 ASSESSMENT — ACTIVITIES OF DAILY LIVING (ADL)
ADLS_ACUITY_SCORE: 24

## 2022-08-23 NOTE — PLAN OF CARE
DATE & TIME: 8/22/22 6242-9129    Cognitive Concerns/ Orientation : Pt A/Ox4   BEHAVIOR & AGGRESSION TOOL COLOR: Green  CIWA SCORE: 0/0/0   ABNL VS/O2: VSS on RA  MOBILITY: Assist x1 with gait belt and walker, fall risk  PAIN MANAGMENT: Complaints of right shoulder pain, managed with PRN oxycodone  DIET: Regular  BOWEL/BLADDER: Continent, BM this eveing  ABNL LAB/BG: K 3.9/Mg 1.6/Hgb 7.2/, 105  DRAIN/DEVICES: IV SL  TELEMETRY RHYTHM: NSR  SKIN: Scattered bruising  TESTS/PROCEDURES: Ortho surgery consulted  D/C DATE: Discharge pending progress  OTHER IMPORTANT INFO: Legally blind. Neuros intact.

## 2022-08-23 NOTE — TELEPHONE ENCOUNTER
What type of discharge? Inpatient  Risk of Hospital admission or ED visit: 18%  Is a TCM episode required? No  When should the patient follow up with PCP? 14 days of discharge.    Ra Rubi RN  [facility]

## 2022-08-23 NOTE — PLAN OF CARE
Goal Outcome Evaluation:      DATE & TIME: 8/23/22 (2839-6362)    Cognitive Concerns/ Orientation : Pt A/Ox4   BEHAVIOR & AGGRESSION TOOL COLOR: Green  CIWA SCORE: 0  ABNL VS/O2: VSS on RA  MOBILITY: Assist x1 with gait belt and walker, fall risk  PAIN MANAGEMENT: Denies  DIET: Regular-good appetite  BOWEL/BLADDER: Continent  ABNL LAB/BG 99  DRAIN/DEVICES: IV removed   TELEMETRY RHYTHM: NSR  SKIN: Scattered bruising  TESTS/PROCEDURES: None  D/C DATE: Home today with OP PT/OT  OTHER IMPORTANT INFO: Legally blind. Neuros intact.     .Discharge    Patient discharged to home via car with wife  Care plan note  complete    Listed belongings gathered and given to patient (including from security/pharmacy). Yes  Care Plan and Patient education resolved: Yes  Prescriptions if needed, hard copies sent with patient  Yes  Medication Bin checked and emptied on discharge Yes  SW/care coordinator/charge RN aware of discharge: Yes

## 2022-08-23 NOTE — CONSULTS
Care Management Initial Consult    General Information  Assessment completed with: Patient, Spouse or significant otherCamelia  Type of CM/SW Visit: Offer D/C Planning    Primary Care Provider verified and updated as needed: Yes   Readmission within the last 30 days: no previous admission in last 30 days      Reason for Consult: discharge planning  Advance Care Planning:            Communication Assessment  Patient's communication style: spoken language (English or Bilingual)    Hearing Difficulty or Deaf: no   Wear Glasses or Blind: yes    Cognitive  Cognitive/Neuro/Behavioral: WDL  Level of Consciousness: alert  Arousal Level: opens eyes spontaneously  Orientation: oriented x 4  Mood/Behavior: calm, cooperative  Best Language: 0 - No aphasia  Speech: spontaneous, clear    Living Environment:   People in home: spouse, friend(s)     Current living Arrangements: house      Able to return to prior arrangements: yes       Family/Social Support:  Care provided by: spouse/significant other  Provides care for: no one  Marital Status:   Wife          Description of Support System: Involved    Support Assessment: Adequate family and caregiver support    Current Resources:   Patient receiving home care services:       Community Resources:    Equipment currently used at home: grab bar, toilet, grab bar, tub/shower, raised toilet seat, shower chair, walker, rolling  Supplies currently used at home:      Employment/Financial:  Employment Status: retired        Financial Concerns: No concerns identified           Lifestyle & Psychosocial Needs:  Social Determinants of Health     Tobacco Use: High Risk     Smoking Tobacco Use: Current Every Day Smoker     Smokeless Tobacco Use: Never Used   Alcohol Use: Not on file   Financial Resource Strain: Not on file   Food Insecurity: Not on file   Transportation Needs: Not on file   Physical Activity: Not on file   Stress: Not on file   Social Connections: Not on file   Intimate  Partner Violence: Not on file   Depression: Not at risk     PHQ-2 Score: 0   Housing Stability: Not on file       Functional Status:  Prior to admission patient needed assistance:   Dependent ADLs:: Independent  Dependent IADLs:: Cooking, Laundry, Shopping, Meal Preparation, Medication Management, Transportation, Money Management, Cleaning  Assesssment of Functional Status: Not at baseline with mobility    Mental Health Status:  Mental Health Status: No Current Concerns       Chemical Dependency Status:  Chemical Dependency Status: Current Concern             Values/Beliefs:  Spiritual, Cultural Beliefs, Bahai Practices, Values that affect care: no               Care Management Discharge Note    Discharge Date: 08/23/2022       Discharge Disposition:Home with spouse    Discharge Services:  None    Discharge DME:  None    Discharge Transportation: family or friend will provide    Private pay costs discussed: Not applicable    PAS Confirmation Code:    Patient/family educated on Medicare website which has current facility and service quality ratings:      Education Provided on the Discharge Plan:    Persons Notified of Discharge Plans: Spouse  Patient/Family in Agreement with the Plan: yes    Handoff Referral Completed: Yes    Additional Information:  Discharge plans discussed with patient and spouse. Patient stating thee are about 8 people living in his home and can provide assistance if needed. Pateint feels he is at baseline with mobility but needs assistance with IADLs and family with spouse will be able to assist.  Discussed problems with ETOH and patient stating he will try his best to abstain. .         Marija Oneal RN CC

## 2022-08-23 NOTE — DISCHARGE SUMMARY
Discharge Summary  Hospitalist    Date of Admission:  8/19/2022  Date of Discharge:  8/23/2022 11:11 AM  Discharging Provider: Kevin Flores MD, MD  Date of Service (when I saw the patient): 08/23/22    Discharge Diagnoses   Please refer below     History of Present Illness   Gomez Turk is an 61 year old male who presented with generalized weakness    Hospital Course     Gomez Turk is a 61 year old male with PMHx of hypertension, hyperlipidemia, PAD with hx of fem-pop bypass, DM II and legally blind who was admitted on 8/19/2022 for evaluation of generalized weakness and recurrent falls in setting of alcohol use disorder.     Final Discharge Diagnosis and Hospital Course      Generalized weakness and recurrent falls, likely multifactorial  R Shoulder pain  * Suspected secondary to alcohol use and alcohol-related peripheral neuropathy, decreased po intake with associated dehydration and hypokalemia.   * Presented to ED with report of increased frequency of falls. Wife stated patient had been falling frequently over the past month or so. Trauma evaluation done in ED --  Head CT, CT facial bones, xrays of R shoulder and elbow were all neg for fracture.   * TSH, B12/folate levels all nl this stay.  * Supportive cares started on admission with IVFs, lyte replacement.  -- PT/OT consulted   -- address EtOH use disorder as below  -- neurology recommended OP follow up for EMG  Overall improve now, continue PT and OT recommend home with home health.   But patient overall condition improve, wife wants out patient therapy which was ordered.   At the time of discharge feeling well, denies any fever, chills, nausea, vomiting, headache or dizziness, able to walk  Wit the walker without any trouble.  He will follow up with his PCP or sports medicine out patient for right shoulder pain.      Hypokalemia  Hypomagnesemia  Likely secondary to alcohol abuse and poor oral intake,  "replaced and resolved at this time   * K 2.6, Mag 1.4 on presentation. Secondary to EtOH use.  -- on K, Mag replacement protocols  Replaced now.      Alcohol use disorder  * Wife reports patient has been drinking heavily for months now at least 6 months or more -- upwards of 1.75L whiskey every 3 days.    * BAL 0.06 in ED. Started on IVFs, MVI, thiamine/folate on admission. Placed on CIWA protocol  -- monitoring on CIWA protocol with prn Ativan -- CIWA scores remained 0 overnight  --cessation counseling given multiple time during his stay, he agree to quite. Continue on oral multivitamin, folic acid and thiamine on discharge.      Possible seizures:   * Wife had noted that with his falls, he has had some involuntary movements in which he has drooling, loud snoring and some abnormal activity and rolling of the eyes.   * Unclear if these episodes represent seizures and whether this may be related to alcohol use.   * Head CT mentioned findings of tiny remove lacunar infarct in the R thalamus. MRI brain neg for acute abnormality.   -- general neurology consulted, recommended further evaluation with EEG  --EEG negative for Seizures at this time.   Overall remain stable.      Hypertension  Hyperlipidemia  PAD with hx of left fem-pop bypass in 2017  * Chronic and stable on full dose ASA, statin.   * Reportedly takes lisinopril 5mg daily as needed. BP meds held on admission and discontinue on discharge as  Blood pressure remain well control.      DM II  * A1c 5.9 in 5/2022. Home meds reportedly include metformin and glipizide but stated to pharmacist on admission that he takes them \"only as needed\".   * Sliding scale insulin started on admission.              Recent Labs   Lab 08/22/22  0745 08/22/22  0734 08/22/22  0225 08/21/22  2117 08/21/22  1725 08/21/22  1137   * 100* 132* 137* 115* 96         Peripheral neuropathy  * Likely multifactorial, dt DM II and alcohol use.   * Chronic and stable on gabapentin 300mg " TID  -- neurology recommended OP follow up for EMG as above     Macrocytic anemia  * Hgb 7-8 this stay. No s/sx of bleeding. Hgb had been 11 in 5/2022.   * Suspect some degree of marrow suppression from EtOH use.   * B12/folate levels nl. Iron studies this stay showed elevated iron, TIBC and ferritin levels  -- some drop in hgb on admission likely dilutional dt IVFs, hgb stable at 7 this stay  -- [facility] following this stay -- EGD negative      GERD  * Started on PPI this stay.     Anxiety   Insomnia  * Chronic and stable on Celexa  * Aleydaien held this stay dt above issues  -- psych consulted on admission     Discharge home today in stable and improve condition with out patient PT and OT      Kevin Flores MD         Significant Results and Procedures   EGD  Impression:               - Normal esophagus.                             - Normal stomach.                             - Normal duodenal bulb, first portion of the                             duodenum and second portion of the duodenum.                             - No specimens collected    EEG  IMPRESSION: The EEG shows low amplitude beta possible related to side effects from medication.  The EEG has a lot of artifact that obscures most of the recording.  No seizures recorded.  Clinical correlation necessary.        Pending Results   These results will be followed up by PCP  Unresulted Labs Ordered in the Past 30 Days of this Admission     No orders found from 7/20/2022 to 8/20/2022.          Code Status   Full Code       Primary Care Physician   Tylor Roberts    Physical Exam   Temp: 98.8  F (37.1  C) Temp src: Oral BP: 114/64 Pulse: 69   Resp: 16 SpO2: 98 % O2 Device: None (Room air)    Vitals:    08/21/22 0300 08/22/22 0017 08/23/22 0500   Weight: 83.6 kg (184 lb 4.9 oz) 89.7 kg (197 lb 11.2 oz) 89.6 kg (197 lb 8.5 oz)     Vital Signs with Ranges  Temp:  [97.4  F (36.3  C)-98.8  F (37.1  C)] 98.8  F (37.1  C)  Pulse:  [69-77] 69  Resp:  [16-18] 16  BP:  (103-129)/(59-73) 114/64  SpO2:  [96 %-98 %] 98 %  I/O last 3 completed shifts:  In: 480 [P.O.:480]  Out: -     Constitutional: awake, alert, cooperative, no apparent distress, and appears stated age  Respiratory: No increased work of breathing, good air exchange, clear to auscultation bilaterally, no crackles or wheezing  Cardiovascular: Normal apical impulse, regular rate and rhythm, normal S1 and S2, no S3 or S4, and no murmur noted  GI: No scars, normal bowel sounds, soft, non-distended, non-tender, no masses palpated, no hepatosplenomegally  Musculoskeletal: no lower extremity pitting edema present  Neurologic: no focal deficit.     Discharge Disposition   Discharged to home  Condition at discharge: Stable    Consultations This Hospital Stay   PSYCHIATRY IP CONSULT  GASTROENTEROLOGY IP CONSULT  NEUROLOGY IP CONSULT  CARE MANAGEMENT / SOCIAL WORK IP CONSULT  PHYSICAL THERAPY ADULT IP CONSULT  OCCUPATIONAL THERAPY ADULT IP CONSULT  CARE MANAGEMENT / SOCIAL WORK IP CONSULT  CHEMICAL DEPENDENCY IP CONSULT  PHYSICAL THERAPY ADULT IP CONSULT  OCCUPATIONAL THERAPY ADULT IP CONSULT  ORTHOPEDIC SURGERY IP CONSULT    Time Spent on this Encounter   IKevin MD, personally saw the patient today and spent greater than 30 minutes discharging this patient.    Discharge Orders      Physical Therapy Referral      Occupational Therapy Referral      Primary Care - Care Coordination Referral      Reason for your hospital stay    Recurrent falls and alcohol abuse     Follow-up and recommended labs and tests     Follow up with primary care provider, Tylor Roberts, within 7 days for hospital follow- up.  The following labs/tests are recommended: cbc, bmp.     Activity    Your activity upon discharge: activity as tolerated     Diet    Follow this diet upon discharge: Orders Placed This Encounter      Room Service      Regular Diet Adult     Discharge Medications   Discharge Medication List as of 8/23/2022 10:42 AM      START  taking these medications    Details   folic acid (FOLVITE) 1 MG tablet Take 1 tablet (1 mg) by mouth daily, Disp-30 tablet, R-0, Local Print      multivitamin w/minerals (THERA-VIT-M) tablet Take 1 tablet by mouth daily, Disp-90 tablet, R-0, Local Print      nicotine (NICODERM CQ) 21 MG/24HR 24 hr patch Place 1 patch onto the skin daily, Disp-20 patch, R-0, Local Print      oxyCODONE (ROXICODONE) 5 MG tablet Take 1 tablet (5 mg) by mouth every 6 hours as needed for moderate to severe pain, Disp-12 tablet, R-0, Local Print      pantoprazole (PROTONIX) 40 MG EC tablet Take 1 tablet (40 mg) by mouth every morning (before breakfast), Disp-30 tablet, R-0, Local Print         CONTINUE these medications which have NOT CHANGED    Details   aspirin (ASA) 325 MG EC tablet Take 650 mg by mouth daily, Historical      atorvastatin (LIPITOR) 40 MG tablet Take 1 tablet (40 mg) by mouth daily, Disp-90 tablet, R-3, E-Prescribe      citalopram (CELEXA) 40 MG tablet TAKE ONE TABLET BY MOUTH ONE TIME DAILY, Disp-90 tablet, R-1, E-Prescribe      gabapentin (NEURONTIN) 300 MG capsule Take 1 capsule (300 mg) by mouth 3 times daily, Disp-90 capsule, R-0, E-Prescribe      GLIPIZIDE PO Daily as needed, Historical      METFORMIN HCL PO Daily as needed, Historical      zolpidem (AMBIEN) 10 MG tablet Take 1 tablet by mouth nightly as needed for sleep. Do not take with pain medications., Disp-30 tablet, R-0, E-Prescribe      ACCU-CHEK GUIDE test strip USE TO TEST BLOOD SUGAR 3 TIMES DAILY OR AS DIRECTED., Disp-100 strip, R-3, E-Prescribe      blood glucose monitoring (NO BRAND SPECIFIED) meter device kit Use to test blood sugar 3 times daily or as directed. Preferred blood glucose meter OR supplies to accompany: Blood Glucose Monitor Brands: per insurance.Disp-1 kit, [id number]      thin (NO BRAND SPECIFIED) lancets Use with lanceting device. To accompany: Blood Glucose Monitor Brands: per insurance., Disp-200 each, R-6, E-Prescribe          STOP taking these medications       lisinopril (ZESTRIL) 5 MG tablet Comments:   Reason for Stopping:             Allergies   Allergies   Allergen Reactions     No Known Drug Allergies      Data   Most Recent 3 CBC's:Recent Labs   Lab Test 08/22/22  0734 08/21/22  0604 08/20/22  0609   WBC 7.1 7.2 8.6   HGB 7.2* 7.0* 7.4*   * 115* 112*    187 204      Most Recent 3 BMP's:  Recent Labs   Lab Test 08/23/22  1003 08/23/22  0831 08/23/22  0135 08/22/22  2127 08/22/22  0745 08/22/22  0734 08/21/22  0729 08/21/22  0604 08/20/22  0845 08/20/22  0609   NA  --   --   --   --   --  136  --  137  --  136   POTASSIUM 4.0  --   --   --   --  3.9  --  4.0  4.0   < > 3.1*   CHLORIDE  --   --   --   --   --  106  --  108  --  105   CO2  --   --   --   --   --  27  --  27  --  26   BUN  --   --   --   --   --  5*  --  7  --  6*   CR  --   --   --   --   --  0.65*  --  0.71  --  0.54*   ANIONGAP  --   --   --   --   --  3  --  2*  --  5   KRYSTEN  --   --   --   --   --  7.9*  --  7.7*  --  7.6*   GLC  --  99 105* 138*   < > 100*   < > 88   < > 89    < > = values in this interval not displayed.     Most Recent 2 LFT's:  Recent Labs   Lab Test 08/19/22  1453 05/24/22  1104   AST 49* 71*   ALT 26 44   ALKPHOS 145 132   BILITOTAL 1.0 1.2     Most Recent INR's and Anticoagulation Dosing History:  Anticoagulation Dose History     Recent Dosing and Labs Latest Ref Rng & Units 2/27/2017 2/27/2017 3/9/2017 3/18/2017 6/7/2018    INR 0.86 - 1.14 0.90 0.93 1.01 1.38(H) 0.89        Most Recent 3 Troponin's:  Recent Labs   Lab Test 04/26/20  1758 05/17/17  1619   TROPI <0.015 <0.015  The 99th percentile for upper reference range is 0.045 ug/L.  Troponin values in   the range of 0.045 - 0.120 ug/L may be associated with risks of adverse   clinical events.       Most Recent Cholesterol Panel:  Recent Labs   Lab Test 10/05/21  0930   CHOL 223*   *   HDL 57   TRIG 129     Most Recent 6 Bacteria Isolates From Any Culture (See EPIC  Reports for Culture Details):  Recent Labs   Lab Test 04/27/20  0920 04/26/20  1857 04/26/20  1758 06/06/18  1550 06/06/18  1439 03/17/17  1725   CULT Moderate growth  Normal shreya   >10 Squamous epithelial cells/low power field indicates oral contamination. Please   recollect.  *  Notification of test cancellation was given to  Irasema LOPEZ) on 4.26.2020 @ [facility], .   No growth  No growth No growth No growth No growth     Most Recent TSH, T4 and A1c Labs:  Recent Labs   Lab Test 08/19/22  1453 05/24/22  1104   TSH 1.51  --    A1C  --  5.9*     Results for orders placed or performed during the hospital encounter of 08/19/22   Head CT w/o contrast    Narrative    EXAM: CT FACIAL BONES WITHOUT CONTRAST, CT HEAD W/O CONTRAST  LOCATION: [facility]  DATE/TIME: 8/19/2022 4:50 PM    INDICATION: Left face and nose pain after fall  COMPARISON: None.  TECHNIQUE:   1) Routine CT Head without IV contrast. Multiplanar reformats. Dose reduction techniques were used.  2) Routine CT Facial Bones without IV contrast. Multiplanar reformats. Dose reduction techniques were used.    FINDINGS:  HEAD CT:   INTRACRANIAL CONTENTS: No intracranial hemorrhage, extraaxial collection, or mass effect.  No CT evidence of acute infarct. Mild presumed chronic small vessel ischemic changes with a tiny remote lacunar infarct in the right thalamus. The ventricles and   sulci are normal for age.     OSSEOUS STRUCTURES/SOFT TISSUES: Left malar soft tissue swelling. No large scalp hematoma. No skull fracture.     FACIAL BONE CT:  OSSEOUS STRUCTURES/SOFT TISSUES: Left malar and periorbital soft tissue swelling. No facial bone fracture or malalignment. No evidence for dental trauma or periapical abscess.    ORBITAL CONTENTS: Prior bilateral cataract surgery. Visualized portions of the orbits are otherwise unremarkable.    SINUSES: No significant paranasal sinus mucosal disease.    OTHER: Multilevel cervical spondylosis.       Impression    IMPRESSION:  HEAD CT:  1.  No acute intracranial process.    FACIAL BONE CT:  1.  Left malar and periorbital soft tissue swelling without acute facial fracture.     XR Shoulder Right G/E 3 Views    Narrative    EXAM: XR SHOULDER RIGHT G/E 3 VIEWS  LOCATION: [facility]  DATE/TIME: 8/19/2022 5:19 PM    INDICATION: check for fracture, pain after fall  COMPARISON: None.      Impression    IMPRESSION: No fracture or dislocation. Mild AC joint arthrosis.    Elbow XR, G/E 3 views, right    Narrative    EXAM: XR ELBOW RIGHT G/E 3 VIEWS  LOCATION: [facility]  DATE/TIME: 8/19/2022 5:20 PM    INDICATION: check for fx, pain after fall  COMPARISON: None.      Impression    IMPRESSION: Normal joint spaces and alignment. No fracture or joint effusion.   CT Facial Bones without Contrast    Narrative    EXAM: CT FACIAL BONES WITHOUT CONTRAST, CT HEAD W/O CONTRAST  LOCATION: [facility]  DATE/TIME: 8/19/2022 4:50 PM    INDICATION: Left face and nose pain after fall  COMPARISON: None.  TECHNIQUE:   1) Routine CT Head without IV contrast. Multiplanar reformats. Dose reduction techniques were used.  2) Routine CT Facial Bones without IV contrast. Multiplanar reformats. Dose reduction techniques were used.    FINDINGS:  HEAD CT:   INTRACRANIAL CONTENTS: No intracranial hemorrhage, extraaxial collection, or mass effect.  No CT evidence of acute infarct. Mild presumed chronic small vessel ischemic changes with a tiny remote lacunar infarct in the right thalamus. The ventricles and   sulci are normal for age.     OSSEOUS STRUCTURES/SOFT TISSUES: Left malar soft tissue swelling. No large scalp hematoma. No skull fracture.     FACIAL BONE CT:  OSSEOUS STRUCTURES/SOFT TISSUES: Left malar and periorbital soft tissue swelling. No facial bone fracture or malalignment. No evidence for dental trauma or periapical abscess.    ORBITAL CONTENTS: Prior  bilateral cataract surgery. Visualized portions of the orbits are otherwise unremarkable.    SINUSES: No significant paranasal sinus mucosal disease.    OTHER: Multilevel cervical spondylosis.      Impression    IMPRESSION:  HEAD CT:  1.  No acute intracranial process.    FACIAL BONE CT:  1.  Left malar and periorbital soft tissue swelling without acute facial fracture.     MR Brain w/o & w Contrast    Narrative    EXAM: MR BRAIN W/O and W CONTRAST  LOCATION: [facility]  DATE/TIME: 8/20/2022 4:03 AM    INDICATION: recurrent falls, alcohol abuse, left sided numbness  COMPARISON: CT head 08/19/2022  CONTRAST: 9mL Gadavist  TECHNIQUE: Routine multiplanar multisequence head MRI without and with intravenous contrast.    FINDINGS:  INTRACRANIAL CONTENTS: No acute or subacute infarct. No mass, acute hemorrhage, or extra-axial fluid collections. Normal brain parenchymal signal. Mild volume loss and presumed chronic small vessel ischemia. No pathologic contrast enhancement.    SELLA: No abnormality accounting for technique.    OSSEOUS STRUCTURES/SOFT TISSUES: Normal marrow signal. The major intracranial vascular flow voids are maintained.     ORBITS: No abnormality accounting for technique.     SINUSES/MASTOIDS: Trace paranasal sinus mucosal disease. No middle ear or mastoid effusion.       Impression    IMPRESSION:  1.  No acute intracranial abnormality.    2.  Mild age-related change.     Most Recent 3 CBC's:Recent Labs   Lab Test 08/22/22  0734 08/21/22  0604 08/20/22  0609   WBC 7.1 7.2 8.6   HGB 7.2* 7.0* 7.4*   * 115* 112*    187 204     Most Recent 3 BMP's:Recent Labs   Lab Test 08/23/22  1003 08/23/22  0831 08/23/22  0135 08/22/22  2127 08/22/22  0745 08/22/22  0734 08/21/22  0729 08/21/22  0604 08/20/22  0845 08/20/22  0609   NA  --   --   --   --   --  136  --  137  --  136   POTASSIUM 4.0  --   --   --   --  3.9  --  4.0  4.0   < > 3.1*   CHLORIDE  --   --   --   --   --   106  --  108  --  105   CO2  --   --   --   --   --  27  --  27  --  26   BUN  --   --   --   --   --  5*  --  7  --  6*   CR  --   --   --   --   --  0.65*  --  0.71  --  0.54*   ANIONGAP  --   --   --   --   --  3  --  2*  --  5   KRYSTEN  --   --   --   --   --  7.9*  --  7.7*  --  7.6*   GLC  --  99 105* 138*   < > 100*   < > 88   < > 89    < > = values in this interval not displayed.     Most Recent 2 LFT's:Recent Labs   Lab Test 08/19/22  1453 05/24/22  1104   AST 49* 71*   ALT 26 44   ALKPHOS 145 132   BILITOTAL 1.0 1.2

## 2022-08-24 ENCOUNTER — PATIENT OUTREACH (OUTPATIENT)
Dept: CARE COORDINATION | Facility: CLINIC | Age: 62
End: 2022-08-24

## 2022-08-24 NOTE — PLAN OF CARE
Physical Therapy Discharge Summary    Reason for therapy discharge:    Discharged to home with assist    Progress towards therapy goal(s). See goals on Care Plan in [facility] electronic health record for goal details.  Goals partially met.  Barriers to achieving goals:   discharge from facility.    Therapy recommendation(s):    Continued therapy is recommended.  Rationale/Recommendations:  PT recommendations were home with assist and home health physical therapy for improvement of strength, functional mobility, and safety..

## 2022-08-24 NOTE — PROGRESS NOTES
Clinic Care Coordination Contact  [facility]: Post-Discharge Note  SITUATION                                                      Admission:    Admission Date: 08/19/22   Reason for Admission: generalized weakness  Discharge:   Discharge Date: 08/23/22  Discharge Diagnosis: Hypokalemia    Generalized muscle weakness    Seizure-like activity (H)    BACKGROUND                                                      Per hospital discharge summary and inpatient provider notes:  This is a 61-year-old male with history of anxiety, GERD, hypertension, hyperlipidemia, peripheral artery disease, status post left fem-pop bypass, diabetes mellitus type 2, legally blind, came to the ER with complaint of generalized weakness and recurrent falls.     According to the patient has been blind for the last 5 years.  He never know the cause and since then, he has increase in his alcohol intake.  His wife noticed that he has been falling down quite frequently now.  For the month, he has been falling off and on, but now for the last month or so, he has been falling every night.  He fall about a week ago and bruised his left side of the face, but did not seek attention.  He had multiple falls.  He may have hit his right shoulder radiographs, but never sought any attention.     The patient's wife noticed that the patient has been drinking a lot.  Although the patient is not very forthcoming in his drinking habit.  He has had 2-3 drinks, but his wife told me that he has been drinking a 1.75 L of whiskey every 3 days.  If he is sitting down for 8-10 hours, he keeps drinking.  He has been drinking early in the morning as well.  He was in the kitchen today around 10 mixing his drink and all of a sudden feeling weak, tired, lightheaded and fell down.  The son noticed that when he fell down, his eyes rolled up, his face rolled up.  He has some abnormal body movement and was drooling from his side and breathing loud.  Wife noticed that  whenever he fell down on his back, he had some seizure-like activity as described by his son.     On 1 occasion, patient did have incontinence of urine, but mostly he remains continent.  He denies any fever, chills, cough, chest pain, shortness of breath, abdominal pain, nausea, vomiting.  No hematemesis, melena, hematochezia has not been noticed by his wife.  He is legally blind, so cannot see.  The patient told me that the alcohol only thing that he had a bad as he cannot go anywhere because of blindness, cannot play golf and other stuff.   Rest of the review of system is negative at this time other than he does have some weight loss as per the patient's wife, but never they checked it.  He is have imbalanced gait.  He has some numbness and tingling on the left side of his face as well as his left arm.  Very unstable on his gait.  He used a walker and despite that he has been falling down and have broken couple of walkers in the past.    ASSESSMENT           Discharge Assessment  How are you doing now that you are home?: doing good  How are your symptoms? (Red Flag symptoms escalate to triage hotline per guidelines): Improved  Do you feel your condition is stable enough to be safe at home until your provider visit?: Yes  Does the patient have their discharge instructions? : Yes  Does the patient have questions regarding their discharge instructions? : No  Were you started on any new medications or were there changes to any of your previous medications? : Yes  Does the patient have all of their medications?: Yes  Do you have questions regarding any of your medications? : No  Do you have all of your needed medical supplies or equipment (DME)?  (i.e. oxygen tank, CPAP, cane, etc.): Yes  Discharge follow-up appointment scheduled within 14 calendar days? : Yes                  PLAN                                                      Outpatient Plan:    Follow up with primary care provider, Tylor Roberts, within 7  days for hospital follow- up.  The following labs/tests are recommended: cbc, bmp.         Future Appointments   Date Time Provider Department Center   8/30/2022 10:30 AM Tylor Roberts MD [facility]   9/23/2022  8:30 AM Leena Gonzales, PT [facility] [address]   9/23/2022  9:30 AM Aniya Polk, OTR [facility] [address]   10/4/2022  9:45 AM Charity Cyr, OT [facility] [address]   10/4/2022 10:30 AM Duarte Brooks, PT [facility] [address]         For any urgent concerns, please contact our 24 hour nurse triage line: [phone] ([phone])         Lexie Wing MA

## 2022-09-03 DIAGNOSIS — E11.39 TYPE 2 DIABETES MELLITUS WITH OTHER OPHTHALMIC COMPLICATION, WITHOUT LONG-TERM CURRENT USE OF INSULIN (H): ICD-10-CM

## 2022-09-06 RX ORDER — GABAPENTIN 300 MG/1
300 CAPSULE ORAL 3 TIMES DAILY
Qty: 90 CAPSULE | Refills: 0 | Status: SHIPPED | OUTPATIENT
Start: 2022-09-06 | End: 2022-10-18

## 2022-09-12 DIAGNOSIS — F41.9 ANXIETY: ICD-10-CM

## 2022-09-12 DIAGNOSIS — H54.3 VISION LOSS, BILATERAL: ICD-10-CM

## 2022-09-12 RX ORDER — ZOLPIDEM TARTRATE 10 MG/1
TABLET ORAL
Qty: 30 TABLET | Refills: 0 | Status: SHIPPED | OUTPATIENT
Start: 2022-09-12 | End: 2022-10-13

## 2022-09-15 RX ORDER — CITALOPRAM HYDROBROMIDE 40 MG/1
TABLET ORAL
Qty: 90 TABLET | Refills: 0 | Status: SHIPPED | OUTPATIENT
Start: 2022-09-15 | End: 2022-12-13

## 2022-09-15 NOTE — TELEPHONE ENCOUNTER
Prescription approved per [facility] Refill Protocol.  Luis Rodas RN  [facility] Triage Nurse

## 2022-10-10 ENCOUNTER — HEALTH MAINTENANCE LETTER (OUTPATIENT)
Age: 62
End: 2022-10-10

## 2022-10-11 DIAGNOSIS — E78.5 HYPERLIPIDEMIA LDL GOAL <100: ICD-10-CM

## 2022-10-11 DIAGNOSIS — E11.39 TYPE 2 DIABETES MELLITUS WITH OTHER OPHTHALMIC COMPLICATION, WITHOUT LONG-TERM CURRENT USE OF INSULIN (H): ICD-10-CM

## 2022-10-11 DIAGNOSIS — F41.9 ANXIETY: ICD-10-CM

## 2022-10-11 DIAGNOSIS — H54.3 VISION LOSS, BILATERAL: ICD-10-CM

## 2022-10-11 NOTE — TELEPHONE ENCOUNTER
Reason for Call:  Form, our goal is to have forms completed with 72 hours, however, some forms may require a visit or additional information.    Type of letter, form or note:  [place] [facility] - [facility]    Who is the form from?: Patient (if other please explain)    Where did the form come from: Patient or family brought in       What clinic location was the form placed at?: Internal Medicine    Where the form was placed: 's Box    What number is listed as a contact on the form?: [phone]       Additional comments: Please fill out as soon as possible.     Call taken on 4/27/2017 at 1:58 PM by Dona Antunez         Heterosexual

## 2022-10-11 NOTE — LETTER
[facility]  [address] [address]  [phone]            Gomez Turk  [address]  [facility] [address]        October 13, 2022    Dear Gomez Turk,    While refilling your atorvastatin (LIPITOR) 40 MG tablet prescription today, we noticed that you to have a fasting cholesterol check.  We will refill your prescription for 90 days, but a follow-up lab only appointment must be completed before any additional refills can be approved.     Taking care of your health is important to us and we look forward to seeing you in the near future.  Please call us at [phone] or [phone] (or use [employer]) to schedule an appointment.     Please disregard this notice if you have already made an appointment.    Sincerely,        [facility]

## 2022-10-13 RX ORDER — ZOLPIDEM TARTRATE 10 MG/1
TABLET ORAL
Qty: 30 TABLET | Refills: 0 | Status: SHIPPED | OUTPATIENT
Start: 2022-10-13 | End: 2022-11-14

## 2022-10-13 RX ORDER — ATORVASTATIN CALCIUM 40 MG/1
40 TABLET, FILM COATED ORAL DAILY
Qty: 90 TABLET | Refills: 0 | Status: SHIPPED | OUTPATIENT
Start: 2022-10-13 | End: 2023-01-13

## 2022-10-13 NOTE — TELEPHONE ENCOUNTER
Routing refill request to provider for review/approval because:  Drug not on the [facility] refill protocol   Labs out of range/not current:    LDL Cholesterol Calculated   Date Value Ref Range Status   10/05/2021 140 (H) <=100 mg/dL Final   01/28/2021 118 (H) <100 mg/dL Final     Comment:     Above desirable:  100-129 mg/dl  Borderline High:  130-159 mg/dL  High:             160-189 mg/dL  Very high:       >189 mg/dl         Kera Zapata RN

## 2022-10-13 NOTE — TELEPHONE ENCOUNTER
Please note I have placed orders in the computer for patient's lipid panel which is due.  I have updated patient's prescription.  Suggest following up for fasting lipid panel prior to next refill request.

## 2022-10-18 DIAGNOSIS — E11.39 TYPE 2 DIABETES MELLITUS WITH OTHER OPHTHALMIC COMPLICATION, WITHOUT LONG-TERM CURRENT USE OF INSULIN (H): ICD-10-CM

## 2022-10-18 RX ORDER — GABAPENTIN 300 MG/1
300 CAPSULE ORAL 3 TIMES DAILY
Qty: 90 CAPSULE | Refills: 0 | Status: SHIPPED | OUTPATIENT
Start: 2022-10-18 | End: 2022-11-21

## 2022-11-07 NOTE — TELEPHONE ENCOUNTER
Prescription has been filled.  Please inform patient that he is due to get a recheck of his labs as orders per future placed   [[phone]]

## 2022-11-13 DIAGNOSIS — F41.9 ANXIETY: ICD-10-CM

## 2022-11-13 DIAGNOSIS — H54.3 VISION LOSS, BILATERAL: ICD-10-CM

## 2022-11-14 RX ORDER — ZOLPIDEM TARTRATE 10 MG/1
TABLET ORAL
Qty: 30 TABLET | Refills: 0 | Status: SHIPPED | OUTPATIENT
Start: 2022-11-14 | End: 2022-12-13

## 2022-11-18 DIAGNOSIS — E11.39 TYPE 2 DIABETES MELLITUS WITH OTHER OPHTHALMIC COMPLICATION, WITHOUT LONG-TERM CURRENT USE OF INSULIN (H): ICD-10-CM

## 2022-11-21 RX ORDER — GABAPENTIN 300 MG/1
300 CAPSULE ORAL 3 TIMES DAILY
Qty: 90 CAPSULE | Refills: 0 | Status: SHIPPED | OUTPATIENT
Start: 2022-11-21 | End: 2022-12-27

## 2022-11-21 NOTE — TELEPHONE ENCOUNTER
Routing refill request to provider for review/approval because:  Drug not on the [facility] refill protocol   Yvonne BARNES RN  [facility]

## 2022-11-27 ENCOUNTER — HEALTH MAINTENANCE LETTER (OUTPATIENT)
Age: 62
End: 2022-11-27

## 2022-12-11 DIAGNOSIS — H54.3 VISION LOSS, BILATERAL: ICD-10-CM

## 2022-12-13 DIAGNOSIS — F41.9 ANXIETY: ICD-10-CM

## 2022-12-13 DIAGNOSIS — H54.3 VISION LOSS, BILATERAL: ICD-10-CM

## 2022-12-13 RX ORDER — CITALOPRAM HYDROBROMIDE 40 MG/1
TABLET ORAL
Qty: 90 TABLET | Refills: 0 | Status: SHIPPED | OUTPATIENT
Start: 2022-12-13 | End: 2023-03-10

## 2022-12-13 RX ORDER — ZOLPIDEM TARTRATE 10 MG/1
TABLET ORAL
Qty: 30 TABLET | Refills: 0 | Status: SHIPPED | OUTPATIENT
Start: 2022-12-13 | End: 2023-01-15

## 2022-12-13 NOTE — TELEPHONE ENCOUNTER
Prescription approved per [facility] Refill Protocol.  Kathleen Crisostomo RN  [facility]

## 2022-12-13 NOTE — TELEPHONE ENCOUNTER
Called patient to relay provider message. Patient's wife Camelia (on C2C) answered and stated she manages patient's health information due to patient being blind. RN relayed provider message to patient's wife, RN also assisted patient's wife with getting patient scheduled for VV per wife's request.    VV scheduled for 1/3/2023 with PCP. Patient's wife had no additional questions.

## 2022-12-13 NOTE — TELEPHONE ENCOUNTER
May inform patient that I have placed a refill for Ambien but that prior to next refill patient will require appointment as is taking a controlled substance and should be seen every 6 months.

## 2022-12-14 RX ORDER — CITALOPRAM HYDROBROMIDE 40 MG/1
TABLET ORAL
Qty: 90 TABLET | Refills: 0 | OUTPATIENT
Start: 2022-12-14

## 2022-12-27 DIAGNOSIS — E11.39 TYPE 2 DIABETES MELLITUS WITH OTHER OPHTHALMIC COMPLICATION, WITHOUT LONG-TERM CURRENT USE OF INSULIN (H): ICD-10-CM

## 2022-12-27 RX ORDER — GABAPENTIN 300 MG/1
300 CAPSULE ORAL 3 TIMES DAILY
Qty: 90 CAPSULE | Refills: 0 | Status: SHIPPED | OUTPATIENT
Start: 2022-12-27 | End: 2023-01-30

## 2023-01-01 ENCOUNTER — PATIENT OUTREACH (OUTPATIENT)
Dept: CARE COORDINATION | Facility: CLINIC | Age: 63
End: 2023-01-01
Payer: COMMERCIAL

## 2023-01-01 ENCOUNTER — HEALTH MAINTENANCE LETTER (OUTPATIENT)
Age: 63
End: 2023-01-01

## 2023-01-01 ENCOUNTER — OFFICE VISIT (OUTPATIENT)
Dept: URGENT CARE | Facility: URGENT CARE | Age: 63
End: 2023-01-01
Payer: COMMERCIAL

## 2023-01-01 ENCOUNTER — ANCILLARY PROCEDURE (OUTPATIENT)
Dept: GENERAL RADIOLOGY | Facility: CLINIC | Age: 63
End: 2023-01-01
Attending: FAMILY MEDICINE
Payer: COMMERCIAL

## 2023-01-01 ENCOUNTER — MYC REFILL (OUTPATIENT)
Dept: INTERNAL MEDICINE | Facility: CLINIC | Age: 63
End: 2023-01-01
Payer: COMMERCIAL

## 2023-01-01 ENCOUNTER — HOSPITAL ENCOUNTER (EMERGENCY)
Facility: CLINIC | Age: 63
Discharge: HOME | End: 2023-08-19
Attending: EMERGENCY MEDICINE | Admitting: EMERGENCY MEDICINE
Payer: COMMERCIAL

## 2023-01-01 ENCOUNTER — APPOINTMENT (OUTPATIENT)
Dept: CT IMAGING | Facility: CLINIC | Age: 63
End: 2023-01-01
Attending: EMERGENCY MEDICINE
Payer: COMMERCIAL

## 2023-01-01 ENCOUNTER — TELEPHONE (OUTPATIENT)
Dept: INTERNAL MEDICINE | Facility: CLINIC | Age: 63
End: 2023-01-01
Payer: COMMERCIAL

## 2023-01-01 VITALS
OXYGEN SATURATION: 96 % | HEART RATE: 77 BPM | SYSTOLIC BLOOD PRESSURE: 104 MMHG | TEMPERATURE: 97.1 F | DIASTOLIC BLOOD PRESSURE: 71 MMHG

## 2023-01-01 VITALS
SYSTOLIC BLOOD PRESSURE: 141 MMHG | OXYGEN SATURATION: 95 % | TEMPERATURE: 97.4 F | HEART RATE: 76 BPM | DIASTOLIC BLOOD PRESSURE: 69 MMHG | RESPIRATION RATE: 16 BRPM

## 2023-01-01 DIAGNOSIS — H54.3 VISION LOSS, BILATERAL: ICD-10-CM

## 2023-01-01 DIAGNOSIS — E11.39 TYPE 2 DIABETES MELLITUS WITH OTHER OPHTHALMIC COMPLICATION, WITHOUT LONG-TERM CURRENT USE OF INSULIN (H): ICD-10-CM

## 2023-01-01 DIAGNOSIS — S00.93XA CONTUSION OF HEAD, UNSPECIFIED PART OF HEAD, INITIAL ENCOUNTER: ICD-10-CM

## 2023-01-01 DIAGNOSIS — R04.0 EPISTAXIS: ICD-10-CM

## 2023-01-01 DIAGNOSIS — F41.9 ANXIETY: ICD-10-CM

## 2023-01-01 DIAGNOSIS — S69.92XA HAND INJURY, LEFT, INITIAL ENCOUNTER: Primary | ICD-10-CM

## 2023-01-01 DIAGNOSIS — H54.3 VISION LOSS, BILATERAL: Primary | ICD-10-CM

## 2023-01-01 DIAGNOSIS — S00.33XA CONTUSION OF NOSE, INITIAL ENCOUNTER: ICD-10-CM

## 2023-01-01 DIAGNOSIS — F10.929 ALCOHOLIC INTOXICATION WITH COMPLICATION (H): ICD-10-CM

## 2023-01-01 LAB — ETHANOL SERPL-MCNC: 0.13 G/DL

## 2023-01-01 PROCEDURE — 99284 EMERGENCY DEPT VISIT MOD MDM: CPT | Mod: 25

## 2023-01-01 PROCEDURE — 99214 OFFICE O/P EST MOD 30 MIN: CPT | Mod: 25 | Performed by: FAMILY MEDICINE

## 2023-01-01 PROCEDURE — 72125 CT NECK SPINE W/O DYE: CPT

## 2023-01-01 PROCEDURE — 36415 COLL VENOUS BLD VENIPUNCTURE: CPT | Performed by: EMERGENCY MEDICINE

## 2023-01-01 PROCEDURE — 73130 X-RAY EXAM OF HAND: CPT | Mod: TC | Performed by: RADIOLOGY

## 2023-01-01 PROCEDURE — 82077 ASSAY SPEC XCP UR&BREATH IA: CPT | Performed by: EMERGENCY MEDICINE

## 2023-01-01 PROCEDURE — 29125 APPL SHORT ARM SPLINT STATIC: CPT | Performed by: FAMILY MEDICINE

## 2023-01-01 PROCEDURE — 250N000009 HC RX 250: Performed by: EMERGENCY MEDICINE

## 2023-01-01 PROCEDURE — 70450 CT HEAD/BRAIN W/O DYE: CPT

## 2023-01-01 RX ORDER — LANCETS
EACH MISCELLANEOUS
Qty: 200 EACH | Refills: 0 | Status: ON HOLD | OUTPATIENT
Start: 2023-01-01 | End: 2024-01-01

## 2023-01-01 RX ORDER — GABAPENTIN 300 MG/1
300 CAPSULE ORAL 3 TIMES DAILY
Qty: 90 CAPSULE | Refills: 0 | Status: SHIPPED | OUTPATIENT
Start: 2023-01-01 | End: 2023-01-01

## 2023-01-01 RX ORDER — GABAPENTIN 300 MG/1
300 CAPSULE ORAL 3 TIMES DAILY
Qty: 90 CAPSULE | Refills: 0 | OUTPATIENT
Start: 2023-01-01

## 2023-01-01 RX ORDER — BLOOD SUGAR DIAGNOSTIC
STRIP MISCELLANEOUS
Qty: 100 STRIP | Refills: 0 | Status: ON HOLD | OUTPATIENT
Start: 2023-01-01 | End: 2024-01-01

## 2023-01-01 RX ORDER — ZOLPIDEM TARTRATE 10 MG/1
10 TABLET ORAL
Qty: 30 TABLET | Refills: 0 | Status: SHIPPED | OUTPATIENT
Start: 2023-01-01 | End: 2023-01-01

## 2023-01-01 RX ORDER — CITALOPRAM HYDROBROMIDE 40 MG/1
TABLET ORAL
Qty: 90 TABLET | Refills: 2 | Status: SHIPPED | OUTPATIENT
Start: 2023-01-01 | End: 2024-01-01

## 2023-01-01 RX ORDER — ZOLPIDEM TARTRATE 10 MG/1
10 TABLET ORAL
Qty: 30 TABLET | Refills: 0 | Status: SHIPPED | OUTPATIENT
Start: 2023-01-01 | End: 2024-01-01

## 2023-01-01 RX ORDER — OXYMETAZOLINE HYDROCHLORIDE 0.05 G/100ML
2 SPRAY NASAL ONCE
Status: COMPLETED | OUTPATIENT
Start: 2023-01-01 | End: 2023-01-01

## 2023-01-01 RX ORDER — GABAPENTIN 300 MG/1
300 CAPSULE ORAL 3 TIMES DAILY
Qty: 90 CAPSULE | Refills: 0 | Status: SHIPPED | OUTPATIENT
Start: 2023-01-01 | End: 2024-01-01

## 2023-01-01 RX ORDER — HYDROCODONE BITARTRATE AND ACETAMINOPHEN 5; 325 MG/1; MG/1
1 TABLET ORAL EVERY 6 HOURS PRN
Qty: 8 TABLET | Refills: 0 | Status: SHIPPED | OUTPATIENT
Start: 2023-01-01 | End: 2023-01-01

## 2023-01-01 RX ADMIN — OXYMETAZOLINE HYDROCHLORIDE 2 SPRAY: 0.05 SPRAY NASAL at 02:41

## 2023-01-01 ASSESSMENT — ACTIVITIES OF DAILY LIVING (ADL)
ADLS_ACUITY_SCORE: 35
DEPENDENT_IADLS:: COOKING;TRANSPORTATION

## 2023-01-03 ENCOUNTER — VIRTUAL VISIT (OUTPATIENT)
Dept: INTERNAL MEDICINE | Facility: CLINIC | Age: 63
End: 2023-01-03
Payer: COMMERCIAL

## 2023-01-03 DIAGNOSIS — E78.5 HYPERLIPIDEMIA LDL GOAL <100: ICD-10-CM

## 2023-01-03 DIAGNOSIS — F10.20 ALCOHOL USE DISORDER, MODERATE, DEPENDENCE (H): ICD-10-CM

## 2023-01-03 DIAGNOSIS — D64.9 ANEMIA, UNSPECIFIED TYPE: ICD-10-CM

## 2023-01-03 DIAGNOSIS — E11.39 TYPE 2 DIABETES MELLITUS WITH OTHER OPHTHALMIC COMPLICATION, WITHOUT LONG-TERM CURRENT USE OF INSULIN (H): Primary | ICD-10-CM

## 2023-01-03 PROCEDURE — 99214 OFFICE O/P EST MOD 30 MIN: CPT | Mod: TEL | Performed by: INTERNAL MEDICINE

## 2023-01-03 RX ORDER — MULTIPLE VITAMINS W/ MINERALS TAB 9MG-400MCG
1 TAB ORAL DAILY
Qty: 90 TABLET | Refills: 1 | Status: ON HOLD | OUTPATIENT
Start: 2023-01-03 | End: 2024-01-01

## 2023-01-03 NOTE — PROGRESS NOTES
"Gomez Turk is a 62 year old who is being evaluated via a billable telephone visit.      What phone number would you like to be contacted at? [phone]  How would you like to obtain your AVS? Jacy   Distant Location (provider location):  On-site    Assessment & Plan     Type 2 diabetes mellitus with other ophthalmic complication, without long-term current use of insulin (H)  Advised to continue to monitor blood sugars.  Suggest recheck basic panel and A1c level.  Following up for routine eye  - Comprehensive metabolic panel; Future  - Hemoglobin A1c; Future  - Albumin Random Urine Quantitative with Creat Ratio; Future    Hyperlipidemia LDL goal <100  Labs ordered as fasting per routine.  - Comprehensive metabolic panel; Future  - Lipid Profile; Future    Anemia, unspecified type  Rechecking CBC based on prior hemoglobin  - CBC with platelets; Future    Alcohol use disorder, moderate, dependence (H)  Advised alcohol cessation as likely contributing to a lot of factors.  - multivitamin w/minerals (THERA-VIT-M) tablet; Take 1 tablet by mouth daily    Discussed use of gabapentin as well as Ambien.  Patient states that he occasionally takes an extra gabapentin.  Discussed the importance of smoking cessation.     Nicotine/Tobacco Cessation:  He reports that he has been smoking cigarettes. He has been smoking an average of 1 pack per day. He has never used smokeless tobacco.  Nicotine/Tobacco Cessation Plan:   Information offered: Patient not interested at this time      BMI:   Estimated body mass index is 29.17 kg/m  as calculated from the following:    Height as of 8/20/22: 1.753 m (5' 9\").    Weight as of 8/23/22: 89.6 kg (197 lb 8.5 oz).   Weight management plan: Discussed healthy diet and exercise guidelines    See Patient Instructions    Return in about 6 months (around 7/3/2023) for BP Recheck.    Tylor Roberts MD  [facility]    Subjective   Gomez Turk is a 62 year " old, presenting for the following health issues:    Recheck Medication      HPI     Hypertension Follow-up      Do you check your blood pressure regularly outside of the clinic? Yes     Are you following a low salt diet? Yes    Are your blood pressures ever more than 140 on the top number (systolic) OR more   than 90 on the bottom number (diastolic), for example 140/90? No      Review of Systems   CONSTITUTIONAL: NEGATIVE for fever, chills, change in weight  ENT/MOUTH: NEGATIVE for ear, mouth and throat problems  RESP: NEGATIVE for significant cough or SOB  CV: NEGATIVE for chest pain, palpitations   GI: NEGATIVE for nausea, abdominal pain, heartburn, or change in bowel habits  : NEGATIVE for frequency, dysuria, or hematuria  NEURO: NEGATIVE for weakness, dizziness  HEME: NEGATIVE for bleeding problems  PSYCHIATRIC: NEGATIVE for changes in mood or affect      Objective         Vitals:  No vitals were obtained today due to virtual visit.    Physical Exam   alert and no distress  PSYCH: Alert and oriented times 3; coherent speech, normal   rate and volume, able to articulate logical thoughts, able   to abstract reason, no tangential thoughts, no hallucinations   or delusions  His affect is normal  RESP: No cough, no audible wheezing, able to talk in full sentences  Remainder of exam unable to be completed due to telephone visits        LDL Cholesterol Calculated   Date Value Ref Range Status   10/05/2021 140 (H) <=100 mg/dL Final   01/28/2021 118 (H) <100 mg/dL Final     Comment:     Above desirable:  100-129 mg/dl  Borderline High:  130-159 mg/dL  High:             160-189 mg/dL  Very high:       >189 mg/dl       PSA   Date Value Ref Range Status   01/28/2021 1.67 0 - 4 ug/L Final     Comment:     Assay Method:  Chemiluminescence using Siemens Vista analyzer     Prostate Specific Antigen Screen   Date Value Ref Range Status   05/24/2022 0.36 0.00 - 4.00 ug/L Final     Lab Results   Component Value Date    A1C 5.9  05/24/2022    A1C 6.0 10/05/2021    A1C 5.7 01/28/2021    A1C 6.5 04/28/2020    A1C 6.4 04/26/2020    A1C 5.8 01/20/2020    A1C 6.5 06/19/2019     Creatinine   Date Value Ref Range Status   08/22/2022 0.65 (L) 0.66 - 1.25 mg/dL Final   05/05/2021 1.28 (H) 0.66 - 1.25 mg/dL Final       Lab Results   Component Value Date    ALT 26 08/19/2022    ALT 39 05/05/2021       Phone call duration: 13 minutes

## 2023-01-13 DIAGNOSIS — E78.5 HYPERLIPIDEMIA LDL GOAL <100: ICD-10-CM

## 2023-01-13 DIAGNOSIS — E11.39 TYPE 2 DIABETES MELLITUS WITH OTHER OPHTHALMIC COMPLICATION, WITHOUT LONG-TERM CURRENT USE OF INSULIN (H): ICD-10-CM

## 2023-01-13 RX ORDER — ATORVASTATIN CALCIUM 40 MG/1
40 TABLET, FILM COATED ORAL DAILY
Qty: 90 TABLET | Refills: 0 | Status: SHIPPED | OUTPATIENT
Start: 2023-01-13 | End: 2023-01-01

## 2023-01-13 NOTE — LETTER
[facility]  [address] [address]  [phone]            Gomez Turk  [address]  [facility] [address]        January 13, 2023    Dear Gomez Turk,    While refilling your atorvastatin (LIPITOR) 40 MG tablet prescription, we noticed that you are overdue for fasting labs.  We will refill your prescription for 90 days, but a follow-up lab only appointment must be completed before any additional refills can be approved.     Taking care of your health is important to us and we look forward to seeing you in the near future.  Please call us at [phone] or [phone] (or use [employer]) to schedule an appointment.     Please disregard this notice if you have already made an appointment.    Sincerely,        [facility]

## 2023-01-15 ENCOUNTER — MYC REFILL (OUTPATIENT)
Dept: INTERNAL MEDICINE | Facility: CLINIC | Age: 63
End: 2023-01-15

## 2023-01-15 DIAGNOSIS — F41.9 ANXIETY: ICD-10-CM

## 2023-01-15 DIAGNOSIS — H54.3 VISION LOSS, BILATERAL: ICD-10-CM

## 2023-01-16 RX ORDER — ZOLPIDEM TARTRATE 10 MG/1
10 TABLET ORAL
Qty: 30 TABLET | Refills: 0 | Status: SHIPPED | OUTPATIENT
Start: 2023-01-16 | End: 2023-02-14

## 2023-01-28 DIAGNOSIS — E11.39 TYPE 2 DIABETES MELLITUS WITH OTHER OPHTHALMIC COMPLICATION, WITHOUT LONG-TERM CURRENT USE OF INSULIN (H): ICD-10-CM

## 2023-01-30 RX ORDER — GABAPENTIN 300 MG/1
300 CAPSULE ORAL 3 TIMES DAILY
Qty: 90 CAPSULE | Refills: 0 | Status: SHIPPED | OUTPATIENT
Start: 2023-01-30 | End: 2023-02-27

## 2023-02-14 ENCOUNTER — MYC REFILL (OUTPATIENT)
Dept: INTERNAL MEDICINE | Facility: CLINIC | Age: 63
End: 2023-02-14
Payer: COMMERCIAL

## 2023-02-14 DIAGNOSIS — H54.3 VISION LOSS, BILATERAL: ICD-10-CM

## 2023-02-14 DIAGNOSIS — F41.9 ANXIETY: ICD-10-CM

## 2023-02-14 RX ORDER — ZOLPIDEM TARTRATE 10 MG/1
10 TABLET ORAL
Qty: 30 TABLET | Refills: 0 | Status: SHIPPED | OUTPATIENT
Start: 2023-02-14 | End: 2023-03-12

## 2023-02-17 ENCOUNTER — LAB (OUTPATIENT)
Dept: LAB | Facility: CLINIC | Age: 63
End: 2023-02-17
Payer: COMMERCIAL

## 2023-02-17 DIAGNOSIS — E11.39 TYPE 2 DIABETES MELLITUS WITH OTHER OPHTHALMIC COMPLICATION, WITHOUT LONG-TERM CURRENT USE OF INSULIN (H): ICD-10-CM

## 2023-02-17 DIAGNOSIS — E78.5 HYPERLIPIDEMIA LDL GOAL <100: ICD-10-CM

## 2023-02-17 DIAGNOSIS — D64.9 ANEMIA, UNSPECIFIED TYPE: ICD-10-CM

## 2023-02-17 LAB
ALBUMIN SERPL BCG-MCNC: 3.9 G/DL (ref 3.5–5.2)
ALP SERPL-CCNC: 251 U/L (ref 40–129)
ALT SERPL W P-5'-P-CCNC: 39 U/L (ref 10–50)
ANION GAP SERPL CALCULATED.3IONS-SCNC: 14 MMOL/L (ref 7–15)
AST SERPL W P-5'-P-CCNC: 63 U/L (ref 10–50)
BILIRUB SERPL-MCNC: 0.6 MG/DL
BUN SERPL-MCNC: 14.1 MG/DL (ref 8–23)
CALCIUM SERPL-MCNC: 9.2 MG/DL (ref 8.8–10.2)
CHLORIDE SERPL-SCNC: 101 MMOL/L (ref 98–107)
CHOLEST SERPL-MCNC: 161 MG/DL
CREAT SERPL-MCNC: 0.94 MG/DL (ref 0.67–1.17)
CREAT UR-MCNC: 218 MG/DL
DEPRECATED HCO3 PLAS-SCNC: 24 MMOL/L (ref 22–29)
ERYTHROCYTE [DISTWIDTH] IN BLOOD BY AUTOMATED COUNT: 12.5 % (ref 10–15)
GFR SERPL CREATININE-BSD FRML MDRD: >90 ML/MIN/1.73M2
GLUCOSE SERPL-MCNC: 168 MG/DL (ref 70–99)
HBA1C MFR BLD: 6.1 % (ref 0–5.6)
HCT VFR BLD AUTO: 34 % (ref 40–53)
HDLC SERPL-MCNC: 74 MG/DL
HGB BLD-MCNC: 11.5 G/DL (ref 13.3–17.7)
LDLC SERPL CALC-MCNC: 59 MG/DL
MCH RBC QN AUTO: 35.2 PG (ref 26.5–33)
MCHC RBC AUTO-ENTMCNC: 33.8 G/DL (ref 31.5–36.5)
MCV RBC AUTO: 104 FL (ref 78–100)
MICROALBUMIN UR-MCNC: <12 MG/L
MICROALBUMIN/CREAT UR: NORMAL MG/G{CREAT}
NONHDLC SERPL-MCNC: 87 MG/DL
PLATELET # BLD AUTO: 220 10E3/UL (ref 150–450)
POTASSIUM SERPL-SCNC: 4.1 MMOL/L (ref 3.4–5.3)
PROT SERPL-MCNC: 7.8 G/DL (ref 6.4–8.3)
RBC # BLD AUTO: 3.27 10E6/UL (ref 4.4–5.9)
SODIUM SERPL-SCNC: 139 MMOL/L (ref 136–145)
TRIGL SERPL-MCNC: 142 MG/DL
WBC # BLD AUTO: 10.9 10E3/UL (ref 4–11)

## 2023-02-17 PROCEDURE — 36415 COLL VENOUS BLD VENIPUNCTURE: CPT

## 2023-02-17 PROCEDURE — 82043 UR ALBUMIN QUANTITATIVE: CPT

## 2023-02-17 PROCEDURE — 85027 COMPLETE CBC AUTOMATED: CPT

## 2023-02-17 PROCEDURE — 83036 HEMOGLOBIN GLYCOSYLATED A1C: CPT

## 2023-02-17 PROCEDURE — 82570 ASSAY OF URINE CREATININE: CPT

## 2023-02-17 PROCEDURE — 80061 LIPID PANEL: CPT

## 2023-02-17 PROCEDURE — 80053 COMPREHEN METABOLIC PANEL: CPT

## 2023-02-26 DIAGNOSIS — E11.39 TYPE 2 DIABETES MELLITUS WITH OTHER OPHTHALMIC COMPLICATION, WITHOUT LONG-TERM CURRENT USE OF INSULIN (H): ICD-10-CM

## 2023-02-27 RX ORDER — GABAPENTIN 300 MG/1
300 CAPSULE ORAL 3 TIMES DAILY
Qty: 90 CAPSULE | Refills: 0 | Status: SHIPPED | OUTPATIENT
Start: 2023-02-27 | End: 2023-03-31

## 2023-03-10 DIAGNOSIS — H54.3 VISION LOSS, BILATERAL: ICD-10-CM

## 2023-03-10 RX ORDER — CITALOPRAM HYDROBROMIDE 40 MG/1
TABLET ORAL
Qty: 90 TABLET | Refills: 1 | Status: SHIPPED | OUTPATIENT
Start: 2023-03-10 | End: 2023-01-01

## 2023-03-12 ENCOUNTER — MYC REFILL (OUTPATIENT)
Dept: INTERNAL MEDICINE | Facility: CLINIC | Age: 63
End: 2023-03-12
Payer: COMMERCIAL

## 2023-03-12 DIAGNOSIS — F41.9 ANXIETY: ICD-10-CM

## 2023-03-12 DIAGNOSIS — H54.3 VISION LOSS, BILATERAL: ICD-10-CM

## 2023-03-13 RX ORDER — ZOLPIDEM TARTRATE 10 MG/1
10 TABLET ORAL
Qty: 30 TABLET | Refills: 0 | Status: SHIPPED | OUTPATIENT
Start: 2023-03-13 | End: 2023-04-12

## 2023-03-31 DIAGNOSIS — E11.39 TYPE 2 DIABETES MELLITUS WITH OTHER OPHTHALMIC COMPLICATION, WITHOUT LONG-TERM CURRENT USE OF INSULIN (H): ICD-10-CM

## 2023-03-31 RX ORDER — GABAPENTIN 300 MG/1
300 CAPSULE ORAL 3 TIMES DAILY
Qty: 90 CAPSULE | Refills: 0 | Status: SHIPPED | OUTPATIENT
Start: 2023-03-31 | End: 2023-05-01

## 2023-04-12 ENCOUNTER — MYC REFILL (OUTPATIENT)
Dept: INTERNAL MEDICINE | Facility: CLINIC | Age: 63
End: 2023-04-12
Payer: COMMERCIAL

## 2023-04-12 DIAGNOSIS — F41.9 ANXIETY: ICD-10-CM

## 2023-04-12 DIAGNOSIS — H54.3 VISION LOSS, BILATERAL: ICD-10-CM

## 2023-04-12 RX ORDER — ZOLPIDEM TARTRATE 10 MG/1
10 TABLET ORAL
Qty: 30 TABLET | Refills: 0 | Status: SHIPPED | OUTPATIENT
Start: 2023-04-12 | End: 2023-05-15

## 2023-04-25 ENCOUNTER — MYC REFILL (OUTPATIENT)
Dept: INTERNAL MEDICINE | Facility: CLINIC | Age: 63
End: 2023-04-25
Payer: COMMERCIAL

## 2023-04-25 NOTE — TELEPHONE ENCOUNTER
Again it is not protocol to place order.  Please read pt note. He has no idea what he takes.  Do you need an appointment to address this.    Gianna DRAKEN, RN

## 2023-04-25 NOTE — TELEPHONE ENCOUNTER
Per patients wife, Gomez has not been taking metformin and they are unsure what dosage he was previously on. Patient is wanting to restart medication due to glucose readings being elevated. Would you like patient to scheduled visit to discuss?

## 2023-04-25 NOTE — TELEPHONE ENCOUNTER
Perhaps it might be better for him to schedule a virtual visit as his last A1c was 6.1 which was done relatively recently in February indicating that his blood sugars have been under really decent control thus question what is changed recently.

## 2023-04-25 NOTE — TELEPHONE ENCOUNTER
Please note I see no documentation as to what dosage the patient is taking.  Medicine is listed on the patient's medication list as of 8/23/2022 discharge summary but there is no dosage present nor do I see any refills.  Active medication list also does not clearly demonstrate dosage nor that I have prescribed medication refills. Unable to fill med without information.  May need to contact patient's pharmacy to find out what dose he has been taking for verification.

## 2023-04-30 DIAGNOSIS — E11.39 TYPE 2 DIABETES MELLITUS WITH OTHER OPHTHALMIC COMPLICATION, WITHOUT LONG-TERM CURRENT USE OF INSULIN (H): ICD-10-CM

## 2023-05-01 RX ORDER — GABAPENTIN 300 MG/1
300 CAPSULE ORAL 3 TIMES DAILY
Qty: 90 CAPSULE | Refills: 0 | Status: SHIPPED | OUTPATIENT
Start: 2023-05-01 | End: 2023-06-02

## 2023-05-03 ENCOUNTER — VIRTUAL VISIT (OUTPATIENT)
Dept: INTERNAL MEDICINE | Facility: CLINIC | Age: 63
End: 2023-05-03
Payer: COMMERCIAL

## 2023-05-03 DIAGNOSIS — Z12.5 SCREENING PSA (PROSTATE SPECIFIC ANTIGEN): ICD-10-CM

## 2023-05-03 DIAGNOSIS — E11.39 TYPE 2 DIABETES MELLITUS WITH OTHER OPHTHALMIC COMPLICATION, WITHOUT LONG-TERM CURRENT USE OF INSULIN (H): Primary | ICD-10-CM

## 2023-05-03 DIAGNOSIS — Z87.891 PERSONAL HISTORY OF TOBACCO USE, PRESENTING HAZARDS TO HEALTH: ICD-10-CM

## 2023-05-03 DIAGNOSIS — F10.20 ALCOHOL USE DISORDER, MODERATE, DEPENDENCE (H): ICD-10-CM

## 2023-05-03 PROCEDURE — 99214 OFFICE O/P EST MOD 30 MIN: CPT | Mod: 93 | Performed by: INTERNAL MEDICINE

## 2023-05-03 NOTE — PROGRESS NOTES
Gomez Turk is a 62 year old who is being evaluated via a billable telephone visit.      What phone number would you like to be contacted at? Home number  How would you like to obtain your AVS? JuanhartDistant Location (provider location):  On-site    Assessment & Plan     Type 2 diabetes mellitus with other ophthalmic complication, without long-term current use of insulin (H)  Discussed with wife that as long as A1c remains stable and blood sugars are returning to baseline that we would not change anything except to follow A1c level at 6-month recommendation.  Offered diabetic education but patient is refusing such.  Discussed importance of good dietary control and also limiting alcohol use and controlling blood sugars.  Discussed that blood sugars may fluctuate accordingly at times.  Future orders placed for lab work accordingly  - Comprehensive metabolic panel; Future  - Hemoglobin A1c; Future    Alcohol use disorder, moderate, dependence (H)  Reviewed the importance of  alcohol cessation.  Discussed issues associated with this in regards to managing liver function as well as dietary change  - CBC with platelets; Future    Personal history of tobacco use, presenting hazards to health  Discussed patient is due to update routine CT imaging for which a referral has been  - CT Chest Lung Cancer Screen Low Dose Without; Future    Screening PSA (prostate specific antigen)  Ordered as part of routine screening.  - PSA, screen; Future       See Patient Instructions    Tylor Roberts MD  [facility]   Gomez Turk is a 62 year old, presenting for the following health issues:      HPI     Called and talked to patient's wife Camelia who is available on consent to communicate.  Camelia is the primary caregiver who takes care of Gomez.  She reports he is still smoking heavily and drinking heavily.  She had questions in regards to his recent blood sugar which had increased  abruptly to 200 when she had checked it but now had dropped down into the 130 range.    She reports that the patient is somewhat noncompliant in regards to his issues of medication.  He is not interested in any diabetic education.  He is not interested in any vaccination updates.  He is visually impaired and is using a walking stick.  His current medications are up-to-date.  He was reminded of the need to consider updating his COVID booster and shingles shot.    He is still using a walking stick.  Per his wife he has had no recurrent falls.    Review of Systems   CONSTITUTIONAL: NEGATIVE for fever, chills, change in weight  ENT/MOUTH: NEGATIVE for ear, mouth and throat problems  RESP: NEGATIVE for significant cough or SOB  CV: NEGATIVE for chest pain, palpitations or peripheral edema  GI: NEGATIVE for nausea, abdominal pain, heartburn, or change in bowel habits  : NEGATIVE for frequency, dysuria, or hematuria  HEME: NEGATIVE for bleeding problems  PSYCHIATRIC: NEGATIVE for changes in mood or affect      Objective           Vitals:  No vitals were obtained today due to virtual visit.    Physical Exam   alert and no distress  PSYCH: Alert and oriented times 3; coherent speech, normal   rate and volume, able to articulate logical thoughts, able   to abstract reason, no tangential thoughts, no hallucinations   or delusions  His affect is normal  RESP: No cough, no audible wheezing, able to talk in full sentences  Remainder of exam unable to be completed due to telephone visits        PSA   Date Value Ref Range Status   01/28/2021 1.67 0 - 4 ug/L Final     Comment:     Assay Method:  Chemiluminescence using Siemens Vista analyzer     Prostate Specific Antigen Screen   Date Value Ref Range Status   05/24/2022 0.36 0.00 - 4.00 ug/L Final     Lab Results   Component Value Date    A1C 6.1 02/17/2023    A1C 5.9 05/24/2022    A1C 6.0 10/05/2021    A1C 5.7 01/28/2021    A1C 6.5 04/28/2020    A1C 6.4 04/26/2020    A1C 5.8  01/20/2020    A1C 6.5 06/19/2019       Creatinine   Date Value Ref Range Status   02/17/2023 0.94 0.67 - 1.17 mg/dL Final   05/05/2021 1.28 (H) 0.66 - 1.25 mg/dL Final       Phone call duration: 15 minutes

## 2023-05-15 ENCOUNTER — MYC REFILL (OUTPATIENT)
Dept: INTERNAL MEDICINE | Facility: CLINIC | Age: 63
End: 2023-05-15
Payer: COMMERCIAL

## 2023-05-15 DIAGNOSIS — H54.3 VISION LOSS, BILATERAL: ICD-10-CM

## 2023-05-15 DIAGNOSIS — F41.9 ANXIETY: ICD-10-CM

## 2023-05-15 RX ORDER — ZOLPIDEM TARTRATE 10 MG/1
10 TABLET ORAL
Qty: 30 TABLET | Refills: 0 | Status: SHIPPED | OUTPATIENT
Start: 2023-05-15 | End: 2023-01-01

## 2023-05-15 RX ORDER — ZOLPIDEM TARTRATE 10 MG/1
10 TABLET ORAL
Qty: 30 TABLET | Refills: 0 | OUTPATIENT
Start: 2023-05-15

## 2023-06-02 DIAGNOSIS — E11.39 TYPE 2 DIABETES MELLITUS WITH OTHER OPHTHALMIC COMPLICATION, WITHOUT LONG-TERM CURRENT USE OF INSULIN (H): ICD-10-CM

## 2023-06-02 RX ORDER — GABAPENTIN 300 MG/1
300 CAPSULE ORAL 3 TIMES DAILY
Qty: 90 CAPSULE | Refills: 0 | Status: SHIPPED | OUTPATIENT
Start: 2023-06-02 | End: 2023-01-01

## 2023-08-19 NOTE — ED NOTES
Performed a road test with the patient. Overall, considering that he is completely blind, the patient was generally steady on his feet with light assistance/guidance.

## 2023-08-19 NOTE — ED PROVIDER NOTES
History     Chief Complaint:  Fall and Alcohol Intoxication     The history is provided by the patient.      Gomez Turk is a 62 year old male with history of stage 3 CKD, hypertension, type 2 diabetes, hyperlipidemia, and alcohol use disorder who presents to the ED via EMS for evaluation after a fall and alcohol intoxication. The patient reports that he was at home and his toe got caught on a rug and he tripped and fell. Endorses drinking alcohol tonight. States his wife or son called 911. Patient takes 2 Aspirin a day. Denies neck pain or any other pain.    Independent Historian:   None - Patient Only    Review of External Notes:   None      Medications:    Aspirin 325 mg  Lipitor  Celexa  Folvite  Gabapentin  Glipizide  Metformin  Roxicodone  Protonix  Ambien  Lisinopril   Plavix     Past Medical History:    Anxiety  Diverticulosis of colon  Hyperlipidemia  Hypertension  Legally blind  Peripheral artery disease  Tobacco use disorder  Type 2 diabetes  Acute cholecystitis   Acute osteomyelitis of left toe  Lung nodule  Hypokalemia  Alcohol use disorder  Anemia   Stage 3 CKD  Acute kidney failure     Past Surgical History:    Amputate left toe(s)  Left bypass graft femoropopliteal  Deviated septum surgery  EGD combined  Combined irrigation and left foot debridement  Laparoscopic cholecystectomy    Physical Exam   Patient Vitals for the past 24 hrs:   BP Temp Temp src Pulse Resp SpO2   08/19/23 0300 (!) 141/69 -- -- 76 16 95 %   08/19/23 0245 130/69 -- -- 74 -- 95 %   08/19/23 0215 135/78 -- -- 70 16 93 %   08/19/23 0148 -- 97.4  F (36.3  C) Temporal 72 16 95 %      Physical Exam  Constitutional:  Oriented to person, place, and time. Intoxicated. Slurred speech.  HENT:    Mild ecchymosis to nose. Non tender. Dry blood noted with no active bleeding. No mandibular or periorbital tenderness.  Head:    Normocephalic. Atraumatic.  Ears:   TM's are clear.  Mouth/Throat:   Oropharynx is clear and moist. No laceration  or dental fractures.   Eyes:    EOM are normal. Pupils are equal, round, and reactive to light.   Neck:    Neck supple.   Cardiovascular:  Normal rate, regular rhythm and normal heart sounds.      Exam reveals no gallop and no friction rub.       No murmur heard.  Pulmonary/Chest:  Effort normal and breath sounds normal.      No respiratory distress. No wheezes. No rales.      No reproducible chest wall pain.  Abdominal:   Soft. No distension. No tenderness. No rebound and no guarding.   Musculoskeletal:  Normal range of motion. No midline spinal tenderness.   Neurological:   Alert and oriented to person, place, and time.           Moves all 4 extremities spontaneously. GCS 15. Slurred speech. Intoxicated.   Skin:    No rash noted. No pallor.      Emergency Department Course   Imaging:  Cervical spine CT w/o contrast   Final Result   IMPRESSION:   HEAD CT:   No acute intracranial process.      CERVICAL SPINE CT:   1.  No CT evidence for acute fracture or post traumatic subluxation.   2.  Straightening of the usual cervical lordosis.   3.  Moderate to severe central canal stenosis at C3-C4, C5-C6 and C6-C7.   4.  Severe right neural foraminal narrowing at C5-C6.         Head CT w/o contrast   Final Result   IMPRESSION:   HEAD CT:   No acute intracranial process.      CERVICAL SPINE CT:   1.  No CT evidence for acute fracture or post traumatic subluxation.   2.  Straightening of the usual cervical lordosis.   3.  Moderate to severe central canal stenosis at C3-C4, C5-C6 and C6-C7.   4.  Severe right neural foraminal narrowing at C5-C6.            Report per radiology    Laboratory:  Labs Ordered and Resulted from Time of ED Arrival to Time of ED Departure   ETHYL ALCOHOL LEVEL - Abnormal       Result Value    Alcohol ethyl 0.13 (*)       Emergency Department Course & Assessments:     Interventions:  Medications   oxymetazoline (AFRIN) 0.05 % spray 2 spray (2 sprays Nasal $Given 8/19/23 3608)      Independent  Interpretation (X-rays, CTs, rhythm strip):  None    Assessments/Consultations/Discussion of Management or Tests:  ED Course as of 08/19/23 0436   Sat Aug 19, 2023   0150 I obtained history and examined the patient as noted above.    0331 I rechecked and updated the patient. Ambulating without difficulty. Wife at bedside.      Social Determinants of Health affecting care:   None    Disposition:  The patient was discharged to home.     Impression & Plan    Medical Decision Making:  Gomez Turk is a 62 year old male who presents for evaluation after fall with trauma to the head.  His exam shows a contusion to the head. This patient has a history and clinical exam consistent with contusion to head. Less likely concussion given scenario but discussed with patient.  The differential diagnosis includes skull fracture, epidural hematoma, subdural hematoma, intracerebral hemorrhage, and traumatic subarachnoid hemorrhage; all of these are highly unlikely in this clinical setting given negative CT.  Risk of delayed bleed is low but patient informed of possiblity.  CT done given age and clinical concern.  No signs of laceration.  I doubt underlying skull fracture.     Return to ED for red flags (change in behavior, severe headache, drowsiness, seizures, vomiting, etc) and gave head contusion precautions for home.  I did stress importance of avoiding a second blow to head just in case he has a concussion.  His head to toe trauma exam is otherwise negative for serious underlying disease of the head, neck, chest, abdomen, extremities, pelvis.     He also had initial epistaxis per EMS which is now resolved.  Has been observed here for some time and had no further epistaxis.  He has no tenderness to his nose no deformity no septal hematoma.  There may be underlying mild fracture but I see no reason for imaging at this time.  He was also noted to be minimally intoxicated and is now currently improved.  He is ambulate without  difficulty and his wife who is sober is here to take custody of him.  He is told return for any worsening symptoms or concerns.      Diagnosis:    ICD-10-CM    1. Contusion of head, unspecified part of head, initial encounter  S00.93XA       2. Epistaxis  R04.0       3. Contusion of nose, initial encounter  S00.33XA       4. Alcoholic intoxication with complication (H)  F10.929          Scribe Disclosure:  DREA GREEN, am serving as a scribe at 1:54 AM on 8/19/2023 to document services personally performed by Nick Torres MD based on my observations and the provider's statements to me.     8/19/2023   Nick Torres MD Shapin, Ryan P, MD  08/19/23 0505

## 2023-08-19 NOTE — ED NOTES
Bed: ED05  Expected date: 8/19/23  Expected time: 1:40 AM  Means of arrival: Ambulance  Comments:  Jamila 541 62M fall, nose bleed; ETOH

## 2023-08-19 NOTE — DISCHARGE INSTRUCTIONS
Discharge Instructions  Head Injury    You have been seen today for a head injury. Your evaluation included a history and physical examination. You may have had a CT (CAT) scan performed, though most head injuries do not require a scan. Based on this evaluation, your provider today does not feel that your head injury is serious.    Generally, every Emergency Department visit should have a follow-up clinic visit with either a primary or a specialty clinic/provider. Please follow-up as instructed by your emergency provider today.  Return to the Emergency Department if:  You are confused or you are not acting right.  Your headache gets worse or you start to have a really bad headache even with your recommended treatment plan.  You vomit (throw up) more than once.  You have a seizure.  You have trouble walking.  You have weakness or paralysis (cannot move) in an arm or a leg.  You have blood or fluid coming from your ears or nose.  You have new symptoms or anything that worries you.    Sleeping:  It is okay for you to sleep, but someone should wake you up if instructed by your provider, and someone should check on you at your usual time to wake up.     Activity:  Do not drive for at least 24 hours.  Do not drive if you have dizzy spells or trouble concentrating, or remembering things.  Do not return to any contact sports until cleared by your regular provider.     MORE INFORMATION:    Concussion:  A concussion is a minor head injury that may cause temporary problems with the way the brain works. Although concussions are important, they are generally not an emergency or a reason that a person needs to be hospitalized. Some concussion symptoms include confusion, amnesia (forgetful), nausea (sick to your stomach) and vomiting (throwing up), dizziness, fatigue, memory or concentration problems, irritability and sleep problems. For most people, concussions are mild and temporary but some will have more severe and persistent  "symptoms that require on-going care and treatment.  CT Scans: Your evaluation today may have included a CT scan (CAT scan) to look for things like bleeding or a skull fracture (broken bone).  CT scans involve radiation and too many CT scans can cause serious health problems like cancer, especially in children.  Because of this, your provider may not have ordered a CT scan today if they think you are at low risk for a serious or life threatening problem.    If you were given a prescription for medicine here today, be sure to read all of the information (including the package insert) that comes with your prescription.  This will include important information about the medicine, its side effects, and any warnings that you need to know about.  The pharmacist who fills the prescription can provide more information and answer questions you may have about the medicine.  If you have questions or concerns that the pharmacist cannot address, please call or return to the Emergency Department.     Remember that you can always come back to the Emergency Department if you are not able to see your regular provider in the amount of time listed above, if you get any new symptoms, or if there is anything that worries you.  Discharge Instructions  Epistaxis    Today you were seen for a nosebleed.   Nosebleeds (the medical term is \"epistaxis\") are very common. Almost every person has had at least one in their lifetime.  Although the amount of blood loss can appear dramatic, nosebleeds rarely cause serious problems.  The most common causes are dry air or nose picking, but they also are common in people who have allergies, high blood pressure, or are on blood thinners (such as Coumadin, Aspirin or Plavix). If you or your child gets a nosebleed, the important thing is to know how to take care of it. With the right self-care, most nosebleeds will stop on their own.    Generally, every Emergency Department visit should have a follow-up " clinic visit with either a primary or a specialty clinic/provider. Please follow-up as instructed by your emergency provider today.    Return to the Emergency Department if:  Your nose is bleeding a very large amount of blood and you are unable to stop it.  You get very pale, faint, or tired.  You cannot get the bleeding to stop after following these instructions.    Treatment:  Your provider may tell you to use a decongestant nose spray, like Afrin  (oxymetazoline), in both nostrils in the morning and at night for the next three days. Do not use this medicine for more than three days at a time.  If you do, it will cause nasal congestion.   Use a moisturizer. A small amount of Vaseline  to the inside of your nostrils for moisture before bed is one option. There are nasal sprays available over-the-counter for this purpose as well.  Using a humidifier in your bedroom or home will help as well when the air is dry.  For the next three days, do not blow your nose or put anything in your nose. You may sniffle, or dab the outside of your nose.  Do not bend with your head below your waist for the next three days. Do not lift anything so heavy that you have to strain.   If you received nasal packing, please do not remove the packing until seen by an Ear, Nose, and Throat (ENT) specialist.  If antibiotics have been given with the packing, please take as directed.    If your nose starts to bleed again:  Blow your nose to get rid of some of the clots that have formed inside your nostrils. This may increase the bleeding temporarily, but that is okay.  Spray decongestant nose spray (like Afrin ) into both nostrils to constrict the blood vessels.  Sit or stand while bending forward slightly at the waist. Do not lie down or tilt your head back. This may cause you to swallow blood and can lead to vomiting.   the soft part of BOTH nostrils at the bottom of your nose and squeeze your nose closed for at least 5 minutes (for  children) or 10 to 15 minutes (for adults). Use a clock to time yourself. Do not release the pressure every so often to check whether the bleeding has stopped. Many people hurt their chances of stopping the bleeding by releasing the pressure too soon or too often.    If you follow the steps outlined above, and your nose continues to bleed, repeat all the steps once more. Apply pressure for a total of at least 30 minutes. If you continue to bleed even then, seek medical attention.  If you were given a prescription for medicine here today, be sure to read all of the information (including the package insert) that comes with your prescription.  This will include important information about the medicine, its side effects, and any warnings that you need to know about.  The pharmacist who fills the prescription can provide more information and answer questions you may have about the medicine.  If you have questions or concerns that the pharmacist cannot address, please call or return to the Emergency Department.   Remember that you can always come back to the Emergency Department if you are not able to see your regular provider in the amount of time listed above, if you get any new symptoms, or if there is anything that worries you.  Discharge Instructions  Alcohol Intoxication    You have been seen today with alcohol intoxication. This means that you have enough alcohol in your system to impair your ability to mentally and physically function, perhaps to the extent that you were unable to care for yourself.    Generally, every Emergency Department visit should have a follow-up clinic visit with either a primary or a specialty clinic/provider. Please follow-up as instructed by your emergency provider today.    You may have come to the Emergency Department because of your intoxication, or for another reason, such as because of an injury. No matter what the case is, this visit is a  red flag  regarding alcohol use, and you  should consider whether your drinking pattern is a problem for you.     You may be at risk for alcohol-related problems if:    Men: you drink more than 14 drinks per week, or more than 4 drinks per occasion.    Women: you drink more than 7 drinks per week or more than 3 drinks per occasion.    You have black-outs.  You do things you regret while drinking.  You have legal problems because of drinking.  You have job problems because of drinking (you call in sick to work because of drinking).    CAGE Questions  Have you ever felt you should cut down on your drinking?  Have people annoyed you by criticizing your drinking?  Have you ever felt bad or guilty about your drinking?  Have you ever had a drink first thing in the morning to steady your nerves or get rid of a hangover (eye opener)?    If you answer yes to any of the CAGE questions, you may have a problem with alcohol.      Return to the Emergency Department if:  You become shaky or tremble when you try to stop drinking.   You have severe abdominal pain (belly pain).   You have a seizure or pass out.    You vomit (throw up) blood or have blood in your stool. This may be bright red or it may look like black coffee grounds.  You become lightheaded or faint.      For further help, contact:   Your caregiver.    Alcoholics Anonymous (AA).    [facility]: [phone]  [facility] Central Office: [phone]   A drug or alcohol rehabilitation program.    You can get information on alcohol resources and groups by calling the number 211 or [phone] on any phone.     Seek medical care if:  You have persistent vomiting.   You have persistent pain in any part of your body.    You do not feel better after a few days.    If you were given a prescription for medicine here today, be sure to read all of the information (including the package insert) that comes with your prescription.  This will include important information about the  medicine, its side effects, and any warnings that you need to know about.  The pharmacist who fills the prescription can provide more information and answer questions you may have about the medicine.  If you have questions or concerns that the pharmacist cannot address, please call or return to the Emergency Department.   Remember that you can always come back to the Emergency Department if you are not able to see your regular doctor in the amount of time listed above, if you get any new symptoms, or if there is anything that worries you.
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
## 2023-08-21 NOTE — PROGRESS NOTES
SUBJECTIVE:  Chief Complaint   Patient presents with    Fall     Fell Saturday. Seen at the ED. L wrist pain, swelling, no ROM.   .ident presents with a chief complaint of left hand.  The injury occurred 2 days ago.   The injury happened while at home.   How: trauma: fall immediate pain  Seen at ED Saturday    Past Medical History:   Diagnosis Date    Anxiety     DIVERTICULOSIS OF COLON W/O BLEED 6/25/2003    GERD (gastroesophageal reflux disease)     Hyperlipidemia LDL goal <100 10/31/2010    Hypertension     Legally blind     PAD (peripheral artery disease) (H)     Tobacco use disorder 6/25/2003    Type 2 diabetes, HbA1c goal < 7% (H) 10/31/2010     Allergies   Allergen Reactions    No Known Drug Allergy      Social History     Tobacco Use    Smoking status: Every Day     Packs/day: 1.00     Types: Cigarettes    Smokeless tobacco: Never   Substance Use Topics    Alcohol use: Yes     Comment: occassion -2 drink daily       ROSINTEGUMENTARY/SKIN: NEGATIVE for open wound/bleeding and POSITIVE for bruising    EXAM:   /71   Pulse 77   Temp 97.1  F (36.2  C) (Tympanic)   SpO2 96%   Gen: healthy,alert,no distress  Extremity: hand has pain with palpation and rom.   There is not compromise to the distal circulation.  Pulses are +2 and CRT is brisk.GENERAL APPEARANCE: healthy, alert and no distress  EXTREMITIES: peripheral pulses normal  SKIN: bruise  NEURO: Normal strength and tone, sensory exam grossly normal, mentation intact and speech normal    Xray without acute findings, no fx read by Tylor Hill D.O.      Web roll applied, 3 in by 18 in orthoglass splint ace wral      ICD-10-CM    1. Hand injury, left, initial encounter  S69.92XA XR Hand Left G/E 3 Views     APPLY SHORT ARM SPLINT STATIC     Orthopedic  Referral     HYDROcodone-acetaminophen (NORCO) 5-325 MG tablet        RICE'
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
## 2023-10-05 NOTE — TELEPHONE ENCOUNTER
Left detailed vm to have family or Gomez call back to speak to Triage.      Consent on file for Wife and Dtr.

## 2023-10-05 NOTE — TELEPHONE ENCOUNTER
As stated, I am unclear on the diagnosis as to why the patient needs a wheelchair.  Nonetheless, I will order and print a DME for a wheelchair under the guidance and diagnosis of the patient's blindness although the patient has been ambulatory with the use of a walking stick for many years when seen in the clinic.  Family needs to be aware of the fact that this may or may not be covered as to the fact that a face to face clinic exam has not been performed.

## 2023-10-05 NOTE — TELEPHONE ENCOUNTER
I will need a little more information as to why a wheelchair is needed.  The patient other than being blind has been ambulatory in the past.  In order to place an order for DME for wheelchair a face-to-face visit is oftentimes required per insurance.  And as mentioned I will need to know the formal diagnosis as to why the patient is currently nonambulatory.
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
## 2023-10-06 NOTE — TELEPHONE ENCOUNTER
Patient Contact    Attempt # 2    Was call answered?  No.  Left message on voicemail requesting pt to return call to nurse triage to discuss symptoms and DME request/order. Pt given clinic hours and triage nurse #. Provider UC hours if pt needs to be seen in person over the weekend.
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
## 2023-12-11 NOTE — PROCEDURES
RADIOLOGY PROCEDURE NOTE  Patient name: Gomez Turk  MRN: [id number]  : 1960    Pre-procedure diagnosis: Nonhealing wounds LLE.  Post-procedure diagnosis: Same    Procedure Date/Time: 2017  10:22 AM  Procedure: Abdominal aortogram and LLE angiogram.  Arteries widely patent to level of knee.  Single vessel runoff via peroneal artery.  2 focal stenoses in proximal peroneal artery, short segment, both treated 2 mm, 2.5 mm, and 3 mm PTA.  Decent result.  PTA chronically occluded.  Segments of EVERARDO chronically occluded, including origin and siteal segment.  DPA reconstitutes at level of ankle mortise.  Plan to start heparin drip 2 hours after sheath removal.  Spoke with Dr. Aragon with plans for debridement in AM.  Hope is that there is adequate blood flow for healing, though significant disease below with level of the knee.  Estimated blood loss: 5 ml  Specimen(s) collected with description: Sheath removed.   prior to removal.  4000 U bolus heparin administered during procedure.  The patient tolerated the procedure well with no immediate complications.  Significant findings:  Please see above.    See imaging dictation for procedural details.    Provider name: Pete Chang  Assistant(s):None      
No. RIGOBERTO screening performed.  STOP BANG Legend: 0-2 = LOW Risk; 3-4 = INTERMEDIATE Risk; 5-8 = HIGH Risk

## 2023-12-14 NOTE — PROGRESS NOTES
Clinic Care Coordination Contact  [facility]/Voicemail    Clinical Data: SW Care Coordinator Outreach    Outreach Documentation Number of Outreach Attempt   12/14/2023   8:31 AM 1       Left message on patient's voicemail with call back information and requested return call.    Plan: If no response,  Care Coordinator will try to reach patient again in 10 business days.        LUCIE Ramos / y[facility]   Care Coordination  [facility]  12/14/2023 8:33 AM

## 2024-01-01 ENCOUNTER — APPOINTMENT (OUTPATIENT)
Dept: CT IMAGING | Facility: CLINIC | Age: 64
DRG: 432 | End: 2024-01-01
Payer: COMMERCIAL

## 2024-01-01 ENCOUNTER — MYC REFILL (OUTPATIENT)
Dept: INTERNAL MEDICINE | Facility: CLINIC | Age: 64
End: 2024-01-01
Payer: COMMERCIAL

## 2024-01-01 ENCOUNTER — APPOINTMENT (OUTPATIENT)
Dept: ULTRASOUND IMAGING | Facility: CLINIC | Age: 64
DRG: 432 | End: 2024-01-01
Payer: COMMERCIAL

## 2024-01-01 ENCOUNTER — HOSPITAL ENCOUNTER (EMERGENCY)
Facility: CLINIC | Age: 64
Discharge: HOME | End: 2024-04-06
Attending: EMERGENCY MEDICINE | Admitting: EMERGENCY MEDICINE
Payer: COMMERCIAL

## 2024-01-01 ENCOUNTER — PATIENT OUTREACH (OUTPATIENT)
Dept: CARE COORDINATION | Facility: CLINIC | Age: 64
End: 2024-01-01
Payer: COMMERCIAL

## 2024-01-01 ENCOUNTER — APPOINTMENT (OUTPATIENT)
Dept: GENERAL RADIOLOGY | Facility: CLINIC | Age: 64
DRG: 432 | End: 2024-01-01
Payer: COMMERCIAL

## 2024-01-01 ENCOUNTER — TELEPHONE (OUTPATIENT)
Dept: INTERNAL MEDICINE | Facility: CLINIC | Age: 64
End: 2024-01-01
Payer: COMMERCIAL

## 2024-01-01 ENCOUNTER — HEALTH MAINTENANCE LETTER (OUTPATIENT)
Age: 64
End: 2024-01-01

## 2024-01-01 ENCOUNTER — MEDICAL CORRESPONDENCE (OUTPATIENT)
Dept: HEALTH INFORMATION MANAGEMENT | Facility: CLINIC | Age: 64
End: 2024-01-01

## 2024-01-01 ENCOUNTER — NURSE TRIAGE (OUTPATIENT)
Dept: NURSING | Facility: CLINIC | Age: 64
End: 2024-01-01
Payer: COMMERCIAL

## 2024-01-01 ENCOUNTER — HOSPITAL ENCOUNTER (INPATIENT)
Facility: CLINIC | Age: 64
LOS: 6 days | DRG: 432 | End: 2024-06-13
Attending: SURGERY | Admitting: SURGERY
Payer: COMMERCIAL

## 2024-01-01 ENCOUNTER — ANESTHESIA (OUTPATIENT)
Dept: INTENSIVE CARE | Facility: CLINIC | Age: 64
DRG: 432 | End: 2024-01-01
Payer: COMMERCIAL

## 2024-01-01 ENCOUNTER — PATIENT OUTREACH (OUTPATIENT)
Dept: INTERNAL MEDICINE | Facility: CLINIC | Age: 64
End: 2024-01-01

## 2024-01-01 ENCOUNTER — ANESTHESIA EVENT (OUTPATIENT)
Dept: INTENSIVE CARE | Facility: CLINIC | Age: 64
DRG: 432 | End: 2024-01-01
Payer: COMMERCIAL

## 2024-01-01 ENCOUNTER — APPOINTMENT (OUTPATIENT)
Dept: CARDIOLOGY | Facility: CLINIC | Age: 64
DRG: 432 | End: 2024-01-01
Payer: COMMERCIAL

## 2024-01-01 VITALS
SYSTOLIC BLOOD PRESSURE: 135 MMHG | BODY MASS INDEX: 35.75 KG/M2 | TEMPERATURE: 98.2 F | DIASTOLIC BLOOD PRESSURE: 69 MMHG | OXYGEN SATURATION: 89 % | HEIGHT: 68 IN | RESPIRATION RATE: 21 BRPM | HEART RATE: 83 BPM | WEIGHT: 235.89 LBS

## 2024-01-01 VITALS
RESPIRATION RATE: 20 BRPM | HEART RATE: 75 BPM | SYSTOLIC BLOOD PRESSURE: 152 MMHG | OXYGEN SATURATION: 98 % | DIASTOLIC BLOOD PRESSURE: 77 MMHG

## 2024-01-01 DIAGNOSIS — H54.3 VISION LOSS, BILATERAL: ICD-10-CM

## 2024-01-01 DIAGNOSIS — R74.8 ELEVATED LIVER ENZYMES: ICD-10-CM

## 2024-01-01 DIAGNOSIS — E11.39 TYPE 2 DIABETES MELLITUS WITH OTHER OPHTHALMIC COMPLICATION, WITHOUT LONG-TERM CURRENT USE OF INSULIN (H): ICD-10-CM

## 2024-01-01 DIAGNOSIS — F41.9 ANXIETY: ICD-10-CM

## 2024-01-01 DIAGNOSIS — R73.9 HYPERGLYCEMIA: ICD-10-CM

## 2024-01-01 DIAGNOSIS — R62.7 FAILURE TO THRIVE IN ADULT: ICD-10-CM

## 2024-01-01 DIAGNOSIS — E78.5 HYPERLIPIDEMIA LDL GOAL <100: ICD-10-CM

## 2024-01-01 LAB
1,3 BETA GLUCAN SER-MCNC: NORMAL PG/ML
ALBUMIN SERPL BCG-MCNC: 2.2 G/DL (ref 3.5–5.2)
ALBUMIN SERPL BCG-MCNC: 2.3 G/DL (ref 3.5–5.2)
ALBUMIN SERPL BCG-MCNC: 2.4 G/DL (ref 3.5–5.2)
ALBUMIN SERPL BCG-MCNC: 2.6 G/DL (ref 3.5–5.2)
ALBUMIN SERPL BCG-MCNC: 2.7 G/DL (ref 3.5–5.2)
ALBUMIN SERPL BCG-MCNC: 2.8 G/DL (ref 3.5–5.2)
ALBUMIN SERPL BCG-MCNC: 2.8 G/DL (ref 3.5–5.2)
ALBUMIN SERPL BCG-MCNC: 3.1 G/DL (ref 3.5–5.2)
ALBUMIN SERPL BCG-MCNC: 3.2 G/DL (ref 3.5–5.2)
ALBUMIN SERPL BCG-MCNC: 3.3 G/DL (ref 3.5–5.2)
ALBUMIN SERPL BCG-MCNC: 3.3 G/DL (ref 3.5–5.2)
ALBUMIN SERPL BCG-MCNC: 3.4 G/DL (ref 3.5–5.2)
ALBUMIN SERPL ELPH-MCNC: 2.5 G/DL (ref 3.7–5.1)
ALBUMIN UR-MCNC: 30 MG/DL
ALBUMIN UR-MCNC: 50 MG/DL
ALBUMIN UR-MCNC: NEGATIVE MG/DL
ALLEN'S TEST: ABNORMAL
ALP SERPL-CCNC: 156 U/L (ref 40–150)
ALP SERPL-CCNC: 160 U/L (ref 40–150)
ALP SERPL-CCNC: 180 U/L (ref 40–150)
ALP SERPL-CCNC: 181 U/L (ref 40–150)
ALP SERPL-CCNC: 201 U/L (ref 40–150)
ALP SERPL-CCNC: 215 U/L (ref 40–150)
ALP SERPL-CCNC: 215 U/L (ref 40–150)
ALP SERPL-CCNC: 217 U/L (ref 40–150)
ALP SERPL-CCNC: 221 U/L (ref 40–150)
ALP SERPL-CCNC: 265 U/L (ref 40–150)
ALP SERPL-CCNC: 275 U/L (ref 40–150)
ALP SERPL-CCNC: 300 U/L (ref 40–150)
ALPHA1 GLOB SERPL ELPH-MCNC: 0.2 G/DL (ref 0.2–0.4)
ALPHA2 GLOB SERPL ELPH-MCNC: 0.3 G/DL (ref 0.5–0.9)
ALT SERPL W P-5'-P-CCNC: 103 U/L (ref 0–70)
ALT SERPL W P-5'-P-CCNC: 32 U/L (ref 0–70)
ALT SERPL W P-5'-P-CCNC: 46 U/L (ref 0–70)
ALT SERPL W P-5'-P-CCNC: 50 U/L (ref 0–70)
ALT SERPL W P-5'-P-CCNC: 52 U/L (ref 0–70)
ALT SERPL W P-5'-P-CCNC: 54 U/L (ref 0–70)
ALT SERPL W P-5'-P-CCNC: 55 U/L (ref 0–70)
ALT SERPL W P-5'-P-CCNC: 60 U/L (ref 0–70)
ALT SERPL W P-5'-P-CCNC: 61 U/L (ref 0–70)
ALT SERPL W P-5'-P-CCNC: 67 U/L (ref 0–70)
ALT SERPL W P-5'-P-CCNC: 68 U/L (ref 0–70)
ALT SERPL W P-5'-P-CCNC: <5 U/L (ref 0–70)
AMMONIA PLAS-SCNC: 65 UMOL/L (ref 16–60)
AMMONIA PLAS-SCNC: 82 UMOL/L (ref 16–60)
AMORPH CRY #/AREA URNS HPF: ABNORMAL /HPF
ANION GAP SERPL CALCULATED.3IONS-SCNC: 12 MMOL/L (ref 7–15)
ANION GAP SERPL CALCULATED.3IONS-SCNC: 14 MMOL/L (ref 7–15)
ANION GAP SERPL CALCULATED.3IONS-SCNC: 14 MMOL/L (ref 7–15)
ANION GAP SERPL CALCULATED.3IONS-SCNC: 15 MMOL/L (ref 7–15)
ANION GAP SERPL CALCULATED.3IONS-SCNC: 16 MMOL/L (ref 7–15)
ANION GAP SERPL CALCULATED.3IONS-SCNC: 17 MMOL/L (ref 7–15)
ANION GAP SERPL CALCULATED.3IONS-SCNC: 18 MMOL/L (ref 7–15)
ANION GAP SERPL CALCULATED.3IONS-SCNC: 19 MMOL/L (ref 7–15)
ANION GAP SERPL CALCULATED.3IONS-SCNC: 20 MMOL/L (ref 7–15)
ANION GAP SERPL CALCULATED.3IONS-SCNC: 23 MMOL/L (ref 7–15)
ANION GAP SERPL CALCULATED.3IONS-SCNC: 24 MMOL/L (ref 7–15)
APAP SERPL-MCNC: <5 UG/ML
APAP SERPL-MCNC: NORMAL UG/ML
APPEARANCE FLD: ABNORMAL
APPEARANCE UR: ABNORMAL
APPEARANCE UR: CLEAR
APPEARANCE UR: CLEAR
APTT PPP: 53 SECONDS (ref 22–38)
APTT PPP: 57 SECONDS (ref 22–38)
AST SERPL W P-5'-P-CCNC: 121 U/L (ref 0–45)
AST SERPL W P-5'-P-CCNC: 125 U/L (ref 0–45)
AST SERPL W P-5'-P-CCNC: 127 U/L (ref 0–45)
AST SERPL W P-5'-P-CCNC: 128 U/L (ref 0–45)
AST SERPL W P-5'-P-CCNC: 131 U/L (ref 0–45)
AST SERPL W P-5'-P-CCNC: 147 U/L (ref 0–45)
AST SERPL W P-5'-P-CCNC: 160 U/L (ref 0–45)
AST SERPL W P-5'-P-CCNC: 160 U/L (ref 0–45)
AST SERPL W P-5'-P-CCNC: 181 U/L (ref 0–45)
AST SERPL W P-5'-P-CCNC: 199 U/L (ref 0–45)
AST SERPL W P-5'-P-CCNC: 338 U/L (ref 0–45)
AST SERPL W P-5'-P-CCNC: 62 U/L (ref 0–45)
ATRIAL RATE - MUSE: 66 BPM
ATRIAL RATE - MUSE: 73 BPM
ATRIAL RATE - MUSE: 74 BPM
ATRIAL RATE - MUSE: 92 BPM
B-GLOBULIN SERPL ELPH-MCNC: 0.9 G/DL (ref 0.6–1)
B-OH-BUTYR SERPL-SCNC: 0.2 MMOL/L
B-OH-BUTYR SERPL-SCNC: <0.18 MMOL/L
BACTERIA #/AREA URNS HPF: ABNORMAL /HPF
BACTERIA BLD CULT: NO GROWTH
BACTERIA BLD CULT: NO GROWTH
BACTERIA BRONCH: NO GROWTH
BACTERIA SPT CULT: NORMAL
BACTERIA UR CULT: NO GROWTH
BASE EXCESS BLDA CALC-SCNC: -10 MMOL/L (ref -3–3)
BASE EXCESS BLDA CALC-SCNC: -10.5 MMOL/L (ref -3–3)
BASE EXCESS BLDA CALC-SCNC: -11.9 MMOL/L (ref -3–3)
BASE EXCESS BLDA CALC-SCNC: -12.4 MMOL/L (ref -3–3)
BASE EXCESS BLDA CALC-SCNC: -13.3 MMOL/L (ref -3–3)
BASE EXCESS BLDA CALC-SCNC: -13.3 MMOL/L (ref -3–3)
BASE EXCESS BLDA CALC-SCNC: -13.7 MMOL/L (ref -3–3)
BASE EXCESS BLDA CALC-SCNC: -13.8 MMOL/L (ref -3–3)
BASE EXCESS BLDA CALC-SCNC: -16.3 MMOL/L (ref -3–3)
BASE EXCESS BLDA CALC-SCNC: -4.3 MMOL/L (ref -3–3)
BASE EXCESS BLDA CALC-SCNC: -4.3 MMOL/L (ref -3–3)
BASE EXCESS BLDA CALC-SCNC: -4.9 MMOL/L (ref -3–3)
BASE EXCESS BLDA CALC-SCNC: -5.1 MMOL/L (ref -3–3)
BASE EXCESS BLDA CALC-SCNC: -5.4 MMOL/L (ref -3–3)
BASE EXCESS BLDA CALC-SCNC: -5.7 MMOL/L (ref -3–3)
BASE EXCESS BLDA CALC-SCNC: -5.8 MMOL/L (ref -3–3)
BASE EXCESS BLDA CALC-SCNC: -6 MMOL/L (ref -3–3)
BASE EXCESS BLDA CALC-SCNC: -7.8 MMOL/L (ref -3–3)
BASE EXCESS BLDA CALC-SCNC: -9.5 MMOL/L (ref -3–3)
BASE EXCESS BLDA CALC-SCNC: -9.7 MMOL/L (ref -3–3)
BASE EXCESS BLDV CALC-SCNC: -11.1 MMOL/L (ref -3–3)
BASE EXCESS BLDV CALC-SCNC: -12.2 MMOL/L (ref -3–3)
BASE EXCESS BLDV CALC-SCNC: -12.4 MMOL/L (ref -3–3)
BASE EXCESS BLDV CALC-SCNC: -8.2 MMOL/L (ref -3–3)
BASE EXCESS BLDV CALC-SCNC: -8.2 MMOL/L (ref -3–3)
BASE EXCESS BLDV CALC-SCNC: -8.5 MMOL/L (ref -3–3)
BASOPHILS # BLD AUTO: 0 10E3/UL (ref 0–0.2)
BASOPHILS # BLD AUTO: 0.1 10E3/UL (ref 0–0.2)
BASOPHILS NFR BLD AUTO: 0 %
BASOPHILS NFR BLD AUTO: 1 %
BILIRUB DIRECT SERPL-MCNC: 17.33 MG/DL (ref 0–0.3)
BILIRUB DIRECT SERPL-MCNC: 20 MG/DL (ref 0–0.3)
BILIRUB DIRECT SERPL-MCNC: 21 MG/DL (ref 0–0.3)
BILIRUB SERPL-MCNC: 1.2 MG/DL
BILIRUB SERPL-MCNC: 20.6 MG/DL
BILIRUB SERPL-MCNC: 21.3 MG/DL
BILIRUB SERPL-MCNC: 22.6 MG/DL
BILIRUB SERPL-MCNC: 23.7 MG/DL
BILIRUB SERPL-MCNC: 24 MG/DL
BILIRUB SERPL-MCNC: 24.2 MG/DL
BILIRUB SERPL-MCNC: 25.1 MG/DL
BILIRUB SERPL-MCNC: 25.2 MG/DL
BILIRUB SERPL-MCNC: 25.4 MG/DL
BILIRUB SERPL-MCNC: 25.6 MG/DL
BILIRUB SERPL-MCNC: 26.1 MG/DL
BILIRUB UR QL STRIP: ABNORMAL
BILIRUB UR QL STRIP: ABNORMAL
BILIRUB UR QL STRIP: NEGATIVE
BUN SERPL-MCNC: 12 MG/DL (ref 8–23)
BUN SERPL-MCNC: 29.5 MG/DL (ref 8–23)
BUN SERPL-MCNC: 29.9 MG/DL (ref 8–23)
BUN SERPL-MCNC: 30 MG/DL (ref 8–23)
BUN SERPL-MCNC: 30.6 MG/DL (ref 8–23)
BUN SERPL-MCNC: 30.6 MG/DL (ref 8–23)
BUN SERPL-MCNC: 30.7 MG/DL (ref 8–23)
BUN SERPL-MCNC: 31.1 MG/DL (ref 8–23)
BUN SERPL-MCNC: 32.3 MG/DL (ref 8–23)
BUN SERPL-MCNC: 33.4 MG/DL (ref 8–23)
BUN SERPL-MCNC: 34.2 MG/DL (ref 8–23)
BUN SERPL-MCNC: 34.6 MG/DL (ref 8–23)
BUN SERPL-MCNC: 34.7 MG/DL (ref 8–23)
BUN SERPL-MCNC: 35.5 MG/DL (ref 8–23)
BUN SERPL-MCNC: 39.9 MG/DL (ref 8–23)
BUN SERPL-MCNC: 47.1 MG/DL (ref 8–23)
BUN SERPL-MCNC: 53 MG/DL (ref 8–23)
BUN SERPL-MCNC: 61.7 MG/DL (ref 8–23)
BUN SERPL-MCNC: 64.3 MG/DL (ref 8–23)
C PNEUM DNA SPEC QL NAA+PROBE: NOT DETECTED
CA-I BLD-MCNC: 4.5 MG/DL (ref 4.4–5.2)
CA-I BLD-MCNC: 4.6 MG/DL (ref 4.4–5.2)
CALCIUM SERPL-MCNC: 8.1 MG/DL (ref 8.8–10.2)
CALCIUM SERPL-MCNC: 8.2 MG/DL (ref 8.8–10.2)
CALCIUM SERPL-MCNC: 8.2 MG/DL (ref 8.8–10.2)
CALCIUM SERPL-MCNC: 8.4 MG/DL (ref 8.8–10.2)
CALCIUM SERPL-MCNC: 8.5 MG/DL (ref 8.8–10.2)
CALCIUM SERPL-MCNC: 8.6 MG/DL (ref 8.8–10.2)
CALCIUM SERPL-MCNC: 8.7 MG/DL (ref 8.8–10.2)
CALCIUM SERPL-MCNC: 8.8 MG/DL (ref 8.8–10.2)
CALCIUM SERPL-MCNC: 8.8 MG/DL (ref 8.8–10.2)
CALCIUM SERPL-MCNC: 9.1 MG/DL (ref 8.8–10.2)
CALCIUM SERPL-MCNC: 9.2 MG/DL (ref 8.8–10.2)
CALCIUM SERPL-MCNC: 9.2 MG/DL (ref 8.8–10.2)
CELL COUNT BODY FLUID SOURCE: ABNORMAL
CHLORIDE SERPL-SCNC: 100 MMOL/L (ref 98–107)
CHLORIDE SERPL-SCNC: 103 MMOL/L (ref 98–107)
CHLORIDE SERPL-SCNC: 109 MMOL/L (ref 98–107)
CHLORIDE SERPL-SCNC: 109 MMOL/L (ref 98–107)
CHLORIDE SERPL-SCNC: 110 MMOL/L (ref 98–107)
CHLORIDE SERPL-SCNC: 111 MMOL/L (ref 98–107)
CHLORIDE SERPL-SCNC: 112 MMOL/L (ref 98–107)
CHLORIDE SERPL-SCNC: 113 MMOL/L (ref 98–107)
CHLORIDE SERPL-SCNC: 115 MMOL/L (ref 98–107)
CK SERPL-CCNC: 2182 U/L (ref 39–308)
COHGB MFR BLD: 84.2 % (ref 96–97)
COHGB MFR BLD: 92.3 % (ref 96–97)
COHGB MFR BLD: 93 % (ref 96–97)
COHGB MFR BLD: 93.8 % (ref 96–97)
COHGB MFR BLD: 94 % (ref 96–97)
COHGB MFR BLD: 94.1 % (ref 96–97)
COHGB MFR BLD: 94.2 % (ref 96–97)
COHGB MFR BLD: 94.5 % (ref 96–97)
COHGB MFR BLD: 96.6 % (ref 96–97)
COHGB MFR BLD: 96.7 % (ref 96–97)
COHGB MFR BLD: 97.2 % (ref 96–97)
COHGB MFR BLD: 98.7 % (ref 96–97)
COHGB MFR BLD: 99.1 % (ref 96–97)
COHGB MFR BLD: 99.2 % (ref 96–97)
COHGB MFR BLD: 99.3 % (ref 96–97)
COHGB MFR BLD: 99.5 % (ref 96–97)
COHGB MFR BLD: >100 % (ref 96–97)
COLOR FLD: YELLOW
COLOR UR AUTO: ABNORMAL
CORTIS SERPL-MCNC: 99.3 UG/DL
CREAT SERPL-MCNC: 0.97 MG/DL (ref 0.67–1.17)
CREAT SERPL-MCNC: 1.63 MG/DL (ref 0.67–1.17)
CREAT SERPL-MCNC: 1.64 MG/DL (ref 0.67–1.17)
CREAT SERPL-MCNC: 1.64 MG/DL (ref 0.67–1.17)
CREAT SERPL-MCNC: 1.65 MG/DL (ref 0.67–1.17)
CREAT SERPL-MCNC: 1.74 MG/DL (ref 0.67–1.17)
CREAT SERPL-MCNC: 1.74 MG/DL (ref 0.67–1.17)
CREAT SERPL-MCNC: 1.75 MG/DL (ref 0.67–1.17)
CREAT SERPL-MCNC: 1.81 MG/DL (ref 0.67–1.17)
CREAT SERPL-MCNC: 1.92 MG/DL (ref 0.67–1.17)
CREAT SERPL-MCNC: 1.96 MG/DL (ref 0.67–1.17)
CREAT SERPL-MCNC: 1.98 MG/DL (ref 0.67–1.17)
CREAT SERPL-MCNC: 1.98 MG/DL (ref 0.67–1.17)
CREAT SERPL-MCNC: 2.06 MG/DL (ref 0.67–1.17)
CREAT SERPL-MCNC: 2.23 MG/DL (ref 0.67–1.17)
CREAT SERPL-MCNC: 2.38 MG/DL (ref 0.67–1.17)
CREAT SERPL-MCNC: 2.49 MG/DL (ref 0.67–1.17)
CREAT SERPL-MCNC: 2.49 MG/DL (ref 0.67–1.17)
CREAT SERPL-MCNC: 2.54 MG/DL (ref 0.67–1.17)
CREAT SERPL-MCNC: 2.82 MG/DL (ref 0.67–1.17)
CREAT UR-MCNC: 124.6 MG/DL
CRYPTOC AG SPEC QL: NEGATIVE
DEPRECATED HCO3 PLAS-SCNC: 11 MMOL/L (ref 22–29)
DEPRECATED HCO3 PLAS-SCNC: 12 MMOL/L (ref 22–29)
DEPRECATED HCO3 PLAS-SCNC: 13 MMOL/L (ref 22–29)
DEPRECATED HCO3 PLAS-SCNC: 13 MMOL/L (ref 22–29)
DEPRECATED HCO3 PLAS-SCNC: 14 MMOL/L (ref 22–29)
DEPRECATED HCO3 PLAS-SCNC: 15 MMOL/L (ref 22–29)
DEPRECATED HCO3 PLAS-SCNC: 16 MMOL/L (ref 22–29)
DEPRECATED HCO3 PLAS-SCNC: 18 MMOL/L (ref 22–29)
DEPRECATED HCO3 PLAS-SCNC: 18 MMOL/L (ref 22–29)
DEPRECATED HCO3 PLAS-SCNC: 19 MMOL/L (ref 22–29)
DEPRECATED HCO3 PLAS-SCNC: 22 MMOL/L (ref 22–29)
DIASTOLIC BLOOD PRESSURE - MUSE: NORMAL MMHG
EGFRCR SERPLBLD CKD-EPI 2021: 24 ML/MIN/1.73M2
EGFRCR SERPLBLD CKD-EPI 2021: 28 ML/MIN/1.73M2
EGFRCR SERPLBLD CKD-EPI 2021: 28 ML/MIN/1.73M2
EGFRCR SERPLBLD CKD-EPI 2021: 30 ML/MIN/1.73M2
EGFRCR SERPLBLD CKD-EPI 2021: 32 ML/MIN/1.73M2
EGFRCR SERPLBLD CKD-EPI 2021: 36 ML/MIN/1.73M2
EGFRCR SERPLBLD CKD-EPI 2021: 37 ML/MIN/1.73M2
EGFRCR SERPLBLD CKD-EPI 2021: 37 ML/MIN/1.73M2
EGFRCR SERPLBLD CKD-EPI 2021: 38 ML/MIN/1.73M2
EGFRCR SERPLBLD CKD-EPI 2021: 39 ML/MIN/1.73M2
EGFRCR SERPLBLD CKD-EPI 2021: 41 ML/MIN/1.73M2
EGFRCR SERPLBLD CKD-EPI 2021: 43 ML/MIN/1.73M2
EGFRCR SERPLBLD CKD-EPI 2021: 44 ML/MIN/1.73M2
EGFRCR SERPLBLD CKD-EPI 2021: 44 ML/MIN/1.73M2
EGFRCR SERPLBLD CKD-EPI 2021: 46 ML/MIN/1.73M2
EGFRCR SERPLBLD CKD-EPI 2021: 47 ML/MIN/1.73M2
EGFRCR SERPLBLD CKD-EPI 2021: 88 ML/MIN/1.73M2
EOSINOPHIL # BLD AUTO: 0 10E3/UL (ref 0–0.7)
EOSINOPHIL # BLD AUTO: 0.3 10E3/UL (ref 0–0.7)
EOSINOPHIL # BLD AUTO: 0.3 10E3/UL (ref 0–0.7)
EOSINOPHIL # BLD AUTO: 0.4 10E3/UL (ref 0–0.7)
EOSINOPHIL # BLD AUTO: 0.5 10E3/UL (ref 0–0.7)
EOSINOPHIL # BLD AUTO: 0.5 10E3/UL (ref 0–0.7)
EOSINOPHIL NFR BLD AUTO: 0 %
EOSINOPHIL NFR BLD AUTO: 3 %
EOSINOPHIL NFR BLD AUTO: 5 %
ERYTHROCYTE [DISTWIDTH] IN BLOOD BY AUTOMATED COUNT: 13.1 % (ref 10–15)
ERYTHROCYTE [DISTWIDTH] IN BLOOD BY AUTOMATED COUNT: 19.4 % (ref 10–15)
ERYTHROCYTE [DISTWIDTH] IN BLOOD BY AUTOMATED COUNT: 19.4 % (ref 10–15)
ERYTHROCYTE [DISTWIDTH] IN BLOOD BY AUTOMATED COUNT: 19.5 % (ref 10–15)
ERYTHROCYTE [DISTWIDTH] IN BLOOD BY AUTOMATED COUNT: 20.4 % (ref 10–15)
ERYTHROCYTE [DISTWIDTH] IN BLOOD BY AUTOMATED COUNT: 21 % (ref 10–15)
ERYTHROCYTE [DISTWIDTH] IN BLOOD BY AUTOMATED COUNT: 21.3 % (ref 10–15)
ERYTHROCYTE [DISTWIDTH] IN BLOOD BY AUTOMATED COUNT: 21.5 % (ref 10–15)
FIBRINOGEN PPP-MCNC: 289 MG/DL (ref 170–490)
FLUAV H1 2009 PAND RNA SPEC QL NAA+PROBE: NOT DETECTED
FLUAV H1 RNA SPEC QL NAA+PROBE: NOT DETECTED
FLUAV H3 RNA SPEC QL NAA+PROBE: NOT DETECTED
FLUAV RNA SPEC QL NAA+PROBE: NEGATIVE
FLUAV RNA SPEC QL NAA+PROBE: NOT DETECTED
FLUBV RNA RESP QL NAA+PROBE: NEGATIVE
FLUBV RNA SPEC QL NAA+PROBE: NOT DETECTED
FRACT EXCRET NA UR+SERPL-RTO: NORMAL %
GALACTOMANNAN AG BAL QL: NEGATIVE
GALACTOMANNAN AG SPEC IA-ACNC: 0.03
GAMMA GLOB SERPL ELPH-MCNC: 1.4 G/DL (ref 0.7–1.6)
GLUCOSE BLDC GLUCOMTR-MCNC: 123 MG/DL (ref 70–99)
GLUCOSE BLDC GLUCOMTR-MCNC: 126 MG/DL (ref 70–99)
GLUCOSE BLDC GLUCOMTR-MCNC: 128 MG/DL (ref 70–99)
GLUCOSE BLDC GLUCOMTR-MCNC: 129 MG/DL (ref 70–99)
GLUCOSE BLDC GLUCOMTR-MCNC: 132 MG/DL (ref 70–99)
GLUCOSE BLDC GLUCOMTR-MCNC: 135 MG/DL (ref 70–99)
GLUCOSE BLDC GLUCOMTR-MCNC: 136 MG/DL (ref 70–99)
GLUCOSE BLDC GLUCOMTR-MCNC: 137 MG/DL (ref 70–99)
GLUCOSE BLDC GLUCOMTR-MCNC: 137 MG/DL (ref 70–99)
GLUCOSE BLDC GLUCOMTR-MCNC: 138 MG/DL (ref 70–99)
GLUCOSE BLDC GLUCOMTR-MCNC: 140 MG/DL (ref 70–99)
GLUCOSE BLDC GLUCOMTR-MCNC: 142 MG/DL (ref 70–99)
GLUCOSE BLDC GLUCOMTR-MCNC: 143 MG/DL (ref 70–99)
GLUCOSE BLDC GLUCOMTR-MCNC: 146 MG/DL (ref 70–99)
GLUCOSE BLDC GLUCOMTR-MCNC: 146 MG/DL (ref 70–99)
GLUCOSE BLDC GLUCOMTR-MCNC: 147 MG/DL (ref 70–99)
GLUCOSE BLDC GLUCOMTR-MCNC: 147 MG/DL (ref 70–99)
GLUCOSE BLDC GLUCOMTR-MCNC: 158 MG/DL (ref 70–99)
GLUCOSE BLDC GLUCOMTR-MCNC: 160 MG/DL (ref 70–99)
GLUCOSE BLDC GLUCOMTR-MCNC: 161 MG/DL (ref 70–99)
GLUCOSE BLDC GLUCOMTR-MCNC: 163 MG/DL (ref 70–99)
GLUCOSE BLDC GLUCOMTR-MCNC: 166 MG/DL (ref 70–99)
GLUCOSE BLDC GLUCOMTR-MCNC: 171 MG/DL (ref 70–99)
GLUCOSE BLDC GLUCOMTR-MCNC: 179 MG/DL (ref 70–99)
GLUCOSE BLDC GLUCOMTR-MCNC: 182 MG/DL (ref 70–99)
GLUCOSE BLDC GLUCOMTR-MCNC: 187 MG/DL (ref 70–99)
GLUCOSE BLDC GLUCOMTR-MCNC: 190 MG/DL (ref 70–99)
GLUCOSE BLDC GLUCOMTR-MCNC: 191 MG/DL (ref 70–99)
GLUCOSE BLDC GLUCOMTR-MCNC: 195 MG/DL (ref 70–99)
GLUCOSE BLDC GLUCOMTR-MCNC: 200 MG/DL (ref 70–99)
GLUCOSE BLDC GLUCOMTR-MCNC: 203 MG/DL (ref 70–99)
GLUCOSE BLDC GLUCOMTR-MCNC: 203 MG/DL (ref 70–99)
GLUCOSE BLDC GLUCOMTR-MCNC: 204 MG/DL (ref 70–99)
GLUCOSE BLDC GLUCOMTR-MCNC: 205 MG/DL (ref 70–99)
GLUCOSE BLDC GLUCOMTR-MCNC: 206 MG/DL (ref 70–99)
GLUCOSE BLDC GLUCOMTR-MCNC: 211 MG/DL (ref 70–99)
GLUCOSE BLDC GLUCOMTR-MCNC: 214 MG/DL (ref 70–99)
GLUCOSE BLDC GLUCOMTR-MCNC: 215 MG/DL (ref 70–99)
GLUCOSE BLDC GLUCOMTR-MCNC: 228 MG/DL (ref 70–99)
GLUCOSE BLDC GLUCOMTR-MCNC: 230 MG/DL (ref 70–99)
GLUCOSE BLDC GLUCOMTR-MCNC: 245 MG/DL (ref 70–99)
GLUCOSE SERPL-MCNC: 121 MG/DL (ref 70–99)
GLUCOSE SERPL-MCNC: 134 MG/DL (ref 70–99)
GLUCOSE SERPL-MCNC: 145 MG/DL (ref 70–99)
GLUCOSE SERPL-MCNC: 150 MG/DL (ref 70–99)
GLUCOSE SERPL-MCNC: 151 MG/DL (ref 70–99)
GLUCOSE SERPL-MCNC: 152 MG/DL (ref 70–99)
GLUCOSE SERPL-MCNC: 157 MG/DL (ref 70–99)
GLUCOSE SERPL-MCNC: 161 MG/DL (ref 70–99)
GLUCOSE SERPL-MCNC: 167 MG/DL (ref 70–99)
GLUCOSE SERPL-MCNC: 168 MG/DL (ref 70–99)
GLUCOSE SERPL-MCNC: 173 MG/DL (ref 70–99)
GLUCOSE SERPL-MCNC: 177 MG/DL (ref 70–99)
GLUCOSE SERPL-MCNC: 195 MG/DL (ref 70–99)
GLUCOSE SERPL-MCNC: 223 MG/DL (ref 70–99)
GLUCOSE SERPL-MCNC: 228 MG/DL (ref 70–99)
GLUCOSE SERPL-MCNC: 233 MG/DL (ref 70–99)
GLUCOSE SERPL-MCNC: 240 MG/DL (ref 70–99)
GLUCOSE SERPL-MCNC: 240 MG/DL (ref 70–99)
GLUCOSE SERPL-MCNC: 261 MG/DL (ref 70–99)
GLUCOSE UR STRIP-MCNC: 100 MG/DL
GLUCOSE UR STRIP-MCNC: 200 MG/DL
GLUCOSE UR STRIP-MCNC: NEGATIVE MG/DL
GRAM STAIN RESULT: NORMAL
H CAPSUL AG SER IA-ACNC: NOT DETECTED
H CAPSUL AG SER QL IA: NOT DETECTED
HADV DNA SPEC QL NAA+PROBE: NOT DETECTED
HAV IGM SERPL QL IA: NONREACTIVE
HBA1C MFR BLD: 6.1 %
HBV CORE IGM SERPL QL IA: NORMAL
HBV SURFACE AG SERPL QL IA: NONREACTIVE
HCO3 BLD-SCNC: 12 MMOL/L (ref 21–28)
HCO3 BLD-SCNC: 13 MMOL/L (ref 21–28)
HCO3 BLD-SCNC: 15 MMOL/L (ref 21–28)
HCO3 BLD-SCNC: 17 MMOL/L (ref 21–28)
HCO3 BLD-SCNC: 19 MMOL/L (ref 21–28)
HCO3 BLD-SCNC: 19 MMOL/L (ref 21–28)
HCO3 BLD-SCNC: 20 MMOL/L (ref 21–28)
HCO3 BLD-SCNC: 21 MMOL/L (ref 21–28)
HCO3 BLDV-SCNC: 13 MMOL/L (ref 21–28)
HCO3 BLDV-SCNC: 14 MMOL/L (ref 21–28)
HCO3 BLDV-SCNC: 14 MMOL/L (ref 21–28)
HCO3 BLDV-SCNC: 16 MMOL/L (ref 21–28)
HCO3 BLDV-SCNC: 16 MMOL/L (ref 21–28)
HCO3 BLDV-SCNC: 18 MMOL/L (ref 21–28)
HCOV PNL SPEC NAA+PROBE: NOT DETECTED
HCT VFR BLD AUTO: 18.8 % (ref 40–53)
HCT VFR BLD AUTO: 20 % (ref 40–53)
HCT VFR BLD AUTO: 20.3 % (ref 40–53)
HCT VFR BLD AUTO: 20.9 % (ref 40–53)
HCT VFR BLD AUTO: 22.6 % (ref 40–53)
HCT VFR BLD AUTO: 22.7 % (ref 40–53)
HCT VFR BLD AUTO: 23.6 % (ref 40–53)
HCT VFR BLD AUTO: 25.1 % (ref 40–53)
HCT VFR BLD AUTO: 31.6 % (ref 40–53)
HCV AB SERPL QL IA: NONREACTIVE
HGB BLD-MCNC: 11.1 G/DL (ref 13.3–17.7)
HGB BLD-MCNC: 6.2 G/DL (ref 13.3–17.7)
HGB BLD-MCNC: 7 G/DL (ref 13.3–17.7)
HGB BLD-MCNC: 7.4 G/DL (ref 13.3–17.7)
HGB BLD-MCNC: 7.5 G/DL (ref 13.3–17.7)
HGB BLD-MCNC: 7.7 G/DL (ref 13.3–17.7)
HGB BLD-MCNC: 8.4 G/DL (ref 13.3–17.7)
HGB BLD-MCNC: ABNORMAL G/DL
HGB UR QL STRIP: ABNORMAL
HGB UR QL STRIP: NEGATIVE
HGB UR QL STRIP: NEGATIVE
HIV 1+2 AB+HIV1 P24 AG SERPL QL IA: NONREACTIVE
HMPV RNA SPEC QL NAA+PROBE: NOT DETECTED
HOLD SPECIMEN: NORMAL
HPIV1 RNA SPEC QL NAA+PROBE: NOT DETECTED
HPIV2 RNA SPEC QL NAA+PROBE: NOT DETECTED
HPIV3 RNA SPEC QL NAA+PROBE: NOT DETECTED
HPIV4 RNA SPEC QL NAA+PROBE: NOT DETECTED
HYALINE CASTS: 27 /LPF
IMM GRANULOCYTES # BLD: 0 10E3/UL
IMM GRANULOCYTES # BLD: 0.1 10E3/UL
IMM GRANULOCYTES # BLD: 0.2 10E3/UL
IMM GRANULOCYTES # BLD: 0.3 10E3/UL
IMM GRANULOCYTES NFR BLD: 0 %
IMM GRANULOCYTES NFR BLD: 1 %
INR PPP: 1.69 (ref 0.85–1.15)
INR PPP: 1.79 (ref 0.85–1.15)
INR PPP: 2.16 (ref 0.85–1.15)
INR PPP: 2.4 (ref 0.85–1.15)
INR PPP: 2.55 (ref 0.85–1.15)
INR PPP: 3.08 (ref 0.85–1.15)
INTERPRETATION ECG - MUSE: NORMAL
KAPPA LC FREE SER-MCNC: 10.39 MG/DL (ref 0.33–1.94)
KAPPA LC FREE/LAMBDA FREE SER NEPH: 1.39 {RATIO} (ref 0.26–1.65)
KETONES UR STRIP-MCNC: 15 MG/DL
KETONES UR STRIP-MCNC: ABNORMAL MG/DL
KETONES UR STRIP-MCNC: NEGATIVE MG/DL
KOH PREPARATION: NORMAL
KOH PREPARATION: NORMAL
LABORATORY REPORT: NORMAL
LACTATE SERPL-SCNC: 10.1 MMOL/L (ref 0.7–2)
LACTATE SERPL-SCNC: 10.1 MMOL/L (ref 0.7–2)
LACTATE SERPL-SCNC: 10.8 MMOL/L (ref 0.7–2)
LACTATE SERPL-SCNC: 2.1 MMOL/L (ref 0.7–2)
LACTATE SERPL-SCNC: 3.2 MMOL/L (ref 0.7–2)
LACTATE SERPL-SCNC: 3.2 MMOL/L (ref 0.7–2)
LACTATE SERPL-SCNC: 3.3 MMOL/L (ref 0.7–2)
LACTATE SERPL-SCNC: 4.1 MMOL/L (ref 0.7–2)
LACTATE SERPL-SCNC: 4.3 MMOL/L (ref 0.7–2)
LACTATE SERPL-SCNC: 4.6 MMOL/L (ref 0.7–2)
LACTATE SERPL-SCNC: 4.6 MMOL/L (ref 0.7–2)
LACTATE SERPL-SCNC: 4.7 MMOL/L (ref 0.7–2)
LACTATE SERPL-SCNC: 5.2 MMOL/L (ref 0.7–2)
LACTATE SERPL-SCNC: 5.3 MMOL/L (ref 0.7–2)
LACTATE SERPL-SCNC: 5.6 MMOL/L (ref 0.7–2)
LACTATE SERPL-SCNC: 6 MMOL/L (ref 0.7–2)
LACTATE SERPL-SCNC: 7.4 MMOL/L (ref 0.7–2)
LACTATE SERPL-SCNC: 7.6 MMOL/L (ref 0.7–2)
LACTATE SERPL-SCNC: 9.4 MMOL/L (ref 0.7–2)
LAMBDA LC FREE SERPL-MCNC: 7.5 MG/DL (ref 0.57–2.63)
LEUKOCYTE ESTERASE UR QL STRIP: ABNORMAL
LEUKOCYTE ESTERASE UR QL STRIP: NEGATIVE
LEUKOCYTE ESTERASE UR QL STRIP: NEGATIVE
LVEF ECHO: NORMAL
LYMPHOCYTES # BLD AUTO: 0.8 10E3/UL (ref 0.8–5.3)
LYMPHOCYTES # BLD AUTO: 1.2 10E3/UL (ref 0.8–5.3)
LYMPHOCYTES # BLD AUTO: 1.3 10E3/UL (ref 0.8–5.3)
LYMPHOCYTES # BLD AUTO: 1.3 10E3/UL (ref 0.8–5.3)
LYMPHOCYTES # BLD AUTO: 1.4 10E3/UL (ref 0.8–5.3)
LYMPHOCYTES # BLD AUTO: 1.5 10E3/UL (ref 0.8–5.3)
LYMPHOCYTES # BLD AUTO: 1.5 10E3/UL (ref 0.8–5.3)
LYMPHOCYTES # BLD AUTO: 1.7 10E3/UL (ref 0.8–5.3)
LYMPHOCYTES NFR BLD AUTO: 10 %
LYMPHOCYTES NFR BLD AUTO: 10 %
LYMPHOCYTES NFR BLD AUTO: 11 %
LYMPHOCYTES NFR BLD AUTO: 11 %
LYMPHOCYTES NFR BLD AUTO: 22 %
LYMPHOCYTES NFR BLD AUTO: 4 %
LYMPHOCYTES NFR BLD AUTO: 5 %
LYMPHOCYTES NFR BLD AUTO: 6 %
LYMPHOCYTES NFR FLD MANUAL: 0 %
M PNEUMO DNA SPEC QL NAA+PROBE: NOT DETECTED
M PROTEIN SERPL ELPH-MCNC: 0 G/DL
MAGNESIUM SERPL-MCNC: 1.5 MG/DL (ref 1.7–2.3)
MAGNESIUM SERPL-MCNC: 1.7 MG/DL (ref 1.7–2.3)
MAGNESIUM SERPL-MCNC: 1.8 MG/DL (ref 1.7–2.3)
MAGNESIUM SERPL-MCNC: 1.9 MG/DL (ref 1.7–2.3)
MAGNESIUM SERPL-MCNC: 1.9 MG/DL (ref 1.7–2.3)
MAGNESIUM SERPL-MCNC: 2 MG/DL (ref 1.7–2.3)
MAGNESIUM SERPL-MCNC: 2.2 MG/DL (ref 1.7–2.3)
MAGNESIUM SERPL-MCNC: 2.2 MG/DL (ref 1.7–2.3)
MAGNESIUM SERPL-MCNC: 2.3 MG/DL (ref 1.7–2.3)
MCH RBC QN AUTO: 35.8 PG (ref 26.5–33)
MCH RBC QN AUTO: 37.6 PG (ref 26.5–33)
MCH RBC QN AUTO: 37.9 PG (ref 26.5–33)
MCH RBC QN AUTO: 38.1 PG (ref 26.5–33)
MCH RBC QN AUTO: 38.4 PG (ref 26.5–33)
MCH RBC QN AUTO: ABNORMAL PG
MCHC RBC AUTO-ENTMCNC: 35.1 G/DL (ref 31.5–36.5)
MCHC RBC AUTO-ENTMCNC: 36.9 G/DL (ref 31.5–36.5)
MCHC RBC AUTO-ENTMCNC: 37 G/DL (ref 31.5–36.5)
MCHC RBC AUTO-ENTMCNC: 37.2 G/DL (ref 31.5–36.5)
MCHC RBC AUTO-ENTMCNC: 37.2 G/DL (ref 31.5–36.5)
MCHC RBC AUTO-ENTMCNC: ABNORMAL G/DL
MCV RBC AUTO: 101 FL (ref 78–100)
MCV RBC AUTO: 102 FL (ref 78–100)
MCV RBC AUTO: 103 FL (ref 78–100)
MCV RBC AUTO: 99 FL (ref 78–100)
MONOCYTES # BLD AUTO: 0.8 10E3/UL (ref 0–1.3)
MONOCYTES # BLD AUTO: 1.2 10E3/UL (ref 0–1.3)
MONOCYTES # BLD AUTO: 1.3 10E3/UL (ref 0–1.3)
MONOCYTES # BLD AUTO: 1.3 10E3/UL (ref 0–1.3)
MONOCYTES # BLD AUTO: 1.4 10E3/UL (ref 0–1.3)
MONOCYTES # BLD AUTO: 1.5 10E3/UL (ref 0–1.3)
MONOCYTES # BLD AUTO: 1.6 10E3/UL (ref 0–1.3)
MONOCYTES # BLD AUTO: 1.7 10E3/UL (ref 0–1.3)
MONOCYTES NFR BLD AUTO: 10 %
MONOCYTES NFR BLD AUTO: 10 %
MONOCYTES NFR BLD AUTO: 11 %
MONOCYTES NFR BLD AUTO: 11 %
MONOCYTES NFR BLD AUTO: 12 %
MONOCYTES NFR BLD AUTO: 6 %
MONOCYTES NFR BLD AUTO: 6 %
MONOCYTES NFR BLD AUTO: 7 %
MONOS+MACROS NFR FLD MANUAL: 7 %
MRSA DNA SPEC QL NAA+PROBE: NEGATIVE
MUCOUS THREADS #/AREA URNS LPF: PRESENT /LPF
NEUTROPHILS # BLD AUTO: 10.7 10E3/UL (ref 1.6–8.3)
NEUTROPHILS # BLD AUTO: 11 10E3/UL (ref 1.6–8.3)
NEUTROPHILS # BLD AUTO: 11.9 10E3/UL (ref 1.6–8.3)
NEUTROPHILS # BLD AUTO: 15.4 10E3/UL (ref 1.6–8.3)
NEUTROPHILS # BLD AUTO: 18.6 10E3/UL (ref 1.6–8.3)
NEUTROPHILS # BLD AUTO: 21.1 10E3/UL (ref 1.6–8.3)
NEUTROPHILS # BLD AUTO: 4 10E3/UL (ref 1.6–8.3)
NEUTROPHILS # BLD AUTO: 9.2 10E3/UL (ref 1.6–8.3)
NEUTROPHILS NFR BLD AUTO: 60 %
NEUTROPHILS NFR BLD AUTO: 73 %
NEUTROPHILS NFR BLD AUTO: 74 %
NEUTROPHILS NFR BLD AUTO: 75 %
NEUTROPHILS NFR BLD AUTO: 76 %
NEUTROPHILS NFR BLD AUTO: 87 %
NEUTROPHILS NFR BLD AUTO: 88 %
NEUTROPHILS NFR BLD AUTO: 88 %
NEUTS BAND NFR FLD MANUAL: 93 %
NITRATE UR QL: NEGATIVE
NRBC # BLD AUTO: 0 10E3/UL
NRBC BLD AUTO-RTO: 0 /100
O2/TOTAL GAS SETTING VFR VENT: 100 %
O2/TOTAL GAS SETTING VFR VENT: 21 %
O2/TOTAL GAS SETTING VFR VENT: 25 %
O2/TOTAL GAS SETTING VFR VENT: 30 %
O2/TOTAL GAS SETTING VFR VENT: 31 %
O2/TOTAL GAS SETTING VFR VENT: 40 %
O2/TOTAL GAS SETTING VFR VENT: 45 %
O2/TOTAL GAS SETTING VFR VENT: 45 %
O2/TOTAL GAS SETTING VFR VENT: 50 %
O2/TOTAL GAS SETTING VFR VENT: 60 %
O2/TOTAL GAS SETTING VFR VENT: 70 %
O2/TOTAL GAS SETTING VFR VENT: 70 %
O2/TOTAL GAS SETTING VFR VENT: 80 %
O2/TOTAL GAS SETTING VFR VENT: 90 %
OBSERVATION IMP: NORMAL
OXYHGB MFR BLDV: 44 % (ref 70–75)
OXYHGB MFR BLDV: 75 % (ref 70–75)
OXYHGB MFR BLDV: 75 % (ref 70–75)
OXYHGB MFR BLDV: 77 % (ref 70–75)
OXYHGB MFR BLDV: 81 % (ref 70–75)
OXYHGB MFR BLDV: 92 % (ref 70–75)
P AXIS - MUSE: 35 DEGREES
P AXIS - MUSE: 42 DEGREES
P AXIS - MUSE: 46 DEGREES
P AXIS - MUSE: 73 DEGREES
PATH REPORT.COMMENTS IMP SPEC: ABNORMAL
PATH REPORT.COMMENTS IMP SPEC: NORMAL
PATH REPORT.COMMENTS IMP SPEC: NORMAL
PATH REPORT.FINAL DX SPEC: NORMAL
PATH REPORT.GROSS SPEC: NORMAL
PATH REPORT.MICROSCOPIC SPEC OTHER STN: NORMAL
PATH REPORT.RELEVANT HX SPEC: NORMAL
PCO2 BLD: 25 MM HG (ref 35–45)
PCO2 BLD: 26 MM HG (ref 35–45)
PCO2 BLD: 27 MM HG (ref 35–45)
PCO2 BLD: 29 MM HG (ref 35–45)
PCO2 BLD: 31 MM HG (ref 35–45)
PCO2 BLD: 31 MM HG (ref 35–45)
PCO2 BLD: 33 MM HG (ref 35–45)
PCO2 BLD: 34 MM HG (ref 35–45)
PCO2 BLD: 34 MM HG (ref 35–45)
PCO2 BLD: 36 MM HG (ref 35–45)
PCO2 BLD: 37 MM HG (ref 35–45)
PCO2 BLD: 37 MM HG (ref 35–45)
PCO2 BLD: 39 MM HG (ref 35–45)
PCO2 BLD: 40 MM HG (ref 35–45)
PCO2 BLD: 40 MM HG (ref 35–45)
PCO2 BLD: 68 MM HG (ref 35–45)
PCO2 BLDV: 26 MM HG (ref 40–50)
PCO2 BLDV: 28 MM HG (ref 40–50)
PCO2 BLDV: 29 MM HG (ref 40–50)
PCO2 BLDV: 30 MM HG (ref 40–50)
PCO2 BLDV: 31 MM HG (ref 40–50)
PCO2 BLDV: 37 MM HG (ref 40–50)
PEEP: 10 CM H2O
PEEP: 10 CM H2O
PEEP: 12 CM H2O
PETH INTERPRETATION: NORMAL
PH BLD: 7.07 [PH] (ref 7.35–7.45)
PH BLD: 7.11 [PH] (ref 7.35–7.45)
PH BLD: 7.25 [PH] (ref 7.35–7.45)
PH BLD: 7.26 [PH] (ref 7.35–7.45)
PH BLD: 7.27 [PH] (ref 7.35–7.45)
PH BLD: 7.28 [PH] (ref 7.35–7.45)
PH BLD: 7.3 [PH] (ref 7.35–7.45)
PH BLD: 7.3 [PH] (ref 7.35–7.45)
PH BLD: 7.31 [PH] (ref 7.35–7.45)
PH BLD: 7.32 [PH] (ref 7.35–7.45)
PH BLD: 7.33 [PH] (ref 7.35–7.45)
PH BLD: 7.34 [PH] (ref 7.35–7.45)
PH BLD: 7.36 [PH] (ref 7.35–7.45)
PH BLD: 7.37 [PH] (ref 7.35–7.45)
PH BLD: 7.39 [PH] (ref 7.35–7.45)
PH BLD: 7.39 [PH] (ref 7.35–7.45)
PH BLDV: 7.25 [PH] (ref 7.32–7.43)
PH BLDV: 7.29 [PH] (ref 7.32–7.43)
PH BLDV: 7.3 [PH] (ref 7.32–7.43)
PH BLDV: 7.31 [PH] (ref 7.32–7.43)
PH BLDV: 7.34 [PH] (ref 7.32–7.43)
PH BLDV: 7.36 [PH] (ref 7.32–7.43)
PH UR STRIP: 5.5 [PH] (ref 5–7)
PH UR STRIP: 6 [PH] (ref 5–7)
PH UR STRIP: 7 [PH] (ref 5–7)
PHOSPHATE SERPL-MCNC: 2 MG/DL (ref 2.5–4.5)
PHOSPHATE SERPL-MCNC: 2.2 MG/DL (ref 2.5–4.5)
PHOSPHATE SERPL-MCNC: 2.3 MG/DL (ref 2.5–4.5)
PHOSPHATE SERPL-MCNC: 2.7 MG/DL (ref 2.5–4.5)
PHOSPHATE SERPL-MCNC: 2.7 MG/DL (ref 2.5–4.5)
PHOSPHATE SERPL-MCNC: 3 MG/DL (ref 2.5–4.5)
PHOSPHATE SERPL-MCNC: 3.3 MG/DL (ref 2.5–4.5)
PHOSPHATE SERPL-MCNC: 3.5 MG/DL (ref 2.5–4.5)
PHOSPHATE SERPL-MCNC: 3.7 MG/DL (ref 2.5–4.5)
PHOSPHATE SERPL-MCNC: 4 MG/DL (ref 2.5–4.5)
PHOSPHATE SERPL-MCNC: 4.6 MG/DL (ref 2.5–4.5)
PLATELET # BLD AUTO: 109 10E3/UL (ref 150–450)
PLATELET # BLD AUTO: 111 10E3/UL (ref 150–450)
PLATELET # BLD AUTO: 116 10E3/UL (ref 150–450)
PLATELET # BLD AUTO: 130 10E3/UL (ref 150–450)
PLATELET # BLD AUTO: 130 10E3/UL (ref 150–450)
PLATELET # BLD AUTO: 141 10E3/UL (ref 150–450)
PLATELET # BLD AUTO: 148 10E3/UL (ref 150–450)
PLATELET # BLD AUTO: 184 10E3/UL (ref 150–450)
PLATELET # BLD AUTO: 189 10E3/UL (ref 150–450)
PLPETH BLD-MCNC: 431 NG/ML
PO2 BLD: 102 MM HG (ref 80–105)
PO2 BLD: 106 MM HG (ref 80–105)
PO2 BLD: 110 MM HG (ref 80–105)
PO2 BLD: 118 MM HG (ref 80–105)
PO2 BLD: 161 MM HG (ref 80–105)
PO2 BLD: 170 MM HG (ref 80–105)
PO2 BLD: 176 MM HG (ref 80–105)
PO2 BLD: 197 MM HG (ref 80–105)
PO2 BLD: 306 MM HG (ref 80–105)
PO2 BLD: 54 MM HG (ref 80–105)
PO2 BLD: 65 MM HG (ref 80–105)
PO2 BLD: 69 MM HG (ref 80–105)
PO2 BLD: 73 MM HG (ref 80–105)
PO2 BLD: 73 MM HG (ref 80–105)
PO2 BLD: 76 MM HG (ref 80–105)
PO2 BLD: 77 MM HG (ref 80–105)
PO2 BLD: 82 MM HG (ref 80–105)
PO2 BLD: 86 MM HG (ref 80–105)
PO2 BLD: 86 MM HG (ref 80–105)
PO2 BLD: 92 MM HG (ref 80–105)
PO2 BLDV: 29 MM HG (ref 25–47)
PO2 BLDV: 44 MM HG (ref 25–47)
PO2 BLDV: 45 MM HG (ref 25–47)
PO2 BLDV: 49 MM HG (ref 25–47)
PO2 BLDV: 50 MM HG (ref 25–47)
PO2 BLDV: 66 MM HG (ref 25–47)
POPETH BLD-MCNC: 555 NG/ML
POTASSIUM BLD-SCNC: 2.6 MMOL/L (ref 3.4–5.3)
POTASSIUM SERPL-SCNC: 2.7 MMOL/L (ref 3.4–5.3)
POTASSIUM SERPL-SCNC: 2.8 MMOL/L (ref 3.4–5.3)
POTASSIUM SERPL-SCNC: 2.9 MMOL/L (ref 3.4–5.3)
POTASSIUM SERPL-SCNC: 3 MMOL/L (ref 3.4–5.3)
POTASSIUM SERPL-SCNC: 3.1 MMOL/L (ref 3.4–5.3)
POTASSIUM SERPL-SCNC: 3.1 MMOL/L (ref 3.4–5.3)
POTASSIUM SERPL-SCNC: 3.2 MMOL/L (ref 3.4–5.3)
POTASSIUM SERPL-SCNC: 3.3 MMOL/L (ref 3.4–5.3)
POTASSIUM SERPL-SCNC: 3.4 MMOL/L (ref 3.4–5.3)
POTASSIUM SERPL-SCNC: 3.6 MMOL/L (ref 3.4–5.3)
POTASSIUM SERPL-SCNC: 3.6 MMOL/L (ref 3.4–5.3)
POTASSIUM SERPL-SCNC: 3.7 MMOL/L (ref 3.4–5.3)
POTASSIUM SERPL-SCNC: 4.3 MMOL/L (ref 3.4–5.3)
PR INTERVAL - MUSE: 146 MS
PR INTERVAL - MUSE: 154 MS
PR INTERVAL - MUSE: 164 MS
PR INTERVAL - MUSE: 180 MS
PROCALCITONIN SERPL IA-MCNC: 0.23 NG/ML
PROCALCITONIN SERPL IA-MCNC: 1 NG/ML
PROT PATTERN SERPL ELPH-IMP: ABNORMAL
PROT PATTERN SERPL IFE-IMP: NORMAL
PROT SERPL-MCNC: 5.1 G/DL (ref 6.4–8.3)
PROT SERPL-MCNC: 5.6 G/DL (ref 6.4–8.3)
PROT SERPL-MCNC: 5.8 G/DL (ref 6.4–8.3)
PROT SERPL-MCNC: 6 G/DL (ref 6.4–8.3)
PROT SERPL-MCNC: 6.1 G/DL (ref 6.4–8.3)
PROT SERPL-MCNC: 6.1 G/DL (ref 6.4–8.3)
PROT SERPL-MCNC: 6.2 G/DL (ref 6.4–8.3)
PROT SERPL-MCNC: 6.6 G/DL (ref 6.4–8.3)
PROT SERPL-MCNC: 7.1 G/DL (ref 6.4–8.3)
PROT SERPL-MCNC: ABNORMAL G/DL
QRS DURATION - MUSE: 66 MS
QRS DURATION - MUSE: 68 MS
QRS DURATION - MUSE: 78 MS
QRS DURATION - MUSE: 78 MS
QT - MUSE: 378 MS
QT - MUSE: 480 MS
QT - MUSE: 490 MS
QT - MUSE: 538 MS
QTC - MUSE: 467 MS
QTC - MUSE: 532 MS
QTC - MUSE: 539 MS
QTC - MUSE: 564 MS
R AXIS - MUSE: -11 DEGREES
R AXIS - MUSE: 17 DEGREES
R AXIS - MUSE: 9 DEGREES
R AXIS - MUSE: 9 DEGREES
RBC # BLD AUTO: 1.86 10E6/UL (ref 4.4–5.9)
RBC # BLD AUTO: 1.94 10E6/UL (ref 4.4–5.9)
RBC # BLD AUTO: 1.98 10E6/UL (ref 4.4–5.9)
RBC # BLD AUTO: 2.19 10E6/UL (ref 4.4–5.9)
RBC # BLD AUTO: 2.3 10E6/UL (ref 4.4–5.9)
RBC # BLD AUTO: 2.39 10E6/UL (ref 4.4–5.9)
RBC # BLD AUTO: 2.54 10E6/UL (ref 4.4–5.9)
RBC # BLD AUTO: 3.1 10E6/UL (ref 4.4–5.9)
RBC URINE: 3 /HPF
RBC URINE: <1 /HPF
RBC URINE: >182 /HPF
RSV RNA SPEC NAA+PROBE: NEGATIVE
RSV RNA SPEC QL NAA+PROBE: NOT DETECTED
RSV RNA SPEC QL NAA+PROBE: NOT DETECTED
RV+EV RNA SPEC QL NAA+PROBE: NOT DETECTED
SA TARGET DNA: NEGATIVE
SAO2 % BLDA: 100 % (ref 92–100)
SAO2 % BLDA: 82 % (ref 92–100)
SAO2 % BLDA: 91 % (ref 92–100)
SAO2 % BLDA: 92 % (ref 92–100)
SAO2 % BLDA: 92 % (ref 92–100)
SAO2 % BLDA: 93 % (ref 92–100)
SAO2 % BLDA: 95 % (ref 92–100)
SAO2 % BLDA: 95 % (ref 92–100)
SAO2 % BLDA: 96 % (ref 92–100)
SAO2 % BLDA: 98 % (ref 92–100)
SAO2 % BLDA: 99 % (ref 92–100)
SAO2 % BLDV: 44.7 % (ref 70–75)
SAO2 % BLDV: 76.1 % (ref 70–75)
SAO2 % BLDV: 76.5 % (ref 70–75)
SAO2 % BLDV: 77.5 % (ref 70–75)
SAO2 % BLDV: 82.5 % (ref 70–75)
SAO2 % BLDV: 92 % (ref 70–75)
SARS-COV-2 RNA RESP QL NAA+PROBE: NEGATIVE
SODIUM SERPL-SCNC: 134 MMOL/L (ref 135–145)
SODIUM SERPL-SCNC: 135 MMOL/L (ref 135–145)
SODIUM SERPL-SCNC: 137 MMOL/L (ref 135–145)
SODIUM SERPL-SCNC: 138 MMOL/L (ref 135–145)
SODIUM SERPL-SCNC: 138 MMOL/L (ref 135–145)
SODIUM SERPL-SCNC: 139 MMOL/L (ref 135–145)
SODIUM SERPL-SCNC: 140 MMOL/L (ref 135–145)
SODIUM SERPL-SCNC: 141 MMOL/L (ref 135–145)
SODIUM SERPL-SCNC: 144 MMOL/L (ref 135–145)
SODIUM SERPL-SCNC: 147 MMOL/L (ref 135–145)
SODIUM SERPL-SCNC: 148 MMOL/L (ref 135–145)
SODIUM SERPL-SCNC: 149 MMOL/L (ref 135–145)
SODIUM SERPL-SCNC: 150 MMOL/L (ref 135–145)
SODIUM UR-SCNC: 20 MMOL/L
SODIUM UR-SCNC: <20 MMOL/L
SP GR UR STRIP: 1.01 (ref 1–1.03)
SP GR UR STRIP: 1.01 (ref 1–1.03)
SP GR UR STRIP: 1.02 (ref 1–1.03)
SQUAMOUS EPITHELIAL: 2 /HPF
SYSTOLIC BLOOD PRESSURE - MUSE: NORMAL MMHG
T AXIS - MUSE: 35 DEGREES
T AXIS - MUSE: 39 DEGREES
T AXIS - MUSE: 50 DEGREES
T AXIS - MUSE: 53 DEGREES
T4 FREE SERPL-MCNC: 0.57 NG/DL (ref 0.9–1.7)
TOTAL PROTEIN SERUM FOR ELP: 5.3 G/DL (ref 6.4–8.3)
TROPONIN T SERPL HS-MCNC: 38 NG/L
TROPONIN T SERPL HS-MCNC: NORMAL NG/L
TSH SERPL DL<=0.005 MIU/L-ACNC: 0.19 UIU/ML (ref 0.3–4.2)
UROBILINOGEN UR STRIP-MCNC: 1 MG/DL
UROBILINOGEN UR STRIP-MCNC: 8 MG/DL
UROBILINOGEN UR STRIP-MCNC: NORMAL MG/DL
VENTRICULAR RATE- MUSE: 66 BPM
VENTRICULAR RATE- MUSE: 73 BPM
VENTRICULAR RATE- MUSE: 74 BPM
VENTRICULAR RATE- MUSE: 92 BPM
WBC # BLD AUTO: 12.2 10E3/UL (ref 4–11)
WBC # BLD AUTO: 14.4 10E3/UL (ref 4–11)
WBC # BLD AUTO: 14.5 10E3/UL (ref 4–11)
WBC # BLD AUTO: 15.9 10E3/UL (ref 4–11)
WBC # BLD AUTO: 17.5 10E3/UL (ref 4–11)
WBC # BLD AUTO: 21.5 10E3/UL (ref 4–11)
WBC # BLD AUTO: 24.1 10E3/UL (ref 4–11)
WBC # BLD AUTO: 6.7 10E3/UL (ref 4–11)
WBC # FLD AUTO: 244 /UL
WBC URINE: 1 /HPF
WBC URINE: 17 /HPF
WBC URINE: 3 /HPF

## 2024-01-01 PROCEDURE — 85384 FIBRINOGEN ACTIVITY: CPT

## 2024-01-01 PROCEDURE — 93010 ELECTROCARDIOGRAM REPORT: CPT | Performed by: INTERNAL MEDICINE

## 2024-01-01 PROCEDURE — 88108 CYTOPATH CONCENTRATE TECH: CPT | Mod: TC | Performed by: INTERNAL MEDICINE

## 2024-01-01 PROCEDURE — 87385 HISTOPLASMA CAPSUL AG IA: CPT | Performed by: INTERNAL MEDICINE

## 2024-01-01 PROCEDURE — 258N000003 HC RX IP 258 OP 636: Mod: JZ | Performed by: SURGERY

## 2024-01-01 PROCEDURE — 258N000003 HC RX IP 258 OP 636: Mod: JZ | Performed by: NURSE ANESTHETIST, CERTIFIED REGISTERED

## 2024-01-01 PROCEDURE — 250N000011 HC RX IP 250 OP 636: Mod: JZ

## 2024-01-01 PROCEDURE — 250N000013 HC RX MED GY IP 250 OP 250 PS 637

## 2024-01-01 PROCEDURE — 71045 X-RAY EXAM CHEST 1 VIEW: CPT | Mod: 26 | Performed by: RADIOLOGY

## 2024-01-01 PROCEDURE — 82805 BLOOD GASES W/O2 SATURATION: CPT

## 2024-01-01 PROCEDURE — 258N000003 HC RX IP 258 OP 636: Mod: JZ

## 2024-01-01 PROCEDURE — 31624 DX BRONCHOSCOPE/LAVAGE: CPT | Performed by: INTERNAL MEDICINE

## 2024-01-01 PROCEDURE — 84155 ASSAY OF PROTEIN SERUM: CPT | Performed by: INTERNAL MEDICINE

## 2024-01-01 PROCEDURE — 250N000009 HC RX 250

## 2024-01-01 PROCEDURE — 87633 RESP VIRUS 12-25 TARGETS: CPT | Performed by: INTERNAL MEDICINE

## 2024-01-01 PROCEDURE — 88305 TISSUE EXAM BY PATHOLOGIST: CPT | Mod: 26 | Performed by: PATHOLOGY

## 2024-01-01 PROCEDURE — P9047 ALBUMIN (HUMAN), 25%, 50ML: HCPCS | Mod: JZ

## 2024-01-01 PROCEDURE — 80321 ALCOHOLS BIOMARKERS 1OR 2: CPT

## 2024-01-01 PROCEDURE — 81001 URINALYSIS AUTO W/SCOPE: CPT

## 2024-01-01 PROCEDURE — 87449 NOS EACH ORGANISM AG IA: CPT | Performed by: INTERNAL MEDICINE

## 2024-01-01 PROCEDURE — 84450 TRANSFERASE (AST) (SGOT): CPT

## 2024-01-01 PROCEDURE — 94640 AIRWAY INHALATION TREATMENT: CPT

## 2024-01-01 PROCEDURE — 87102 FUNGUS ISOLATION CULTURE: CPT | Performed by: INTERNAL MEDICINE

## 2024-01-01 PROCEDURE — 84300 ASSAY OF URINE SODIUM: CPT

## 2024-01-01 PROCEDURE — 85004 AUTOMATED DIFF WBC COUNT: CPT

## 2024-01-01 PROCEDURE — 82247 BILIRUBIN TOTAL: CPT

## 2024-01-01 PROCEDURE — 93005 ELECTROCARDIOGRAM TRACING: CPT

## 2024-01-01 PROCEDURE — 250N000011 HC RX IP 250 OP 636: Performed by: NURSE ANESTHETIST, CERTIFIED REGISTERED

## 2024-01-01 PROCEDURE — 94660 CPAP INITIATION&MGMT: CPT

## 2024-01-01 PROCEDURE — 83036 HEMOGLOBIN GLYCOSYLATED A1C: CPT

## 2024-01-01 PROCEDURE — 85610 PROTHROMBIN TIME: CPT

## 2024-01-01 PROCEDURE — 82330 ASSAY OF CALCIUM: CPT

## 2024-01-01 PROCEDURE — 85041 AUTOMATED RBC COUNT: CPT

## 2024-01-01 PROCEDURE — 200N000002 HC R&B ICU UMMC

## 2024-01-01 PROCEDURE — 74018 RADEX ABDOMEN 1 VIEW: CPT | Mod: 26 | Performed by: RADIOLOGY

## 2024-01-01 PROCEDURE — 80048 BASIC METABOLIC PNL TOTAL CA: CPT

## 2024-01-01 PROCEDURE — 250N000011 HC RX IP 250 OP 636: Performed by: SURGERY

## 2024-01-01 PROCEDURE — 82040 ASSAY OF SERUM ALBUMIN: CPT

## 2024-01-01 PROCEDURE — 83605 ASSAY OF LACTIC ACID: CPT

## 2024-01-01 PROCEDURE — 82010 KETONE BODYS QUAN: CPT

## 2024-01-01 PROCEDURE — 85049 AUTOMATED PLATELET COUNT: CPT

## 2024-01-01 PROCEDURE — 84165 PROTEIN E-PHORESIS SERUM: CPT | Mod: TC | Performed by: PATHOLOGY

## 2024-01-01 PROCEDURE — 3E043XZ INTRODUCTION OF VASOPRESSOR INTO CENTRAL VEIN, PERCUTANEOUS APPROACH: ICD-10-PCS | Performed by: SURGERY

## 2024-01-01 PROCEDURE — 36415 COLL VENOUS BLD VENIPUNCTURE: CPT

## 2024-01-01 PROCEDURE — 99233 SBSQ HOSP IP/OBS HIGH 50: CPT | Performed by: INTERNAL MEDICINE

## 2024-01-01 PROCEDURE — 76705 ECHO EXAM OF ABDOMEN: CPT

## 2024-01-01 PROCEDURE — 84100 ASSAY OF PHOSPHORUS: CPT

## 2024-01-01 PROCEDURE — 272N000277 HC DEVICE 4FR SECURACATH

## 2024-01-01 PROCEDURE — 87070 CULTURE OTHR SPECIMN AEROBIC: CPT | Performed by: INTERNAL MEDICINE

## 2024-01-01 PROCEDURE — 93306 TTE W/DOPPLER COMPLETE: CPT | Mod: 26 | Performed by: STUDENT IN AN ORGANIZED HEALTH CARE EDUCATION/TRAINING PROGRAM

## 2024-01-01 PROCEDURE — 999N000065 XR CHEST PORT 1 VIEW

## 2024-01-01 PROCEDURE — 94640 AIRWAY INHALATION TREATMENT: CPT | Mod: 76

## 2024-01-01 PROCEDURE — 36620 INSERTION CATHETER ARTERY: CPT

## 2024-01-01 PROCEDURE — 85014 HEMATOCRIT: CPT

## 2024-01-01 PROCEDURE — 99291 CRITICAL CARE FIRST HOUR: CPT

## 2024-01-01 PROCEDURE — 999N000259 HC STATISTIC EXTUBATION

## 2024-01-01 PROCEDURE — 71045 X-RAY EXAM CHEST 1 VIEW: CPT

## 2024-01-01 PROCEDURE — 87116 MYCOBACTERIA CULTURE: CPT | Performed by: INTERNAL MEDICINE

## 2024-01-01 PROCEDURE — P9047 ALBUMIN (HUMAN), 25%, 50ML: HCPCS

## 2024-01-01 PROCEDURE — 74176 CT ABD & PELVIS W/O CONTRAST: CPT | Mod: 26 | Performed by: RADIOLOGY

## 2024-01-01 PROCEDURE — 999N000040 HC STATISTIC CONSULT NO CHARGE VASC ACCESS

## 2024-01-01 PROCEDURE — 87205 SMEAR GRAM STAIN: CPT | Performed by: INTERNAL MEDICINE

## 2024-01-01 PROCEDURE — 250N000011 HC RX IP 250 OP 636

## 2024-01-01 PROCEDURE — 70450 CT HEAD/BRAIN W/O DYE: CPT

## 2024-01-01 PROCEDURE — 82248 BILIRUBIN DIRECT: CPT

## 2024-01-01 PROCEDURE — 0B9J8ZX DRAINAGE OF LEFT LOWER LUNG LOBE, VIA NATURAL OR ARTIFICIAL OPENING ENDOSCOPIC, DIAGNOSTIC: ICD-10-PCS | Performed by: INTERNAL MEDICINE

## 2024-01-01 PROCEDURE — 84145 PROCALCITONIN (PCT): CPT

## 2024-01-01 PROCEDURE — 99223 1ST HOSP IP/OBS HIGH 75: CPT | Mod: GC | Performed by: INTERNAL MEDICINE

## 2024-01-01 PROCEDURE — 250N000013 HC RX MED GY IP 250 OP 250 PS 637: Performed by: SURGERY

## 2024-01-01 PROCEDURE — 94002 VENT MGMT INPAT INIT DAY: CPT

## 2024-01-01 PROCEDURE — 85025 COMPLETE CBC W/AUTO DIFF WBC: CPT

## 2024-01-01 PROCEDURE — 999N000289 HC STATISTIC BRONCHOSCOPY ASST

## 2024-01-01 PROCEDURE — 84295 ASSAY OF SERUM SODIUM: CPT

## 2024-01-01 PROCEDURE — 83735 ASSAY OF MAGNESIUM: CPT

## 2024-01-01 PROCEDURE — 999N000157 HC STATISTIC RCP TIME EA 10 MIN

## 2024-01-01 PROCEDURE — 87205 SMEAR GRAM STAIN: CPT

## 2024-01-01 PROCEDURE — 82140 ASSAY OF AMMONIA: CPT

## 2024-01-01 PROCEDURE — 84155 ASSAY OF PROTEIN SERUM: CPT

## 2024-01-01 PROCEDURE — 94003 VENT MGMT INPAT SUBQ DAY: CPT

## 2024-01-01 PROCEDURE — 87305 ASPERGILLUS AG IA: CPT | Performed by: INTERNAL MEDICINE

## 2024-01-01 PROCEDURE — 82962 GLUCOSE BLOOD TEST: CPT

## 2024-01-01 PROCEDURE — 87389 HIV-1 AG W/HIV-1&-2 AB AG IA: CPT | Performed by: INTERNAL MEDICINE

## 2024-01-01 PROCEDURE — 84439 ASSAY OF FREE THYROXINE: CPT

## 2024-01-01 PROCEDURE — 99207 PR SC NO CHARGE VISIT/PATIENT NOT SEEN: CPT | Performed by: INTERNAL MEDICINE

## 2024-01-01 PROCEDURE — 86334 IMMUNOFIX E-PHORESIS SERUM: CPT | Performed by: PATHOLOGY

## 2024-01-01 PROCEDURE — 85730 THROMBOPLASTIN TIME PARTIAL: CPT

## 2024-01-01 PROCEDURE — 370N000003 HC ANESTHESIA WARD SERVICE: Performed by: NURSE ANESTHETIST, CERTIFIED REGISTERED

## 2024-01-01 PROCEDURE — 999N000065 XR ABDOMEN PORT 1 VIEW

## 2024-01-01 PROCEDURE — 88108 CYTOPATH CONCENTRATE TECH: CPT | Mod: 26 | Performed by: PATHOLOGY

## 2024-01-01 PROCEDURE — 272N000457 HC KIT, 4 FR SV DUAL LUMEN POWERPICC

## 2024-01-01 PROCEDURE — 99223 1ST HOSP IP/OBS HIGH 75: CPT | Mod: 25 | Performed by: INTERNAL MEDICINE

## 2024-01-01 PROCEDURE — 87637 SARSCOV2&INF A&B&RSV AMP PRB: CPT

## 2024-01-01 PROCEDURE — 82550 ASSAY OF CK (CPK): CPT

## 2024-01-01 PROCEDURE — 36569 INSJ PICC 5 YR+ W/O IMAGING: CPT

## 2024-01-01 PROCEDURE — 84165 PROTEIN E-PHORESIS SERUM: CPT | Mod: 26 | Performed by: PATHOLOGY

## 2024-01-01 PROCEDURE — 84460 ALANINE AMINO (ALT) (SGPT): CPT

## 2024-01-01 PROCEDURE — 99291 CRITICAL CARE FIRST HOUR: CPT | Mod: FS

## 2024-01-01 PROCEDURE — 87210 SMEAR WET MOUNT SALINE/INK: CPT | Performed by: INTERNAL MEDICINE

## 2024-01-01 PROCEDURE — 76705 ECHO EXAM OF ABDOMEN: CPT | Mod: 26 | Performed by: RADIOLOGY

## 2024-01-01 PROCEDURE — 84484 ASSAY OF TROPONIN QUANT: CPT

## 2024-01-01 PROCEDURE — 82533 TOTAL CORTISOL: CPT

## 2024-01-01 PROCEDURE — 85025 COMPLETE CBC W/AUTO DIFF WBC: CPT | Performed by: EMERGENCY MEDICINE

## 2024-01-01 PROCEDURE — 88305 TISSUE EXAM BY PATHOLOGIST: CPT | Mod: TC | Performed by: INTERNAL MEDICINE

## 2024-01-01 PROCEDURE — 82565 ASSAY OF CREATININE: CPT

## 2024-01-01 PROCEDURE — 99292 CRITICAL CARE ADDL 30 MIN: CPT | Mod: FS

## 2024-01-01 PROCEDURE — 80053 COMPREHEN METABOLIC PANEL: CPT | Performed by: EMERGENCY MEDICINE

## 2024-01-01 PROCEDURE — 87086 URINE CULTURE/COLONY COUNT: CPT

## 2024-01-01 PROCEDURE — 93975 VASCULAR STUDY: CPT | Mod: 26 | Performed by: RADIOLOGY

## 2024-01-01 PROCEDURE — 5A1945Z RESPIRATORY VENTILATION, 24-96 CONSECUTIVE HOURS: ICD-10-PCS

## 2024-01-01 PROCEDURE — 80074 ACUTE HEPATITIS PANEL: CPT

## 2024-01-01 PROCEDURE — 89050 BODY FLUID CELL COUNT: CPT | Performed by: INTERNAL MEDICINE

## 2024-01-01 PROCEDURE — 71250 CT THORAX DX C-: CPT

## 2024-01-01 PROCEDURE — 81003 URINALYSIS AUTO W/O SCOPE: CPT | Performed by: EMERGENCY MEDICINE

## 2024-01-01 PROCEDURE — 87899 AGENT NOS ASSAY W/OPTIC: CPT | Performed by: INTERNAL MEDICINE

## 2024-01-01 PROCEDURE — 71045 X-RAY EXAM CHEST 1 VIEW: CPT | Mod: 26 | Performed by: STUDENT IN AN ORGANIZED HEALTH CARE EDUCATION/TRAINING PROGRAM

## 2024-01-01 PROCEDURE — 272N000272 HC CONTINUOUS NEBULIZER MICRO PUMP

## 2024-01-01 PROCEDURE — 86334 IMMUNOFIX E-PHORESIS SERUM: CPT | Mod: 26 | Performed by: PATHOLOGY

## 2024-01-01 PROCEDURE — 99283 EMERGENCY DEPT VISIT LOW MDM: CPT

## 2024-01-01 PROCEDURE — 87641 MR-STAPH DNA AMP PROBE: CPT

## 2024-01-01 PROCEDURE — 80053 COMPREHEN METABOLIC PANEL: CPT

## 2024-01-01 PROCEDURE — 80143 DRUG ASSAY ACETAMINOPHEN: CPT

## 2024-01-01 PROCEDURE — 99291 CRITICAL CARE FIRST HOUR: CPT | Mod: 25

## 2024-01-01 PROCEDURE — 87206 SMEAR FLUORESCENT/ACID STAI: CPT | Performed by: INTERNAL MEDICINE

## 2024-01-01 PROCEDURE — 87040 BLOOD CULTURE FOR BACTERIA: CPT

## 2024-01-01 PROCEDURE — 70450 CT HEAD/BRAIN W/O DYE: CPT | Mod: 26 | Performed by: RADIOLOGY

## 2024-01-01 PROCEDURE — 87070 CULTURE OTHR SPECIMN AEROBIC: CPT

## 2024-01-01 PROCEDURE — 999N000185 HC STATISTIC TRANSPORT TIME EA 15 MIN

## 2024-01-01 PROCEDURE — 250N000012 HC RX MED GY IP 250 OP 636 PS 637

## 2024-01-01 PROCEDURE — 31624 DX BRONCHOSCOPE/LAVAGE: CPT

## 2024-01-01 PROCEDURE — 71250 CT THORAX DX C-: CPT | Mod: 26 | Performed by: RADIOLOGY

## 2024-01-01 PROCEDURE — 93306 TTE W/DOPPLER COMPLETE: CPT

## 2024-01-01 PROCEDURE — 80069 RENAL FUNCTION PANEL: CPT

## 2024-01-01 PROCEDURE — 36415 COLL VENOUS BLD VENIPUNCTURE: CPT | Performed by: EMERGENCY MEDICINE

## 2024-01-01 PROCEDURE — 83521 IG LIGHT CHAINS FREE EACH: CPT | Performed by: INTERNAL MEDICINE

## 2024-01-01 PROCEDURE — 93975 VASCULAR STUDY: CPT

## 2024-01-01 PROCEDURE — 84443 ASSAY THYROID STIM HORMONE: CPT

## 2024-01-01 PROCEDURE — 999N000009 HC STATISTIC AIRWAY CARE

## 2024-01-01 PROCEDURE — 71045 X-RAY EXAM CHEST 1 VIEW: CPT | Mod: 76

## 2024-01-01 RX ORDER — POTASSIUM CHLORIDE 1500 MG/1
40 TABLET, EXTENDED RELEASE ORAL ONCE
Status: DISCONTINUED | OUTPATIENT
Start: 2024-01-01 | End: 2024-01-01

## 2024-01-01 RX ORDER — LIDOCAINE HYDROCHLORIDE 20 MG/ML
5 SOLUTION OROPHARYNGEAL ONCE
Status: COMPLETED | OUTPATIENT
Start: 2024-01-01 | End: 2024-01-01

## 2024-01-01 RX ORDER — GABAPENTIN 300 MG/1
300 CAPSULE ORAL 3 TIMES DAILY
Qty: 90 CAPSULE | Refills: 0 | Status: SHIPPED | OUTPATIENT
Start: 2024-01-01 | End: 2024-01-01

## 2024-01-01 RX ORDER — GUAIFENESIN 600 MG/1
15 TABLET, EXTENDED RELEASE ORAL DAILY
Status: DISCONTINUED | OUTPATIENT
Start: 2024-01-01 | End: 2024-01-01 | Stop reason: HOSPADM

## 2024-01-01 RX ORDER — LIDOCAINE HYDROCHLORIDE 20 MG/ML
JELLY TOPICAL ONCE
Status: COMPLETED | OUTPATIENT
Start: 2024-01-01 | End: 2024-01-01

## 2024-01-01 RX ORDER — METRONIDAZOLE 500 MG/100ML
500 INJECTION, SOLUTION INTRAVENOUS EVERY 12 HOURS
Status: DISCONTINUED | OUTPATIENT
Start: 2024-01-01 | End: 2024-01-01

## 2024-01-01 RX ORDER — POTASSIUM CHLORIDE 1500 MG/1
20 TABLET, EXTENDED RELEASE ORAL ONCE
Status: DISCONTINUED | OUTPATIENT
Start: 2024-01-01 | End: 2024-01-01

## 2024-01-01 RX ORDER — LACTULOSE 10 G/15ML
20 SOLUTION ORAL 2 TIMES DAILY
Status: DISCONTINUED | OUTPATIENT
Start: 2024-01-01 | End: 2024-01-01

## 2024-01-01 RX ORDER — ALBUMIN (HUMAN) 12.5 G/50ML
50 SOLUTION INTRAVENOUS EVERY 12 HOURS
Status: DISCONTINUED | OUTPATIENT
Start: 2024-01-01 | End: 2024-01-01

## 2024-01-01 RX ORDER — CEFTRIAXONE 2 G/1
2 INJECTION, POWDER, FOR SOLUTION INTRAMUSCULAR; INTRAVENOUS EVERY 24 HOURS
Status: DISCONTINUED | OUTPATIENT
Start: 2024-01-01 | End: 2024-01-01

## 2024-01-01 RX ORDER — POTASSIUM CHLORIDE 1.5 G/1.58G
40 POWDER, FOR SOLUTION ORAL ONCE
Status: DISCONTINUED | OUTPATIENT
Start: 2024-01-01 | End: 2024-01-01

## 2024-01-01 RX ORDER — MIDODRINE HYDROCHLORIDE 5 MG/1
20 TABLET ORAL
Status: DISCONTINUED | OUTPATIENT
Start: 2024-01-01 | End: 2024-01-01

## 2024-01-01 RX ORDER — ALBUMIN (HUMAN) 12.5 G/50ML
100 SOLUTION INTRAVENOUS ONCE
Qty: 400 ML | Refills: 0 | Status: COMPLETED | OUTPATIENT
Start: 2024-01-01 | End: 2024-01-01

## 2024-01-01 RX ORDER — MIDODRINE HYDROCHLORIDE 5 MG/1
10 TABLET ORAL
Status: DISCONTINUED | OUTPATIENT
Start: 2024-01-01 | End: 2024-01-01

## 2024-01-01 RX ORDER — ZOLPIDEM TARTRATE 10 MG/1
TABLET ORAL
Qty: 30 TABLET | Refills: 0 | Status: SHIPPED | OUTPATIENT
Start: 2024-01-01

## 2024-01-01 RX ORDER — SENNOSIDES 8.6 MG
1 TABLET ORAL 2 TIMES DAILY PRN
Status: DISCONTINUED | OUTPATIENT
Start: 2024-01-01 | End: 2024-01-01 | Stop reason: HOSPADM

## 2024-01-01 RX ORDER — FUROSEMIDE 10 MG/ML
20 INJECTION INTRAMUSCULAR; INTRAVENOUS ONCE
Status: COMPLETED | OUTPATIENT
Start: 2024-01-01 | End: 2024-01-01

## 2024-01-01 RX ORDER — POTASSIUM CHLORIDE 29.8 MG/ML
20 INJECTION INTRAVENOUS ONCE
Status: COMPLETED | OUTPATIENT
Start: 2024-01-01 | End: 2024-01-01

## 2024-01-01 RX ORDER — POTASSIUM CHLORIDE 1.5 G/1.58G
40 POWDER, FOR SOLUTION ORAL ONCE
Status: COMPLETED | OUTPATIENT
Start: 2024-01-01 | End: 2024-01-01

## 2024-01-01 RX ORDER — POTASSIUM CHLORIDE 20MEQ/15ML
20 LIQUID (ML) ORAL DAILY
Status: DISCONTINUED | OUTPATIENT
Start: 2024-01-01 | End: 2024-01-01

## 2024-01-01 RX ORDER — CHLORHEXIDINE GLUCONATE ORAL RINSE 1.2 MG/ML
15 SOLUTION DENTAL EVERY 12 HOURS
Status: DISCONTINUED | OUTPATIENT
Start: 2024-01-01 | End: 2024-01-01 | Stop reason: HOSPADM

## 2024-01-01 RX ORDER — CITALOPRAM HYDROBROMIDE 40 MG/1
40 TABLET ORAL DAILY
Qty: 90 TABLET | Refills: 0 | Status: SHIPPED | OUTPATIENT
Start: 2024-01-01 | End: 2024-01-01

## 2024-01-01 RX ORDER — PROPOFOL 10 MG/ML
5-75 INJECTION, EMULSION INTRAVENOUS CONTINUOUS
Status: DISCONTINUED | OUTPATIENT
Start: 2024-01-01 | End: 2024-01-01

## 2024-01-01 RX ORDER — BISACODYL 10 MG
10 SUPPOSITORY, RECTAL RECTAL
Status: DISCONTINUED | OUTPATIENT
Start: 2024-06-16 | End: 2024-01-01 | Stop reason: HOSPADM

## 2024-01-01 RX ORDER — HYDROMORPHONE HCL IN WATER/PF 6 MG/30 ML
0.2 PATIENT CONTROLLED ANALGESIA SYRINGE INTRAVENOUS ONCE
Status: COMPLETED | OUTPATIENT
Start: 2024-01-01 | End: 2024-01-01

## 2024-01-01 RX ORDER — ZOLPIDEM TARTRATE 10 MG/1
10 TABLET ORAL
Qty: 30 TABLET | Refills: 0 | Status: SHIPPED | OUTPATIENT
Start: 2024-01-01 | End: 2024-01-01

## 2024-01-01 RX ORDER — THIAMINE HYDROCHLORIDE 100 MG/ML
200 INJECTION, SOLUTION INTRAMUSCULAR; INTRAVENOUS 3 TIMES DAILY
Status: COMPLETED | OUTPATIENT
Start: 2024-01-01 | End: 2024-01-01

## 2024-01-01 RX ORDER — POTASSIUM CHLORIDE 7.45 MG/ML
10 INJECTION INTRAVENOUS ONCE
Status: COMPLETED | OUTPATIENT
Start: 2024-01-01 | End: 2024-01-01

## 2024-01-01 RX ORDER — LACTULOSE 10 G/15ML
10 SOLUTION ORAL 2 TIMES DAILY
Status: DISCONTINUED | OUTPATIENT
Start: 2024-01-01 | End: 2024-01-01 | Stop reason: HOSPADM

## 2024-01-01 RX ORDER — PROPOFOL 10 MG/ML
5-75 INJECTION, EMULSION INTRAVENOUS CONTINUOUS
Status: CANCELLED | OUTPATIENT
Start: 2024-01-01

## 2024-01-01 RX ORDER — FOLIC ACID 1 MG/1
1 TABLET ORAL DAILY
Status: DISCONTINUED | OUTPATIENT
Start: 2024-01-01 | End: 2024-01-01 | Stop reason: HOSPADM

## 2024-01-01 RX ORDER — NALOXONE HYDROCHLORIDE 0.4 MG/ML
0.4 INJECTION, SOLUTION INTRAMUSCULAR; INTRAVENOUS; SUBCUTANEOUS
Status: DISCONTINUED | OUTPATIENT
Start: 2024-01-01 | End: 2024-01-01

## 2024-01-01 RX ORDER — PHENYLEPHRINE HCL IN 0.9% NACL 50MG/250ML
.1-6 PLASTIC BAG, INJECTION (ML) INTRAVENOUS CONTINUOUS
Status: DISCONTINUED | OUTPATIENT
Start: 2024-01-01 | End: 2024-01-01

## 2024-01-01 RX ORDER — POTASSIUM CHLORIDE 1.5 G/1.58G
20 POWDER, FOR SOLUTION ORAL ONCE
Status: COMPLETED | OUTPATIENT
Start: 2024-01-01 | End: 2024-01-01

## 2024-01-01 RX ORDER — DEXTROSE MONOHYDRATE 100 MG/ML
INJECTION, SOLUTION INTRAVENOUS CONTINUOUS PRN
Status: DISCONTINUED | OUTPATIENT
Start: 2024-01-01 | End: 2024-01-01 | Stop reason: HOSPADM

## 2024-01-01 RX ORDER — NOREPINEPHRINE BITARTRATE 0.06 MG/ML
.01-.6 INJECTION, SOLUTION INTRAVENOUS CONTINUOUS
Status: DISCONTINUED | OUTPATIENT
Start: 2024-01-01 | End: 2024-01-01 | Stop reason: HOSPADM

## 2024-01-01 RX ORDER — ALBUMIN (HUMAN) 12.5 G/50ML
50 SOLUTION INTRAVENOUS 2 TIMES DAILY
Status: DISCONTINUED | OUTPATIENT
Start: 2024-01-01 | End: 2024-01-01

## 2024-01-01 RX ORDER — POTASSIUM CHLORIDE 1.5 G/1.58G
20 POWDER, FOR SOLUTION ORAL 2 TIMES DAILY
Status: DISCONTINUED | OUTPATIENT
Start: 2024-01-01 | End: 2024-01-01

## 2024-01-01 RX ORDER — PROPOFOL 10 MG/ML
INJECTION, EMULSION INTRAVENOUS
Status: COMPLETED
Start: 2024-01-01 | End: 2024-01-01

## 2024-01-01 RX ORDER — MINERAL OIL/HYDROPHIL PETROLAT
OINTMENT (GRAM) TOPICAL
Status: DISCONTINUED | OUTPATIENT
Start: 2024-01-01 | End: 2024-01-01 | Stop reason: HOSPADM

## 2024-01-01 RX ORDER — GABAPENTIN 300 MG/1
300 CAPSULE ORAL 3 TIMES DAILY
Qty: 90 CAPSULE | Refills: 0 | Status: SHIPPED | OUTPATIENT
Start: 2024-01-01

## 2024-01-01 RX ORDER — FOLIC ACID 1 MG/1
1 TABLET ORAL DAILY
Status: DISCONTINUED | OUTPATIENT
Start: 2024-01-01 | End: 2024-01-01

## 2024-01-01 RX ORDER — MAGNESIUM SULFATE 1 G/100ML
1 INJECTION INTRAVENOUS ONCE
Qty: 100 ML | Refills: 0 | Status: COMPLETED | OUTPATIENT
Start: 2024-01-01 | End: 2024-01-01

## 2024-01-01 RX ORDER — DEXTROSE MONOHYDRATE 25 G/50ML
25-50 INJECTION, SOLUTION INTRAVENOUS
Status: DISCONTINUED | OUTPATIENT
Start: 2024-01-01 | End: 2024-01-01 | Stop reason: HOSPADM

## 2024-01-01 RX ORDER — AMOXICILLIN 250 MG
2 CAPSULE ORAL 2 TIMES DAILY PRN
Status: DISCONTINUED | OUTPATIENT
Start: 2024-01-01 | End: 2024-01-01

## 2024-01-01 RX ORDER — PROPOFOL 10 MG/ML
5-75 INJECTION, EMULSION INTRAVENOUS CONTINUOUS
Status: DISCONTINUED | OUTPATIENT
Start: 2024-01-01 | End: 2024-01-01 | Stop reason: HOSPADM

## 2024-01-01 RX ORDER — PIPERACILLIN SODIUM, TAZOBACTAM SODIUM 4; .5 G/20ML; G/20ML
4.5 INJECTION, POWDER, LYOPHILIZED, FOR SOLUTION INTRAVENOUS EVERY 6 HOURS
Status: DISCONTINUED | OUTPATIENT
Start: 2024-01-01 | End: 2024-01-01

## 2024-01-01 RX ORDER — MORPHINE SULFATE 2 MG/ML
2 INJECTION, SOLUTION INTRAMUSCULAR; INTRAVENOUS
Status: DISCONTINUED | OUTPATIENT
Start: 2024-01-01 | End: 2024-01-01 | Stop reason: HOSPADM

## 2024-01-01 RX ORDER — CHLOROTHIAZIDE SODIUM 500 MG/1
1000 INJECTION INTRAVENOUS ONCE
Status: COMPLETED | OUTPATIENT
Start: 2024-01-01 | End: 2024-01-01

## 2024-01-01 RX ORDER — CITALOPRAM HYDROBROMIDE 40 MG/1
40 TABLET ORAL DAILY
Qty: 90 TABLET | Refills: 0 | Status: CANCELLED | OUTPATIENT
Start: 2024-01-01

## 2024-01-01 RX ORDER — LORAZEPAM 1 MG/1
1 TABLET ORAL
Status: DISCONTINUED | OUTPATIENT
Start: 2024-01-01 | End: 2024-01-01 | Stop reason: HOSPADM

## 2024-01-01 RX ORDER — ASCORBIC ACID 250 MG
250 TABLET,CHEWABLE ORAL DAILY
Status: DISCONTINUED | OUTPATIENT
Start: 2024-01-01 | End: 2024-01-01 | Stop reason: HOSPADM

## 2024-01-01 RX ORDER — FUROSEMIDE 10 MG/ML
120 INJECTION INTRAMUSCULAR; INTRAVENOUS ONCE
Qty: 12 ML | Refills: 0 | Status: COMPLETED | OUTPATIENT
Start: 2024-01-01 | End: 2024-01-01

## 2024-01-01 RX ORDER — FOLIC ACID 5 MG/ML
1 INJECTION, SOLUTION INTRAMUSCULAR; INTRAVENOUS; SUBCUTANEOUS DAILY
Status: COMPLETED | OUTPATIENT
Start: 2024-01-01 | End: 2024-01-01

## 2024-01-01 RX ORDER — NALOXONE HYDROCHLORIDE 0.4 MG/ML
0.1 INJECTION, SOLUTION INTRAMUSCULAR; INTRAVENOUS; SUBCUTANEOUS
Status: DISCONTINUED | OUTPATIENT
Start: 2024-01-01 | End: 2024-01-01 | Stop reason: HOSPADM

## 2024-01-01 RX ORDER — LACTULOSE 10 G/15ML
10 SOLUTION ORAL 2 TIMES DAILY
Status: DISCONTINUED | OUTPATIENT
Start: 2024-01-01 | End: 2024-01-01

## 2024-01-01 RX ORDER — HEPARIN SODIUM 5000 [USP'U]/.5ML
5000 INJECTION, SOLUTION INTRAVENOUS; SUBCUTANEOUS EVERY 8 HOURS
Status: DISCONTINUED | OUTPATIENT
Start: 2024-01-01 | End: 2024-01-01

## 2024-01-01 RX ORDER — NICOTINE POLACRILEX 4 MG
15-30 LOZENGE BUCCAL
Status: DISCONTINUED | OUTPATIENT
Start: 2024-01-01 | End: 2024-01-01 | Stop reason: HOSPADM

## 2024-01-01 RX ORDER — ROPIVACAINE IN 0.9% SOD CHL/PF 0.1 %
.03-.125 PLASTIC BAG, INJECTION (ML) EPIDURAL CONTINUOUS
Status: DISCONTINUED | OUTPATIENT
Start: 2024-01-01 | End: 2024-01-01

## 2024-01-01 RX ORDER — NALOXONE HYDROCHLORIDE 0.4 MG/ML
0.2 INJECTION, SOLUTION INTRAMUSCULAR; INTRAVENOUS; SUBCUTANEOUS
Status: DISCONTINUED | OUTPATIENT
Start: 2024-01-01 | End: 2024-01-01

## 2024-01-01 RX ORDER — NALOXONE HYDROCHLORIDE 0.4 MG/ML
0.2 INJECTION, SOLUTION INTRAMUSCULAR; INTRAVENOUS; SUBCUTANEOUS
Status: DISCONTINUED | OUTPATIENT
Start: 2024-01-01 | End: 2024-01-01 | Stop reason: HOSPADM

## 2024-01-01 RX ORDER — ASCORBIC ACID 250 MG
250 TABLET,CHEWABLE ORAL DAILY
Status: DISCONTINUED | OUTPATIENT
Start: 2024-01-01 | End: 2024-01-01

## 2024-01-01 RX ORDER — AMINO ACIDS/PROTEIN HYDROLYS 11G-40/45
1 LIQUID IN PACKET (ML) ORAL 2 TIMES DAILY
Status: DISCONTINUED | OUTPATIENT
Start: 2024-01-01 | End: 2024-01-01 | Stop reason: HOSPADM

## 2024-01-01 RX ORDER — MULTIPLE VITAMINS W/ MINERALS TAB 9MG-400MCG
1 TAB ORAL DAILY
Status: DISCONTINUED | OUTPATIENT
Start: 2024-01-01 | End: 2024-01-01

## 2024-01-01 RX ORDER — PIPERACILLIN SODIUM, TAZOBACTAM SODIUM 4; .5 G/20ML; G/20ML
4.5 INJECTION, POWDER, LYOPHILIZED, FOR SOLUTION INTRAVENOUS ONCE
Qty: 20 ML | Refills: 0 | Status: COMPLETED | OUTPATIENT
Start: 2024-01-01 | End: 2024-01-01

## 2024-01-01 RX ORDER — FOLIC ACID 5 MG/ML
1 INJECTION, SOLUTION INTRAMUSCULAR; INTRAVENOUS; SUBCUTANEOUS ONCE
Qty: 0.2 ML | Refills: 0 | Status: COMPLETED | OUTPATIENT
Start: 2024-01-01 | End: 2024-01-01

## 2024-01-01 RX ORDER — LORAZEPAM 2 MG/ML
2 INJECTION INTRAMUSCULAR
Status: DISCONTINUED | OUTPATIENT
Start: 2024-01-01 | End: 2024-01-01 | Stop reason: HOSPADM

## 2024-01-01 RX ORDER — MAGNESIUM SULFATE 1 G/100ML
1 INJECTION INTRAVENOUS ONCE
Status: COMPLETED | OUTPATIENT
Start: 2024-01-01 | End: 2024-01-01

## 2024-01-01 RX ORDER — PANTOPRAZOLE SODIUM 40 MG/1
40 TABLET, DELAYED RELEASE ORAL
Status: DISCONTINUED | OUTPATIENT
Start: 2024-01-01 | End: 2024-01-01

## 2024-01-01 RX ORDER — LIDOCAINE 40 MG/G
CREAM TOPICAL
Status: DISCONTINUED | OUTPATIENT
Start: 2024-01-01 | End: 2024-01-01 | Stop reason: HOSPADM

## 2024-01-01 RX ORDER — CITALOPRAM HYDROBROMIDE 40 MG/1
40 TABLET ORAL DAILY
Qty: 90 TABLET | Refills: 0 | Status: SHIPPED | OUTPATIENT
Start: 2024-01-01

## 2024-01-01 RX ORDER — DEXTROSE MONOHYDRATE 25 G/50ML
25-50 INJECTION, SOLUTION INTRAVENOUS
Status: DISCONTINUED | OUTPATIENT
Start: 2024-01-01 | End: 2024-01-01

## 2024-01-01 RX ORDER — NICOTINE POLACRILEX 4 MG
15-30 LOZENGE BUCCAL
Status: DISCONTINUED | OUTPATIENT
Start: 2024-01-01 | End: 2024-01-01

## 2024-01-01 RX ORDER — MIDAZOLAM HCL IN 0.9 % NACL/PF 1 MG/ML
1-8 PLASTIC BAG, INJECTION (ML) INTRAVENOUS CONTINUOUS
Status: DISCONTINUED | OUTPATIENT
Start: 2024-01-01 | End: 2024-01-01 | Stop reason: HOSPADM

## 2024-01-01 RX ORDER — CHLOROTHIAZIDE SODIUM 500 MG/1
500 INJECTION INTRAVENOUS ONCE
Qty: 20 ML | Refills: 0 | Status: COMPLETED | OUTPATIENT
Start: 2024-01-01 | End: 2024-01-01

## 2024-01-01 RX ORDER — IPRATROPIUM BROMIDE AND ALBUTEROL SULFATE 2.5; .5 MG/3ML; MG/3ML
3 SOLUTION RESPIRATORY (INHALATION)
Status: DISCONTINUED | OUTPATIENT
Start: 2024-01-01 | End: 2024-01-01 | Stop reason: HOSPADM

## 2024-01-01 RX ORDER — AMOXICILLIN 250 MG
1 CAPSULE ORAL 2 TIMES DAILY PRN
Status: DISCONTINUED | OUTPATIENT
Start: 2024-01-01 | End: 2024-01-01

## 2024-01-01 RX ORDER — FUROSEMIDE 10 MG/ML
60 INJECTION INTRAMUSCULAR; INTRAVENOUS ONCE
Qty: 6 ML | Refills: 0 | Status: COMPLETED | OUTPATIENT
Start: 2024-01-01 | End: 2024-01-01

## 2024-01-01 RX ORDER — NICOTINE 21 MG/24HR
1 PATCH, TRANSDERMAL 24 HOURS TRANSDERMAL DAILY
Status: DISCONTINUED | OUTPATIENT
Start: 2024-01-01 | End: 2024-01-01 | Stop reason: HOSPADM

## 2024-01-01 RX ORDER — CARBOXYMETHYLCELLULOSE SODIUM 5 MG/ML
1-2 SOLUTION/ DROPS OPHTHALMIC
Status: DISCONTINUED | OUTPATIENT
Start: 2024-01-01 | End: 2024-01-01 | Stop reason: HOSPADM

## 2024-01-01 RX ORDER — METHOCARBAMOL 500 MG/1
500 TABLET, FILM COATED ORAL ONCE
Status: COMPLETED | OUTPATIENT
Start: 2024-01-01 | End: 2024-01-01

## 2024-01-01 RX ORDER — ATORVASTATIN CALCIUM 40 MG/1
40 TABLET, FILM COATED ORAL DAILY
Qty: 90 TABLET | Refills: 0 | Status: SHIPPED | OUTPATIENT
Start: 2024-01-01

## 2024-01-01 RX ORDER — MORPHINE SULFATE 2 MG/ML
1 INJECTION, SOLUTION INTRAMUSCULAR; INTRAVENOUS
Status: DISCONTINUED | OUTPATIENT
Start: 2024-01-01 | End: 2024-01-01 | Stop reason: HOSPADM

## 2024-01-01 RX ORDER — POLYETHYLENE GLYCOL 3350 17 G/17G
17 POWDER, FOR SOLUTION ORAL DAILY PRN
Status: DISCONTINUED | OUTPATIENT
Start: 2024-01-01 | End: 2024-01-01 | Stop reason: HOSPADM

## 2024-01-01 RX ORDER — CALCIUM GLUCONATE 20 MG/ML
2 INJECTION, SOLUTION INTRAVENOUS ONCE
Status: COMPLETED | OUTPATIENT
Start: 2024-01-01 | End: 2024-01-01

## 2024-01-01 RX ORDER — PROPOFOL 10 MG/ML
INJECTION, EMULSION INTRAVENOUS PRN
Status: DISCONTINUED | OUTPATIENT
Start: 2024-01-01 | End: 2024-01-01

## 2024-01-01 RX ORDER — OXYCODONE HYDROCHLORIDE 5 MG/1
5 TABLET ORAL ONCE
Status: COMPLETED | OUTPATIENT
Start: 2024-01-01 | End: 2024-01-01

## 2024-01-01 RX ORDER — HEPARIN SODIUM,PORCINE 10 UNIT/ML
5-20 VIAL (ML) INTRAVENOUS
Status: DISCONTINUED | OUTPATIENT
Start: 2024-01-01 | End: 2024-01-01 | Stop reason: HOSPADM

## 2024-01-01 RX ORDER — PIPERACILLIN SODIUM, TAZOBACTAM SODIUM 3; .375 G/15ML; G/15ML
3.38 INJECTION, POWDER, LYOPHILIZED, FOR SOLUTION INTRAVENOUS EVERY 6 HOURS
Status: DISCONTINUED | OUTPATIENT
Start: 2024-01-01 | End: 2024-01-01 | Stop reason: HOSPADM

## 2024-01-01 RX ORDER — HEPARIN SODIUM,PORCINE 10 UNIT/ML
5-20 VIAL (ML) INTRAVENOUS EVERY 24 HOURS
Status: DISCONTINUED | OUTPATIENT
Start: 2024-01-01 | End: 2024-01-01 | Stop reason: HOSPADM

## 2024-01-01 RX ADMIN — Medication 1 PACKET: at 21:00

## 2024-01-01 RX ADMIN — IPRATROPIUM BROMIDE AND ALBUTEROL SULFATE 3 ML: .5; 3 SOLUTION RESPIRATORY (INHALATION) at 17:00

## 2024-01-01 RX ADMIN — PIPERACILLIN AND TAZOBACTAM 3.38 G: 3; .375 INJECTION, POWDER, LYOPHILIZED, FOR SOLUTION INTRAVENOUS at 16:21

## 2024-01-01 RX ADMIN — SODIUM BICARBONATE 50 MEQ/HR: 84 INJECTION, SOLUTION INTRAVENOUS at 01:17

## 2024-01-01 RX ADMIN — POTASSIUM CHLORIDE 20 MEQ: 29.8 INJECTION, SOLUTION INTRAVENOUS at 01:57

## 2024-01-01 RX ADMIN — NICOTINE 1 PATCH: 21 PATCH, EXTENDED RELEASE TRANSDERMAL at 07:51

## 2024-01-01 RX ADMIN — THIAMINE HYDROCHLORIDE 200 MG: 100 INJECTION, SOLUTION INTRAMUSCULAR; INTRAVENOUS at 07:45

## 2024-01-01 RX ADMIN — PIPERACILLIN AND TAZOBACTAM 3.38 G: 3; .375 INJECTION, POWDER, LYOPHILIZED, FOR SOLUTION INTRAVENOUS at 08:40

## 2024-01-01 RX ADMIN — IPRATROPIUM BROMIDE AND ALBUTEROL SULFATE 3 ML: .5; 3 SOLUTION RESPIRATORY (INHALATION) at 20:06

## 2024-01-01 RX ADMIN — MIDAZOLAM HYDROCHLORIDE 2 MG: 1 INJECTION, SOLUTION INTRAMUSCULAR; INTRAVENOUS at 18:07

## 2024-01-01 RX ADMIN — PIPERACILLIN AND TAZOBACTAM 4.5 G: 4; .5 INJECTION, POWDER, LYOPHILIZED, FOR SOLUTION INTRAVENOUS at 09:35

## 2024-01-01 RX ADMIN — NOREPINEPHRINE BITARTRATE 0.03 MCG/KG/MIN: 0.06 INJECTION, SOLUTION INTRAVENOUS at 17:23

## 2024-01-01 RX ADMIN — POTASSIUM CHLORIDE 20 MEQ: 1.5 POWDER, FOR SOLUTION ORAL at 00:11

## 2024-01-01 RX ADMIN — THIAMINE HCL TAB 100 MG 100 MG: 100 TAB at 19:31

## 2024-01-01 RX ADMIN — IPRATROPIUM BROMIDE AND ALBUTEROL SULFATE 3 ML: .5; 3 SOLUTION RESPIRATORY (INHALATION) at 19:49

## 2024-01-01 RX ADMIN — CEFTRIAXONE SODIUM 2 G: 2 INJECTION, POWDER, FOR SOLUTION INTRAMUSCULAR; INTRAVENOUS at 04:32

## 2024-01-01 RX ADMIN — MAGNESIUM SULFATE HEPTAHYDRATE 1 G: 1 INJECTION, SOLUTION INTRAVENOUS at 12:04

## 2024-01-01 RX ADMIN — Medication 5 MG: at 19:32

## 2024-01-01 RX ADMIN — CHLORHEXIDINE GLUCONATE 0.12% ORAL RINSE 15 ML: 1.2 LIQUID ORAL at 19:31

## 2024-01-01 RX ADMIN — THIAMINE HYDROCHLORIDE 200 MG: 100 INJECTION, SOLUTION INTRAMUSCULAR; INTRAVENOUS at 14:44

## 2024-01-01 RX ADMIN — Medication 5 MG: at 22:05

## 2024-01-01 RX ADMIN — PHYTONADIONE 10 MG: 10 INJECTION, EMULSION INTRAMUSCULAR; INTRAVENOUS; SUBCUTANEOUS at 12:31

## 2024-01-01 RX ADMIN — LACTULOSE 10 G: 20 SOLUTION ORAL at 19:30

## 2024-01-01 RX ADMIN — PROPOFOL 200 MG: 10 INJECTION, EMULSION INTRAVENOUS at 13:27

## 2024-01-01 RX ADMIN — PIPERACILLIN AND TAZOBACTAM 3.38 G: 3; .375 INJECTION, POWDER, LYOPHILIZED, FOR SOLUTION INTRAVENOUS at 22:20

## 2024-01-01 RX ADMIN — POTASSIUM CHLORIDE 20 MEQ: 29.8 INJECTION, SOLUTION INTRAVENOUS at 00:30

## 2024-01-01 RX ADMIN — CALCIUM GLUCONATE 2 G: 20 INJECTION, SOLUTION INTRAVENOUS at 17:59

## 2024-01-01 RX ADMIN — INSULIN ASPART 2 UNITS: 100 INJECTION, SOLUTION INTRAVENOUS; SUBCUTANEOUS at 12:56

## 2024-01-01 RX ADMIN — INSULIN ASPART 1 UNITS: 100 INJECTION, SOLUTION INTRAVENOUS; SUBCUTANEOUS at 19:04

## 2024-01-01 RX ADMIN — Medication 100 MCG/HR: at 15:39

## 2024-01-01 RX ADMIN — CHLORHEXIDINE GLUCONATE 0.12% ORAL RINSE 15 ML: 1.2 LIQUID ORAL at 20:06

## 2024-01-01 RX ADMIN — POTASSIUM CHLORIDE 20 MEQ: 29.8 INJECTION, SOLUTION INTRAVENOUS at 16:33

## 2024-01-01 RX ADMIN — ALBUMIN HUMAN 50 G: 0.25 SOLUTION INTRAVENOUS at 16:20

## 2024-01-01 RX ADMIN — Medication 250 MG: at 09:15

## 2024-01-01 RX ADMIN — PIPERACILLIN AND TAZOBACTAM 3.38 G: 3; .375 INJECTION, POWDER, LYOPHILIZED, FOR SOLUTION INTRAVENOUS at 15:15

## 2024-01-01 RX ADMIN — POTASSIUM PHOSPHATE, MONOBASIC POTASSIUM PHOSPHATE, DIBASIC 15 MMOL: 224; 236 INJECTION, SOLUTION, CONCENTRATE INTRAVENOUS at 00:27

## 2024-01-01 RX ADMIN — MIDODRINE HYDROCHLORIDE 20 MG: 5 TABLET ORAL at 08:44

## 2024-01-01 RX ADMIN — INSULIN ASPART 2 UNITS: 100 INJECTION, SOLUTION INTRAVENOUS; SUBCUTANEOUS at 19:30

## 2024-01-01 RX ADMIN — HYDROCORTISONE SODIUM SUCCINATE 100 MG: 100 INJECTION, POWDER, FOR SOLUTION INTRAMUSCULAR; INTRAVENOUS at 17:58

## 2024-01-01 RX ADMIN — MIDODRINE HYDROCHLORIDE 10 MG: 5 TABLET ORAL at 09:16

## 2024-01-01 RX ADMIN — POTASSIUM CHLORIDE 40 MEQ: 1.5 POWDER, FOR SOLUTION ORAL at 17:35

## 2024-01-01 RX ADMIN — INSULIN ASPART 1 UNITS: 100 INJECTION, SOLUTION INTRAVENOUS; SUBCUTANEOUS at 06:40

## 2024-01-01 RX ADMIN — CHLORHEXIDINE GLUCONATE 0.12% ORAL RINSE 15 ML: 1.2 LIQUID ORAL at 08:56

## 2024-01-01 RX ADMIN — IPRATROPIUM BROMIDE AND ALBUTEROL SULFATE 3 ML: .5; 3 SOLUTION RESPIRATORY (INHALATION) at 11:57

## 2024-01-01 RX ADMIN — Medication 50 MCG/HR: at 16:07

## 2024-01-01 RX ADMIN — INSULIN ASPART 2 UNITS: 100 INJECTION, SOLUTION INTRAVENOUS; SUBCUTANEOUS at 23:54

## 2024-01-01 RX ADMIN — PROPOFOL 70 MCG/KG/MIN: 10 INJECTION, EMULSION INTRAVENOUS at 16:45

## 2024-01-01 RX ADMIN — PIPERACILLIN AND TAZOBACTAM 3.38 G: 3; .375 INJECTION, POWDER, LYOPHILIZED, FOR SOLUTION INTRAVENOUS at 20:10

## 2024-01-01 RX ADMIN — POTASSIUM CHLORIDE 40 MEQ: 1.5 POWDER, FOR SOLUTION ORAL at 22:14

## 2024-01-01 RX ADMIN — CHLOROTHIAZIDE SODIUM 1000 MG: 500 INJECTION, POWDER, LYOPHILIZED, FOR SOLUTION INTRAVENOUS at 15:52

## 2024-01-01 RX ADMIN — Medication 50 MCG: at 00:01

## 2024-01-01 RX ADMIN — NICOTINE 1 PATCH: 21 PATCH, EXTENDED RELEASE TRANSDERMAL at 08:37

## 2024-01-01 RX ADMIN — LACTULOSE 20 G: 20 SOLUTION ORAL at 19:50

## 2024-01-01 RX ADMIN — POTASSIUM CHLORIDE 20 MEQ: 1.5 POWDER, FOR SOLUTION ORAL at 08:15

## 2024-01-01 RX ADMIN — HYDROCORTISONE SODIUM SUCCINATE 100 MG: 100 INJECTION, POWDER, FOR SOLUTION INTRAMUSCULAR; INTRAVENOUS at 01:24

## 2024-01-01 RX ADMIN — IPRATROPIUM BROMIDE AND ALBUTEROL SULFATE 3 ML: .5; 3 SOLUTION RESPIRATORY (INHALATION) at 08:00

## 2024-01-01 RX ADMIN — METRONIDAZOLE 500 MG: 500 INJECTION, SOLUTION INTRAVENOUS at 15:04

## 2024-01-01 RX ADMIN — MAGNESIUM SULFATE HEPTAHYDRATE 1 G: 1 INJECTION, SOLUTION INTRAVENOUS at 01:31

## 2024-01-01 RX ADMIN — MIDAZOLAM HYDROCHLORIDE 7 MG/HR: 1 INJECTION, SOLUTION INTRAVENOUS at 06:45

## 2024-01-01 RX ADMIN — INSULIN ASPART 1 UNITS: 100 INJECTION, SOLUTION INTRAVENOUS; SUBCUTANEOUS at 15:30

## 2024-01-01 RX ADMIN — PROPOFOL 20 MCG/KG/MIN: 10 INJECTION, EMULSION INTRAVENOUS at 01:52

## 2024-01-01 RX ADMIN — SODIUM BICARBONATE 50 MEQ/HR: 84 INJECTION, SOLUTION INTRAVENOUS at 21:55

## 2024-01-01 RX ADMIN — INSULIN ASPART 2 UNITS: 100 INJECTION, SOLUTION INTRAVENOUS; SUBCUTANEOUS at 19:38

## 2024-01-01 RX ADMIN — PIPERACILLIN AND TAZOBACTAM 3.38 G: 3; .375 INJECTION, POWDER, LYOPHILIZED, FOR SOLUTION INTRAVENOUS at 01:59

## 2024-01-01 RX ADMIN — LACTULOSE 10 G: 20 SOLUTION ORAL at 19:09

## 2024-01-01 RX ADMIN — LACTULOSE 10 G: 20 SOLUTION ORAL at 20:54

## 2024-01-01 RX ADMIN — MIDODRINE HYDROCHLORIDE 20 MG: 5 TABLET ORAL at 12:47

## 2024-01-01 RX ADMIN — FOLIC ACID 1 MG: 5 INJECTION, SOLUTION INTRAMUSCULAR; INTRAVENOUS; SUBCUTANEOUS at 07:46

## 2024-01-01 RX ADMIN — METRONIDAZOLE 500 MG: 500 INJECTION, SOLUTION INTRAVENOUS at 14:43

## 2024-01-01 RX ADMIN — INSULIN ASPART 1 UNITS: 100 INJECTION, SOLUTION INTRAVENOUS; SUBCUTANEOUS at 03:21

## 2024-01-01 RX ADMIN — Medication 1 PACKET: at 09:41

## 2024-01-01 RX ADMIN — THIAMINE HCL TAB 100 MG 100 MG: 100 TAB at 09:16

## 2024-01-01 RX ADMIN — METRONIDAZOLE 500 MG: 500 INJECTION, SOLUTION INTRAVENOUS at 01:59

## 2024-01-01 RX ADMIN — HEPARIN SODIUM 5000 UNITS: 5000 INJECTION, SOLUTION INTRAVENOUS; SUBCUTANEOUS at 05:33

## 2024-01-01 RX ADMIN — POTASSIUM CHLORIDE 20 MEQ: 29.8 INJECTION, SOLUTION INTRAVENOUS at 09:07

## 2024-01-01 RX ADMIN — SODIUM CHLORIDE 1000 ML: 9 INJECTION, SOLUTION INTRAVENOUS at 17:49

## 2024-01-01 RX ADMIN — MIDAZOLAM 1 MG: 1 INJECTION INTRAMUSCULAR; INTRAVENOUS at 14:27

## 2024-01-01 RX ADMIN — THIAMINE HCL TAB 100 MG 100 MG: 100 TAB at 15:16

## 2024-01-01 RX ADMIN — PROPOFOL 30 MCG/KG/MIN: 10 INJECTION, EMULSION INTRAVENOUS at 13:49

## 2024-01-01 RX ADMIN — VASOPRESSIN 2.4 UNITS/HR: 20 INJECTION, SOLUTION INTRAMUSCULAR; SUBCUTANEOUS at 08:09

## 2024-01-01 RX ADMIN — PHENYLEPHRINE HYDROCHLORIDE 150 MCG: 10 INJECTION INTRAVENOUS at 13:27

## 2024-01-01 RX ADMIN — MORPHINE SULFATE 1 MG: 2 INJECTION, SOLUTION INTRAMUSCULAR; INTRAVENOUS at 02:37

## 2024-01-01 RX ADMIN — LACTULOSE 10 G: 20 SOLUTION ORAL at 09:18

## 2024-01-01 RX ADMIN — MORPHINE SULFATE 1 MG: 2 INJECTION, SOLUTION INTRAMUSCULAR; INTRAVENOUS at 02:27

## 2024-01-01 RX ADMIN — NOREPINEPHRINE BITARTRATE 0.09 MCG/KG/MIN: 0.02 INJECTION, SOLUTION INTRAVENOUS at 22:24

## 2024-01-01 RX ADMIN — SODIUM BICARBONATE 50 MEQ: 84 INJECTION INTRAVENOUS at 03:10

## 2024-01-01 RX ADMIN — SODIUM PHOSPHATE, MONOBASIC, MONOHYDRATE AND SODIUM PHOSPHATE, DIBASIC, ANHYDROUS 9 MMOL: 142; 276 INJECTION, SOLUTION INTRAVENOUS at 08:12

## 2024-01-01 RX ADMIN — CEFTRIAXONE SODIUM 2 G: 2 INJECTION, POWDER, FOR SOLUTION INTRAMUSCULAR; INTRAVENOUS at 04:33

## 2024-01-01 RX ADMIN — SODIUM BICARBONATE 100 MEQ: 84 INJECTION INTRAVENOUS at 16:58

## 2024-01-01 RX ADMIN — MIDODRINE HYDROCHLORIDE 20 MG: 5 TABLET ORAL at 18:18

## 2024-01-01 RX ADMIN — HYDROCORTISONE SODIUM SUCCINATE 100 MG: 100 INJECTION, POWDER, FOR SOLUTION INTRAMUSCULAR; INTRAVENOUS at 19:31

## 2024-01-01 RX ADMIN — POTASSIUM CHLORIDE 40 MEQ: 1.5 POWDER, FOR SOLUTION ORAL at 08:06

## 2024-01-01 RX ADMIN — FOLIC ACID 1 MG: 1 TABLET ORAL at 09:16

## 2024-01-01 RX ADMIN — LIDOCAINE HYDROCHLORIDE 5 ML: 20 SOLUTION ORAL; TOPICAL at 12:21

## 2024-01-01 RX ADMIN — Medication 250 MG: at 12:49

## 2024-01-01 RX ADMIN — Medication 15 ML: at 08:44

## 2024-01-01 RX ADMIN — MORPHINE SULFATE 2 MG: 2 INJECTION, SOLUTION INTRAMUSCULAR; INTRAVENOUS at 02:31

## 2024-01-01 RX ADMIN — Medication 250 MG: at 07:45

## 2024-01-01 RX ADMIN — NOREPINEPHRINE BITARTRATE 0.3 MCG/KG/MIN: 0.06 INJECTION, SOLUTION INTRAVENOUS at 07:58

## 2024-01-01 RX ADMIN — THIAMINE HCL TAB 100 MG 100 MG: 100 TAB at 20:54

## 2024-01-01 RX ADMIN — MIDAZOLAM HYDROCHLORIDE 2 MG/HR: 1 INJECTION, SOLUTION INTRAVENOUS at 19:29

## 2024-01-01 RX ADMIN — VANCOMYCIN HYDROCHLORIDE 1500 MG: 10 INJECTION, POWDER, LYOPHILIZED, FOR SOLUTION INTRAVENOUS at 15:37

## 2024-01-01 RX ADMIN — MICAFUNGIN SODIUM 100 MG: 50 INJECTION, POWDER, LYOPHILIZED, FOR SOLUTION INTRAVENOUS at 18:15

## 2024-01-01 RX ADMIN — OXYCODONE HYDROCHLORIDE 5 MG: 5 TABLET ORAL at 05:29

## 2024-01-01 RX ADMIN — THIAMINE HYDROCHLORIDE 200 MG: 100 INJECTION, SOLUTION INTRAMUSCULAR; INTRAVENOUS at 19:08

## 2024-01-01 RX ADMIN — FOLIC ACID 1 MG: 5 INJECTION, SOLUTION INTRAMUSCULAR; INTRAVENOUS; SUBCUTANEOUS at 11:29

## 2024-01-01 RX ADMIN — MIDAZOLAM HYDROCHLORIDE 5 MG/HR: 1 INJECTION, SOLUTION INTRAVENOUS at 12:46

## 2024-01-01 RX ADMIN — THIAMINE HYDROCHLORIDE 200 MG: 100 INJECTION, SOLUTION INTRAMUSCULAR; INTRAVENOUS at 08:38

## 2024-01-01 RX ADMIN — SODIUM BICARBONATE 50 MEQ/HR: 84 INJECTION, SOLUTION INTRAVENOUS at 17:35

## 2024-01-01 RX ADMIN — Medication 5 MG: at 20:07

## 2024-01-01 RX ADMIN — NOREPINEPHRINE BITARTRATE 0.06 MCG/KG/MIN: 0.02 INJECTION, SOLUTION INTRAVENOUS at 07:51

## 2024-01-01 RX ADMIN — FUROSEMIDE 20 MG: 10 INJECTION, SOLUTION INTRAMUSCULAR; INTRAVENOUS at 13:02

## 2024-01-01 RX ADMIN — FUROSEMIDE 20 MG: 10 INJECTION, SOLUTION INTRAMUSCULAR; INTRAVENOUS at 17:57

## 2024-01-01 RX ADMIN — SODIUM BICARBONATE: 84 INJECTION, SOLUTION INTRAVENOUS at 16:22

## 2024-01-01 RX ADMIN — PHYTONADIONE 10 MG: 10 INJECTION, EMULSION INTRAMUSCULAR; INTRAVENOUS; SUBCUTANEOUS at 08:56

## 2024-01-01 RX ADMIN — MAGNESIUM SULFATE HEPTAHYDRATE 1 G: 1 INJECTION, SOLUTION INTRAVENOUS at 21:58

## 2024-01-01 RX ADMIN — POTASSIUM CHLORIDE 20 MEQ: 1.5 POWDER, FOR SOLUTION ORAL at 09:41

## 2024-01-01 RX ADMIN — INSULIN ASPART 2 UNITS: 100 INJECTION, SOLUTION INTRAVENOUS; SUBCUTANEOUS at 15:17

## 2024-01-01 RX ADMIN — Medication 1 TABLET: at 08:38

## 2024-01-01 RX ADMIN — INSULIN ASPART 2 UNITS: 100 INJECTION, SOLUTION INTRAVENOUS; SUBCUTANEOUS at 08:19

## 2024-01-01 RX ADMIN — FOLIC ACID 1 MG: 5 INJECTION, SOLUTION INTRAMUSCULAR; INTRAVENOUS; SUBCUTANEOUS at 14:44

## 2024-01-01 RX ADMIN — CEFTRIAXONE SODIUM 2 G: 2 INJECTION, POWDER, FOR SOLUTION INTRAMUSCULAR; INTRAVENOUS at 05:02

## 2024-01-01 RX ADMIN — Medication 50 MCG: at 23:56

## 2024-01-01 RX ADMIN — POTASSIUM CHLORIDE 40 MEQ: 1.5 POWDER, FOR SOLUTION ORAL at 08:37

## 2024-01-01 RX ADMIN — HYDROCORTISONE SODIUM SUCCINATE 100 MG: 100 INJECTION, POWDER, FOR SOLUTION INTRAMUSCULAR; INTRAVENOUS at 20:11

## 2024-01-01 RX ADMIN — INSULIN ASPART 1 UNITS: 100 INJECTION, SOLUTION INTRAVENOUS; SUBCUTANEOUS at 03:07

## 2024-01-01 RX ADMIN — CHLOROTHIAZIDE SODIUM 500 MG: 500 INJECTION, POWDER, LYOPHILIZED, FOR SOLUTION INTRAVENOUS at 12:18

## 2024-01-01 RX ADMIN — FUROSEMIDE 120 MG: 10 INJECTION, SOLUTION INTRAMUSCULAR; INTRAVENOUS at 12:47

## 2024-01-01 RX ADMIN — NICOTINE 1 PATCH: 21 PATCH, EXTENDED RELEASE TRANSDERMAL at 08:47

## 2024-01-01 RX ADMIN — MICAFUNGIN SODIUM 100 MG: 50 INJECTION, POWDER, LYOPHILIZED, FOR SOLUTION INTRAVENOUS at 18:20

## 2024-01-01 RX ADMIN — INSULIN ASPART 1 UNITS: 100 INJECTION, SOLUTION INTRAVENOUS; SUBCUTANEOUS at 23:32

## 2024-01-01 RX ADMIN — HEPARIN SODIUM 5000 UNITS: 5000 INJECTION, SOLUTION INTRAVENOUS; SUBCUTANEOUS at 22:58

## 2024-01-01 RX ADMIN — CHLORHEXIDINE GLUCONATE 0.12% ORAL RINSE 15 ML: 1.2 LIQUID ORAL at 20:54

## 2024-01-01 RX ADMIN — INSULIN ASPART 1 UNITS: 100 INJECTION, SOLUTION INTRAVENOUS; SUBCUTANEOUS at 22:24

## 2024-01-01 RX ADMIN — Medication 1 PACKET: at 19:35

## 2024-01-01 RX ADMIN — Medication 15 ML: at 09:15

## 2024-01-01 RX ADMIN — THIAMINE HYDROCHLORIDE 200 MG: 100 INJECTION, SOLUTION INTRAMUSCULAR; INTRAVENOUS at 15:04

## 2024-01-01 RX ADMIN — INSULIN ASPART 1 UNITS: 100 INJECTION, SOLUTION INTRAVENOUS; SUBCUTANEOUS at 20:14

## 2024-01-01 RX ADMIN — THIAMINE HCL TAB 100 MG 100 MG: 100 TAB at 08:43

## 2024-01-01 RX ADMIN — INSULIN ASPART 2 UNITS: 100 INJECTION, SOLUTION INTRAVENOUS; SUBCUTANEOUS at 23:33

## 2024-01-01 RX ADMIN — PIPERACILLIN AND TAZOBACTAM 4.5 G: 4; .5 INJECTION, POWDER, LYOPHILIZED, FOR SOLUTION INTRAVENOUS at 14:59

## 2024-01-01 RX ADMIN — THIAMINE HYDROCHLORIDE 200 MG: 100 INJECTION, SOLUTION INTRAMUSCULAR; INTRAVENOUS at 19:34

## 2024-01-01 RX ADMIN — SODIUM CHLORIDE, POTASSIUM CHLORIDE, SODIUM LACTATE AND CALCIUM CHLORIDE 500 ML: 600; 310; 30; 20 INJECTION, SOLUTION INTRAVENOUS at 20:59

## 2024-01-01 RX ADMIN — IPRATROPIUM BROMIDE AND ALBUTEROL SULFATE 3 ML: .5; 3 SOLUTION RESPIRATORY (INHALATION) at 12:17

## 2024-01-01 RX ADMIN — PANTOPRAZOLE SODIUM 40 MG: 40 TABLET, DELAYED RELEASE ORAL at 08:38

## 2024-01-01 RX ADMIN — INSULIN ASPART 1 UNITS: 100 INJECTION, SOLUTION INTRAVENOUS; SUBCUTANEOUS at 12:48

## 2024-01-01 RX ADMIN — INSULIN ASPART 2 UNITS: 100 INJECTION, SOLUTION INTRAVENOUS; SUBCUTANEOUS at 00:04

## 2024-01-01 RX ADMIN — CHLORHEXIDINE GLUCONATE 0.12% ORAL RINSE 15 ML: 1.2 LIQUID ORAL at 09:18

## 2024-01-01 RX ADMIN — NICOTINE 1 PATCH: 21 PATCH, EXTENDED RELEASE TRANSDERMAL at 17:48

## 2024-01-01 RX ADMIN — PANTOPRAZOLE SODIUM 40 MG: 40 TABLET, DELAYED RELEASE ORAL at 07:45

## 2024-01-01 RX ADMIN — Medication 5 MG: at 19:29

## 2024-01-01 RX ADMIN — MICAFUNGIN SODIUM 100 MG: 50 INJECTION, POWDER, LYOPHILIZED, FOR SOLUTION INTRAVENOUS at 18:14

## 2024-01-01 RX ADMIN — THIAMINE HCL TAB 100 MG 100 MG: 100 TAB at 14:34

## 2024-01-01 RX ADMIN — IPRATROPIUM BROMIDE AND ALBUTEROL SULFATE 3 ML: .5; 3 SOLUTION RESPIRATORY (INHALATION) at 16:13

## 2024-01-01 RX ADMIN — MIDAZOLAM HYDROCHLORIDE 7 MG/HR: 1 INJECTION, SOLUTION INTRAVENOUS at 18:09

## 2024-01-01 RX ADMIN — METHOCARBAMOL 500 MG: 500 TABLET ORAL at 22:05

## 2024-01-01 RX ADMIN — SODIUM BICARBONATE 50 MEQ/HR: 84 INJECTION, SOLUTION INTRAVENOUS at 19:48

## 2024-01-01 RX ADMIN — Medication 50 MCG: at 16:26

## 2024-01-01 RX ADMIN — INSULIN ASPART 1 UNITS: 100 INJECTION, SOLUTION INTRAVENOUS; SUBCUTANEOUS at 16:40

## 2024-01-01 RX ADMIN — NICOTINE 1 PATCH: 21 PATCH, EXTENDED RELEASE TRANSDERMAL at 09:17

## 2024-01-01 RX ADMIN — LACTULOSE 20 G: 20 SOLUTION ORAL at 07:51

## 2024-01-01 RX ADMIN — Medication 1 TABLET: at 14:43

## 2024-01-01 RX ADMIN — PHYTONADIONE 10 MG: 10 INJECTION, EMULSION INTRAMUSCULAR; INTRAVENOUS; SUBCUTANEOUS at 09:54

## 2024-01-01 RX ADMIN — INSULIN ASPART 2 UNITS: 100 INJECTION, SOLUTION INTRAVENOUS; SUBCUTANEOUS at 16:07

## 2024-01-01 RX ADMIN — Medication 250 MG: at 08:38

## 2024-01-01 RX ADMIN — VASOPRESSIN 2.4 UNITS/HR: 20 INJECTION, SOLUTION INTRAMUSCULAR; SUBCUTANEOUS at 14:49

## 2024-01-01 RX ADMIN — HYDROMORPHONE HYDROCHLORIDE 0.2 MG: 0.2 INJECTION, SOLUTION INTRAMUSCULAR; INTRAVENOUS; SUBCUTANEOUS at 16:26

## 2024-01-01 RX ADMIN — NOREPINEPHRINE BITARTRATE 0.1 MCG/KG/MIN: 0.02 INJECTION, SOLUTION INTRAVENOUS at 15:02

## 2024-01-01 RX ADMIN — INSULIN ASPART 2 UNITS: 100 INJECTION, SOLUTION INTRAVENOUS; SUBCUTANEOUS at 12:54

## 2024-01-01 RX ADMIN — IPRATROPIUM BROMIDE AND ALBUTEROL SULFATE 3 ML: .5; 3 SOLUTION RESPIRATORY (INHALATION) at 12:30

## 2024-01-01 RX ADMIN — HYDROCORTISONE SODIUM SUCCINATE 100 MG: 100 INJECTION, POWDER, FOR SOLUTION INTRAMUSCULAR; INTRAVENOUS at 09:20

## 2024-01-01 RX ADMIN — PIPERACILLIN AND TAZOBACTAM 4.5 G: 4; .5 INJECTION, POWDER, LYOPHILIZED, FOR SOLUTION INTRAVENOUS at 20:06

## 2024-01-01 RX ADMIN — Medication 250 MG: at 08:43

## 2024-01-01 RX ADMIN — ALBUMIN HUMAN 100 G: 0.25 SOLUTION INTRAVENOUS at 14:35

## 2024-01-01 RX ADMIN — POTASSIUM CHLORIDE 20 MEQ: 29.8 INJECTION, SOLUTION INTRAVENOUS at 17:29

## 2024-01-01 RX ADMIN — INSULIN ASPART 2 UNITS: 100 INJECTION, SOLUTION INTRAVENOUS; SUBCUTANEOUS at 09:23

## 2024-01-01 RX ADMIN — INSULIN ASPART 1 UNITS: 100 INJECTION, SOLUTION INTRAVENOUS; SUBCUTANEOUS at 02:32

## 2024-01-01 RX ADMIN — INSULIN ASPART 1 UNITS: 100 INJECTION, SOLUTION INTRAVENOUS; SUBCUTANEOUS at 19:11

## 2024-01-01 RX ADMIN — LORAZEPAM 2 MG: 2 INJECTION INTRAMUSCULAR; INTRAVENOUS at 02:20

## 2024-01-01 RX ADMIN — NOREPINEPHRINE BITARTRATE 0.12 MCG/KG/MIN: 0.02 INJECTION, SOLUTION INTRAVENOUS at 12:12

## 2024-01-01 RX ADMIN — LACTULOSE 10 G: 20 SOLUTION ORAL at 08:44

## 2024-01-01 RX ADMIN — INSULIN ASPART 2 UNITS: 100 INJECTION, SOLUTION INTRAVENOUS; SUBCUTANEOUS at 03:47

## 2024-01-01 RX ADMIN — FUROSEMIDE 60 MG: 10 INJECTION, SOLUTION INTRAMUSCULAR; INTRAVENOUS at 11:06

## 2024-01-01 RX ADMIN — NICOTINE 1 PATCH: 21 PATCH, EXTENDED RELEASE TRANSDERMAL at 07:54

## 2024-01-01 RX ADMIN — Medication 40 MG: at 09:37

## 2024-01-01 RX ADMIN — LIDOCAINE HYDROCHLORIDE: 20 JELLY TOPICAL at 03:22

## 2024-01-01 RX ADMIN — NOREPINEPHRINE BITARTRATE 0.28 MCG/KG/MIN: 0.06 INJECTION, SOLUTION INTRAVENOUS at 05:04

## 2024-01-01 RX ADMIN — HYDROMORPHONE HYDROCHLORIDE 0.2 MG: 0.2 INJECTION, SOLUTION INTRAMUSCULAR; INTRAVENOUS; SUBCUTANEOUS at 23:24

## 2024-01-01 RX ADMIN — NOREPINEPHRINE BITARTRATE 0.25 MCG/KG/MIN: 0.06 INJECTION, SOLUTION INTRAVENOUS at 14:57

## 2024-01-01 RX ADMIN — Medication 40 MG: at 08:43

## 2024-01-01 RX ADMIN — INSULIN ASPART 1 UNITS: 100 INJECTION, SOLUTION INTRAVENOUS; SUBCUTANEOUS at 18:19

## 2024-01-01 RX ADMIN — ALBUMIN HUMAN 50 G: 0.25 SOLUTION INTRAVENOUS at 03:21

## 2024-01-01 RX ADMIN — PIPERACILLIN AND TAZOBACTAM 4.5 G: 4; .5 INJECTION, POWDER, LYOPHILIZED, FOR SOLUTION INTRAVENOUS at 01:24

## 2024-01-01 RX ADMIN — IPRATROPIUM BROMIDE AND ALBUTEROL SULFATE 3 ML: .5; 3 SOLUTION RESPIRATORY (INHALATION) at 20:25

## 2024-01-01 RX ADMIN — POTASSIUM CHLORIDE 10 MEQ: 7.46 INJECTION, SOLUTION INTRAVENOUS at 01:37

## 2024-01-01 RX ADMIN — Medication 50 MCG: at 03:40

## 2024-01-01 RX ADMIN — NOREPINEPHRINE BITARTRATE 0.08 MCG/KG/MIN: 0.02 INJECTION, SOLUTION INTRAVENOUS at 04:05

## 2024-01-01 RX ADMIN — LACTULOSE 10 G: 20 SOLUTION ORAL at 20:07

## 2024-01-01 RX ADMIN — NOREPINEPHRINE BITARTRATE 0.3 MCG/KG/MIN: 0.06 INJECTION, SOLUTION INTRAVENOUS at 23:29

## 2024-01-01 RX ADMIN — IPRATROPIUM BROMIDE AND ALBUTEROL SULFATE 3 ML: .5; 3 SOLUTION RESPIRATORY (INHALATION) at 08:33

## 2024-01-01 RX ADMIN — HYDROCORTISONE SODIUM SUCCINATE 100 MG: 100 INJECTION, POWDER, FOR SOLUTION INTRAMUSCULAR; INTRAVENOUS at 08:48

## 2024-01-01 RX ADMIN — FOLIC ACID 1 MG: 1 TABLET ORAL at 08:43

## 2024-01-01 RX ADMIN — METRONIDAZOLE 500 MG: 500 INJECTION, SOLUTION INTRAVENOUS at 02:14

## 2024-01-01 RX ADMIN — IPRATROPIUM BROMIDE AND ALBUTEROL SULFATE 3 ML: .5; 3 SOLUTION RESPIRATORY (INHALATION) at 15:52

## 2024-01-01 RX ADMIN — Medication 50 MCG: at 19:44

## 2024-01-01 RX ADMIN — POTASSIUM CHLORIDE 20 MEQ: 29.8 INJECTION, SOLUTION INTRAVENOUS at 10:08

## 2024-01-01 RX ADMIN — Medication 50 MCG: at 10:12

## 2024-01-01 RX ADMIN — LORAZEPAM 1 MG: 2 INJECTION INTRAMUSCULAR; INTRAVENOUS at 02:21

## 2024-01-01 RX ADMIN — SODIUM BICARBONATE 50 MEQ: 84 INJECTION INTRAVENOUS at 17:57

## 2024-01-01 RX ADMIN — Medication 5 MG: at 20:54

## 2024-01-01 RX ADMIN — MIDODRINE HYDROCHLORIDE 10 MG: 5 TABLET ORAL at 20:07

## 2024-01-01 RX ADMIN — SODIUM PHOSPHATE, MONOBASIC, MONOHYDRATE AND SODIUM PHOSPHATE, DIBASIC, ANHYDROUS 9 MMOL: 142; 276 INJECTION, SOLUTION INTRAVENOUS at 17:36

## 2024-01-01 RX ADMIN — POTASSIUM CHLORIDE 20 MEQ: 1.5 POWDER, FOR SOLUTION ORAL at 01:42

## 2024-01-01 RX ADMIN — VASOPRESSIN 2.4 UNITS/HR: 20 INJECTION, SOLUTION INTRAMUSCULAR; SUBCUTANEOUS at 01:17

## 2024-01-01 RX ADMIN — VASOPRESSIN 2.4 UNITS/HR: 20 INJECTION, SOLUTION INTRAMUSCULAR; SUBCUTANEOUS at 15:38

## 2024-01-01 RX ADMIN — THIAMINE HCL TAB 100 MG 100 MG: 100 TAB at 20:06

## 2024-01-01 RX ADMIN — NOREPINEPHRINE BITARTRATE 0.05 MCG/KG/MIN: 0.02 INJECTION, SOLUTION INTRAVENOUS at 18:32

## 2024-01-01 RX ADMIN — Medication 125 MCG/HR: at 12:57

## 2024-01-01 RX ADMIN — NOREPINEPHRINE BITARTRATE 0.15 MCG/KG/MIN: 0.06 INJECTION, SOLUTION INTRAVENOUS at 00:50

## 2024-01-01 RX ADMIN — LACTULOSE 10 G: 20 SOLUTION ORAL at 08:36

## 2024-01-01 RX ADMIN — SUCCINYLCHOLINE CHLORIDE 80 MG: 20 INJECTION, SOLUTION INTRAMUSCULAR; INTRAVENOUS; PARENTERAL at 13:27

## 2024-01-01 RX ADMIN — MAGNESIUM SULFATE HEPTAHYDRATE 1 G: 1 INJECTION, SOLUTION INTRAVENOUS at 17:36

## 2024-01-01 RX ADMIN — Medication 1 PACKET: at 08:44

## 2024-01-01 ASSESSMENT — ACTIVITIES OF DAILY LIVING (ADL)
ADLS_ACUITY_SCORE: 53
ADLS_ACUITY_SCORE: 45
ADLS_ACUITY_SCORE: 38
ADLS_ACUITY_SCORE: 49
ADLS_ACUITY_SCORE: 53
ADLS_ACUITY_SCORE: 49
ADLS_ACUITY_SCORE: 43
ADLS_ACUITY_SCORE: 53
ADLS_ACUITY_SCORE: 49
ADLS_ACUITY_SCORE: 53
ADLS_ACUITY_SCORE: 53
ADLS_ACUITY_SCORE: 43
ADLS_ACUITY_SCORE: 45
ADLS_ACUITY_SCORE: 53
ADLS_ACUITY_SCORE: 45
ADLS_ACUITY_SCORE: 42
ADLS_ACUITY_SCORE: 53
ADLS_ACUITY_SCORE: 45
ADLS_ACUITY_SCORE: 49
ADLS_ACUITY_SCORE: 45
ADLS_ACUITY_SCORE: 49
ADLS_ACUITY_SCORE: 45
ADLS_ACUITY_SCORE: 53
ADLS_ACUITY_SCORE: 49
ADLS_ACUITY_SCORE: 45
ADLS_ACUITY_SCORE: 45
ADLS_ACUITY_SCORE: 53
ADLS_ACUITY_SCORE: 49
ADLS_ACUITY_SCORE: 43
ADLS_ACUITY_SCORE: 43
ADLS_ACUITY_SCORE: 42
ADLS_ACUITY_SCORE: 45
ADLS_ACUITY_SCORE: 49
ADLS_ACUITY_SCORE: 43
ADLS_ACUITY_SCORE: 53
ADLS_ACUITY_SCORE: 45
ADLS_ACUITY_SCORE: 49
ADLS_ACUITY_SCORE: 49
ADLS_ACUITY_SCORE: 43
ADLS_ACUITY_SCORE: 49
ADLS_ACUITY_SCORE: 53
ADLS_ACUITY_SCORE: 43
ADLS_ACUITY_SCORE: 53
ADLS_ACUITY_SCORE: 42
ADLS_ACUITY_SCORE: 49
ADLS_ACUITY_SCORE: 43
ADLS_ACUITY_SCORE: 45
ADLS_ACUITY_SCORE: 53
ADLS_ACUITY_SCORE: 34
ADLS_ACUITY_SCORE: 49
ADLS_ACUITY_SCORE: 53
ADLS_ACUITY_SCORE: 49
ADLS_ACUITY_SCORE: 53
ADLS_ACUITY_SCORE: 45
ADLS_ACUITY_SCORE: 34
ADLS_ACUITY_SCORE: 53
ADLS_ACUITY_SCORE: 45
ADLS_ACUITY_SCORE: 49
ADLS_ACUITY_SCORE: 45
ADLS_ACUITY_SCORE: 38
ADLS_ACUITY_SCORE: 49
ADLS_ACUITY_SCORE: 49
ADLS_ACUITY_SCORE: 47
ADLS_ACUITY_SCORE: 53
ADLS_ACUITY_SCORE: 53
ADLS_ACUITY_SCORE: 45
ADLS_ACUITY_SCORE: 49
ADLS_ACUITY_SCORE: 49
ADLS_ACUITY_SCORE: 53
ADLS_ACUITY_SCORE: 45
ADLS_ACUITY_SCORE: 53
ADLS_ACUITY_SCORE: 53
ADLS_ACUITY_SCORE: 47
ADLS_ACUITY_SCORE: 43
ADLS_ACUITY_SCORE: 43
ADLS_ACUITY_SCORE: 49
ADLS_ACUITY_SCORE: 49
ADLS_ACUITY_SCORE: 53
ADLS_ACUITY_SCORE: 43
ADLS_ACUITY_SCORE: 53
ADLS_ACUITY_SCORE: 43
ADLS_ACUITY_SCORE: 49
ADLS_ACUITY_SCORE: 53
ADLS_ACUITY_SCORE: 42
ADLS_ACUITY_SCORE: 45
ADLS_ACUITY_SCORE: 38
ADLS_ACUITY_SCORE: 45
ADLS_ACUITY_SCORE: 53
ADLS_ACUITY_SCORE: 43
ADLS_ACUITY_SCORE: 38
ADLS_ACUITY_SCORE: 38
ADLS_ACUITY_SCORE: 45
ADLS_ACUITY_SCORE: 49
ADLS_ACUITY_SCORE: 43
ADLS_ACUITY_SCORE: 49
ADLS_ACUITY_SCORE: 53
ADLS_ACUITY_SCORE: 45
ADLS_ACUITY_SCORE: 45
ADLS_ACUITY_SCORE: 53
ADLS_ACUITY_SCORE: 49
ADLS_ACUITY_SCORE: 38
ADLS_ACUITY_SCORE: 43
ADLS_ACUITY_SCORE: 49
ADLS_ACUITY_SCORE: 53
ADLS_ACUITY_SCORE: 43
ADLS_ACUITY_SCORE: 43
ADLS_ACUITY_SCORE: 45
ADLS_ACUITY_SCORE: 49
ADLS_ACUITY_SCORE: 49
ADLS_ACUITY_SCORE: 53
ADLS_ACUITY_SCORE: 45
ADLS_ACUITY_SCORE: 43
ADLS_ACUITY_SCORE: 49
DEPENDENT_IADLS:: CLEANING;COOKING;LAUNDRY;SHOPPING;MEAL PREPARATION;INCONTINENCE
ADLS_ACUITY_SCORE: 49
ADLS_ACUITY_SCORE: 43
ADLS_ACUITY_SCORE: 49
ADLS_ACUITY_SCORE: 53
ADLS_ACUITY_SCORE: 47

## 2024-01-01 ASSESSMENT — LIFESTYLE VARIABLES
AUDITORY DISTURBANCES: NOT PRESENT
TREMOR: NO TREMOR
NAUSEA AND VOMITING: NO NAUSEA AND NO VOMITING
HEADACHE, FULLNESS IN HEAD: NOT PRESENT
ORIENTATION AND CLOUDING OF SENSORIUM: ORIENTED AND CAN DO SERIAL ADDITIONS
ANXIETY: NO ANXIETY, AT EASE
TOTAL SCORE: 0
VISUAL DISTURBANCES: NOT PRESENT
PAROXYSMAL SWEATS: NO SWEAT VISIBLE
AGITATION: NORMAL ACTIVITY

## 2024-01-01 ASSESSMENT — COLUMBIA-SUICIDE SEVERITY RATING SCALE - C-SSRS
1. IN THE PAST MONTH, HAVE YOU WISHED YOU WERE DEAD OR WISHED YOU COULD GO TO SLEEP AND NOT WAKE UP?: NO
6. HAVE YOU EVER DONE ANYTHING, STARTED TO DO ANYTHING, OR PREPARED TO DO ANYTHING TO END YOUR LIFE?: NO
2. HAVE YOU ACTUALLY HAD ANY THOUGHTS OF KILLING YOURSELF IN THE PAST MONTH?: NO

## 2024-01-23 NOTE — PROGRESS NOTES
Clinic Care Coordination Contact    SW received voicemail from daughter that pt is doing well and has no further needs.  CCC episode closed.  Please reach out if CC can be of further assistance.      Jeimy James,  [facility]  Clinic Care Coordinator  [facility]  [phone]  [email or web address]

## 2024-01-30 NOTE — TELEPHONE ENCOUNTER
Medication Question or Refill    Contacts         Type Contact Phone/Fax    01/30/2024 11:34 AM CST Phone (Incoming) Gomez Turk (Self) [phone] (H)            What medication are you calling about (include dose and sig)?: Pended    Preferred Pharmacy:   [id number] - [facility] [address] [address] [address] [facility] [place] [facility] [place] [address]  Phone: [phone] Fax: [phone]      Controlled Substance Agreement on file:   CSA -- Patient Level:    CSA: None found at the patient level.       Who prescribed the medication?: PCP    Do you need a refill? Yes    When did you use the medication last? N/A    Patient offered an appointment? No    Do you have any questions or concerns?  No      Could we send this information to you in [facility] or would you prefer to receive a phone call?:   Patient would prefer a phone call   Okay to leave a detailed message?: Yes at Cell number on file:    Telephone Information:   Mobile [phone]

## 2024-02-27 NOTE — TELEPHONE ENCOUNTER
Please note I am unable to fill this refill request as the listed pharmacy does not except the faxes.    zolpidem (AMBIEN) 10 MG tablet   Pharmacy: [id number] - [facility] [address] [facility].  The selected pharmacy does not accept electronic prescriptions for controlled substances.

## 2024-04-02 NOTE — TELEPHONE ENCOUNTER
Reason for Call:  Appointment Request    Patient requesting this type of appt:  Preventive     Requested provider: Tylor Roberts    Reason patient unable to be scheduled: Needs to be scheduled by clinic    When does patient want to be seen/preferred time: Same day or 1-2 Days    Comments: Raquel Luevano is calling for an appt asap for patient Gomez for an Wellness check due to needing self care, self neglected, losing control of bladder and bowels, sometimes blood in it, alcohol issues, mental health, all he cares about is people filling his drinks, Cough up a bit of fluids. Refuses to get help and to go to the doctors. Due to his wife medical condition she is no longer be able to care for Gomez. Need to get Gomez into [facility] for help    Could we send this information to you in [employer] or would you prefer to receive a phone call?:   Patient would prefer a phone call   Okay to leave a detailed message?: Yes at Other phone number: [phone] Raquel Luevano     Call taken on 4/2/2024 at 5:37 PM by KAE ROJAS
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
## 2024-04-06 NOTE — DISCHARGE INSTRUCTIONS
We checked your blood work today that demonstrates high blood sugar and elevated liver enzymes.  Your liver enzymes show that your liver is inflamed, likely related to your continued alcohol use.  Though this is something you have done for many years, your liver is showing signs of inflammation, which will ultimately progress to liver failure, which is irreversible.  I recommend stopping alcohol.  I recommend talking with Dr. Roberts for additional recommendations on how to do so.  We have placed orders for home care today from the ED, and given resources to help you get set up with transitional care skilled nursing if that is when she needed this point.  Return to the ED if you have additional falls, or for other concerns.

## 2024-04-06 NOTE — ED NOTES
Pt says he drinks at least 6 double jony fisher/day. Says his last drink was at 2130 last night. Daughter is concerned about withdrawal. Not exhibiting any CIWA signs at this time

## 2024-04-06 NOTE — CONSULTS
"Care Management Initial Consult    General Information  Writer(WILMA) was contacted and asked to assist with supporting this pt/family at time of discharge. CM spoke to pt's daughter Chloé HOBSON(GIDEON) by phone as located outside the hospital. Pt's wife usually cares for him and she is currently hospitalized herself. Chloé expressed that although the pt resides with multiple family members, they are struggling to provide 24/7 supervision as they work and have other things happening in their lives. TE also voiced concern with pt's alcohol intake. CM provided TE with resources for finding additional support within the home from their local community. CM also requested that the ED MD place \"Eval & Treat\" home care orders for appropriate disciplines. Pt/family was provided with the Medicare Compare list for SNF and Home Care.  Discussed associated Medicare star ratings to assist with choice for referrals/discharge planning Yes. Education was given to pt/family that star ratings are updated/maintained by Medicare and can be reviewed by visiting www.medicare.gov Yes - CM directed to make appropriate referrals - [facility] Intake referral placed.    TE stated the pt is in no immediate danger and she is willing to take him back home. TE stated she was told by a  to bring the pt in to the ED to get him placed in a TCU. TE was informed the pt doesn't qualify for a TCU stay as he doesn't have any rehabilitation needs. Further, TE was informed that TCU could happen by way of private-pay, as well as home care. Family are not financially capable to do so at this time. TE was instructed to utilize resources provided as soon as possible. No further CM needs at this time.    Bernard Barajas RN      "

## 2024-04-06 NOTE — ED NOTES
Bed: ED23  Expected date:   Expected time:   Means of arrival:   Comments:  [place] [phone] [id number] unable to care for self

## 2024-04-07 NOTE — TELEPHONE ENCOUNTER
Home Care is calling regarding an established patient with [facility].       Requesting orders from: Tylor Roberts  Provider is following patient: Yes  Is this a 60-day recertification request?  No    Orders Requested    Skilled Nursing  Request for delay in care, service is not able to be provided within same scheduled day.   Care will be delayed until 4/11/24 due to scheduling conflicts with the home care agency.    Physical Therapy  Request for delay in care, service is not able to be provided within same scheduled day.   Care will be delayed until 4/11/24 due to scheduling conflicts with the home care agency.      Verbal orders given.  Home Care will send orders for provider to sign.  Confirmed ok to leave a detailed message with call back.  Contact information confirmed and updated as needed.    Polly Wiseman RN    Reason for Disposition   [1] Other NON-URGENT information for PCP AND [2] does not require PCP response    Additional Information   Negative: Lab calling with strep throat test results and triager can call in prescription   Negative: Lab calling with urinalysis test results and triager can call in prescription   Negative: Medication questions   Negative: Medication renewal and refill questions   Negative: Pre-operative or pre-procedural questions   Negative: ED call to PCP (i.e., primary care provider; doctor, NP, or PA)   Negative: Doctor (or NP/PA) call to PCP   Negative: Call about patient who is currently hospitalized   Negative: Lab or radiology calling with CRITICAL test results   Negative: [1] Follow-up call from patient regarding patient's clinical status AND [2] information urgent   Negative: [1] Caller requests to speak ONLY to PCP AND [2] URGENT question   Negative: [1] Caller requests to speak to PCP now AND [2] won't tell us reason for call  (Exception: If 10 pm to 6 am, caller must first discuss reason for the call.)   Negative: Notification of hospital admission    Negative: Notification of death   Negative: Caller requesting lab results  (Exception: Routine or non-urgent lab result.)   Negative: Lab or radiology calling with test results   Negative: [1] Follow-up call from patient regarding patient's clinical status AND [2] information NON-URGENT   Negative: [1] Caller requests to speak ONLY to PCP AND [2] NON-URGENT question   Negative: Caller requesting an appointment, triage offered and declined   Negative: Caller requesting routine or non-urgent lab result    Protocols used: PCP Call - No Triage-A-

## 2024-04-08 NOTE — TELEPHONE ENCOUNTER
Patient Contact    Attempt # 1    Was call answered?  No. Left detailed message on voicemail with information to call me back.    On call back, please relay approval of requested home care orders to home care RN

## 2024-04-08 NOTE — TELEPHONE ENCOUNTER
What type of discharge? Emergency Department  Risk of Hospital admission or ED visit: 68.8%  Is a TCM episode required? No  When should the patient follow up with PCP? COBY Wiseman RN  [facility]

## 2024-04-11 NOTE — TELEPHONE ENCOUNTER
SAYRA Meza from [facility] calling to notify PCP of the following FYI:     Patient will not be admitting patient to home care - home care has been unable to get in contact with patient or spouse and therefore has to close referral.    Home care states if the patient wants home care in the future they will need a new referral.     Routing to PCP as FYI update - thank you!     If further questions or concerns - callback to Susana at [phone]     Quynh Ambrocio RN  [facility]

## 2024-04-18 NOTE — TELEPHONE ENCOUNTER
Medication Question or Refill        What medication are you calling about (include dose and sig)?: Pended    Preferred Pharmacy:     [id number] - [place], [place] - [address]  AT [facility] [address] [facility] [address]        Controlled Substance Agreement on file:   CSA -- Patient Level:    CSA: None found at the patient level.       Who prescribed the medication?: PCP    Do you need a refill? Yes    When did you use the medication last? N/A    Patient offered an appointment? No    Do you have any questions or concerns?  No      Could we send this information to you in [facility] or would you prefer to receive a phone call?:   Patient would prefer a phone call   Okay to leave a detailed message?: Yes at Cell number on file:    Telephone Information:   Mobile [phone]

## 2024-04-22 NOTE — TELEPHONE ENCOUNTER
Writer reviewing open encounters.    Per chart review, PCP is aware of FYI update and no further instruction given to triage:        Closing this encounter.    Clarissa Ambrocio RN  [facility]
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
## 2024-06-07 PROBLEM — R57.9 SHOCK (H): Status: ACTIVE | Noted: 2024-01-01

## 2024-06-07 NOTE — PLAN OF CARE
ICU End of Shift Summary. See flowsheets for vital signs and detailed assessment.    Changes this shift: Pt A&Ox4, forgetful. Legally blind, able to see shapes and outlines. Levo titrated for MAP > 65. Tolerating clear liquid diet, advanced to regular. Abdomen tender/distended, US completed. Unable to void, bladder scanned for 147 mL. Incontinent of large loose BM upon arrival. Generalized jaundice, no other skin issues.     Plan: Need UA and sputum sample. Wife and daughter to visit tomorrow.     Problem: Liver Failure  Goal: Fluid and Electrolyte Balance    Intervention: Monitor and Manage Fluid and Electrolyte Balance  Recent Flowsheet Documentation  Taken 6/7/2024 1500 by Ashley Lao, RN  Fluid/Electrolyte Management:   electrolyte supplement adjusted   fluids provided

## 2024-06-07 NOTE — H&P
[facility] ICU H&P  06/07/2024    Date of Hospital Admission: 6/7/24  Date of ICU Admission: 6/7/24  Reason for Critical Care Admission: shock  Date of Service (when I saw the patient): 06/07/2024    ASSESSMENT: Gomez Turk is a 63 year old male with PMH of Type 2 DM, blindness, ETOH use disorder who was admitted on 6/7/24. He initially presented to the ED in the [facility] in [facility] due to having a decline in health, memory issues, ataxia, and jaundice. He was found to be hypotensive. Hypotension persisted despite fluid resuscitation with 3L of fluid and a dose of albumin. He was started on Levophed and transferred to  ICU.     CHANGES and MAJOR THINGS TODAY:   - Admit to ICU  - Hepatology Consult    PLAN:    Neurological:  # Acute pain   # Encephalopathy   # Ataxia  # ETOH use disorder  # Risk for ETOH withdrawal  # blindness  # Anxiety  # Neuropathy  Ataxia and encephalopathy likely 2/2 liver disease. Ammonia level was elevated in the ED. Patient reportedly drinks [employer] every day, but has cut down recently. Head CT in ED negative for acute intracranial pathology per report. Patient reports he thinks his last drink was 3 days ago.  - Monitor neurological status. Delirium preventions and precautions.   - Ammonia level on arrival to ICU  - Monitor for ETOH withdrawal  - Peth sent  - Avoid sedating medications as able. Takes gabapentin and ambien at home, hold for now.  - Await pharmacy med rec to determine if he is still taking celexa. Resume if he is.     Pulmonary:   # No acute concerns  # Tobacco Use Disorder  - VENT:   Patient currently on RA. CXR shows some scarring or atelectasis to right side.   - Supplemental oxygen to keep saturation above 92 %.  - Incentive spirometer every 15- 30 minutes when awake.  - Monitor  - nicotine patch     Cardiovascular:    # Shock-distributive possibly septic and also 2/2 liver disease   # Hx HTN  # PAD  # Hx Hyperlipidemia  Received 3L of  fluid, albumin x1 in ED per report. Remained hypotensive so was started on pressors.   - Monitor hemodynamic status.   - Levophed to maintain MAP >65  - hold any PTA antihypertensives  - Baseline EKG and troponin on admit to ICU     Gastroenterology/Nutrition:  # Malnutrition  # Ascites, small volume, reported on CT  # Liver disease, likely ETOH related Hepatitis, with acute worsening  # Elevated bilirubin  # Elevated LFTs  # GERD  # C/f SBP  # Diffuse colonic wall thickening, reported on CT  CT done at OSH, reporting small volume ascites. Also reporting diffuse colonic wall thickening which could reflect colitis or possibly portal hypertensive colopathy.   - RD consult  - Hepatology Consult  - Consider paracentesis, although SBP is less likely with small volume ascites.   - acetaminophen level, peth, hepatitis panel ordered  - possibly consider steroids for alcohol induced hepatitis in the coming days  - daily MELDS scores  - Hold PTA atorvastatin  - Hold PTA ASA for now, until reviewed by pharmacy and until labs return to determine bleed risk.     Renal/Fluids/Electrolytes:   # Acute kidney injury   # Metabolic Acidosis  # Lactic Acidosis, Mild, Lactic 2.1  # Hypokalemia  Baseline creatinine is 0.97 as of April 2024 labs. Current creatine 2.4 per report. Could be secondary to poor perfusion to kidney in setting of liver failure and/or sepsis. Patient also reports he hasn't been eating or drinking well recently. VBG on arrival to ICU showed pH 7.29, PCO2 37, HCO3 18. Potassium was low at 3.1 in ED but was reportedly repleted already.   - Monitor intake and output.  - Provider to replace electrolytes d/t DEEPA  - CMP, Mg, Phos on admit to ICU and daily        Endocrine:  # Diabetes mellitus   - Q4 hour accuchecks  - Sliding scale for glucose management.   - Goal to keep BG< 180 for optimal wound healing         ID:  #C/f SBP  # Leukocytosis   # possible colitis  Patient has small volume ascites on CT per report. CXR  "not remarkable for infection. Possible colitis on CT.   - WBC 11.3 at OSH  - UA when able  - Sputum if having a productive cough  - Continue ceftriaxone 2g  - CBC on admit to ICU       Heme:     # Coagulopathy  # Anemia of chronic disease  - Hemoglobin 9.2 at OSH  - Transfuse if hgb <7.0 or signs/symptoms of hypoperfusion. Monitor and trend.   - CBC on arrival to ICU     Musculoskeletal:  # Weakness and deconditioning of critical illness   - Physical and occupational therapy consult      Skin:  # Jaundice  # No acute concerns  Jaundice 2/2 liver failure.      General Cares/Prophylaxis:    DVT Prophylaxis: hold while awaiting INR and platelet count  GI Prophylaxis: Not indicated  Restraints: None  Family Communication: Step daughter Chloé was updated  Code Status: Full Code. Patient states \"give me a couple weeks to see how this goes, if I don't get better pull the plug\"    Lines/tubes/drains:  - PIVs    Disposition:  - Medical ICU for vasopressors    Patient seen and findings/plan discussed with medical ICU staff, Dr. Sheehan.    Critical time spent 60 min    COOKIE Mcclellan CNP      Clinically Significant Risk Factors Present on Admission                                           -----------------------------------------------------------------------    HISTORY PRESENTING ILLNESS: Gomez Turk is a 63 year old male with PMH of Type 2 DM, blindness, ETOH use disorder who was admitted on 6/7/24. He initially presented to the ED in the [facility] in [facility] due to having a decline in health, memory issues, ataxia, and jaundice. He was found to be hypotensive. Hypotension persisted despite fluid resuscitation with 3L of fluid and a dose of albumin. He was started on Levophed and transferred to  ICU.     REVIEW OF SYSTEMS: Patient denies chest pain, SOB, nausea, vomiting, generalized pain. Endorses diarrhea and neuropathy.     PAST MEDICAL HISTORY:   Past Medical History:   Diagnosis " Date    Anxiety     DIVERTICULOSIS OF COLON W/O BLEED 6/25/2003    GERD (gastroesophageal reflux disease)     Hyperlipidemia LDL goal <100 10/31/2010    Hypertension     Legally blind     PAD (peripheral artery disease) (H)     Tobacco use disorder 6/25/2003    Type 2 diabetes, HbA1c goal < 7% (H) 10/31/2010     SURGICAL HISTORY:  Past Surgical History:   Procedure Laterality Date    AMPUTATE TOE(S) Left 2/28/2017    Procedure: AMPUTATE TOE(S);  Surgeon: Jesus Aragon MD;  Location:  OR    BYPASS GRAFT FEMOROPOPLITEAL Left 2/28/2017    Procedure: BYPASS GRAFT FEMOROPOPLITEAL;  Surgeon: Jesus Aragon MD;  Location:  OR    ENT SURGERY  1990    deviated septum repair    ESOPHAGOSCOPY, GASTROSCOPY, DUODENOSCOPY (EGD), COMBINED N/A 8/21/2022    Procedure: ESOPHAGOGASTRODUODENOSCOPY (EGD);  Surgeon: Nikolas Ramachandran MD;  Location:  GI    IRRIGATION AND DEBRIDEMENT FOOT, COMBINED Left 2/28/2017    Procedure: COMBINED IRRIGATION AND DEBRIDEMENT FOOT;  Surgeon: Jesus Aragon MD;  Location:  OR    LAPAROSCOPIC CHOLECYSTECTOMY N/A 3/18/2017    Procedure: LAPAROSCOPIC CHOLECYSTECTOMY;  Surgeon: Edin Hollingsworth MD;  Location:  OR     SOCIAL HISTORY:  Social History     Socioeconomic History    Marital status:    Tobacco Use    Smoking status: Every Day     Current packs/day: 1.00     Types: Cigarettes    Smokeless tobacco: Never   Vaping Use    Vaping status: Never Used   Substance and Sexual Activity    Alcohol use: Yes     Comment: occassion -2 drink daily    Drug use: No    Sexual activity: Not Currently     Partners: Female   Other Topics Concern    Parent/sibling w/ CABG, MI or angioplasty before 65F 55M? No     Social Determinants of Health     Financial Resource Strain: Low Risk  (10/30/2023)    Financial Resource Strain     Within the past 12 months, have you or your family members you live with been unable to get utilities (heat, electricity) when it was really  needed?: No   Food Insecurity: Low Risk  (10/30/2023)    Food Insecurity     Within the past 12 months, did you worry that your food would run out before you got money to buy more?: No     Within the past 12 months, did the food you bought just not last and you didn t have money to get more?: No   Transportation Needs: Low Risk  (10/30/2023)    Transportation Needs     Within the past 12 months, has lack of transportation kept you from medical appointments, getting your medicines, non-medical meetings or appointments, work, or from getting things that you need?: No   Housing Stability: Low Risk  (10/30/2023)    Housing Stability     Do you have housing? : Yes     Are you worried about losing your housing?: Patient refused     FAMILY HISTORY:   Family History   Problem Relation Age of Onset    Diabetes Mother     Lipids Mother     Melanoma Mother     Cancer Paternal Grandmother     Neurologic Disorder Maternal Uncle     Glaucoma No family hx of     Macular Degeneration No family hx of     Retinal detachment No family hx of     Thyroid Disease No family hx of      ALLERGIES:   Allergies   Allergen Reactions    No Known Drug Allergy      MEDICATIONS:  No current facility-administered medications for this encounter.     Current Outpatient Medications   Medication Sig Dispense Refill    aspirin (ASA) 325 MG EC tablet Take 650 mg by mouth daily      atorvastatin (LIPITOR) 40 MG tablet Take 1 tablet (40 mg) by mouth daily 90 tablet 0    blood glucose (ACCU-CHEK GUIDE) test strip USE TO TEST BLOOD SUGAR 3 TIMES DAILY OR AS DIRECTED. 100 strip 0    blood glucose monitoring (NO BRAND SPECIFIED) meter device kit Use to test blood sugar 3 times daily or as directed. Preferred blood glucose meter OR supplies to accompany: Blood Glucose Monitor Brands: per insurance. 1 kit 0    citalopram (CELEXA) 40 MG tablet Take 1 tablet (40 mg) by mouth daily 90 tablet 0    folic acid (FOLVITE) 1 MG tablet Take 1 tablet (1 mg) by mouth daily  30 tablet 0    gabapentin (NEURONTIN) 300 MG capsule Take 1 capsule (300 mg) by mouth 3 times daily 90 capsule 0    GLIPIZIDE PO Daily as needed (Patient not taking: Reported on 8/21/2023)      METFORMIN HCL PO Daily as needed (Patient not taking: Reported on 8/21/2023)      multivitamin w/minerals (THERA-VIT-M) tablet Take 1 tablet by mouth daily 90 tablet 1    nicotine (NICODERM CQ) 21 MG/24HR 24 hr patch Place 1 patch onto the skin daily 20 patch 0    oxyCODONE (ROXICODONE) 5 MG tablet Take 1 tablet (5 mg) by mouth every 6 hours as needed for moderate to severe pain 12 tablet 0    pantoprazole (PROTONIX) 40 MG EC tablet Take 1 tablet (40 mg) by mouth every morning (before breakfast) 30 tablet 0    thin (NO BRAND SPECIFIED) lancets Use with lanceting device. To accompany: Blood Glucose Monitor Brands: per insurance. 200 each 0    zolpidem (AMBIEN) 10 MG tablet TAKE 1 TABLET(10 MG) BY MOUTH NIGHTLY AS NEEDED FOR SLEEP 30 tablet 0       PHYSICAL EXAMINATION:  BP: ()/()   Arterial Line BP: ()/()     GEN: Resting in bed, NAD  EYES: PERRL,Sclera jaundiced  HEENT:  Normocephalic, atraumatic, trachea midline  CV: RRR  PULM/CHEST: Clear breath sounds bilaterally without rhonchi, crackles or wheeze, symmetric chest rise. On RA.   GI: normal bowel sounds, soft, non-tender  : no urine to assess  EXTREMITIES: Pulses palpable  NEURO: Slow to respond. No focal deficit. Baseline neuropathy.  SKIN: No rashes, sores or ulcerations  PSYCH:  Slow to respond, cooperative    LABS: Reviewed.   Arterial Blood Gases   No lab results found in last 7 days.  Complete Blood Count   No lab results found in last 7 days.  Basic Metabolic Panel  No lab results found in last 7 days.  Liver Function Tests  No lab results found in last 7 days.  Coagulation Profile  No lab results found in last 7 days.    IMAGING:  No results found for this or any previous visit (from the past 24 hour(s)).

## 2024-06-07 NOTE — CONSULTS
Hepatology note    Patient was not seen directly in the following note is product of chart review along with information from primary team.    63-year-old male with history of hypertension, type 2 diabetes, CKD, hyperlipidemia, bilateral blindness who presented to [facility] in [place] with weakness and jaundice.  Patient is reportedly living with his sister-in-law as his wife is hospitalized and she noticed that he was turning yellow and was having.'s of confusion and unsteadiness of his feet.  She also him to go to the ER.      In the ER there he was found to have elevated liver tests with bilirubin of 22.7, , ALT 62, .  CT head was done and this is negative for any acute changes.  CT abdomen showed small volume ascites with diffuse colonic wall thickening along with a normal spleen and a normal-looking liver.  While patient was in the ED there is pressures started to trend down, he was started on empiric antibiotics was given IV fluids and was transferred to [facility] for further care.    Patient is now in [facility] ICU, he continues to be on ceftriaxone he is on norepinephrine for vasopressor support.  Hepatology was requested for further management.    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  With regards to shock it is unclear if it is septic versus distributive shock.  Considering that there is a possibility for occult infection we will hold off from giving steroids for treatment of alcohol associated hepatitis.    Recommendations  Continue empiric antibiotic therapy  Ultrasound liver with Doppler  Peth levels  Urine analysis, blood cultures and paracentesis (if ascites is unable to tapping)  If there is no apparent source of infection in the next 24 to 48 hours will likely consider starting steroids for alcohol associated hepatitis  Monitor MELD labs every day  Alcohol cessation

## 2024-06-07 NOTE — PROGRESS NOTES
Admitted/transferred from: [facility]  Reason for admission/transfer: liver failure   Patient status upon admission/transfer: stable  Interventions: levo gtt, EKG,   Plan: GI/Hepatology consult   2 RN skin assessment: completed by Ashley Stern  Result of skin assessment and interventions/actions: generalized jaundice and several missing toes; no other skin issues noted.   Height, weight, drug calc weight: Done  Patient belongings (see Flowsheet - Adult Profile for details): shoes, pants, shirt, sweatshirt, socks.   MDRO education (if applicable): n/a

## 2024-06-08 NOTE — PHARMACY-ADMISSION MEDICATION HISTORY
Pharmacist Admission Medication History    Admission medication history is complete. The information provided in this note is only as accurate as the sources available at the time of the update.    Information Source(s): Patient and CareEverywhere/SureScripts via in-person    Pertinent Information:   -Pt reported stopping atorvastatin, unable to clarify why  -Pt reported not taking any medications for diabetes or any vitamins/supplements  -Pt reported being out of medications for 4-5 days (atorvastatin last filled 3/25/24 for 90 day supply, citalopram last filled 3/26/24 for 90 day supply, gabapentin filled 5/1/24 for 30 day supply, zolpidem filled 5/6/24 for 30 day supply)    Changes made to PTA medication list:  Added: omeprazole  Deleted: aspirin, folic acid, glipizide, metformin,multivitamin, nicotine patch, oxycodone, pantoprazole (not taking per pt)  Changed: none    Allergies reviewed with patient and updates made in EHR: yes    Medication History Completed By: Benita Chahal [facility] 6/8/2024 11:03 AM    PTA Med List   Medication Sig Last Dose    atorvastatin (LIPITOR) 40 MG tablet Take 1 tablet (40 mg) by mouth daily Unknown    citalopram (CELEXA) 40 MG tablet Take 1 tablet (40 mg) by mouth daily Past Week    gabapentin (NEURONTIN) 300 MG capsule Take 1 capsule (300 mg) by mouth 3 times daily Past Week    omeprazole (PRILOSEC) 20 MG DR capsule Take 20 mg by mouth daily Past Week    zolpidem (AMBIEN) 10 MG tablet TAKE 1 TABLET(10 MG) BY MOUTH NIGHTLY AS NEEDED FOR SLEEP Past Week

## 2024-06-08 NOTE — PROGRESS NOTES
"Hepatology Progress Note    Date of Service: June 8, 2024     OBJECTIVE:  Blood pressure 100/56, pulse 69, temperature 98  F (36.7  C), temperature source Oral, resp. rate 17, height 1.727 m (5' 8\"), weight 90.4 kg (199 lb 4.7 oz), SpO2 96%.    Labs: reviewed    Imaging: reviewed    ASSESSMENT/PLAN:  This is a 63-year-old male with past medical history of hypertension, diabetes, chronic kidney disease, bilateral blindness, and severe alcohol use disorder with active use who presents with abnormal liver tests consistent with acute alcohol related hepatitis and an DEEPA.  -Unclear if the patient has underlying cirrhosis, while possible given his longstanding history of alcohol use disorder it is also noted that he has a normal-sized spleen and has had historically normal platelet counts during previous healthcare encounters over the past several years.  -The AST: ALT ratio as well as the bilirubin elevated out of proportion in the setting of his ongoing alcohol use are all consistent with a diagnosis of severe alcohol related hepatitis  -While can certainly lead to SIRS or severe sepsis like picture, in and of itself would not expect alcohol related hepatitis to cause shock without another concomitant driving issue    RECS:  -Will defer to the primary ICU team with regard to further evaluation as to etiology of shock    -Consider an echocardiogram as the patient would be at risk for an alcohol related cardiomyopathy as and underlying etiology of shock  -He will need with withdrawal precautions  - His Maddrey Discriminant Function is >32, and may benefit from steriods    - Will defer to primary team regarding risks of steroids; if initiated would use Prednisolone 40mg daily  - Would continue with volume challenge: Albumin 25% 50g BID today (noted hyperchloremic as well as hypoalbuminemic)  - Protein caloriet supplements TID and QHS  - The patient is not a candidate for consideration of liver transplantation given ongoing " alcohol use in the setting of multiple encounters with health care with complications of alcohol use    Thomas M. Leventhal, M.D.  Associate Professor of Medicine  Advanced/Transplant Hepatology & Critical Care Medicine  [facility]

## 2024-06-08 NOTE — CONSULTS
INTERVENTIONAL RADIOLOGY CONSULT NOTE    Reason for referral:   Paracentesis to assess for SBP.    History:   PMH of Type 2 DM, blindness, ETOH use disorder who was admitted on 6/7/24. He initially presented to the ED in the [facility] in [facility] due to having a decline in health, memory issues, ataxia, and jaundice. He was found to be hypotensive, which persisted despite fluid resuscitation with 3L of fluid and a dose of albumin. He was started on Levophed and transferred to  ICU.       Imaging:   CT done at OSH demonstrated small volume ascites.     Impression:   1.  Patent Doppler evaluation were visualized. Technically challenging  study with inability to visualize the splenic and left hepatic veins.  2.  Cirrhotic liver morphology without focal hepatic mass.  3.  Moderate volume intra-abdominal ascites.    Series 1, image 45        Labs:    Lab Results   Component Value Date     06/08/2024     04/15/2021     Lab Results   Component Value Date    WBC 14.5 06/08/2024    WBC 8.9 04/15/2021       Lab Results   Component Value Date    INR 1.79 06/08/2024    INR 0.89 06/07/2018         Assessment:   Gomez Turk is a 63-year-old patient with PMH of Type 2 DM, blindness, and ETOH use disorder. Admitted on 6/7/24. Initially presented to the ED in [facility], [place], [place], with health decline, memory issues, ataxia, and jaundice. Found to be hypotensive despite 3L fluid resuscitation and albumin dose. Started on Levophed and transferred to  ICU.    Based on the ultrasound review from 6/7/24, the patient has trace peritoneal ascites, insufficient for safely obtaining ascitic fluid. SBP patients typically present with a larger volume of ascites. However, waiting several days and repeating the ultrasound early next week may yield more fluid, allowing for a safe paracentesis.        Plan:   - Trace ascitic fluid is insufficient for safe paracentesis. Repeat the ultrasound early  next week to reassess ascites volume and reconsult IR if necessary.        If procedure has been approved, IR charge RN can be contacted at [phone] for estimated time of procedure.       Discussed with Dr. Oscar Chopra, Interventional Radiology Attending.      Chon Penaloza M.D.  Interventional Radiology PGY-3  IR pass pager: [phone]     --------------------------------------------    Discussed with Dr. Penaloza. Agree with assessment and plan. If a larger volume of ascites is demonstrated in subsequent studies, paracentesis can be reconsidered.    Addendum placed because note was not associated with order for consult. No change to contents of note.    ------------------------------------    No change in Dr. Chopra's opinion.

## 2024-06-08 NOTE — PROGRESS NOTES
"CLINICAL NUTRITION SERVICES - ASSESSMENT NOTE     Nutrition Prescription    RECOMMENDATIONS FOR MDs/PROVIDERS TO ORDER:  None at this time    Malnutrition Status:    Patient does not meet two of the established criteria necessary for diagnosing malnutrition but is at risk for malnutrition    Recommendations already ordered by Registered Dietitian (RD):  -Ensure Enlive chocolate w/ meals  -Vitamin C 250 mg    Future/Additional Recommendations:  Monitor PO intake, supplement use, pertinent labs, GI sx, wt trends.      REASON FOR ASSESSMENT  Gomez Turk is a/an 63 year old male assessed by the dietitian for Provider Order - assess nutrition, liver failure.    CLINICAL HISTORY  PMH significant for T2DM, HTN, HLD, CKD, bilateral blindness, and alcohol use disorder. Admitted from [place] ED 6/7/24 due to persistent hypotension despite fluid resuscitation with 3L of fluid and dose of albumin. Pt initially presented to ED due to concern for decline in health, memory issues, ataxia, and jaundice.    NUTRITION HISTORY  RD met with Gomez at bedside who reports poor appetite and intake for \"a long time,\" pt began drinking 1 Ensure per day in March and typically eats ~1 meal per day. Pt reports smoking 1 PPD and last drink was 4 days ago. Pt plans to pursue sobriety after hospitalization. Currently NPO for planned paracentesis. Tolerated clear liquid diet yesterday per RN documentation. Pt is agreeable to Ensure with meals as diet allows.     GI: ongoing loose stools    CURRENT NUTRITION ORDERS  Diet: NPO  Supplement: none  Intake/Tolerance: NPO for procedure    LABS  K 2.9L  BUN 31.1H  Cr 1.81H  GFR 41H  Phos 2.2L  AST 160H  Tbili 22.6H    MEDICATIONS  Novolog, lactulose, mag sulfate, klor-con, sodium phos, levophed @ 23.7 mL/hr,     ANTHROPOMETRICS  Height: 172.7 cm (5' 8\")  Most Recent Weight: 90.4 kg (199 lb 4.7 oz)    IBW: 70 kg   BMI: Obesity Grade I BMI 30-34.9  Weight History:   Wt Readings from Last 15 Encounters: "   06/08/24 90.4 kg (199 lb 4.7 oz)   06/07/24 93.7 kg (206 lb 9.1 oz)      08/23/22 89.6 kg (197 lb 8.5 oz)   05/24/22 91.8 kg (202 lb 4.8 oz)   10/12/21 93.3 kg (205 lb 11.2 oz)   04/15/21 97.1 kg (214 lb)   01/28/21 98 kg (216 lb)   04/26/20 98.8 kg (217 lb 13 oz)   02/19/20 95.9 kg (211 lb 8 oz)   08/22/19 99.8 kg (220 lb)   08/12/19 99.8 kg (220 lb)   02/28/19 100 kg (220 lb 6.4 oz)   02/11/19 97.1 kg (214 lb)   12/05/18 99.4 kg (219 lb 1.6 oz)   11/16/18 98.9 kg (218 lb)   11/01/18 99.3 kg (218 lb 14.4 oz)   Limited wt hx available, -3.3 kg wt loss in 1 day likely fluid related    Dosing Weight: 78 kg (adjusted)    ASSESSED NUTRITION NEEDS  Estimated Energy Needs: 1560 - 1950 kcals/day (20 - 25 kcals/kg)  Justification: Maintenance  Estimated Protein Needs: 72 - 90 grams protein/day (0.8 - 1 grams of pro/kg)  Justification: Obesity guidelines  Estimated Fluid Needs: 1 mL/kcal  Justification: Maintenance or Per Provider    PHYSICAL FINDINGS  See malnutrition section below.  Benjamin: 13  Ascites - paracentesis planned for 6/8    MALNUTRITION  % Intake: Decreased intake does not meet criteria  % Weight Loss: Unable to assess  Subcutaneous Fat Loss: None observed  Muscle Loss: None observed  Fluid Accumulation/Edema: None noted  Malnutrition Diagnosis: Patient does not meet two of the established criteria necessary for diagnosing malnutrition but is at risk for malnutrition    NUTRITION DIAGNOSIS  Inadequate oral intake related to NPO status and poor appetite as evidenced by pt reported intake of ~1 meal per day.       INTERVENTIONS  Implementation  Nutrition Education: RD role in care   Medical food supplement therapy  Multivitamin/mineral supplement therapy     Goals  Patient to consume % of nutritionally adequate meal trays TID, or the equivalent with supplements/snacks.     Monitoring/Evaluation  Progress toward goals will be monitored and evaluated per protocol.    Rita Blackmon, MPH, RDN, LD  Clinical  Dietitian  [facility] 5B/10A ICU Vocera, Teams, or desk [phone]  Weekend/Holiday Vocera: Weekend Holiday Clinical Dietitian [Multi Site Groups]

## 2024-06-08 NOTE — PROGRESS NOTES
[facility] ICU PROGRESS NOTE  06/08/2024      Date of Service (when I saw the patient): 06/08/2024    ASSESSMENT: Gomez Turk is a 63 year old male with PMH of Type 2 DM, blindness, ETOH use disorder who was admitted on 6/7/24. He initially presented to the ED in the [facility] in [facility] due to having a decline in health, memory issues, ataxia, and jaundice. He was found to be hypotensive. Hypotension persisted despite fluid resuscitation with 3L of fluid and a dose of albumin. He was started on Levophed and transferred to  ICU.     CHANGES and MAJOR THINGS TODAY:   - UPDATE: Per IR review of imaging, pocket of fluid is not large enough to be drained at this time, they recommend repeating imaging early next week to assess for need for paracentesis.   - Okay to eat as no procedure will be done today  - Add flagyl  - Repeat CMP, VBG this afternoon  - Continue ETOH withdrawal monitoring  - TTE to further evaluate shock as patient continues on vasopressors  - Albumin challenge as recommended by Hepatology      PLAN:    Neurological:  # Acute pain   # Encephalopathy   # Ataxia  # ETOH use disorder  # Risk for ETOH withdrawal  # blindness  # Anxiety  # Neuropathy  Ataxia and encephalopathy likely 2/2 liver disease. Ammonia level was elevated in the ED. Patient reportedly drinks [employer] every day, but has cut down recently. Head CT in ED negative for acute intracranial pathology per report. Patient reports he thinks his last drink was 3 days ago.  - Monitor neurological status. Delirium preventions and precautions.   - Ammonia level on arrival to ICU,   - Monitor for ETOH withdrawal  - Peth sent  - Avoid sedating medications as able. Takes gabapentin and ambien at home, hold for now.  - Continue to hold PTA Celexa today d/t prolonged Qtc  - Actively monitoring for ETOH withdrawal, however not ordering benzodiazepines at this time. Patient reports last drink was 3 days prior to admission.   -  thiamine and folate ordered     Pulmonary:   # No acute concerns  # Tobacco Use Disorder  - VENT:   Patient currently on RA. CXR shows some scarring or atelectasis to right side.   - Supplemental oxygen to keep saturation above 92 %.  - Incentive spirometer every 15- 30 minutes when awake.  - Monitor  - nicotine patch     Cardiovascular:    # Shock-distributive possibly septic and also 2/2 liver disease   # Hx HTN  # PAD  # Hx Hyperlipidemia  # Prolonged QTc  Received 3L of fluid, albumin x1 in ED per report. Remained hypotensive so was started on pressors.   - Monitor hemodynamic status.   - Levophed to maintain MAP >65  - hold any PTA antihypertensives  - Baseline EKG and troponin on admit to ICU, troponin mildly elevated at 37, EKG not showing active ischemia, repeat troponin unable to be resulted d/t icteric sample  - TTE to evaluate for other etiologies of ongoing shock     Gastroenterology/Nutrition:  # Malnutrition  # Ascites, small volume, reported on CT  # Liver disease, likely ETOH related Hepatitis, with acute worsening  # Elevated bilirubin  # Elevated LFTs  # GERD  # C/f SBP  # Diffuse colonic wall thickening, reported on CT  CT done at OSH, reporting small volume ascites. Also reporting diffuse colonic wall thickening which could reflect colitis or possibly portal hypertensive colopathy. Abdominal US with Doppler showed cirrhotic liver morphology without focal hepatic mass and moderate volume intra-abdominal ascites.   - RD consult  - Hepatology Consult  - IR consult placed for paracentesis  - acetaminophen level pending, however low concern for tylenol overdose.   - peth pending.  - hepatitis panel non-reactive  - possibly consider steroids for alcohol induced hepatitis in the coming days, will first need to rule out infection. IR is consulted for paracentesis, however they report they do not consult on weekends unless the consult is STAT. Have discussed this with Hepatology fellow as current plan is  to r/o active infection prior to starting steroids. Per Hepatology fellow, okay to wait until Monday for paracentesis, starting the steroids for the alcohol induced hepatitis is not urgent and can be delayed for several days until work up can be completed.   - UPDATE: Per IR review of imaging, pocket of fluid is not large enough to be drained at this time, they recommend repeating imaging early next week to assess for need for paracentesis.   - daily MELDS scores  - Hold PTA atorvastatin  - albumin challenge as recommended by hepatology    MELD 3.0: 33 at 6/8/2024  6:12 AM  MELD-Na: 30 at 6/8/2024  6:12 AM  Calculated from:  Serum Creatinine: 1.81 mg/dL at 6/8/2024  6:12 AM  Serum Sodium: 139 mmol/L (Using max of 137 mmol/L) at 6/8/2024  6:12 AM  Total Bilirubin: 22.6 mg/dL at 6/8/2024  6:12 AM  Serum Albumin: 2.3 g/dL at 6/8/2024  6:12 AM  INR(ratio): 1.79 at 6/8/2024  6:12 AM  Age at listing (hypothetical): 63 years  Sex: Male at 6/8/2024  6:12 AM         Renal/Fluids/Electrolytes:   # Acute kidney injury   # Metabolic Acidosis  # Lactic Acidosis, Mild, Lactic 2.1  # Hypokalemia  # Hypophosphatemia  # Hypomagnesemia   Baseline creatinine is 0.97 as of April 2024 labs. Current creatine 2.4 per report. Could be secondary to poor perfusion to kidney in setting of liver failure and/or sepsis. Patient also reports he hasn't been eating or drinking well recently. VBG on arrival to ICU showed pH 7.29, PCO2 37, HCO3 18.   - Patient oliguric, urinated 400 ml overnight, none documented yet today. Monitoring closely,  may need to consider Nephrology at some point. Patient's bicarb is decreasing, latest pH WNL.   - Creatinine currently 1.81, this is improved from yesterday's value of 2.4 at OSH and stable with yesterday evenings value here of 1.75.   - Monitor intake and output.  - Provider to replace electrolytes d/t DEEPA  - CMP, Mg, Phos daily        Endocrine:  # Diabetes mellitus   - Q4 hour accuchecks  - Sliding scale for  glucose management.   - Goal to keep BG< 180 for optimal wound healing         ID:  #C/f SBP  # Leukocytosis   # possible colitis  Patient has small volume ascites on CT per report. CXR not remarkable for infection. Possible colitis on CT.   - WBC 11.3 at OSH, now 14.5  - Procalcitonin 0.23  - UA when able  - Sputum if having a productive cough  - Continue ceftriaxone 2g  - Flagyl added today  - CBC daily  -. Varying reports of ascites fluid, small volume per CT at OSH on 6/7, moderate volume on US here on 6/7. Currently afebrile, no abdominal pain, abdomen is soft. Pressor needs continue but at a stable rate.         Heme:     # Coagulopathy  # Anemia of chronic disease  - Hemoglobin 9.2 at OSH  - Transfuse if hgb <7.0 or signs/symptoms of hypoperfusion. Monitor and trend.   - CBC on arrival to ICU     Musculoskeletal:  # Weakness and deconditioning of critical illness   - Physical and occupational therapy consult      Skin:  # Jaundice  # No acute concerns  Jaundice 2/2 liver failure.       General Cares/Prophylaxis:    DVT Prophylaxis: hold while awaiting INR and platelet count  GI Prophylaxis: Not indicated  Restraints: None  Family Communication: Patient's family was updated by Dr. Sheehan at bedside today  Code Status: Full Code.      Lines/tubes/drains:  - PIVs     Disposition:  - Medical ICU for vasopressors     Patient seen and findings/plan discussed with medical ICU staff, Dr. Sheehan.     Critical time spent 60 min      COOKIE Mcclellan CNP    Clinically Significant Risk Factors Present on Admission        # Hypokalemia: Lowest K = 3.3 mmol/L in last 2 days, will replace as needed     # Hypomagnesemia: Lowest Mg = 1.5 mg/dL in last 2 days, will replace as needed   # Hypoalbuminemia: Lowest albumin = 2.6 g/dL at 6/7/2024  3:42 PM, will monitor as appropriate  # Coagulation Defect: INR = 1.79 (Ref range: 0.85 - 1.15) and/or PTT = 53 Seconds (Ref range: 22 - 38 Seconds), will monitor for  "bleeding  # Drug Induced Platelet Defect: home medication list includes an antiplatelet medication  # Acute Kidney Injury, unspecified: based on a >150% or 0.3 mg/dL increase in last creatinine compared to past 90 day average, will monitor renal function    # Circulatory Shock: required vasopressors within past 24 hours              # Obesity: Estimated body mass index is 30.3 kg/m  as calculated from the following:    Height as of this encounter: 1.727 m (5' 8\").    Weight as of this encounter: 90.4 kg (199 lb 4.7 oz).                      ====================================  INTERVAL HISTORY:   Patient continues on levophed, stable low rate. Afebrile. Denies abdominal pain, does have some pain to his right rib area which he thinks could be related to falling PTA. Made 400 ml of urine overnight.     OBJECTIVE:   1. VITAL SIGNS:   Temp:  [97.7  F (36.5  C)-97.8  F (36.6  C)] 97.8  F (36.6  C)  Pulse:  [61-78] 64  Resp:  [10-21] 16  BP: ()/(39-66) 96/49  SpO2:  [95 %-99 %] 97 %  Resp: 16    2. INTAKE/ OUTPUT:   I/O last 3 completed shifts:  In: 1306.98 [P.O.:240; I.V.:1066.98]  Out: 400 [Urine:400]    3. PHYSICAL EXAMINATION:    GEN: Resting in bed, NAD  EYES: PERRL,Sclera jaundiced  HEENT:  Normocephalic, atraumatic, trachea midline  CV: RRR  PULM/CHEST: Clear breath sounds bilaterally without rhonchi, crackles or wheeze, symmetric chest rise. On RA.   GI: normal bowel sounds, soft, non-tender  : no urine to assess  EXTREMITIES: Pulses palpable  NEURO: Slow to respond. No focal deficit. Baseline neuropathy.  SKIN: Jaundiced. No rashes, sores or ulcerations  PSYCH:  Slow to respond, cooperative       4. LABS:   Arterial Blood Gases   No lab results found in last 7 days.  Complete Blood Count   Recent Labs   Lab 06/08/24  0612 06/08/24  0039 06/07/24  1542   WBC 14.5* 15.9* 14.4*   * 184 189     Basic Metabolic Panel  Recent Labs   Lab 06/08/24  0639 06/08/24  0319 06/07/24  2332 06/07/24  1956 " 06/07/24  1841 06/07/24  1542   NA  --   --   --   --   --  135   POTASSIUM  --   --   --   --   --  3.3*   CHLORIDE  --   --   --   --   --  103   CO2  --   --   --   --   --  16*   BUN  --   --   --   --   --  30.7*   CR  --   --   --   --   --  1.75*   * 179* 190* 171*   < > 150*    < > = values in this interval not displayed.     Liver Function Tests  Recent Labs   Lab 06/08/24  0612 06/07/24  1542   AST  --  181*   ALT  --  67   ALKPHOS  --  300*   BILITOTAL  --  24.2*   ALBUMIN  --  2.6*   INR 1.79* 1.69*     Coagulation Profile  Recent Labs   Lab 06/08/24  0612 06/07/24  1542   INR 1.79* 1.69*   PTT 53* 57*       5. RADIOLOGY:   Recent Results (from the past 24 hour(s))   US Abdomen Limited w Doppler Complete    Narrative    EXAMINATION: US ABDOMEN LIMITED WITH DOPPLER COMPLETE 6/7/2024 4:38 PM      COMPARISON: CT 2/11/2019.    HISTORY: Alcohol use disorder, jaundice    TECHNIQUE: The abdomen was scanned in standard fashion with  specialized ultrasound transducer(s) using both gray-scale, color  Doppler, and spectral flow techniques.    Findings:  Liver: The liver demonstrates coarsened echotexture and nodular  surface contour. No evidence of a focal hepatic mass.     Extrahepatic portal vein flow is antegrade at 19 cm/s.  Right portal vein flow is antegrade, measuring 12 cm/s.  Left portal vein flow is antegrade, measuring 7 cm/s.    Flow in the hepatic artery is towards the liver and:  227 cm/s peak systolic  0.70 resistive index.     The splenic vein is not seen.  The middle and right hepatic veins are  patent with flow towards the IVC. The left hepatic vein is not seen.  The IVC is patent with flow towards the heart.   The visualized aorta  is not dilated.    Gallbladder: surgically absent.    Bile Ducts: Both the intra- and extrahepatic biliary system are of  normal caliber.  The common bile duct measures 7 mm.    Pancreas: Limited evaluation of the pancreas.    Kidneys: Right kidney measures 10.0  cm in length. Normal renal  echotexture without mass or hydronephrosis.    Fluid: Moderate volume intra-abdominal ascites.      Impression    Impression:   1.  Patent Doppler evaluation were visualized. Technically challenging  study with inability to visualize the splenic and left hepatic veins.  2.  Cirrhotic liver morphology without focal hepatic mass.  3.  Moderate volume intra-abdominal ascites.    I have personally reviewed the examination and initial interpretation  and I agree with the findings.    KAREN POOL DO         SYSTEM ID:  [id number]

## 2024-06-09 NOTE — PROGRESS NOTES
[facility] [place] ICU PROGRESS NOTE  06/09/2024      Date of Service (when I saw the patient): 06/09/2024    ASSESSMENT: Gomez Turk is a 63 year old male with PMH of Type 2 DM, blindness, ETOH use disorder who was admitted on 6/7/24. He initially presented to the ED in the [facility] in [facility] due to having a decline in health, memory issues, ataxia, and jaundice. He was found to be hypotensive. Hypotension persisted despite fluid resuscitation with 3L of fluid and a dose of albumin. He was started on Levophed and transferred to  ICU.     CHANGES and MAJOR THINGS TODAY:   - Nephrology Consult  - 20 mg IV lasix  - may consider sodium bicarbonate tablets if bicarbonate continues to trend down  - BID CMP  - Insert [place] for strict urine output monitoring      PLAN:    Neurological:  # Acute pain   # Encephalopathy   # Ataxia  # ETOH use disorder  # Risk for ETOH withdrawal  # blindness  # Anxiety  # Neuropathy  Ataxia and encephalopathy likely 2/2 liver disease. Ammonia level was elevated in the ED. Patient reportedly drinks [employer] every day, but has cut down recently. Head CT in ED negative for acute intracranial pathology per report. Patient reports he thinks his last drink was 3 days ago.  - Monitor neurological status. Delirium preventions and precautions.   - Ammonia level on arrival to ICU,   - Monitor for ETOH withdrawal  - Peth sent  - Avoid sedating medications as able. Takes gabapentin and ambien at home, hold for now.  - Melatonin at HS  - Continue to hold PTA Celexa today d/t prolonged Qtc  - Actively monitoring for ETOH withdrawal, however not ordering benzodiazepines at this time. Patient reports last drink was 3 days prior to admission.   - thiamine and folate ordered     Pulmonary:   # Acute Hypoxic Respiratory Failure  # Pulmonary Edema  # Tobacco Use Disorder  - VENT:   Patient was initially on RA, however early on 6/9 he developed SOB and required 4L of oxygen. CXR done,  c/f pulmonary edema. Per Radiology read, could also consider infection.   - Supplemental oxygen to keep saturation above 92 %.  - Incentive spirometer every 15- 30 minutes when awake.  - Monitor  - nicotine patch  - diuresis as below     Cardiovascular:    # Shock-distributive possibly septic and also 2/2 liver disease   # Hx HTN  # PAD  # Hx Hyperlipidemia  # Prolonged Qtc  # Elevated troponin, likely demand  Received 3L of fluid, albumin x1 in ED per report. Remained hypotensive so was started on pressors. Troponin mildly elevated at 37, EKG not showing active ischemia, repeat troponin unable to be resulted d/t icteric sample  - Monitor hemodynamic status.   - Levophed to maintain MAP >65. Currently not needed.   - TTE to evaluate for other etiologies of ongoing shock     Gastroenterology/Nutrition:  # Malnutrition  # Ascites, small volume, reported on CT  # Liver disease, likely ETOH related Hepatitis, with acute worsening  # Elevated bilirubin  # Elevated LFTs  # GERD  # C/f SBP  # Diffuse colonic wall thickening, reported on CT  CT done at OSH, reporting small volume ascites. Also reporting diffuse colonic wall thickening which could reflect colitis or possibly portal hypertensive colopathy. Abdominal US with Doppler showed cirrhotic liver morphology without focal hepatic mass and moderate volume intra-abdominal ascites.   - RD consult  - Hepatology Consult  - IR consult placed for paracentesis  - acetaminophen level pending, however low concern for tylenol overdose.   - peth pending.  - hepatitis panel non-reactive  - possibly consider steroids for alcohol induced hepatitis in the coming days, will first need to rule out active infection.  - Per IR review of imaging, pocket of fluid is not large enough to be drained at this time, they recommend repeating imaging early next week to assess for need for paracentesis.   - daily MELDS scores  - Hold PTA atorvastatin  - 6/8: albumin challenge as recommended by  hepatology, patient received 2 doses of 25% 50g albumin  - 6/9: Consider starting prednisolone tomorrow if able.     MELD 3.0: 34 at 6/9/2024  5:40 AM  MELD-Na: 33 at 6/9/2024  5:40 AM  Calculated from:  Serum Creatinine: 1.74 mg/dL at 6/9/2024  5:40 AM  Serum Sodium: 139 mmol/L (Using max of 137 mmol/L) at 6/9/2024  5:40 AM  Total Bilirubin: 25.4 mg/dL at 6/9/2024  5:40 AM  Serum Albumin: 3.3 g/dL at 6/9/2024  5:40 AM  INR(ratio): 2.16 at 6/9/2024  5:40 AM  Age at listing (hypothetical): 63 years  Sex: Male at 6/9/2024  5:40 AM      Renal/Fluids/Electrolytes:   # Acute kidney injury   # Metabolic Acidosis  # Lactic Acidosis, Mild, Lactic 2.1  # Hypokalemia  # Hypophosphatemia  # Hypomagnesemia   # C/f bile cast nephropathy  Baseline creatinine is 0.97 as of April 2024 labs. Creatine 2.4 at OSH. Could be secondary to poor perfusion to kidney in setting of liver failure and/or sepsis. Also concern for bile cast nephropathy per Nephrology.   - Creatinine currently 1.81, this is improved from yesterday's value of 2.4 at OSH and stable with yesterday evenings value here of 1.75.   - Monitor intake and output.  - Provider to replace electrolytes d/t DEEPA  - CMP, Mg, Phos daily  - Nephrology Consult. Appreciate Recs        Endocrine:  # Diabetes mellitus   - Q4 hour accuchecks  - Sliding scale for glucose management.   - Goal to keep BG< 180 for optimal wound healing         ID:  #C/f SBP  # Leukocytosis   # possible colitis  Patient has small volume ascites on CT per report. CXR not remarkable for infection. Possible colitis on CT.   - Procalcitonin 0.23  - UA negative for infection  - Continue ceftriaxone 2g  - Flagyl started 6/7  - CBC daily  -. Varying reports of ascites fluid, small volume per CT at OSH on 6/7, moderate volume on US here on 6/7. Currently afebrile, no abdominal pain, abdomen is soft.        Heme:     # Coagulopathy  # Anemia of chronic disease  - Hemoglobin 9.2 at OSH  - Transfuse if hgb <7.0 or  "signs/symptoms of hypoperfusion. Monitor and trend.   - CBC on arrival to ICU     Musculoskeletal:  # Weakness and deconditioning of critical illness   - Physical and occupational therapy consult      Skin:  # Jaundice  # No acute concerns  Jaundice 2/2 liver failure.       General Cares/Prophylaxis:    DVT Prophylaxis: hold while awaiting INR and platelet count  GI Prophylaxis: Not indicated  Restraints: None  Family Communication: Attempted to call wife, no answer  Code Status: Full Code.      Lines/tubes/drains:  - PIVs     Disposition:  - Medical ICU.      Patient seen and findings/plan discussed with medical ICU staff, Dr. Sheehan.     Critical time spent 40 min      COOKIE Mcclellan CNP    Clinically Significant Risk Factors        # Hypokalemia: Lowest K = 2.9 mmol/L in last 2 days, will replace as needed     # Hypomagnesemia: Lowest Mg = 1.5 mg/dL in last 2 days, will replace as needed   # Hypoalbuminemia: Lowest albumin = 2.2 g/dL at 6/8/2024  3:50 PM, will monitor as appropriate    # Coagulation Defect: INR = 2.16 (Ref range: 0.85 - 1.15) and/or PTT = 53 Seconds (Ref range: 22 - 38 Seconds), will monitor for bleeding  # Thrombocytopenia: Lowest platelets = 109 in last 2 days, will monitor for bleeding                  # Obesity: Estimated body mass index is 30.3 kg/m  as calculated from the following:    Height as of this encounter: 1.727 m (5' 8\").    Weight as of this encounter: 90.4 kg (199 lb 4.7 oz)., PRESENT ON ADMISSION                     ====================================  INTERVAL HISTORY:   Patient is more confused today, more short of breath. Did have one dose of oxycodone overnight. Pulmonary edema noted on CXR.     OBJECTIVE:   1. VITAL SIGNS:   Temp:  [97.5  F (36.4  C)-98  F (36.7  C)] 97.9  F (36.6  C)  Pulse:  [60-79] 66  Resp:  [10-28] 20  BP: ()/(32-94) 88/44  SpO2:  [88 %-97 %] 93 %  Resp: 20    2. INTAKE/ OUTPUT:   I/O last 3 completed shifts:  In: 2036 [P.O.:720; " I.V.:1316]  Out: 550 [Urine:550]    3. PHYSICAL EXAMINATION:    GEN: Resting in bed, NAD  EYES: PERRL,Sclera jaundiced  HEENT:  Normocephalic, atraumatic, trachea midline  CV: RRR  PULM/CHEST: Lung sounds coarse bilaterally, on 4L of oxygen, increased respiratory effort in comparison with previous days  GI: normal bowel sounds, soft, non-tender  : no urine to assess  EXTREMITIES: Pulses palpable  NEURO: Much more lethargic today, no focal deficit  SKIN: Jaundiced. No rashes, sores or ulcerations  PSYCH:  Slow to respond, cooperative       4. LABS:   Arterial Blood Gases   No lab results found in last 7 days.  Complete Blood Count   Recent Labs   Lab 06/09/24  0540 06/08/24  0612 06/08/24  0039 06/07/24  1542   WBC 12.2* 14.5* 15.9* 14.4*   HGB 7.0*  --   --   --    * 148* 184 189     Basic Metabolic Panel  Recent Labs   Lab 06/09/24  0713 06/09/24  0540 06/09/24  0316 06/08/24  2313 06/08/24  2133 06/08/24  1719 06/08/24  1550 06/08/24  0639 06/08/24  0612   NA  --  139  --   --  138  --  138  --  139   POTASSIUM  --  3.4  --   --  3.3*  --  3.3*  --  2.9*   CHLORIDE  --  112*  --   --  111*  --  110*  --  109*   CO2  --  12*  --   --  13*  --  14*  --  14*   BUN  --  29.5*  --   --  30.0*  --  29.9*  --  31.1*   CR  --  1.74*  --   --  1.64*  --  1.64*  --  1.81*   * 121* 136* 137* 157*   < > 134*   < > 167*    < > = values in this interval not displayed.     Liver Function Tests  Recent Labs   Lab 06/09/24  0540 06/08/24  1550 06/08/24  0612 06/07/24  1542   * 160* 160* 181*   ALT 50 60 61 67   ALKPHOS 215* 265* 275* 300*   BILITOTAL 25.4* 21.3* 22.6* 24.2*   ALBUMIN 3.3* 2.2* 2.3* 2.6*   INR 2.16*  --  1.79* 1.69*     Coagulation Profile  Recent Labs   Lab 06/09/24  0540 06/08/24  0612 06/07/24  1542   INR 2.16* 1.79* 1.69*   PTT  --  53* 57*       5. RADIOLOGY:   No results found for this or any previous visit (from the past 24 hour(s)).

## 2024-06-09 NOTE — PLAN OF CARE
"ICU SHIFT NOTE  Major Shift Events:    -afebrile, Aox3, SR, confuse with situation and forgetful , easily get reoriented , 4 lpm started this morning and Cx-ray done too noted as per chart review oxycodone administered earlier this morning at 5:29 due to pain  - multiple loose BM this shift  -Levophed was restarted at 640 am due to MAP 55   -Hypokalemic , poor appetite  verbalizing \" I just don't want to eat\"  - about 1300 hrs  complained of shortness of breath , pulled out his PIV in the left , complaining not able to pee,  auscultated crackles in lung fields AARON notified lasix administered,   -wiseman inserted as ordered  - PIV inserted to left arm  -started on BIPAP at 430 pm 1 x dilaudid IV administered as ordered  Plan: Levophed titration and turn off if able          : electrolytes replacement as per orders          : wiseman inserted for I /O  For vital signs and complete assessments, please see documentation flowsheets.   Goal Outcome Evaluation:      Plan of Care Reviewed With: patient    Overall Patient Progress: no changeOverall Patient Progress: no change           "

## 2024-06-09 NOTE — PLAN OF CARE
Problem: Liver Failure  Goal: Fluid and Electrolyte Balance  Outcome: Progressing  Intervention: Monitor and Manage Fluid and Electrolyte Balance  Recent Flowsheet Documentation  Taken 6/9/2024 0000 by Chantelle Ng RN  Fluid/Electrolyte Management: fluids provided  Taken 6/8/2024 2000 by Chantelle Ng RN  Fluid/Electrolyte Management: fluids provided   Goal Outcome Evaluation: Progressing                    10A ICU End of Shift Summary.     Changes this shift: EKG done x 2. Potassium and phos replaced.  Oxycodone 5 mg tab one time dose given for pain on back and generalized body pain.  Oxygen via NC started at 4 LPM. Levo restarted at 0640. CXR done for SOB.     Neuro: A/O x 4. Able to make needs known.   Cardiac: SR. MAP goal >65.   Respiratory: LS clear. On oxygen via NC at 4 LPM.   GI/: BM x 4 this shift. Urine incontinence x 1 and voided in urinal x 1.   Diet/appetite: Regular diet.   Pain: Denied pain.   Skin: No new concerns.   LDA's: PIV x 2.       Drips: Levo at 0.07 infusing on left arm.        Plan: For IR assessment on 6/10/24 for potential paracentesis. Keep MAP goal >65.      For complete vital signs and assessments, please refer to flowsheets.

## 2024-06-09 NOTE — PROGRESS NOTES
Nephrology Initial Consult  June 9, 2024      Gomez Turk MRN:[id number] YOB: 1960  Date of Admission:6/7/2024  Primary care provider: Tylor Roberts  Requesting physician: Aranza Sheehan*    ASSESSMENT AND RECOMMENDATIONS:     # DEEPA   # State of shock - improving, on ceftriaxone and metronidazole   # Acute ETOh hepatitis , T bili of 25  # AG+NAGMA   # Hypokalemia / hypophosphatemia in the setting of poor nutrition and diarrhea, would be at risk of refeeding syndrome.  # h/o DM and hypertension     ATI in the setting of hypotension and possibly bilirubin cast nephropathy. Non oliguric with urine output of 800 ml yesterday and 370 ml so far.  Baseline creatinine of 0.97 mg/dl however likely has some degree of underlying CKD from DM and Hypertension. Presented with creatinine of 2.4, now improved down to 1.74 with better blood pressures and albumin. UA with trace protein, no significant hematuria.   He is now hypervolemic with new onset pulmonary edema, possible component of infection. Requiring 4LO2 NC. TTE with EF of 60-65%  His metabolic acidosis is in the setting of DEEPA and ongoing diarrhea from lactulose.   --start lasix iv 40 mg for now in the context of pulmonary edema with low threshold for starting pressors if hypotensive.    Recommendations were communicated to primary team       Marielos Ceron MD   Division of Renal Disease and Hypertension  [facility]  Vocera Web Console      REASON FOR CONSULT: DEEPA    HISTORY OF PRESENT ILLNESS:  Admitting provider and nursing notes reviewed  Gomez Turk is a 63 year old male with a h/o alcohol use disorder, Dm, HTN and blindness presented with a decline in health, memory issues, ataxia and jaundice. He was hypotensive, and receiving albumin, started on ceftriaxone and metronidazole. He is being treated for acute etoh hepatitis and potential infection. He had a baseline of creatinine of 0.97 mg/dl in 4/24 and presented with creatinine  of 2.4 mg/dl. His creatinine is now down to 1.74 mg/dl.     PAST MEDICAL HISTORY:  Reviewed with patient on 06/09/2024     Past Medical History:   Diagnosis Date    Anxiety     DIVERTICULOSIS OF COLON W/O BLEED 6/25/2003    GERD (gastroesophageal reflux disease)     Hyperlipidemia LDL goal <100 10/31/2010    Hypertension     Legally blind     PAD (peripheral artery disease) (H)     Tobacco use disorder 6/25/2003    Type 2 diabetes, HbA1c goal < 7% (H) 10/31/2010       Past Surgical History:   Procedure Laterality Date    AMPUTATE TOE(S) Left 2/28/2017    Procedure: AMPUTATE TOE(S);  Surgeon: Jesus Aragon MD;  Location:  OR    BYPASS GRAFT FEMOROPOPLITEAL Left 2/28/2017    Procedure: BYPASS GRAFT FEMOROPOPLITEAL;  Surgeon: Jesus Aragon MD;  Location:  OR    ENT SURGERY  1990    deviated septum repair    ESOPHAGOSCOPY, GASTROSCOPY, DUODENOSCOPY (EGD), COMBINED N/A 8/21/2022    Procedure: ESOPHAGOGASTRODUODENOSCOPY (EGD);  Surgeon: Nikolas Ramachandran MD;  Location:  GI    IRRIGATION AND DEBRIDEMENT FOOT, COMBINED Left 2/28/2017    Procedure: COMBINED IRRIGATION AND DEBRIDEMENT FOOT;  Surgeon: Jesus Aragon MD;  Location:  OR    LAPAROSCOPIC CHOLECYSTECTOMY N/A 3/18/2017    Procedure: LAPAROSCOPIC CHOLECYSTECTOMY;  Surgeon: Edin Hollingsworth MD;  Location:  OR        MEDICATIONS:  PTA Meds  Prior to Admission medications    Medication Sig Last Dose Taking? Auth Provider Long Term End Date   atorvastatin (LIPITOR) 40 MG tablet Take 1 tablet (40 mg) by mouth daily Unknown Yes Tylor Roberts MD Yes    citalopram (CELEXA) 40 MG tablet Take 1 tablet (40 mg) by mouth daily Past Week Yes Tylor Roberts MD Yes    gabapentin (NEURONTIN) 300 MG capsule Take 1 capsule (300 mg) by mouth 3 times daily Past Week Yes Tylor Roberts MD Yes    omeprazole (PRILOSEC) 20 MG DR capsule Take 20 mg by mouth daily Past Week Yes Unknown, Entered By History     zolpidem (AMBIEN) 10 MG  tablet TAKE 1 TABLET(10 MG) BY MOUTH NIGHTLY AS NEEDED FOR SLEEP Past Week Yes Tylor Roberts MD     lisinopril (ZESTRIL) 5 MG tablet Take 1 tablet (5 mg) by mouth daily  Patient taking differently: Take 5 mg by mouth daily as needed   Tylor Roberts MD Yes 8/23/22      Current Meds  Current Facility-Administered Medications   Medication Dose Route Frequency Provider Last Rate Last Admin    ascorbic acid (vitamin C) chewable tablet 250 mg  250 mg Oral Daily Fela Stein APRN CNP   250 mg at 06/09/24 0745    cefTRIAXone (ROCEPHIN) 2 g vial to attach to  ml bag for ADULTS or NS 50 ml bag for PEDS  2 g Intravenous Q24H Fela Stein APRN CNP   2 g at 06/09/24 0432    [START ON 6/11/2024] folic acid (FOLVITE) tablet 1 mg  1 mg Oral Daily Fela Stein APRN CNP        folic acid injection 1 mg  1 mg Intravenous Daily eFla Stein APRN CNP   1 mg at 06/09/24 0746    [Held by provider] heparin ANTICOAGULANT injection 5,000 Units  5,000 Units Subcutaneous Q8H Fela Stein APRN CNP   5,000 Units at 06/09/24 0533    insulin aspart (NovoLOG) injection (RAPID ACTING)  1-7 Units Subcutaneous Q4H Fela Stein APRN CNP   2 Units at 06/08/24 1930    lactulose (CHRONULAC) solution 10 g  10 g Oral BID Fela Stein APRN CNP   10 g at 06/08/24 1930    melatonin tablet 5 mg  5 mg Oral QPM Jose David Sanchez APRN CNP   5 mg at 06/08/24 1929    metroNIDAZOLE (FLAGYL) infusion 500 mg  500 mg Intravenous Q12H Fela Stein APRN CNP   500 mg at 06/09/24 1504    multivitamin w/minerals (THERA-VIT-M) tablet 1 tablet  1 tablet Oral Daily Fela Stein APRN CNP   1 tablet at 06/08/24 1443    nicotine (NICODERM CQ) 21 MG/24HR 24 hr patch 1 patch  1 patch Transdermal Daily Fela Stein APRN CNP   1 patch at 06/09/24 0754    pantoprazole (PROTONIX) EC tablet 40 mg  40 mg Oral QAM AC Fela Stein, APRN CNP   40 mg at 06/09/24 0745    thiamine (B-1) injection 200 mg  200 mg Intravenous TID Fela Stein,  COOKIE CNP   200 mg at 06/09/24 1504    [START ON 6/10/2024] thiamine (B-1) tablet 100 mg  100 mg Oral TID Fela Stein APRN CNP        [START ON 6/15/2024] thiamine (B-1) tablet 100 mg  100 mg Oral Daily Fela Stein APRN CNP         Infusion Meds  Current Facility-Administered Medications   Medication Dose Route Frequency Provider Last Rate Last Admin    norepinephrine (LEVOPHED) 4 mg in  mL PERIPHERAL infusion  0.03-0.125 mcg/kg/min (Dosing Weight) Intravenous Continuous Fela Stein APRN CNP   Stopped at 06/09/24 1045       ALLERGIES:    Allergies   Allergen Reactions    No Known Drug Allergy        REVIEW OF SYSTEMS:  A comprehensive of systems was negative except as noted above.    SOCIAL HISTORY:   Social History     Socioeconomic History    Marital status:      Spouse name: Not on file    Number of children: Not on file    Years of education: Not on file    Highest education level: Not on file   Occupational History    Not on file   Tobacco Use    Smoking status: Every Day     Current packs/day: 1.00     Types: Cigarettes    Smokeless tobacco: Never   Vaping Use    Vaping status: Never Used   Substance and Sexual Activity    Alcohol use: Yes     Comment: occassion -2 drink daily    Drug use: No    Sexual activity: Not Currently     Partners: Female   Other Topics Concern    Parent/sibling w/ CABG, MI or angioplasty before 65F 55M? No   Social History Narrative    Not on file     Social Determinants of Health     Financial Resource Strain: Low Risk  (10/30/2023)    Financial Resource Strain     Within the past 12 months, have you or your family members you live with been unable to get utilities (heat, electricity) when it was really needed?: No   Food Insecurity: Low Risk  (10/30/2023)    Food Insecurity     Within the past 12 months, did you worry that your food would run out before you got money to buy more?: No     Within the past 12 months, did the food you bought just not last and  "you didn t have money to get more?: No   Transportation Needs: Low Risk  (10/30/2023)    Transportation Needs     Within the past 12 months, has lack of transportation kept you from medical appointments, getting your medicines, non-medical meetings or appointments, work, or from getting things that you need?: No   Physical Activity: Not on file   Stress: Not on file   Social Connections: Not on file   Interpersonal Safety: Not on file   Housing Stability: Low Risk  (10/30/2023)    Housing Stability     Do you have housing? : Yes     Are you worried about losing your housing?: Patient refused     Reviewed with patient       FAMILY MEDICAL HISTORY:   Family History   Problem Relation Age of Onset    Diabetes Mother     Lipids Mother     Melanoma Mother     Cancer Paternal Grandmother     Neurologic Disorder Maternal Uncle     Glaucoma No family hx of     Macular Degeneration No family hx of     Retinal detachment No family hx of     Thyroid Disease No family hx of      no Family history of kidney disease    PHYSICAL EXAM:   Temp  Av.8  F (36.6  C)  Min: 97.5  F (36.4  C)  Max: 98  F (36.7  C)      Pulse  Av.7  Min: 60  Max: 85 Resp  Av.2  Min: 10  Max: 28  SpO2  Av.7 %  Min: 88 %  Max: 99 %       BP 98/54   Pulse 72   Temp 98  F (36.7  C) (Oral)   Resp 18   Ht 1.727 m (5' 8\")   Wt 93.8 kg (206 lb 12.7 oz)   SpO2 94%   BMI 31.44 kg/m     Date 24 0700 - 06/10/24 0659   Shift 5475-3859 1804-4651 1981-5106 24 Hour Total   INTAKE   I.V. 40.94   40.94   Shift Total(mL/kg) 40.94(0.44)   40.94(0.44)   OUTPUT   Urine 120 100  220   Shift Total(mL/kg) 120(1.28) 100(1.07)  220(2.35)   Weight (kg) 93.8 93.8 93.8 93.8      Admit Weight: 93.7 kg (206 lb 9.1 oz)     GENERAL APPEARANCE: mild  distress,  awake  EYES: ++ scleral icterus, pupils equal  Lymphatics: no cervical or supraclavicular LAD  Pulmonary: lungs clear to auscultation with equal breath sounds bilaterally, no clubbing  CV: regular " rhythm, normal rate, no rub   - JVD none   - Edema none   GI: soft, nontender, normal bowel sounds  MS: no evidence of inflammation in joints, no muscle tenderness  : no wiseman  SKIN: no rash, warm, dry, no cyanosis  NEURO: face symmetric, no asterixis     LABS:   I have reviewed the following labs:  CMP  Recent Labs   Lab 06/09/24  1508 06/09/24  1118 06/09/24  0713 06/09/24  0540 06/08/24  2313 06/08/24  2133 06/08/24  1719 06/08/24  1550 06/08/24  0639 06/08/24  0612 06/07/24  1841 06/07/24  1542   NA  --   --   --  139  --  138  --  138  --  139  --  135   POTASSIUM  --   --   --  3.4  --  3.3*  --  3.3*  --  2.9*  --  3.3*   CHLORIDE  --   --   --  112*  --  111*  --  110*  --  109*  --  103   CO2  --   --   --  12*  --  13*  --  14*  --  14*  --  16*   ANIONGAP  --   --   --  15  --  14  --  14  --  16*  --  16*   * 129* 123* 121*   < > 157*   < > 134*   < > 167*   < > 150*   BUN  --   --   --  29.5*  --  30.0*  --  29.9*  --  31.1*  --  30.7*   CR  --   --   --  1.74*  --  1.64*  --  1.64*  --  1.81*  --  1.75*   GFRESTIMATED  --   --   --  44*  --  47*  --  47*  --  41*  --  43*   KRYSTEN  --   --   --  8.5*  --  8.4*  --  8.2*  --  8.2*  --  8.4*   MAG  --   --   --  2.0  --  2.2  --  1.9  --  2.0   < > 1.5*   PHOS  --   --   --  2.7  --  2.0*  --  2.7  --  2.2*   < > 2.3*   PROTTOTAL  --   --   --  6.2*  --   --   --  6.1*  --  6.6  --   --    ALBUMIN  --   --   --  3.3*  --   --   --  2.2*  --  2.3*  --  2.6*   BILITOTAL  --   --   --  25.4*  --   --   --  21.3*  --  22.6*  --  24.2*   ALKPHOS  --   --   --  215*  --   --   --  265*  --  275*  --  300*   AST  --   --   --  121*  --   --   --  160*  --  160*  --  181*   ALT  --   --   --  50  --   --   --  60  --  61  --  67    < > = values in this interval not displayed.     CBC  Recent Labs   Lab 06/09/24  0540 06/08/24  0612 06/08/24  0039 06/07/24  1542   HGB 7.0*  --   --   --    WBC 12.2* 14.5* 15.9* 14.4*   RBC 1.86* 2.39* 2.30* 2.54*   HCT  18.8* 23.6* 22.7* 25.1*   * 99 99 99   MCH 37.6*  --   --   --    MCHC 37.2*  --   --   --    RDW 20.4* 19.4* 19.5* 19.4*   * 148* 184 189     INR  Recent Labs   Lab 06/09/24  0540 06/08/24  0612 06/07/24  1542   INR 2.16* 1.79* 1.69*   PTT  --  53* 57*     ABG  Recent Labs   Lab 06/09/24  1215 06/09/24  0540 06/08/24  1550 06/08/24  1012   O2PER 25 21 21 21      URINE STUDIES  Recent Labs   Lab Test 06/08/24  1455 04/06/24  1636 08/19/22  2021 04/15/21  2357   COLOR Orange* Light Yellow Yellow Yellow   APPEARANCE Clear Clear Clear Clear   URINEGLC 100* 200* Negative Negative   URINEBILI Large* Negative Negative Negative   URINEKETONE 15* Negative Negative 10*   SG 1.020 1.011 1.012 1.030   UBLD Negative Negative Negative Negative   URINEPH 6.0 7.0 7.0 5.5   PROTEIN 30* Negative Negative 50*   NITRITE Negative Negative Negative Negative   LEUKEST Negative Negative Negative Negative   RBCU 3* <1 <1 2   WBCU 3 1 1 2     No lab results found.  PTH  No lab results found.  IRON STUDIES  Recent Labs   Lab Test 08/19/22  1453 04/26/20  1758   IRON 192*  --    *  --    IRONSAT 105*  --    ANDREI 648* 345       IMAGING:  All imaging studies reviewed by me.     Marielos Ceron MD

## 2024-06-10 NOTE — PROGRESS NOTES
Assisted with endotracheal intubation for respiratory failure/airway protection. A #7.5 ETT was placed and secured at 24 cm @ lip. Positive color change noted with CO2 detector, breath sounds were diminished, equal bilaterally. Neck positioning aid removed. Patient placed on full vent support, labs and CXR pending.    Patients ETT was secured with anchorfast.    Initial vent settings: CMV/AC 15/500/+8/ 100%.       Polly Cook, RT, RRT-NPS  6/10/2024 1:42 PM

## 2024-06-10 NOTE — PROCEDURES
[facility]    Double Lumen PICC Placement    Date/Time: 6/10/2024 3:27 PM    Performed by: Gomez Echols RN  Authorized by: Aranza Sheehan MD  Indications: vascular access      UNIVERSAL PROTOCOL   Site Marked: Yes  Prior Images Obtained and Reviewed:  Yes  Required items: Required blood products, implants, devices and special equipment available    Patient identity confirmed:  Arm band, hospital-assigned identification number and anonymous protocol, patient vented/unresponsive  NA - No sedation, light sedation, or local anesthesia  Confirmation Checklist:  Patient's identity using two indicators, relevant allergies, procedure was appropriate and matched the consent or emergent situation and correct equipment/implants were available  Time out: Immediately prior to the procedure a time out was called    Universal Protocol: the Joint Commission Universal Protocol was followed    Preparation: Patient was prepped and draped in usual sterile fashion    ESBL (mL):  3     ANESTHESIA    Anesthesia:  Local infiltration  Local Anesthetic:  Lidocaine 1% without epinephrine  Anesthetic Total (mL):  2      SEDATION  Patient Sedated: Yes    Sedation:  See MAR for details (per bedside RN)  Vital signs: Vital signs monitored during sedation        Preparation: skin prepped with ChloraPrep  Skin prep agent: skin prep agent completely dried prior to procedure  Sterile barriers: maximum sterile barriers were used: cap, mask, sterile gown, sterile gloves, and large sterile sheet  Hand hygiene: hand hygiene performed prior to central venous catheter insertion  Type of line used: PICC  Catheter type: double lumen  Lumen type: non-valved and power PICC  Lumen Identification: Red and Purple  Catheter size: 4 Fr  Brand: Bard  Lot number: [id number]  Placement method: venipuncture, MST, ultrasound and tip navigation system  Number of attempts: 1  Difficulty threading catheter:  "no  Successful placement: yes  Orientation: right  Catheter to Vein (%): 33  Location: basilic vein  Tip Location: SVC/RA Junction  Arm circumference: adults 10 cm  Extremity circumference: 29  Visible catheter length: 3  Total catheter length: 42  Dressing and securement: alcohol impregnated caps, blood cleaned with CHG, blood removed, chlorhexidine patch applied, dressing applied, gloves changed prior to final dressing, line secured, occlusive dressing applied, securement device, site cleansed, sterile dressing applied, subcutaneous anchor securement system, transparent securement dressing and hemostatic agent  Post procedure assessment: blood return through all ports, free fluid flow and placement verified by 3CG technology  PROCEDURE   Patient Tolerance:  Patient tolerated the procedure well with no immediate complicationsDescribe Procedure: PICC placed in right upper extremity for unknown LOT.  Patient tolerated well and denies pain. Tip location verified at the cavoatrial junction (CAJ) using ECG technology. PICC is ready to use. To contact the [facility] [place] enter a \"nursing to consult for vascular access\" [id number] order in [facility].     Disposal: sharps and needle count correct at the end of procedure, needles and guidewire disposed in sharps container        "

## 2024-06-10 NOTE — PROGRESS NOTES
Nephrology Progress Note  Yuliana 10, 2024        Interval history:  Care providers notes reviewed.  Making progress with oxygen requirements  Still on pressors, dose increased this morning   over the past 24 hrs  I/O pos 424  Intubated early afternoon for possible aspiration pneumonia    ASSESSMENT AND RECOMMENDATIONS:   # DEEPA   # State of shock - improving, on ceftriaxone and metronidazole   # Acute ETOh hepatitis , T bili of 25  # h/o DM and hypertension     ATI in the setting of hypotension and possibly bilirubin cast nephropathy.  Baseline creatinine of 0.97 mg/dl however likely has some degree of underlying CKD from DM and Hypertension. Presented with creatinine of 2.4, now improved down to 1.74 with better blood pressures and albumin. UA with trace protein, no significant hematuria. Repeat UA likely a sample from atraumatic wiseman insertion given significant hematuria  -Intubated today for worsening SOB and patchy infiltrates on his CXR, which rather look infiltrates than pulmonary edema. TTE with EF of 60-65%  -He doesn't look volume overloaded on exam, CXR with bilateral pulmonary opacities and has a worsening WBCS count...all that suggest an infectious etiology rather than pulmonary edema  -would hold on any diuretics for now, especially if pressors are initiated  -Keep Maps >65 mmhg  -No indication for dialysis    # AG+NAGMA   His metabolic acidosis is in the setting of DEEPA, ongoing diarrhea, starvation acidosis given ketones in urine and possibly lactic acidosis given liver disease/low perfusion state.   -Her Ph is at 7.3 this morning with some respiratory compensation PCO2 at 7.3, now at 7.26  -would start isotonic bicarb (160meq in 1L) and run at 40 ml per hour. Avoid Bicarb pushes unless you are using it in a code situation    # Hypokalemia / hypophosphatemia in the setting of poor nutrition, diarrhea and lasix -would be at risk of refeeding syndrome  -replete as needed    #Acute hypoxic  respiratory failure:  CXR, WBCs and clinical exam more suggestive of infection rather than pulmonary edema. Intubated this afternoon  -Will defer decision for bronchoscopy to the primary team    Recommendations were communicated to primary team     DAWNA KULKARNI MD  Date of Service (when I saw the patient):Yuliana 10, 2024     45 minutes spent on the date of the encounter doing chart review, history and exam, documentation, coordinating care and further activities as noted.    [employer]  Vocera Web Console      REASON FOR CONSULT: DEEPA    HISTORY OF PRESENT ILLNESS:  Admitting provider and nursing notes reviewed  Gomez Turk is a 63 year old male with a h/o alcohol use disorder, Dm, HTN and blindness presented with a decline in health, memory issues, ataxia and jaundice. He was hypotensive, and receiving albumin, started on ceftriaxone and metronidazole. He is being treated for acute etoh hepatitis and potential infection. He had a baseline of creatinine of 0.97 mg/dl in 4/24 and presented with creatinine of 2.4 mg/dl. His creatinine is now down to 1.74 mg/dl.     PAST MEDICAL HISTORY:  Reviewed with patient on 06/10/2024     Past Medical History:   Diagnosis Date    Anxiety     DIVERTICULOSIS OF COLON W/O BLEED 6/25/2003    GERD (gastroesophageal reflux disease)     Hyperlipidemia LDL goal <100 10/31/2010    Hypertension     Legally blind     PAD (peripheral artery disease) (H)     Tobacco use disorder 6/25/2003    Type 2 diabetes, HbA1c goal < 7% (H) 10/31/2010       Past Surgical History:   Procedure Laterality Date    AMPUTATE TOE(S) Left 2/28/2017    Procedure: AMPUTATE TOE(S);  Surgeon: Jesus Aragon MD;  Location:  OR    BYPASS GRAFT FEMOROPOPLITEAL Left 2/28/2017    Procedure: BYPASS GRAFT FEMOROPOPLITEAL;  Surgeon: Jesus Aragon MD;  Location:  OR    ENT SURGERY  1990    deviated septum repair    ESOPHAGOSCOPY, GASTROSCOPY, DUODENOSCOPY (EGD), COMBINED N/A 8/21/2022    Procedure:  ESOPHAGOGASTRODUODENOSCOPY (EGD);  Surgeon: Nikolas Ramachandran MD;  Location: [facility]    IRRIGATION AND DEBRIDEMENT FOOT, COMBINED Left 2/28/2017    Procedure: COMBINED IRRIGATION AND DEBRIDEMENT FOOT;  Surgeon: Jesus Aragon MD;  Location:  OR    LAPAROSCOPIC CHOLECYSTECTOMY N/A 3/18/2017    Procedure: LAPAROSCOPIC CHOLECYSTECTOMY;  Surgeon: Edin Hollingsworth MD;  Location:  OR        MEDICATIONS:  PTA Meds  Prior to Admission medications    Medication Sig Last Dose Taking? Auth Provider Long Term End Date   atorvastatin (LIPITOR) 40 MG tablet Take 1 tablet (40 mg) by mouth daily Unknown Yes Tylor Roberts MD Yes    citalopram (CELEXA) 40 MG tablet Take 1 tablet (40 mg) by mouth daily Past Week Yes Tylor Roberts MD Yes    gabapentin (NEURONTIN) 300 MG capsule Take 1 capsule (300 mg) by mouth 3 times daily Past Week Yes Tylor Roberts MD Yes    omeprazole (PRILOSEC) 20 MG DR capsule Take 20 mg by mouth daily Past Week Yes Unknown, Entered By History     zolpidem (AMBIEN) 10 MG tablet TAKE 1 TABLET(10 MG) BY MOUTH NIGHTLY AS NEEDED FOR SLEEP Past Week Yes Tylor Roberts MD     lisinopril (ZESTRIL) 5 MG tablet Take 1 tablet (5 mg) by mouth daily  Patient taking differently: Take 5 mg by mouth daily as needed   Tylor Roberts MD Yes 8/23/22      Current Meds  Current Facility-Administered Medications   Medication Dose Route Frequency Provider Last Rate Last Admin    ascorbic acid (vitamin C) chewable tablet 250 mg  250 mg Oral Daily Fela Stein APRN CNP   250 mg at 06/10/24 0838    cefTRIAXone (ROCEPHIN) 2 g vial to attach to  ml bag for ADULTS or NS 50 ml bag for PEDS  2 g Intravenous Q24H Fela Stein APRN CNP   2 g at 06/10/24 0433    [START ON 6/11/2024] folic acid (FOLVITE) tablet 1 mg  1 mg Oral Daily Fela Stein APRN CNP        folic acid injection 1 mg  1 mg Intravenous Daily Fela Stein APRN CNP   1 mg at 06/09/24 0746    insulin aspart (NovoLOG)  injection (RAPID ACTING)  1-7 Units Subcutaneous Q4H Fela Stein APRN CNP   1 Units at 06/10/24 0307    lactulose (CHRONULAC) solution 10 g  10 g Oral BID Fela Stein APRN CNP   10 g at 06/10/24 0836    lidocaine (viscous) (XYLOCAINE) 2 % solution 5 mL  5 mL Topical Once Fela Stein APRN CNP        magnesium sulfate 1 g in 100 mL D5W intermittent infusion  1 g Intravenous Once Fela Stein APRN CNP        melatonin tablet 5 mg  5 mg Oral QPM Jose David Sanchez APRN CNP   5 mg at 06/09/24 2205    metroNIDAZOLE (FLAGYL) infusion 500 mg  500 mg Intravenous Q12H Fela Stein APRN CNP   500 mg at 06/10/24 0214    midodrine (PROAMATINE) tablet 10 mg  10 mg Oral TID w/meals Fela Stein APRN CNP        multivitamin w/minerals (THERA-VIT-M) tablet 1 tablet  1 tablet Oral Daily Fela Stein APRN CNP   1 tablet at 06/10/24 0838    nicotine (NICODERM CQ) 21 MG/24HR 24 hr patch 1 patch  1 patch Transdermal Daily Fela Stein APRN CNP   1 patch at 06/10/24 0837    pantoprazole (PROTONIX) EC tablet 40 mg  40 mg Oral QAM AC Fela Stein APRN CNP   40 mg at 06/10/24 0838    thiamine (B-1) tablet 100 mg  100 mg Oral TID Fela Stein APRN CNP        [START ON 6/15/2024] thiamine (B-1) tablet 100 mg  100 mg Oral Daily Fela Stein APRN CNP         Infusion Meds  Current Facility-Administered Medications   Medication Dose Route Frequency Provider Last Rate Last Admin    norepinephrine (LEVOPHED) 4 mg in  mL PERIPHERAL infusion  0.03-0.125 mcg/kg/min (Dosing Weight) Intravenous Continuous Fela Stein APRN CNP 40.6 mL/hr at 06/10/24 0900 0.12 mcg/kg/min at 06/10/24 0900       ALLERGIES:    Allergies   Allergen Reactions    No Known Drug Allergy        REVIEW OF SYSTEMS:  A comprehensive of systems was negative except as noted above.    SOCIAL HISTORY:   Social History     Socioeconomic History    Marital status:      Spouse name: Not on file    Number of children: Not on file     Years of education: Not on file    Highest education level: Not on file   Occupational History    Not on file   Tobacco Use    Smoking status: Every Day     Current packs/day: 1.00     Types: Cigarettes    Smokeless tobacco: Never   Vaping Use    Vaping status: Never Used   Substance and Sexual Activity    Alcohol use: Yes     Comment: occassion -2 drink daily    Drug use: No    Sexual activity: Not Currently     Partners: Female   Other Topics Concern    Parent/sibling w/ CABG, MI or angioplasty before 65F 55M? No   Social History Narrative    Not on file     Social Determinants of Health     Financial Resource Strain: Low Risk  (10/30/2023)    Financial Resource Strain     Within the past 12 months, have you or your family members you live with been unable to get utilities (heat, electricity) when it was really needed?: No   Food Insecurity: Low Risk  (10/30/2023)    Food Insecurity     Within the past 12 months, did you worry that your food would run out before you got money to buy more?: No     Within the past 12 months, did the food you bought just not last and you didn t have money to get more?: No   Transportation Needs: Low Risk  (10/30/2023)    Transportation Needs     Within the past 12 months, has lack of transportation kept you from medical appointments, getting your medicines, non-medical meetings or appointments, work, or from getting things that you need?: No   Physical Activity: Not on file   Stress: Not on file   Social Connections: Not on file   Interpersonal Safety: Not on file   Housing Stability: Low Risk  (10/30/2023)    Housing Stability     Do you have housing? : Yes     Are you worried about losing your housing?: Patient refused     Reviewed with patient       FAMILY MEDICAL HISTORY:   Family History   Problem Relation Age of Onset    Diabetes Mother     Lipids Mother     Melanoma Mother     Cancer Paternal Grandmother     Neurologic Disorder Maternal Uncle     Glaucoma No family hx of      "Macular Degeneration No family hx of     Retinal detachment No family hx of     Thyroid Disease No family hx of      no Family history of kidney disease    PHYSICAL EXAM:   Temp  Av.8  F (36.6  C)  Min: 97.5  F (36.4  C)  Max: 98  F (36.7  C)      Pulse  Av.7  Min: 60  Max: 85 Resp  Av.2  Min: 10  Max: 28  SpO2  Av.7 %  Min: 88 %  Max: 99 %       /54   Pulse 73   Temp 98.4  F (36.9  C) (Oral)   Resp 24   Ht 1.727 m (5' 8\")   Wt 96.7 kg (213 lb 3 oz)   SpO2 96%   BMI 32.41 kg/m     Date 24 07 - 06/10/24 0659   Shift 3434-3340 4673-9486 2337-7390 24 Hour Total   INTAKE   I.V. 40.94   40.94   Shift Total(mL/kg) 40.94(0.44)   40.94(0.44)   OUTPUT   Urine 120 100  220   Shift Total(mL/kg) 120(1.28) 100(1.07)  220(2.35)   Weight (kg) 93.8 93.8 93.8 93.8      Admit Weight: 93.7 kg (206 lb 9.1 oz)     GENERAL APPEARANCE: mild  distress,  awake  EYES: ++ scleral icterus, pupils equal  Lymphatics: no cervical or supraclavicular LAD  Pulmonary: lungs clear to auscultation with equal breath sounds bilaterally, no clubbing  CV: regular rhythm, normal rate, no rub   - JVD none   - Edema none   GI: soft, nontender, normal bowel sounds  MS: no evidence of inflammation in joints, no muscle tenderness  : no wiseman  SKIN: no rash, warm, dry, no cyanosis  NEURO: face symmetric, no asterixis     LABS:   reviewed    IMAGING:  All imaging studies reviewed by me.     DAWNA KULKARNI MD     "

## 2024-06-10 NOTE — PROCEDURES
Procedure note    Procedure:  Bronchoscopy with BAL  Indication: Acute respiratory failure, LLL infiltrate  Consent:  Obtained from family after all risks and benefits were discussed  Sedation:  Fentanyl gtt, Propofol gtt  Procedure: After time out was performed, 3 ml of 1% lidocaine was administered through the ETT.  The bronchoscope was placed into the ETT and advanced to the distal trachea. ETT was 3 cm from the trachea.  The bilateral mainstem bronchi and all sub-segments on both sides were examined. No airway lesions, minimal blood tinged secretions in bilateral airways.  No significant mucus or plugs in the LLL.  BAL of 60 ml saline was administered to the lateral segment of the LLL with return of 50 ml.  Return was bile tinged.    No complications. Patient tolerated the procedure well.    BAL fluid sent to lab for immunocompromised panel and Aspergillus galactomnann level    Helena Huitron MD  [phone]

## 2024-06-10 NOTE — PHARMACY-CONSULT NOTE
Pharmacy Tube Feeding Consult    Medication reviewed for administration by feeding tube and for potential food/drug interactions.    Recommendation: Recommend changing the following medications to a liquid dosage form: Multivitamin, Protonix. Already done for you.      Pharmacy will continue to follow as new medications are ordered.    Martha Barajas, PharmD, BCPS

## 2024-06-10 NOTE — ANESTHESIA PROCEDURE NOTES
Airway       Patient location during procedure: OR       Procedure Start/Stop Times: 6/10/2024 1:28 PM  Staff -        CRNA: Corrine Joel APRN CRNA       Performed By: CRNA  Consent for Airway        Urgency: emergent       Consent: The procedure was performed in an emergent situation.  Indications and Patient Condition       Indications for airway management: madyson-procedural       Induction type:intravenous       Mask difficulty assessment: 2 - vent by mask + OA or adjuvant +/- NMBA    Final Airway Details       Final airway type: endotracheal airway       Successful airway: ETT - single  Endotracheal Airway Details        ETT size (mm): 7.5       Cuffed: yes       Successful intubation technique: video laryngoscopy       VL Blade Size: MAC 3       Grade View of Cords: 1       Adjucts: stylet       Position: Right       Measured from: gums/teeth       Secured at (cm): 22       Bite block used: None    Post intubation assessment        Placement verified by: capnometry, equal breath sounds and chest rise        Number of attempts at approach: 1       Secured with: tape       Ease of procedure: easy       Dentition: Intact and Unchanged    Medication(s) Administered   Medication Administration Time: 6/10/2024 1:28 PM

## 2024-06-10 NOTE — PROCEDURES
Small Bowel Feeding Tube Placement Assessment  Reason for Feeding Tube Placement: post pyloric enteral access for nutrition and medication administration  Cortrak Start Time: 1400   Cortrak End Time: 1425  Medicine Delivered During Procedure: 2% viscous lidocaine gel   Placement Successful: yes per Cortrak tracing pending AXR confirmation      Procedure Complications: none  Final Placement Tylor at exit of nare:  93 cm  Face to Face time with patient: 30 minutes      Bridle Placement:   Reason for bridle placement: securement of nasoenteric feeding tube    Medicine delivered during procedure: lidocaine gel   Procedure: Successful   Location of top of clip on FT: @ 94 cm marker   Condition of nose/skin at time of bridle placement: Unremarkable   Face to Face time with patient: < 5 minutes.      Alejandrina VALDERRAMA  Clinical Dietitian  [facility] 5B/10A ICU Vocera, Teams, or desk [phone]  Weekend/Holiday Vocera: Weekend Holiday Clinical Dietitian [Multi Site Groups]

## 2024-06-10 NOTE — PROCEDURES
ARTERIAL LINE INSERTION PROCEDURE NOTE  (NON-OR)    Procedure Date:  6/9/2024   Performing Physician:  COOKIE Muller CNP    Pre-Procedure Diagnosis:     SEPTIC SHOCK  Post-Procedure Diagnosis:  Same as Pre-Procedure Diagnosis    Procedure:  Arterial line insertion  Left Radial  Indications:  HEMODYNAMIC SHOCK      Estimated Blood Loss: Minimal   Complications: none      Procedure Details:   The risks, benefits, complications, treatment options, and expected outcomes were discussed with the patient spouse Camelia Turk. The risks and potential complications of their problem and purposed procedure include but are not limited to infection, bleeding, pain,  the need for additional procedures, and nerve and vessel injury. The patient/alternate (see above) concurred with the proposed plan, giving informed consent.  The site of the procedure was properly noted/marked. The patient was identified as Gomez Turk with Date of Birth 1960 and the procedure verified as Arterial Line Insertion.  A Time Out was held and the above information confirmed.        An Eduardo test was  done before the procedure.  The Eduardo test results were  positive .  In sterile fashion, the line site was prepped with Chlorhexidine.  Strict sterile conditions were maintained,  Cap, mask, and sterile gloves were worn by all participants.  0.5 ml of   Lidocaine 1% without epinephrine anesthetic were infiltrated into the skin.  The arterial line was placed in the Left Radial artery percutaneously,  without  difficulty.  The linewas  sutured in place  and an occlusive sterile dressing was applied.  The total number of needle stick attempts was 1.      Condition: stable    COOKIE Muller CNP, 6/9/2024, 11:33 PM

## 2024-06-10 NOTE — PROGRESS NOTES
CLINICAL NUTRITION SERVICES - BRIEF NOTE     Nutrition Prescription    RECOMMENDATIONS FOR MDs/PROVIDERS TO ORDER:  Plan of care discussed in ICU rounds    Malnutrition Status:    Moderate malnutrition in the context of acute on chronic illness    Recommendations already ordered by Registered Dietitian (RD):  Osmolite 1.5 Deric (or equivalent) @ goal of  45ml/hr  (1080 ml/day) provides: 1620 kcals, 67 g PRO, 822 ml free H20, 219 g CHO, and 0 g fiber daily.    Begin at 15 mL/hr and advance as tolerated 15 mL every 8-10 hours to goal rate    2 packets Prosource TF20 provides 160 kcals and 40 grams protein    Total provisions (including 446 kcals lipids): 2226  kcals, 107 grams protein    Free Water Flushes: 30 mL q 4 hours to maintain tube patency. Additional free water per provider order.     Future/Additional Recommendations:  Monitor labs, weights, BM/GI, TF tolerance  Monitor kcal-providing sedation, titrate TF rate  Monitor for Levothyroxine resumption to hold TF one hour before and after dosing     REASON FOR ASSESSMENT  Gomez Turk is a/an 63 year old male assessed by the dietitian for Provider Order - Registered Dietitian to Assess and Order TF per Medical Nutrition Therapy Protocol  FT placement via Cortrak    Findings  RespiratoryVent   Pressors/Needs: Norepinephrine 0.125 mcg/kg/min and Vasopressin 2.4 units per hour  IVF: None at this time  Sedation: Propofol at 30 mcg/kg/min, 16.9 mL/hr providing 446 kcals/day lipids  Pt with jaundice and MELD score 36  Plans to start TF today following feeding tube placement  Small volume ascites    LABS  Current replacement of electrolyte- RN managed None   Electrolytes  Potassium (mmol/L)   Date Value   06/10/2024 3.1 (L)   06/09/2024 3.4   06/09/2024 3.6   08/23/2022 4.0   08/22/2022 3.9   08/21/2022 4.0   08/21/2022 4.0   05/05/2021 3.8   04/15/2021 4.2   04/13/2021 3.9     Phosphorus (mg/dL)   Date Value   06/10/2024 3.7   06/09/2024 3.3   06/09/2024 2.7    06/08/2024 2.0 (L)   06/08/2024 2.7    Blood Glucose  Glucose (mg/dL)   Date Value   06/10/2024 145 (H)   06/09/2024 151 (H)   06/09/2024 152 (H)   08/22/2022 100 (H)   08/21/2022 88   08/20/2022 89   08/19/2022 120 (H)   05/24/2022 134 (H)   05/05/2021 103 (H)   04/15/2021 108 (H)   04/13/2021 104 (H)   01/28/2021 104 (H)   04/28/2020 110 (H)     GLUCOSE BY METER POCT (mg/dL)   Date Value   06/10/2024 138 (H)   06/10/2024 146 (H)   06/09/2024 143 (H)   06/09/2024 146 (H)   06/09/2024 132 (H)     Hemoglobin A1C (%)   Date Value   06/07/2024 6.1 (H)   02/17/2023 6.1 (H)   05/24/2022 5.9 (H)   10/05/2021 6.0 (H)   01/28/2021 5.7 (H)   04/28/2020 6.5 (H)   04/26/2020 6.4 (H)   01/20/2020 5.8 (H)   06/19/2019 6.5 (H)    Inflammatory Markers  CRP Inflammation (mg/L)   Date Value   04/26/2020 <2.9   06/06/2018 76.8 (H)   03/09/2018 <2.9     WBC (10e9/L)   Date Value   04/15/2021 8.9   04/27/2020 9.1   04/26/2020 9.5     WBC Count (10e3/uL)   Date Value   06/10/2024 17.5 (H)   06/09/2024 12.2 (H)   06/08/2024 14.5 (H)     Albumin (g/dL)   Date Value   06/10/2024 3.1 (L)   06/09/2024 3.3 (L)   06/09/2024 3.2 (L)   08/20/2022 2.2 (L)   08/19/2022 2.4 (L)   05/24/2022 3.6   05/05/2021 3.5   04/15/2021 3.6   04/13/2021 3.9      Magnesium (mg/dL)   Date Value   06/10/2024 1.7   06/09/2024 2.3   06/09/2024 2.0   04/26/2020 1.6   03/18/2017 2.3     Sodium (mmol/L)   Date Value   06/10/2024 141   06/09/2024 139   06/09/2024 137   05/05/2021 132 (L)   04/15/2021 131 (L)   04/13/2021 130 (L)    Renal  Urea Nitrogen (mg/dL)   Date Value   06/10/2024 32.3 (H)   06/09/2024 30.6 (H)   06/09/2024 30.6 (H)   08/22/2022 5 (L)   08/21/2022 7   08/20/2022 6 (L)   05/05/2021 18   04/15/2021 21   04/13/2021 15     Creatinine (mg/dL)   Date Value   06/10/2024 1.74 (H)   06/09/2024 1.63 (H)   06/09/2024 1.65 (H)   05/05/2021 1.28 (H)   04/15/2021 1.02   04/13/2021 1.04     Additional  Triglycerides (mg/dL)   Date Value   02/17/2023 142    10/05/2021 129   01/28/2021 87   06/19/2019 85   03/06/2018 102     Ketones Urine (mg/dL)   Date Value   06/09/2024 Trace (A)   04/15/2021 10 (A)     Platelet Count   Date Value   06/10/2024 141 10e3/uL (L)   04/15/2021 257 10e9/L     PTT (sec)   Date Value   06/07/2018 28     aPTT (Seconds)   Date Value   06/08/2024 53 (H)     INR (no units)   Date Value   06/10/2024 2.55 (H)   06/07/2018 0.89        172.7 cm  Body mass index is 32.41 kg/m .    Wt Readings from Last 10 Encounters:   06/10/24 96.7 kg (213 lb 3 oz)   08/23/22 89.6 kg (197 lb 8.5 oz)   05/24/22 91.8 kg (202 lb 4.8 oz)   10/12/21 93.3 kg (205 lb 11.2 oz)   04/15/21 97.1 kg (214 lb)   01/28/21 98 kg (216 lb)   04/26/20 98.8 kg (217 lb 13 oz)   02/19/20 95.9 kg (211 lb 8 oz)   08/22/19 99.8 kg (220 lb)   08/12/19 99.8 kg (220 lb)     Dosing Weight: 76.7 kg (adjusted using IBW 70 kg and actual body weight 96.7 kg)     ASSESSED NUTRITION NEEDS  Estimated Energy Needs: 1920 - 2300 kcals/day (25-30 kcals/kg)  Justification: Maintenance  Estimated Protein Needs: 92 - 115 grams protein/day (1.2-1.5 grams of pro/kg)  Justification: Obesity guidelines  Estimated Fluid Needs: 1 mL/kcal  Justification: Maintenance or Per Provider    MALNUTRITION  % Intake: </=75% for >/= 1 month (severe)  % Weight Loss: Unable to assess  Subcutaneous Fat Loss: Facial region:  Moderate  Muscle Loss: Thoracic region (clavicle, acromium bone, deltoid, trapezius, pectoral):  Moderate and Upper leg (quadricep, hamstring):  Moderate  Fluid Accumulation/Edema: ascites  Malnutrition Diagnosis: Moderate malnutrition in the context of acute on chronic illness    INTERVENTIONS  Implementation  Nutrition Education: Not appropriate at this time due to patient condition   Collaboration with other providers  Enteral Nutrition - Initiate  Feeding tube flush        Follow up/Monitoring  Progress toward goals will be monitored and evaluated per protocol.    Alejandrina Campos RDN [facility] 5B/10A  ICU RD pager: [phone]   Weekend/Holiday Vocera: Weekend Holiday Clinical Dietitian [Multi Site Groups]

## 2024-06-10 NOTE — CONSULTS
"PICC placed in right upper extremity for unknown length of therapy. Patient tolerated well and denies pain. Tip location verified at the cavoatrial junction (CAJ) using ECG technology. PICC is ready to use. To contact the [facility] Vascular Access team enter a \"nursing to consult for vascular access\" [id number] order in EPIC.        "

## 2024-06-10 NOTE — PHARMACY-VANCOMYCIN DOSING SERVICE
"Pharmacy Vancomycin Initial Note  Date of Service Yuliana 10, 2024  Patient's  1960  63 year old, male    Indication: Community Acquired Pneumonia and Intra-abdominal infection    Current estimated CrCl = Estimated Creatinine Clearance: 49 mL/min (A) (based on SCr of 1.74 mg/dL (H)).    Creatinine for last 3 days  2024:  3:42 PM Creatinine 1.75 mg/dL  2024:  6:12 AM Creatinine 1.81 mg/dL;  3:50 PM Creatinine 1.64 mg/dL;  9:33 PM Creatinine 1.64 mg/dL  2024:  5:40 AM Creatinine 1.74 mg/dL;  8:00 PM Creatinine 1.65 mg/dL;  9:54 PM Creatinine 1.63 mg/dL  6/10/2024:  5:27 AM Creatinine 1.74 mg/dL    Recent Vancomycin Level(s) for last 3 days  No results found for requested labs within last 3 days.      Vancomycin IV Administrations (past 72 hours)        No vancomycin orders with administrations in past 72 hours.                    Nephrotoxins and other renal medications (From now, onward)      Start     Dose/Rate Route Frequency Ordered Stop    06/10/24 1300  vancomycin (VANCOCIN) 1,500 mg in 0.9% NaCl 250 mL intermittent infusion         1,500 mg  over 90 Minutes Intravenous EVERY 24 HOURS 06/10/24 1132      06/10/24 1200  piperacillin-tazobactam (ZOSYN) 4.5 g vial to attach to  mL bag        Note to Pharmacy: For [facility], [facility] and [facility]: For Zosyn-naive patients, use the \"Zosyn initial dose + extended infusion\" order panel.    4.5 g  over 30 Minutes Intravenous ONCE 06/10/24 1129      24 1500  norepinephrine (LEVOPHED) 4 mg in  mL PERIPHERAL infusion         0.03-0.125 mcg/kg/min × 90.3 kg (Dosing Weight)  10.2-42.3 mL/hr  Intravenous CONTINUOUS 24 1446              Contrast Orders - past 72 hours (72h ago, onward)      None            [employer] Prediction of Planned Initial Vancomycin Regimen  Loading dose: N/A  Regimen: 1500 mg IV every 24 hours.  Start time: 11:31 on 06/10/2024  Exposure target: AUC24 (range)400-600 mg/L.hr   AUC24,ss: 540 mg/L.hr  Probability of AUC24 > 400: 82 " %  Ctrough,ss: 16.8 mg/L  Probability of Ctrough,ss > 20: 32 %  Probability of nephrotoxicity (Lodise NANY 2009): 12 %          Plan:  Start vancomycin  1500 mg IV q24h.   Vancomycin monitoring method: AUC  Vancomycin therapeutic monitoring goal: 400-600 mg*h/L  Pharmacy will check vancomycin levels as appropriate in 1-3 Days.    Serum creatinine levels will be ordered daily for the first week of therapy and at least twice weekly for subsequent weeks.      Tanna José RPH
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
## 2024-06-10 NOTE — PROGRESS NOTES
[facility] ICU PROGRESS NOTE  06/10/2024      Date of Service (when I saw the patient): 06/10/2024    ASSESSMENT: Gomez Turk is a 63 year old male with PMH of Type 2 DM, blindness, ETOH use disorder who was admitted on 6/7/24. He initially presented to the ED in the [facility] in [facility] due to having a decline in health, memory issues, ataxia, and jaundice. He was found to be hypotensive. Hypotension persisted despite fluid resuscitation with 3L of fluid and a dose of albumin. He was started on Levophed and transferred to  ICU.     CHANGES and MAJOR THINGS TODAY:   - Start midodrine 10 mg TID  - No response to diuresis, Xray findings more likely pneumonia and/or developing ARDs  - Intubated after diuresis ineffective and BiPap failure  - Planning for possible bronchoscopy  - planning to insert central line or PICC  - give 2 units FFP for elevated INR  - Give vitamin K 10 mg x 3 days  - Start Duonebs  - Start Zosyn  - Start Vancomycin  - MRSA swab  - check ketones  - start bicarbonate infusion, 160 mEq at 40 ml/hr  - consider small fluid bolus  - insert cortrak feeding tube  - start tube feeds  - procalcitonin  - blood cultures x2  - urine culture pending  - sputum ordered      PLAN:    Neurological:  # Acute pain   # Encephalopathy   # Ataxia  # ETOH use disorder  # Risk for ETOH withdrawal  # blindness  # Anxiety  # Neuropathy  # Sedation for Mechanical Ventilation  Ataxia and encephalopathy likely 2/2 liver disease. Ammonia level was elevated in the ED. Patient reportedly drinks Jimbo Romaine's every day, but has cut down recently. Head CT in ED negative for acute intracranial pathology per report. Patient reports he thinks his last drink was 3 days ago.  - Monitor neurological status. Delirium preventions and precautions.   - Ammonia level on arrival to ICU,   - Monitor for ETOH withdrawal  - Peth sent  - Avoid sedating medications as able. Takes gabapentin and ambien at home, hold for  "now.  - Melatonin at HS  - Continue to hold PTA Celexa today d/t prolonged Qtc  - Actively monitoring for ETOH withdrawal, however not ordering benzodiazepines at this time. Patient reports last drink was 3 days prior to admission.   - thiamine and folate ordered  - Propofol and fentanyl for sedation     Pulmonary:   # Acute Hypoxic Respiratory Failure  # C/f pulmonary edema  # C/f aspiration/hospital acquired pneumonia  # C/f developing ARDS  # Tobacco Use Disorder   Patient was initially on RA, however early on 6/9 he developed SOB and required 4L of oxygen. CXR done, c/f pulmonary edema or possible infection. On afternoon of 6/9 patient placed on BiPap, his respiratory status continued to worsen, CXR worsened on 6/10, some c/f for ongoing edema, per radiology read, \"increasing patchy opacities likely indicating worsening alveolar edema\" Opacities to left side, concern for pneumonia. CXR appearance could also possibly be developing ARDS.   - 6/10 CXR: worsening opacities, especially to left  - 6/10: intubated  - Supplemental oxygen to keep saturation above 92 %.  - Monitor  - nicotine patch  - antibiotics as below    Vent Mode: CMV/AC  (Continuous Mandatory Ventilation/ Assist Control)  FiO2 (%): 100 %  Resp Rate (Set): 15 breaths/min  Tidal Volume (Set, mL): 500 mL  PEEP (cm H2O): 8 cmH2O  Resp: 27    Cardiovascular:    # Shock-distributive possibly septic and also 2/2 liver disease   # Hx HTN  # PAD  # Hx Hyperlipidemia  # Prolonged Qtc  # Elevated troponin, likely demand  Received 3L of fluid, albumin x1 in ED per report. Remained hypotensive so was started on pressors. Troponin mildly elevated at 37, EKG not showing active ischemia, repeat troponin unable to be resulted d/t icteric sample  - Monitor hemodynamic status.   - Levophed to maintain MAP >65.   - Vasopressin also ordered, not currently needed  - 6/10: midodrine 10 mg TID started  - TTE on 06/09: EF 60-65%, right ventricular function normal, no " significant valvular abnormalities.      Gastroenterology/Nutrition:  # Risk for Malnutrition  # Ascites, small volume, reported on CT  # Liver disease, likely ETOH related Hepatitis, with acute worsening  # Elevated bilirubin  # Elevated LFTs  # GERD  # C/f SBP  # Diffuse colonic wall thickening, reported on CT  CT done at OSH, reporting small volume ascites. Also reporting diffuse colonic wall thickening which could reflect colitis or possibly portal hypertensive colopathy. Abdominal US with Doppler showed cirrhotic liver morphology without focal hepatic mass and moderate volume intra-abdominal ascites.   - RD consult  - Hepatology Consult  - acetaminophen level unable to be determined d/t icteric sample, however low concern for tylenol overdose.   - peth level 555  - hepatitis panel non-reactive  - Per IR review of imaging, pocket of fluid is not large enough to be drained at this time  - daily MELDS scores  - Hold PTA atorvastatin  - 6/8: albumin challenge as recommended by hepatology, patient received 2 doses of 25% 50g albumin  - 6/10: possibly consider steroids for alcohol induced hepatitis in the coming days, however currently holding off d/t c/f for active infection. UA could possibly be infection, c/f for pneumonia, and other infectious concerns as below    MELD 3.0: 36 at 6/10/2024  5:27 AM  MELD-Na: 34 at 6/10/2024  5:27 AM  Calculated from:  Serum Creatinine: 1.74 mg/dL at 6/10/2024  5:27 AM  Serum Sodium: 141 mmol/L (Using max of 137 mmol/L) at 6/10/2024  5:27 AM  Total Bilirubin: 25.6 mg/dL at 6/10/2024  5:27 AM  Serum Albumin: 3.1 g/dL at 6/10/2024  5:27 AM  INR(ratio): 2.55 at 6/10/2024  5:27 AM  Age at listing (hypothetical): 63 years  Sex: Male at 6/10/2024  5:27 AM      Renal/Fluids/Electrolytes:   # Acute kidney injury   # Metabolic Acidosis  # Lactic Acidosis, Mild, Lactic 2.1  # Hypokalemia  # Hypophosphatemia  # Hypomagnesemia   # ATI in setting of hypotension and possibly bile cast  nephropathy  # AGMA  Baseline creatinine is 0.97 as of April 2024 labs. Creatine 2.4 at OSH. Could be secondary to poor perfusion to kidney in setting of liver failure and/or sepsis. Also concern for bile cast nephropathy per Nephrology.   - Monitor intake and output.  - Provider to replace electrolytes d/t DEEPA  - CMP, Mg, Phos daily  - Nephrology Consult. Appreciate Recs  - 6/10: creatinine remaining stable, currently 1.74   - 6/10: patient diuresed yesterday and again this morning with no response, respiratory status worsened. Patient is now intubated. Weight is up, however it is possibly inaccurate. Respiratory status may be related to infection rather than pulmonary edema. Will start bicarbonate infusion, as patient also has worsening acidosis, possibly 2/2 bicarbonate losses with ongoing diarrhea. Patient's extremity appearance more consistent with dryness rather than fluid overload.    # Hematuria  # Penile pain   Hematuria possibly 2/2 traumatic wiseman insertion? Patient complaining of pain with catheter in place, wiseman was exchanged for a smaller size and urojet was applied during exchange.  - UA sent        Endocrine:  # Diabetes mellitus   - Q4 hour accuchecks  - Sliding scale for glucose management.   - Goal to keep BG< 180 for optimal wound healing         ID:  #C/f SBP  # Leukocytosis   # possible colitis  # C/f pneumonia  # C/f possible UTI  Patient has small volume ascites on CT per report. Possible colitis on CT. Now new concern for pneumonia, UA also possibly concerning for infection, culture has been sent.   - Procalcitonin 0.23 on admit, repeat sent today  - urine culture pending  - broaden to zosyn and vancomycin today, send MRSA swab  - CBC daily  -. Varying reports of ascites fluid, small volume per CT at OSH on 6/7, moderate volume on US here on 6/7. Per IR, not enough fluid to tap.  - enteric pathogen panel ordered          Heme:     # Coagulopathy  # Anemia of chronic disease  - INR elevated  in setting of liver failure.  - Transfuse if hgb <7.0 or signs/symptoms of hypoperfusion. Monitor and trend.   - CBC on arrival to ICU  - FFP x 2 units today, Vitamin K also ordered for increasing INR  - continue to monitor INR and CBC daily     Musculoskeletal:  # Weakness and deconditioning of critical illness   - Physical and occupational therapy consult      Skin:  # Jaundice  # No acute concerns  Jaundice 2/2 liver failure.       General Cares/Prophylaxis:    DVT Prophylaxis: SCDs. Discontinued heparin d/t increasing INR  GI Prophylaxis: PTA PPI  Restraints: None  Family Communication: Sister in Law updated via phone, had attempted to call wife but no answer  Code Status: Full Code.      Lines/tubes/drains:  - PIVs     Disposition:  - Medical ICU.      Patient seen and findings/plan discussed with medical ICU staff, Dr. Huitron.     Critical time spent 70 min      COOKIE Mcclellan CNP    Attending note:  Patient seen, examined and discussed with the AARON. All data reviewed including vital signs, medications, laboratory studies, and imaging.  I agree with the assessment and plan as outlined in the above note.  The patient remains critically ill with acute respiratory failure, severe alcoholic cirrhosis, acute kidney injury, encephalopathy, pneumonia, septic shock, elevated INR, and metabolic acidosis.  I personally adjusted the ventilator to treat the acute respiratory failure, titrated the vasopressors to treat the septic shock and followed the encephalopathy. Worsening overnight with increased vasopressor requirements, increased oxygen requirements, and worsening renal function. Will plan intubation due to respiratory distress and new left sided infiltrate.  Start stress dose steroids, cortisol level pending.  Follow ABG- may need to start HCO3 gtt.    Total Critical care time excluding time for teaching and procedures was 70 minutes.    Helena Huitron MD  [phone]    Clinically Significant Risk Factors        #  "Hypokalemia: Lowest K = 3.1 mmol/L in last 2 days, will replace as needed    # Hypercalcemia: corrected calcium is >10.1, will monitor as appropriate    # Hypoalbuminemia: Lowest albumin = 2.2 g/dL at 6/8/2024  3:50 PM, will monitor as appropriate    # Coagulation Defect: INR = 2.55 (Ref range: 0.85 - 1.15) and/or PTT = 53 Seconds (Ref range: 22 - 38 Seconds), will monitor for bleeding  # Thrombocytopenia: Lowest platelets = 109 in last 2 days, will monitor for bleeding                  # Obesity: Estimated body mass index is 32.41 kg/m  as calculated from the following:    Height as of this encounter: 1.727 m (5' 8\").    Weight as of this encounter: 96.7 kg (213 lb 3 oz)., PRESENT ON ADMISSION                 ====================================  INTERVAL HISTORY:   Patient was increasingly short of breath, diuresis was ineffective. Patient was desaturating significantly with BiPap removed for even short time periods and struggling to breathe. He consented to be intubated at this time.     OBJECTIVE:   1. VITAL SIGNS:   Temp:  [98  F (36.7  C)-98.4  F (36.9  C)] 98.4  F (36.9  C)  Pulse:  [64-97] 72  Resp:  [12-29] 29  BP: ()/() 118/104  MAP:  [56 mmHg-100 mmHg] 63 mmHg  Arterial Line BP: ()/(29-69) 104/45  FiO2 (%):  [30 %-40 %] 40 %  SpO2:  [87 %-98 %] 91 %  FiO2 (%): 40 %  Resp: 29    2. INTAKE/ OUTPUT:   I/O last 3 completed shifts:  In: 455.3 [I.V.:455.3]  Out: 1150 [Urine:1150]    3. PHYSICAL EXAMINATION:    GEN: Resting in bed, NAD  EYES: PERRL,Sclera jaundiced  HEENT:  Normocephalic, atraumatic, trachea midline  CV: RRR  PULM/CHEST: Lung sounds coarse bilaterally, dyspneic  GI: normal bowel sounds, soft, non-tender  : wiseman catheter present, urine icteric  EXTREMITIES: Pulses palpable  NEURO: Much more lethargic today, no focal deficit  SKIN: Jaundiced. No rashes, sores or ulcerations  PSYCH:  Slow to respond, cooperative       4. LABS:   Arterial Blood Gases   Recent Labs   Lab " 06/10/24  0527 06/10/24  0210 06/09/24  2353   PH 7.30* 7.28* 7.30*   PCO2 27* 25* 26*   PO2 69* 73* 73*   HCO3 13* 12* 13*     Complete Blood Count   Recent Labs   Lab 06/10/24  0640 06/10/24  0527 06/09/24  2154 06/09/24  0540 06/08/24  0612 06/08/24  0039   WBC 17.5*  --   --  12.2* 14.5* 15.9*   HGB 8.4*  --  7.7* 7.0*  --   --    * 130*  --  109* 148* 184     Basic Metabolic Panel  Recent Labs   Lab 06/10/24  0527 06/10/24  0258 06/09/24  2331 06/09/24  2154 06/09/24  2000 06/09/24  0713 06/09/24  0540     --   --  139 137  --  139   POTASSIUM 3.1*  --   --  3.4 3.6  --  3.4   CHLORIDE 111*  --   --  110* 109*  --  112*   CO2 12*  --   --  12* 11*  --  12*   BUN 32.3*  --   --  30.6* 30.6*  --  29.5*   CR 1.74*  --   --  1.63* 1.65*  --  1.74*   * 146* 143* 151* 152*   < > 121*    < > = values in this interval not displayed.     Liver Function Tests  Recent Labs   Lab 06/10/24  0527 06/09/24  2154 06/09/24  2000 06/09/24  0540 06/08/24  1550 24  1542   * 125* 127* 121*   < > 160* 181*   ALT 54 55 52 50   < > 61 67   ALKPHOS 221* 215* 217* 215*   < > 275* 300*   BILITOTAL 25.6* 26.1* 25.2* 25.4*   < > 22.6* 24.2*   ALBUMIN 3.1* 3.3* 3.2* 3.3*   < > 2.3* 2.6*   INR 2.55*  --   --  2.16*  --  1.79* 1.69*    < > = values in this interval not displayed.     Coagulation Profile  Recent Labs   Lab 06/10/24  0527 24  0540 24  0612 24  1542   INR 2.55* 2.16* 1.79* 1.69*   PTT  --   --  53* 57*       5. RADIOLOGY:   Recent Results (from the past 24 hour(s))   Echo Complete   Result Value    LVEF  60-65%    Narrative    [id number]  [id number]  [id number]  [id number]^SHARI^KARINA^FIDELIA     [facility],[place]  Echocardiography Laboratory  [address] [place], [place] [id number]     Name: AYAZ CHAPARRO  MRN: [id number]  : 1960  Study Date: 2024 08:58 AM  Age: 63 yrs  Gender: Male  Patient Location: [facility]  Reason For Study:  Shock  Ordering Physician: KARINA MCCARTHY  Performed By: Ciara Felix     BSA: 2.0 m2  Height: 68 in  Weight: 199 lb  HR: 74  BP: 101/59 mmHg  ______________________________________________________________________________  Procedure  Complete Portable Echo Adult.  ______________________________________________________________________________  Interpretation Summary  Global and regional left ventricular function is normal with an EF of 60-65%.  Global right ventricular function is normal. The right ventricle is normal  size.  No significant valvular abnormalities present.  No pericardial effusion.  There is no prior study for direct comparison.     ______________________________________________________________________________  Left Ventricle  Global and regional left ventricular function is normal with an EF of 60-65%.  Left ventricular wall thickness is normal. Left ventricular size is normal.  Left ventricular diastolic function is normal.     Right Ventricle  Global right ventricular function is normal. The right ventricle is normal  size.     Atria  Both atria appear normal.     Mitral Valve  The mitral valve is normal. Trace mitral insufficiency is present.     Aortic Valve  The aortic valve is tricuspid. On Doppler interrogation, there is no  significant stenosis or regurgitation.     Tricuspid Valve  The tricuspid valve is normal. Trace tricuspid insufficiency is present.  Pulmonary artery systolic pressure cannot be assessed.     Pulmonic Valve  The valve leaflets are not well visualized. Trace pulmonic insufficiency is  present.     Vessels  The aorta root is normal. The thoracic aorta is normal. The inferior vena cava  cannot be assessed.     Pericardium  No pericardial effusion is present.     Miscellaneous  No significant valvular abnormalities present.     Compared to Previous Study  There is no prior study for direct  comparison.  ______________________________________________________________________________  MMode/2D Measurements & Calculations  IVSd: 0.92 cm  LVIDd: 5.4 cm  LVIDs: 3.1 cm  LVPWd: 0.67 cm  FS: 42.8 %  LV mass(C)d: 152.4 grams  LV mass(C)dI: 74.7 grams/m2  Ao root diam: 3.2 cm  asc Aorta Diam: 3.3 cm  LVOT diam: 2.3 cm  LVOT area: 4.2 cm2  Ao root diam index Ht(cm/m): 1.9  Ao root diam index BSA (cm/m2): 1.6  Asc Ao diam index BSA (cm/m2): 1.6  Asc Ao diam index Ht(cm/m): 1.9  LA Volume (BP): 65.2 ml     LA Volume Index (BP): 32.0 ml/m2  RV Base: 3.1 cm  RWT: 0.25  TAPSE: 2.2 cm     Doppler Measurements & Calculations  MV E max vahid: 136.0 cm/sec  MV A max vahid: 90.0 cm/sec  MV E/A: 1.5  MV max P.8 mmHg  MV mean P.0 mmHg  MV V2 VTI: 47.8 cm  MVA(VTI): 2.7 cm2  MV dec time: 0.19 sec  Ao V2 max: 173.0 cm/sec  Ao max P.0 mmHg  Ao V2 mean: 102.0 cm/sec  Ao mean P.0 mmHg  Ao V2 VTI: 35.6 cm  PING(I,D): 3.6 cm2  PING(V,D): 3.6 cm2  LV V1 max P.0 mmHg  LV V1 max: 150.0 cm/sec  LV V1 VTI: 31.2 cm     SV(LVOT): 129.6 ml  SI(LVOT): 63.6 ml/m2  PA acc time: 0.11 sec  AV Vahid Ratio (DI): 0.87  PING Index (cm2/m2): 1.8  E/E' av.6  Lateral E/e': 9.5  Medial E/e': 13.7  RV S Vahid: 15.0 cm/sec     ______________________________________________________________________________  Report approved by: Lloyd Garcia 2024 10:52 AM         XR Chest Port 1 View    Impression    RESIDENT PRELIMINARY INTERPRETATION  IMPRESSION:  Shifting pulmonary edema, overall similar to slightly increased.

## 2024-06-10 NOTE — PLAN OF CARE
10A ICU End of Shift Summary.     Changes this shift: Patient very uncomfortable in bed. Wiseman changed to a smaller size in hopes of increasing comfort to penis. Blood clot passed from the urethra when changing wiseman. Patient up to the chair for ~2 hours to help with oxygenation and MAPs with success but returned to bed when he was ready to sleep. Dilaudid and robaxin given for pain. Patient bit lip when taking off BiPAP at ~ 0600    Neuro: Alert but slightly disoriented to situation. Restless in bed. Afebrile  Cardiac: NSR in mid 70's. Hypotensive on LEVO. MAP goal > 60  Respiratory: BiPAP 10/5 at 30% and RR of 12. Patient non-compliant with mask at times and placed on oxymask  GI/: Wiseman in place with red tingled urine output. Low appetite. Small sips of water given when off of BiPAP., Loose stools throughout shift  Diet/appetite: Regular. Holding fluid intake when on BiPAP to reduce risk of aspiration  Pain: Pain to penis from wiseman trauma. Generalized pain to back and legs; dilaudid and robaxin given  Skin: Scab on back. Redness to groin and penis. Jaundice skin generalized  LDA's: 2 PIV right arm. ART line left radial. Wiseman  Activities: Turns in bed q2H. Up to chair for multiple hours    Drips: Levo @ 0.08       Plan: Place internal jugular during day shift. Increase comfortability

## 2024-06-10 NOTE — PROGRESS NOTES
Critical Care Update:  Patient has become increasingly acidotic, his vasopressor needs are increasing. Have discussed his code status with his wife, Camelia and his step daughter Chloé who report we should continue full medical cares but that he should be a DNR. They also state they would not want to pursue dialysis in the event that his kidneys worsen.     Plan:  -Add Micafungin  - stress dose steroids 100 mg Q8 hours hydrocortisone  - patient has now had 3 amps of bicarb and is on continuous bicarb drip, monitor pH, could stop if pH has normalized, but then continue to monitor for need to restart bicarb  - give 500 ml NS now  - draw ionized calcium, then give 2 g calcium gluconate  - add phenylephrine as third pressor if needed  - patient on ARDS vent settings, once blood pressure has stabilized, plan to optimize sedation, RASS -4 to -5  - Repeat ABG for 2000, assess P/F ratio as well as pH/HCO3      Fela Stein NP

## 2024-06-10 NOTE — PROGRESS NOTES
Patient uncomfortable in bed and switched in to the chair. He is stating that he is more comfortable in the upright position; we are having more success with O2 status and MAPs while in this position. Wiseman unable to be changed at this time but patient states that the wiseman is not bothering him as much anymore. I plan to change this when he is back in bed and more comfortable.     Patient slightly non-compliant with the BiPAP and a sitter is being used to help keep patient calm while wearing the mask

## 2024-06-11 NOTE — PROGRESS NOTES
GI Progress Note  Date of Service:  6/10/2024      Assessment:   63-year-old male with history of hypertension, diabetes, bilateral blindness, active alcohol use who was admitted with severe alcohol hepatitis and obscure shock.  Patient is currently on vasopressors, hospital course complicated by development of pneumonia and DEEPA.    Today patient was on noninvasive positive pressure ventilation at the time of rounds.  He denies being in any pain and as per staff has been having loose stools and is also on lactulose.    Decompensated alcohol-related cirrhosis, MELD Na 37  Alcohol associated hepatitis    Etiology: likely alcohol, viral markers negative  Dx: By imaging, ultrasound suggestive of cirrhosis   Ascites : Small volume , not amenable to paracentesis  HE : On lactulose currently   EV screenin2022 None, needs one now   DEEPA: Present see below   HCC screening: US abdomen on 24 No HCC  Infective work up: Negative so far, is in shock  Outpatient hepatologist : None      Shock   Obscure shock , septic vs vaso plegic , suspected to have pneumonia,      DEEPA  Hypotension related vs bile cast nephropathy  Being followed by Nephrology   Improving with addressing hypotension and with albumin challenge     Recommendations:   -Consider oral prednisolone 40 Mg once daily if able to  - Monitor MELD Na labs   - Continue lactulose for possible HE  - Apprecaite nephrology input for management of DEEPA   - Management of shock as per primary  - Follow infective work up    Patient was discussed with Dr. Leventhal Vijay Are, MD   Fellow   Gastroenterology & Hepatology     __________________________________________________________________________________  Subjective:  Nursing notes reviewed and noted.  Pt on non invasive positive pressure ventilation at the time of my rounds  Denied being in any pain, has shortness of breath      Physical Examination:  Vital Signs:  /87   Pulse 77   Temp 97.8  F (36.6  C)   Resp 26  "  Ht 1.727 m (5' 8\")   Wt 96.7 kg (213 lb 3 oz)   SpO2 97%   BMI 32.41 kg/m       Gen: Pt on non invasive positive pressure ventilation  Eyes: Sclera icteric  Heart: regular rate and rhtyhm  Lungs: tachypneic , on BiPAP  Abdomen: Non distended  Neuro: Alert, responding to name       DATA:  Labs:    Reviewed and noted          "

## 2024-06-11 NOTE — SUMMARY OF CARE
Neuro: Fully sedated.   RASS goal -4 / -5   CPOT 2 or less   Patient currently RASS of -4   PERRLA at 2mm    CV: Sinus Rhythm   MAP to keep above 70 mmHg, as ordered verbally by Shahider AARON    Respiratory: On mechanical ventilator support.   Mode:  CMV/AC   Rate: 26   TV: 420   PEEP: 12   FiO2: 60%    GI: With NGT. Tube feeding not yet started.    Abdomen rounded and soft.   Had bowel movement x 1    : With wiseman catheter draining adequately to dark everette/orange color urine output.    Lines: Right PICC x 2 lumen   3 PIV 2R/1L    Drips: Vasopressin 2.4 units/hr (fixed)   Norepinephrine 0.25 mcg/kg/min   Midazolam 5mg/hr   Fentanyl 100 mcg/hr    Skin: Jaundice with scattered bruising (arms and back)      Highlights of the shift:    - Critical result of Lactic Acid & Potassium in the night.  - Bolus  ml x 1 given.  - Potassium replacement given as ordered x 2.  - CT Chest/Abdomen done.

## 2024-06-11 NOTE — PROGRESS NOTES
Nephrology Progress Note  June 11, 2024        Interval history:  Care providers notes reviewed.  Remains intubated and sedated  Hemodynamically better, off pressors  UOP 1235 over the past 24 hrs  I/O pos 1.6L    ASSESSMENT AND RECOMMENDATIONS:   # DEEPA   # State of shock - improving, on ceftriaxone and metronidazole   # Acute ETOh hepatitis , T bili of 25  # h/o DM and hypertension     ATN in the setting of hypotension and possibly bilirubin cast nephropathy.  Baseline creatinine of 0.97 mg/dl however likely has some degree of underlying CKD from DM and Hypertension. Presented with creatinine of 2.4(reported), improved down to 1.74 upon admission, and slowly worsening since.   UA with trace protein, no significant hematuria. Repeat UA likely a sample from a traumatic wiseman insertion given significant hematuria.     -His CT chest showed some fractures, he has hypokalemia and anemia, this might suggestive Multiple Myloma, though less likely. Nonetheless, will check SPEP, SIFE and Free light chains  -He doesn't look volume overloaded on exam, CT chest with bilateral pulmonary opacities and has a worsening WBCS count...all that suggest an infectious etiology rather than pulmonary edema  -would continue to hold on any diuretics for now  -Keep Maps >65 mmhg  -No indication for dialysis    # AG+NAGMA   His metabolic acidosis is in the setting of DEEPA, ongoing diarrhea, starvation acidosis given ketones in urine and mainly lactic acidosis given liver disease/low perfusion state.   -Her Ph is at 7.37 this morning with some respiratory compensation.   -He was on Hco3 drip yesterday and it was stopped today. No need to resume given normal Ph.    # Hypokalemia: in the setting of poor nutrition, diarrhea and lasix -would be at risk of refeeding syndrome  -He continue to have hypokalemia despite his DEEPA. His UA intermittently showed some glucosuria (which could be related to his high serum glucose levels on these occasions). It  "is unlikely that this is Fanconi syndrome but in the setting of Fractures/anemia and DEEPA, will check for monoclonal gammopathy  -He is not on tube feeds and that can be very likely contributing to his hypokalemia, will defer that to the primary team  - replete as needed    #Hypernatremia:  Please start free water at 40 ml/hr    #Acute hypoxic respiratory failure:  CXR, WBCs and clinical exam more suggestive of infection rather than pulmonary edema. Intubated     Recommendations were communicated to primary team verbally    DAWNA KULKARNI MD  Date of Service (when I saw the patient): 2024     50 minutes spent on the date of the encounter doing chart review, history and exam, documentation, coordinating care and further activities as noted.    [employer]  Vocera Web Console      REASON FOR CONSULT: DEEPA    HISTORY OF PRESENT ILLNESS:  Admitting provider and nursing notes reviewed  Gomez Turk is a 63 year old male with a h/o alcohol use disorder, Dm, HTN and blindness presented with a decline in health, memory issues, ataxia and jaundice. He was hypotensive, and receiving albumin, started on ceftriaxone and metronidazole. He is being treated for acute etoh hepatitis and potential infection. He had a baseline of creatinine of 0.97 mg/dl in  and presented with creatinine of 2.4 mg/dl. He is currently intubated.    MEDICATIONS:  Reviewed    ALLERGIES:    Allergies   Allergen Reactions    No Known Drug Allergy        REVIEW OF SYSTEMS:  Cannot be done    PHYSICAL EXAM:   Temp  Av.8  F (36.6  C)  Min: 97.5  F (36.4  C)  Max: 98  F (36.7  C)      Pulse  Av.7  Min: 60  Max: 85 Resp  Av.2  Min: 10  Max: 28  SpO2  Av.7 %  Min: 88 %  Max: 99 %       /87   Pulse 76   Temp 99  F (37.2  C)   Resp 11   Ht 1.727 m (5' 8\")   Wt 96.7 kg (213 lb 3 oz)   SpO2 92%   BMI 32.41 kg/m     Date 24 07 - 06/10/24 0659   Shift 3910-0691 9850-3038 9558-5945 24 Hour Total   INTAKE   I.V. 40.94   " 40.94   Shift Total(mL/kg) 40.94(0.44)   40.94(0.44)   OUTPUT   Urine 120 100  220   Shift Total(mL/kg) 120(1.28) 100(1.07)  220(2.35)   Weight (kg) 93.8 93.8 93.8 93.8      Admit Weight: 93.7 kg (206 lb 9.1 oz)     GENERAL APPEARANCE: intubated, sedated  EYES: ++ scleral icterus, pupils equal  Lymphatics: no cervical or supraclavicular LAD  Pulmonary: lungs clear to auscultation with equal breath sounds bilaterally, no clubbing  CV: regular rhythm, normal rate, no rub   - JVD none   - Edema none   GI: soft, nontender, normal bowel sounds  MS: no evidence of inflammation in joints, no muscle tenderness  : no wiseman  SKIN: no rash, warm, dry, no cyanosis  NEURO: face symmetric, no asterixis     LABS:   reviewed    IMAGING:  All imaging studies reviewed by me.     DAWNA KULKARNI MD

## 2024-06-11 NOTE — PROGRESS NOTES
ICU End of Shift Summary. See flowsheets for vital signs and detailed assessment.      Major Events this shift: Patient intubated today at 1330. Very unstable BP, labs and work of breathing. AARON notified with all abnormal labs values. Drips titrated per order to keep patient at MAP and RASS goal.       Lines: Double lumen PICC, 3PIV, NG    Drip: Fentanyl 100mcg/hr            Bicarb 50meq/hr            Levophed 0.25mcg/kg/min            Propofol 30mcg/kg/min     Plan: Continue current POC, stop propofol and start versed, administer Midodrine, labs at 2000.

## 2024-06-11 NOTE — PROGRESS NOTES
Tele-Intensivist note - at [facility]  Case discussed with Tao Gucci who is taking care of patient at [facility] ICU.     He was admitted 3 days ago with likely decompensated alcoholic liver disease and shock, history DM, and alcoholism.    CT report from outside hospital suspicious for chronic liver disease and possible colitis.     He had been in shock most of the time he's been here (3 days) but today he developed worsening shock, worsening acute respiratory failure and ARDS, and slowly worsening acute renal failure. In addition, consistent with his deteriorating physiologic status he has had a worsening and now persistent lactic acidosis with Lactate about 10.     His cultures thus far are unremarkable; I would check with outside hospital to see if he had any performed there and results.     He was initially on Rocephin and flagyl which today were changed to zosyn, vanco, and micafungin.     At this point, he is condition may be due to worsening septic shock complicated by acute on chronic liver disease, ARDS, and acute renal failure but would rule out other potential occult etiologies of potential untreated sepsis sources. I would recommend a CT of chest/abdomen/pelvis stat to evaluate other potential sources of shock, particularly his colon. I would also request ID consult in AM.     His cxr was reviewed and showed severe ARDS    Labs were reviewed and of note he is compensating with pH 7.31.     I would not recommend any further changes at this time and tele-ICU will follow with you.    Winsome chavez  Yuliana 10, 2024    Addendum: AV link unavailable for [facility].   30 minutes of critical care time provided for this patient. uriel

## 2024-06-11 NOTE — PLAN OF CARE
Problem: Adult Inpatient Plan of Care  Goal: Absence of Hospital-Acquired Illness or Injury  Intervention: Identify and Manage Fall Risk  Recent Flowsheet Documentation  Taken 6/11/2024 0400 by Harmony Kitchen RN  Safety Promotion/Fall Prevention:   clutter free environment maintained   increased rounding and observation   increase visualization of patient   lighting adjusted   room door open   room near nurse's station   room organization consistent   safety round/check completed  Taken 6/11/2024 0000 by Harmony Kitchen RN  Safety Promotion/Fall Prevention:   clutter free environment maintained   increased rounding and observation   increase visualization of patient   lighting adjusted   room door open   room near nurse's station   room organization consistent   safety round/check completed  Taken 6/10/2024 2000 by Harmony Kitchen RN  Safety Promotion/Fall Prevention:   clutter free environment maintained   increased rounding and observation   increase visualization of patient   lighting adjusted   room door open   room near nurse's station   room organization consistent   safety round/check completed  Intervention: Prevent Skin Injury  Recent Flowsheet Documentation  Taken 6/11/2024 0400 by Harmony Kitchen RN  Body Position:   turned   left  Taken 6/11/2024 0200 by Harmony Kitchen RN  Body Position:   turned   right  Taken 6/11/2024 0000 by Harmony Kitchen RN  Body Position:   turned   left  Taken 6/10/2024 2200 by Harmony Kitchen RN  Body Position:   turned   right  Taken 6/10/2024 2000 by Harmony Kitchen RN  Body Position:   turned   left  Intervention: Prevent and Manage VTE (Venous Thromboembolism) Risk  Recent Flowsheet Documentation  Taken 6/11/2024 0400 by Harmony Kitchen RN  VTE Prevention/Management: SCDs (sequential compression devices) on  Taken 6/11/2024 0000 by Harmony Kitchen RN  VTE Prevention/Management: SCDs (sequential compression  devices) on  Taken 6/10/2024 2000 by Harmony Kitchen RN  VTE Prevention/Management: SCDs (sequential compression devices) on  Intervention: Prevent Infection  Recent Flowsheet Documentation  Taken 6/11/2024 0400 by Harmony Kitchen RN  Infection Prevention:   cohorting utilized   environmental surveillance performed   equipment surfaces disinfected   hand hygiene promoted   rest/sleep promoted   single patient room provided  Taken 6/11/2024 0000 by Harmony Kitchen RN  Infection Prevention:   cohorting utilized   environmental surveillance performed   equipment surfaces disinfected   hand hygiene promoted   rest/sleep promoted   single patient room provided  Taken 6/10/2024 2000 by Harmony Kitchen RN  Infection Prevention:   cohorting utilized   environmental surveillance performed   equipment surfaces disinfected   hand hygiene promoted   rest/sleep promoted   single patient room provided  Goal: Optimal Comfort and Wellbeing  Intervention: Provide Person-Centered Care  Recent Flowsheet Documentation  Taken 6/11/2024 0400 by Harmony Kitchen RN  Trust Relationship/Rapport: care explained  Taken 6/11/2024 0000 by Harmony Kitchen RN  Trust Relationship/Rapport: care explained  Taken 6/10/2024 2000 by Harmony Kitchen RN  Trust Relationship/Rapport: care explained     Problem: Liver Failure  Goal: Optimal Gastrointestinal Function  Intervention: Monitor and Support Gastrointestinal Function  Recent Flowsheet Documentation  Taken 6/11/2024 0400 by Harmony Kitchen RN  Body Position:   turned   left  Taken 6/11/2024 0200 by Harmony Kitchen RN  Body Position:   turned   right  Taken 6/11/2024 0000 by Harmony Kitchen RN  Body Position:   turned   left  Taken 6/10/2024 2200 by Harmony Kitchen RN  Body Position:   turned   right  Taken 6/10/2024 2000 by Harmony Kitchen RN  Body Position:   turned   left  Goal: Optimal Coagulation Function  Intervention:  Monitor and Manage Coagulation  Recent Flowsheet Documentation  Taken 6/11/2024 0400 by Harmony Kitchen RN  Bleeding Precautions: blood pressure closely monitored  Taken 6/11/2024 0000 by Harmony Kitchen RN  Bleeding Precautions: blood pressure closely monitored  Taken 6/10/2024 2000 by Harmony Kitchen RN  Bleeding Precautions: blood pressure closely monitored  Goal: Optimal Pain Control, Comfort and Function  Intervention: Prevent or Manage Pain  Recent Flowsheet Documentation  Taken 6/11/2024 0400 by Harmony Kitchen RN  Sleep/Rest Enhancement:   awakenings minimized   comfort measures   noise level reduced   relaxation techniques promoted   music provided  Taken 6/11/2024 0000 by Harmony Kitchen RN  Sleep/Rest Enhancement:   awakenings minimized   comfort measures   noise level reduced   relaxation techniques promoted   music provided  Taken 6/10/2024 2000 by Harmony Kicthen RN  Sleep/Rest Enhancement:   awakenings minimized   comfort measures   noise level reduced   relaxation techniques promoted   music provided  Goal: Effective Oxygenation and Ventilation  Intervention: Promote Airway Secretion Clearance  Recent Flowsheet Documentation  Taken 6/11/2024 0400 by Harmony Kitchen RN  Cough And Deep Breathing: unable to perform  Activity Management: activity adjusted per tolerance  Taken 6/11/2024 0000 by Harmony Kitchen RN  Cough And Deep Breathing: unable to perform  Activity Management: activity adjusted per tolerance  Taken 6/10/2024 2000 by Harmony Kitchen RN  Cough And Deep Breathing: unable to perform  Activity Management: activity adjusted per tolerance  Intervention: Optimize Oxygenation and Ventilation  Recent Flowsheet Documentation  Taken 6/11/2024 0400 by Harmony Kitchen RN  Airway/Ventilation Management: airway patency maintained  Head of Bed (HOB) Positioning: HOB at 30 degrees  Taken 6/11/2024 0200 by Harmony Kitchen RN  Head of Bed  (HOB) Positioning: HOB at 30 degrees  Taken 6/11/2024 0000 by Harmony Kitchen RN  Airway/Ventilation Management: airway patency maintained  Head of Bed (HOB) Positioning: HOB at 30 degrees  Taken 6/10/2024 2200 by Harmony Kitchen RN  Head of Bed (HOB) Positioning: HOB at 30 degrees  Taken 6/10/2024 2000 by Harmony Kitchen RN  Airway/Ventilation Management: airway patency maintained  Head of Bed (HOB) Positioning: HOB at 30 degrees     Problem: Adult Inpatient Plan of Care  Goal: Absence of Hospital-Acquired Illness or Injury  Intervention: Identify and Manage Fall Risk  Recent Flowsheet Documentation  Taken 6/11/2024 0400 by Harmony Kitchen RN  Safety Promotion/Fall Prevention:   clutter free environment maintained   increased rounding and observation   increase visualization of patient   lighting adjusted   room door open   room near nurse's station   room organization consistent   safety round/check completed  Taken 6/11/2024 0000 by Harmony Kitchen RN  Safety Promotion/Fall Prevention:   clutter free environment maintained   increased rounding and observation   increase visualization of patient   lighting adjusted   room door open   room near nurse's station   room organization consistent   safety round/check completed  Taken 6/10/2024 2000 by Harmony Kitchen RN  Safety Promotion/Fall Prevention:   clutter free environment maintained   increased rounding and observation   increase visualization of patient   lighting adjusted   room door open   room near nurse's station   room organization consistent   safety round/check completed  Intervention: Prevent Skin Injury  Recent Flowsheet Documentation  Taken 6/11/2024 0400 by Harmony Kitchen RN  Body Position:   turned   left  Taken 6/11/2024 0200 by Harmony Kitchen RN  Body Position:   turned   right  Taken 6/11/2024 0000 by Harmony Kitchen RN  Body Position:   turned   left  Taken 6/10/2024 2200 by Harmony Kitchen  Macrina RIVERA RN  Body Position:   turned   right  Taken 6/10/2024 2000 by Harmony Kitchen RN  Body Position:   turned   left  Intervention: Prevent and Manage VTE (Venous Thromboembolism) Risk  Recent Flowsheet Documentation  Taken 6/11/2024 0400 by Harmony Kitchen RN  VTE Prevention/Management: SCDs (sequential compression devices) on  Taken 6/11/2024 0000 by Harmony Kitchen RN  VTE Prevention/Management: SCDs (sequential compression devices) on  Taken 6/10/2024 2000 by Harmony Kitchen RN  VTE Prevention/Management: SCDs (sequential compression devices) on  Intervention: Prevent Infection  Recent Flowsheet Documentation  Taken 6/11/2024 0400 by Harmony Kitchen RN  Infection Prevention:   cohorting utilized   environmental surveillance performed   equipment surfaces disinfected   hand hygiene promoted   rest/sleep promoted   single patient room provided  Taken 6/11/2024 0000 by Harmony Kitchen RN  Infection Prevention:   cohorting utilized   environmental surveillance performed   equipment surfaces disinfected   hand hygiene promoted   rest/sleep promoted   single patient room provided  Taken 6/10/2024 2000 by Harmony Kitchen RN  Infection Prevention:   cohorting utilized   environmental surveillance performed   equipment surfaces disinfected   hand hygiene promoted   rest/sleep promoted   single patient room provided  Goal: Optimal Comfort and Wellbeing  Intervention: Provide Person-Centered Care  Recent Flowsheet Documentation  Taken 6/11/2024 0400 by Harmony Kitchen RN  Trust Relationship/Rapport: care explained  Taken 6/11/2024 0000 by Harmony Kitchen RN  Trust Relationship/Rapport: care explained  Taken 6/10/2024 2000 by Harmony Kitchen RN  Trust Relationship/Rapport: care explained     Problem: Liver Failure  Goal: Optimal Gastrointestinal Function  Intervention: Monitor and Support Gastrointestinal Function  Recent Flowsheet Documentation  Taken  6/11/2024 0400 by Harmony Kitchen RN  Body Position:   turned   left  Taken 6/11/2024 0200 by Harmony Kitchen RN  Body Position:   turned   right  Taken 6/11/2024 0000 by Harmony Kitchen RN  Body Position:   turned   left  Taken 6/10/2024 2200 by Harmony Kitchen RN  Body Position:   turned   right  Taken 6/10/2024 2000 by Harmony Kitchen RN  Body Position:   turned   left  Goal: Optimal Coagulation Function  Intervention: Monitor and Manage Coagulation  Recent Flowsheet Documentation  Taken 6/11/2024 0400 by Harmony Kitchen RN  Bleeding Precautions: blood pressure closely monitored  Taken 6/11/2024 0000 by Harmony Kitchen RN  Bleeding Precautions: blood pressure closely monitored  Taken 6/10/2024 2000 by Harmony Kitchen RN  Bleeding Precautions: blood pressure closely monitored  Goal: Optimal Pain Control, Comfort and Function  Intervention: Prevent or Manage Pain  Recent Flowsheet Documentation  Taken 6/11/2024 0400 by Harmony Kitchen RN  Sleep/Rest Enhancement:   awakenings minimized   comfort measures   noise level reduced   relaxation techniques promoted   music provided  Taken 6/11/2024 0000 by Harmony Kitchen RN  Sleep/Rest Enhancement:   awakenings minimized   comfort measures   noise level reduced   relaxation techniques promoted   music provided  Taken 6/10/2024 2000 by Harmony Kitchen RN  Sleep/Rest Enhancement:   awakenings minimized   comfort measures   noise level reduced   relaxation techniques promoted   music provided  Goal: Effective Oxygenation and Ventilation  Intervention: Promote Airway Secretion Clearance  Recent Flowsheet Documentation  Taken 6/11/2024 0400 by Harmony Kitchen RN  Cough And Deep Breathing: unable to perform  Activity Management: activity adjusted per tolerance  Taken 6/11/2024 0000 by Harmony Kitchen RN  Cough And Deep Breathing: unable to perform  Activity Management: activity adjusted per  tolerance  Taken 6/10/2024 2000 by Harmony Kitchen RN  Cough And Deep Breathing: unable to perform  Activity Management: activity adjusted per tolerance  Intervention: Optimize Oxygenation and Ventilation  Recent Flowsheet Documentation  Taken 6/11/2024 0400 by Harmony Kitchen, RN  Airway/Ventilation Management: airway patency maintained  Head of Bed (HOB) Positioning: HOB at 30 degrees  Taken 6/11/2024 0200 by Harmony Kitchen RN  Head of Bed (HOB) Positioning: HOB at 30 degrees  Taken 6/11/2024 0000 by Harmony Kitchen RN  Airway/Ventilation Management: airway patency maintained  Head of Bed (HOB) Positioning: HOB at 30 degrees  Taken 6/10/2024 2200 by Harmony Kitchen RN  Head of Bed (HOB) Positioning: HOB at 30 degrees  Taken 6/10/2024 2000 by Harmony Kitchen RN  Airway/Ventilation Management: airway patency maintained  Head of Bed (HOB) Positioning: HOB at 30 degrees   Goal Outcome Evaluation:

## 2024-06-11 NOTE — PROGRESS NOTES
[facility] PROGRESS NOTE  06/12/2024      Date of Service (when I saw the patient): 06/12/2024    ASSESSMENT: Gomez Turk is a 63 year old male with PMH of Type 2 DM, blindness, ETOH use disorder who was admitted on 6/7/24. He initially presented to the ED in the [facility] in [facility] due to having a decline in health, memory issues, ataxia, and jaundice. He was found to be hypotensive. Hypotension persisted despite fluid resuscitation with 3L of fluid and a dose of albumin. He was started on Levophed and transferred to  ICU.     CHANGES and MAJOR THINGS TODAY:     - CXR this am: diffuse patchy opacities   - increased PEEP to 12 from 10, CXR worsened, increased oxygen needs  - Full strength veletri   - Change sbp goal to maintain 100; given low diastolic bp  - Increased FWF to 60 ml/hr  - Albumin challenge for HRS with 1.5g/kg   - Obtain FeNA   - spoke w step daughter via phone; would like to move towards comfort based care, will continue cares w no escalation currently, she needs time to talk w her mom Camelia. Consider palliative care consult  - Stat head CT for non reactive pupils    PLAN:    Neurological:  # Acute pain   # Encephalopathy   # Ataxia  # ETOH use disorder  # Risk for ETOH withdrawal  # blindness  # Anxiety  # Neuropathy  # Sedation for Mechanical Ventilation  Ataxia and encephalopathy likely 2/2 liver disease. Ammonia level was elevated in the ED. Patient reportedly drinks [employer] every day, but has cut down recently. Head CT in ED negative for acute intracranial pathology per report. Patient reports he thinks his last drink was 3 days ago.  - Monitor neurological status. Delirium preventions and precautions.   - Ammonia level elevated, continue lactulose, goal of 4 BM daily  - Monitor for ETOH withdrawal  - Peth sent  - Avoid sedating medications as able. Takes gabapentin and ambien at home, hold for now.  - Melatonin at HS  - Continue to hold PTA Celexa d/t  "prolonged Qtc  - Actively monitoring for ETOH withdrawal since admission, however no signs of withdrawal  - thiamine and folate ordered  - Versed and Fentanyl for sedation  - 6/12 notified that there is no pupil reaction per pupillometer, stat head CT ordered      Pulmonary:   # Acute Hypoxic Respiratory Failure; worsening  # C/f pulmonary edema  # C/f aspiration/hospital acquired pneumonia  # ARDS  # Tobacco Use Disorder   Patient was initially on RA, however early on 6/9 he developed SOB and required 4L of oxygen. CXR done, c/f pulmonary edema or possible infection. On afternoon of 6/9 patient placed on BiPap, his respiratory status continued to worsen, CXR worsened on 6/10, some c/f for ongoing edema, per radiology read, \"increasing patchy opacities likely indicating worsening alveolar edema\" Opacities to left side, concern for pneumonia. CXR appearance could also possibly be developing ARDS. Chest CT on 6/11: \"Diffuse consolidative and groundglass opacities throughout both lung and near total opacification of the left lower lobe. Differential diagnostic considerations include edema, infection and/or aspiration.\"  - 6/10: intubated  - Supplemental oxygen to keep saturation above 92 %.  - nicotine patch  - antibiotics as below  - CXR this am: diffuse patchy opacities   - increased PEEP to 12 from 10, CXR worsened, increased oxygen needs  - Full strength veletri     Vent Mode: CMV/AC  (Continuous Mandatory Ventilation/ Assist Control)  FiO2 (%): 80 %  Resp Rate (Set): 22 breaths/min  Tidal Volume (Set, mL): 420 mL  PEEP (cm H2O): 12 cmH2O  Resp: 25    Cardiovascular:    # Shock-distributive possibly septic and also 2/2 liver disease   # Hx HTN  # PAD  # Hx Hyperlipidemia  # Prolonged Qtc  # Elevated troponin, likely demand  - Monitor hemodynamic status.   - TTE on 06/09: EF 60-65%, right ventricular function normal, no significant valvular abnormalities.   - Levophed and vasopressin for goal sbp greater than 100  - " 6/12: restarted vasopressin given high norepinephrine, stopped midodrine  - Continues on stress dose steroids     Gastroenterology/Nutrition:  # Risk for Malnutrition  # Ascites, small volume, reported on CT  # Liver disease, likely ETOH related Hepatitis, with acute worsening  # Elevated bilirubin  # Elevated LFTs  # GERD  # C/f SBP  # Diffuse colonic wall thickening, reported on CT  CT done at OSH, reporting small volume ascites. Also reporting diffuse colonic wall thickening which could reflect colitis or possibly portal hypertensive colopathy. Abdominal US with Doppler showed cirrhotic liver morphology without focal hepatic mass and moderate volume intra-abdominal ascites.   - RD consult  - Hepatology Consult  - acetaminophen level unable to be determined d/t icteric sample, however low concern for tylenol overdose.   - peth level 555  - hepatitis panel non-reactive  - Per IR review of imaging, pocket of fluid is not large enough to be drained at this time  - daily MELDS scores  - Hold PTA atorvastatin  -6/12:  Free water deficit 2.2 L; increase FWF to 60 ml q 1 hour, check fena and albumin challenge    MELD 3.0: 39 at 6/12/2024 12:23 PM  MELD-Na: 37 at 6/12/2024 12:23 PM  Calculated from:  Serum Creatinine: 2.49 mg/dL at 6/12/2024 12:23 PM  Serum Sodium: 148 mmol/L (Using max of 137 mmol/L) at 6/12/2024 12:23 PM  Total Bilirubin: 25.1 mg/dL at 6/12/2024  5:22 AM  Serum Albumin: 2.8 g/dL at 6/12/2024  5:22 AM  INR(ratio): 2.40 at 6/12/2024  5:22 AM  Age at listing (hypothetical): 63 years  Sex: Male at 6/12/2024 12:23 PM      Renal/Fluids/Electrolytes:   # Acute kidney injury   # Metabolic Acidosis  # Lactic Acidosis  # Hypokalemia  # Hypophosphatemia  # Hypomagnesemia   # ATI in setting of hypotension and possibly bile cast nephropathy  # AGMA  # Hyperchloremia  # Hypernatremia  # AGMA (improving)  Baseline creatinine is 0.97 as of April 2024- Monitor intake and output.  - Provider to replace electrolytes d/t  DEEPA  - CMP, Mg, Phos daily  - Nephrology Consult. Appreciate Recs  - Creat worsening, will give 100 mg IV lasix, not interested in HD at this time  - Albumin challenge for HRS with 1.5g/kg   - Obtain FeNA      Endocrine:  # Diabetes mellitus   - Q4 hour accuchecks  - Sliding scale for glucose management.   - Goal to keep BG< 180 for optimal wound healing      ID:  #C/f SBP  # Leukocytosis   # possible colitis  # C/f pneumonia,  Patient has small volume ascites on CT per report. Possible colitis on CT. Now new concern for pneumonia/ARDS  - Procalcitonin 0.23 on admit, 6/11 increased to 1.0  - 6/10 MRSA negative  - CBC daily  -. Varying reports of ascites fluid, small volume per CT at OSH on 6/7, moderate volume on US here on 6/7. Per IR, not enough fluid to tap.  - enteric pathogen panel ordered  - Repeat CT on 6/11: small volume ascites, Diffuse consolidative and groundglass opacities throughout both lung and near total opacification of the left lower lobe.   - ID consult; serum HIV; neg, Cryptococcus: neg, Serum histoplasmosis antigen, Serum blastomyces antigen, Serum beta-D glucan still in process    Abx:   - Vancomycin 6/10-6/11  - Zosyn 6/10 -  - Micafungin 6/10 -        Heme:     # Coagulopathy  # Anemia of chronic disease  - INR elevated in setting of liver failure.  - Transfuse if hgb <7.0 or signs/symptoms of hypoperfusion. Monitor and trend.   - CBC on arrival to ICU  - Vitamin K for 3 days  - continue to monitor INR and CBC daily     Musculoskeletal:  # Weakness and deconditioning of critical illness   # Age-indeterminate right lateral fourth and fifth rib fracture  deformities and a right L2 transverse process fracture, although  likely chronic, noted on CT  - Physical and occupational therapy consult   - Patient did report rib pain prior to intubation, his family had attributed it to prior falls     Skin:  # Jaundice  # No acute concerns  Jaundice 2/2 liver failure.       General Cares/Prophylaxis:     DVT Prophylaxis: SCDs. Discontinued heparin d/t increasing INR  GI Prophylaxis: PTA PPI  Restraints: None  Family Communication: spoke w step daughter via phone; would like to move towards comfort based care, will continue cares w no escalation currently, she needs time to talk w her mom Camelia. Consider palliative care consult    Code Status: NO CPR     Lines/tubes/drains:  - PIVs  - Arterial line  - NG  - wiseman  - PICC     Disposition:  - Medical ICU.      Patient seen and findings/plan discussed with medical ICU staff, Dr. Huitron.     Critical time spent 65 min      COOKIE Matamoros CNP    Attending note:  Patient seen, examined and discussed with the AARON. All data reviewed including vital signs, medications, laboratory studies, and imaging.  I agree with the assessment and plan as outlined in the above note.  The patient remains critically ill with acute respiratory failure, severe alcoholic cirrhosis, acute kidney injury, encephalopathy, pneumonia, septic shock, elevated INR, and metabolic acidosis.  I personally adjusted the ventilator to treat the acute respiratory failure, titrated the vasopressors to treat the septic shock and followed the encephalopathy. Worsening overnight with increased vasopressor requirements, increased oxygen requirements, worsening renal function, and decreased lung compliance. Will attempt diuresis, discuss with family again regarding dialysis (they declined on discussions yesterday).  Overall worsening ARDS picture, may be some component of volume overload.  Severe alcoholic hepatitis; on stress dose steroids for septic shock.  Ongoing discussion with family regarding goals of care.       Total Critical care time excluding time for teaching and procedures was 65 minutes.     Helena Huitron MD  [phone]      Clinically Significant Risk Factors        # Hypokalemia: Lowest K = 2.6 mmol/L in last 2 days, will replace as needed  # Hypernatremia: Highest Na = 150 mmol/L in last 2 days,  "will monitor as appropriate   # Hypercalcemia: corrected calcium is >10.1, will monitor as appropriate   # Anion Gap Metabolic Acidosis: Highest Anion Gap = 24 mmol/L in last 2 days, will monitor and treat as appropriate  # Hypoalbuminemia: Lowest albumin = 2.2 g/dL at 6/8/2024  3:50 PM, will monitor as appropriate    # Coagulation Defect: INR = 2.40 (Ref range: 0.85 - 1.15) and/or PTT = 53 Seconds (Ref range: 22 - 38 Seconds), will monitor for bleeding  # Thrombocytopenia: Lowest platelets = 116 in last 2 days, will monitor for bleeding  # Acute Kidney Injury, unspecified: based on a >150% or 0.3 mg/dL increase in last creatinine compared to past 90 day average, will monitor renal function                 # Obesity: Estimated body mass index is 35.87 kg/m  as calculated from the following:    Height as of this encounter: 1.727 m (5' 8\").    Weight as of this encounter: 107 kg (235 lb 14.3 oz).   # Moderate Malnutrition: based on nutrition assessment    # Financial/Environmental Concerns: none        Code Status: No CPR- Pre-arrest intubation OK       ====================================  INTERVAL HISTORY:   Worsening oxygenation this am and increased pressor requirements.     OBJECTIVE:   1. VITAL SIGNS:   Temp:  [98.2  F (36.8  C)-99  F (37.2  C)] 98.4  F (36.9  C)  Pulse:  [] 89  Resp:  [17-28] 25  MAP:  [60 mmHg-310 mmHg] 74 mmHg  Arterial Line BP: ()/(41-57) 125/52  FiO2 (%):  [75 %-80 %] 80 %  SpO2:  [84 %-97 %] 93 %  Vent Mode: CMV/AC  (Continuous Mandatory Ventilation/ Assist Control)  FiO2 (%): 80 %  Resp Rate (Set): 22 breaths/min  Tidal Volume (Set, mL): 420 mL  PEEP (cm H2O): 12 cmH2O  Resp: 25    2. INTAKE/ OUTPUT:   I/O last 3 completed shifts:  In: 2968.3 [I.V.:2538.3; NG/GT:430]  Out: 600 [Urine:600]    3. PHYSICAL EXAMINATION:    GEN: Resting in bed, NAD  EYES: PERRL,Sclera jaundiced  HEENT:  Normocephalic, atraumatic, trachea midline, ETT secure  CV: RRR  PULM/CHEST: Lung sounds coarse " bilaterally, on Vent  GI: bowel sounds present, distended, soft  : wiseman catheter present, urine icteric  EXTREMITIES: Pulses palpable  NEURO: sedated  SKIN: Jaundiced. No rashes, sores or ulcerations  PSYCH:  AGUSTIN, sedated       4. LABS:   Arterial Blood Gases   Recent Labs   Lab 06/12/24  1224 06/12/24  0915 06/12/24  0523 06/11/24  2232   PH 7.31* 7.32* 7.33* 7.34*   PCO2 40 40 39 37   PO2 176* 54* 77* 86   HCO3 20* 21 21 20*     Complete Blood Count   Recent Labs   Lab 06/12/24  0522 06/11/24  0518 06/10/24  0640 06/10/24  0527 06/09/24 2154 06/09/24  0540   WBC 24.1* 21.5* 17.5*  --   --  12.2*   HGB 7.4* 7.5* 8.4*  --  7.7* 7.0*   * 130* 141* 130*  --  109*     Basic Metabolic Panel  Recent Labs   Lab 06/12/24  1223 06/12/24  1209 06/12/24  0811 06/12/24  0522 06/11/24  2353 06/11/24  2232 06/11/24  1515 06/11/24  1234   *  148*  --   --  150*  --  147*  --  149*   POTASSIUM 3.4  --   --  3.4  --  3.6  --  3.2*   CHLORIDE 113*  --   --  115*  --  113*  --  113*   CO2 19*  --   --  19*  --  18*  --  19*   BUN 61.7*  --   --  53.0*  --  47.1*  --  39.9*   CR 2.49*  2.49*  --   --  2.54*  --  2.38*  --  2.23*   * 230* 200* 233*   < > 228*   < > 223*    < > = values in this interval not displayed.     Liver Function Tests  Recent Labs   Lab 06/12/24  0522 06/11/24  0518 06/10/24  1746 06/10/24  1550 06/10/24  0527 06/09/24  2000 06/09/24  0540   * 199* 128* 147* 131*   < > 121*   * 68 <5 46 54   < > 50   ALKPHOS 156* 181* 180* 201* 221*   < > 215*   BILITOTAL 25.1* 23.7* 20.6* 24.0* 25.6*   < > 25.4*   ALBUMIN 2.8* 2.7* 2.4* 2.8* 3.1*   < > 3.3*   INR 2.40* 3.08*  --   --  2.55*  --  2.16*    < > = values in this interval not displayed.     Coagulation Profile  Recent Labs   Lab 06/12/24  0522 06/11/24  0518 06/10/24  0527 06/09/24  0540 06/08/24  0612 06/07/24  1542   INR 2.40* 3.08* 2.55* 2.16* 1.79* 1.69*   PTT  --   --   --   --  53* 57*       5. RADIOLOGY:   Recent  Results (from the past 24 hour(s))   XR Chest Port 1 View    Narrative    Portable chest    INDICATION hypoxia    COMPARISON: 6/10/2024    FINDINGS: Increased patchy opacities throughout the right lung with  slightly decreased dense opacification in the left lower lung but  continued diffuse patchy opacification throughout the entirety of the  left lung. This may represent edema or other infection. Endotracheal  tube tip approximately 4.3 cm above the sara.      Impression    IMPRESSION: Diffuse patchy opacities in the lungs the worse in the  right lung base and slightly improved in the left lung base.  Consistent with diffuse ARDS. Please follow-up to clearing to exclude  underlying infection as opposed to pulmonary edema/alveolar  hemorrhage.    ION MAYER MD         SYSTEM ID:  [id number]

## 2024-06-11 NOTE — PROGRESS NOTES
Rt assisted in bagging and transporting the pt down to CT and placed him back on previous vent  settings.

## 2024-06-11 NOTE — CONSULTS
GENERAL ID SERVICE CONSULTATION     Patient:  Gomez Turk   Date of birth 1960, Medical record number [id number]  Date of Visit:  06/11/2024  Date of Admission: 6/7/2024  Consult Requester:Aranza Sheehan*          Assessment and Recommendations:     ASSESSMENT:  Shock, multifactorial   Decompensated alcohol-associated cirrhosis   Substance use disoder  ARDS, respiratory failure  Severe pneumonitis, bilateral GGO  Small volume ascites    DISCUSSION:   63 year old male with decompensated alcoholic cirrhosis with ongoing shock and persistent vasopressor needs with concern for septic shock with persistent leukocytosis and left shift.  On imaging he has persistent bilateral pulmonary edema and increasing oxygen needs now intubated. With opacities to the left side concern for pneumonia vs ARDS. Will assess immune status with HIV testing and recommend fungal studies with pending BAL fungal cultures. Was started on micafungin yesterday, this is reasonable to continue until blood cultures finalized as candidemia can take longer to result. MRSA negative, can discontinue vancomycin. While he does have small volume ascites and significant distension, his presentation appears less consistent with SBP as he was afebrile and not noting abdominal pain. Since he is not better with antibiotics, it's less likely to be regular bacterial infection. His lungs could have severe destruction that can be difficult to recover from.     RECOMMENDATION:  Additional labs recommended:  Serum HIV (ordered for you)  Cryptococcus (ordered for you)  Serum histoplasmosis antigen (ordered for you)  Serum blastomyces antigen (ordered for you)  Serum beta-D glucan (ordered for you)  Continue IV pip-tazo, adjust dose to 3.375 g Q6h  Continue IV Micafungin 100mg daily.   Stop Vancomycin    Thank you for this consult. ID will continue to follow.     Patient was discussed with Dr. Jones.     Kierra Valiente MD  Internal Medicine -  Pediatrics PGY-1        Physician Attestation   I personally examined and evaluated this patient.  I discussed the patient with the resident/fellow and care team, and agree with the assessment and plan of care as documented in the note.     [place] findings:   Gomez Turk is a 63 year old male with severe decompensated alcohol-associated cirrhosis. He has shock which is probably multifactorial. No obvious infection at this moment so we would expand the ID workup.     KYAW WAHL MD  Date of Service (when I saw the patient): 06/11/24    MDM: >3 notes and tests reviewed, discussed with primary team, life threatening illness.   ________________________________________________________________    Consult Question: Worsening shock, likely septic, unknown source at this time.  Admission Diagnosis: Alcoholic liver cirrhosis, septic shock  Shock (H)         History of Present Illness:   Gomez is a 63 year old male with pmhx of DM type II, alcohol use disorder, CKD stage III, and blindness who was admitted on 6/7/24 after presenting to an outside ED with jaundice, and increased weakness.  At the OSH he denied shortness of breath, cough, congestion, fevers or abdominal pain. He did have periods of confusion and unsteadiness on his feet but he was brought into the ED for yellowing of the skin. He did fall at home but did not hit his head. He had noticed increasing abdominal distension prior to arrival and decreased urine output with darker urine.    Longstanding history of alcohol use with daily use 2-3 alcoholic drinks a day. Was previously living in [place] with wife but four months ago moved in with sister-in-law due to wife's hospitalization and subsequent rehab needs.      At the OSH he was hypotensive refractory to fluid bolus and albumin challenge and was thus transferred to  for vasopressors. Since admission he has been persistently hypotensive with consistent vasopressor needs of Levophed and vasopressin.  Initially on room air but 6/9 developed SOB and required 4L of oxygen which then progressed to intubation 6/10 for worsening pulmonary opacities and bronchoscopy.    Gomez is not interactive this morning and family was not at bedside. Above HPI is gathered from chart review.         Review of Systems:   CONSTITUTIONAL:  No fevers or chills  EYES: positive for icterus  ENT:  negative for hearing loss, tinnitus and sore throat  RESPIRATORY:  negative for cough with sputum and dyspnea  CARDIOVASCULAR:  negative for chest pain, dyspnea  GASTROINTESTINAL:  negative for nausea, vomiting, diarrhea and constipation  GENITOURINARY:  negative for dysuria  HEME:  No easy bruising  INTEGUMENT:  negative for rash and pruritus, positive for jaundice  NEURO:  Negative for headache         Past Medical History:     Past Medical History:   Diagnosis Date    Anxiety     DIVERTICULOSIS OF COLON W/O BLEED 6/25/2003    GERD (gastroesophageal reflux disease)     Hyperlipidemia LDL goal <100 10/31/2010    Hypertension     Legally blind     PAD (peripheral artery disease) (H)     Tobacco use disorder 6/25/2003    Type 2 diabetes, HbA1c goal < 7% (H) 10/31/2010          Social History:     Social History     Tobacco Use    Smoking status: Every Day     Current packs/day: 1.00     Types: Cigarettes    Smokeless tobacco: Never   Substance Use Topics    Alcohol use: Yes     Comment: occassion -2 drink daily     History   Sexual Activity    Sexual activity: Not Currently    Partners: Female            Current Antimicrobials:   Zosyn (6/10- **)  Micafungin (6/10- **)  Vancomycin (6/10- **)         Allergies:     Allergies   Allergen Reactions    No Known Drug Allergy             Physical Exam:   Vitals were reviewed  Visit Vitals  /87   Pulse 76   Temp 97.9  F (36.6  C) (Oral)   Resp 11       Physical Examination:  GENERAL:  Obtunded and intubated  HEENT:  Head is normocephalic, atraumatic   EYES:  Eyes have icteric sclerae without  conjunctival injection or stigmata of endocarditis.    NECK:  Supple. No cervical lymphadenopathy  LUNGS:  Clear to auscultation bilaterally.   CARDIOVASCULAR:  Regular rate and rhythm with no murmurs, gallops or rubs.  ABDOMEN:  Normal bowel sounds, soft, nontender. Appreciable hepatomegaly, significant abdominal distension  SKIN:  Diffuse jaundice over whole body. No acute rashes.  Line(s) are in place without any surrounding erythema or exudate.   NEUROLOGIC:  Obtunded and not following commands         Laboratory Data:     MICRO:  6/10 MRSA - neg  6/10 BAL Cx -   RPP - neg  6/10 Bcx -  6/8 Scx -   6/7 Bcx -    Hematology Studies    Recent Labs   Lab Test 06/11/24  0518 06/10/24  0640 06/10/24  0527 06/09/24  2154 06/09/24  0540 06/08/24  0612 06/08/24  0039 06/07/24  1542 04/06/24  1414 02/17/23  0948 10/05/21  0930 04/15/21  2201 04/27/20  1030 04/26/20  1758 01/20/20  1008 02/11/19  1400 06/07/18  0818 06/06/18  1439 03/18/17  0615 03/17/17  1700 03/16/17  0833   WBC 21.5* 17.5*  --   --  12.2* 14.5* 15.9* 14.4* 6.7 10.9   < > 8.9   < > 9.5  --  10.9   < > 8.2   < > 35.0* 38.5*   ANEU  --   --   --   --   --   --   --   --   --   --   --  6.3  --  5.8  --  7.2  --  6.9  --  30.5* 34.3*   AEOS  --   --   --   --   --   --   --   --   --   --   --  0.1  --  0.2  --  0.2  --  0.0  --  0.1 0.0   HGB 7.5* 8.4*  --  7.7* 7.0*  --   --   --  11.1* 11.5*   < > 12.9*   < > 10.7*   < > 12.6*   < > 12.2*   < > 9.6* 10.4*   * 103*  --   --  101* 99 99 99 102* 104*   < > 101*   < > 95  --  90   < > 90   < > 88 92   * 141* 130*  --  109* 148* 184 189 111* 220   < > 257   < > 253  --  317   < > 244   < > 666* 768*    < > = values in this interval not displayed.       Metabolic Studies     Recent Labs   Lab Test 06/11/24  0518 06/10/24  2350 06/10/24  2217 06/10/24  2208 06/10/24  2014 06/10/24  1746 06/10/24  1550   *  --   --  148* 147* 144 140   POTASSIUM 2.7* 2.6* 2.6* 2.6*  2.8* 3.1* 3.3* 3.7    CHLORIDE 112*  --   --  111* 110* 113* 111*   CO2 15*  --   --  13* 14* 12* 12*   BUN 34.7*  --   --  34.2* 34.6* 33.4* 35.5*   CR 1.98*  --   --  1.92* 1.96* 2.06* 1.98*   GFRESTIMATED 37*  --   --  39* 38* 36* 37*       Hepatic Studies    Recent Labs   Lab Test 06/11/24  0518 06/10/24  1746 06/10/24  1550 06/10/24  0527 06/09/24  2154 06/09/24 2000   BILITOTAL 23.7* 20.6* 24.0* 25.6* 26.1* 25.2*   ALKPHOS 181* 180* 201* 221* 215* 217*   ALBUMIN 2.7* 2.4* 2.8* 3.1* 3.3* 3.2*   * 128* 147* 131* 125* 127*   ALT 68 <5 46 54 55 52

## 2024-06-12 NOTE — PLAN OF CARE
Problem: Liver Failure  Goal: Optimal Pain Control, Comfort and Function  Outcome: Progressing   Goal Outcome Evaluation:    10A ICU End of Shift Summary.     Changes this shift: Frequent pressor changes. Vent switched to pressure control mode.     Neuro: RASS -4, sedated. Pupils not PERRLA (unreactive via pupilometer reading npi=0) - AARON notified, head CT completed.   Cardiac: Sinus Rhythm. Pulses difficult to palpate, doppler used, all pulses present. BP goal now >100 systolic. Patient maintaining this on 0.1 of Levo currently. Put on vaso early in shift, then weaned back off.   Respiratory: Intubated, vent settings below. Occasional gurgling heard from vent, RT notified, small amount of air inflated back in balloon, gurgling improved    Mechanical Ventilator Setting: Pressure Control   FiO2: 60%  Rate: 22  PC: 20  PEEP: 12    GI/: [place] in place, brown minimal output. Diuretics given with minimal effect. Had 1 loose BM this shift. NJ tube infusing 60 ml freewater flushes Q1.   Diet/appetite: NPO, freewater flushes Q1, holding TF for now.   Pain: CPOT =0  Skin: Jaundice, bruising to arms, abdomen.   LDA's: Left radial arterial line, Right PICC line, 3 PIVS  Activities: Q2 turns.    Drips: Versed at 7 mg hr. Fentanyl at 125 mcg/hr. Levo at 0.12 mcg/kg/min.       Plan: Per family, moving towards comfort based care, continuing cares with no escalation currently.

## 2024-06-12 NOTE — PLAN OF CARE
10A ICU End of Shift Summary.     Lactic acid trending down, 1L NS bolus given, potassium replacement x2, EKG completed. MAP goal reduced. Attempted to wean vasopressin.   Neuro: Sedated, RASS goal of -4/-5, achieved with current sedation. PERRL at 2mm. CPOT negative.  Cardiac: Sinus rhythm with new ST elevation, AARON aware. EKG obtained. MAP goal changed to at or above 60mmHg, unable to completely discontinue vasopressin at this time.  Respiratory: LS: coarse, diminished.   Mechanical Ventilator Setting: CMV/AC  FiO2: 80%  Rate: 22  Volume: 420  PEEP: 10     GI/: NPO. NJ, landmark unchanged, unable to start TF d/t pressor needs, prosource and meds given through NJ. No bm this shift. Abd rounded, slight distention. Oliguric, AARON aware. [place] patent draining icteric urine.  Skin: No new concerns, jaundiced with scattered bruising.  LDA's: See flowsheet  Drips:  Vasopressin 1.2 units/hr (wean)              Norepinephrine 0.3 mcg/kg/min              Midazolam 5mg/hr              Fentanyl 100 mcg/hr     Plan: Continue to wean pressors as able, start TF, update family with changes.

## 2024-06-12 NOTE — PROGRESS NOTES
[facility] GENERAL INFECTIOUS DISEASES PROGRESS NOTE     Patient:  Gomez Turk   YOB: 1960, MRN: [id number]  Date of Visit: 06/12/2024  Date of Admission: 6/7/2024  Consult Requester: Aranza Sheehan*          ASSESSMENT AND PLAN     Gomez Turk is a 63 year old male with decompensated alcoholic cirrhosis with ongoing shock and persistent vasopressor needs. CT showed Diffuse consolidative and groundglass opacities throughout both lung and near total opacification of the left lower lobe. Small volume ascites and diffuse body wall edema. He might have aspirated and now has severe lung disease and ARDS. He is adequately being treated with antibiotics. Since candidemia is less likely, would discontinue micafungin as this is hepatotoxic.     IMPRESSION  Shock, multifactorial   Decompensated alcohol-associated cirrhosis   Substance use disoder  Severe ARDS, respiratory failure  Severe pneumonitis, bilateral GGO  Small volume ascites    RECOMMENDATION:  Continue IV pip-tazo, adjust dose to 3.375 g Q6h  Stop micafungin.     ID will continue to follow with you. Please check [facility] for staff covering the service for questions.     Maribell Jones MD  Infectious Diseases  Vocera cherry    MDM: >3 notes and tests reviewed, discussed with primary team, life threatening illness.          SUBJECTIVE      Interval History and Events:  Still intubated and critically ill. On pressors.     Antimicrobial Treatment:  Zosyn 6/10-  Micafungin 6/10-  Vancomycin 6/10-6/11         OBJECTIVE       Physical Examination:    Temp:  [97.9  F (36.6  C)-99  F (37.2  C)] 98.8  F (37.1  C)  Pulse:  [] 89  Resp:  [7-28] 25  MAP:  [61 mmHg-327 mmHg] 65 mmHg  Arterial Line BP: (103-151)/(41-57) 107/47  FiO2 (%):  [75 %-80 %] 80 %  SpO2:  [89 %-97 %] 93 %    I/O last 3 completed shifts:  In: 2968.3 [I.V.:2538.3; NG/GT:430]  Out: 600 [Urine:600]    Vitals:    06/07/24 1521 06/08/24 0600 06/09/24 0741 06/10/24 0000   Weight:  93.7 kg (206 lb 9.1 oz) 90.4 kg (199 lb 4.7 oz) 93.8 kg (206 lb 12.7 oz) 96.7 kg (213 lb 3 oz)    06/12/24 0400   Weight: 107 kg (235 lb 14.3 oz)     Constitutional: critically ill   Respiratory: intubated, fine crackles bilateral  Cardiovascular: RRR, no murmur noted.   GI: soft, distended   Skin: Jaundiced    Laboratory Data:    Estimated Creatinine Clearance: 35.3 mL/min (A) (based on SCr of 2.54 mg/dL (H)).    WBC 24    AST, ALT, ALP high     Microbiology:  6/11 Crypto ag, HIV - neg   6/11 Histo, Blasto, BDG -   6/10 MRSA - neg  6/10 BAL Cx -   RPP - neg  6/10 Bcx -  6/8 Scx -   6/7 Bcx -

## 2024-06-12 NOTE — PROGRESS NOTES
Nephrology Progress Note  2024        Interval history:  Care providers notes reviewed.  Remains intubated and sedated  On and off pressors  UOP declinin over the past 24 hrs  I/O pos 2.2 L over the past 24 hrs  Worsening hypernatremia and creatinine    ASSESSMENT AND RECOMMENDATIONS:   # DEEPA   # State of shock - improving, on ceftriaxone and metronidazole   # Acute ETOh hepatitis , T bili of 25  # h/o DM and hypertension     ATN in the setting of hypotension and possibly bilirubin cast nephropathy.  Baseline creatinine of 0.97 mg/dl however likely has some degree of underlying CKD from DM and Hypertension. Presented with creatinine of 2.4(reported), improved down to 1.74 upon admission, and slowly worsening since.   UA with trace protein, no significant hematuria. Repeat UA likely a sample from a traumatic wiseman insertion given significant hematuria.     -His CT chest showed some fractures, he has hypokalemia and anemia, this might suggestive Multiple Myloma, though less likely. SPEP, SIFE and Free light chains negative for monoclonal protein  -There is no acute indication for HD at this point. That was discussed with the family by the ICU and they have declined  dialysis. We can try high dose lasix to see if it can help with volume overload.  -Keep Maps >65 mmhg    # AG+NAGMA   His metabolic acidosis is in the setting of DEEPA, ongoing diarrhea, starvation acidosis given ketones in urine and mainly lactic acidosis given liver disease/low perfusion state.   -Her Ph is at 7.36 this morning with some respiratory compensation.   -He was on Hco3 drip yesterday and it was stopped today. No need to resume given normal Ph.    # Hypokalemia: in the setting of poor nutrition, diarrhea  -He is not on tube feeds and that can be very likely contributing to his hypokalemia, will defer that to the primary team  - replete as needed    #Hypernatremia:  Increase free water to 60 ml/hr    #Acute hypoxic respiratory  "failure:  CXR, WBCs and clinical exam more suggestive of infection rather than pulmonary edema. Intubated     Recommendations were communicated to primary team verbally    DAWNA KULKARNI MD  Date of Service (when I saw the patient): 2024     40 minutes spent on the date of the encounter doing chart review, history and exam, documentation, coordinating care and further activities as noted.    [employer]  Vocera Web Console      REASON FOR CONSULT: DEEPA    HISTORY OF PRESENT ILLNESS:  Admitting provider and nursing notes reviewed  Gomez Turk is a 63 year old male with a h/o alcohol use disorder, Dm, HTN and blindness presented with a decline in health, memory issues, ataxia and jaundice. He was hypotensive, and receiving albumin, started on ceftriaxone and metronidazole. He is being treated for acute etoh hepatitis and potential infection. He had a baseline of creatinine of 0.97 mg/dl in  and presented with creatinine of 2.4 mg/dl. He is currently intubated.    MEDICATIONS:  Reviewed    ALLERGIES:    Allergies   Allergen Reactions    No Known Drug Allergy        REVIEW OF SYSTEMS:  Cannot be done    PHYSICAL EXAM:   Temp  Av.8  F (36.6  C)  Min: 97.5  F (36.4  C)  Max: 98  F (36.7  C)      Pulse  Av.7  Min: 60  Max: 85 Resp  Av.2  Min: 10  Max: 28  SpO2  Av.7 %  Min: 88 %  Max: 99 %       /87   Pulse 86   Temp 98.7  F (37.1  C) (Oral)   Resp 25   Ht 1.727 m (5' 8\")   Wt 107 kg (235 lb 14.3 oz)   SpO2 96%   BMI 35.87 kg/m     Date 24 07 - 06/10/24 0659   Shift 7709-8706 9201-6259 4040-5444 24 Hour Total   INTAKE   I.V. 40.94   40.94   Shift Total(mL/kg) 40.94(0.44)   40.94(0.44)   OUTPUT   Urine 120 100  220   Shift Total(mL/kg) 120(1.28) 100(1.07)  220(2.35)   Weight (kg) 93.8 93.8 93.8 93.8      Admit Weight: 93.7 kg (206 lb 9.1 oz)     GENERAL APPEARANCE: intubated, sedated  EYES: ++ scleral icterus, pupils equal  Lymphatics: no cervical or supraclavicular " LAD  Pulmonary: lungs clear to auscultation with equal breath sounds bilaterally, no clubbing  CV: regular rhythm, normal rate, no rub   - JVD none   - Edema none   GI: soft, nontender, normal bowel sounds  MS: no evidence of inflammation in joints, no muscle tenderness  : no wiseman  SKIN: no rash, warm, dry, no cyanosis  NEURO: face symmetric, no asterixis     LABS:   reviewed    IMAGING:  All imaging studies reviewed by me.     DAWNA KULKARNI MD

## 2024-06-12 NOTE — PLAN OF CARE
Goal Outcome Evaluation:    Major changes    Vaso weaned off    Pain: CPOT scores 0    Fever: Afebrile    Neuro: Intubated and sedated. PERRLA and pinpoint. Withdraws to pain    Cardiac: SR. MAPs above 60 on 0.3 levo. Weaning as able    Respiratory: CMV settings. 80fio2/PEEP 10/ RR22/ . Minimal secretions with inline ETT suction. Diminished lung sounds. Weaning O2 as able    : [place] patent. Low, yellow, brownish UOP    GI: Attempting to wean pressors to start TF. NG present at 93cm. No BM    Skin: Jaundiced. No wounds    Access:   L ART line  3 PIV  R PICC    Drips:   levophed 0.3  Versed 7mg/hr  Fentanyl 125mcg/hr    Activity:No OOB    Plan:

## 2024-06-12 NOTE — PROGRESS NOTES
Care Management Follow Up    Length of Stay (days): 5    Expected Discharge Date: 06//14/24     Concerns to be Addressed: discharge planning     Patient plan of care discussed at interdisciplinary rounds: Yes    Anticipated Discharge Disposition: Comfort cares.      Anticipated Discharge Services: comfort cares  Anticipated Discharge DME: Other comfort cares    Patient/family educated on Medicare website which has current facility and service quality ratings: other (see comments) (DC needs TBD)  Education Provided on the Discharge Plan: Yes  Patient/Family in Agreement with the Plan: yes    Referrals Placed by CM/SW:    Private pay costs discussed: Not applicable    Additional Information:  Pt discussed at unit rounds. Plan is to remove cares and for Pt to transition to comfort cares. SW will continue to follow and provide support.     Anthony Sim, [facility]  10 ICU & [facility]   Ph: [phone]

## 2024-06-13 PROBLEM — N17.0 ACUTE KIDNEY FAILURE WITH TUBULAR NECROSIS (H): Status: ACTIVE | Noted: 2024-01-01

## 2024-06-13 NOTE — PROGRESS NOTES
Miscellaneous [facility] ICU Progress Note    I was called to the bedside around 1:30 AM to evaluate Gomez. He was noted to have significant vent dyssynchrony. A bolus of versed was given and propofol was started for further sedation and vent synchrony. A blood gas was checked which came back with a pH of 7.07 and a Hgb of 6.2. The day team had discussions with family and it was decided that they did not want any further new cares. For this reason, I called the patient's family and spoke with his wife, Camelia. She stated that Gomez would not want to live like this and he would not want to be kept around any longer like this. She stated that she would like to move the patient to a comfort care approach. Camelia and the rest of the family had discussed this already and felt that a comfort care approach is what Gomez would likely want at this point. I explained the process of a comfort care approach and her questions were answered. Camelia stated that she would not be able to make it in due to transportation issues but she had said her goodbyes last time she was here.    Shade Duckworth PA-C on 6/13/2024 at 2:12 AM

## 2024-06-13 NOTE — DISCHARGE SUMMARY
[facility]  Discharge Summary    Date of Admission: 6/7/2024  Date of Discharge: 6/13/2024  4:10 AM  Attending Physician: Dr. Huitron   Discharging Provider: Dr. Huitron  Date of Service (when I saw the patient): 6/12/2024    Primary Provider: Tylor Roberts  Primary Care clinic: [address] [place] [id number]  Phone: [phone]  Fax number: [phone]     Discharge Diagnoses   Patient Active Problem List    Diagnosis Date Noted    Acute kidney failure with tubular necrosis (H) 06/13/2024     Priority: Medium    Shock (H) 06/07/2024     Priority: Medium    Hypokalemia 08/19/2022     Priority: Medium    Generalized muscle weakness 08/19/2022     Priority: Medium    Seizure-like activity (H) 08/19/2022     Priority: Medium    Multiple falls 08/19/2022     Priority: Medium    Closed head injury, initial encounter 08/19/2022     Priority: Medium    Alcohol use disorder, moderate, dependence (H) 08/19/2022     Priority: Medium    Anemia, unspecified type 08/19/2022     Priority: Medium    Lung nodule 06/06/2022     Priority: Medium     Referred to [facility] by [facility] Results Team.      Syncope 04/27/2020     Priority: Medium    Chronic midline low back pain without sciatica 02/28/2019     Priority: Medium    Acute osteomyelitis of toe, left (H) 06/06/2018     Priority: Medium    Counseling regarding advanced directives 05/30/2017     Priority: Medium     Advance Care Planning 5/30/2017: ACP Review of Chart / Resources Provided:  Reviewed chart for advance care plan.  Gomez Turk has been provided information and resources to begin or update their advance care plan.  Added by Zeenat Roberson            Vision loss, bilateral 03/23/2017     Priority: Medium    Acute cholecystitis 03/17/2017     Priority: Medium    Type 2 diabetes mellitus with other ophthalmic complication, without long-term current use of insulin (H) 03/16/2017      Priority: Medium    PAD (peripheral artery disease) (H24) 2017     Priority: Medium    HYPERLIPIDEMIA LDL GOAL <100 10/31/2010     Priority: Medium    Diverticulosis of large intestine 2003     Priority: Medium     Problem list name updated by automated process. Provider to review      Tobacco use disorder 2003     Priority: Medium       Hospital Course       Gomez Turk is a 63 year old male with PMH of Type 2 DM, blindness, ETOH use disorder who was admitted on 24. He initially presented to the ED in the [facility] in [facility] due to having a decline in health, memory issues, ataxia, and jaundice. He was found to be hypotensive. Hypotension persisted despite fluid resuscitation with 3L of fluid and a dose of albumin. He was started on Levophed and transferred to  ICU. Intubated for worsening encephalopathy, liver failure and ana worsening, pneumonia. Family agreed on  that they would not like to escalate care overnight - became more acidotic and eventually care was withdrawn.  2024 at 0255.        Pending Results     Unresulted Labs Ordered in the Past 30 Days of this Admission       Date and Time Order Name Status Description    2024 11:00 AM Blastomyces Agn Quant EIA Blood In process     6/10/2024  5:20 PM Nocardia culture - BAL Site 1 Preliminary     6/10/2024  5:20 PM Fungus Culture, non-blood - BAL Site 1 Preliminary     6/10/2024  5:20 PM Acid-Fast Bacilli Culture and Stain In process     6/10/2024 11:29 AM Blood Culture Hand, Right Preliminary     6/10/2024 11:29 AM Blood Culture Arm, Left Preliminary           Code Status   DNR / DNI    CABRERA  Discharge Orders   No discharge procedures on file.  Discharge Medications   Discharge Medication List as of 2024  4:11 AM        CONTINUE these medications which have NOT CHANGED    Details   atorvastatin (LIPITOR) 40 MG tablet Take 1 tablet (40 mg) by mouth daily, Disp-90 tablet, R-0,  E-PrescribeNo further refills beyond this 90-day supply will be granted until the patient is seen in the clinic for follow-up routine annual lab work.      citalopram (CELEXA) 40 MG tablet Take 1 tablet (40 mg) by mouth daily, Disp-90 tablet, R-0, E-Prescribe      gabapentin (NEURONTIN) 300 MG capsule Take 1 capsule (300 mg) by mouth 3 times daily, Disp-90 capsule, R-0, E-Prescribe      omeprazole (PRILOSEC) 20 MG DR capsule Take 20 mg by mouth daily, Historical      zolpidem (AMBIEN) 10 MG tablet TAKE 1 TABLET(10 MG) BY MOUTH NIGHTLY AS NEEDED FOR SLEEP, Disp-30 tablet, R-0, E-Prescribe           Allergies   Allergies   Allergen Reactions    No Known Drug Allergy      Data   Most Recent 3 CBC's:  Recent Labs   Lab Test 06/13/24  0124 06/12/24  0522 06/11/24  0518 06/10/24  0640   WBC  --  24.1* 21.5* 17.5*   HGB 6.2* 7.4* 7.5* 8.4*   MCV  --  103* 103* 103*   PLT  --  116* 130* 141*      Most Recent 3 BMP's:  Recent Labs   Lab Test 06/13/24  0003 06/12/24  1930 06/12/24  1757 06/12/24  1606 06/12/24  1223 06/12/24  0811 06/12/24  0522   NA  --   --  147*  --  148*  148*  --  150*   POTASSIUM  --   --  3.0*  --  3.4  --  3.4   CHLORIDE  --   --  112*  --  113*  --  115*   CO2  --   --  18*  --  19*  --  19*   BUN  --   --  64.3*  --  61.7*  --  53.0*   CR  --   --  2.82*  --  2.49*  2.49*  --  2.54*   ANIONGAP  --   --  17*  --  16*  --  16*   KRYSTEN  --   --  9.2  --  8.4*  --  8.8   * 203* 240*   < > 240*   < > 233*    < > = values in this interval not displayed.     Most Recent 2 LFT's:  Recent Labs   Lab Test 06/12/24  0522 06/11/24  0518   * 199*   * 68   ALKPHOS 156* 181*   BILITOTAL 25.1* 23.7*     Most Recent INR's and Anticoagulation Dosing History:  Anticoagulation Dose History  More data exists         Latest Ref Rng & Units 6/7/2018 6/7/2024 6/8/2024 6/9/2024 6/10/2024 6/11/2024 6/12/2024   Recent Dosing and Labs   INR 0.85 - 1.15 0.89  1.69  1.79  2.16  2.55  3.08  2.40      Most  Recent 3 Troponin's:  Recent Labs   Lab Test 04/26/20  1758 05/17/17  1619   TROPI <0.015 <0.015  The 99th percentile for upper reference range is 0.045 ug/L.  Troponin values in   the range of 0.045 - 0.120 ug/L may be associated with risks of adverse   clinical events.       Most Recent 6 Bacteria Isolates From Any Culture (See EPIC Reports for Culture Details):  Recent Labs   Lab Test 04/27/20  0920 04/26/20  1857 04/26/20  1758 06/06/18  1550 06/06/18  1439 03/17/17  1725   CULT Moderate growth  Normal shreya   >10 Squamous epithelial cells/low power field indicates oral contamination. Please   recollect.  *  Notification of test cancellation was given to  Irasema Edmondson (RN) on 4.26.2020 @ [facility], .   No growth  No growth No growth No growth No growth     Most Recent TSH, T4 and A1c Labs:  Recent Labs   Lab Test 06/10/24  1746 06/07/24  1542   TSH 0.19*  --    T4 0.57*  --    A1C  --  6.1*     Time Spent on this Encounter   I, COOKIE Matamoros CNP, discharged this patient today but I did not personally see the patient today and will not be billing for the patient's discharge.    We appreciate the opportunity to care for your patient while in the hospital.     COOKIE Matamoros CNP

## 2024-06-13 NOTE — PLAN OF CARE
Goal Outcome Evaluation:    Major changes    Vaso remains off. Weaning levo as able. Sedation maxed for vent compliance. (See Care Plan note and Death Pronunciation note)    Pain: CPOT scores 0. RASS -5    Fever: Afebrile    Neuro: Intubated and sedated. NPI R 0.7, NPI R 0.8    Cardiac: SR. Systolic above 100 on levo gtt. Weaning as able    Respiratory: CMV settings. 80fio2/PEEP 10/ RR22/ . Minimal secretions with inline ETT suction. Diminished lung sounds. Weaning O2 as able    : [place] patent. Low, yellow, brownish UOP    GI: Attempting to wean pressors to start TF. NG present at 93cm. No BM    Skin: Jaundiced. No wounds    Access:   L ART line  3 PIV  R PICC    Drips:   levophed 0.3  Versed 7mg/hr  Fentanyl 125mcg/hr    Activity:No OOB    Plan: Transitioned to Comfort Cares

## 2024-06-13 NOTE — PROGRESS NOTES
[facility] ICU PROGRESS NOTE  06/11/2024        Date of Service (when I saw the patient): 06/11/2024     ASSESSMENT: Gomez Turk is a 63 year old male with PMH of Type 2 DM, blindness, ETOH use disorder who was admitted on 6/7/24. He initially presented to the ED in the [facility] in [facility] due to having a decline in health, memory issues, ataxia, and jaundice. He was found to be hypotensive. Hypotension persisted despite fluid resuscitation with 3L of fluid and a dose of albumin. He was started on Levophed and transferred to  ICU.      CHANGES and MAJOR THINGS TODAY:   - Micafungin added overnight  - cryptococcus antigen, histoplasma, blasto, HIV, beta d glucan added on by ID  - Was on bicarbonate infusion yesterday evening, pH now improved  - Stop Vancomycin, MRSA swab negative  - Reduce MAP goal to 60. If worsening lactic, re-increase goal.   - Increase midodrine to 20 mg TID  - Decrease PEEP to 10, decrease rate to 22 on vent  - Decrease SDS to 100 mg BID  - If able to titrate down to one pressor, okay to start tube feeds  - Increase free water flushes to 40 ml Q1 hour for hypernatremia  - fluid bolus given in afternoon  - continue to follow CMP/ABG/lactics closely        PLAN:     Neurological:  # Acute pain   # Encephalopathy   # Ataxia  # ETOH use disorder  # Risk for ETOH withdrawal  # blindness  # Anxiety  # Neuropathy  # Sedation for Mechanical Ventilation  Ataxia and encephalopathy likely 2/2 liver disease. Ammonia level was elevated in the ED. Patient reportedly drinks foodjunky every day, but has cut down recently. Head CT in ED negative for acute intracranial pathology per report. Patient reports he thinks his last drink was 3 days ago.  - Monitor neurological status. Delirium preventions and precautions.   - Ammonia level elevated, continue lactulose, goal of 4 BM daily  - Monitor for ETOH withdrawal  - Peth sent  - Avoid sedating medications as able. Takes gabapentin and  "ambien at home, hold for now.  - Melatonin at HS  - Continue to hold PTA Celexa d/t prolonged Qtc  - Actively monitoring for ETOH withdrawal since admission, however no signs of withdrawal  - thiamine and folate ordered  - Versed and Fentanyl for sedation     Pulmonary:   # Acute Hypoxic Respiratory Failure  # C/f pulmonary edema  # C/f aspiration/hospital acquired pneumonia  # ARDS  # Tobacco Use Disorder   Patient was initially on RA, however early on 6/9 he developed SOB and required 4L of oxygen. CXR done, c/f pulmonary edema or possible infection. On afternoon of 6/9 patient placed on BiPap, his respiratory status continued to worsen, CXR worsened on 6/10, some c/f for ongoing edema, per radiology read, \"increasing patchy opacities likely indicating worsening alveolar edema\" Opacities to left side, concern for pneumonia. CXR appearance could also possibly be developing ARDS. Chest CT on 6/11: \"Diffuse consolidative and groundglass opacities throughout both lung and near total opacification of the left lower lobe. Differential diagnostic considerations include edema, infection and/or aspiration.\"  - 6/10 CXR: worsening opacities, especially to left  - 6/10: intubated  - Supplemental oxygen to keep saturation above 92 %.  - Monitor  - nicotine patch  - antibiotics as below     Vent Mode: CMV/AC  (Continuous Mandatory Ventilation/ Assist Control)  FiO2 (%): 60 %  Resp Rate (Set): 26 breaths/min  Tidal Volume (Set, mL): 420 mL  PEEP (cm H2O): 12 cmH2O  Resp: 11     Cardiovascular:    # Shock-distributive possibly septic and also 2/2 liver disease   # Hx HTN  # PAD  # Hx Hyperlipidemia  # Prolonged Qtc  # Elevated troponin, likely demand  Received 3L of fluid, albumin x1 in ED per report. Remained hypotensive so was started on pressors. Troponin mildly elevated at 37, EKG not showing active ischemia, repeat troponin unable to be resulted d/t icteric sample  - Monitor hemodynamic status.   - Levophed to maintain MAP " >65.  - Vasopressin being titrated off  - 6/10: midodrine 10 mg TID started  - 6/11: midodrine increased to 20 mg TID  - TTE on 06/09: EF 60-65%, right ventricular function normal, no significant valvular abnormalities.      Gastroenterology/Nutrition:  # Risk for Malnutrition  # Ascites, small volume, reported on CT  # Liver disease, likely ETOH related Hepatitis, with acute worsening  # Elevated bilirubin  # Elevated LFTs  # GERD  # C/f SBP  # Diffuse colonic wall thickening, reported on CT  CT done at OSH, reporting small volume ascites. Also reporting diffuse colonic wall thickening which could reflect colitis or possibly portal hypertensive colopathy. Abdominal US with Doppler showed cirrhotic liver morphology without focal hepatic mass and moderate volume intra-abdominal ascites.   - RD consult  - Hepatology Consult  - acetaminophen level unable to be determined d/t icteric sample, however low concern for tylenol overdose.   - peth level 555  - hepatitis panel non-reactive  - Per IR review of imaging, pocket of fluid is not large enough to be drained at this time  - daily MELDS scores  - Hold PTA atorvastatin  - 6/8: albumin challenge as recommended by hepatology, patient received 2 doses of 25% 50g albumin  - 6/11: Had avoided steroids d/t infectious concerns, however patient currently now on stress dose steroids d/t shock  - Free water flushes increased to 40 ml Q1 hour     MELD 3.0: 39 at 6/11/2024  5:18 AM  MELD-Na: 38 at 6/11/2024  5:18 AM  Calculated from:  Serum Creatinine: 1.98 mg/dL at 6/11/2024  5:18 AM  Serum Sodium: 147 mmol/L (Using max of 137 mmol/L) at 6/11/2024  5:18 AM  Total Bilirubin: 23.7 mg/dL at 6/11/2024  5:18 AM  Serum Albumin: 2.7 g/dL at 6/11/2024  5:18 AM  INR(ratio): 3.08 at 6/11/2024  5:18 AM  Age at listing (hypothetical): 63 years  Sex: Male at 6/11/2024  5:18 AM        Renal/Fluids/Electrolytes:   # Acute kidney injury   # Metabolic Acidosis  # Lactic Acidosis  # Hypokalemia  #  Hypophosphatemia  # Hypomagnesemia   # ATI in setting of hypotension and possibly bile cast nephropathy  # AGMA  # Hyperchloremia  # Hypernatremia  # AGMA (improving)  Baseline creatinine is 0.97 as of April 2024 labs. Creatine 2.4 at OSH. Could be secondary to poor perfusion to kidney in setting of liver failure and/or sepsis. Also concern for bile cast nephropathy per Nephrology.   - Monitor intake and output.  - Provider to replace electrolytes d/t DEEPA  - CMP, Mg, Phos daily  - Nephrology Consult. Appreciate Recs  - 6/10: patient required bicarb amps and short term infusion for acidosis, also received 1L of fluid total, calcium gluconate infusion   - 6/10: serum ketones negative, were checked d/t c/f DKA vs starvation ketosis contributing to acidosis. Of note, did have ketones in urine on 6/09  - 6/11: Had lactic as high as 10.8 overnight, now improving to 4.6 most recently. Creatinine worsening, urine output decreasing this afternoon. Will give 1L fluid bolus.      # Hematuria  # Penile pain   Hematuria possibly 2/2 traumatic wiseman insertion? Patient complaining of pain with catheter in place, wiseman was exchanged for a smaller size and urojet was applied during exchange.  - UA sent        Endocrine:  # Diabetes mellitus   - Q4 hour accuchecks  - Sliding scale for glucose management.   - Goal to keep BG< 180 for optimal wound healing         ID:  #C/f SBP  # Leukocytosis   # possible colitis  # C/f pneumonia  Patient has small volume ascites on CT per report. Possible colitis on CT. Now new concern for pneumonia/ARDS  - Procalcitonin 0.23 on admit, repeat sent today  - broaden to zosyn and vancomycin today, send MRSA swab  - CBC daily  -. Varying reports of ascites fluid, small volume per CT at OSH on 6/7, moderate volume on US here on 6/7. Per IR, not enough fluid to tap.  - enteric pathogen panel ordered  - Repeat CT on 6/11: small volume ascites, Diffuse consolidative and groundglass opacities throughout both  lung and near total opacification of the left lower lobe.   - ID consult     Abx:   - Vancomycin 6/10-6/11  - Zosyn 6/10 -  - Micafungin 6/10 -         Heme:     # Coagulopathy  # Anemia of chronic disease  - INR elevated in setting of liver failure.  - Transfuse if hgb <7.0 or signs/symptoms of hypoperfusion. Monitor and trend.   - CBC on arrival to ICU  - Vitamin K for 3 days  - continue to monitor INR and CBC daily     Musculoskeletal:  # Weakness and deconditioning of critical illness   # Age-indeterminate right lateral fourth and fifth rib fracture  deformities and a right L2 transverse process fracture, although  likely chronic, noted on CT  - Physical and occupational therapy consult   - Patient did report rib pain prior to intubation, his family had attributed it to prior falls     Skin:  # Jaundice  # No acute concerns  Jaundice 2/2 liver failure.       General Cares/Prophylaxis:    DVT Prophylaxis: SCDs. Discontinued heparin d/t increasing INR  GI Prophylaxis: PTA PPI  Restraints: None  Family Communication: Has daughter in law, wife, and sister in law  Code Status: Full Code.      Lines/tubes/drains:  - PIVs     Disposition:  - Medical ICU.      Patient seen and findings/plan discussed with medical ICU staff, Dr. Huitron.     Critical time spent 45 min        COOKIE Mcclellan CNP    Attending note:  Patient seen, examined and discussed with the AARON. All data reviewed including vital signs, medications, laboratory studies, and imaging.  I agree with the assessment and plan as outlined in the above note.  The patient remains critically ill with acute respiratory failure, severe alcoholic cirrhosis, acute kidney injury, encephalopathy, pneumonia, septic shock, elevated INR, and metabolic acidosis.  I personally adjusted the ventilator to treat the acute respiratory failure, titrated the vasopressors to treat the septic shock and followed the encephalopathy. Increased lactic acidosis overnight, however  "vasopressor requirement decreased. CT chest and abdomen performed- bilateral infiltrates, no intra-abdominal process noted.  Decreasing FiO2 requirement this morning- will decrease PEEP. Follow-up on cultures.  Family updated at bedside.     Total Critical care time excluding time for teaching and procedures was 45 minutes.     Helena Huitron MD  [phone]           Clinically Significant Risk Factors         # Hypokalemia: Lowest K = 2.6 mmol/L in last 2 days, will replace as needed  # Hypernatremia: Highest Na = 148 mmol/L in last 2 days, will monitor as appropriate   # Hypercalcemia: corrected calcium is >10.1, will monitor as appropriate   # Anion Gap Metabolic Acidosis: Highest Anion Gap = 24 mmol/L in last 2 days, will monitor and treat as appropriate  # Hypoalbuminemia: Lowest albumin = 2.2 g/dL at 6/8/2024  3:50 PM, will monitor as appropriate     # Coagulation Defect: INR = 3.08 (Ref range: 0.85 - 1.15) and/or PTT = 53 Seconds (Ref range: 22 - 38 Seconds), will monitor for bleeding                      # Obesity: Estimated body mass index is 32.41 kg/m  as calculated from the following:    Height as of this encounter: 1.727 m (5' 8\").    Weight as of this encounter: 96.7 kg (213 lb 3 oz)., PRESENT ON ADMISSION  # Moderate Malnutrition: based on nutrition assessment, PRESENT ON ADMISSION   # Financial/Environmental Concerns: none        Code Status: No CPR- Pre-arrest intubation OK          ====================================  INTERVAL HISTORY:   Patient's lactic improving today, FiO2 requirements improving,      OBJECTIVE:   1. VITAL SIGNS:   Temp:  [95  F (35  C)-98.2  F (36.8  C)] 97.9  F (36.6  C)  Pulse:  [61-96] 76  Resp:  [11-39] 11  BP: ()/(41-87) 118/87  MAP:  [45 mmHg-98 mmHg] 77 mmHg  Arterial Line BP: ()/(31-64) 142/54  FiO2 (%):  [60 %-100 %] 60 %  SpO2:  [89 %-100 %] 92 %  Vent Mode: CMV/AC  (Continuous Mandatory Ventilation/ Assist Control)  FiO2 (%): 60 %  Resp Rate (Set): 26 " breaths/min  Tidal Volume (Set, mL): 420 mL  PEEP (cm H2O): 12 cmH2O  Resp: 11     2. INTAKE/ OUTPUT:   I/O last 3 completed shifts:  In: 3068.69 [I.V.:2348.69; NG/GT:220; IV Piggyback:500]  Out: 1275 [Urine:1275]     3. PHYSICAL EXAMINATION:     GEN: Resting in bed, NAD  EYES: PERRL,Sclera jaundiced  HEENT:  Normocephalic, atraumatic, trachea midline, ETT secure  CV: RRR  PULM/CHEST: Lung sounds coarse bilaterally, on Vent  GI: bowel sounds present, distended, soft  : wiseman catheter present, urine icteric  EXTREMITIES: Pulses palpable  NEURO: sedated  SKIN: Jaundiced. No rashes, sores or ulcerations  PSYCH:  AGUSTIN, sedated        4. LABS:   Arterial Blood Gases          Recent Labs   Lab 06/11/24  0524 06/10/24  2350 06/10/24  2217 06/10/24  2020   PH 7.34* 7.34* 7.31* 7.31*   PCO2 33* 29* 29* 31*   PO2 82 102 110* 197*   HCO3 17* 15* 15* 15*      Complete Blood Count            Recent Labs   Lab 06/11/24  0518 06/10/24  0640 06/10/24  0527 06/09/24  2154 06/09/24  0540 06/08/24  0612   WBC 21.5* 17.5*  --   --  12.2* 14.5*   HGB 7.5* 8.4*  --  7.7* 7.0*  --    * 141* 130*  --  109* 148*      Basic Metabolic Panel                Recent Labs   Lab 06/11/24  0756 06/11/24  0518 06/11/24  0231 06/10/24  2350 06/10/24  2220 06/10/24  2217 06/10/24  2208 06/10/24  2027 06/10/24  2014 06/10/24  1818 06/10/24  1746   NA  --  147*  --   --   --   --  148*  --  147*  --  144   POTASSIUM  --  2.7*  --  2.6*  --  2.6* 2.6*  2.8*  --  3.1*  --  3.3*   CHLORIDE  --  112*  --   --   --   --  111*  --  110*  --  113*   CO2  --  15*  --   --   --   --  13*  --  14*  --  12*   BUN  --  34.7*  --   --   --   --  34.2*  --  34.6*  --  33.4*   CR  --  1.98*  --   --   --   --  1.92*  --  1.96*  --  2.06*   * 195* 163*  --  166*  --  173*   < > 177*   < > 168*    < > = values in this interval not displayed.      Liver Function Tests             Recent Labs   Lab 06/11/24  0518 06/10/24  1746 06/10/24  1550 06/10/24  0527  06/09/24 2000 06/09/24  0540 06/08/24  1550 06/08/24  0612   * 128* 147* 131*   < > 121*   < > 160*   ALT 68 <5 46 54   < > 50   < > 61   ALKPHOS 181* 180* 201* 221*   < > 215*   < > 275*   BILITOTAL 23.7* 20.6* 24.0* 25.6*   < > 25.4*   < > 22.6*   ALBUMIN 2.7* 2.4* 2.8* 3.1*   < > 3.3*   < > 2.3*   INR 3.08*  --   --  2.55*  --  2.16*  --  1.79*    < > = values in this interval not displayed.      Coagulation Profile          Recent Labs   Lab 06/11/24  0518 06/10/24  0527 06/09/24  0540 06/08/24  0612 06/07/24  1542   INR 3.08* 2.55* 2.16* 1.79* 1.69*   PTT  --   --   --  53* 57*         5. RADIOLOGY:       Recent Results (from the past 24 hour(s))   XR Chest Port 1 View     Narrative     Portable chest 6/10/2024 at 0918 hours     INDICATION: Concern for pulmonary edema     COMPARISON: Today's 0201 hours     FINDINGS: Increasing patchy opacities once likely indicating worsening  alveolar edema. Heart borders are obscured especially on the left. No  ectopic air.     IMPRESSION increasing alveolar opacities concerning for alveolar edema  rather than alveolar hemorrhage or infection. Recommend follow-up to  clearing.     ION MAYER MD         SYSTEM ID:  [id number]   XR Chest Port 1 View     Narrative     Portable chest 6/10/2024 at 1432 hours     INDICATION: Endotracheal tube placement     COMPARISON: 0918 hours earlier today     FINDINGS: Diffuse opacification of the left lung and right upper lung  unchanged with patchy opacities. Dense opacification slightly  increased in the left lower lung zone. Left heart border remains  obscured. No obvious ectopic air.  Endotracheal tube now present with tip approximately 3.8 cm above the  sara. Feeding tube beyond the inferior margin of the image.        Impression     IMPRESSION: Worsening ARDS especially in the left lower lung zone  compared with earlier this morning. Now intubated.     ION MAYER MD         SYSTEM ID:  [id number]   XR Abdomen Port  1 View     Narrative     EXAMINATION:  XR ABDOMEN PORT 1 VIEW 6/10/2024      COMPARISON: CT abdomen and pelvis 2/11/2019     HISTORY: Verify small bowel feeding tube bedside placement.     TECHNIQUE: One frontal supine view of the abdomen.     FINDINGS: The feeding tube follows the greater curvature of the  stomach and terminates in the distal stomach near the expected area of  the pylorus. Air distention of the stomach. No abnormally dilated  air-filled loops of bowel. Partially visualized left greater than  right basilar pulmonary opacities.        Impression     IMPRESSION:   Feeding tube tip in the distal stomach near the pylorus.     I have personally reviewed the examination and initial interpretation  and I agree with the findings.     ERICKA SANDERS MD         SYSTEM ID:  [id number]   XR Chest Port 1 View     Narrative     Exam: XR CHEST PORT 1 VIEW, 6/10/2024 4:36 PM     Comparison: Same day radiographs     History: RN placed PICC - verify tip placement     Findings:  Portable AP view of the chest. Right PICC terminates over the SVC.  Stable endotracheal tube. Partially visualized enteric tube.     The cardiac silhouette is obscured. No pneumothorax. Diffuse bilateral  pulmonary opacities.         Impression     Impression:   1. Right PICC terminates over the SVC. Stable endotracheal tube.  2. Continued findings of ARDS.      I have personally reviewed the examination and initial interpretation  and I agree with the findings.     ERICKA SANDERS MD         SYSTEM ID:  [id number]   CT Chest Abdomen Pelvis w/o Contrast     Impression     RESIDENT PRELIMINARY INTERPRETATION  IMPRESSION:  1. Diffuse consolidative and groundglass opacities consistent with  pulmonary edema. Cannot exclude superimposed pneumonia.  2. Small volume ascites.

## 2024-06-13 NOTE — DEATH PRONOUNCEMENT
PROVIDER DEATH PRONOUNCEMENT    Called to pronounce Gomez Turk dead.    Physical Exam: Unresponsive to noxious stimuli, Spontaneous respirations absent, Breath sounds absent, Carotid pulse absent, Heart sounds absent, Pupillary light reflex absent, and Corneal blink reflex absent    Patient was pronounced dead at 2:55 AM, 2024.    Preliminary Cause of Death: Septic shock    Active Problems:    Shock (H)    Infectious disease present?: NO    Communicable disease present? (examples: HIV, chicken pox, TB, Ebola, CJD) :  NO    Multi-drug resistant organism present? (example: MRSA): NO    Please consider an autopsy if any of the following exist:  NO Unexpected or unexplained death during or following any dental, medical, or surgical diagnostic treatment procedures.   NO Death of mother at or up to seven days after delivery.     NO All  and pediatric deaths.     NO Death where the cause is sufficiently obscure to delay completion of the death certificate.   NO Deaths in which autopsy would confirm a suspected illness/condition that would affect surviving family members or recipients of transplanted organs.     The following deaths must be reported to the 's Office:  NO A death that may be due entirely or in part to any factors other than natural disease (recent surgery, recent trauma, suspected abuse/neglect).   NO A death that may be an accident, suicide, or homicide.     NO Any sudden, unexpected death in which there is no prior history of significant heart disease or any other condition associated with sudden death.   NO A death under suspicious, unusual, or unexpected circumstances.    NO Any death which is apparently due to natural causes but in which the  does not have a personal physician familiar with the patient s medical history, social, or environmental situation or the circumstances of the terminal event.   NO Any death apparently due to Sudden Infant Death Syndrome.      NO Deaths that occur during, in association with, or as consequences of a diagnostic, therapeutic, or anesthetic procedure.   NO Any death in which a fracture of a major bone has occurred within the past (6) six months.   NO A death of persons note seen by their physician within 120 days of demise.     NO Any death in which the  was an inmate of a public institution or was in the custody of Law Enforcement personnel.   NO  All unexpected deaths of children   NO Solid organ donors   NO Unidentified bodies   YES Deaths of persons whose bodies are to be cremated or otherwise disposed of so that the bodies will later be unavailable for examination;   NO Deaths unattended by a physician outside of a licensed healthcare facility or licensed residential hospice program   NO Deaths occurring within 24 hours of arrival to a health care facility if death is unexpected.    NO Deaths associated with the decedent s employment.   NO Deaths attributed to acts of terrorism.   NO Any death in which there is uncertainty as to whether it is a medical examiner s care should be discussed with the medical investigator.        Body disposition: Autopsy was discussed with family member:  Spouse by phone.  Permission for autopsy was declined.    Shade Duckworth PA-C on 2024 at 3:06 AM

## 2024-06-13 NOTE — DEATH PRONOUNCEMENT
RN auscultated lung sounds. Found to be absent. No pulse present. EKG showed PEA. No pulse present for over 2 minutes with palpation. EKG showed no activity. Provider notified. Time of RN assessment [id number]

## 2024-06-13 NOTE — CARE PLAN
Respiratory concerns:     Patient continued to guppy breathe and hold breathe while on pressure control mode. Provider updated vent settings and ordered to increase sedation to max. No improvement in breathing noted with increased sedation and vent changes. RN pulled ABGs and notified provider of worsening breathing pattern. Provider ordered propofol. ABG results critical with pH of 7.07 and Hbg 6.2. Provider notified wife who requested no escalation of cares and requested comfort cares to be initiated. Providing comfort cares per order.

## 2024-06-14 LAB — SCANNED LAB RESULT: NORMAL

## 2024-06-15 LAB
BACTERIA BLD CULT: NO GROWTH
BACTERIA BLD CULT: NO GROWTH

## 2024-07-08 LAB
BACTERIA BRONCH: NO GROWTH
BACTERIA BRONCH: NO GROWTH

## 2024-07-12 NOTE — PLAN OF CARE
Problem: Goal Outcome Summary  Goal: Goal Outcome Summary  Outcome: Improving  Pain managed with oral Oxycodone every 3 hours and IV Dilaudid for breakthrough. Potassium replaced orally.  Last BM 2/26/17, on scheduled stool softener, declined further intervention. Voiding freely.  LLE elevated.  Dangled at bedside, activity as tolerated.         Request Reference Number: [id number].     WEGOVY INJ 1.7MG is denied for not meeting the prior authorization requirement(s).     Details of this decision are in the notice attached below or have been faxed to you.

## 2024-08-06 LAB
ACID FAST STAIN (ARUP): NORMAL
